# Patient Record
Sex: FEMALE | Race: WHITE | NOT HISPANIC OR LATINO | URBAN - METROPOLITAN AREA
[De-identification: names, ages, dates, MRNs, and addresses within clinical notes are randomized per-mention and may not be internally consistent; named-entity substitution may affect disease eponyms.]

---

## 2017-05-03 ENCOUNTER — OUTPATIENT (OUTPATIENT)
Dept: OUTPATIENT SERVICES | Facility: HOSPITAL | Age: 67
LOS: 1 days | Discharge: HOME | End: 2017-05-03

## 2017-05-03 DIAGNOSIS — Z90.722 ACQUIRED ABSENCE OF OVARIES, BILATERAL: Chronic | ICD-10-CM

## 2017-05-03 DIAGNOSIS — Z98.89 OTHER SPECIFIED POSTPROCEDURAL STATES: Chronic | ICD-10-CM

## 2017-05-03 DIAGNOSIS — C54.1 MALIGNANT NEOPLASM OF ENDOMETRIUM: Chronic | ICD-10-CM

## 2017-05-03 DIAGNOSIS — C54.1 MALIGNANT NEOPLASM OF ENDOMETRIUM: ICD-10-CM

## 2017-05-03 DIAGNOSIS — Z90.710 ACQUIRED ABSENCE OF BOTH CERVIX AND UTERUS: Chronic | ICD-10-CM

## 2017-05-05 ENCOUNTER — RESULT REVIEW (OUTPATIENT)
Age: 67
End: 2017-05-05

## 2017-05-05 ENCOUNTER — APPOINTMENT (OUTPATIENT)
Dept: HEMATOLOGY ONCOLOGY | Facility: CLINIC | Age: 67
End: 2017-05-05

## 2017-05-05 VITALS
DIASTOLIC BLOOD PRESSURE: 78 MMHG | SYSTOLIC BLOOD PRESSURE: 120 MMHG | BODY MASS INDEX: 50.99 KG/M2 | WEIGHT: 293 LBS | RESPIRATION RATE: 17 BRPM | HEART RATE: 88 BPM | TEMPERATURE: 100.2 F

## 2017-05-05 LAB
HCT VFR BLD CALC: 33 % — LOW (ref 34.5–45)
HGB BLD-MCNC: 10.7 G/DL — LOW (ref 11.5–15.5)
MCHC RBC-ENTMCNC: 32.3 G/DL — SIGNIFICANT CHANGE UP (ref 32–36)
MCHC RBC-ENTMCNC: 32.8 PG — SIGNIFICANT CHANGE UP (ref 27–34)
MCV RBC AUTO: 101 FL — HIGH (ref 80–100)
PLATELET # BLD AUTO: 328 K/UL — SIGNIFICANT CHANGE UP (ref 150–400)
RBC # BLD: 3.26 M/UL — LOW (ref 3.8–5.2)
RBC # FLD: 13.4 % — SIGNIFICANT CHANGE UP (ref 10.3–14.5)
WBC # BLD: 9.2 K/UL — SIGNIFICANT CHANGE UP (ref 3.8–10.5)
WBC # FLD AUTO: 9.2 K/UL — SIGNIFICANT CHANGE UP (ref 3.8–10.5)

## 2017-05-09 LAB
ALBUMIN SERPL ELPH-MCNC: 4.2 G/DL
ALP BLD-CCNC: 58 U/L
ALT SERPL-CCNC: 10 U/L
ANION GAP SERPL CALC-SCNC: 23 MMOL/L
AST SERPL-CCNC: 14 U/L
BILIRUB SERPL-MCNC: 0.3 MG/DL
BUN SERPL-MCNC: 22 MG/DL
CALCIUM SERPL-MCNC: 9 MG/DL
CANCER AG125 SERPL-ACNC: 9 U/ML
CHLORIDE SERPL-SCNC: 98 MMOL/L
CO2 SERPL-SCNC: 22 MMOL/L
CREAT SERPL-MCNC: 1.02 MG/DL
GLUCOSE SERPL-MCNC: 128 MG/DL
POTASSIUM SERPL-SCNC: 5.1 MMOL/L
PROT SERPL-MCNC: 6.8 G/DL
SODIUM SERPL-SCNC: 143 MMOL/L

## 2017-05-11 ENCOUNTER — FORM ENCOUNTER (OUTPATIENT)
Age: 67
End: 2017-05-11

## 2017-05-12 ENCOUNTER — APPOINTMENT (OUTPATIENT)
Dept: CT IMAGING | Facility: IMAGING CENTER | Age: 67
End: 2017-05-12

## 2017-05-12 ENCOUNTER — OUTPATIENT (OUTPATIENT)
Dept: OUTPATIENT SERVICES | Facility: HOSPITAL | Age: 67
LOS: 1 days | End: 2017-05-12
Payer: COMMERCIAL

## 2017-05-12 DIAGNOSIS — E78.5 HYPERLIPIDEMIA, UNSPECIFIED: ICD-10-CM

## 2017-05-12 DIAGNOSIS — Z90.722 ACQUIRED ABSENCE OF OVARIES, BILATERAL: Chronic | ICD-10-CM

## 2017-05-12 DIAGNOSIS — Z90.710 ACQUIRED ABSENCE OF BOTH CERVIX AND UTERUS: Chronic | ICD-10-CM

## 2017-05-12 DIAGNOSIS — C54.1 MALIGNANT NEOPLASM OF ENDOMETRIUM: Chronic | ICD-10-CM

## 2017-05-12 DIAGNOSIS — Z98.89 OTHER SPECIFIED POSTPROCEDURAL STATES: Chronic | ICD-10-CM

## 2017-05-12 PROCEDURE — 71260 CT THORAX DX C+: CPT

## 2017-05-12 PROCEDURE — 82565 ASSAY OF CREATININE: CPT

## 2017-05-12 PROCEDURE — 74177 CT ABD & PELVIS W/CONTRAST: CPT

## 2017-05-30 ENCOUNTER — APPOINTMENT (OUTPATIENT)
Dept: GYNECOLOGIC ONCOLOGY | Facility: CLINIC | Age: 67
End: 2017-05-30

## 2017-05-30 VITALS
HEIGHT: 65 IN | WEIGHT: 293 LBS | SYSTOLIC BLOOD PRESSURE: 140 MMHG | DIASTOLIC BLOOD PRESSURE: 80 MMHG | BODY MASS INDEX: 48.82 KG/M2

## 2017-05-30 DIAGNOSIS — R92.8 OTHER ABNORMAL AND INCONCLUSIVE FINDINGS ON DIAGNOSTIC IMAGING OF BREAST: ICD-10-CM

## 2017-07-11 ENCOUNTER — INPATIENT (INPATIENT)
Facility: HOSPITAL | Age: 67
LOS: 8 days | Discharge: HOME | DRG: 603 | End: 2017-07-20
Attending: INTERNAL MEDICINE | Admitting: INTERNAL MEDICINE
Payer: COMMERCIAL

## 2017-07-11 VITALS
SYSTOLIC BLOOD PRESSURE: 117 MMHG | HEART RATE: 97 BPM | TEMPERATURE: 101 F | OXYGEN SATURATION: 97 % | RESPIRATION RATE: 20 BRPM | DIASTOLIC BLOOD PRESSURE: 73 MMHG

## 2017-07-11 DIAGNOSIS — Z90.722 ACQUIRED ABSENCE OF OVARIES, BILATERAL: Chronic | ICD-10-CM

## 2017-07-11 DIAGNOSIS — C54.1 MALIGNANT NEOPLASM OF ENDOMETRIUM: Chronic | ICD-10-CM

## 2017-07-11 DIAGNOSIS — Z98.89 OTHER SPECIFIED POSTPROCEDURAL STATES: Chronic | ICD-10-CM

## 2017-07-11 DIAGNOSIS — L03.90 CELLULITIS, UNSPECIFIED: ICD-10-CM

## 2017-07-11 DIAGNOSIS — Z90.710 ACQUIRED ABSENCE OF BOTH CERVIX AND UTERUS: Chronic | ICD-10-CM

## 2017-07-11 LAB
ALBUMIN SERPL ELPH-MCNC: 3.8 G/DL — SIGNIFICANT CHANGE UP (ref 3.3–5)
ALP SERPL-CCNC: 59 U/L — SIGNIFICANT CHANGE UP (ref 40–120)
ALT FLD-CCNC: 33 U/L RC — SIGNIFICANT CHANGE UP (ref 10–45)
ANION GAP SERPL CALC-SCNC: 17 MMOL/L — SIGNIFICANT CHANGE UP (ref 5–17)
AST SERPL-CCNC: 31 U/L — SIGNIFICANT CHANGE UP (ref 10–40)
BASOPHILS # BLD AUTO: 0 K/UL — SIGNIFICANT CHANGE UP (ref 0–0.2)
BASOPHILS NFR BLD AUTO: 0 % — SIGNIFICANT CHANGE UP (ref 0–2)
BILIRUB SERPL-MCNC: 0.4 MG/DL — SIGNIFICANT CHANGE UP (ref 0.2–1.2)
BUN SERPL-MCNC: 16 MG/DL — SIGNIFICANT CHANGE UP (ref 7–23)
CALCIUM SERPL-MCNC: 8.8 MG/DL — SIGNIFICANT CHANGE UP (ref 8.4–10.5)
CHLORIDE SERPL-SCNC: 98 MMOL/L — SIGNIFICANT CHANGE UP (ref 96–108)
CO2 SERPL-SCNC: 24 MMOL/L — SIGNIFICANT CHANGE UP (ref 22–31)
CREAT SERPL-MCNC: 1.09 MG/DL — SIGNIFICANT CHANGE UP (ref 0.5–1.3)
EOSINOPHIL # BLD AUTO: 0 K/UL — SIGNIFICANT CHANGE UP (ref 0–0.5)
EOSINOPHIL NFR BLD AUTO: 0.2 % — SIGNIFICANT CHANGE UP (ref 0–6)
GAS PNL BLDV: SIGNIFICANT CHANGE UP
GLUCOSE SERPL-MCNC: 168 MG/DL — HIGH (ref 70–99)
HCT VFR BLD CALC: 32.5 % — LOW (ref 34.5–45)
HGB BLD-MCNC: 10.8 G/DL — LOW (ref 11.5–15.5)
LYMPHOCYTES # BLD AUTO: 0.6 K/UL — LOW (ref 1–3.3)
LYMPHOCYTES # BLD AUTO: 8.8 % — LOW (ref 13–44)
MCHC RBC-ENTMCNC: 32.8 PG — SIGNIFICANT CHANGE UP (ref 27–34)
MCHC RBC-ENTMCNC: 33.4 GM/DL — SIGNIFICANT CHANGE UP (ref 32–36)
MCV RBC AUTO: 98.3 FL — SIGNIFICANT CHANGE UP (ref 80–100)
MONOCYTES # BLD AUTO: 0.5 K/UL — SIGNIFICANT CHANGE UP (ref 0–0.9)
MONOCYTES NFR BLD AUTO: 7.3 % — SIGNIFICANT CHANGE UP (ref 2–14)
NEUTROPHILS # BLD AUTO: 6.2 K/UL — SIGNIFICANT CHANGE UP (ref 1.8–7.4)
NEUTROPHILS NFR BLD AUTO: 83.7 % — HIGH (ref 43–77)
PLATELET # BLD AUTO: 244 K/UL — SIGNIFICANT CHANGE UP (ref 150–400)
POTASSIUM SERPL-MCNC: 4.3 MMOL/L — SIGNIFICANT CHANGE UP (ref 3.5–5.3)
POTASSIUM SERPL-SCNC: 4.3 MMOL/L — SIGNIFICANT CHANGE UP (ref 3.5–5.3)
PROT SERPL-MCNC: 6.9 G/DL — SIGNIFICANT CHANGE UP (ref 6–8.3)
RBC # BLD: 3.3 M/UL — LOW (ref 3.8–5.2)
RBC # FLD: 13.5 % — SIGNIFICANT CHANGE UP (ref 10.3–14.5)
SODIUM SERPL-SCNC: 139 MMOL/L — SIGNIFICANT CHANGE UP (ref 135–145)
WBC # BLD: 7.4 K/UL — SIGNIFICANT CHANGE UP (ref 3.8–10.5)
WBC # FLD AUTO: 7.4 K/UL — SIGNIFICANT CHANGE UP (ref 3.8–10.5)

## 2017-07-11 PROCEDURE — 73590 X-RAY EXAM OF LOWER LEG: CPT | Mod: 26,RT

## 2017-07-11 PROCEDURE — 99223 1ST HOSP IP/OBS HIGH 75: CPT

## 2017-07-11 PROCEDURE — 99285 EMERGENCY DEPT VISIT HI MDM: CPT

## 2017-07-11 RX ORDER — VANCOMYCIN HCL 1 G
2000 VIAL (EA) INTRAVENOUS ONCE
Qty: 0 | Refills: 0 | Status: COMPLETED | OUTPATIENT
Start: 2017-07-11 | End: 2017-07-11

## 2017-07-11 RX ORDER — DEXTROSE 50 % IN WATER 50 %
1 SYRINGE (ML) INTRAVENOUS ONCE
Qty: 0 | Refills: 0 | Status: DISCONTINUED | OUTPATIENT
Start: 2017-07-11 | End: 2017-07-20

## 2017-07-11 RX ORDER — SODIUM CHLORIDE 9 MG/ML
1000 INJECTION, SOLUTION INTRAVENOUS
Qty: 0 | Refills: 0 | Status: DISCONTINUED | OUTPATIENT
Start: 2017-07-11 | End: 2017-07-20

## 2017-07-11 RX ORDER — VANCOMYCIN HCL 1 G
VIAL (EA) INTRAVENOUS
Qty: 0 | Refills: 0 | Status: DISCONTINUED | OUTPATIENT
Start: 2017-07-11 | End: 2017-07-11

## 2017-07-11 RX ORDER — GLUCAGON INJECTION, SOLUTION 0.5 MG/.1ML
1 INJECTION, SOLUTION SUBCUTANEOUS ONCE
Qty: 0 | Refills: 0 | Status: DISCONTINUED | OUTPATIENT
Start: 2017-07-11 | End: 2017-07-20

## 2017-07-11 RX ORDER — INSULIN LISPRO 100/ML
VIAL (ML) SUBCUTANEOUS AT BEDTIME
Qty: 0 | Refills: 0 | Status: DISCONTINUED | OUTPATIENT
Start: 2017-07-11 | End: 2017-07-20

## 2017-07-11 RX ORDER — INSULIN LISPRO 100/ML
VIAL (ML) SUBCUTANEOUS
Qty: 0 | Refills: 0 | Status: DISCONTINUED | OUTPATIENT
Start: 2017-07-11 | End: 2017-07-20

## 2017-07-11 RX ORDER — DEXTROSE 50 % IN WATER 50 %
25 SYRINGE (ML) INTRAVENOUS ONCE
Qty: 0 | Refills: 0 | Status: DISCONTINUED | OUTPATIENT
Start: 2017-07-11 | End: 2017-07-20

## 2017-07-11 RX ORDER — SODIUM CHLORIDE 9 MG/ML
1000 INJECTION, SOLUTION INTRAVENOUS ONCE
Qty: 0 | Refills: 0 | Status: DISCONTINUED | OUTPATIENT
Start: 2017-07-11 | End: 2017-07-11

## 2017-07-11 RX ORDER — ACETAMINOPHEN 500 MG
975 TABLET ORAL ONCE
Qty: 0 | Refills: 0 | Status: COMPLETED | OUTPATIENT
Start: 2017-07-11 | End: 2017-07-11

## 2017-07-11 RX ORDER — SODIUM CHLORIDE 9 MG/ML
1000 INJECTION INTRAMUSCULAR; INTRAVENOUS; SUBCUTANEOUS ONCE
Qty: 0 | Refills: 0 | Status: COMPLETED | OUTPATIENT
Start: 2017-07-11 | End: 2017-07-11

## 2017-07-11 RX ORDER — DEXTROSE 50 % IN WATER 50 %
12.5 SYRINGE (ML) INTRAVENOUS ONCE
Qty: 0 | Refills: 0 | Status: DISCONTINUED | OUTPATIENT
Start: 2017-07-11 | End: 2017-07-20

## 2017-07-11 RX ADMIN — Medication 975 MILLIGRAM(S): at 22:17

## 2017-07-11 RX ADMIN — SODIUM CHLORIDE 2000 MILLILITER(S): 9 INJECTION INTRAMUSCULAR; INTRAVENOUS; SUBCUTANEOUS at 21:10

## 2017-07-11 RX ADMIN — Medication 250 MILLIGRAM(S): at 21:15

## 2017-07-11 RX ADMIN — Medication 975 MILLIGRAM(S): at 22:50

## 2017-07-11 NOTE — H&P ADULT - RS GEN PE MLT RESP DETAILS PC
no rales/clear to auscultation bilaterally/no wheezes/respirations non-labored/airway patent/breath sounds equal/good air movement

## 2017-07-11 NOTE — PATIENT PROFILE ADULT. - VISION (WITH CORRECTIVE LENSES IF THE PATIENT USUALLY WEARS THEM):
Normal vision: sees adequately in most situations; can see medication labels, newsprint Normal vision: sees adequately in most situations; can see medication labels, newsprint/uses eyeglasses

## 2017-07-11 NOTE — ED ADULT NURSE NOTE - OBJECTIVE STATEMENT
66F comes to ED c/o right leg swelling, redness and bullae. She states she noticed the redness and bullae today. She has large bullae to medial side of right calf, smaller bullae to right lateral and several smaller areas around right calf. Redness extends to bottom of right heel. She has had N/V/fever/chills x2 days which has resolved. PMH, bullous pemphigoid HTN, hypothyroid, osteoarthritis, DM, hyperlipidemia.  Patient is c/o 5/10 pain to right leg worse with walking. She denies SOB/chest pain/N/V/D/dizziness/numbness/weakness/tingling/abdominal pain/urinary symptoms. Family at bedside. Will continue to monitor. 66F comes to ED c/o right leg swelling, redness and bullae. She states she noticed the redness and bullae today. She has large bullae to medial side of right calf, smaller bullae to right lateral and several smaller areas around right calf. Redness extends to bottom of right heel. She has had N/V/fever/chills x2 days which has resolved. PMH, bullous pemphigoid HTN, hypothyroid, osteoarthritis, DM, hyperlipidemia.  Patient is c/o 5/10 pain to right leg worse with walking. She denies SOB/chest pain/N/V/D/dizziness/numbness/weakness/tingling/abdominal pain/urinary symptoms. She is a&ox4 and does not appear to be in any distress. Family at bedside. Will continue to monitor.

## 2017-07-11 NOTE — H&P ADULT - ASSESSMENT
67 yo woman with PMH of Uterine ca (s/p chemo and completed in April 2016 reported to be in Remission), Bullous Pemphigoid (diagnosed 2014 on Prednisone), HTN, HLD, DMT2, Osteoarthritis presenting with 1 day of worsening right leg redness, swelling and worsening bullae of right leg. 65 yo woman with PMH of Uterine ca (s/p chemo and completed in April 2016 reported to be in Remission), Bullous Pemphigoid (diagnosed 2014 on Prednisone), HTN, HLD, DMT2, Osteoarthritis presenting with 1 day of worsening right leg redness, swelling and worsening bullae of right leg concerning for cellulitis.

## 2017-07-11 NOTE — H&P ADULT - PROBLEM SELECTOR PLAN 5
Continue with pain management with Tramadol prn.  Patient endorse rare Vicodin use.  ISTOP # #: [id number] Continue with pain management with Tramadol prn. (Will have to use immediate release dose as only option inpatient formulary)  Patient endorse rare Vicodin use.  ISTOP # #: [id number]

## 2017-07-11 NOTE — ED PROVIDER NOTE - SKIN, MLM
RLE: extensive erythema, warmth, and mild tenderness over right anterior calf extending to lateral and posterior. No crepitus. Large flaccid bullous over right medial calf, small bullae over right lateral calf.

## 2017-07-11 NOTE — H&P ADULT - PMH
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  On Immunosupressants  Cellcept  Endometrial cancer  dx: 8/2015:  High grade Endometroid Adenocarcinoma  History of osteoarthritis    Hyperlipidemia    Hypertension    Hypothyroidism    Morbid obesity  BMI:  53.6  Type 2 diabetes mellitus    Vitamin D deficiency

## 2017-07-11 NOTE — PATIENT PROFILE ADULT. - NS TRANSFER PATIENT BELONGINGS
Clothing/Jewelry/Money (specify)/Cell Phone/PDA (specify)/tablet, earrings Clothing/Jewelry/Money (specify)/Cell Phone/PDA (specify)/tablet, earrings, pocket book (black), sneakers

## 2017-07-11 NOTE — H&P ADULT - ATTENDING COMMENTS
Dr. Remi Olvera accepted patient's case from the ED and requested hospitalist team to complete admission. Patient previously unknown to me and I was assigned case. Dr. Remi Olvera/[facility] team to continue care. Dr. Remi Olvera accepted patient's case from the ED and requested hospitalist team to complete admission. Patient previously unknown to me and I was assigned case. Dr. Remi Olvera/[facility] team to continue care. Sign out given Night NP. Dr. Remi Olvera accepted patient's case from the ED and requested hospitalist team to complete admission. Patient previously unknown to me and I was assigned case. Dr. Remi Olvera/[facility] team to continue care. Sign out given Night NPJigar.

## 2017-07-11 NOTE — H&P ADULT - NSHPLABSRESULTS_GEN_ALL_CORE
Personally reviewed labs an noted in detail below    Personally reviewed EKG SR 77 bpm  QTc 457 Comparable to Dec 2015 EKG    Prelim Read of : XRay of Right Tibia and Fibula "No tracking soft tissue gas collection. Dystrophic calcifications within the subcutaneous soft tissues of the right lower leg. Diffuse soft tissue swelling. No definite cortical erosion to suggest osteomyelitis. Right knee arthrosis most severe in the medial compartment. "

## 2017-07-11 NOTE — H&P ADULT - HISTORY OF PRESENT ILLNESS
Pleasant, 65 yo woman with PMH of Uterine ca (s/p chemo and completed in April 2016 reported to be in Remission), Bullous Pemphigoid (diagnosed 2014 on Prednisone), HTN, HLD, DMT2, Osteoarthritis presenting with 1 day of worsening right leg redness, swelling and worsening bullae of right leg. Endorses right leg discomfort that is difficult for her to describe but denies burning or pain when bearing weight on the right leg. She states that bullous pemphigoid typically affects the right leg recently and states that they were not as enlarged and fluid filled in prior days. She has been on Prednisone 10 mg day (the past 4-5 weeks). This dose has been increased from 5 mg in setting of increased bullae. Patient also takes Daptomycin and Niacin daily for pemphigoid management. Patient denies subjective fevers, chills, sweats. Endorsed symptoms of fever, vomiting without diarrhea a few days ago that is currently resolved. Denies URI symptoms, CP, SOB, palpitations, development of new rashes. Chronic pain 2/2 to osteoarthritis. Denies recent trauma, recent travel.

## 2017-07-11 NOTE — ED PROVIDER NOTE - CONSTITUTIONAL, MLM
normal... Morbidly obese, awake, alert, oriented to person, place, time/situation and in no apparent distress.

## 2017-07-11 NOTE — ED PROVIDER NOTE - PMH
Adrenal mass  2014 incidental findings 2014  Ct SCAN 9/2015 unchannged  24 hour urine for VMA done by Dr DR Canas [id number] Neg asper patient  Anemia    Bullous pemphigoid  dx: 8/2014.  On Immunosupressants  Cellcept  Endometrial cancer  dx: 8/2015:  High grade Endometroid Adenocarcinoma  History of osteoarthritis    Hyperlipidemia    Hypertension    Hypothyroidism    Morbid obesity  BMI:  53.6  Type 2 diabetes mellitus    Vitamin D deficiency

## 2017-07-11 NOTE — H&P ADULT - SKIN COMMENTS
RLE: bright red erythema with warmth encompassing  top of shin to ankle and medial aspect of foot. Large ~10 cm flacid full bullous over right medial calf  Smaller ~5 cm flaccid bullosu over left lateral calf Mild TTP to shin.

## 2017-07-11 NOTE — ED PROVIDER NOTE - MEDICAL DECISION MAKING DETAILS
Attending MD Garcia: 66F with bullous pemphigoid on chronic po prednisone, DM presenting with right leg redness, swelling and pain, exam consistent with cellulitis, has associated bullae that are likely superinfected, do not suspect necrotizing fasciitis. Given extent of erythema and chronic relative immunosuppression, will admit for IV abx

## 2017-07-11 NOTE — ED PROVIDER NOTE - OBJECTIVE STATEMENT
65 y/o female with pmhx of bullous pemphigoid, DM, HLD, HTN, and OA presents with c/o a painful fluid filled blister on her right leg with erythema that developed today. States her weight is 295 lb. Denies fever, chills, or any other complaints.     Medications:  niacin  BID  prednisone BID    PMD:  Dr. Ross  [facility]    Dermatologist:  Dr. Dea Silva

## 2017-07-11 NOTE — H&P ADULT - PROBLEM SELECTOR PLAN 4
Relative hypotension. Patient took Lisinopril   will hold HTN meds pending improve hemodynamic stablity

## 2017-07-11 NOTE — ED ADULT NURSE NOTE - CHPI ED SYMPTOMS NEG
no dizziness/no numbness/no fever/no tingling/no decreased eating/drinking/no chills/no weakness/no vomiting/no nausea

## 2017-07-11 NOTE — ED ADULT NURSE NOTE - PSH
Endometrial cancer  2015:  Endometrial Biopsy: dx: High Grade Endometroid Adenocarcinoma  History of  section  -95:  4 X  S/P BSO (bilateral salpingo-oophorectomy)    S/P hysterectomy    Status post total thyroidectomy  2001

## 2017-07-11 NOTE — H&P ADULT - PROBLEM SELECTOR PLAN 1
Patient chronically on Doxycycline and Prednisone as outpatient.   Physical exam concerning for cellulitis and possibility of superinfected bullae.  Patient with fever in ED. Labs without leukocytosis, but lactate 2.4  IV vancomycin initiated in ED. Continue with IV vancomycin. Monitor Vancomycin trough.  As patient diabetic, concern for pseudomonal involvement. Will cover with Aztreonam for now. Pharmacy reviewed and approved use of abx  Primary day team to consider ID consult and Derm consult in AM Patient chronically on Doxycycline and Prednisone as outpatient.   Physical exam concerning for cellulitis and possibility of superinfected bullae.  Patient with fever in ED. Labs without leukocytosis, but lactate 2.4  IV vancomycin initiated in ED. Continue with IV vancomycin. Monitor Vancomycin trough.  As patient diabetic, concern for pseudomonal involvement. Will cover with Aztreonam for now. Pharmacy reviewed and approved use of abx  Continue IV fluids  Trend Lactate  F/U Cultures  Primary day team to consider ID consult and Derm consult in AM

## 2017-07-11 NOTE — ED ADULT NURSE NOTE - PMH
Adrenal mass  2014 incidental findings 2014  Ct SCAN 9/2015 unchannged  24 hour urine for VMA done by Dr DR Canas [id number] Neg asper patient  Anemia    Bullous pemphigoid  dx: 8/2014.  On Immunosupressants  Cellcept  Endometrial cancer  dx: 8/2015:  High grade Endometroid Adenocarcinoma  History of osteoarthritis    Hyperlipidemia    Hypertension    Hypothyroidism    Morbid obesity  BMI:  53.6  Type 2 diabetes mellitus    Vitamin D deficiency

## 2017-07-12 DIAGNOSIS — Z87.39 PERSONAL HISTORY OF OTHER DISEASES OF THE MUSCULOSKELETAL SYSTEM AND CONNECTIVE TISSUE: ICD-10-CM

## 2017-07-12 DIAGNOSIS — L12.0 BULLOUS PEMPHIGOID: ICD-10-CM

## 2017-07-12 DIAGNOSIS — E11.9 TYPE 2 DIABETES MELLITUS WITHOUT COMPLICATIONS: ICD-10-CM

## 2017-07-12 DIAGNOSIS — Z29.9 ENCOUNTER FOR PROPHYLACTIC MEASURES, UNSPECIFIED: ICD-10-CM

## 2017-07-12 DIAGNOSIS — L03.115 CELLULITIS OF RIGHT LOWER LIMB: ICD-10-CM

## 2017-07-12 DIAGNOSIS — I10 ESSENTIAL (PRIMARY) HYPERTENSION: ICD-10-CM

## 2017-07-12 DIAGNOSIS — L03.90 CELLULITIS, UNSPECIFIED: ICD-10-CM

## 2017-07-12 LAB
ANION GAP SERPL CALC-SCNC: 15 MMOL/L — SIGNIFICANT CHANGE UP (ref 5–17)
BASOPHILS # BLD AUTO: 0.02 K/UL — SIGNIFICANT CHANGE UP (ref 0–0.2)
BASOPHILS NFR BLD AUTO: 0.3 % — SIGNIFICANT CHANGE UP (ref 0–2)
BUN SERPL-MCNC: 17 MG/DL — SIGNIFICANT CHANGE UP (ref 7–23)
CALCIUM SERPL-MCNC: 7.4 MG/DL — LOW (ref 8.4–10.5)
CHLORIDE SERPL-SCNC: 103 MMOL/L — SIGNIFICANT CHANGE UP (ref 96–108)
CO2 SERPL-SCNC: 21 MMOL/L — LOW (ref 22–31)
CREAT SERPL-MCNC: 0.89 MG/DL — SIGNIFICANT CHANGE UP (ref 0.5–1.3)
EOSINOPHIL # BLD AUTO: 0.06 K/UL — SIGNIFICANT CHANGE UP (ref 0–0.5)
EOSINOPHIL NFR BLD AUTO: 1 % — SIGNIFICANT CHANGE UP (ref 0–6)
GLUCOSE SERPL-MCNC: 102 MG/DL — HIGH (ref 70–99)
HBA1C BLD-MCNC: 6.2 % — HIGH (ref 4–5.6)
HCT VFR BLD CALC: 29.6 % — LOW (ref 34.5–45)
HGB BLD-MCNC: 9.3 G/DL — LOW (ref 11.5–15.5)
IMM GRANULOCYTES NFR BLD AUTO: 0.3 % — SIGNIFICANT CHANGE UP (ref 0–1.5)
LACTATE SERPL-SCNC: 2.1 MMOL/L — HIGH (ref 0.7–2)
LYMPHOCYTES # BLD AUTO: 1.04 K/UL — SIGNIFICANT CHANGE UP (ref 1–3.3)
LYMPHOCYTES # BLD AUTO: 17.2 % — SIGNIFICANT CHANGE UP (ref 13–44)
MAGNESIUM SERPL-MCNC: 1.6 MG/DL — SIGNIFICANT CHANGE UP (ref 1.6–2.6)
MCHC RBC-ENTMCNC: 30.3 PG — SIGNIFICANT CHANGE UP (ref 27–34)
MCHC RBC-ENTMCNC: 31.4 GM/DL — LOW (ref 32–36)
MCV RBC AUTO: 96.4 FL — SIGNIFICANT CHANGE UP (ref 80–100)
MONOCYTES # BLD AUTO: 0.62 K/UL — SIGNIFICANT CHANGE UP (ref 0–0.9)
MONOCYTES NFR BLD AUTO: 10.2 % — SIGNIFICANT CHANGE UP (ref 2–14)
NEUTROPHILS # BLD AUTO: 4.3 K/UL — SIGNIFICANT CHANGE UP (ref 1.8–7.4)
NEUTROPHILS NFR BLD AUTO: 71 % — SIGNIFICANT CHANGE UP (ref 43–77)
PHOSPHATE SERPL-MCNC: 1.8 MG/DL — LOW (ref 2.5–4.5)
PLATELET # BLD AUTO: 234 K/UL — SIGNIFICANT CHANGE UP (ref 150–400)
POTASSIUM SERPL-MCNC: 4.1 MMOL/L — SIGNIFICANT CHANGE UP (ref 3.5–5.3)
POTASSIUM SERPL-SCNC: 4.1 MMOL/L — SIGNIFICANT CHANGE UP (ref 3.5–5.3)
RBC # BLD: 3.07 M/UL — LOW (ref 3.8–5.2)
RBC # FLD: 15.1 % — HIGH (ref 10.3–14.5)
SODIUM SERPL-SCNC: 139 MMOL/L — SIGNIFICANT CHANGE UP (ref 135–145)
WBC # BLD: 6.06 K/UL — SIGNIFICANT CHANGE UP (ref 3.8–10.5)
WBC # FLD AUTO: 6.06 K/UL — SIGNIFICANT CHANGE UP (ref 3.8–10.5)

## 2017-07-12 PROCEDURE — 99254 IP/OBS CNSLTJ NEW/EST MOD 60: CPT | Mod: GC

## 2017-07-12 PROCEDURE — 99223 1ST HOSP IP/OBS HIGH 75: CPT | Mod: GC

## 2017-07-12 RX ORDER — AZTREONAM 2 G
VIAL (EA) INJECTION
Qty: 0 | Refills: 0 | Status: DISCONTINUED | OUTPATIENT
Start: 2017-07-12 | End: 2017-07-12

## 2017-07-12 RX ORDER — ACETAMINOPHEN 500 MG
2 TABLET ORAL
Qty: 0 | Refills: 0 | COMMUNITY

## 2017-07-12 RX ORDER — SODIUM CHLORIDE 9 MG/ML
1000 INJECTION INTRAMUSCULAR; INTRAVENOUS; SUBCUTANEOUS
Qty: 0 | Refills: 0 | Status: COMPLETED | OUTPATIENT
Start: 2017-07-12 | End: 2017-07-12

## 2017-07-12 RX ORDER — LEVOTHYROXINE SODIUM 125 MCG
150 TABLET ORAL DAILY
Qty: 0 | Refills: 0 | Status: DISCONTINUED | OUTPATIENT
Start: 2017-07-12 | End: 2017-07-20

## 2017-07-12 RX ORDER — AZTREONAM 2 G
2000 VIAL (EA) INJECTION EVERY 8 HOURS
Qty: 0 | Refills: 0 | Status: DISCONTINUED | OUTPATIENT
Start: 2017-07-12 | End: 2017-07-12

## 2017-07-12 RX ORDER — ATORVASTATIN CALCIUM 80 MG/1
10 TABLET, FILM COATED ORAL AT BEDTIME
Qty: 0 | Refills: 0 | Status: DISCONTINUED | OUTPATIENT
Start: 2017-07-12 | End: 2017-07-20

## 2017-07-12 RX ORDER — NIACIN 50 MG
500 TABLET ORAL
Qty: 0 | Refills: 0 | Status: DISCONTINUED | OUTPATIENT
Start: 2017-07-12 | End: 2017-07-20

## 2017-07-12 RX ORDER — CEFAZOLIN SODIUM 1 G
VIAL (EA) INJECTION
Qty: 0 | Refills: 0 | Status: DISCONTINUED | OUTPATIENT
Start: 2017-07-12 | End: 2017-07-13

## 2017-07-12 RX ORDER — CEFAZOLIN SODIUM 1 G
2000 VIAL (EA) INJECTION EVERY 8 HOURS
Qty: 0 | Refills: 0 | Status: DISCONTINUED | OUTPATIENT
Start: 2017-07-12 | End: 2017-07-13

## 2017-07-12 RX ORDER — AZTREONAM 2 G
2000 VIAL (EA) INJECTION ONCE
Qty: 0 | Refills: 0 | Status: COMPLETED | OUTPATIENT
Start: 2017-07-12 | End: 2017-07-12

## 2017-07-12 RX ORDER — SODIUM CHLORIDE 9 MG/ML
1000 INJECTION INTRAMUSCULAR; INTRAVENOUS; SUBCUTANEOUS
Qty: 0 | Refills: 0 | Status: DISCONTINUED | OUTPATIENT
Start: 2017-07-12 | End: 2017-07-20

## 2017-07-12 RX ORDER — METFORMIN HYDROCHLORIDE 850 MG/1
1 TABLET ORAL
Qty: 0 | Refills: 0 | COMMUNITY

## 2017-07-12 RX ORDER — TRAMADOL HYDROCHLORIDE 50 MG/1
100 TABLET ORAL EVERY 8 HOURS
Qty: 0 | Refills: 0 | Status: DISCONTINUED | OUTPATIENT
Start: 2017-07-12 | End: 2017-07-15

## 2017-07-12 RX ORDER — FOLIC ACID 0.8 MG
0 TABLET ORAL
Qty: 0 | Refills: 0 | COMMUNITY

## 2017-07-12 RX ORDER — VANCOMYCIN HCL 1 G
1250 VIAL (EA) INTRAVENOUS EVERY 12 HOURS
Qty: 0 | Refills: 0 | Status: DISCONTINUED | OUTPATIENT
Start: 2017-07-12 | End: 2017-07-12

## 2017-07-12 RX ORDER — VANCOMYCIN HCL 1 G
1750 VIAL (EA) INTRAVENOUS EVERY 12 HOURS
Qty: 0 | Refills: 0 | Status: DISCONTINUED | OUTPATIENT
Start: 2017-07-12 | End: 2017-07-12

## 2017-07-12 RX ORDER — ATORVASTATIN CALCIUM 80 MG/1
1 TABLET, FILM COATED ORAL
Qty: 0 | Refills: 0 | COMMUNITY

## 2017-07-12 RX ORDER — CEFAZOLIN SODIUM 1 G
2000 VIAL (EA) INJECTION ONCE
Qty: 0 | Refills: 0 | Status: COMPLETED | OUTPATIENT
Start: 2017-07-12 | End: 2017-07-12

## 2017-07-12 RX ORDER — NIACIN 50 MG
0 TABLET ORAL
Qty: 0 | Refills: 0 | COMMUNITY

## 2017-07-12 RX ORDER — LISINOPRIL 2.5 MG/1
0 TABLET ORAL
Qty: 0 | Refills: 0 | COMMUNITY

## 2017-07-12 RX ADMIN — Medication 166.67 MILLIGRAM(S): at 10:04

## 2017-07-12 RX ADMIN — TRAMADOL HYDROCHLORIDE 100 MILLIGRAM(S): 50 TABLET ORAL at 05:31

## 2017-07-12 RX ADMIN — Medication 500 MILLIGRAM(S): at 18:08

## 2017-07-12 RX ADMIN — SODIUM CHLORIDE 60 MILLILITER(S): 9 INJECTION INTRAMUSCULAR; INTRAVENOUS; SUBCUTANEOUS at 11:00

## 2017-07-12 RX ADMIN — Medication 100 MILLIGRAM(S): at 12:01

## 2017-07-12 RX ADMIN — Medication 100 MILLIGRAM(S): at 02:03

## 2017-07-12 RX ADMIN — Medication 1: at 12:54

## 2017-07-12 RX ADMIN — Medication 150 MICROGRAM(S): at 05:31

## 2017-07-12 RX ADMIN — Medication 2: at 18:00

## 2017-07-12 RX ADMIN — ATORVASTATIN CALCIUM 10 MILLIGRAM(S): 80 TABLET, FILM COATED ORAL at 22:54

## 2017-07-12 RX ADMIN — TRAMADOL HYDROCHLORIDE 100 MILLIGRAM(S): 50 TABLET ORAL at 14:11

## 2017-07-12 RX ADMIN — SODIUM CHLORIDE 125 MILLILITER(S): 9 INJECTION INTRAMUSCULAR; INTRAVENOUS; SUBCUTANEOUS at 00:49

## 2017-07-12 RX ADMIN — TRAMADOL HYDROCHLORIDE 100 MILLIGRAM(S): 50 TABLET ORAL at 23:29

## 2017-07-12 RX ADMIN — Medication 500 MILLIGRAM(S): at 06:01

## 2017-07-12 RX ADMIN — Medication 10 MILLIGRAM(S): at 05:31

## 2017-07-12 RX ADMIN — Medication 100 MILLIGRAM(S): at 18:08

## 2017-07-12 NOTE — CONSULT NOTE ADULT - ASSESSMENT
Pleasant, 67 yo woman with PMH of Uterine ca (s/p chemo and completed in April 2016 reported to be in Remission), Bullous Pemphigoid (diagnosed 2014 on Prednisone), HTN, HLD, DMT2, Osteoarthritis presenting with 1 day of worsening right leg redness. Pleasant, 65 yo woman with PMH of Uterine ca (s/p chemo and completed in April 2016 reported to be in Remission), Bullous Pemphigoid (diagnosed 2014 on Prednisone), HTN, HLD, DMT2, Osteoarthritis presenting with 1 day of worsening right leg redness. Pt with temp of 100.5. Examination notable for multiple RLE bullae with surrounding area of erythema and warmth. Pleasant, 65 yo woman with PMH of Uterine ca (s/p chemo and completed in April 2016 reported to be in Remission), Bullous Pemphigoid (diagnosed 2014 on Prednisone), HTN, HLD, DMT2, Osteoarthritis presenting with 1 day of worsening right leg redness and bullae. Pt with temp of 100.5, however w/o leukocytosis. Examination notable for multiple RLE bullae with surrounding area of erythema and warmth. Pleasant, 65 yo woman with PMH of Uterine ca (s/p chemo and completed in April 2016 reported to be in Remission), Bullous Pemphigoid (diagnosed 2014 on Prednisone), HTN, HLD, DMT2, Osteoarthritis presenting with 1 day of worsening right leg redness and bullae. Pt with temp of 100.5, however w/o leukocytosis. Examination notable for multiple RLE bullae with surrounding area of erythema and warmth, concerning for superimposed cellulitis.

## 2017-07-12 NOTE — CONSULT NOTE ADULT - PROBLEM SELECTOR RECOMMENDATION 9
Appears well controlled over all  -continue with prednisone 10mg/d  -continue with doxy at prior dosing once discharged or antibiotic course completed per ID  -continue niacin  -f/u with outside dermatologist after discharge

## 2017-07-12 NOTE — CONSULT NOTE ADULT - SUBJECTIVE AND OBJECTIVE BOX
HPI:  66F h/o Uterine ca (s/p chemo and completed in 2016 reported to be in Remission), Bullous Pemphigoid ( followed by Derm Dr. Silva, diagnosed  on Prednisone 40 initially, more recently between 5-10mg, also on doxy/niacin as outpatient) presenting with 1 day of worsening right leg redness, swelling and worsening bullae of right leg. Patient reports that initially her BP was all over her body, very itchy. Biopsies established the diagnosis. Recently she has not had any bullae form anywhere but her R leg. In the past outbreaks were not associated with fever, but this time she had fever and nausea/v. It is also itchy.      PAST MEDICAL & SURGICAL HISTORY:  Adrenal mass:  incidental findings   Ct SCAN 2015 unchannged  24 hour urine for VMA done by Dr DR Canas [phone] Neg asper patient  Hyperlipidemia  Endometrial cancer: dx: 2015:  High grade Endometroid Adenocarcinoma  Morbid obesity: BMI:  53.6  Vitamin D deficiency  Hypothyroidism  Anemia  History of osteoarthritis  Type 2 diabetes mellitus  Hypertension  Bullous pemphigoid: dx: 2014.  On Immunosupressants  Cellcept  S/P BSO (bilateral salpingo-oophorectomy)  S/P hysterectomy  Endometrial cancer: 2015:  Endometrial Biopsy: dx: High Grade Endometroid Adenocarcinoma  Status post total thyroidectomy: &#x27; 2001  History of  section: &#x27; 82-&#x27;95:  4 X      REVIEW OF SYSTEMS      General: no lethary	    Skin/Breast: see HPI  	  Ophthalmologic: no eye pain or change in vision  	  ENMT: no dysphagia or change in hearing    Respiratory and Thorax: no SOB or cough  	  Cardiovascular: no palpitations or chest pain    Gastrointestinal: no abdomenal pain or blood in stool     Genitourinary: no dysuria or frequency    Musculoskeletal: no joint pains or weakness	    Neurological:no weakness, numbness , or tingling    MEDICATIONS  (STANDING):  insulin lispro (HumaLOG) corrective regimen sliding scale   SubCutaneous three times a day before meals  insulin lispro (HumaLOG) corrective regimen sliding scale   SubCutaneous at bedtime  dextrose 5%. 1000 milliLiter(s) (50 mL/Hr) IV Continuous <Continuous>  dextrose 50% Injectable 12.5 Gram(s) IV Push once  dextrose 50% Injectable 25 Gram(s) IV Push once  dextrose 50% Injectable 25 Gram(s) IV Push once  atorvastatin 10 milliGRAM(s) Oral at bedtime  niacin  milliGRAM(s) Oral two times a day  levothyroxine 150 MICROGram(s) Oral daily  predniSONE   Tablet 10 milliGRAM(s) Oral daily  sodium chloride 0.9%. 1000 milliLiter(s) (60 mL/Hr) IV Continuous <Continuous>  ceFAZolin   IVPB   IV Intermittent   ceFAZolin   IVPB 2000 milliGRAM(s) IV Intermittent once  ceFAZolin   IVPB 2000 milliGRAM(s) IV Intermittent every 8 hours    MEDICATIONS  (PRN):  dextrose Gel 1 Dose(s) Oral once PRN Blood Glucose LESS THAN 70 milliGRAM(s)/deciliter  glucagon  Injectable 1 milliGRAM(s) IntraMuscular once PRN Glucose LESS THAN 70 milligrams/deciliter  traMADol 100 milliGRAM(s) Oral every 8 hours PRN Moderate-Severe Pain      Allergies    daptomycin (Vomiting)  penicillin (Pruritus; Rash; Hives)    FAMILY HISTORY:  Family history of lymphoma (Father): father      Vital Signs Last 24 Hrs  T(C): 36.4 (2017 07:30), Max: 38.1 (2017 20:31)  T(F): 97.6 (2017 07:30), Max: 100.5 (2017 20:31)  HR: 71 (2017 07:30) (71 - 97)  BP: 96/66 (2017 07:30) (96/59 - 117/73)  BP(mean): --  RR: 19 (2017 07:30) (18 - 20)  SpO2: 96% (2017 07:30) (96% - 97%)    PHYSICAL EXAM:     The patient was alert and oriented X 3, well nourished, and in no  apparent distress.  OP showed no ulcerations  There was no visible lymphadenopathy.  Conjunctiva were non injected  There was no clubbing or edema of extremities.  The scalp, hair, face, eyebrows, lips, OP, neck, chest, back,   extremities X 4, nails were examined.  There was no hyperhidrosis or bromhidrosis.    Of note on skin exam:     R leg edematous    R shin with erythema and scale from mid anterior shin to superior ankle  Large flaccid bullae with serous fluid on medial and lateral shin      LABS:                        9.3    6.06  )-----------( 234      ( 2017 07:30 )             29.6     07-12    139  |  103  |  17  ----------------------------<  102<H>  4.1   |  21<L>  |  0.89    Ca    7.4<L>      2017 07:28  Phos  1.8     07-12  Mg     1.6     07-12    TPro  6.9  /  Alb  3.8  /  TBili  0.4  /  DBili  x   /  AST  31  /  ALT  33  /  AlkPhos  59  07-11

## 2017-07-12 NOTE — PROGRESS NOTE ADULT - PROBLEM SELECTOR PLAN 1
Patient chronically on Doxycycline and Prednisone as outpatient.   Physical exam concerning for cellulitis and possibility of superinfected bullae.  Vanco/aztreonam  Continue IV fluids  Trend Lactate  F/U Cultures  Appreciate ID

## 2017-07-12 NOTE — PROGRESS NOTE ADULT - SUBJECTIVE AND OBJECTIVE BOX
Patient is a 66y old  Female who presents with a chief complaint of RLE redness, swelling (11 Jul 2017 23:54)      SUBJECTIVE / OVERNIGHT EVENTS:    Not in discomfort.  Mild pain in rt foot and distal rt foreleg    Vital Signs Last 24 Hrs  T(C): 36.4 (12 Jul 2017 07:30), Max: 38.1 (11 Jul 2017 20:31)  T(F): 97.6 (12 Jul 2017 07:30), Max: 100.5 (11 Jul 2017 20:31)  HR: 71 (12 Jul 2017 07:30) (71 - 97)  BP: 96/66 (12 Jul 2017 07:30) (96/59 - 117/73)  BP(mean): --  RR: 19 (12 Jul 2017 07:30) (18 - 20)  SpO2: 96% (12 Jul 2017 07:30) (96% - 97%)  I&O's Summary      GENERAL: NAD, AAOx3  HEAD:  Atraumatic, Normocephalic  EYES: EOMI  NECK: Supple, No JVD  CHEST/LUNG: Clear to auscultation bilaterally; No wheeze  HEART: Regular rate and rhythm; No murmurs, rubs, or gallops  ABDOMEN: Soft, Nontender, Nondistended; Bowel sounds present  EXTREMITIES:  2+ Peripheral Pulses, No clubbing, cyanosis, or edema  SKIN: a large blister on medial and lateral rt foreleg, 2 more smaller ones on posterior calf; erythema around all from middle of foreleg to ankle with attendant 2+ edema  NEURO: nonfocal CN/motor/sensory/reflexes    LABS:                        9.3    6.06  )-----------( 234      ( 12 Jul 2017 07:30 )             29.6     07-12    139  |  103  |  17  ----------------------------<  102<H>  4.1   |  21<L>  |  0.89    Ca    7.4<L>      12 Jul 2017 07:28  Phos  1.8     07-12  Mg     1.6     07-12    TPro  6.9  /  Alb  3.8  /  TBili  0.4  /  DBili  x   /  AST  31  /  ALT  33  /  AlkPhos  59  07-11      CAPILLARY BLOOD GLUCOSE  163 (12 Jul 2017 12:29)  127 (12 Jul 2017 09:14)                RADIOLOGY & ADDITIONAL TESTS:    Imaging Personally Reviewed:  [x] YES  [ ] NO    Consultant(s) Notes Reviewed:  [x] YES  [ ] NO      MEDICATIONS  (STANDING):  insulin lispro (HumaLOG) corrective regimen sliding scale   SubCutaneous three times a day before meals  insulin lispro (HumaLOG) corrective regimen sliding scale   SubCutaneous at bedtime  dextrose 5%. 1000 milliLiter(s) (50 mL/Hr) IV Continuous <Continuous>  dextrose 50% Injectable 12.5 Gram(s) IV Push once  dextrose 50% Injectable 25 Gram(s) IV Push once  dextrose 50% Injectable 25 Gram(s) IV Push once  atorvastatin 10 milliGRAM(s) Oral at bedtime  niacin  milliGRAM(s) Oral two times a day  levothyroxine 150 MICROGram(s) Oral daily  predniSONE   Tablet 10 milliGRAM(s) Oral daily  aztreonam  IVPB   IV Intermittent   aztreonam  IVPB 2000 milliGRAM(s) IV Intermittent every 8 hours  vancomycin  IVPB 1250 milliGRAM(s) IV Intermittent every 12 hours  sodium chloride 0.9%. 1000 milliLiter(s) (60 mL/Hr) IV Continuous <Continuous>    MEDICATIONS  (PRN):  dextrose Gel 1 Dose(s) Oral once PRN Blood Glucose LESS THAN 70 milliGRAM(s)/deciliter  glucagon  Injectable 1 milliGRAM(s) IntraMuscular once PRN Glucose LESS THAN 70 milligrams/deciliter  traMADol 100 milliGRAM(s) Oral every 8 hours PRN Moderate-Severe Pain      Care Discussed with Consultants/Other Providers [x] YES  [ ] NO    HEALTH ISSUES - PROBLEM Dx:  Prophylactic measure: Prophylactic measure  History of osteoarthritis: History of osteoarthritis  Hypertension: Hypertension  Type 2 diabetes mellitus without complication, unspecified long term insulin use status: Type 2 diabetes mellitus without complication, unspecified long term insulin use status  Bullous pemphigoid: Bullous pemphigoid  Cellulitis of right lower extremity: Cellulitis of right lower extremity Patient is a 66y old  Female who presents with a chief complaint of RLE redness, swelling (11 Jul 2017 23:54)      SUBJECTIVE / OVERNIGHT EVENTS:    Not in discomfort.  Mild pain in rt foot and distal rt foreleg    Vital Signs Last 24 Hrs  T(C): 36.4 (12 Jul 2017 07:30), Max: 38.1 (11 Jul 2017 20:31)  T(F): 97.6 (12 Jul 2017 07:30), Max: 100.5 (11 Jul 2017 20:31)  HR: 71 (12 Jul 2017 07:30) (71 - 97)  BP: 96/66 (12 Jul 2017 07:30) (96/59 - 117/73)  BP(mean): --  RR: 19 (12 Jul 2017 07:30) (18 - 20)  SpO2: 96% (12 Jul 2017 07:30) (96% - 97%)  I&O's Summary      GENERAL: NAD, AAOx3  HEAD:  Atraumatic, Normocephalic  EYES: EOMI  NECK: Supple, No JVD  CHEST/LUNG: Clear to auscultation bilaterally; No wheeze  HEART: Regular rate and rhythm; No murmurs, rubs, or gallops  ABDOMEN: Soft, Nontender, Nondistended; Bowel sounds present  EXTREMITIES:  2+ LE  edema b/l  SKIN: a large blister on medial and lateral rt foreleg, 2 more smaller ones on posterior calf; erythema around all from middle of foreleg to ankle with attendant 2+ edema  NEURO: nonfocal CN/motor/sensory/reflexes    LABS:                        9.3    6.06  )-----------( 234      ( 12 Jul 2017 07:30 )             29.6     07-12    139  |  103  |  17  ----------------------------<  102<H>  4.1   |  21<L>  |  0.89    Ca    7.4<L>      12 Jul 2017 07:28  Phos  1.8     07-12  Mg     1.6     07-12    TPro  6.9  /  Alb  3.8  /  TBili  0.4  /  DBili  x   /  AST  31  /  ALT  33  /  AlkPhos  59  07-11      CAPILLARY BLOOD GLUCOSE  163 (12 Jul 2017 12:29)  127 (12 Jul 2017 09:14)                RADIOLOGY & ADDITIONAL TESTS:    Imaging Personally Reviewed:  [x] YES  [ ] NO    Consultant(s) Notes Reviewed:  [x] YES  [ ] NO      MEDICATIONS  (STANDING):  insulin lispro (HumaLOG) corrective regimen sliding scale   SubCutaneous three times a day before meals  insulin lispro (HumaLOG) corrective regimen sliding scale   SubCutaneous at bedtime  dextrose 5%. 1000 milliLiter(s) (50 mL/Hr) IV Continuous <Continuous>  dextrose 50% Injectable 12.5 Gram(s) IV Push once  dextrose 50% Injectable 25 Gram(s) IV Push once  dextrose 50% Injectable 25 Gram(s) IV Push once  atorvastatin 10 milliGRAM(s) Oral at bedtime  niacin  milliGRAM(s) Oral two times a day  levothyroxine 150 MICROGram(s) Oral daily  predniSONE   Tablet 10 milliGRAM(s) Oral daily  aztreonam  IVPB   IV Intermittent   aztreonam  IVPB 2000 milliGRAM(s) IV Intermittent every 8 hours  vancomycin  IVPB 1250 milliGRAM(s) IV Intermittent every 12 hours  sodium chloride 0.9%. 1000 milliLiter(s) (60 mL/Hr) IV Continuous <Continuous>    MEDICATIONS  (PRN):  dextrose Gel 1 Dose(s) Oral once PRN Blood Glucose LESS THAN 70 milliGRAM(s)/deciliter  glucagon  Injectable 1 milliGRAM(s) IntraMuscular once PRN Glucose LESS THAN 70 milligrams/deciliter  traMADol 100 milliGRAM(s) Oral every 8 hours PRN Moderate-Severe Pain      Care Discussed with Consultants/Other Providers [x] YES  [ ] NO    HEALTH ISSUES - PROBLEM Dx:  Prophylactic measure: Prophylactic measure  History of osteoarthritis: History of osteoarthritis  Hypertension: Hypertension  Type 2 diabetes mellitus without complication, unspecified long term insulin use status: Type 2 diabetes mellitus without complication, unspecified long term insulin use status  Bullous pemphigoid: Bullous pemphigoid  Cellulitis of right lower extremity: Cellulitis of right lower extremity

## 2017-07-12 NOTE — CONSULT NOTE ADULT - ASSESSMENT
66F h/o BP on pred 10, doxy/niacin, p/w fever, pain and swelling of R leg with large flaccid bullae on exam 66F h/o BP on pred 10, doxy/niacin, p/w fever, pain and swelling of R leg with large flaccid bullae on exam suggestive of cellulitis w/ superimposed bullae

## 2017-07-12 NOTE — CONSULT NOTE ADULT - PROBLEM SELECTOR RECOMMENDATION 2
R leg with appearance suggestive of true cellulitis and superimposed bullae  -agree w/ abx per ID  -drained bullae today  -topical bactroban daily to open areas  -keep area clean dry

## 2017-07-12 NOTE — CONSULT NOTE ADULT - ATTENDING COMMENTS
Patient seen and examined.  above verified  h/o bullous pemphigoid-on chronic steroids,doxycycline  Now with LLE cellulitis  Also with bulla ?pemphigoid vs secondary to cellulitis  Pt with PCN allergy but has previously tolerated cefazolin-unclear if 11/18/16 had drug fever(old records reviewed)  No other s/s allergy with it  Appearance is mosty consistent with strep-no prior Cx with MRSA  hemodynamically stable  Will recommend cefazolin with close monitoring for any s/s allergic reaction  Follow Cx  Derm eval  Tailor plan per results and clinical course.  Will Follow.  Beeper [id number]-gizp/afterhours/No response-[id number]

## 2017-07-12 NOTE — CONSULT NOTE ADULT - SUBJECTIVE AND OBJECTIVE BOX
Patient is a 66y old  Female who presents with a chief complaint of RLE redness, swelling (2017 23:54)    HPI:  Pleasant, 67 yo woman with PMH of Uterine ca (s/p chemo and completed in 2016 reported to be in Remission), Bullous Pemphigoid (diagnosed  on Prednisone), HTN, HLD, DMT2, Osteoarthritis presenting with 1 day of worsening right leg redness, swelling and worsening bullae of right leg. Endorses right leg discomfort that is difficult for her to describe but denies burning or pain when bearing weight on the right leg. She states that bullous pemphigoid typically affects the right leg recently and states that they were not as enlarged and fluid filled in prior days. She has been on Prednisone 10 mg day (the past 4-5 weeks). This dose has been increased from 5 mg in setting of increased bullae. Patient also takes Doxycycline PO and Niacin daily for pemphigoid management. Patient denies subjective fevers, chills, sweats. Endorsed symptoms of fever, vomiting without diarrhea a few days ago that is currently resolved. Denies URI symptoms, CP, SOB, palpitations, development of new rashes. Chronic pain 2/2 to osteoarthritis. Denies recent trauma, recent travel. (2017 23:54)      PAST MEDICAL & SURGICAL HISTORY:  Adrenal mass:  incidental findings 2014  Ct SCAN 2015 unchannged  24 hour urine for VMA done by Dr DR Canas [phone] Neg asper patient  Hyperlipidemia  Endometrial cancer: dx: 2015:  High grade Endometroid Adenocarcinoma  Morbid obesity: BMI:  53.6  Vitamin D deficiency  Hypothyroidism  Anemia  History of osteoarthritis  Type 2 diabetes mellitus  Hypertension  Bullous pemphigoid: dx: 2014.  On Immunosupressants  Cellcept  S/P BSO (bilateral salpingo-oophorectomy)  S/P hysterectomy  Endometrial cancer: 2015:  Endometrial Biopsy: dx: High Grade Endometroid Adenocarcinoma  Status post total thyroidectomy: &#x27;   History of  section: &#x27; 82-&#x27;95:  4 X      Social history:  Past smoker  Lives with family  No drug abuse    FAMILY HISTORY:  Family history of lymphoma (Father): father  No hx of bullous pemphigoid    Allergies    daptomycin (Vomiting)  penicillin (Pruritus; Rash; Hives)    Intolerances        Antimicrobials:    aztreonam  IVPB 2000 milliGRAM(s) IV Intermittent every 8 hours  vancomycin  IVPB 1250 milliGRAM(s) IV Intermittent every 12 hours        Vital Signs Last 24 Hrs  T(C): 36.4 (2017 07:30), Max: 38.1 (2017 20:31)  T(F): 97.6 (2017 07:30), Max: 100.5 (2017 20:31)  HR: 71 (2017 07:30) (71 - 97)  BP: 96/66 (2017 07:30) (96/59 - 117/73)  BP(mean): --  RR: 19 (2017 07:30) (18 - 20)  SpO2: 96% (2017 07:30) (96% - 97%)                              9.3    6.06  )-----------( 234      ( 2017 07:30 )             29.6         07-12    139  |  103  |  17  ----------------------------<  102<H>  4.1   |  21<L>  |  0.89    Ca    7.4<L>      2017 07:28  Phos  1.8     07-12  Mg     1.6     07-12    TPro  6.9  /  Alb  3.8  /  TBili  0.4  /  DBili  x   /  AST  31  /  ALT  33  /  AlkPhos  59  07-11      RECENT CULTURES:    Blood cultures : In lab Patient is a 66y old  Female who presents with a chief complaint of RLE redness, swelling (2017 23:54)    HPI:  Pleasant, 67 yo woman with PMH of Uterine ca (s/p chemo and completed in 2016 reported to be in Remission), Bullous Pemphigoid (diagnosed  on Prednisone), HTN, HLD, DMT2, Osteoarthritis presenting with 1 day of worsening right leg redness, swelling and worsening bullae of right leg. Endorses right leg discomfort that is difficult for her to describe but denies burning or pain when bearing weight on the right leg. She states that bullous pemphigoid typically affects the right leg recently and states that they were not as enlarged and fluid filled in prior days. She has been on Prednisone 10 mg day (the past 4-5 weeks). This dose has been increased from 5 mg in setting of increased bullae. Patient also takes Doxycycline PO and Niacin daily for pemphigoid management. Patient denies subjective fevers, chills, sweats. Endorsed symptoms of fever, vomiting without diarrhea a few days ago that is currently resolved. Denies URI symptoms, CP, SOB, palpitations, development of new rashes. Chronic pain 2/2 to osteoarthritis. Denies recent trauma, recent travel. (2017 23:54)    ID consulted for RLE cellulitis    PAST MEDICAL & SURGICAL HISTORY:  Adrenal mass:  incidental findings   Ct SCAN 2015 unchanged  24 hour urine for VMA done by Dr DR Canas [phone] Neg asper patient  Hyperlipidemia  Endometrial cancer: dx: 2015:  High grade Endometrioid Adenocarcinoma  Morbid obesity: BMI:  53.6  Vitamin D deficiency  Hypothyroidism  Anemia  History of osteoarthritis  Type 2 diabetes mellitus  Hypertension  Bullous pemphigoid: dx: 2014.  Off Cellcept  S/P BSO (bilateral salpingo-oophorectomy)  S/P hysterectomy  Endometrial cancer: 2015:  Endometrial Biopsy: dx: High Grade Endometroid Adenocarcinoma  Status post total thyroidectomy: &#x27;   History of  section: &#x27; 82-&#x27;95:  4 X      Social history:  Past smoker  Lives with family  No drug abuse    FAMILY HISTORY:  Family history of lymphoma (Father): father  No hx of bullous pemphigoid    Allergies    daptomycin (Vomiting)  penicillin (Pruritus; Rash; Hives)    Intolerances        Antimicrobials:    aztreonam  IVPB 2000 milliGRAM(s) IV Intermittent every 8 hours  vancomycin  IVPB 1250 milliGRAM(s) IV Intermittent every 12 hours        Vital Signs Last 24 Hrs  T(C): 36.4 (2017 07:30), Max: 38.1 (2017 20:31)  T(F): 97.6 (2017 07:30), Max: 100.5 (2017 20:31)  HR: 71 (2017 07:30) (71 - 97)  BP: 96/66 (2017 07:30) (96/59 - 117/73)  BP(mean): --  RR: 19 (2017 07:30) (18 - 20)  SpO2: 96% (2017 07:30) (96% - 97%)                              9.3    6.06  )-----------( 234      ( 2017 07:30 )             29.6         07-12    139  |  103  |  17  ----------------------------<  102<H>  4.1   |  21<L>  |  0.89    Ca    7.4<L>      2017 07:28  Phos  1.8     07-12  Mg     1.6     07-12    TPro  6.9  /  Alb  3.8  /  TBili  0.4  /  DBili  x   /  AST  31  /  ALT  33  /  AlkPhos  59  07-11      RECENT CULTURES:    Blood cultures : In lab Patient is a 66y old  Female who presents with a chief complaint of RLE redness, swelling (2017 23:54)    HPI:  Pleasant, 65 yo woman with PMH of Uterine ca (s/p chemo and completed in 2016 reported to be in Remission), Bullous Pemphigoid (diagnosed  on Prednisone), HTN, HLD, DMT2, Osteoarthritis presenting with 1 day of worsening right leg redness, swelling and worsening bullae of right leg. Endorses right leg discomfort that is difficult for her to describe but denies burning or pain when bearing weight on the right leg. She states that bullous pemphigoid typically affects the right leg recently and states that they were not as enlarged and fluid filled in prior days. She has been on Prednisone 10 mg day (the past 4-5 weeks). This dose has been increased from 5 mg in setting of increased bullae. Patient also takes Doxycycline PO and Niacin daily for pemphigoid management. Patient denies subjective fevers, chills, sweats. Endorsed symptoms of fever, vomiting without diarrhea a few days ago that is currently resolved. Denies URI symptoms, CP, SOB, palpitations, development of new rashes. Chronic pain 2/2 to osteoarthritis. Denies recent trauma, recent travel. (2017 23:54)    ID consulted for RLE cellulitis    PAST MEDICAL & SURGICAL HISTORY:  Adrenal mass:  incidental findings   Ct SCAN 2015 unchanged  24 hour urine for VMA done by Dr DR Canas [phone] Neg asper patient  Hyperlipidemia  Endometrial cancer: dx: 2015:  High grade Endometrioid Adenocarcinoma  Morbid obesity: BMI:  53.6  Vitamin D deficiency  Hypothyroidism  Anemia  History of osteoarthritis  Type 2 diabetes mellitus  Hypertension  Bullous pemphigoid: dx: 2014.  Off Cellcept  S/P BSO (bilateral salpingo-oophorectomy)  S/P hysterectomy  Endometrial cancer: 2015:  Endometrial Biopsy: dx: High Grade Endometroid Adenocarcinoma  Status post total thyroidectomy: &#x27;   History of  section: &#x27; 82-&#x27;95:  4 X      Social history:  Past smoker  Lives with family  No drug abuse    FAMILY HISTORY:  Family history of lymphoma (Father): father  No hx of bullous pemphigoid    Allergies    daptomycin (Vomiting)  penicillin (Pruritus; Rash; Hives)    Intolerances  has tolerated ancef in past-but had fever 16 not certain if drug fever and was changed to vanco        Antimicrobials:    aztreonam  IVPB 2000 milliGRAM(s) IV Intermittent every 8 hours  vancomycin  IVPB 1250 milliGRAM(s) IV Intermittent every 12 hours        Vital Signs Last 24 Hrs  T(C): 36.4 (2017 07:30), Max: 38.1 (2017 20:31)  T(F): 97.6 (2017 07:30), Max: 100.5 (2017 20:31)  HR: 71 (2017 07:30) (71 - 97)  BP: 96/66 (2017 07:30) (96/59 - 117/73)  BP(mean): --  RR: 19 (2017 07:30) (18 - 20)  SpO2: 96% (2017 07:30) (96% - 97%)                              9.3    6.06  )-----------( 234      ( 2017 07:30 )             29.6         07-12    139  |  103  |  17  ----------------------------<  102<H>  4.1   |  21<L>  |  0.89    Ca    7.4<L>      2017 07:28  Phos  1.8     07-12  Mg     1.6     -12    TPro  6.9  /  Alb  3.8  /  TBili  0.4  /  DBili  x   /  AST  31  /  ALT  33  /  AlkPhos  59  07-11      RECENT CULTURES:    Blood cultures : In lab

## 2017-07-12 NOTE — CONSULT NOTE ADULT - SKIN COMMENTS
RLE with multiple bullae, surrounding erythema and warmth RLE with multiple bullae, surrounding erythema and warmth, tenderness

## 2017-07-12 NOTE — PROGRESS NOTE ADULT - ASSESSMENT
65 yo woman with PMH of Uterine ca (s/p chemo and completed in April 2016 reported to be in Remission), Bullous Pemphigoid (diagnosed 2014 on Prednisone), HTN, HLD, DMT2, Osteoarthritis presenting with 1 day of worsening right leg redness, swelling and worsening bullae of right leg concerning for cellulitis.

## 2017-07-12 NOTE — CONSULT NOTE ADULT - PROBLEM SELECTOR RECOMMENDATION 9
-Fu blood cultures  -Continue Vancomycin 1 g q12 -Fu blood cultures  -Dc Vancomycin   -Dc Aztreonam  -Start Ancef 2g q8-has tolerated ancef in past with unclear hx of drug fever, will monitor closely while on ancef  -Will look for possible oral options for discharge, however will be based on further clinical course and culture results -Fu blood cultures  -Dc Vancomycin   -Dc Aztreonam  -Start Ancef 2g q8-has tolerated ancef in past with unclear hx of drug fever, will monitor closely while on ancef  -Will look for possible oral options on discharge, however will be based on further clinical course and culture results -Fu blood cultures  -Dc Vancomycin   -Dc Aztreonam  -Start Ancef 2g q8-has tolerated ancef in past with unclear hx of drug fever, will monitor closely while on ancef  -Will look for possible oral options on discharge, however will be based on further clinical course and culture results  -Derm to address bullous pemphigoid and dose of prednisone -Fu blood cultures  -Dc Vancomycin   -Dc Aztreonam  -Start Ancef 2g q8-has tolerated ancef in past with unclear hx of drug fever, will monitor closely while on ancef  -Derm to address bullous pemphigoid and dose of prednisone

## 2017-07-13 ENCOUNTER — TRANSCRIPTION ENCOUNTER (OUTPATIENT)
Age: 67
End: 2017-07-13

## 2017-07-13 LAB
ANION GAP SERPL CALC-SCNC: 15 MMOL/L — SIGNIFICANT CHANGE UP (ref 5–17)
BASOPHILS # BLD AUTO: 0.02 K/UL — SIGNIFICANT CHANGE UP (ref 0–0.2)
BASOPHILS NFR BLD AUTO: 0.3 % — SIGNIFICANT CHANGE UP (ref 0–2)
BUN SERPL-MCNC: 20 MG/DL — SIGNIFICANT CHANGE UP (ref 7–23)
CALCIUM SERPL-MCNC: 7.8 MG/DL — LOW (ref 8.4–10.5)
CHLORIDE SERPL-SCNC: 100 MMOL/L — SIGNIFICANT CHANGE UP (ref 96–108)
CO2 SERPL-SCNC: 22 MMOL/L — SIGNIFICANT CHANGE UP (ref 22–31)
CREAT SERPL-MCNC: 0.92 MG/DL — SIGNIFICANT CHANGE UP (ref 0.5–1.3)
EOSINOPHIL # BLD AUTO: 0.16 K/UL — SIGNIFICANT CHANGE UP (ref 0–0.5)
EOSINOPHIL NFR BLD AUTO: 2.6 % — SIGNIFICANT CHANGE UP (ref 0–6)
GLUCOSE SERPL-MCNC: 105 MG/DL — HIGH (ref 70–99)
HCT VFR BLD CALC: 28.9 % — LOW (ref 34.5–45)
HGB BLD-MCNC: 9.2 G/DL — LOW (ref 11.5–15.5)
IMM GRANULOCYTES NFR BLD AUTO: 0.8 % — SIGNIFICANT CHANGE UP (ref 0–1.5)
LYMPHOCYTES # BLD AUTO: 1.62 K/UL — SIGNIFICANT CHANGE UP (ref 1–3.3)
LYMPHOCYTES # BLD AUTO: 26.4 % — SIGNIFICANT CHANGE UP (ref 13–44)
MCHC RBC-ENTMCNC: 30.5 PG — SIGNIFICANT CHANGE UP (ref 27–34)
MCHC RBC-ENTMCNC: 31.8 GM/DL — LOW (ref 32–36)
MCV RBC AUTO: 95.7 FL — SIGNIFICANT CHANGE UP (ref 80–100)
MONOCYTES # BLD AUTO: 0.66 K/UL — SIGNIFICANT CHANGE UP (ref 0–0.9)
MONOCYTES NFR BLD AUTO: 10.8 % — SIGNIFICANT CHANGE UP (ref 2–14)
NEUTROPHILS # BLD AUTO: 3.62 K/UL — SIGNIFICANT CHANGE UP (ref 1.8–7.4)
NEUTROPHILS NFR BLD AUTO: 59.1 % — SIGNIFICANT CHANGE UP (ref 43–77)
PLATELET # BLD AUTO: 264 K/UL — SIGNIFICANT CHANGE UP (ref 150–400)
POTASSIUM SERPL-MCNC: 3.9 MMOL/L — SIGNIFICANT CHANGE UP (ref 3.5–5.3)
POTASSIUM SERPL-SCNC: 3.9 MMOL/L — SIGNIFICANT CHANGE UP (ref 3.5–5.3)
RBC # BLD: 3.02 M/UL — LOW (ref 3.8–5.2)
RBC # FLD: 14.8 % — HIGH (ref 10.3–14.5)
SODIUM SERPL-SCNC: 137 MMOL/L — SIGNIFICANT CHANGE UP (ref 135–145)
WBC # BLD: 6.13 K/UL — SIGNIFICANT CHANGE UP (ref 3.8–10.5)
WBC # FLD AUTO: 6.13 K/UL — SIGNIFICANT CHANGE UP (ref 3.8–10.5)

## 2017-07-13 PROCEDURE — 93970 EXTREMITY STUDY: CPT | Mod: 26

## 2017-07-13 PROCEDURE — 99233 SBSQ HOSP IP/OBS HIGH 50: CPT | Mod: GC

## 2017-07-13 RX ORDER — VANCOMYCIN HCL 1 G
1000 VIAL (EA) INTRAVENOUS EVERY 12 HOURS
Qty: 0 | Refills: 0 | Status: DISCONTINUED | OUTPATIENT
Start: 2017-07-13 | End: 2017-07-20

## 2017-07-13 RX ORDER — DIPHENHYDRAMINE HCL 50 MG
25 CAPSULE ORAL ONCE
Qty: 0 | Refills: 0 | Status: COMPLETED | OUTPATIENT
Start: 2017-07-13 | End: 2017-07-13

## 2017-07-13 RX ORDER — MUPIROCIN 20 MG/G
1 OINTMENT TOPICAL
Qty: 0 | Refills: 0 | Status: DISCONTINUED | OUTPATIENT
Start: 2017-07-13 | End: 2017-07-19

## 2017-07-13 RX ORDER — HEPARIN SODIUM 5000 [USP'U]/ML
5000 INJECTION INTRAVENOUS; SUBCUTANEOUS EVERY 8 HOURS
Qty: 0 | Refills: 0 | Status: DISCONTINUED | OUTPATIENT
Start: 2017-07-13 | End: 2017-07-20

## 2017-07-13 RX ORDER — VANCOMYCIN HCL 1 G
VIAL (EA) INTRAVENOUS
Qty: 0 | Refills: 0 | Status: DISCONTINUED | OUTPATIENT
Start: 2017-07-13 | End: 2017-07-20

## 2017-07-13 RX ORDER — VANCOMYCIN HCL 1 G
1000 VIAL (EA) INTRAVENOUS ONCE
Qty: 0 | Refills: 0 | Status: COMPLETED | OUTPATIENT
Start: 2017-07-13 | End: 2017-07-13

## 2017-07-13 RX ADMIN — Medication 1: at 12:51

## 2017-07-13 RX ADMIN — TRAMADOL HYDROCHLORIDE 100 MILLIGRAM(S): 50 TABLET ORAL at 14:54

## 2017-07-13 RX ADMIN — TRAMADOL HYDROCHLORIDE 100 MILLIGRAM(S): 50 TABLET ORAL at 22:56

## 2017-07-13 RX ADMIN — Medication 250 MILLIGRAM(S): at 09:44

## 2017-07-13 RX ADMIN — Medication 25 MILLIGRAM(S): at 08:04

## 2017-07-13 RX ADMIN — Medication 250 MILLIGRAM(S): at 21:36

## 2017-07-13 RX ADMIN — Medication 100 MILLIGRAM(S): at 03:00

## 2017-07-13 RX ADMIN — MUPIROCIN 1 APPLICATION(S): 20 OINTMENT TOPICAL at 17:16

## 2017-07-13 RX ADMIN — Medication 500 MILLIGRAM(S): at 17:16

## 2017-07-13 RX ADMIN — TRAMADOL HYDROCHLORIDE 100 MILLIGRAM(S): 50 TABLET ORAL at 00:35

## 2017-07-13 RX ADMIN — HEPARIN SODIUM 5000 UNIT(S): 5000 INJECTION INTRAVENOUS; SUBCUTANEOUS at 14:50

## 2017-07-13 RX ADMIN — TRAMADOL HYDROCHLORIDE 100 MILLIGRAM(S): 50 TABLET ORAL at 15:30

## 2017-07-13 RX ADMIN — Medication 500 MILLIGRAM(S): at 05:24

## 2017-07-13 RX ADMIN — HEPARIN SODIUM 5000 UNIT(S): 5000 INJECTION INTRAVENOUS; SUBCUTANEOUS at 21:36

## 2017-07-13 RX ADMIN — TRAMADOL HYDROCHLORIDE 100 MILLIGRAM(S): 50 TABLET ORAL at 22:04

## 2017-07-13 RX ADMIN — Medication 10 MILLIGRAM(S): at 05:24

## 2017-07-13 RX ADMIN — Medication 150 MICROGRAM(S): at 05:24

## 2017-07-13 RX ADMIN — ATORVASTATIN CALCIUM 10 MILLIGRAM(S): 80 TABLET, FILM COATED ORAL at 21:36

## 2017-07-13 NOTE — DISCHARGE NOTE ADULT - PLAN OF CARE
Completed IV antibiotics. Your Hemoglobin A1C (HgA1C)  was 6.2 this admission.  Make sure you get your HgA1c  checked every three months.  It's important not to skip any meals.  Keep a log of your blood glucose results and always take it with you to your doctor appointments.  Keep a list of your current medications including  and over the counter medications and bring this medication list with you to all your doctor appointments.  If you have not seen your ophthalmologist this year call for appointment.  Check your feet daily for redness, sores, or openings. Do not self treat. If no improvement in two days call your primary care physician for an appointment.  Low blood sugar (hypoglycemia) is a blood sugar below 70mg/dl. Check your blood sugar if you feel signs/symptoms of hypoglycemia. If your blood sugar is below 70 take 15 grams of carbohydrates (ex 4 oz of apple juice, 3-4 glucose tablets, or 4-6 oz of regular soda) wait 15 minutes and repeat blood sugar to make sure it comes up above 70.  If your blood sugar is above 70 and you are due for a meal, have a meal.  If you are not due for a meal have a snack.  This snack helps keeps your blood sugar at a safe range. Your Blood Pressure medication is now on hold because your Blood Pressure is not high.  Take your medication as prescribed when you are restarted on your Blood Pressure medications.   Follow up with your medical doctor for routine blood pressure monitoring, and to establish long term blood pressure treatment goals.  Low salt diet  Activity as tolerated.  Take all medication as prescribed.  Notify your doctor if you have any of the following symptoms:   Dizziness, Lightheadedness, Blurry vision, Headache, Chest pain, Shortness of breath Take your medication as prescribed.   Follow up with your medical doctor for routine blood  work monitoring, and to establish long term treatment goals. Take your medication as prescribed.   Follow up with your Dermatologist for  monitoring, and to establish long term treatment goals. Monitor your leg for any pain,  swelling, warmth or redness and seek medical care.

## 2017-07-13 NOTE — DISCHARGE NOTE ADULT - VISION (WITH CORRECTIVE LENSES IF THE PATIENT USUALLY WEARS THEM):
Normal vision: sees adequately in most situations; can see medication labels, newsprint/uses eyeglasses

## 2017-07-13 NOTE — CONSULT NOTE ADULT - SUBJECTIVE AND OBJECTIVE BOX
HPI:  Pleasant, 67 yo woman with PMH of Uterine ca (s/p chemo and completed in 2016 reported to be in Remission), Bullous Pemphigoid (diagnosed  on Prednisone), HTN, HLD, DMT2, Osteoarthritis presenting with 1 day of worsening right leg redness, swelling and worsening bullae of right leg. Endorses right leg discomfort that is difficult for her to describe but denies burning or pain when bearing weight on the right leg. She states that bullous pemphigoid typically affects the right leg recently and states that they were not as enlarged and fluid filled in prior days. She has been on Prednisone 10 mg day (the past 4-5 weeks). This dose has been increased from 5 mg in setting of increased bullae. Patient also takes Daptomycin and Niacin daily for pemphigoid management. Patient denies subjective fevers, chills, sweats. Endorsed symptoms of fever, vomiting without diarrhea a few days ago that is currently resolved. Denies URI symptoms, CP, SOB, palpitations, development of new rashes. Chronic pain 2/2 to osteoarthritis. Denies recent trauma, recent travel. (2017 23:54)    Podiatry consulted to evaluate painful, elongated nails.     Allergies    daptomycin (Vomiting)  penicillin (Pruritus; Rash; Hives)    Intolerances    PAST MEDICAL & SURGICAL HISTORY:  Adrenal mass:  incidental findings   Ct SCAN 2015 unchannged  24 hour urine for VMA done by Dr DR Canas [phone] Neg asper patient  Hyperlipidemia  Endometrial cancer: dx: 2015:  High grade Endometroid Adenocarcinoma  Morbid obesity: BMI:  53.6  Vitamin D deficiency  Hypothyroidism  Anemia  History of osteoarthritis  Type 2 diabetes mellitus  Hypertension  Bullous pemphigoid: dx: 2014.  On Immunosupressants  Cellcept  S/P BSO (bilateral salpingo-oophorectomy)  S/P hysterectomy  Endometrial cancer: 2015:  Endometrial Biopsy: dx: High Grade Endometroid Adenocarcinoma  Status post total thyroidectomy: &#x27;   History of  section: &#x27; 82-&#x27;95:  4 X        ELVIA:  Elongated painful toe nails x 10.  No local signs of infection.  No open lesions.    Epicritic sensation intact  Pedal pulses palp.

## 2017-07-13 NOTE — PROGRESS NOTE ADULT - SUBJECTIVE AND OBJECTIVE BOX
66yPatient is a 66y old  Female who presents with a chief complaint of RLE redness, swelling (11 Jul 2017 23:54)      Interval history: Seen by derm, s/p drainage of the bullae. Remains afebrile.         Antimicrobials:    vancomycin  IVPB 1000 milliGRAM(s) IV Intermittent once  vancomycin  IVPB 1000 milliGRAM(s) IV Intermittent every 12 hours  vancomycin  IVPB   IV Intermittent       Vital Signs Last 24 Hrs  T(C): 36.6 (07-13-17 @ 07:55), Max: 36.9 (07-12-17 @ 16:00)  T(F): 97.8 (07-13-17 @ 07:55), Max: 98.5 (07-13-17 @ 06:36)  HR: 83 (07-13-17 @ 07:55) (77 - 88)  BP: 128/75 (07-13-17 @ 07:55) (105/57 - 128/75)  BP(mean): --  RR: 16 (07-13-17 @ 07:55) (16 - 18)  SpO2: 96% (07-13-17 @ 07:55) (96% - 100%)                            9.2    6.13  )-----------( 264      ( 13 Jul 2017 08:14 )             28.9   07-12    139  |  103  |  17  ----------------------------<  102<H>  4.1   |  21<L>  |  0.89    Ca    7.4<L>      12 Jul 2017 07:28  Phos  1.8     07-12  Mg     1.6     07-12    TPro  6.9  /  Alb  3.8  /  TBili  0.4  /  DBili  x   /  AST  31  /  ALT  33  /  AlkPhos  59  07-11      LIVER FUNCTIONS - ( 11 Jul 2017 21:21 )  Alb: 3.8 g/dL / Pro: 6.9 g/dL / ALK PHOS: 59 U/L / ALT: 33 U/L RC / AST: 31 U/L / GGT: x             RECENT CULTURES:      Radiology: 66yPatient is a 66y old  Female who presents with a chief complaint of RLE redness, swelling (11 Jul 2017 23:54)      Interval history: Seen by derm, s/p drainage of the bullae. Remains afebrile. Had body itching and hives while on Ancef Ancef changed to IV Vancomycin         Antimicrobials:    vancomycin  IVPB 1000 milliGRAM(s) IV Intermittent once  vancomycin  IVPB 1000 milliGRAM(s) IV Intermittent every 12 hours  vancomycin  IVPB   IV Intermittent       Vital Signs Last 24 Hrs  T(C): 36.6 (07-13-17 @ 07:55), Max: 36.9 (07-12-17 @ 16:00)  T(F): 97.8 (07-13-17 @ 07:55), Max: 98.5 (07-13-17 @ 06:36)  HR: 83 (07-13-17 @ 07:55) (77 - 88)  BP: 128/75 (07-13-17 @ 07:55) (105/57 - 128/75)  BP(mean): --  RR: 16 (07-13-17 @ 07:55) (16 - 18)  SpO2: 96% (07-13-17 @ 07:55) (96% - 100%)                            9.2    6.13  )-----------( 264      ( 13 Jul 2017 08:14 )             28.9   07-12    139  |  103  |  17  ----------------------------<  102<H>  4.1   |  21<L>  |  0.89    Ca    7.4<L>      12 Jul 2017 07:28  Phos  1.8     07-12  Mg     1.6     07-12    TPro  6.9  /  Alb  3.8  /  TBili  0.4  /  DBili  x   /  AST  31  /  ALT  33  /  AlkPhos  59  07-11      LIVER FUNCTIONS - ( 11 Jul 2017 21:21 )  Alb: 3.8 g/dL / Pro: 6.9 g/dL / ALK PHOS: 59 U/L / ALT: 33 U/L RC / AST: 31 U/L / GGT: x             RECENT CULTURES:      Radiology:

## 2017-07-13 NOTE — DISCHARGE NOTE ADULT - ADDITIONAL INSTRUCTIONS
Follow up with Dr. Ross - your PMD for management of your Diabetes and Blood Pressure, and Dr. Dr. Dea Silva - your Dermatologist within 1 week after discharge from hospital.  Call to schedule appointments.

## 2017-07-13 NOTE — DISCHARGE NOTE ADULT - PATIENT PORTAL LINK FT
“You can access the Follow[facility] Patient Portal, offered by [facility], by registering with the following website: [email or web address]”

## 2017-07-13 NOTE — PROGRESS NOTE ADULT - SUBJECTIVE AND OBJECTIVE BOX
Developed skin rash 2' keflex, was switched back to vancomycin    Vital Signs Last 24 Hrs  T(C): 36.6 (13 Jul 2017 07:55), Max: 36.9 (12 Jul 2017 16:00)  T(F): 97.8 (13 Jul 2017 07:55), Max: 98.5 (13 Jul 2017 06:36)  HR: 83 (13 Jul 2017 07:55) (77 - 88)  BP: 128/75 (13 Jul 2017 07:55) (105/57 - 128/75)  BP(mean): --  RR: 16 (13 Jul 2017 07:55) (16 - 18)  SpO2: 96% (13 Jul 2017 07:55) (96% - 100%)    GENERAL: NAD, AAOx3  HEAD:  Atraumatic, Normocephalic  EYES: EOMI  NECK: Supple, No JVD  CHEST/LUNG: Clear to auscultation bilaterally; No wheeze  HEART: Regular rate and rhythm; No murmurs, rubs, or gallops  ABDOMEN: Soft, Nontender, Nondistended; Bowel sounds present  EXTREMITIES: 2 + LE edema b/l  SKIN: medial blister redeveloped in rt lower leg; chronic drainage from lateral drained blister site  NEURO: nonfocal CN/motor/sensory/reflexes    LABS:                        9.2    6.13  )-----------( 264      ( 13 Jul 2017 08:14 )             28.9     07-13    137  |  100  |  20  ----------------------------<  105<H>  3.9   |  22  |  0.92    Ca    7.8<L>      13 Jul 2017 08:01  Phos  1.8     07-12  Mg     1.6     07-12    TPro  6.9  /  Alb  3.8  /  TBili  0.4  /  DBili  x   /  AST  31  /  ALT  33  /  AlkPhos  59  07-11      CAPILLARY BLOOD GLUCOSE  117 (13 Jul 2017 07:49)  173 (12 Jul 2017 21:37)  227 (12 Jul 2017 16:27)  163 (12 Jul 2017 12:29)

## 2017-07-13 NOTE — DISCHARGE NOTE ADULT - PROVIDER TOKENS
TOKEN:'[id number]',FREE:[LAST:[Ricardo],FIRST:[Dea],PHONE:[phone]],FAX:[(   [phone]    -],ADDRESS:[Dermatologist  [address] [place] [address].]]

## 2017-07-13 NOTE — DISCHARGE NOTE ADULT - CARE PLAN
Principal Discharge DX:	Cellulitis of right lower extremity  Goal:	Completed IV antibiotics.  Instructions for follow-up, activity and diet:	Monitor your leg for any pain,  swelling, warmth or redness and seek medical care.  Secondary Diagnosis:	Type 2 diabetes mellitus  Instructions for follow-up, activity and diet:	Your Hemoglobin A1C (HgA1C)  was 6.2 this admission.  Make sure you get your HgA1c  checked every three months.  It's important not to skip any meals.  Keep a log of your blood glucose results and always take it with you to your doctor appointments.  Keep a list of your current medications including  and over the counter medications and bring this medication list with you to all your doctor appointments.  If you have not seen your ophthalmologist this year call for appointment.  Check your feet daily for redness, sores, or openings. Do not self treat. If no improvement in two days call your primary care physician for an appointment.  Low blood sugar (hypoglycemia) is a blood sugar below 70mg/dl. Check your blood sugar if you feel signs/symptoms of hypoglycemia. If your blood sugar is below 70 take 15 grams of carbohydrates (ex 4 oz of apple juice, 3-4 glucose tablets, or 4-6 oz of regular soda) wait 15 minutes and repeat blood sugar to make sure it comes up above 70.  If your blood sugar is above 70 and you are due for a meal, have a meal.  If you are not due for a meal have a snack.  This snack helps keeps your blood sugar at a safe range.  Secondary Diagnosis:	Hypertension  Instructions for follow-up, activity and diet:	Your Blood Pressure medication is now on hold because your Blood Pressure is not high.  Take your medication as prescribed when you are restarted on your Blood Pressure medications.   Follow up with your medical doctor for routine blood pressure monitoring, and to establish long term blood pressure treatment goals.  Low salt diet  Activity as tolerated.  Take all medication as prescribed.  Notify your doctor if you have any of the following symptoms:   Dizziness, Lightheadedness, Blurry vision, Headache, Chest pain, Shortness of breath  Secondary Diagnosis:	Hyperlipidemia  Instructions for follow-up, activity and diet:	Take your medication as prescribed.   Follow up with your medical doctor for routine blood  work monitoring, and to establish long term treatment goals.  Secondary Diagnosis:	Bullous pemphigoid  Instructions for follow-up, activity and diet:	Take your medication as prescribed.   Follow up with your Dermatologist for  monitoring, and to establish long term treatment goals.  Secondary Diagnosis:	Hypothyroidism  Instructions for follow-up, activity and diet:	Take your medication as prescribed.   Follow up with your medical doctor for routine blood  work monitoring, and to establish long term treatment goals.

## 2017-07-13 NOTE — PROGRESS NOTE ADULT - EXTREMITIES COMMENTS
RLE with erythema, redness, still with one big bullae over medial aspect of leg, tenderness and dressing gauze with soakage

## 2017-07-13 NOTE — DISCHARGE NOTE ADULT - HOSPITAL COURSE
Attending  MD to complete. 67 yo woman with PMH of Uterine ca (s/p chemo and completed in April 2016 reported to be in Remission), Bullous Pemphigoid (diagnosed 2014 on Prednisone), HTN, HLD, DMT2, Osteoarthritis presenting with 1 day of worsening right leg redness, swelling and worsening bullae of right leg.  Dx: Cellulitis     7/11: Vanco X 1 dose in ED. On aztreonam and vanco now   7/12: Seen by ID, believes cellulitis 2/2 to Gram positive bacteria, recs d/c Aztreonam & Vanc           Started on Ancef 2gms IV Q8 (received in the past as per ID with no reaction)           Podiatry consulted to clip toenails as rec by ID, f/u recs  7/13: Developed rash - Ancef d/c'd - started on Vancomycin.  LE Doppler neg dvt  7/14	Check vanco levels and adjust dose to target trough 10-20  7/17: Morphine prn added           Derm f/u - blisters are r/t cellulitis, not bullous pemphigoid, so don't increase steroids at 	this time  7/18 Wound care recs - adaptiq no touch and aquacel on top daily          D/c in am; as per ID, restart her Doxycycline 100mg po BID on discharge

## 2017-07-13 NOTE — PROGRESS NOTE ADULT - PROBLEM SELECTOR PLAN 1
-Fu blood culture  -Continue Ancef 2g q8--monitor for any allergic symptoms( ? hx of drug fever with Ancef on prior admission) -Fu blood culture  -Ancef discontinued 2/2 allergic reaction( itchiness and hives)  -Continue Vancomycin 1 g q12--monitor vanco trough  -Bullous pemphigoid--care per derm, s/p drainage of bullae

## 2017-07-13 NOTE — PROGRESS NOTE ADULT - ASSESSMENT
65 yo woman with PMH of Uterine ca (s/p chemo and completed in April 2016 reported to be in Remission), Bullous Pemphigoid (diagnosed 2014 on Prednisone and suppressive PO doxy theapy), HTN, HLD, DMT2, Osteoarthritis presenting with worsening right leg redness and bullae. Examination notable for multiple RLE bullae with surrounding area of erythema and warmth, concerning for superimposed cellulitis, s/p drainage of bullae by derm. 65 yo woman with PMH of Uterine ca (s/p chemo and completed in April 2016 reported to be in Remission), Bullous Pemphigoid (diagnosed 2014 on Prednisone and suppressive PO doxy theapy), HTN, HLD, DMT2, Osteoarthritis presenting with worsening right leg redness and bullae. Examination notable for multiple RLE bullae with surrounding area of erythema and warmth, concerning for superimposed cellulitis, s/p drainage of bullae by derm. Had allergic hives and itchiness while on Ancef. I

## 2017-07-13 NOTE — CONSULT NOTE ADULT - ASSESSMENT
66 year old DM female with onychomycosis bilateral feet  -DM pedal education   -Routine examination  -Debridement of elongated nails x 10  -Stressed the importance of following up as an out patient to prevent them from getting so long ago - may cause ulcerations.  Pt agreeable.   -F/u within 2 weeks of DC.  Call [phone] to make an apt with Dr. Ramos.

## 2017-07-13 NOTE — PROGRESS NOTE ADULT - ATTENDING COMMENTS
Patient seen and examined.  Above verified-Hives/?rash with ancef  Changed to Vanco  leg better  Continue vanco for now  Monitor trough  Other plan as above  Will Follow.  Beeper [id number]-tflt/afterhours/No response-[id number]

## 2017-07-13 NOTE — PROGRESS NOTE ADULT - PROBLEM SELECTOR PLAN 1
Patient chronically on Doxycycline and Prednisone as outpatient.   Vanco as per ID  wet-to-dry bid dressing changes with mupirocin  Trend Lactate  F/U Cultures  Appreciate ID

## 2017-07-13 NOTE — DISCHARGE NOTE ADULT - CARE PROVIDER_API CALL
Olivia Ross), Internal Medicine  [address] [facility] [address] [place] [address]  Phone: [phone]  Fax: [phone]    Dea Silva  Dermatologist  [address] [place] [address].  Phone: [phone]  Fax: (   )    -

## 2017-07-13 NOTE — DISCHARGE NOTE ADULT - MEDICATION SUMMARY - MEDICATIONS TO TAKE
I will START or STAY ON the medications listed below when I get home from the hospital:    iron  -- 1 tab(s) by mouth once a day  -- Indication: For Supplement    clobetasol topical cream  -- Apply on skin to affected area once a day, As Needed  -- Indication: For Bullous pemphigoid    predniSONE 10 mg oral tablet  -- 1 tab(s) by mouth once a day  -- Indication: For Bullous pemphigoid    traMADol 300 mg/24 hours oral tablet, extended release  -- 1 tab(s) by mouth once a day  -- Indication: For Pain    Tylenol Caplet Extra Strength 500 mg oral tablet  -- 1 tab(s) by mouth 2 times a day, As Needed -   For mild pain.   -- Indication: For Pain    HYDROcodone-acetaminophen 10 mg-325 mg oral tablet  -- 1 tab(s) by mouth once a day, As Needed - for cough.   -- Indication: For Cough    metFORMIN 500 mg oral tablet, extended release  -- 2 tab(s) by mouth 2 times a day  -- Indication: For Type 2 diabetes mellitus without complication, unspecified long term insulin use status    pioglitazone 15 mg oral tablet  -- 1 tab(s) by mouth once a day  -- Indication: For Type 2 diabetes mellitus without complication, unspecified long term insulin use status    Lipitor 20 mg oral tablet  -- 1 tab(s) by mouth once a day  -- Indication: For Cholesterol    niacin 500 mg oral capsule  -- 1 cap(s) by mouth 2 times a day  -- Indication: For Cholesterol    Fosamax 35 mg oral tablet  -- 1 tab(s) by mouth once a week on Sunday  -- Indication: For Bone    Alpha Lipoic Acid 100 mg oral tablet  -- 1 tab(s) by mouth once a day  -- Indication: For Supplement    Synthroid 150 mcg (0.15 mg) oral tablet  -- 2 tab(s) by mouth once a day on Thursdays  -- Indication: For Thyroid    Synthroid 150 mcg (0.15 mg) oral tablet  -- 1 tab(s) by mouth once a day on Mon, Tues, Wed, Fri, Sat, Sun  -- Indication: For Thyroid    Vitamin B-12  -- 1 tab(s) by mouth once a day  -- Indication: For Supplement    folic acid  -- 1 tab(s) by mouth once a day  -- Indication: For Supplement I will START or STAY ON the medications listed below when I get home from the hospital:    iron  -- 1 tab(s) by mouth once a day  -- Indication: For Supplement    clobetasol topical cream  -- Apply on skin to affected area once a day, As Needed  -- Indication: For Bullous pemphigoid    predniSONE 10 mg oral tablet  -- 1 tab(s) by mouth once a day  -- Indication: For Bullous pemphigoid    traMADol 300 mg/24 hours oral tablet, extended release  -- 1 tab(s) by mouth once a day  -- Indication: For Pain    Tylenol Caplet Extra Strength 500 mg oral tablet  -- 1 tab(s) by mouth 2 times a day, As Needed -   For mild pain.   -- Indication: For Pain    HYDROcodone-acetaminophen 10 mg-325 mg oral tablet  -- 1 tab(s) by mouth once a day, As Needed - for cough.   -- Indication: For Cough    metFORMIN 500 mg oral tablet, extended release  -- 2 tab(s) by mouth 2 times a day  -- Indication: For Type 2 diabetes mellitus without complication, unspecified long term insulin use status    pioglitazone 15 mg oral tablet  -- 1 tab(s) by mouth once a day  -- Indication: For Type 2 diabetes mellitus without complication, unspecified long term insulin use status    Lipitor 20 mg oral tablet  -- 1 tab(s) by mouth once a day  -- Indication: For Cholesterol    niacin 500 mg oral capsule  -- 1 cap(s) by mouth 2 times a day  -- Indication: For Cholesterol    doxycycline hyclate 100 mg oral tablet  -- 1 tab(s) by mouth 2 times a day  -- Indication: For Bullous pemphigoid    Fosamax 35 mg oral tablet  -- 1 tab(s) by mouth once a week on Sunday  -- Indication: For Bone    Alpha Lipoic Acid 100 mg oral tablet  -- 1 tab(s) by mouth once a day  -- Indication: For Supplement    Synthroid 150 mcg (0.15 mg) oral tablet  -- 2 tab(s) by mouth once a day on Thursdays  -- Indication: For Thyroid    Synthroid 150 mcg (0.15 mg) oral tablet  -- 1 tab(s) by mouth once a day on Mon, Tues, Wed, Fri, Sat, Sun  -- Indication: For Thyroid    Vitamin B-12  -- 1 tab(s) by mouth once a day  -- Indication: For Supplement    folic acid  -- 1 tab(s) by mouth once a day  -- Indication: For Supplement

## 2017-07-14 LAB
ANION GAP SERPL CALC-SCNC: 16 MMOL/L — SIGNIFICANT CHANGE UP (ref 5–17)
BUN SERPL-MCNC: 18 MG/DL — SIGNIFICANT CHANGE UP (ref 7–23)
CALCIUM SERPL-MCNC: 8.3 MG/DL — LOW (ref 8.4–10.5)
CHLORIDE SERPL-SCNC: 100 MMOL/L — SIGNIFICANT CHANGE UP (ref 96–108)
CO2 SERPL-SCNC: 22 MMOL/L — SIGNIFICANT CHANGE UP (ref 22–31)
CREAT SERPL-MCNC: 0.88 MG/DL — SIGNIFICANT CHANGE UP (ref 0.5–1.3)
GLUCOSE SERPL-MCNC: 99 MG/DL — SIGNIFICANT CHANGE UP (ref 70–99)
HCT VFR BLD CALC: 29.1 % — LOW (ref 34.5–45)
HGB BLD-MCNC: 9.3 G/DL — LOW (ref 11.5–15.5)
MCHC RBC-ENTMCNC: 30.6 PG — SIGNIFICANT CHANGE UP (ref 27–34)
MCHC RBC-ENTMCNC: 32 GM/DL — SIGNIFICANT CHANGE UP (ref 32–36)
MCV RBC AUTO: 95.7 FL — SIGNIFICANT CHANGE UP (ref 80–100)
PLATELET # BLD AUTO: 291 K/UL — SIGNIFICANT CHANGE UP (ref 150–400)
POTASSIUM SERPL-MCNC: 4 MMOL/L — SIGNIFICANT CHANGE UP (ref 3.5–5.3)
POTASSIUM SERPL-SCNC: 4 MMOL/L — SIGNIFICANT CHANGE UP (ref 3.5–5.3)
RBC # BLD: 3.04 M/UL — LOW (ref 3.8–5.2)
RBC # FLD: 14.7 % — HIGH (ref 10.3–14.5)
SODIUM SERPL-SCNC: 138 MMOL/L — SIGNIFICANT CHANGE UP (ref 135–145)
VANCOMYCIN TROUGH SERPL-MCNC: 12.8 UG/ML — SIGNIFICANT CHANGE UP (ref 10–20)
WBC # BLD: 5.99 K/UL — SIGNIFICANT CHANGE UP (ref 3.8–10.5)
WBC # FLD AUTO: 5.99 K/UL — SIGNIFICANT CHANGE UP (ref 3.8–10.5)

## 2017-07-14 PROCEDURE — 99232 SBSQ HOSP IP/OBS MODERATE 35: CPT

## 2017-07-14 RX ADMIN — Medication 2: at 12:25

## 2017-07-14 RX ADMIN — HEPARIN SODIUM 5000 UNIT(S): 5000 INJECTION INTRAVENOUS; SUBCUTANEOUS at 21:56

## 2017-07-14 RX ADMIN — Medication 500 MILLIGRAM(S): at 05:16

## 2017-07-14 RX ADMIN — TRAMADOL HYDROCHLORIDE 100 MILLIGRAM(S): 50 TABLET ORAL at 05:16

## 2017-07-14 RX ADMIN — MUPIROCIN 1 APPLICATION(S): 20 OINTMENT TOPICAL at 05:22

## 2017-07-14 RX ADMIN — TRAMADOL HYDROCHLORIDE 100 MILLIGRAM(S): 50 TABLET ORAL at 06:06

## 2017-07-14 RX ADMIN — Medication 500 MILLIGRAM(S): at 18:06

## 2017-07-14 RX ADMIN — TRAMADOL HYDROCHLORIDE 100 MILLIGRAM(S): 50 TABLET ORAL at 23:14

## 2017-07-14 RX ADMIN — Medication 250 MILLIGRAM(S): at 21:56

## 2017-07-14 RX ADMIN — HEPARIN SODIUM 5000 UNIT(S): 5000 INJECTION INTRAVENOUS; SUBCUTANEOUS at 14:15

## 2017-07-14 RX ADMIN — Medication 150 MICROGRAM(S): at 05:16

## 2017-07-14 RX ADMIN — ATORVASTATIN CALCIUM 10 MILLIGRAM(S): 80 TABLET, FILM COATED ORAL at 21:56

## 2017-07-14 RX ADMIN — Medication 2: at 18:05

## 2017-07-14 RX ADMIN — HEPARIN SODIUM 5000 UNIT(S): 5000 INJECTION INTRAVENOUS; SUBCUTANEOUS at 05:16

## 2017-07-14 RX ADMIN — MUPIROCIN 1 APPLICATION(S): 20 OINTMENT TOPICAL at 19:19

## 2017-07-14 RX ADMIN — TRAMADOL HYDROCHLORIDE 100 MILLIGRAM(S): 50 TABLET ORAL at 15:37

## 2017-07-14 RX ADMIN — Medication 250 MILLIGRAM(S): at 11:18

## 2017-07-14 RX ADMIN — TRAMADOL HYDROCHLORIDE 100 MILLIGRAM(S): 50 TABLET ORAL at 00:00

## 2017-07-14 RX ADMIN — Medication 10 MILLIGRAM(S): at 05:16

## 2017-07-14 NOTE — PROGRESS NOTE ADULT - SUBJECTIVE AND OBJECTIVE BOX
Patient is a 66y old  Female who presents with a chief complaint of RLE redness, swelling (13 Jul 2017 12:51)    Being followed by ID for cellulitis,cefazolin allergy    Interval history:  feels better  Leg pain improved  No acute events      ROS:  No cough,SOB,CP  No N/V/D./abd pain  No other complaints      Antimicrobials:    vancomycin  IVPB 1000 milliGRAM(s) IV Intermittent every 12 hours  vancomycin  IVPB   IV Intermittent       Vital Signs Last 24 Hrs  T(C): 37.3 (07-14-17 @ 12:31), Max: 37.3 (07-14-17 @ 12:31)  T(F): 99.1 (07-14-17 @ 12:31), Max: 99.1 (07-14-17 @ 12:31)  HR: 80 (07-14-17 @ 12:31) (74 - 90)  BP: 137/82 (07-14-17 @ 12:31) (117/71 - 137/82)  BP(mean): --  RR: 18 (07-14-17 @ 12:31) (18 - 18)  SpO2: 98% (07-14-17 @ 12:31) (95% - 98%)    Physical Exam:        HEENT PERRLA EOMI,No pallor or icterus    No oral exudate or erythema    Neck supple no JVD or LN    Chest Good AE,CTA    CVS RRR S1 S2 WNl No murmur or rub or gallop    Abd soft BS normal No tenderness no masses    Ext Bilateral LE stasis,left leg erythema decreased,no tenderness-blister no discharge    IV site no erythema tenderness or discharge    Joints no swelling or LOM    CNS AAO X 3 no focal    Lab Data:                          9.3    5.99  )-----------( 291      ( 14 Jul 2017 06:52 )             29.1       07-14    138  |  100  |  18  ----------------------------<  99  4.0   |  22  |  0.88    Ca    8.3<L>      14 Jul 2017 08:33    Culture - Blood (07.12.17 @ 00:34)    Specimen Source: .Blood Blood    Culture Results:   No growth to date.    Culture - Blood (07.12.17 @ 00:34)    Specimen Source: .Blood Blood    Culture Results:   No growth to date.                Culture - Blood (07.12.17 @ 00:34)    Specimen Source: .Blood Blood    Culture Results:   No growth to date.

## 2017-07-14 NOTE — PROGRESS NOTE ADULT - PROBLEM SELECTOR PLAN 1
Patient chronically on Doxycycline and Prednisone as outpatient.   Vanco to be continued (developed rash 2' keflex); trough noted  wet-to-dry bid dressing changes with mupirocin  Appreciate ID

## 2017-07-14 NOTE — PROGRESS NOTE ADULT - SUBJECTIVE AND OBJECTIVE BOX
Pt seen and examined on July 14th, 2017    Still has 7/10 pain in her distal right leg    No fevers or chills.    Afebrile VSS      GENERAL: NAD, AAOx3  HEAD:  Atraumatic, Normocephalic  EYES: EOMI  NECK: Supple, No JVD  CHEST/LUNG: Clear to auscultation bilaterally; No wheeze  HEART: Regular rate and rhythm; No murmurs, rubs, or gallops  ABDOMEN: Soft, Nontender, Nondistended; Bowel sounds present  EXTREMITIES: 2 + LE edema b/l  SKIN: no more blisters intact; still erythematous and warm to touch distal rt LE  NEURO: nonfocal CN/motor/sensory/reflexes    labs reviewed

## 2017-07-14 NOTE — PROGRESS NOTE ADULT - ASSESSMENT
65 yo woman with PMH of Uterine ca (s/p chemo and completed in April 2016 reported to be in Remission), Bullous Pemphigoid (diagnosed 2014 on Prednisone and suppressive PO doxy theapy), HTN, HLD, DMT2, Osteoarthritis presenting with worsening right leg redness and bullae. Examination notable for multiple RLE bullae with surrounding area of erythema and warmth, concerning for superimposed cellulitis, s/p drainage of bullae by derm. Had allergic hives and itchiness while on Ancef.   Improved on vanco  Continue for now  Check vanco levels and adjust dose to target trough 10-20  Other plan per primary  Infectious Diseases Service will cover over weekend.  Please call [phone] if issues

## 2017-07-15 LAB
ANION GAP SERPL CALC-SCNC: 16 MMOL/L — SIGNIFICANT CHANGE UP (ref 5–17)
BUN SERPL-MCNC: 19 MG/DL — SIGNIFICANT CHANGE UP (ref 7–23)
CALCIUM SERPL-MCNC: 8.8 MG/DL — SIGNIFICANT CHANGE UP (ref 8.4–10.5)
CHLORIDE SERPL-SCNC: 102 MMOL/L — SIGNIFICANT CHANGE UP (ref 96–108)
CO2 SERPL-SCNC: 23 MMOL/L — SIGNIFICANT CHANGE UP (ref 22–31)
CREAT SERPL-MCNC: 0.83 MG/DL — SIGNIFICANT CHANGE UP (ref 0.5–1.3)
GLUCOSE SERPL-MCNC: 112 MG/DL — HIGH (ref 70–99)
POTASSIUM SERPL-MCNC: 3.9 MMOL/L — SIGNIFICANT CHANGE UP (ref 3.5–5.3)
POTASSIUM SERPL-SCNC: 3.9 MMOL/L — SIGNIFICANT CHANGE UP (ref 3.5–5.3)
SODIUM SERPL-SCNC: 141 MMOL/L — SIGNIFICANT CHANGE UP (ref 135–145)

## 2017-07-15 RX ORDER — TRAMADOL HYDROCHLORIDE 50 MG/1
100 TABLET ORAL EVERY 6 HOURS
Qty: 0 | Refills: 0 | Status: DISCONTINUED | OUTPATIENT
Start: 2017-07-15 | End: 2017-07-20

## 2017-07-15 RX ADMIN — HEPARIN SODIUM 5000 UNIT(S): 5000 INJECTION INTRAVENOUS; SUBCUTANEOUS at 05:13

## 2017-07-15 RX ADMIN — TRAMADOL HYDROCHLORIDE 100 MILLIGRAM(S): 50 TABLET ORAL at 06:40

## 2017-07-15 RX ADMIN — HEPARIN SODIUM 5000 UNIT(S): 5000 INJECTION INTRAVENOUS; SUBCUTANEOUS at 12:30

## 2017-07-15 RX ADMIN — TRAMADOL HYDROCHLORIDE 100 MILLIGRAM(S): 50 TABLET ORAL at 13:22

## 2017-07-15 RX ADMIN — Medication 500 MILLIGRAM(S): at 17:57

## 2017-07-15 RX ADMIN — TRAMADOL HYDROCHLORIDE 100 MILLIGRAM(S): 50 TABLET ORAL at 00:21

## 2017-07-15 RX ADMIN — TRAMADOL HYDROCHLORIDE 100 MILLIGRAM(S): 50 TABLET ORAL at 20:40

## 2017-07-15 RX ADMIN — Medication 500 MILLIGRAM(S): at 05:13

## 2017-07-15 RX ADMIN — MUPIROCIN 1 APPLICATION(S): 20 OINTMENT TOPICAL at 17:57

## 2017-07-15 RX ADMIN — HEPARIN SODIUM 5000 UNIT(S): 5000 INJECTION INTRAVENOUS; SUBCUTANEOUS at 21:49

## 2017-07-15 RX ADMIN — Medication 1: at 12:29

## 2017-07-15 RX ADMIN — TRAMADOL HYDROCHLORIDE 100 MILLIGRAM(S): 50 TABLET ORAL at 19:50

## 2017-07-15 RX ADMIN — Medication 250 MILLIGRAM(S): at 21:48

## 2017-07-15 RX ADMIN — TRAMADOL HYDROCHLORIDE 100 MILLIGRAM(S): 50 TABLET ORAL at 13:52

## 2017-07-15 RX ADMIN — Medication 150 MICROGRAM(S): at 05:13

## 2017-07-15 RX ADMIN — Medication 250 MILLIGRAM(S): at 08:30

## 2017-07-15 RX ADMIN — Medication 1: at 08:28

## 2017-07-15 RX ADMIN — ATORVASTATIN CALCIUM 10 MILLIGRAM(S): 80 TABLET, FILM COATED ORAL at 21:49

## 2017-07-15 RX ADMIN — MUPIROCIN 1 APPLICATION(S): 20 OINTMENT TOPICAL at 05:13

## 2017-07-15 RX ADMIN — Medication 10 MILLIGRAM(S): at 05:13

## 2017-07-15 NOTE — PROGRESS NOTE ADULT - PROBLEM SELECTOR PLAN 1
Patient chronically on Doxycycline and Prednisone as outpatient.   Vanco to be continued (developed rash 2' keflex); next trough Monday morning  wet-to-dry bid dressing changes with mupirocin  Appreciate ID

## 2017-07-15 NOTE — PROGRESS NOTE ADULT - SUBJECTIVE AND OBJECTIVE BOX
Pain medicine not holding through    Vital Signs Last 24 Hrs  T(C): 36.8 (15 Jul 2017 06:46), Max: 37.3 (14 Jul 2017 12:31)  T(F): 98.3 (15 Jul 2017 06:46), Max: 99.1 (14 Jul 2017 12:31)  HR: 94 (15 Jul 2017 06:46) (80 - 106)  BP: 109/69 (15 Jul 2017 06:46) (109/69 - 137/82)  BP(mean): --  RR: 18 (15 Jul 2017 06:46) (18 - 18)  SpO2: 100% (15 Jul 2017 06:46) (97% - 100%)    GENERAL: NAD, AAOx3  HEAD:  Atraumatic, Normocephalic  EYES: EOMI  NECK: Supple, No JVD  CHEST/LUNG: Clear to auscultation bilaterally; No wheeze  HEART: Regular rate and rhythm; No murmurs, rubs, or gallops  ABDOMEN: Soft, Nontender, Nondistended; Bowel sounds present  EXTREMITIES: 2 + LE edema b/l  SKIN: no more blisters intact; still erythematous and warm to touch distal rt LE  NEURO: nonfocal CN/motor/sensory/reflexes    LABS:                        9.3    5.99  )-----------( 291      ( 14 Jul 2017 06:52 )             29.1     07-14    138  |  100  |  18  ----------------------------<  99  4.0   |  22  |  0.88    Ca    8.3<L>      14 Jul 2017 08:33        CAPILLARY BLOOD GLUCOSE  157 (15 Jul 2017 07:14)  133 (14 Jul 2017 21:15)  227 (14 Jul 2017 16:24)  225 (14 Jul 2017 11:32)

## 2017-07-15 NOTE — PROGRESS NOTE ADULT - ASSESSMENT
67 yo woman with PMH of Uterine ca (s/p chemo and completed in April 2016 reported to be in Remission), Bullous Pemphigoid (diagnosed 2014 on Prednisone), HTN, HLD, DMT2, Osteoarthritis presenting with 1 day of worsening right leg redness, swelling and worsening bullae of right leg concerning for cellulitis.

## 2017-07-16 LAB
ANION GAP SERPL CALC-SCNC: 14 MMOL/L — SIGNIFICANT CHANGE UP (ref 5–17)
BUN SERPL-MCNC: 19 MG/DL — SIGNIFICANT CHANGE UP (ref 7–23)
CALCIUM SERPL-MCNC: 9 MG/DL — SIGNIFICANT CHANGE UP (ref 8.4–10.5)
CHLORIDE SERPL-SCNC: 101 MMOL/L — SIGNIFICANT CHANGE UP (ref 96–108)
CO2 SERPL-SCNC: 25 MMOL/L — SIGNIFICANT CHANGE UP (ref 22–31)
CREAT SERPL-MCNC: 0.92 MG/DL — SIGNIFICANT CHANGE UP (ref 0.5–1.3)
GLUCOSE SERPL-MCNC: 108 MG/DL — HIGH (ref 70–99)
POTASSIUM SERPL-MCNC: 4.3 MMOL/L — SIGNIFICANT CHANGE UP (ref 3.5–5.3)
POTASSIUM SERPL-SCNC: 4.3 MMOL/L — SIGNIFICANT CHANGE UP (ref 3.5–5.3)
SODIUM SERPL-SCNC: 140 MMOL/L — SIGNIFICANT CHANGE UP (ref 135–145)
VANCOMYCIN TROUGH SERPL-MCNC: 16.1 UG/ML — SIGNIFICANT CHANGE UP (ref 10–20)

## 2017-07-16 RX ORDER — ACETAMINOPHEN 500 MG
650 TABLET ORAL ONCE
Qty: 0 | Refills: 0 | Status: COMPLETED | OUTPATIENT
Start: 2017-07-16 | End: 2017-07-16

## 2017-07-16 RX ADMIN — TRAMADOL HYDROCHLORIDE 100 MILLIGRAM(S): 50 TABLET ORAL at 16:52

## 2017-07-16 RX ADMIN — TRAMADOL HYDROCHLORIDE 100 MILLIGRAM(S): 50 TABLET ORAL at 09:11

## 2017-07-16 RX ADMIN — HEPARIN SODIUM 5000 UNIT(S): 5000 INJECTION INTRAVENOUS; SUBCUTANEOUS at 23:12

## 2017-07-16 RX ADMIN — TRAMADOL HYDROCHLORIDE 100 MILLIGRAM(S): 50 TABLET ORAL at 08:41

## 2017-07-16 RX ADMIN — HEPARIN SODIUM 5000 UNIT(S): 5000 INJECTION INTRAVENOUS; SUBCUTANEOUS at 05:08

## 2017-07-16 RX ADMIN — MUPIROCIN 1 APPLICATION(S): 20 OINTMENT TOPICAL at 16:42

## 2017-07-16 RX ADMIN — Medication 250 MILLIGRAM(S): at 23:09

## 2017-07-16 RX ADMIN — Medication 1: at 08:32

## 2017-07-16 RX ADMIN — Medication 250 MILLIGRAM(S): at 08:33

## 2017-07-16 RX ADMIN — Medication 150 MICROGRAM(S): at 05:08

## 2017-07-16 RX ADMIN — Medication 650 MILLIGRAM(S): at 04:44

## 2017-07-16 RX ADMIN — TRAMADOL HYDROCHLORIDE 100 MILLIGRAM(S): 50 TABLET ORAL at 23:45

## 2017-07-16 RX ADMIN — TRAMADOL HYDROCHLORIDE 100 MILLIGRAM(S): 50 TABLET ORAL at 02:27

## 2017-07-16 RX ADMIN — ATORVASTATIN CALCIUM 10 MILLIGRAM(S): 80 TABLET, FILM COATED ORAL at 23:12

## 2017-07-16 RX ADMIN — Medication 500 MILLIGRAM(S): at 05:07

## 2017-07-16 RX ADMIN — Medication 500 MILLIGRAM(S): at 16:51

## 2017-07-16 RX ADMIN — TRAMADOL HYDROCHLORIDE 100 MILLIGRAM(S): 50 TABLET ORAL at 16:53

## 2017-07-16 RX ADMIN — Medication 1: at 12:19

## 2017-07-16 RX ADMIN — HEPARIN SODIUM 5000 UNIT(S): 5000 INJECTION INTRAVENOUS; SUBCUTANEOUS at 12:20

## 2017-07-16 RX ADMIN — Medication 10 MILLIGRAM(S): at 05:08

## 2017-07-16 RX ADMIN — TRAMADOL HYDROCHLORIDE 100 MILLIGRAM(S): 50 TABLET ORAL at 01:28

## 2017-07-16 RX ADMIN — TRAMADOL HYDROCHLORIDE 100 MILLIGRAM(S): 50 TABLET ORAL at 23:11

## 2017-07-16 RX ADMIN — Medication 650 MILLIGRAM(S): at 05:30

## 2017-07-16 RX ADMIN — MUPIROCIN 1 APPLICATION(S): 20 OINTMENT TOPICAL at 05:08

## 2017-07-16 NOTE — PROGRESS NOTE ADULT - SUBJECTIVE AND OBJECTIVE BOX
Still quite sensitive in popped blister skin areas  Still serous drainage from popped blisters    Vital Signs Last 24 Hrs  T(C): 37.2 (16 Jul 2017 04:50), Max: 37.2 (16 Jul 2017 04:50)  T(F): 99 (16 Jul 2017 04:50), Max: 99 (16 Jul 2017 04:50)  HR: 80 (16 Jul 2017 04:50) (78 - 86)  BP: 115/61 (16 Jul 2017 04:50) (115/61 - 132/59)  BP(mean): --  RR: 18 (16 Jul 2017 04:50) (18 - 18)  SpO2: 96% (16 Jul 2017 04:50) (96% - 99%)    GENERAL: NAD, AAOx3  HEAD:  Atraumatic, Normocephalic  EYES: EOMI  NECK: Supple, No JVD  CHEST/LUNG: Clear to auscultation bilaterally; No wheeze  HEART: Regular rate and rhythm; No murmurs, rubs, or gallops  ABDOMEN: Soft, Nontender, Nondistended; Bowel sounds present  EXTREMITIES: 2 + LE edema b/l  SKIN: nonblister areas less red and tender, popped blister areas still with serous drainage and raw pink skin underneath  NEURO: nonfocal CN/motor/sensory/reflexes    LABS:    07-16    140  |  101  |  19  ----------------------------<  108<H>  4.3   |  25  |  0.92    Ca    9.0      16 Jul 2017 08:46        CAPILLARY BLOOD GLUCOSE  158 (16 Jul 2017 08:23)  185 (15 Jul 2017 21:59)  131 (15 Jul 2017 16:38)  174 (15 Jul 2017 12:26)

## 2017-07-17 LAB
ANION GAP SERPL CALC-SCNC: 15 MMOL/L — SIGNIFICANT CHANGE UP (ref 5–17)
BUN SERPL-MCNC: 19 MG/DL — SIGNIFICANT CHANGE UP (ref 7–23)
CALCIUM SERPL-MCNC: 9.2 MG/DL — SIGNIFICANT CHANGE UP (ref 8.4–10.5)
CHLORIDE SERPL-SCNC: 97 MMOL/L — SIGNIFICANT CHANGE UP (ref 96–108)
CO2 SERPL-SCNC: 27 MMOL/L — SIGNIFICANT CHANGE UP (ref 22–31)
CREAT SERPL-MCNC: 0.86 MG/DL — SIGNIFICANT CHANGE UP (ref 0.5–1.3)
CULTURE RESULTS: SIGNIFICANT CHANGE UP
CULTURE RESULTS: SIGNIFICANT CHANGE UP
GLUCOSE SERPL-MCNC: 110 MG/DL — HIGH (ref 70–99)
POTASSIUM SERPL-MCNC: 4.1 MMOL/L — SIGNIFICANT CHANGE UP (ref 3.5–5.3)
POTASSIUM SERPL-SCNC: 4.1 MMOL/L — SIGNIFICANT CHANGE UP (ref 3.5–5.3)
SODIUM SERPL-SCNC: 139 MMOL/L — SIGNIFICANT CHANGE UP (ref 135–145)
SPECIMEN SOURCE: SIGNIFICANT CHANGE UP
SPECIMEN SOURCE: SIGNIFICANT CHANGE UP

## 2017-07-17 PROCEDURE — 99232 SBSQ HOSP IP/OBS MODERATE 35: CPT

## 2017-07-17 RX ORDER — MORPHINE SULFATE 50 MG/1
2 CAPSULE, EXTENDED RELEASE ORAL EVERY 6 HOURS
Qty: 0 | Refills: 0 | Status: DISCONTINUED | OUTPATIENT
Start: 2017-07-17 | End: 2017-07-20

## 2017-07-17 RX ADMIN — Medication 500 MILLIGRAM(S): at 05:19

## 2017-07-17 RX ADMIN — MORPHINE SULFATE 2 MILLIGRAM(S): 50 CAPSULE, EXTENDED RELEASE ORAL at 15:01

## 2017-07-17 RX ADMIN — Medication 2: at 16:45

## 2017-07-17 RX ADMIN — HEPARIN SODIUM 5000 UNIT(S): 5000 INJECTION INTRAVENOUS; SUBCUTANEOUS at 12:02

## 2017-07-17 RX ADMIN — Medication 250 MILLIGRAM(S): at 08:43

## 2017-07-17 RX ADMIN — Medication 1: at 08:43

## 2017-07-17 RX ADMIN — Medication 500 MILLIGRAM(S): at 16:46

## 2017-07-17 RX ADMIN — ATORVASTATIN CALCIUM 10 MILLIGRAM(S): 80 TABLET, FILM COATED ORAL at 21:25

## 2017-07-17 RX ADMIN — HEPARIN SODIUM 5000 UNIT(S): 5000 INJECTION INTRAVENOUS; SUBCUTANEOUS at 05:21

## 2017-07-17 RX ADMIN — TRAMADOL HYDROCHLORIDE 100 MILLIGRAM(S): 50 TABLET ORAL at 22:28

## 2017-07-17 RX ADMIN — Medication 1: at 12:01

## 2017-07-17 RX ADMIN — Medication 250 MILLIGRAM(S): at 21:30

## 2017-07-17 RX ADMIN — Medication 150 MICROGRAM(S): at 05:19

## 2017-07-17 RX ADMIN — MUPIROCIN 1 APPLICATION(S): 20 OINTMENT TOPICAL at 16:45

## 2017-07-17 RX ADMIN — TRAMADOL HYDROCHLORIDE 100 MILLIGRAM(S): 50 TABLET ORAL at 21:30

## 2017-07-17 RX ADMIN — HEPARIN SODIUM 5000 UNIT(S): 5000 INJECTION INTRAVENOUS; SUBCUTANEOUS at 21:25

## 2017-07-17 RX ADMIN — MUPIROCIN 1 APPLICATION(S): 20 OINTMENT TOPICAL at 05:19

## 2017-07-17 RX ADMIN — TRAMADOL HYDROCHLORIDE 100 MILLIGRAM(S): 50 TABLET ORAL at 05:17

## 2017-07-17 RX ADMIN — TRAMADOL HYDROCHLORIDE 100 MILLIGRAM(S): 50 TABLET ORAL at 05:50

## 2017-07-17 RX ADMIN — MORPHINE SULFATE 2 MILLIGRAM(S): 50 CAPSULE, EXTENDED RELEASE ORAL at 14:31

## 2017-07-17 RX ADMIN — Medication 10 MILLIGRAM(S): at 05:19

## 2017-07-17 NOTE — PROGRESS NOTE ADULT - SUBJECTIVE AND OBJECTIVE BOX
Still has significant pain at her blister sites on rt foreleg    Vital Signs Last 24 Hrs  T(C): 36.8 (17 Jul 2017 04:50), Max: 37.3 (16 Jul 2017 21:41)  T(F): 98.2 (17 Jul 2017 04:50), Max: 99.1 (16 Jul 2017 21:41)  HR: 81 (17 Jul 2017 04:50) (73 - 81)  BP: 104/61 (17 Jul 2017 04:50) (104/61 - 155/78)  BP(mean): --  RR: 18 (17 Jul 2017 04:50) (18 - 18)  SpO2: 95% (17 Jul 2017 04:50) (95% - 98%)    GENERAL: NAD, AAOx3  HEAD:  Atraumatic, Normocephalic  EYES: EOMI  NECK: Supple, No JVD  CHEST/LUNG: Clear to auscultation bilaterally; No wheeze  HEART: Regular rate and rhythm; No murmurs, rubs, or gallops  ABDOMEN: Soft, Nontender, Nondistended; Bowel sounds present  EXTREMITIES: 2 + LE edema b/l  SKIN: nonblister areas less red and tender, popped blister areas still with serous drainage and raw pink skin underneath    LABS:    07-17    139  |  97  |  19  ----------------------------<  110<H>  4.1   |  27  |  0.86    Ca    9.2      17 Jul 2017 07:24        CAPILLARY BLOOD GLUCOSE  156 (17 Jul 2017 07:24)  114 (16 Jul 2017 21:41)  130 (16 Jul 2017 16:44)  183 (16 Jul 2017 12:17)

## 2017-07-17 NOTE — PROGRESS NOTE ADULT - ASSESSMENT
65 yo woman with PMH of Uterine ca (s/p chemo and completed in April 2016 reported to be in Remission), Bullous Pemphigoid (diagnosed 2014 on Prednisone and suppressive PO doxy theapy), HTN, HLD, DMT2, Osteoarthritis presenting with worsening right leg redness and bullae.   Had allergic hives and itchiness while on Ancef.   Improved -day 6 of abx  If stable could stop tomorrow  Other plan including pain per primary team  Case d/w Med NP  ID will follow as needed,please call [phone] if any questions or issues.

## 2017-07-17 NOTE — PROGRESS NOTE ADULT - SUBJECTIVE AND OBJECTIVE BOX
Patient is a 66y old  Female who presents with a chief complaint of RLE redness, swelling (13 Jul 2017 12:51)    Being followed by ID for cellulitis    Interval history:Some leg pain  No acute events      ROS:  No cough,SOB,CP  No N/V/D./abd pain  No other complaints      Antimicrobials:    vancomycin  IVPB 1000 milliGRAM(s) IV Intermittent every 12 hours  vancomycin  IVPB   IV Intermittent       Vital Signs Last 24 Hrs  T(C): 36.8 (07-17-17 @ 04:50), Max: 37.3 (07-16-17 @ 21:41)  T(F): 98.2 (07-17-17 @ 04:50), Max: 99.1 (07-16-17 @ 21:41)  HR: 81 (07-17-17 @ 04:50) (74 - 81)  BP: 104/61 (07-17-17 @ 04:50) (104/61 - 155/78)  BP(mean): --  RR: 18 (07-17-17 @ 04:50) (18 - 18)  SpO2: 95% (07-17-17 @ 04:50) (95% - 98%)    Physical Exam:    Constitutional well preserved,comfortable,pleasant    HEENT PERRLA EOMI,No pallor or icterus    No oral exudate or erythema    Neck supple no JVD or LN    Chest Good AE,CTA    CVS RRR S1 S2 WNl No murmur or rub or gallop    Abd soft BS normal No tenderness no masses    Ext bilateral stasis-some erythema on right leg but decreased-mild tenderness    IV site no erythema tenderness or discharge    Joints no swelling or LOM    CNS AAO X 3 no focal    Lab Data:        07-17    139  |  97  |  19  ----------------------------<  110<H>  4.1   |  27  |  0.86    Ca    9.2      17 Jul 2017 07:24                  Vancomycin Level, Trough: 16.1 ug/mL (07-16-17 @ 21:36)    Culture - Blood (07.12.17 @ 00:34)    Specimen Source: .Blood Blood    Culture Results:   No growth at 5 days.    Culture - Blood (07.12.17 @ 00:34)    Specimen Source: .Blood Blood    Culture Results:   No growth at 5 days.

## 2017-07-17 NOTE — PROGRESS NOTE ADULT - PROBLEM SELECTOR PLAN 1
Patient chronically on Doxycycline and Prednisone as outpatient.   Vanco to be continued (developed rash 2' keflex); next trough Thursday morning  wet-to-dry bid dressing changes with mupirocin  Appreciate ID, derm; wound care consult  MSO4 2 mg IVP q6h prn severe pain

## 2017-07-18 LAB
ANION GAP SERPL CALC-SCNC: 14 MMOL/L — SIGNIFICANT CHANGE UP (ref 5–17)
BASOPHILS # BLD AUTO: 0.03 K/UL — SIGNIFICANT CHANGE UP (ref 0–0.2)
BASOPHILS NFR BLD AUTO: 0.5 % — SIGNIFICANT CHANGE UP (ref 0–2)
BUN SERPL-MCNC: 22 MG/DL — SIGNIFICANT CHANGE UP (ref 7–23)
CALCIUM SERPL-MCNC: 9.4 MG/DL — SIGNIFICANT CHANGE UP (ref 8.4–10.5)
CHLORIDE SERPL-SCNC: 98 MMOL/L — SIGNIFICANT CHANGE UP (ref 96–108)
CO2 SERPL-SCNC: 26 MMOL/L — SIGNIFICANT CHANGE UP (ref 22–31)
CREAT SERPL-MCNC: 0.98 MG/DL — SIGNIFICANT CHANGE UP (ref 0.5–1.3)
EOSINOPHIL # BLD AUTO: 0.16 K/UL — SIGNIFICANT CHANGE UP (ref 0–0.5)
EOSINOPHIL NFR BLD AUTO: 2.9 % — SIGNIFICANT CHANGE UP (ref 0–6)
GLUCOSE SERPL-MCNC: 127 MG/DL — HIGH (ref 70–99)
HCT VFR BLD CALC: 30.2 % — LOW (ref 34.5–45)
HGB BLD-MCNC: 9.6 G/DL — LOW (ref 11.5–15.5)
IMM GRANULOCYTES NFR BLD AUTO: 3.1 % — HIGH (ref 0–1.5)
LYMPHOCYTES # BLD AUTO: 1.52 K/UL — SIGNIFICANT CHANGE UP (ref 1–3.3)
LYMPHOCYTES # BLD AUTO: 27.4 % — SIGNIFICANT CHANGE UP (ref 13–44)
MCHC RBC-ENTMCNC: 31 PG — SIGNIFICANT CHANGE UP (ref 27–34)
MCHC RBC-ENTMCNC: 31.8 GM/DL — LOW (ref 32–36)
MCV RBC AUTO: 97.4 FL — SIGNIFICANT CHANGE UP (ref 80–100)
MONOCYTES # BLD AUTO: 0.48 K/UL — SIGNIFICANT CHANGE UP (ref 0–0.9)
MONOCYTES NFR BLD AUTO: 8.6 % — SIGNIFICANT CHANGE UP (ref 2–14)
NEUTROPHILS # BLD AUTO: 3.19 K/UL — SIGNIFICANT CHANGE UP (ref 1.8–7.4)
NEUTROPHILS NFR BLD AUTO: 57.5 % — SIGNIFICANT CHANGE UP (ref 43–77)
PLATELET # BLD AUTO: 351 K/UL — SIGNIFICANT CHANGE UP (ref 150–400)
POTASSIUM SERPL-MCNC: 4.4 MMOL/L — SIGNIFICANT CHANGE UP (ref 3.5–5.3)
POTASSIUM SERPL-SCNC: 4.4 MMOL/L — SIGNIFICANT CHANGE UP (ref 3.5–5.3)
RBC # BLD: 3.1 M/UL — LOW (ref 3.8–5.2)
RBC # FLD: 15.4 % — HIGH (ref 10.3–14.5)
SODIUM SERPL-SCNC: 138 MMOL/L — SIGNIFICANT CHANGE UP (ref 135–145)
WBC # BLD: 5.55 K/UL — SIGNIFICANT CHANGE UP (ref 3.8–10.5)
WBC # FLD AUTO: 5.55 K/UL — SIGNIFICANT CHANGE UP (ref 3.8–10.5)

## 2017-07-18 PROCEDURE — 99232 SBSQ HOSP IP/OBS MODERATE 35: CPT

## 2017-07-18 RX ADMIN — MORPHINE SULFATE 2 MILLIGRAM(S): 50 CAPSULE, EXTENDED RELEASE ORAL at 17:31

## 2017-07-18 RX ADMIN — MORPHINE SULFATE 2 MILLIGRAM(S): 50 CAPSULE, EXTENDED RELEASE ORAL at 07:39

## 2017-07-18 RX ADMIN — ATORVASTATIN CALCIUM 10 MILLIGRAM(S): 80 TABLET, FILM COATED ORAL at 22:40

## 2017-07-18 RX ADMIN — MUPIROCIN 1 APPLICATION(S): 20 OINTMENT TOPICAL at 17:27

## 2017-07-18 RX ADMIN — MORPHINE SULFATE 2 MILLIGRAM(S): 50 CAPSULE, EXTENDED RELEASE ORAL at 18:01

## 2017-07-18 RX ADMIN — HEPARIN SODIUM 5000 UNIT(S): 5000 INJECTION INTRAVENOUS; SUBCUTANEOUS at 22:40

## 2017-07-18 RX ADMIN — Medication 500 MILLIGRAM(S): at 17:28

## 2017-07-18 RX ADMIN — TRAMADOL HYDROCHLORIDE 100 MILLIGRAM(S): 50 TABLET ORAL at 11:24

## 2017-07-18 RX ADMIN — TRAMADOL HYDROCHLORIDE 100 MILLIGRAM(S): 50 TABLET ORAL at 11:54

## 2017-07-18 RX ADMIN — Medication 10 MILLIGRAM(S): at 06:33

## 2017-07-18 RX ADMIN — Medication 2: at 12:40

## 2017-07-18 RX ADMIN — TRAMADOL HYDROCHLORIDE 100 MILLIGRAM(S): 50 TABLET ORAL at 04:16

## 2017-07-18 RX ADMIN — HEPARIN SODIUM 5000 UNIT(S): 5000 INJECTION INTRAVENOUS; SUBCUTANEOUS at 12:41

## 2017-07-18 RX ADMIN — TRAMADOL HYDROCHLORIDE 100 MILLIGRAM(S): 50 TABLET ORAL at 03:28

## 2017-07-18 RX ADMIN — Medication 150 MICROGRAM(S): at 06:32

## 2017-07-18 RX ADMIN — Medication 500 MILLIGRAM(S): at 06:32

## 2017-07-18 RX ADMIN — Medication 250 MILLIGRAM(S): at 08:20

## 2017-07-18 RX ADMIN — Medication 2: at 17:27

## 2017-07-18 RX ADMIN — Medication 250 MILLIGRAM(S): at 21:25

## 2017-07-18 RX ADMIN — HEPARIN SODIUM 5000 UNIT(S): 5000 INJECTION INTRAVENOUS; SUBCUTANEOUS at 06:32

## 2017-07-18 RX ADMIN — MUPIROCIN 1 APPLICATION(S): 20 OINTMENT TOPICAL at 06:33

## 2017-07-18 RX ADMIN — MORPHINE SULFATE 2 MILLIGRAM(S): 50 CAPSULE, EXTENDED RELEASE ORAL at 07:00

## 2017-07-18 NOTE — ADVANCED PRACTICE NURSE CONSULT - RECOMMEDATIONS
1.  Adaptic no touch silicone sheets to areas of ruptured bullae.  This dressing may remain in place for several days and changed as needed.  Place Aquacel dressings over the Adaptic as needed to wick and absorb drainage.  As drainage deceases may replace Aquacel with gauze over Adaptic.  Cover with ABDs and sonya or stretch netting.  Change the gauze layers daily or as needed.  Box of stretch netting ordered to replace the sonya.  Remain available as requested by staff

## 2017-07-18 NOTE — PROGRESS NOTE ADULT - SUBJECTIVE AND OBJECTIVE BOX
Patient is a 66y old  Female who presents with a chief complaint of RLE redness, swelling (13 Jul 2017 12:51)    Being followed by ID for cellulitis,pemhigoid    Interval history:  Still some leg pain  No acute events      ROS:  No cough,SOB,CP  No N/V/D./abd pain  No other complaints      Antimicrobials:    vancomycin  IVPB 1000 milliGRAM(s) IV Intermittent every 12 hours  vancomycin  IVPB   IV Intermittent       Vital Signs Last 24 Hrs  T(C): 37.2 (07-18-17 @ 05:15), Max: 37.2 (07-18-17 @ 05:15)  T(F): 99 (07-18-17 @ 05:15), Max: 99 (07-18-17 @ 05:15)  HR: 76 (07-18-17 @ 05:15) (76 - 93)  BP: 125/73 (07-18-17 @ 05:15) (125/73 - 135/72)  BP(mean): --  RR: 18 (07-18-17 @ 05:15) (18 - 18)  SpO2: 96% (07-18-17 @ 05:15) (93% - 98%)    Physical Exam:    Constitutional well preserved,comfortable,pleasant    HEENT PERRLA EOMI,No pallor or icterus    No oral exudate or erythema    Neck supple no JVD or LN    Chest Good AE,CTA    CVS RRR S1 S2 WNl No murmur or rub or gallop    Abd soft BS normal No tenderness no masses    Ext Right leg decreased erythema minimal tenderenss and ruptured bullae site    IV site no erythema tenderness or discharge    Joints no swelling or LOM    CNS AAO X 3 no focal    Lab Data:                          9.6    5.55  )-----------( 351      ( 18 Jul 2017 07:50 )             30.2       07-18    138  |  98  |  22  ----------------------------<  127<H>  4.4   |  26  |  0.98    Ca    9.4      18 Jul 2017 07:41                  Vancomycin Level, Trough: 16.1 ug/mL (07-16-17 @ 21:36)    Culture - Blood (07.12.17 @ 00:34)    Specimen Source: .Blood Blood    Culture Results:   No growth at 5 days.      Culture - Blood (07.12.17 @ 00:34)    Specimen Source: .Blood Blood    Culture Results:   No growth at 5 days.

## 2017-07-18 NOTE — PROGRESS NOTE ADULT - SUBJECTIVE AND OBJECTIVE BOX
Patient is a 66y old  Female who presents with a chief complaint of RLE redness, swelling (13 Jul 2017 12:51)      SUBJECTIVE / OVERNIGHT EVENTS:   Feels better.  Denies CP/SOB/Palpitation/HA.    MEDICATIONS  (STANDING):  insulin lispro (HumaLOG) corrective regimen sliding scale   SubCutaneous three times a day before meals  insulin lispro (HumaLOG) corrective regimen sliding scale   SubCutaneous at bedtime  dextrose 5%. 1000 milliLiter(s) (50 mL/Hr) IV Continuous <Continuous>  dextrose 50% Injectable 12.5 Gram(s) IV Push once  dextrose 50% Injectable 25 Gram(s) IV Push once  dextrose 50% Injectable 25 Gram(s) IV Push once  atorvastatin 10 milliGRAM(s) Oral at bedtime  niacin  milliGRAM(s) Oral two times a day  levothyroxine 150 MICROGram(s) Oral daily  predniSONE   Tablet 10 milliGRAM(s) Oral daily  sodium chloride 0.9%. 1000 milliLiter(s) (60 mL/Hr) IV Continuous <Continuous>  vancomycin  IVPB 1000 milliGRAM(s) IV Intermittent every 12 hours  vancomycin  IVPB   IV Intermittent   heparin  Injectable 5000 Unit(s) SubCutaneous every 8 hours  mupirocin 2% Ointment 1 Application(s) Topical two times a day    MEDICATIONS  (PRN):  dextrose Gel 1 Dose(s) Oral once PRN Blood Glucose LESS THAN 70 milliGRAM(s)/deciliter  glucagon  Injectable 1 milliGRAM(s) IntraMuscular once PRN Glucose LESS THAN 70 milligrams/deciliter  traMADol 100 milliGRAM(s) Oral every 6 hours PRN Moderate Pain (4 - 6)  morphine  - Injectable 2 milliGRAM(s) IV Push every 6 hours PRN Severe Pain (7 - 10)      Vital Signs Last 24 Hrs  T(C): 36.9 (07-18-17 @ 22:23), Max: 37.2 (07-18-17 @ 05:15)  HR: 86 (07-19-17 @ 00:56) (76 - 87)  BP: 128/78 (07-19-17 @ 00:56) (125/73 - 142/74)  RR: 18 (07-19-17 @ 00:56) (16 - 18)  SpO2: 97% (07-18-17 @ 22:23) (96% - 97%)  CAPILLARY BLOOD GLUCOSE  197 (18 Jul 2017 22:19)  212 (18 Jul 2017 17:11)  205 (18 Jul 2017 11:56)  131 (18 Jul 2017 07:53)        I&O's Summary    17 Jul 2017 07:01  -  18 Jul 2017 07:00  --------------------------------------------------------  IN: 1160 mL / OUT: 0 mL / NET: 1160 mL    18 Jul 2017 07:01  -  19 Jul 2017 01:41  --------------------------------------------------------  IN: 1660 mL / OUT: 0 mL / NET: 1660 mL        PHYSICAL EXAM:  GENERAL: NAD, well-developed  HEAD:  Atraumatic, Normocephalic  EYES: EOMI, PERRLA, conjunctiva and sclera clear  NECK: Supple, No JVD  CHEST/LUNG: Clear to auscultation bilaterally; No wheeze  HEART: Regular rate and rhythm; No murmurs, rubs, or gallops  ABDOMEN: Soft, Nontender, Nondistended; Bowel sounds present  EXTREMITIES:  2+ Peripheral Pulses, No clubbing, cyanosis, or edema  PSYCH: AAOx3  NEUROLOGY: non-focal  SKIN: No rashes or lesions    LABS:                        9.6    5.55  )-----------( 351      ( 18 Jul 2017 07:50 )             30.2     07-18    138  |  98  |  22  ----------------------------<  127<H>  4.4   |  26  |  0.98    Ca    9.4      18 Jul 2017 07:41              CAPILLARY BLOOD GLUCOSE  197 (18 Jul 2017 22:19)  212 (18 Jul 2017 17:11)  205 (18 Jul 2017 11:56)  131 (18 Jul 2017 07:53)                    RADIOLOGY & ADDITIONAL TESTS:    Imaging Personally Reviewed:    Consultant(s) Notes Reviewed:      Care Discussed with Consultants/Other Providers:

## 2017-07-18 NOTE — ADVANCED PRACTICE NURSE CONSULT - REASON FOR CONSULT
Requested by nursing staff to assess right lower extremity.  Patient is a pleasant, 65 yo woman with PMH of Uterine ca (s/p chemo and completed in April 2016 reported to be in Remission), Bullous Pemphigoid (diagnosed 2014 on Prednisone), HTN, HLD, DMT2, Osteoarthritis presenting with 1 day of worsening right leg redness, swelling and worsening bullae of right leg. Endorses right leg discomfort that is difficult for her to describe but denies burning or pain when bearing weight on the right leg. She states that bullous pemphigoid typically affects the right leg recently and states that they were not as enlarged and fluid filled in prior days. She has been on Prednisone 10 mg day (the past 4-5 weeks). This dose has been increased from 5 mg in setting of increased bullae. Patient also takes Daptomycin and Niacin daily for pemphigoid management. Patient denies subjective fevers, chills, sweats. Endorsed symptoms of fever, vomiting without diarrhea a few days ago that is currently resolved. Denies URI symptoms, CP, SOB, palpitations, development of new rashes. Chronic pain 2/2 to osteoarthritis. Denies recent trauma, recent travel.  Patient has been seen by dermatology and at this time this right lower leg may be presenting with bullae and also cellulitis.  Requested for dressing recommendations.

## 2017-07-18 NOTE — ADVANCED PRACTICE NURSE CONSULT - ASSESSMENT
Patient is on a APERA BAGS P 500 low airloss surface and is being assisted with turning and positioning as needed. She is currently sitting out of bed in the chair with her legs elevated.  She states her right lower extremity is extremely painful even with the lightest touch.  She is agreeable to have me assess her leg as the treatments have been painful and she is hopeful I can recommend an alternate treatment option. .  On assessment patient presents with several ruptured bullae over the anterior and posterior aspects of the leg.  Drainage appears to be serous and no odor is noted.   The leg was gently cleansed and assessed.  To ensure more comfort with treatments,  will recommend applying Adaptic No Touch silicone sheets over ruptured bullae sites and then cover with Aquacel to wick and absorb drainage.  Goal of dressing is to ensure less painful and atraumatic dressing changes.  The Adaptic dressing may remain in place for 2-3 days and the Aquacel may be changed daily and as needed.  Cover with ABD pads and stretch netting was ordered to replace using sonya.  After dressing change patient was comfortable and happy with this treatment that may be repeated at home.

## 2017-07-18 NOTE — PROGRESS NOTE ADULT - ASSESSMENT
67 yo woman with PMH of Uterine ca (s/p chemo and completed in April 2016 reported to be in Remission), Bullous Pemphigoid (diagnosed 2014 on Prednisone and suppressive PO doxy theapy), HTN, HLD, DMT2, Osteoarthritis presenting with worsening right leg redness and bullae.   Had allergic hives and itchiness while on Ancef.   Improved -day 7 of abx  can stop after today,restart suppressive po doxy.(per derm)  Other plan including pain per primary team  Case d/w Med NP  Will Follow.  Beeper [id number]-deno/afterhours/No response-[id number]

## 2017-07-19 LAB
ANION GAP SERPL CALC-SCNC: 16 MMOL/L — SIGNIFICANT CHANGE UP (ref 5–17)
BUN SERPL-MCNC: 20 MG/DL — SIGNIFICANT CHANGE UP (ref 7–23)
CALCIUM SERPL-MCNC: 9.5 MG/DL — SIGNIFICANT CHANGE UP (ref 8.4–10.5)
CHLORIDE SERPL-SCNC: 98 MMOL/L — SIGNIFICANT CHANGE UP (ref 96–108)
CO2 SERPL-SCNC: 24 MMOL/L — SIGNIFICANT CHANGE UP (ref 22–31)
CREAT SERPL-MCNC: 0.95 MG/DL — SIGNIFICANT CHANGE UP (ref 0.5–1.3)
GLUCOSE SERPL-MCNC: 125 MG/DL — HIGH (ref 70–99)
HCT VFR BLD CALC: 30.3 % — LOW (ref 34.5–45)
HGB BLD-MCNC: 9.6 G/DL — LOW (ref 11.5–15.5)
MCHC RBC-ENTMCNC: 31 PG — SIGNIFICANT CHANGE UP (ref 27–34)
MCHC RBC-ENTMCNC: 31.7 GM/DL — LOW (ref 32–36)
MCV RBC AUTO: 97.7 FL — SIGNIFICANT CHANGE UP (ref 80–100)
PLATELET # BLD AUTO: 359 K/UL — SIGNIFICANT CHANGE UP (ref 150–400)
POTASSIUM SERPL-MCNC: 4.3 MMOL/L — SIGNIFICANT CHANGE UP (ref 3.5–5.3)
POTASSIUM SERPL-SCNC: 4.3 MMOL/L — SIGNIFICANT CHANGE UP (ref 3.5–5.3)
RBC # BLD: 3.1 M/UL — LOW (ref 3.8–5.2)
RBC # FLD: 15.5 % — HIGH (ref 10.3–14.5)
SODIUM SERPL-SCNC: 138 MMOL/L — SIGNIFICANT CHANGE UP (ref 135–145)
WBC # BLD: 5.17 K/UL — SIGNIFICANT CHANGE UP (ref 3.8–10.5)
WBC # FLD AUTO: 5.17 K/UL — SIGNIFICANT CHANGE UP (ref 3.8–10.5)

## 2017-07-19 PROCEDURE — 99232 SBSQ HOSP IP/OBS MODERATE 35: CPT

## 2017-07-19 RX ADMIN — MORPHINE SULFATE 2 MILLIGRAM(S): 50 CAPSULE, EXTENDED RELEASE ORAL at 21:55

## 2017-07-19 RX ADMIN — MORPHINE SULFATE 2 MILLIGRAM(S): 50 CAPSULE, EXTENDED RELEASE ORAL at 01:02

## 2017-07-19 RX ADMIN — Medication 500 MILLIGRAM(S): at 17:22

## 2017-07-19 RX ADMIN — Medication 150 MICROGRAM(S): at 05:38

## 2017-07-19 RX ADMIN — ATORVASTATIN CALCIUM 10 MILLIGRAM(S): 80 TABLET, FILM COATED ORAL at 21:39

## 2017-07-19 RX ADMIN — Medication 250 MILLIGRAM(S): at 08:09

## 2017-07-19 RX ADMIN — HEPARIN SODIUM 5000 UNIT(S): 5000 INJECTION INTRAVENOUS; SUBCUTANEOUS at 13:33

## 2017-07-19 RX ADMIN — Medication 2: at 12:29

## 2017-07-19 RX ADMIN — TRAMADOL HYDROCHLORIDE 100 MILLIGRAM(S): 50 TABLET ORAL at 05:38

## 2017-07-19 RX ADMIN — TRAMADOL HYDROCHLORIDE 100 MILLIGRAM(S): 50 TABLET ORAL at 16:30

## 2017-07-19 RX ADMIN — Medication 500 MILLIGRAM(S): at 05:38

## 2017-07-19 RX ADMIN — TRAMADOL HYDROCHLORIDE 100 MILLIGRAM(S): 50 TABLET ORAL at 15:55

## 2017-07-19 RX ADMIN — HEPARIN SODIUM 5000 UNIT(S): 5000 INJECTION INTRAVENOUS; SUBCUTANEOUS at 05:38

## 2017-07-19 RX ADMIN — HEPARIN SODIUM 5000 UNIT(S): 5000 INJECTION INTRAVENOUS; SUBCUTANEOUS at 21:40

## 2017-07-19 RX ADMIN — Medication 250 MILLIGRAM(S): at 21:13

## 2017-07-19 RX ADMIN — Medication 1: at 08:07

## 2017-07-19 RX ADMIN — Medication 10 MILLIGRAM(S): at 05:38

## 2017-07-19 RX ADMIN — MORPHINE SULFATE 2 MILLIGRAM(S): 50 CAPSULE, EXTENDED RELEASE ORAL at 01:15

## 2017-07-19 RX ADMIN — MORPHINE SULFATE 2 MILLIGRAM(S): 50 CAPSULE, EXTENDED RELEASE ORAL at 21:37

## 2017-07-19 RX ADMIN — TRAMADOL HYDROCHLORIDE 100 MILLIGRAM(S): 50 TABLET ORAL at 06:02

## 2017-07-19 NOTE — PROGRESS NOTE ADULT - SUBJECTIVE AND OBJECTIVE BOX
Patient is a 66y old  Female who presents with a chief complaint of RLE redness, swelling (13 Jul 2017 12:51)      SUBJECTIVE / OVERNIGHT EVENTS:   Feels better.  Denies CP/SOB/Palpitation/HA.    MEDICATIONS  (STANDING):  insulin lispro (HumaLOG) corrective regimen sliding scale   SubCutaneous three times a day before meals  insulin lispro (HumaLOG) corrective regimen sliding scale   SubCutaneous at bedtime  dextrose 5%. 1000 milliLiter(s) (50 mL/Hr) IV Continuous <Continuous>  dextrose 50% Injectable 12.5 Gram(s) IV Push once  dextrose 50% Injectable 25 Gram(s) IV Push once  dextrose 50% Injectable 25 Gram(s) IV Push once  atorvastatin 10 milliGRAM(s) Oral at bedtime  niacin  milliGRAM(s) Oral two times a day  levothyroxine 150 MICROGram(s) Oral daily  predniSONE   Tablet 10 milliGRAM(s) Oral daily  sodium chloride 0.9%. 1000 milliLiter(s) (60 mL/Hr) IV Continuous <Continuous>  vancomycin  IVPB 1000 milliGRAM(s) IV Intermittent every 12 hours  vancomycin  IVPB   IV Intermittent   heparin  Injectable 5000 Unit(s) SubCutaneous every 8 hours    MEDICATIONS  (PRN):  dextrose Gel 1 Dose(s) Oral once PRN Blood Glucose LESS THAN 70 milliGRAM(s)/deciliter  glucagon  Injectable 1 milliGRAM(s) IntraMuscular once PRN Glucose LESS THAN 70 milligrams/deciliter  traMADol 100 milliGRAM(s) Oral every 6 hours PRN Moderate Pain (4 - 6)  morphine  - Injectable 2 milliGRAM(s) IV Push every 6 hours PRN Severe Pain (7 - 10)      Vital Signs Last 24 Hrs  T(C): 37.3 (07-19-17 @ 21:28), Max: 37.4 (07-19-17 @ 13:53)  HR: 84 (07-19-17 @ 21:28) (83 - 90)  BP: 120/78 (07-19-17 @ 21:28) (120/78 - 122/76)  RR: 18 (07-19-17 @ 21:28) (18 - 18)  SpO2: 97% (07-19-17 @ 21:28) (96% - 97%)  CAPILLARY BLOOD GLUCOSE  120 (19 Jul 2017 21:41)  145 (19 Jul 2017 17:20)  222 (19 Jul 2017 11:57)  154 (19 Jul 2017 07:31)        I&O's Summary    18 Jul 2017 07:01  -  19 Jul 2017 07:00  --------------------------------------------------------  IN: 2060 mL / OUT: 0 mL / NET: 2060 mL    19 Jul 2017 07:01  -  20 Jul 2017 02:05  --------------------------------------------------------  IN: 1360 mL / OUT: 0 mL / NET: 1360 mL        PHYSICAL EXAM:  GENERAL: NAD, well-developed  HEAD:  Atraumatic, Normocephalic  EYES: EOMI, PERRLA, conjunctiva and sclera clear  NECK: Supple, No JVD  CHEST/LUNG: Clear to auscultation bilaterally; No wheeze  HEART: Regular rate and rhythm; No murmurs, rubs, or gallops  ABDOMEN: Soft, Nontender, Nondistended; Bowel sounds present  EXTREMITIES:  2+ Peripheral Pulses, No clubbing, cyanosis, or edema  PSYCH: AAOx3  NEUROLOGY: non-focal  SKIN: No rashes or lesions    LABS:                        9.6    5.17  )-----------( 359      ( 19 Jul 2017 07:25 )             30.3     07-19    138  |  98  |  20  ----------------------------<  125<H>  4.3   |  24  |  0.95    Ca    9.5      19 Jul 2017 07:30              CAPILLARY BLOOD GLUCOSE  120 (19 Jul 2017 21:41)  145 (19 Jul 2017 17:20)  222 (19 Jul 2017 11:57)  154 (19 Jul 2017 07:31)                    RADIOLOGY & ADDITIONAL TESTS:    Imaging Personally Reviewed:    Consultant(s) Notes Reviewed:      Care Discussed with Consultants/Other Providers:

## 2017-07-19 NOTE — PROGRESS NOTE ADULT - SUBJECTIVE AND OBJECTIVE BOX
Patient is a 66y old  Female who presents with a chief complaint of RLE redness, swelling (13 Jul 2017 12:51)    Being followed by ID for leg cellyulitis    Interval history:  leg pain better  No acute events      ROS:  No cough,SOB,CP  No N/V/D./abd pain  No other complaints      Antimicrobials:    vancomycin  IVPB 1000 milliGRAM(s) IV Intermittent every 12 hours  vancomycin  IVPB   IV Intermittent       Vital Signs Last 24 Hrs  T(C): 37.1 (07-19-17 @ 03:38), Max: 37.1 (07-19-17 @ 03:38)  T(F): 98.7 (07-19-17 @ 03:38), Max: 98.7 (07-19-17 @ 03:38)  HR: 90 (07-19-17 @ 03:38) (79 - 90)  BP: 120/78 (07-19-17 @ 03:38) (120/78 - 142/74)  BP(mean): --  RR: 18 (07-19-17 @ 03:38) (16 - 18)  SpO2: 97% (07-19-17 @ 03:38) (97% - 97%)    Physical Exam:    Constitutional well preserved,comfortable,pleasant    HEENT PERRLA EOMI,No pallor or icterus    No oral exudate or erythema    Neck supple no JVD or LN    Chest Good AE,CTA    CVS RRR S1 S2 WNl No murmur or rub or gallop    Abd soft BS normal No tenderness no masses    Right leg erythema improved ,bullae collapsed -no tenderness     IV site no erythema tenderness or discharge    Joints no swelling or LOM    CNS AAO X 3 no focal    Lab Data:                          9.6    5.17  )-----------( 359      ( 19 Jul 2017 07:25 )             30.3       07-19    138  |  98  |  20  ----------------------------<  125<H>  4.3   |  24  |  0.95    Ca    9.5      19 Jul 2017 07:30    Culture - Blood (07.12.17 @ 00:34)    Specimen Source: .Blood Blood    Culture Results:   No growth at 5 days.

## 2017-07-19 NOTE — PROGRESS NOTE ADULT - ASSESSMENT
65 yo woman with PMH of Uterine ca (s/p chemo and completed in April 2016 reported to be in Remission), Bullous Pemphigoid (diagnosed 2014 on Prednisone and suppressive PO doxy theapy), HTN, HLD, DMT2, Osteoarthritis presenting with worsening right leg redness and bullae.   Had allergic hives and itchiness while on Ancef.   Improved -s/p 7 days of vanco  can stop ,restart suppressive po doxy.(per derm)  Other plan including pain per primary team  Case d/w Med NP  ID will follow as needed,please call [phone] if any questions or issues.

## 2017-07-19 NOTE — PROGRESS NOTE ADULT - PROBLEM SELECTOR PROBLEM 5
History of osteoarthritis

## 2017-07-19 NOTE — PROGRESS NOTE ADULT - PROBLEM SELECTOR PLAN 3
Hold oral medications  Monitor FS  Corrective SSI  Primary day team to readjust coverage pending FS
Hold oral medications in hospital  Monitor FS  Corrective SSI  Primary day team to readjust coverage pending FS
Hold oral medications in hospital  Monitor FS  Corrective SSI  Primary day team to readjust coverage pending FS
Hold oral medications  Monitor FS  Corrective SSI  Primary day team to readjust coverage pending FS
Hold oral medications in hospital  Monitor FS  Corrective SSI  Primary day team to readjust coverage pending FS
Monitor FS  Corrective SSI  Primary day team to readjust coverage pending FS

## 2017-07-19 NOTE — PROGRESS NOTE ADULT - PROBLEM SELECTOR PLAN 5
Cont tramadol to 100 mg q6h prn.   ISTOP # #: [id number]
Cont tramadol to 100 mg q6h prn.   ISTOP # #: [id number]
Continue with pain management with Tramadol prn. (Will have to use immediate release dose as only option inpatient formulary)  ISTOP # #: [id number]
Continue with pain management with Tramadol prn. (Will have to use immediate release dose as only option inpatient formulary)  ISTOP # #: [id number]
Increased tramadol to 100 mg q6h prn.   ISTOP # #: [id number]
Cont tramadol to 100 mg q6h prn.
Cont tramadol to 100 mg q6h prn.   ISTOP # #: [id number]
Continue with pain management with Tramadol prn. (Will have to use immediate release dose as only option inpatient formulary)  ISTOP # #: [id number]

## 2017-07-19 NOTE — PROGRESS NOTE ADULT - PROBLEM SELECTOR PLAN 4
Cont to hold BP meds
Relative hypotension. Patient took Lisinopril   will hold HTN meds pending improve hemodynamic stablity
Cont to hold BP meds

## 2017-07-19 NOTE — PROGRESS NOTE ADULT - PROBLEM SELECTOR PROBLEM 3
Type 2 diabetes mellitus without complication, unspecified long term insulin use status

## 2017-07-19 NOTE — PROGRESS NOTE ADULT - PROBLEM SELECTOR PLAN 1
Vanco to be continued (developed rash 2' keflex); next trough Thursday morning  wet-to-dry bid dressing changes with mupirocin.  MSO4 2 mg IVP q6h prn severe pain

## 2017-07-19 NOTE — PROGRESS NOTE ADULT - PROBLEM SELECTOR PROBLEM 1
Cellulitis of right lower extremity

## 2017-07-19 NOTE — PROGRESS NOTE ADULT - PROBLEM SELECTOR PLAN 2
Will continue with outpatient regimen  Appreciate derm  Pt on prednisone 10 mg daily, weaned down from 15 mg short time ago by her outside dermatologist; was on cellcept in past as well
Will continue with outpatient regimen  Appreciate derm  Pt on prednisone 10 mg daily, weaned down from 15 mg short time ago by her outside dermatologist; was on cellcept in past as well
Will continue with outpatient regimen  Appreciate derm  Pt on prednisone 10 mg daily, weaned down from 15 mg short time ago by her outside dermatologist; was on cellcept in past as well  If drainage does not resolved, will ask derm if going to 15 mg is worth a trial
Will continue with outpatient regimen  Appreciate derm; ?increase prednisone dose given persistent pain?
Will continue with outpatient regimen  Called derm consult  Pt on prednisone 10 mg daily, weaned down from 15 mg short time ago by her outside dermatologist; was on cellcept in past as well
Prednisone.
Will continue with outpatient regimen  Appreciate derm  Pt on prednisone 10 mg daily, weaned down from 15 mg short time ago by her outside dermatologist; was on cellcept in past as well
Will continue with outpatient regimen  Appreciate derm; ?increase prednisone dose given persistent pain?

## 2017-07-19 NOTE — PROGRESS NOTE ADULT - PROBLEM SELECTOR PROBLEM 2
Bullous pemphigoid

## 2017-07-19 NOTE — PROGRESS NOTE ADULT - PROBLEM SELECTOR PROBLEM 4
Hypertension

## 2017-07-20 VITALS — HEART RATE: 78 BPM | RESPIRATION RATE: 18 BRPM | SYSTOLIC BLOOD PRESSURE: 118 MMHG | DIASTOLIC BLOOD PRESSURE: 70 MMHG

## 2017-07-20 PROCEDURE — 80053 COMPREHEN METABOLIC PANEL: CPT

## 2017-07-20 PROCEDURE — 85027 COMPLETE CBC AUTOMATED: CPT

## 2017-07-20 PROCEDURE — 93970 EXTREMITY STUDY: CPT

## 2017-07-20 PROCEDURE — 82803 BLOOD GASES ANY COMBINATION: CPT

## 2017-07-20 PROCEDURE — 80202 ASSAY OF VANCOMYCIN: CPT

## 2017-07-20 PROCEDURE — 83605 ASSAY OF LACTIC ACID: CPT

## 2017-07-20 PROCEDURE — 73590 X-RAY EXAM OF LOWER LEG: CPT

## 2017-07-20 PROCEDURE — 82947 ASSAY GLUCOSE BLOOD QUANT: CPT

## 2017-07-20 PROCEDURE — 84132 ASSAY OF SERUM POTASSIUM: CPT

## 2017-07-20 PROCEDURE — 82435 ASSAY OF BLOOD CHLORIDE: CPT

## 2017-07-20 PROCEDURE — 99232 SBSQ HOSP IP/OBS MODERATE 35: CPT

## 2017-07-20 PROCEDURE — 99285 EMERGENCY DEPT VISIT HI MDM: CPT | Mod: 25

## 2017-07-20 PROCEDURE — 84295 ASSAY OF SERUM SODIUM: CPT

## 2017-07-20 PROCEDURE — 83735 ASSAY OF MAGNESIUM: CPT

## 2017-07-20 PROCEDURE — 84100 ASSAY OF PHOSPHORUS: CPT

## 2017-07-20 PROCEDURE — 80048 BASIC METABOLIC PNL TOTAL CA: CPT

## 2017-07-20 PROCEDURE — 87040 BLOOD CULTURE FOR BACTERIA: CPT

## 2017-07-20 PROCEDURE — 82330 ASSAY OF CALCIUM: CPT

## 2017-07-20 PROCEDURE — 96374 THER/PROPH/DIAG INJ IV PUSH: CPT

## 2017-07-20 PROCEDURE — 85014 HEMATOCRIT: CPT

## 2017-07-20 PROCEDURE — 83036 HEMOGLOBIN GLYCOSYLATED A1C: CPT

## 2017-07-20 RX ORDER — ACETAMINOPHEN 500 MG
1 TABLET ORAL
Qty: 0 | Refills: 0 | COMMUNITY

## 2017-07-20 RX ORDER — LISINOPRIL 2.5 MG/1
1 TABLET ORAL
Qty: 0 | Refills: 0 | COMMUNITY

## 2017-07-20 RX ADMIN — Medication 500 MILLIGRAM(S): at 05:13

## 2017-07-20 RX ADMIN — MORPHINE SULFATE 2 MILLIGRAM(S): 50 CAPSULE, EXTENDED RELEASE ORAL at 05:18

## 2017-07-20 RX ADMIN — Medication 150 MICROGRAM(S): at 05:13

## 2017-07-20 RX ADMIN — HEPARIN SODIUM 5000 UNIT(S): 5000 INJECTION INTRAVENOUS; SUBCUTANEOUS at 05:13

## 2017-07-20 RX ADMIN — TRAMADOL HYDROCHLORIDE 100 MILLIGRAM(S): 50 TABLET ORAL at 08:50

## 2017-07-20 RX ADMIN — Medication 2: at 08:15

## 2017-07-20 RX ADMIN — Medication 250 MILLIGRAM(S): at 08:16

## 2017-07-20 RX ADMIN — TRAMADOL HYDROCHLORIDE 100 MILLIGRAM(S): 50 TABLET ORAL at 08:13

## 2017-07-20 RX ADMIN — Medication 10 MILLIGRAM(S): at 05:13

## 2017-07-20 RX ADMIN — MORPHINE SULFATE 2 MILLIGRAM(S): 50 CAPSULE, EXTENDED RELEASE ORAL at 05:35

## 2017-07-20 NOTE — PROGRESS NOTE ADULT - ASSESSMENT
67 yo woman with PMH of Uterine ca (s/p chemo and completed in April 2016 reported to be in Remission), Bullous Pemphigoid (diagnosed 2014 on Prednisone and suppressive PO doxy theapy), HTN, HLD, DMT2, Osteoarthritis presenting with worsening right leg redness and bullae.   Had allergic hives and itchiness while on Ancef.   Improved -s/p 8 days of vanco  can stop ,restart suppressive po doxy.(per derm)  Other plan including pain per primary team  Case d/w Med NP  ID will follow as needed,please call [phone] if any questions or issues.

## 2017-07-20 NOTE — PROGRESS NOTE ADULT - SUBJECTIVE AND OBJECTIVE BOX
Patient is a 66y old  Female who presents with a chief complaint of RLE redness, swelling (13 Jul 2017 12:51)    Being followed by ID for leg cellulitis    Interval history:feels better  No acute events      ROS:  No cough,SOB,CP  No N/V/D./abd pain  No other complaints      Antimicrobials:vanco dced        Vital Signs Last 24 Hrs  T(C): 36.7 (07-20-17 @ 03:55), Max: 37.4 (07-19-17 @ 13:53)  T(F): 98.1 (07-20-17 @ 03:55), Max: 99.3 (07-19-17 @ 13:53)  HR: 78 (07-20-17 @ 05:24) (78 - 89)  BP: 118/70 (07-20-17 @ 05:24) (108/69 - 122/76)  BP(mean): --  RR: 18 (07-20-17 @ 05:24) (18 - 18)  SpO2: 98% (07-20-17 @ 03:55) (96% - 98%)    Physical Exam:    HEENT PERRLA EOMI,No pallor or icterus    No oral exudate or erythema    Neck supple no JVD or LN    Chest Good AE,CTA    CVS RRR S1 S2 WNl No murmur or rub or gallop    Abd soft BS normal No tenderness no masses    Ext Right leg almost resolved erythema ,minimal tenderness    IV site no erythema tenderness or discharge    Joints no swelling or LOM    CNS AAO X 3 no focal    Lab Data:                          9.6    5.17  )-----------( 359      ( 19 Jul 2017 07:25 )             30.3       07-19    138  |  98  |  20  ----------------------------<  125<H>  4.3   |  24  |  0.95    Ca    9.5      19 Jul 2017 07:30

## 2017-09-07 ENCOUNTER — INPATIENT (INPATIENT)
Facility: HOSPITAL | Age: 67
LOS: 4 days | Discharge: HOME | DRG: 603 | End: 2017-09-12
Attending: INTERNAL MEDICINE | Admitting: INTERNAL MEDICINE
Payer: COMMERCIAL

## 2017-09-07 VITALS
TEMPERATURE: 99 F | DIASTOLIC BLOOD PRESSURE: 76 MMHG | RESPIRATION RATE: 18 BRPM | OXYGEN SATURATION: 98 % | SYSTOLIC BLOOD PRESSURE: 120 MMHG | HEART RATE: 98 BPM

## 2017-09-07 DIAGNOSIS — Z29.9 ENCOUNTER FOR PROPHYLACTIC MEASURES, UNSPECIFIED: ICD-10-CM

## 2017-09-07 DIAGNOSIS — I10 ESSENTIAL (PRIMARY) HYPERTENSION: ICD-10-CM

## 2017-09-07 DIAGNOSIS — Z98.89 OTHER SPECIFIED POSTPROCEDURAL STATES: Chronic | ICD-10-CM

## 2017-09-07 DIAGNOSIS — Z90.722 ACQUIRED ABSENCE OF OVARIES, BILATERAL: Chronic | ICD-10-CM

## 2017-09-07 DIAGNOSIS — E03.9 HYPOTHYROIDISM, UNSPECIFIED: ICD-10-CM

## 2017-09-07 DIAGNOSIS — Z90.710 ACQUIRED ABSENCE OF BOTH CERVIX AND UTERUS: Chronic | ICD-10-CM

## 2017-09-07 DIAGNOSIS — L03.115 CELLULITIS OF RIGHT LOWER LIMB: ICD-10-CM

## 2017-09-07 DIAGNOSIS — C54.1 MALIGNANT NEOPLASM OF ENDOMETRIUM: Chronic | ICD-10-CM

## 2017-09-07 DIAGNOSIS — E11.9 TYPE 2 DIABETES MELLITUS WITHOUT COMPLICATIONS: ICD-10-CM

## 2017-09-07 DIAGNOSIS — L12.0 BULLOUS PEMPHIGOID: ICD-10-CM

## 2017-09-07 LAB
ALBUMIN SERPL ELPH-MCNC: 4.2 G/DL — SIGNIFICANT CHANGE UP (ref 3.3–5)
ALP SERPL-CCNC: 63 U/L — SIGNIFICANT CHANGE UP (ref 40–120)
ALT FLD-CCNC: 12 U/L RC — SIGNIFICANT CHANGE UP (ref 10–45)
ANION GAP SERPL CALC-SCNC: 16 MMOL/L — SIGNIFICANT CHANGE UP (ref 5–17)
AST SERPL-CCNC: 14 U/L — SIGNIFICANT CHANGE UP (ref 10–40)
BASOPHILS # BLD AUTO: 0 K/UL — SIGNIFICANT CHANGE UP (ref 0–0.2)
BASOPHILS NFR BLD AUTO: 0.1 % — SIGNIFICANT CHANGE UP (ref 0–2)
BILIRUB SERPL-MCNC: 0.2 MG/DL — SIGNIFICANT CHANGE UP (ref 0.2–1.2)
BUN SERPL-MCNC: 27 MG/DL — HIGH (ref 7–23)
CALCIUM SERPL-MCNC: 9.5 MG/DL — SIGNIFICANT CHANGE UP (ref 8.4–10.5)
CHLORIDE SERPL-SCNC: 100 MMOL/L — SIGNIFICANT CHANGE UP (ref 96–108)
CO2 SERPL-SCNC: 26 MMOL/L — SIGNIFICANT CHANGE UP (ref 22–31)
CREAT SERPL-MCNC: 1.04 MG/DL — SIGNIFICANT CHANGE UP (ref 0.5–1.3)
EOSINOPHIL # BLD AUTO: 0 K/UL — SIGNIFICANT CHANGE UP (ref 0–0.5)
EOSINOPHIL NFR BLD AUTO: 0.4 % — SIGNIFICANT CHANGE UP (ref 0–6)
GLUCOSE SERPL-MCNC: 169 MG/DL — HIGH (ref 70–99)
HCT VFR BLD CALC: 32.2 % — LOW (ref 34.5–45)
HGB BLD-MCNC: 10.9 G/DL — LOW (ref 11.5–15.5)
LYMPHOCYTES # BLD AUTO: 0.8 K/UL — LOW (ref 1–3.3)
LYMPHOCYTES # BLD AUTO: 13.4 % — SIGNIFICANT CHANGE UP (ref 13–44)
MCHC RBC-ENTMCNC: 33.7 GM/DL — SIGNIFICANT CHANGE UP (ref 32–36)
MCHC RBC-ENTMCNC: 33.9 PG — SIGNIFICANT CHANGE UP (ref 27–34)
MCV RBC AUTO: 100 FL — SIGNIFICANT CHANGE UP (ref 80–100)
MONOCYTES # BLD AUTO: 0.4 K/UL — SIGNIFICANT CHANGE UP (ref 0–0.9)
MONOCYTES NFR BLD AUTO: 6.5 % — SIGNIFICANT CHANGE UP (ref 2–14)
NEUTROPHILS # BLD AUTO: 4.7 K/UL — SIGNIFICANT CHANGE UP (ref 1.8–7.4)
NEUTROPHILS NFR BLD AUTO: 79.6 % — HIGH (ref 43–77)
PLATELET # BLD AUTO: 299 K/UL — SIGNIFICANT CHANGE UP (ref 150–400)
POTASSIUM SERPL-MCNC: 4.5 MMOL/L — SIGNIFICANT CHANGE UP (ref 3.5–5.3)
POTASSIUM SERPL-SCNC: 4.5 MMOL/L — SIGNIFICANT CHANGE UP (ref 3.5–5.3)
PROT SERPL-MCNC: 7.3 G/DL — SIGNIFICANT CHANGE UP (ref 6–8.3)
RBC # BLD: 3.21 M/UL — LOW (ref 3.8–5.2)
RBC # FLD: 13.7 % — SIGNIFICANT CHANGE UP (ref 10.3–14.5)
SODIUM SERPL-SCNC: 142 MMOL/L — SIGNIFICANT CHANGE UP (ref 135–145)
WBC # BLD: 6 K/UL — SIGNIFICANT CHANGE UP (ref 3.8–10.5)
WBC # FLD AUTO: 6 K/UL — SIGNIFICANT CHANGE UP (ref 3.8–10.5)

## 2017-09-07 PROCEDURE — 99223 1ST HOSP IP/OBS HIGH 75: CPT

## 2017-09-07 PROCEDURE — 73590 X-RAY EXAM OF LOWER LEG: CPT | Mod: 26,RT

## 2017-09-07 PROCEDURE — 99285 EMERGENCY DEPT VISIT HI MDM: CPT

## 2017-09-07 RX ORDER — DEXTROSE 50 % IN WATER 50 %
25 SYRINGE (ML) INTRAVENOUS ONCE
Qty: 0 | Refills: 0 | Status: DISCONTINUED | OUTPATIENT
Start: 2017-09-07 | End: 2017-09-12

## 2017-09-07 RX ORDER — CEFAZOLIN SODIUM 1 G
VIAL (EA) INJECTION
Qty: 0 | Refills: 0 | Status: DISCONTINUED | OUTPATIENT
Start: 2017-09-07 | End: 2017-09-08

## 2017-09-07 RX ORDER — TRAMADOL HYDROCHLORIDE 50 MG/1
50 TABLET ORAL EVERY 6 HOURS
Qty: 0 | Refills: 0 | Status: DISCONTINUED | OUTPATIENT
Start: 2017-09-07 | End: 2017-09-12

## 2017-09-07 RX ORDER — NIACIN 50 MG
500 TABLET ORAL
Qty: 0 | Refills: 0 | Status: DISCONTINUED | OUTPATIENT
Start: 2017-09-07 | End: 2017-09-12

## 2017-09-07 RX ORDER — FOLIC ACID 0.8 MG
0.4 TABLET ORAL DAILY
Qty: 0 | Refills: 0 | Status: DISCONTINUED | OUTPATIENT
Start: 2017-09-07 | End: 2017-09-12

## 2017-09-07 RX ORDER — ACETAMINOPHEN 500 MG
1 TABLET ORAL
Qty: 0 | Refills: 0 | COMMUNITY

## 2017-09-07 RX ORDER — FOLIC ACID 0.8 MG
1 TABLET ORAL
Qty: 0 | Refills: 0 | COMMUNITY

## 2017-09-07 RX ORDER — MUPIROCIN 20 MG/G
1 OINTMENT TOPICAL
Qty: 0 | Refills: 0 | Status: DISCONTINUED | OUTPATIENT
Start: 2017-09-07 | End: 2017-09-12

## 2017-09-07 RX ORDER — ATORVASTATIN CALCIUM 80 MG/1
1 TABLET, FILM COATED ORAL
Qty: 0 | Refills: 0 | COMMUNITY

## 2017-09-07 RX ORDER — PREGABALIN 225 MG/1
1 CAPSULE ORAL
Qty: 0 | Refills: 0 | COMMUNITY

## 2017-09-07 RX ORDER — NIACIN 50 MG
1 TABLET ORAL
Qty: 0 | Refills: 0 | COMMUNITY

## 2017-09-07 RX ORDER — ATORVASTATIN CALCIUM 80 MG/1
20 TABLET, FILM COATED ORAL AT BEDTIME
Qty: 0 | Refills: 0 | Status: DISCONTINUED | OUTPATIENT
Start: 2017-09-07 | End: 2017-09-12

## 2017-09-07 RX ORDER — FERROUS SULFATE 325(65) MG
325 TABLET ORAL DAILY
Qty: 0 | Refills: 0 | Status: DISCONTINUED | OUTPATIENT
Start: 2017-09-07 | End: 2017-09-12

## 2017-09-07 RX ORDER — SODIUM CHLORIDE 9 MG/ML
1000 INJECTION, SOLUTION INTRAVENOUS
Qty: 0 | Refills: 0 | Status: DISCONTINUED | OUTPATIENT
Start: 2017-09-07 | End: 2017-09-12

## 2017-09-07 RX ORDER — ACETAMINOPHEN 500 MG
650 TABLET ORAL EVERY 6 HOURS
Qty: 0 | Refills: 0 | Status: DISCONTINUED | OUTPATIENT
Start: 2017-09-07 | End: 2017-09-12

## 2017-09-07 RX ORDER — INFLUENZA VIRUS VACCINE 15; 15; 15; 15 UG/.5ML; UG/.5ML; UG/.5ML; UG/.5ML
0.5 SUSPENSION INTRAMUSCULAR ONCE
Qty: 0 | Refills: 0 | Status: COMPLETED | OUTPATIENT
Start: 2017-09-07 | End: 2017-09-12

## 2017-09-07 RX ORDER — ASPIRIN/CALCIUM CARB/MAGNESIUM 324 MG
81 TABLET ORAL DAILY
Qty: 0 | Refills: 0 | Status: DISCONTINUED | OUTPATIENT
Start: 2017-09-07 | End: 2017-09-12

## 2017-09-07 RX ORDER — CEFAZOLIN SODIUM 1 G
1000 VIAL (EA) INJECTION ONCE
Qty: 0 | Refills: 0 | Status: COMPLETED | OUTPATIENT
Start: 2017-09-07 | End: 2017-09-07

## 2017-09-07 RX ORDER — DEXTROSE 50 % IN WATER 50 %
1 SYRINGE (ML) INTRAVENOUS ONCE
Qty: 0 | Refills: 0 | Status: DISCONTINUED | OUTPATIENT
Start: 2017-09-07 | End: 2017-09-12

## 2017-09-07 RX ORDER — HEPARIN SODIUM 5000 [USP'U]/ML
5000 INJECTION INTRAVENOUS; SUBCUTANEOUS EVERY 8 HOURS
Qty: 0 | Refills: 0 | Status: DISCONTINUED | OUTPATIENT
Start: 2017-09-07 | End: 2017-09-12

## 2017-09-07 RX ORDER — CEFAZOLIN SODIUM 1 G
1000 VIAL (EA) INJECTION EVERY 8 HOURS
Qty: 0 | Refills: 0 | Status: DISCONTINUED | OUTPATIENT
Start: 2017-09-08 | End: 2017-09-08

## 2017-09-07 RX ORDER — INSULIN LISPRO 100/ML
VIAL (ML) SUBCUTANEOUS
Qty: 0 | Refills: 0 | Status: DISCONTINUED | OUTPATIENT
Start: 2017-09-07 | End: 2017-09-12

## 2017-09-07 RX ORDER — GLUCAGON INJECTION, SOLUTION 0.5 MG/.1ML
1 INJECTION, SOLUTION SUBCUTANEOUS ONCE
Qty: 0 | Refills: 0 | Status: DISCONTINUED | OUTPATIENT
Start: 2017-09-07 | End: 2017-09-12

## 2017-09-07 RX ORDER — LISINOPRIL 2.5 MG/1
5 TABLET ORAL DAILY
Qty: 0 | Refills: 0 | Status: DISCONTINUED | OUTPATIENT
Start: 2017-09-07 | End: 2017-09-12

## 2017-09-07 RX ORDER — CHOLECALCIFEROL (VITAMIN D3) 125 MCG
1000 CAPSULE ORAL DAILY
Qty: 0 | Refills: 0 | Status: DISCONTINUED | OUTPATIENT
Start: 2017-09-07 | End: 2017-09-12

## 2017-09-07 RX ORDER — LEVOTHYROXINE SODIUM 125 MCG
300 TABLET ORAL DAILY
Qty: 0 | Refills: 0 | Status: DISCONTINUED | OUTPATIENT
Start: 2017-09-07 | End: 2017-09-08

## 2017-09-07 RX ORDER — DEXTROSE 50 % IN WATER 50 %
12.5 SYRINGE (ML) INTRAVENOUS ONCE
Qty: 0 | Refills: 0 | Status: DISCONTINUED | OUTPATIENT
Start: 2017-09-07 | End: 2017-09-12

## 2017-09-07 RX ORDER — VANCOMYCIN HCL 1 G
1000 VIAL (EA) INTRAVENOUS ONCE
Qty: 0 | Refills: 0 | Status: COMPLETED | OUTPATIENT
Start: 2017-09-07 | End: 2017-09-07

## 2017-09-07 RX ADMIN — Medication 100 MILLIGRAM(S): at 22:27

## 2017-09-07 RX ADMIN — Medication 250 MILLIGRAM(S): at 18:11

## 2017-09-07 RX ADMIN — Medication 81 MILLIGRAM(S): at 21:45

## 2017-09-07 RX ADMIN — ATORVASTATIN CALCIUM 20 MILLIGRAM(S): 80 TABLET, FILM COATED ORAL at 21:45

## 2017-09-07 RX ADMIN — HEPARIN SODIUM 5000 UNIT(S): 5000 INJECTION INTRAVENOUS; SUBCUTANEOUS at 21:45

## 2017-09-07 RX ADMIN — TRAMADOL HYDROCHLORIDE 50 MILLIGRAM(S): 50 TABLET ORAL at 21:43

## 2017-09-07 RX ADMIN — Medication 500 MILLIGRAM(S): at 23:17

## 2017-09-07 RX ADMIN — Medication 0.4 MILLIGRAM(S): at 23:17

## 2017-09-07 RX ADMIN — MUPIROCIN 1 APPLICATION(S): 20 OINTMENT TOPICAL at 23:17

## 2017-09-07 NOTE — H&P ADULT - PMH
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  On Immunosupressants  Cellcept  Endometrial cancer  dx: 8/2015:  High grade Endometroid Adenocarcinoma  History of osteoarthritis    Hyperlipidemia    Hypertension    Hypothyroidism    Morbid obesity  BMI:  53.6  Type 2 diabetes mellitus    Vitamin D deficiency

## 2017-09-07 NOTE — H&P ADULT - SKIN COMMENTS
RLE: bright red erythema  extending from below the knee to the ankle, 2 small wounds noted , 1 on the lateral aspect of the shin and 1 on the calf , both with serosanginous discharge, no malodor noted; tender+, warmth+

## 2017-09-07 NOTE — H&P ADULT - HISTORY OF PRESENT ILLNESS
66F with h/o Uterine ca (s/p chemo and completed in April 2016 ;  in remission), Bullous Pemphigoid (diagnosed 2014 on Prednisone), HTN, HLD, DMT2, OA who presents with persistent right leg redness, swelling and  bullae x 1 day.  Reports RLE pain and warmth. She notes 2 bullae on the right leg which ruptured ; denies purulent discharge. Pt denies fever/chills, nausea/vomiting.  Of note pt was recently admitted to [facility] for RLE cellulitis ( 7/11 - 7/20).  She reports some improvement in redness , swelling and pain at the time of discharge. However noted recurrence of symptoms yesterday. Pt was discharged on PO Doxycycline ( which she is on chronically for Bullous pemphigoid). During her last admission she received vancomycin,aztreonam and cefazolin. Blood cultures were negative.    ED course  VS : 120/76    98  18  O2 98% on room air  T 99.3F  Labs : wbc 6  h/h 10/32  plt 299 bun/Cr 27/1.04  Treatment : Vancomycin 1 g IVPB x 1 66F with h/o Uterine ca (s/p chemo and completed in April 2016 ;  in remission), Bullous Pemphigoid (diagnosed 2014 on Prednisone), HTN, HLD, DMT2, OA who presents with persistent right leg redness, swelling and  bullae x 1 day.  Reports RLE pain and warmth. She notes 2 bullae on the right leg which ruptured ; denies purulent discharge. Pt denies fever/chills, nausea/vomiting.  Of note pt was recently admitted to [facility] for RLE cellulitis ( 7/11 - 7/20).  She reports some improvement in redness , swelling and pain at the time of discharge. However noted recurrence of symptoms yesterday. Pt was discharged on PO Doxycycline ( which she is on chronically for Bullous pemphigoid). During her last admission she received vancomycin,aztreonam and cefazolin. Blood cultures were negative.    ED course  VS : 120/76    98  18  O2 98% on room air  T 99.3F  Labs : wbc 6  h/h 10/32  plt 299 bun/Cr 27/1.04  Treatment : Vancomycin 1 g IVPB x 1

## 2017-09-07 NOTE — ED PROVIDER NOTE - PMH
Adrenal mass  2014 incidental findings 2014  Ct SCAN 9/2015 unchannged  24 hour urine for VMA done by Dr DR Canas [phone] Neg asper patient  Anemia    Bullous pemphigoid  dx: 8/2014.  On Immunosupressants  Cellcept  Endometrial cancer  dx: 8/2015:  High grade Endometroid Adenocarcinoma  History of osteoarthritis    Hyperlipidemia    Hypertension    Hypothyroidism    Morbid obesity  BMI:  53.6  Type 2 diabetes mellitus    Vitamin D deficiency

## 2017-09-07 NOTE — ED ADULT NURSE NOTE - OBJECTIVE STATEMENT
Pt is sent by her PMD for the management of RLE  cellulitis . RLE has swelling & redness. temp 99.2 denies CP SOB N/V/D fever chills. Pt got evalauted by Ed MD Dr Melendez & team

## 2017-09-07 NOTE — ED PROVIDER NOTE - OBJECTIVE STATEMENT
65 yo female, history of bullous pemphigoid, chronic prednisone, prior RLE cellulitis requiring IV antibiotics in July, here with recurrent moderate RLE erythema x several days, seen by PCP today, sent to ED for further management.  No fevers, no LOC, no vomiting.

## 2017-09-07 NOTE — H&P ADULT - ASSESSMENT
66F with h/o Uterine ca (s/p chemo and completed in April 2016 ;  in remission), Bullous Pemphigoid (diagnosed 2014 on Prednisone), HTN, HLD, DMT2, OA who presents with persistent right leg redness, swelling ,pain, warmth and  bullae concerning for recurrent cellulitis vs incomplete treatment.

## 2017-09-07 NOTE — ED PROVIDER NOTE - NS ED ROS FT
CONSTITUTIONAL: no fevers  HEENT: no dysphagia  CV: no chest pain  RESP: no SOB  GI: no nausea/vomiting  : no dysuria  DERM: +RLE erythema  MSK: no back pain  NEURO: no LOC  ENDO: no diabetes

## 2017-09-07 NOTE — PATIENT PROFILE ADULT. - NS TRANSFER PATIENT BELONGINGS
Cell Phone/PDA (specify)/Jewelry/Money (specify)/Clothing/tablet, earrings, pocket book (black), sneakers

## 2017-09-07 NOTE — ED PROVIDER NOTE - PHYSICAL EXAMINATION
GEN: calm, cooperative, ENT: mucous membranes moist, HEAD: NCAT, CV: S1 S2 no murmurs, RESP: no respiratory distress, ABD: no abdominal TTP, MSK: moves all extremities, 2+ LE edema, circumferential erythema in the RLE, foot not involved, NEURO: awake, alert, oriented, PSYCH: affect normal

## 2017-09-08 LAB
ANION GAP SERPL CALC-SCNC: 15 MMOL/L — SIGNIFICANT CHANGE UP (ref 5–17)
BUN SERPL-MCNC: 25 MG/DL — HIGH (ref 7–23)
CALCIUM SERPL-MCNC: 9.8 MG/DL — SIGNIFICANT CHANGE UP (ref 8.4–10.5)
CHLORIDE SERPL-SCNC: 99 MMOL/L — SIGNIFICANT CHANGE UP (ref 96–108)
CO2 SERPL-SCNC: 25 MMOL/L — SIGNIFICANT CHANGE UP (ref 22–31)
CREAT SERPL-MCNC: 0.97 MG/DL — SIGNIFICANT CHANGE UP (ref 0.5–1.3)
GLUCOSE SERPL-MCNC: 100 MG/DL — HIGH (ref 70–99)
HCT VFR BLD CALC: 34.9 % — SIGNIFICANT CHANGE UP (ref 34.5–45)
HGB BLD-MCNC: 11.7 G/DL — SIGNIFICANT CHANGE UP (ref 11.5–15.5)
MCHC RBC-ENTMCNC: 33.6 GM/DL — SIGNIFICANT CHANGE UP (ref 32–36)
MCHC RBC-ENTMCNC: 33.7 PG — SIGNIFICANT CHANGE UP (ref 27–34)
MCV RBC AUTO: 100 FL — SIGNIFICANT CHANGE UP (ref 80–100)
PLATELET # BLD AUTO: 304 K/UL — SIGNIFICANT CHANGE UP (ref 150–400)
POTASSIUM SERPL-MCNC: 4.3 MMOL/L — SIGNIFICANT CHANGE UP (ref 3.5–5.3)
POTASSIUM SERPL-SCNC: 4.3 MMOL/L — SIGNIFICANT CHANGE UP (ref 3.5–5.3)
RBC # BLD: 3.48 M/UL — LOW (ref 3.8–5.2)
RBC # FLD: 13.9 % — SIGNIFICANT CHANGE UP (ref 10.3–14.5)
SODIUM SERPL-SCNC: 139 MMOL/L — SIGNIFICANT CHANGE UP (ref 135–145)
WBC # BLD: 7.2 K/UL — SIGNIFICANT CHANGE UP (ref 3.8–10.5)
WBC # FLD AUTO: 7.2 K/UL — SIGNIFICANT CHANGE UP (ref 3.8–10.5)

## 2017-09-08 PROCEDURE — 99254 IP/OBS CNSLTJ NEW/EST MOD 60: CPT

## 2017-09-08 PROCEDURE — 99223 1ST HOSP IP/OBS HIGH 75: CPT

## 2017-09-08 RX ORDER — LEVOTHYROXINE SODIUM 125 MCG
300 TABLET ORAL
Qty: 0 | Refills: 0 | Status: DISCONTINUED | OUTPATIENT
Start: 2017-09-08 | End: 2017-09-12

## 2017-09-08 RX ORDER — LEVOTHYROXINE SODIUM 125 MCG
150 TABLET ORAL
Qty: 0 | Refills: 0 | Status: DISCONTINUED | OUTPATIENT
Start: 2017-09-08 | End: 2017-09-12

## 2017-09-08 RX ORDER — VANCOMYCIN HCL 1 G
1250 VIAL (EA) INTRAVENOUS EVERY 12 HOURS
Qty: 0 | Refills: 0 | Status: DISCONTINUED | OUTPATIENT
Start: 2017-09-08 | End: 2017-09-11

## 2017-09-08 RX ORDER — LEVOTHYROXINE SODIUM 125 MCG
150 TABLET ORAL DAILY
Qty: 0 | Refills: 0 | Status: DISCONTINUED | OUTPATIENT
Start: 2017-09-08 | End: 2017-09-08

## 2017-09-08 RX ORDER — VANCOMYCIN HCL 1 G
VIAL (EA) INTRAVENOUS
Qty: 0 | Refills: 0 | Status: DISCONTINUED | OUTPATIENT
Start: 2017-09-08 | End: 2017-09-11

## 2017-09-08 RX ORDER — VANCOMYCIN HCL 1 G
1250 VIAL (EA) INTRAVENOUS ONCE
Qty: 0 | Refills: 0 | Status: COMPLETED | OUTPATIENT
Start: 2017-09-08 | End: 2017-09-08

## 2017-09-08 RX ORDER — OXYCODONE HYDROCHLORIDE 5 MG/1
5 TABLET ORAL ONCE
Qty: 0 | Refills: 0 | Status: DISCONTINUED | OUTPATIENT
Start: 2017-09-08 | End: 2017-09-08

## 2017-09-08 RX ADMIN — TRAMADOL HYDROCHLORIDE 50 MILLIGRAM(S): 50 TABLET ORAL at 04:03

## 2017-09-08 RX ADMIN — Medication 166.67 MILLIGRAM(S): at 12:00

## 2017-09-08 RX ADMIN — TRAMADOL HYDROCHLORIDE 50 MILLIGRAM(S): 50 TABLET ORAL at 04:31

## 2017-09-08 RX ADMIN — MUPIROCIN 1 APPLICATION(S): 20 OINTMENT TOPICAL at 05:58

## 2017-09-08 RX ADMIN — Medication 5 MILLIGRAM(S): at 19:09

## 2017-09-08 RX ADMIN — Medication 166.67 MILLIGRAM(S): at 23:30

## 2017-09-08 RX ADMIN — LISINOPRIL 5 MILLIGRAM(S): 2.5 TABLET ORAL at 05:55

## 2017-09-08 RX ADMIN — Medication 1000 UNIT(S): at 11:46

## 2017-09-08 RX ADMIN — HEPARIN SODIUM 5000 UNIT(S): 5000 INJECTION INTRAVENOUS; SUBCUTANEOUS at 05:55

## 2017-09-08 RX ADMIN — TRAMADOL HYDROCHLORIDE 50 MILLIGRAM(S): 50 TABLET ORAL at 06:45

## 2017-09-08 RX ADMIN — TRAMADOL HYDROCHLORIDE 50 MILLIGRAM(S): 50 TABLET ORAL at 20:55

## 2017-09-08 RX ADMIN — Medication 150 MICROGRAM(S): at 05:57

## 2017-09-08 RX ADMIN — Medication 325 MILLIGRAM(S): at 11:46

## 2017-09-08 RX ADMIN — MUPIROCIN 1 APPLICATION(S): 20 OINTMENT TOPICAL at 18:20

## 2017-09-08 RX ADMIN — Medication 100 MILLIGRAM(S): at 06:35

## 2017-09-08 RX ADMIN — TRAMADOL HYDROCHLORIDE 50 MILLIGRAM(S): 50 TABLET ORAL at 19:54

## 2017-09-08 RX ADMIN — Medication 500 MILLIGRAM(S): at 05:55

## 2017-09-08 RX ADMIN — Medication 0.4 MILLIGRAM(S): at 11:46

## 2017-09-08 RX ADMIN — Medication 500 MILLIGRAM(S): at 19:55

## 2017-09-08 RX ADMIN — Medication 650 MILLIGRAM(S): at 07:48

## 2017-09-08 RX ADMIN — Medication 650 MILLIGRAM(S): at 08:43

## 2017-09-08 RX ADMIN — Medication 1: at 12:51

## 2017-09-08 RX ADMIN — ATORVASTATIN CALCIUM 20 MILLIGRAM(S): 80 TABLET, FILM COATED ORAL at 21:34

## 2017-09-08 RX ADMIN — HEPARIN SODIUM 5000 UNIT(S): 5000 INJECTION INTRAVENOUS; SUBCUTANEOUS at 21:34

## 2017-09-08 RX ADMIN — OXYCODONE HYDROCHLORIDE 5 MILLIGRAM(S): 5 TABLET ORAL at 11:46

## 2017-09-08 RX ADMIN — HEPARIN SODIUM 5000 UNIT(S): 5000 INJECTION INTRAVENOUS; SUBCUTANEOUS at 15:36

## 2017-09-08 RX ADMIN — Medication 81 MILLIGRAM(S): at 11:46

## 2017-09-08 RX ADMIN — Medication 100 MILLIGRAM(S): at 18:20

## 2017-09-08 NOTE — CONSULT NOTE ADULT - ASSESSMENT
Lipodermatosclerosis, Acute Flare  we have disc nature and course with pt  we do not suspect soft tissue infection  suggest Pentoxifylline 400mg TID, advanced to 800mg TID after 1-2 week if well tolerated  disc GI upset with higher doses  course will be chronic  pt given info for f/u as needed
66F with h/o Uterine ca (s/p chemo and completed in April 2016 ;  in remission), Bullous Pemphigoid (diagnosed 2014 on Prednisone), HTN, HLD, DMT2, OA who presents with persistent right leg redness, swelling and  bullae x 1 day.  patient on chronic suppressive doxy.  Prior cellulitis-had allergic reaction to cefazolin  Also rash to dapto  Was treated with IV vanco till 7/20  Now again with erythema-mildly increased temperature-no tenderness  No systemic signs  ?Early cellulitis V stasis v pemphigoid  Rec:  1) Check Cx  2) Derm input  3) Empiric IV vanco,monitor trough  4) leg elevation  5) Tailor plan per course,results.  Infectious Diseases Service will cover over weekend.  Please call [phone] if issues  Case d/w Med NP

## 2017-09-08 NOTE — PROGRESS NOTE ADULT - PROBLEM SELECTOR PLAN 2
will hold doxycycline and prednisone for now  Dermatology evaluation  Wound care evaluation cont doxycycline and prednisone  Dermatology evaluation  Wound care evaluation

## 2017-09-08 NOTE — CONSULT NOTE ADULT - SUBJECTIVE AND OBJECTIVE BOX
Patient is a 66y old  Female who presents with a chief complaint of right leg cellulitis (07 Sep 2017 22:31)    HPI:  66F with h/o Uterine ca (s/p chemo and completed in 2016 ;  in remission), Bullous Pemphigoid (diagnosed  on Prednisone), HTN, HLD, DMT2, OA who presents with persistent right leg redness, swelling and  bullae x 1 day.  Reports RLE pain and warmth. She notes 2 bullae on the right leg which ruptured ; denies purulent discharge. Pt denies fever/chills, nausea/vomiting.  Of note pt was recently admitted to [facility] for RLE cellulitis (  - ).  She reports some improvement in redness , swelling and pain at the time of discharge. However noted recurrence of symptoms yesterday. Pt was discharged on PO Doxycycline ( which she is on chronically for Bullous pemphigoid). During her last admission she received vancomycin,aztreonam and cefazolin. Blood cultures were negative.  She had developed itching and hives with cefazolin on last admission  Id consulted    ED course  VS : 120/76    98  18  O2 98% on room air  T 99.3F  Labs : wbc 6  h/h 10/32  plt 299 bun/Cr 27/1.04  Treatment : Vancomycin 1 g IVPB x 1 (07 Sep 2017 20:51)      PAST MEDICAL & SURGICAL HISTORY:  Adrenal mass:  incidental findings   Ct SCAN 2015 unchannged  24 hour urine for VMA done by Dr DR Canas [phone] Neg asper patient  Hyperlipidemia  Endometrial cancer: dx: 2015:  High grade Endometroid Adenocarcinoma  Morbid obesity: BMI:  53.6  Vitamin D deficiency  Hypothyroidism  Anemia  History of osteoarthritis  Type 2 diabetes mellitus  Hypertension  Bullous pemphigoid: dx: 2014.  On Immunosupressants  Cellcept  S/P BSO (bilateral salpingo-oophorectomy)  S/P hysterectomy  Endometrial cancer: 2015:  Endometrial Biopsy: dx: High Grade Endometroid Adenocarcinoma  Status post total thyroidectomy: &#x27;   History of  section: &#x27; 82-&#x27;95:  4 X      Social history:  No    Smoking,    ETOH,      IVDU       FAMILY HISTORY:  Family history of lymphoma (Father): father  - Reviewed,Non contributory     REVIEW OF SYSTEMS  General:	Denies any malaise fatigue or chills. Fevers absent    Skin:as above   	  Ophthalmologic:Denies any visual complaints,discharge redness or photophobia  	  ENMT:No nasal discharge,headache,sinus congestion or throat pain.No dental complaints    Respiratory and Thorax:No cough,sputum or chest pain.Denies shortness of breath  	  Cardiovascular:	No chest pain,palpitaions or dizziness    Gastrointestinal:	NO nausea,abdominal pain or diarrhea.    Genitourinary:	No dysuria,frequency. No flank pain    Musculoskeletal:	No joint swelling or pain.No weakness    Neurological:No confusion,diziness.No extremity weakness.No bladder or bowel incontinence	    Psychiatric:No delusions or hallucinations	    Hematology/Lymphatics:	No LN swelling.No gum bleeding     Endocrine:	No recent weight gain or loss.No abnormal heat/cold intolerance    Allergic/Immunologic:	No hives or rash   Allergies    Ancef (Rash)  daptomycin (Vomiting)  Keflex (Unknown)  penicillin (Pruritus; Rash; Hives)    Intolerances        Antimicrobials:    vancomycin  IVPB 1250 milliGRAM(s) IV Intermittent once  vancomycin  IVPB 1250 milliGRAM(s) IV Intermittent every 12 hours  vancomycin  IVPB   IV Intermittent   doxycycline hyclate Capsule 100 milliGRAM(s) Oral every 12 hours        Vital Signs Last 24 Hrs  T(C): 36.7 (08 Sep 2017 07:00), Max: 37.7 (08 Sep 2017 05:51)  T(F): 98.1 (08 Sep 2017 07:00), Max: 99.8 (08 Sep 2017 05:51)  HR: 82 (08 Sep 2017 07:00) (75 - 98)  BP: 114/68 (08 Sep 2017 07:00) (112/62 - 125/76)  BP(mean): 92 (07 Sep 2017 20:51) (92 - 92)  RR: 18 (08 Sep 2017 07:00) (17 - 18)  SpO2: 98% (08 Sep 2017 07:00) (97% - 100%)    PHYSICAL EXAM:Pleasant patient in no acute distress.      Constitutional:Comfortable.Awake and alert  No cachexia     Eyes:PERRL EOMI.NO discharge or conjunctival injection    ENMT:No sinus tenderness.No thrush.No pharyngeal exudate or erythema.Fair dental hygiene    Neck:Supple,No LN,no JVD      Respiratory:Good air entry bilaterally,CTA    Cardiovascular:S1 S2 wnl, No murmurs,rub or gallops    Gastrointestinal:Obese Soft BS(+) no tenderness no masses ,No rebound or guarding    Genitourinary:No CVA tendereness     Rectal:    Extremities:Right leg erythema -no skin breaks,mild increased temperature-no discharge,no tenderness    Vascular:peripheral pulses felt    Neurological:AAO X 3,No grossly focal deficits    Lymph Nodes:No palpable LNs    Musculoskeletal:No joint swelling or LOM    Psychiatric:Affect normal.                Labs:                            11.7   7.2   )-----------( 304      ( 08 Sep 2017 05:37 )             34.9             139  |  99  |  25<H>  ----------------------------<  100<H>  4.3   |  25  |  0.97    Ca    9.8      08 Sep 2017 05:37    TPro  7.3  /  Alb  4.2  /  TBili  0.2  /  DBili  x   /  AST  14  /  ALT  12  /  AlkPhos  63  -07        MICROBIOLOGY:      RECENT CULTURES: Patient is a 66y old  Female who presents with a chief complaint of right leg cellulitis (07 Sep 2017 22:31)    HPI:  66F with h/o Uterine ca (s/p chemo and completed in 2016 ;  in remission), Bullous Pemphigoid (diagnosed  on Prednisone), HTN, HLD, DMT2, OA who presents with persistent right leg redness, swelling and  bullae x 1 day.  Reports RLE pain and warmth. She notes 2 bullae on the right leg which ruptured ; denies purulent discharge. Pt denies fever/chills, nausea/vomiting.  Of note pt was recently admitted to [facility] for RLE cellulitis (  - ).  She reports some improvement in redness , swelling and pain at the time of discharge. However noted recurrence of symptoms yesterday. Pt was discharged on PO Doxycycline ( which she is on chronically for Bullous pemphigoid). During her last admission she received vancomycin,aztreonam and cefazolin. Blood cultures were negative.  She had developed itching and hives with cefazolin on last admission  Id consulted    ED course  VS : 120/76    98  18  O2 98% on room air  T 99.3F  Labs : wbc 6  h/h 10/32  plt 299 bun/Cr 27/1.04  Treatment : Vancomycin 1 g IVPB x 1 (07 Sep 2017 20:51)      PAST MEDICAL & SURGICAL HISTORY:  Adrenal mass:  incidental findings   Ct SCAN 2015 unchannged  24 hour urine for VMA done by Dr DR Canas [phone] Neg asper patient  Hyperlipidemia  Endometrial cancer: dx: 2015:  High grade Endometroid Adenocarcinoma  Morbid obesity: BMI:  53.6  Vitamin D deficiency  Hypothyroidism  Anemia  History of osteoarthritis  Type 2 diabetes mellitus  Hypertension  Bullous pemphigoid: dx: 2014.  On Immunosupressants  Cellcept  S/P BSO (bilateral salpingo-oophorectomy)  S/P hysterectomy  Endometrial cancer: 2015:  Endometrial Biopsy: dx: High Grade Endometroid Adenocarcinoma  Status post total thyroidectomy: &#x27;   History of  section: &#x27; 82-&#x27;95:  4 X      Social history:  No    Smoking,    ETOH,      IVDU       FAMILY HISTORY:  Family history of lymphoma (Father): father  - Reviewed,Non contributory     REVIEW OF SYSTEMS  General:	Denies any malaise fatigue or chills. Fevers absent    Skin:as above   	  Ophthalmologic:Denies any visual complaints,discharge redness or photophobia  	  ENMT:No nasal discharge,headache,sinus congestion or throat pain.No dental complaints    Respiratory and Thorax:No cough,sputum or chest pain.Denies shortness of breath  	  Cardiovascular:	No chest pain,palpitaions or dizziness    Gastrointestinal:	NO nausea,abdominal pain or diarrhea.    Genitourinary:	No dysuria,frequency. No flank pain    Musculoskeletal:	No joint swelling or pain.No weakness    Neurological:No confusion,diziness.No extremity weakness.No bladder or bowel incontinence	    Psychiatric:No delusions or hallucinations	    Hematology/Lymphatics:	No LN swelling.No gum bleeding     Endocrine:	No recent weight gain or loss.No abnormal heat/cold intolerance    Allergic/Immunologic:	No hives or rash   Allergies    Ancef (Rash)  daptomycin (Vomiting)  Keflex (Unknown)  penicillin (Pruritus; Rash; Hives)    Intolerances        Antimicrobials:    vancomycin  IVPB 1250 milliGRAM(s) IV Intermittent once  vancomycin  IVPB 1250 milliGRAM(s) IV Intermittent every 12 hours  vancomycin  IVPB   IV Intermittent   doxycycline hyclate Capsule 100 milliGRAM(s) Oral every 12 hours        Vital Signs Last 24 Hrs  T(C): 36.7 (08 Sep 2017 07:00), Max: 37.7 (08 Sep 2017 05:51)  T(F): 98.1 (08 Sep 2017 07:00), Max: 99.8 (08 Sep 2017 05:51)  HR: 82 (08 Sep 2017 07:00) (75 - 98)  BP: 114/68 (08 Sep 2017 07:00) (112/62 - 125/76)  BP(mean): 92 (07 Sep 2017 20:51) (92 - 92)  RR: 18 (08 Sep 2017 07:00) (17 - 18)  SpO2: 98% (08 Sep 2017 07:00) (97% - 100%)    PHYSICAL EXAM:Pleasant patient in no acute distress.      Constitutional:Comfortable.Awake and alert  No cachexia     Eyes:PERRL EOMI.NO discharge or conjunctival injection    ENMT:No sinus tenderness.No thrush.No pharyngeal exudate or erythema.Fair dental hygiene    Neck:Supple,No LN,no JVD      Respiratory:Good air entry bilaterally,CTA    Cardiovascular:S1 S2 wnl, No murmurs,rub or gallops    Gastrointestinal:Obese Soft BS(+) no tenderness no masses ,No rebound or guarding    Genitourinary:No CVA tendereness     Rectal:    Extremities:Right leg erythema -no skin breaks,mild increased temperature-no discharge,no tenderness    Vascular:peripheral pulses felt    Neurological:AAO X 3,No grossly focal deficits    Lymph Nodes:No palpable LNs    Musculoskeletal:No joint swelling or LOM    Psychiatric:Affect normal.                Labs:                            11.7   7.2   )-----------( 304      ( 08 Sep 2017 05:37 )             34.9             139  |  99  |  25<H>  ----------------------------<  100<H>  4.3   |  25  |  0.97    Ca    9.8      08 Sep 2017 05:37    TPro  7.3  /  Alb  4.2  /  TBili  0.2  /  DBili  x   /  AST  14  /  ALT  12  /  AlkPhos  63  -07 Patient is a 66y old  Female who presents with a chief complaint of right leg cellulitis (07 Sep 2017 22:31)    HPI:  66F with h/o Uterine ca (s/p chemo and completed in 2016 ;  in remission), Bullous Pemphigoid (diagnosed  on Prednisone), HTN, HLD, DMT2, OA who presents with persistent right leg redness, swelling and  bullae x 1 day.  Reports RLE pain and warmth. She notes 2 bullae on the right leg which ruptured ; denies purulent discharge. Pt denies fever/chills, nausea/vomiting.  Of note pt was recently admitted to [facility] for RLE cellulitis (  - ).  She reports some improvement in redness , swelling and pain at the time of discharge. However noted recurrence of symptoms yesterday. Pt was discharged on PO Doxycycline ( which she is on chronically for Bullous pemphigoid). During her last admission she received vancomycin,aztreonam and cefazolin. Blood cultures were negative.  She had developed itching and hives with cefazolin on last admission  Id consulted    ED course  VS : 120/76    98  18  O2 98% on room air  T 99.3F  Labs : wbc 6  h/h 10/32  plt 299 bun/Cr 27/1.04  Treatment : Vancomycin 1 g IVPB x 1 (07 Sep 2017 20:51)      PAST MEDICAL & SURGICAL HISTORY:  Adrenal mass:  incidental findings   Ct SCAN 2015 unchannged  24 hour urine for VMA done by Dr DR Canas [phone] Neg asper patient  Hyperlipidemia  Endometrial cancer: dx: 2015:  High grade Endometroid Adenocarcinoma  Morbid obesity: BMI:  53.6  Vitamin D deficiency  Hypothyroidism  Anemia  History of osteoarthritis  Type 2 diabetes mellitus  Hypertension  Bullous pemphigoid: dx: 2014.  On Immunosupressants  Cellcept  S/P BSO (bilateral salpingo-oophorectomy)  S/P hysterectomy  Endometrial cancer: 2015:  Endometrial Biopsy: dx: High Grade Endometroid Adenocarcinoma  Status post total thyroidectomy: &#x27;   History of  section: &#x27; 82-&#x27;95:  4 X      Social history:  No    Smoking,    ETOH,      IVDU       FAMILY HISTORY:  Family history of lymphoma (Father): father  - Reviewed,Non contributory     REVIEW OF SYSTEMS  General:	Denies any malaise fatigue or chills. Fevers absent    Skin:as above   	  Ophthalmologic:Denies any visual complaints,discharge redness or photophobia  	  ENMT:No nasal discharge,headache,sinus congestion or throat pain.No dental complaints    Respiratory and Thorax:No cough,sputum or chest pain.Denies shortness of breath  	  Cardiovascular:	No chest pain,palpitaions or dizziness    Gastrointestinal:	NO nausea,abdominal pain or diarrhea.    Genitourinary:	No dysuria,frequency. No flank pain    Musculoskeletal:	No joint swelling or pain.No weakness    Neurological:No confusion,diziness.No extremity weakness.No bladder or bowel incontinence	    Psychiatric:No delusions or hallucinations	    Hematology/Lymphatics:	No LN swelling.No gum bleeding     Endocrine:	No recent weight gain or loss.No abnormal heat/cold intolerance    Allergic/Immunologic:	No hives or rash   Allergies    Ancef (Rash)  daptomycin (Vomiting)  Keflex (Unknown)  penicillin (Pruritus; Rash; Hives)    Intolerances        Antimicrobials:    vancomycin  IVPB 1250 milliGRAM(s) IV Intermittent once  vancomycin  IVPB 1250 milliGRAM(s) IV Intermittent every 12 hours  vancomycin  IVPB   IV Intermittent   doxycycline hyclate Capsule 100 milliGRAM(s) Oral every 12 hours        Vital Signs Last 24 Hrs  T(C): 36.7 (08 Sep 2017 07:00), Max: 37.7 (08 Sep 2017 05:51)  T(F): 98.1 (08 Sep 2017 07:00), Max: 99.8 (08 Sep 2017 05:51)  HR: 82 (08 Sep 2017 07:00) (75 - 98)  BP: 114/68 (08 Sep 2017 07:00) (112/62 - 125/76)  BP(mean): 92 (07 Sep 2017 20:51) (92 - 92)  RR: 18 (08 Sep 2017 07:00) (17 - 18)  SpO2: 98% (08 Sep 2017 07:00) (97% - 100%)    PHYSICAL EXAM:Pleasant patient in no acute distress.      Constitutional:Comfortable.Awake and alert  No cachexia     Eyes:PERRL EOMI.NO discharge or conjunctival injection    ENMT:No sinus tenderness.No thrush.No pharyngeal exudate or erythema.Fair dental hygiene    Neck:Supple,No LN,no JVD      Respiratory:Good air entry bilaterally,CTA    Cardiovascular:S1 S2 wnl, No murmurs,rub or gallops    Gastrointestinal:Obese Soft BS(+) no tenderness no masses ,No rebound or guarding    Genitourinary:No CVA tendereness     Rectal:    Extremities:Right leg erythema -no skin breaks,mild increased temperature-no discharge,no tenderness    Vascular:peripheral pulses felt    Neurological:AAO X 3,No grossly focal deficits    Lymph Nodes:No palpable LNs    Musculoskeletal:No joint swelling or LOM    Psychiatric:Affect normal.    Leg as of 17 1100 hrs(with permission of patient)                  Labs:                            11.7   7.2   )-----------( 304      ( 08 Sep 2017 05:37 )             34.9         -    139  |  99  |  25<H>  ----------------------------<  100<H>  4.3   |  25  |  0.97    Ca    9.8      08 Sep 2017 05:37    TPro  7.3  /  Alb  4.2  /  TBili  0.2  /  DBili  x   /  AST  14  /  ALT  12  /  AlkPhos  63

## 2017-09-08 NOTE — CONSULT NOTE ADULT - SUBJECTIVE AND OBJECTIVE BOX
HPI:  66F with h/o Uterine ca (s/p chemo and completed in 2016 ;  in remission), Bullous Pemphigoid (diagnosed  on Prednisone), HTN, HLD, DMT2, OA who presents with persistent right leg redness, swelling and  bullae x 1 day.  Reports RLE pain and warmth.  Noted 2 bullae on the right leg which ruptured ; denies purulent discharge. Pt denies fever/chills, nausea/vomiting.  Of note pt was recently admitted to [facility] for RLE cellulitis (  - ).  She reports some improvement in redness , swelling and pain at the time of discharge. However noted recurrence of symptoms yesterday. Pt was discharged on PO Doxycycline (which she is on chronically for Bullous pemphigoid, along with prednisone 10 mg). During her last admission she received vancomycin,aztreonam and cefazolin. Blood cultures were negative.  No fevers, WBC.    ED course  VS : 120/76    98  18  O2 98% on room air  T 99.3F  Labs : wbc 6  h/h 10/32  plt 299 bun/Cr 27/1.04  Treatment : Vancomycin 1 g IVPB x 1 (07 Sep 2017 20:51)      PAST MEDICAL & SURGICAL HISTORY:  Adrenal mass:  incidental findings   Ct SCAN 2015 unchannged  24 hour urine for VMA done by Dr DR Canas [id number] Neg asper patient  Hyperlipidemia  Endometrial cancer: dx: 2015:  High grade Endometroid Adenocarcinoma  Morbid obesity: BMI:  53.6  Vitamin D deficiency  Hypothyroidism  Anemia  History of osteoarthritis  Type 2 diabetes mellitus  Hypertension  Bullous pemphigoid: dx: 2014.  On Immunosupressants  Cellcept  S/P BSO (bilateral salpingo-oophorectomy)  S/P hysterectomy  Endometrial cancer: 2015:  Endometrial Biopsy: dx: High Grade Endometroid Adenocarcinoma  Status post total thyroidectomy: &#x27;   History of  section: &#x27; 82-&#x27;95:  4 X      REVIEW OF SYSTEMS      General: no fevers/chills, no lethary	    Skin/Breast: see HPI  	  Ophthalmologic: no eye pain or change in vision  	  ENMT: no dysphagia or change in hearing    Respiratory and Thorax: no SOB or cough  	  Cardiovascular: no palpitations or chest pain    Gastrointestinal: no abdomenal pain or blood in stool     Genitourinary: no dysuria or frequency    Musculoskeletal: no joint pains or weakness	    Neurological:no weakness, numbness , or tingling    MEDICATIONS  (STANDING):  aspirin enteric coated 81 milliGRAM(s) Oral daily  lisinopril 5 milliGRAM(s) Oral daily  atorvastatin 20 milliGRAM(s) Oral at bedtime  niacin  milliGRAM(s) Oral two times a day  mupirocin 2% Ointment 1 Application(s) Topical two times a day  ferrous    sulfate 325 milliGRAM(s) Oral daily  folic acid 0.4 milliGRAM(s) Oral daily  cholecalciferol 1000 Unit(s) Oral daily  heparin  Injectable 5000 Unit(s) SubCutaneous every 8 hours  insulin lispro (HumaLOG) corrective regimen sliding scale   SubCutaneous three times a day before meals  dextrose 5%. 1000 milliLiter(s) (50 mL/Hr) IV Continuous <Continuous>  dextrose 50% Injectable 12.5 Gram(s) IV Push once  dextrose 50% Injectable 25 Gram(s) IV Push once  dextrose 50% Injectable 25 Gram(s) IV Push once  influenza   Vaccine 0.5 milliLiter(s) IntraMuscular once  levothyroxine 300 MICROGram(s) Oral <User Schedule>  levothyroxine 150 MICROGram(s) Oral <User Schedule>  vancomycin  IVPB 1250 milliGRAM(s) IV Intermittent every 12 hours  vancomycin  IVPB   IV Intermittent   predniSONE   Tablet 5 milliGRAM(s) Oral two times a day  doxycycline hyclate Capsule 100 milliGRAM(s) Oral every 12 hours    MEDICATIONS  (PRN):  dextrose Gel 1 Dose(s) Oral once PRN Blood Glucose LESS THAN 70 milliGRAM(s)/deciliter  glucagon  Injectable 1 milliGRAM(s) IntraMuscular once PRN Glucose LESS THAN 70 milligrams/deciliter  traMADol 50 milliGRAM(s) Oral every 6 hours PRN Moderate Pain (4 - 6)  acetaminophen   Tablet. 650 milliGRAM(s) Oral every 6 hours PRN Mild Pain (1 - 3)      Allergies    Ancef (Rash)  daptomycin (Vomiting)  Keflex (Unknown)  penicillin (Pruritus; Rash; Hives)    Intolerances        SOCIAL HISTORY:    FAMILY HISTORY:  Family history of lymphoma (Father): father      Vital Signs Last 24 Hrs  T(C): 36.6 (08 Sep 2017 13:17), Max: 37.7 (08 Sep 2017 05:51)  T(F): 97.8 (08 Sep 2017 13:17), Max: 99.8 (08 Sep 2017 05:51)  HR: 75 (08 Sep 2017 13:17) (75 - 98)  BP: 113/69 (08 Sep 2017 13:17) (112/62 - 125/76)  BP(mean): 92 (07 Sep 2017 20:51) (92 - 92)  RR: 16 (08 Sep 2017 13:17) (16 - 18)  SpO2: 98% (08 Sep 2017 13:17) (97% - 100%)    PHYSICAL EXAM:     The patient was alert and oriented X 3, well nourished, and in no  apparent distress.  OP showed no ulcerations  There was no visible lymphadenopathy.  Conjunctiva were non injected  There was no clubbing or edema of extremities.  The scalp, hair, face, eyebrows, lips, OP, neck, chest, back,   extremities X 4, nails were examined.  There was no hyperhidrosis or bromhidrosis.    Of note on skin exam:     R lower leg with pink patch with posterior small tense bulla, +edema    LABS:                        11.7   7.2   )-----------( 304      ( 08 Sep 2017 05:37 )             34.9     09-08    139  |  99  |  25<H>  ----------------------------<  100<H>  4.3   |  25  |  0.97    Ca    9.8      08 Sep 2017 05:37    TPro  7.3  /  Alb  4.2  /  TBili  0.2  /  DBili  x   /  AST  14  /  ALT  12  /  AlkPhos  63  -07          RADIOLOGY & ADDITIONAL STUDIES: HPI:  66F with h/o Uterine ca (s/p chemo and completed in 2016 ;  in remission), Bullous Pemphigoid (diagnosed  on Prednisone), HTN, HLD, DMT2, OA who presents with persistent right leg redness, swelling and  bullae x 1 day.  Reports RLE pain and warmth.  Noted 2 bullae on the right leg which ruptured ; denies purulent discharge. Pt denies fever/chills, nausea/vomiting.  Of note pt was recently admitted to [facility] for RLE cellulitis (  - ).  She reports some improvement in redness , swelling and pain at the time of discharge. However noted recurrence of symptoms yesterday. Pt was discharged on PO Doxycycline (which she is on chronically for Bullous pemphigoid, along with prednisone 10 mg). During her last admission she received vancomycin,aztreonam and cefazolin. Blood cultures were negative.  No fevers, WBC.    ED course  VS : 120/76    98  18  O2 98% on room air  T 99.3F  Labs : wbc 6  h/h 10/32  plt 299 bun/Cr 27/1.04  Treatment : Vancomycin 1 g IVPB x 1 (07 Sep 2017 20:51)      PAST MEDICAL & SURGICAL HISTORY:  Adrenal mass:  incidental findings   Ct SCAN 2015 unchannged  24 hour urine for VMA done by Dr DR Canas [phone] Neg asper patient  Hyperlipidemia  Endometrial cancer: dx: 2015:  High grade Endometroid Adenocarcinoma  Morbid obesity: BMI:  53.6  Vitamin D deficiency  Hypothyroidism  Anemia  History of osteoarthritis  Type 2 diabetes mellitus  Hypertension  Bullous pemphigoid: dx: 2014.  On Immunosupressants  Cellcept  S/P BSO (bilateral salpingo-oophorectomy)  S/P hysterectomy  Endometrial cancer: 2015:  Endometrial Biopsy: dx: High Grade Endometroid Adenocarcinoma  Status post total thyroidectomy: &#x27;   History of  section: &#x27; 82-&#x27;95:  4 X      REVIEW OF SYSTEMS      General: no fevers/chills, no lethary	    Skin/Breast: see HPI  	  Ophthalmologic: no eye pain or change in vision  	  ENMT: no dysphagia or change in hearing    Respiratory and Thorax: no SOB or cough  	  Cardiovascular: no palpitations or chest pain    Gastrointestinal: no abdomenal pain or blood in stool     Genitourinary: no dysuria or frequency    Musculoskeletal: no joint pains or weakness	    Neurological:no weakness, numbness , or tingling    MEDICATIONS  (STANDING):  aspirin enteric coated 81 milliGRAM(s) Oral daily  lisinopril 5 milliGRAM(s) Oral daily  atorvastatin 20 milliGRAM(s) Oral at bedtime  niacin  milliGRAM(s) Oral two times a day  mupirocin 2% Ointment 1 Application(s) Topical two times a day  ferrous    sulfate 325 milliGRAM(s) Oral daily  folic acid 0.4 milliGRAM(s) Oral daily  cholecalciferol 1000 Unit(s) Oral daily  heparin  Injectable 5000 Unit(s) SubCutaneous every 8 hours  insulin lispro (HumaLOG) corrective regimen sliding scale   SubCutaneous three times a day before meals  dextrose 5%. 1000 milliLiter(s) (50 mL/Hr) IV Continuous <Continuous>  dextrose 50% Injectable 12.5 Gram(s) IV Push once  dextrose 50% Injectable 25 Gram(s) IV Push once  dextrose 50% Injectable 25 Gram(s) IV Push once  influenza   Vaccine 0.5 milliLiter(s) IntraMuscular once  levothyroxine 300 MICROGram(s) Oral <User Schedule>  levothyroxine 150 MICROGram(s) Oral <User Schedule>  vancomycin  IVPB 1250 milliGRAM(s) IV Intermittent every 12 hours  vancomycin  IVPB   IV Intermittent   predniSONE   Tablet 5 milliGRAM(s) Oral two times a day  doxycycline hyclate Capsule 100 milliGRAM(s) Oral every 12 hours    MEDICATIONS  (PRN):  dextrose Gel 1 Dose(s) Oral once PRN Blood Glucose LESS THAN 70 milliGRAM(s)/deciliter  glucagon  Injectable 1 milliGRAM(s) IntraMuscular once PRN Glucose LESS THAN 70 milligrams/deciliter  traMADol 50 milliGRAM(s) Oral every 6 hours PRN Moderate Pain (4 - 6)  acetaminophen   Tablet. 650 milliGRAM(s) Oral every 6 hours PRN Mild Pain (1 - 3)      Allergies    Ancef (Rash)  daptomycin (Vomiting)  Keflex (Unknown)  penicillin (Pruritus; Rash; Hives)    Intolerances        SOCIAL HISTORY:    FAMILY HISTORY:  Family history of lymphoma (Father): father      Vital Signs Last 24 Hrs  T(C): 36.6 (08 Sep 2017 13:17), Max: 37.7 (08 Sep 2017 05:51)  T(F): 97.8 (08 Sep 2017 13:17), Max: 99.8 (08 Sep 2017 05:51)  HR: 75 (08 Sep 2017 13:17) (75 - 98)  BP: 113/69 (08 Sep 2017 13:17) (112/62 - 125/76)  BP(mean): 92 (07 Sep 2017 20:51) (92 - 92)  RR: 16 (08 Sep 2017 13:17) (16 - 18)  SpO2: 98% (08 Sep 2017 13:17) (97% - 100%)    PHYSICAL EXAM:     The patient was alert and oriented X 3, well nourished, and in no  apparent distress.  OP showed no ulcerations  There was no visible lymphadenopathy.  Conjunctiva were non injected  There was no clubbing or edema of extremities.  The scalp, hair, face, eyebrows, lips, OP, neck, chest, back,   extremities X 4, nails were examined.  There was no hyperhidrosis or bromhidrosis.    Of note on skin exam:   obese  R lower leg shows bottle necking and verrucous scale, has well demarcated erythema beginning at calf and patchy erythema down to ankle. No significant swelling or warmth. small bulla    LABS:                        11.7   7.2   )-----------( 304      ( 08 Sep 2017 05:37 )             34.9     09-08    139  |  99  |  25<H>  ----------------------------<  100<H>  4.3   |  25  |  0.97    Ca    9.8      08 Sep 2017 05:37    TPro  7.3  /  Alb  4.2  /  TBili  0.2  /  DBili  x   /  AST  14  /  ALT  12  /  AlkPhos  63  -07          RADIOLOGY & ADDITIONAL STUDIES:

## 2017-09-09 RX ADMIN — TRAMADOL HYDROCHLORIDE 50 MILLIGRAM(S): 50 TABLET ORAL at 15:15

## 2017-09-09 RX ADMIN — ATORVASTATIN CALCIUM 20 MILLIGRAM(S): 80 TABLET, FILM COATED ORAL at 22:54

## 2017-09-09 RX ADMIN — HEPARIN SODIUM 5000 UNIT(S): 5000 INJECTION INTRAVENOUS; SUBCUTANEOUS at 13:02

## 2017-09-09 RX ADMIN — Medication 500 MILLIGRAM(S): at 06:15

## 2017-09-09 RX ADMIN — TRAMADOL HYDROCHLORIDE 50 MILLIGRAM(S): 50 TABLET ORAL at 23:50

## 2017-09-09 RX ADMIN — Medication 5 MILLIGRAM(S): at 17:02

## 2017-09-09 RX ADMIN — MUPIROCIN 1 APPLICATION(S): 20 OINTMENT TOPICAL at 06:15

## 2017-09-09 RX ADMIN — LISINOPRIL 5 MILLIGRAM(S): 2.5 TABLET ORAL at 06:15

## 2017-09-09 RX ADMIN — Medication 150 MICROGRAM(S): at 06:15

## 2017-09-09 RX ADMIN — MUPIROCIN 1 APPLICATION(S): 20 OINTMENT TOPICAL at 17:02

## 2017-09-09 RX ADMIN — HEPARIN SODIUM 5000 UNIT(S): 5000 INJECTION INTRAVENOUS; SUBCUTANEOUS at 22:54

## 2017-09-09 RX ADMIN — Medication 1000 UNIT(S): at 11:32

## 2017-09-09 RX ADMIN — Medication 100 MILLIGRAM(S): at 06:30

## 2017-09-09 RX ADMIN — Medication 5 MILLIGRAM(S): at 06:15

## 2017-09-09 RX ADMIN — TRAMADOL HYDROCHLORIDE 50 MILLIGRAM(S): 50 TABLET ORAL at 03:32

## 2017-09-09 RX ADMIN — Medication 166.67 MILLIGRAM(S): at 12:25

## 2017-09-09 RX ADMIN — TRAMADOL HYDROCHLORIDE 50 MILLIGRAM(S): 50 TABLET ORAL at 16:10

## 2017-09-09 RX ADMIN — Medication 0.4 MILLIGRAM(S): at 11:32

## 2017-09-09 RX ADMIN — Medication 325 MILLIGRAM(S): at 11:33

## 2017-09-09 RX ADMIN — TRAMADOL HYDROCHLORIDE 50 MILLIGRAM(S): 50 TABLET ORAL at 03:02

## 2017-09-09 RX ADMIN — Medication 81 MILLIGRAM(S): at 11:32

## 2017-09-09 RX ADMIN — Medication 500 MILLIGRAM(S): at 17:45

## 2017-09-09 RX ADMIN — Medication 100 MILLIGRAM(S): at 17:02

## 2017-09-09 RX ADMIN — HEPARIN SODIUM 5000 UNIT(S): 5000 INJECTION INTRAVENOUS; SUBCUTANEOUS at 06:15

## 2017-09-09 NOTE — DOWNTIME INTERRUPTION NOTE - WHICH MANUAL FORMS INITIATED?
Downtime protocol was followed for this chart. Orders were checked and backloaded by the providers, if necessary, and reviewed by downtime backloading captains. See paper records for clinical information entered during the [place] downtime.  Downtime protocol was followed for this chart. Orders were checked and backloaded by the providers, if necessary, and reviewed by downtime backloading captains.

## 2017-09-10 LAB — VANCOMYCIN TROUGH SERPL-MCNC: 17.6 UG/ML — SIGNIFICANT CHANGE UP (ref 10–20)

## 2017-09-10 RX ADMIN — Medication 0.4 MILLIGRAM(S): at 12:44

## 2017-09-10 RX ADMIN — Medication 1: at 17:14

## 2017-09-10 RX ADMIN — Medication 1000 UNIT(S): at 12:44

## 2017-09-10 RX ADMIN — HEPARIN SODIUM 5000 UNIT(S): 5000 INJECTION INTRAVENOUS; SUBCUTANEOUS at 06:04

## 2017-09-10 RX ADMIN — TRAMADOL HYDROCHLORIDE 50 MILLIGRAM(S): 50 TABLET ORAL at 19:57

## 2017-09-10 RX ADMIN — HEPARIN SODIUM 5000 UNIT(S): 5000 INJECTION INTRAVENOUS; SUBCUTANEOUS at 23:07

## 2017-09-10 RX ADMIN — Medication 500 MILLIGRAM(S): at 06:04

## 2017-09-10 RX ADMIN — ATORVASTATIN CALCIUM 20 MILLIGRAM(S): 80 TABLET, FILM COATED ORAL at 23:06

## 2017-09-10 RX ADMIN — Medication 5 MILLIGRAM(S): at 17:53

## 2017-09-10 RX ADMIN — TRAMADOL HYDROCHLORIDE 50 MILLIGRAM(S): 50 TABLET ORAL at 12:44

## 2017-09-10 RX ADMIN — Medication 150 MICROGRAM(S): at 06:04

## 2017-09-10 RX ADMIN — Medication 166.67 MILLIGRAM(S): at 01:27

## 2017-09-10 RX ADMIN — TRAMADOL HYDROCHLORIDE 50 MILLIGRAM(S): 50 TABLET ORAL at 01:00

## 2017-09-10 RX ADMIN — Medication 100 MILLIGRAM(S): at 06:04

## 2017-09-10 RX ADMIN — Medication 325 MILLIGRAM(S): at 12:44

## 2017-09-10 RX ADMIN — TRAMADOL HYDROCHLORIDE 50 MILLIGRAM(S): 50 TABLET ORAL at 14:00

## 2017-09-10 RX ADMIN — TRAMADOL HYDROCHLORIDE 50 MILLIGRAM(S): 50 TABLET ORAL at 19:27

## 2017-09-10 RX ADMIN — Medication 500 MILLIGRAM(S): at 17:53

## 2017-09-10 RX ADMIN — MUPIROCIN 1 APPLICATION(S): 20 OINTMENT TOPICAL at 06:04

## 2017-09-10 RX ADMIN — MUPIROCIN 1 APPLICATION(S): 20 OINTMENT TOPICAL at 17:53

## 2017-09-10 RX ADMIN — Medication 100 MILLIGRAM(S): at 17:53

## 2017-09-10 RX ADMIN — TRAMADOL HYDROCHLORIDE 50 MILLIGRAM(S): 50 TABLET ORAL at 07:09

## 2017-09-10 RX ADMIN — Medication 5 MILLIGRAM(S): at 06:04

## 2017-09-10 RX ADMIN — HEPARIN SODIUM 5000 UNIT(S): 5000 INJECTION INTRAVENOUS; SUBCUTANEOUS at 13:55

## 2017-09-10 RX ADMIN — Medication 166.67 MILLIGRAM(S): at 12:51

## 2017-09-10 RX ADMIN — Medication 81 MILLIGRAM(S): at 12:44

## 2017-09-10 RX ADMIN — LISINOPRIL 5 MILLIGRAM(S): 2.5 TABLET ORAL at 06:04

## 2017-09-10 RX ADMIN — TRAMADOL HYDROCHLORIDE 50 MILLIGRAM(S): 50 TABLET ORAL at 06:04

## 2017-09-11 ENCOUNTER — TRANSCRIPTION ENCOUNTER (OUTPATIENT)
Age: 67
End: 2017-09-11

## 2017-09-11 LAB
ANION GAP SERPL CALC-SCNC: 14 MMOL/L — SIGNIFICANT CHANGE UP (ref 5–17)
BUN SERPL-MCNC: 26 MG/DL — HIGH (ref 7–23)
CALCIUM SERPL-MCNC: 9.7 MG/DL — SIGNIFICANT CHANGE UP (ref 8.4–10.5)
CHLORIDE SERPL-SCNC: 101 MMOL/L — SIGNIFICANT CHANGE UP (ref 96–108)
CO2 SERPL-SCNC: 24 MMOL/L — SIGNIFICANT CHANGE UP (ref 22–31)
CREAT SERPL-MCNC: 0.94 MG/DL — SIGNIFICANT CHANGE UP (ref 0.5–1.3)
GLUCOSE SERPL-MCNC: 119 MG/DL — HIGH (ref 70–99)
HCT VFR BLD CALC: 29.9 % — LOW (ref 34.5–45)
HGB BLD-MCNC: 9.5 G/DL — LOW (ref 11.5–15.5)
MCHC RBC-ENTMCNC: 31.4 PG — SIGNIFICANT CHANGE UP (ref 27–34)
MCHC RBC-ENTMCNC: 31.8 GM/DL — LOW (ref 32–36)
MCV RBC AUTO: 98.7 FL — SIGNIFICANT CHANGE UP (ref 80–100)
PLATELET # BLD AUTO: 313 K/UL — SIGNIFICANT CHANGE UP (ref 150–400)
POTASSIUM SERPL-MCNC: 4.5 MMOL/L — SIGNIFICANT CHANGE UP (ref 3.5–5.3)
POTASSIUM SERPL-SCNC: 4.5 MMOL/L — SIGNIFICANT CHANGE UP (ref 3.5–5.3)
RBC # BLD: 3.03 M/UL — LOW (ref 3.8–5.2)
RBC # FLD: 15.4 % — HIGH (ref 10.3–14.5)
SODIUM SERPL-SCNC: 139 MMOL/L — SIGNIFICANT CHANGE UP (ref 135–145)
WBC # BLD: 6.08 K/UL — SIGNIFICANT CHANGE UP (ref 3.8–10.5)
WBC # FLD AUTO: 6.08 K/UL — SIGNIFICANT CHANGE UP (ref 3.8–10.5)

## 2017-09-11 PROCEDURE — 99232 SBSQ HOSP IP/OBS MODERATE 35: CPT

## 2017-09-11 RX ADMIN — HEPARIN SODIUM 5000 UNIT(S): 5000 INJECTION INTRAVENOUS; SUBCUTANEOUS at 23:38

## 2017-09-11 RX ADMIN — Medication 650 MILLIGRAM(S): at 16:40

## 2017-09-11 RX ADMIN — Medication 650 MILLIGRAM(S): at 15:31

## 2017-09-11 RX ADMIN — Medication 0.4 MILLIGRAM(S): at 11:56

## 2017-09-11 RX ADMIN — MUPIROCIN 1 APPLICATION(S): 20 OINTMENT TOPICAL at 17:21

## 2017-09-11 RX ADMIN — Medication 5 MILLIGRAM(S): at 17:21

## 2017-09-11 RX ADMIN — TRAMADOL HYDROCHLORIDE 50 MILLIGRAM(S): 50 TABLET ORAL at 19:01

## 2017-09-11 RX ADMIN — Medication 81 MILLIGRAM(S): at 11:56

## 2017-09-11 RX ADMIN — TRAMADOL HYDROCHLORIDE 50 MILLIGRAM(S): 50 TABLET ORAL at 19:51

## 2017-09-11 RX ADMIN — Medication 5 MILLIGRAM(S): at 05:36

## 2017-09-11 RX ADMIN — MUPIROCIN 1 APPLICATION(S): 20 OINTMENT TOPICAL at 05:31

## 2017-09-11 RX ADMIN — HEPARIN SODIUM 5000 UNIT(S): 5000 INJECTION INTRAVENOUS; SUBCUTANEOUS at 05:31

## 2017-09-11 RX ADMIN — HEPARIN SODIUM 5000 UNIT(S): 5000 INJECTION INTRAVENOUS; SUBCUTANEOUS at 13:58

## 2017-09-11 RX ADMIN — Medication 166.67 MILLIGRAM(S): at 15:26

## 2017-09-11 RX ADMIN — Medication 166.67 MILLIGRAM(S): at 03:06

## 2017-09-11 RX ADMIN — Medication 150 MICROGRAM(S): at 05:31

## 2017-09-11 RX ADMIN — TRAMADOL HYDROCHLORIDE 50 MILLIGRAM(S): 50 TABLET ORAL at 11:56

## 2017-09-11 RX ADMIN — TRAMADOL HYDROCHLORIDE 50 MILLIGRAM(S): 50 TABLET ORAL at 03:06

## 2017-09-11 RX ADMIN — Medication 1: at 11:55

## 2017-09-11 RX ADMIN — Medication 100 MILLIGRAM(S): at 05:30

## 2017-09-11 RX ADMIN — Medication 325 MILLIGRAM(S): at 11:56

## 2017-09-11 RX ADMIN — TRAMADOL HYDROCHLORIDE 50 MILLIGRAM(S): 50 TABLET ORAL at 03:36

## 2017-09-11 RX ADMIN — TRAMADOL HYDROCHLORIDE 50 MILLIGRAM(S): 50 TABLET ORAL at 12:40

## 2017-09-11 RX ADMIN — Medication 650 MILLIGRAM(S): at 23:38

## 2017-09-11 RX ADMIN — Medication 500 MILLIGRAM(S): at 05:29

## 2017-09-11 RX ADMIN — Medication 100 MILLIGRAM(S): at 17:21

## 2017-09-11 RX ADMIN — Medication 1000 UNIT(S): at 11:56

## 2017-09-11 RX ADMIN — ATORVASTATIN CALCIUM 20 MILLIGRAM(S): 80 TABLET, FILM COATED ORAL at 23:38

## 2017-09-11 RX ADMIN — Medication 500 MILLIGRAM(S): at 17:20

## 2017-09-11 RX ADMIN — LISINOPRIL 5 MILLIGRAM(S): 2.5 TABLET ORAL at 05:30

## 2017-09-11 RX ADMIN — Medication 1: at 17:20

## 2017-09-11 NOTE — DISCHARGE NOTE ADULT - PLAN OF CARE
free of infection improved, complete bactrim prescribed, pain med as needed, elevate leg as needed, use dressing daily, use ointment as needed. control infection cont prednisone and doxycycline as before, follow up with Ricardo Frankel in 1-2 weeks to keep Hb A1-c <6.5 cont current home regimens stable cont current home med controlled

## 2017-09-11 NOTE — DISCHARGE NOTE ADULT - SECONDARY DIAGNOSIS.
Bullous pemphigoid Type 2 diabetes mellitus without complication, without long-term current use of insulin Hypothyroidism Hypertension Endometrial cancer

## 2017-09-11 NOTE — DISCHARGE NOTE ADULT - VISION (WITH CORRECTIVE LENSES IF THE PATIENT USUALLY WEARS THEM):
uses eyeglasses/Normal vision: sees adequately in most situations; can see medication labels, newsprint

## 2017-09-11 NOTE — DISCHARGE NOTE ADULT - HOSPITAL COURSE
Pt was admitted with RIGHT LOWER EXTREMITY cellulitis and treated with iv vanco for 5 days, iv ancef for 2 days, seen by house ID  and recommend po bactrim ds 1 tab bid for 5 days. She also continued her home doxycycline and prednisone for bullous pemphigoid, she was evaluated by PT,  she is stable to be discharged home with home PT. Pt was admitted with RIGHT LOWER EXTREMITY cellulitis and treated with iv vanco for 5 days, iv ancef for 2 days, seen by house ID  and recommend po bactrim ds 1 tab bid for 5 days. She also continued her home doxycycline and prednisone for bullous pemphigoid, she was evaluated by PT,  she is stable to be discharged home with home PT.      Attending note:  66F with h/o Uterine ca (s/p chemo and completed in April 2016 ;  in remission), Bullous Pemphigoid (diagnosed 2014 on chronic Prednisone), HTN, HLD, DMT2, OA who presents with persistent right leg redness, swelling , pain, warmth and  bullae. Patient admitted for cellulitis. Patient started on IV vanco. Patient remained afebrile, no leukocytosis. Seen by derm and thought possible lipodermatosclerosis.  On discharge, abx changed to bactrim 5 day course. patient declined to start Pentoxifylline 400mg TID, can decide outpatient.  To cont doxy and prednisone.

## 2017-09-11 NOTE — DISCHARGE NOTE ADULT - CARE PROVIDER_API CALL
Olivia Ross), Internal Medicine  [address] [place] [address] [address]  [place] [address]  Phone: [phone]  Fax: [phone]    Ricardo Malin  Phone: [phone]  Fax: (   )    -

## 2017-09-11 NOTE — DISCHARGE NOTE ADULT - MEDICATION SUMMARY - MEDICATIONS TO TAKE
I will START or STAY ON the medications listed below when I get home from the hospital:    clobetasol topical cream  -- Apply on skin to affected area once a day, As Needed  -- Indication: For Bullous pemphigoid    predniSONE 5 mg oral tablet  -- 1 tab(s) by mouth 2 times a day  -- Indication: For Bullous pemphigoid    traMADol 300 mg/24 hours oral tablet, extended release  -- 1 tab(s) by mouth once a day  -- Indication: For Cellulitis of right leg    HYDROcodone-acetaminophen 10 mg-325 mg oral tablet  -- 1 tab(s) by mouth every 6 hours, As Needed -   -- Indication: For Cellulitis of right leg    Aspirin Enteric Coated 81 mg oral delayed release tablet  -- 1 tab(s) by mouth once a day  -- Indication: For CAD prophylaxis    lisinopril 5 mg oral tablet  -- 1 tab(s) by mouth once a day  -- Indication: For Essential hypertension    pioglitazone 15 mg oral tablet  -- 1 tab(s) by mouth once a day  -- Indication: For Type 2 diabetes mellitus without complication, without long-term current use of insulin    metFORMIN 500 mg oral tablet, extended release  -- 2 tab(s) by mouth 2 times a day  -- Indication: For Type 2 diabetes mellitus without complication, without long-term current use of insulin    Lipitor 20 mg oral tablet  -- 1 tab(s) by mouth once a day (at bedtime)  -- Indication: For HLD    niacin 500 mg oral tablet  -- 1 tab(s) by mouth 2 times a day  -- Indication: For Prophylactic measure    doxycycline hyclate 100 mg oral tablet  -- 1 tab(s) by mouth 2 times a day  -- Indication: For Bullous pemphigoid    Fosamax 35 mg oral tablet  -- 1 tab(s) by mouth once a week on Sunday  -- Indication: For Prophylactic measure    mupirocin 2% topical ointment  -- Apply on skin to affected area 2 times a day  -- Indication: For Bullous pemphigoid    ferrous sulfate 325 mg (65 mg elemental iron) oral tablet  -- 1 tab(s) by mouth once a day  -- Indication: For anemia    sulfamethoxazole-trimethoprim 800 mg-160 mg oral tablet  -- 1 tab(s) by mouth 2 times a day  -- Indication: For Cellulitis of right leg    Alpha Lipoic Acid 200 mg oral capsule  -- 1 cap(s) by mouth once a day  -- Indication: For Prophylactic measure    Synthroid 150 mcg (0.15 mg) oral tablet  -- 2 tab(s) by mouth once a day on Thursdays  -- Indication: For Hypothyroidism, unspecified type    levothyroxine 150 mcg (0.15 mg) oral tablet  -- 1 tab(s) by mouth   -- Indication: For Hypothyroidism, unspecified type    Synthroid 150 mcg (0.15 mg) oral tablet  -- 1 tab(s) by mouth once a day on Mon, Tues, Wed, Fri, Sat, Sun  -- Indication: For Hypothyroidism, unspecified type    Vitamin D3 1000 intl units oral tablet  -- 1 tab(s) by mouth once a day  -- Indication: For Prophylactic measure    folic acid 0.4 mg oral tablet  -- 1 tab(s) by mouth once a day  -- Indication: For Prophylactic measure I will START or STAY ON the medications listed below when I get home from the hospital:    clobetasol topical cream  -- Apply on skin to affected area once a day, As Needed  -- Indication: For Bullous pemphigoid    predniSONE 5 mg oral tablet  -- 1 tab(s) by mouth 2 times a day  -- Indication: For Bullous pemphigoid    Aspirin Enteric Coated 81 mg oral delayed release tablet  -- 1 tab(s) by mouth once a day  -- Indication: For CAD prophylaxis    HYDROcodone-acetaminophen 10 mg-325 mg oral tablet  -- 1 tab(s) by mouth every 6 hours, As Needed -   -- Indication: For Cellulitis of right leg    traMADol 300 mg/24 hours oral tablet, extended release  -- 1 tab(s) by mouth once a day  -- Indication: For Cellulitis of right leg    lisinopril 5 mg oral tablet  -- 1 tab(s) by mouth once a day  -- Indication: For Essential hypertension    pioglitazone 15 mg oral tablet  -- 1 tab(s) by mouth once a day  -- Indication: For Type 2 diabetes mellitus without complication, without long-term current use of insulin    metFORMIN 500 mg oral tablet, extended release  -- 2 tab(s) by mouth 2 times a day  -- Indication: For Type 2 diabetes mellitus without complication, without long-term current use of insulin    Lipitor 20 mg oral tablet  -- 1 tab(s) by mouth once a day (at bedtime)  -- Indication: For HLD    niacin 500 mg oral tablet  -- 1 tab(s) by mouth 2 times a day  -- Indication: For Prophylactic measure    doxycycline hyclate 100 mg oral tablet  -- 1 tab(s) by mouth 2 times a day  -- Indication: For Bullous pemphigoid    Fosamax 35 mg oral tablet  -- 1 tab(s) by mouth once a week on Sunday  -- Indication: For Prophylactic measure    mupirocin 2% topical ointment  -- Apply on skin to affected area 2 times a day  -- Indication: For Bullous pemphigoid    ferrous sulfate 325 mg (65 mg elemental iron) oral tablet  -- 1 tab(s) by mouth once a day  -- Indication: For anemia    sulfamethoxazole-trimethoprim 800 mg-160 mg oral tablet  -- 1 tab(s) by mouth 2 times a day x 4 days  -- Indication: For Cellulitis of right leg    Alpha Lipoic Acid 200 mg oral capsule  -- 1 cap(s) by mouth once a day  -- Indication: For Prophylactic measure    Synthroid 150 mcg (0.15 mg) oral tablet  -- 2 tab(s) by mouth once a day on Thursdays  -- Indication: For Hypothyroidism, unspecified type    Synthroid 150 mcg (0.15 mg) oral tablet  -- 1 tab(s) by mouth once a day on Mon, Tues, Wed, Fri, Sat, Sun  -- Indication: For Hypothyroidism, unspecified type    folic acid 0.4 mg oral tablet  -- 1 tab(s) by mouth once a day  -- Indication: For Prophylactic measure    Vitamin D3 1000 intl units oral tablet  -- 1 tab(s) by mouth once a day  -- Indication: For Prophylactic measure

## 2017-09-11 NOTE — DISCHARGE NOTE ADULT - PATIENT PORTAL LINK FT
“You can access the [facility] Patient Portal, offered by [facility], by registering with the following website: [email or web address]”

## 2017-09-11 NOTE — DISCHARGE NOTE ADULT - NS AS DC FOLLOWUP STROKE INST
Influenza vaccination (VIS Pub Date: August 19, 2014)/Smoking Cessation Influenza vaccination (VIS Pub Date: August 19, 2014)

## 2017-09-11 NOTE — DISCHARGE NOTE ADULT - CARE PLAN
Principal Discharge DX:	Cellulitis of right leg  Goal:	free of infection  Instructions for follow-up, activity and diet:	improved, complete bactrim prescribed, pain med as needed, elevate leg as needed, use dressing daily, use ointment as needed.  Secondary Diagnosis:	Bullous pemphigoid  Goal:	control infection  Instructions for follow-up, activity and diet:	cont prednisone and doxycycline as before, follow up with Ricardo Frankel in 1-2 weeks  Secondary Diagnosis:	Type 2 diabetes mellitus without complication, without long-term current use of insulin  Goal:	to keep Hb A1-c <6.5  Instructions for follow-up, activity and diet:	cont current home regimens  Secondary Diagnosis:	Hypothyroidism  Goal:	stable  Instructions for follow-up, activity and diet:	cont current home med  Secondary Diagnosis:	Hypertension  Goal:	controlled  Instructions for follow-up, activity and diet:	cont current home med  Secondary Diagnosis:	Endometrial cancer

## 2017-09-11 NOTE — PROGRESS NOTE ADULT - PROBLEM SELECTOR PLAN 3
hold oral medications  TAN  DM diet  Monitor FS q ac qhs

## 2017-09-11 NOTE — PROGRESS NOTE ADULT - PROBLEM SELECTOR PLAN 6
Heparin SQ for DVT ppx
Heparin SQ for DVT ppx    anticipate DC tomorrow

## 2017-09-11 NOTE — PROGRESS NOTE ADULT - PROBLEM SELECTOR PLAN 1
appreciate ID recs, cont IV vanco  derm eval
appreciate ID and derm recs, concern for acute flare lipodermatosclerosis  will cont IV vanco per ID  patient declines to start Pentoxifylline 400mg TID, can decide outpatient
appreciate ID and derm recs, concern for acute flare lipodermatosclerosis  will defer to ID regarding continuing antibiotics  patient hesitant to start Pentoxifylline 400mg TID, will discuss again tomorrow
appreciate ID and derm recs, unclear dx including lipodermatosclerosis / cellulitis  change PO abx to bactrim 5 day course  patient declines to start Pentoxifylline 400mg TID, can decide outpatient

## 2017-09-12 VITALS
TEMPERATURE: 37 F | OXYGEN SATURATION: 97 % | DIASTOLIC BLOOD PRESSURE: 51 MMHG | HEART RATE: 84 BPM | RESPIRATION RATE: 18 BRPM | SYSTOLIC BLOOD PRESSURE: 95 MMHG

## 2017-09-12 LAB
ANION GAP SERPL CALC-SCNC: 18 MMOL/L — HIGH (ref 5–17)
BASOPHILS # BLD AUTO: 0.03 K/UL — SIGNIFICANT CHANGE UP (ref 0–0.2)
BASOPHILS NFR BLD AUTO: 0.5 % — SIGNIFICANT CHANGE UP (ref 0–2)
BUN SERPL-MCNC: 25 MG/DL — HIGH (ref 7–23)
CALCIUM SERPL-MCNC: 8.8 MG/DL — SIGNIFICANT CHANGE UP (ref 8.4–10.5)
CHLORIDE SERPL-SCNC: 101 MMOL/L — SIGNIFICANT CHANGE UP (ref 96–108)
CO2 SERPL-SCNC: 21 MMOL/L — LOW (ref 22–31)
CREAT SERPL-MCNC: 0.81 MG/DL — SIGNIFICANT CHANGE UP (ref 0.5–1.3)
EOSINOPHIL # BLD AUTO: 0.21 K/UL — SIGNIFICANT CHANGE UP (ref 0–0.5)
EOSINOPHIL NFR BLD AUTO: 3.2 % — SIGNIFICANT CHANGE UP (ref 0–6)
GLUCOSE SERPL-MCNC: 111 MG/DL — HIGH (ref 70–99)
HCT VFR BLD CALC: 31.1 % — LOW (ref 34.5–45)
HGB BLD-MCNC: 9.9 G/DL — LOW (ref 11.5–15.5)
IMM GRANULOCYTES NFR BLD AUTO: 1.2 % — SIGNIFICANT CHANGE UP (ref 0–1.5)
LYMPHOCYTES # BLD AUTO: 1.83 K/UL — SIGNIFICANT CHANGE UP (ref 1–3.3)
LYMPHOCYTES # BLD AUTO: 28.1 % — SIGNIFICANT CHANGE UP (ref 13–44)
MCHC RBC-ENTMCNC: 31.5 PG — SIGNIFICANT CHANGE UP (ref 27–34)
MCHC RBC-ENTMCNC: 31.8 GM/DL — LOW (ref 32–36)
MCV RBC AUTO: 99 FL — SIGNIFICANT CHANGE UP (ref 80–100)
MONOCYTES # BLD AUTO: 0.62 K/UL — SIGNIFICANT CHANGE UP (ref 0–0.9)
MONOCYTES NFR BLD AUTO: 9.5 % — SIGNIFICANT CHANGE UP (ref 2–14)
NEUTROPHILS # BLD AUTO: 3.74 K/UL — SIGNIFICANT CHANGE UP (ref 1.8–7.4)
NEUTROPHILS NFR BLD AUTO: 57.5 % — SIGNIFICANT CHANGE UP (ref 43–77)
PLATELET # BLD AUTO: 352 K/UL — SIGNIFICANT CHANGE UP (ref 150–400)
POTASSIUM SERPL-MCNC: 4.5 MMOL/L — SIGNIFICANT CHANGE UP (ref 3.5–5.3)
POTASSIUM SERPL-SCNC: 4.5 MMOL/L — SIGNIFICANT CHANGE UP (ref 3.5–5.3)
RBC # BLD: 3.14 M/UL — LOW (ref 3.8–5.2)
RBC # FLD: 15.3 % — HIGH (ref 10.3–14.5)
SODIUM SERPL-SCNC: 140 MMOL/L — SIGNIFICANT CHANGE UP (ref 135–145)
WBC # BLD: 6.51 K/UL — SIGNIFICANT CHANGE UP (ref 3.8–10.5)
WBC # FLD AUTO: 6.51 K/UL — SIGNIFICANT CHANGE UP (ref 3.8–10.5)

## 2017-09-12 PROCEDURE — 99232 SBSQ HOSP IP/OBS MODERATE 35: CPT

## 2017-09-12 PROCEDURE — 87040 BLOOD CULTURE FOR BACTERIA: CPT

## 2017-09-12 PROCEDURE — 80202 ASSAY OF VANCOMYCIN: CPT

## 2017-09-12 PROCEDURE — 99285 EMERGENCY DEPT VISIT HI MDM: CPT | Mod: 25

## 2017-09-12 PROCEDURE — 73590 X-RAY EXAM OF LOWER LEG: CPT

## 2017-09-12 PROCEDURE — 80053 COMPREHEN METABOLIC PANEL: CPT

## 2017-09-12 PROCEDURE — 85027 COMPLETE CBC AUTOMATED: CPT

## 2017-09-12 PROCEDURE — 80048 BASIC METABOLIC PNL TOTAL CA: CPT

## 2017-09-12 PROCEDURE — 90686 IIV4 VACC NO PRSV 0.5 ML IM: CPT

## 2017-09-12 RX ORDER — LEVOTHYROXINE SODIUM 125 MCG
1 TABLET ORAL
Qty: 0 | Refills: 0 | COMMUNITY
Start: 2017-09-12

## 2017-09-12 RX ADMIN — Medication 5 MILLIGRAM(S): at 16:55

## 2017-09-12 RX ADMIN — Medication 1000 UNIT(S): at 11:57

## 2017-09-12 RX ADMIN — Medication 650 MILLIGRAM(S): at 00:15

## 2017-09-12 RX ADMIN — Medication 81 MILLIGRAM(S): at 11:57

## 2017-09-12 RX ADMIN — Medication 1: at 11:56

## 2017-09-12 RX ADMIN — Medication 1 TABLET(S): at 16:55

## 2017-09-12 RX ADMIN — MUPIROCIN 1 APPLICATION(S): 20 OINTMENT TOPICAL at 06:24

## 2017-09-12 RX ADMIN — TRAMADOL HYDROCHLORIDE 50 MILLIGRAM(S): 50 TABLET ORAL at 09:05

## 2017-09-12 RX ADMIN — Medication 325 MILLIGRAM(S): at 11:57

## 2017-09-12 RX ADMIN — Medication 1 TABLET(S): at 06:24

## 2017-09-12 RX ADMIN — INFLUENZA VIRUS VACCINE 0.5 MILLILITER(S): 15; 15; 15; 15 SUSPENSION INTRAMUSCULAR at 12:01

## 2017-09-12 RX ADMIN — Medication 0.4 MILLIGRAM(S): at 11:57

## 2017-09-12 RX ADMIN — TRAMADOL HYDROCHLORIDE 50 MILLIGRAM(S): 50 TABLET ORAL at 02:19

## 2017-09-12 RX ADMIN — TRAMADOL HYDROCHLORIDE 50 MILLIGRAM(S): 50 TABLET ORAL at 01:25

## 2017-09-12 RX ADMIN — TRAMADOL HYDROCHLORIDE 50 MILLIGRAM(S): 50 TABLET ORAL at 14:36

## 2017-09-12 RX ADMIN — Medication 150 MICROGRAM(S): at 06:24

## 2017-09-12 RX ADMIN — Medication 500 MILLIGRAM(S): at 16:55

## 2017-09-12 RX ADMIN — Medication 500 MILLIGRAM(S): at 06:24

## 2017-09-12 RX ADMIN — LISINOPRIL 5 MILLIGRAM(S): 2.5 TABLET ORAL at 06:24

## 2017-09-12 RX ADMIN — HEPARIN SODIUM 5000 UNIT(S): 5000 INJECTION INTRAVENOUS; SUBCUTANEOUS at 06:24

## 2017-09-12 RX ADMIN — Medication 100 MILLIGRAM(S): at 16:55

## 2017-09-12 RX ADMIN — Medication 5 MILLIGRAM(S): at 06:24

## 2017-09-12 RX ADMIN — TRAMADOL HYDROCHLORIDE 50 MILLIGRAM(S): 50 TABLET ORAL at 08:43

## 2017-09-12 RX ADMIN — Medication 650 MILLIGRAM(S): at 06:24

## 2017-09-12 RX ADMIN — Medication 100 MILLIGRAM(S): at 06:24

## 2017-09-12 RX ADMIN — Medication 650 MILLIGRAM(S): at 07:15

## 2017-09-12 NOTE — PROGRESS NOTE ADULT - PROVIDER SPECIALTY LIST ADULT
Infectious Disease
Infectious Disease
Internal Medicine

## 2017-09-12 NOTE — PROGRESS NOTE ADULT - SUBJECTIVE AND OBJECTIVE BOX
Patient is a 66y old  Female who presents with a chief complaint of right leg cellulitis (07 Sep 2017 22:31)      SUBJECTIVE / OVERNIGHT EVENTS:    Patient seen and examined. able to ambulate. does not know if leg is improved. denies pain.      Vital Signs Last 24 Hrs  T(C): 37.1 (10 Sep 2017 03:38), Max: 37.1 (09 Sep 2017 14:47)  T(F): 98.8 (10 Sep 2017 03:38), Max: 98.8 (09 Sep 2017 14:47)  HR: 73 (10 Sep 2017 03:38) (73 - 84)  BP: 99/55 (10 Sep 2017 03:38) (99/55 - 127/74)  BP(mean): --  RR: 17 (10 Sep 2017 03:38) (17 - 18)  SpO2: 95% (10 Sep 2017 03:38) (95% - 98%)  I&O's Summary    09 Sep 2017 07:01  -  10 Sep 2017 07:00  --------------------------------------------------------  IN: 1780 mL / OUT: 0 mL / NET: 1780 mL        PE:  GENERAL: NAD, AAOx3, obese  HEAD:  Atraumatic, Normocephalic  EYES: EOMI, PERRLA, conjunctiva and sclera clear  NECK: Supple, No JVD  CHEST/LUNG: CTABL, No wheeze  HEART: Regular rate and rhythm; no murmur  ABDOMEN: Soft, Nontender, Nondistended; Bowel sounds present  EXTREMITIES:  2+ Peripheral Pulses, No clubbing, cyanosis, or edema  SKIN: No rashes or lesions, RLE erythematous, bullae  NEURO: No focal deficits    LABS:            CAPILLARY BLOOD GLUCOSE  117 (10 Sep 2017 08:09)  184 (09 Sep 2017 22:18)  116 (09 Sep 2017 16:37)  124 (09 Sep 2017 12:14)                RADIOLOGY & ADDITIONAL TESTS:    Imaging Personally Reviewed:  [x] YES  [ ] NO    Consultant(s) Notes Reviewed:  [x] YES  [ ] NO    MEDICATIONS  (STANDING):  aspirin enteric coated 81 milliGRAM(s) Oral daily  lisinopril 5 milliGRAM(s) Oral daily  atorvastatin 20 milliGRAM(s) Oral at bedtime  niacin  milliGRAM(s) Oral two times a day  mupirocin 2% Ointment 1 Application(s) Topical two times a day  ferrous    sulfate 325 milliGRAM(s) Oral daily  folic acid 0.4 milliGRAM(s) Oral daily  cholecalciferol 1000 Unit(s) Oral daily  heparin  Injectable 5000 Unit(s) SubCutaneous every 8 hours  insulin lispro (HumaLOG) corrective regimen sliding scale   SubCutaneous three times a day before meals  dextrose 5%. 1000 milliLiter(s) (50 mL/Hr) IV Continuous <Continuous>  dextrose 50% Injectable 12.5 Gram(s) IV Push once  dextrose 50% Injectable 25 Gram(s) IV Push once  dextrose 50% Injectable 25 Gram(s) IV Push once  influenza   Vaccine 0.5 milliLiter(s) IntraMuscular once  levothyroxine 300 MICROGram(s) Oral <User Schedule>  levothyroxine 150 MICROGram(s) Oral <User Schedule>  vancomycin  IVPB   IV Intermittent   vancomycin  IVPB 1250 milliGRAM(s) IV Intermittent every 12 hours  predniSONE   Tablet 5 milliGRAM(s) Oral two times a day  doxycycline hyclate Capsule 100 milliGRAM(s) Oral every 12 hours    MEDICATIONS  (PRN):  dextrose Gel 1 Dose(s) Oral once PRN Blood Glucose LESS THAN 70 milliGRAM(s)/deciliter  glucagon  Injectable 1 milliGRAM(s) IntraMuscular once PRN Glucose LESS THAN 70 milligrams/deciliter  traMADol 50 milliGRAM(s) Oral every 6 hours PRN Moderate Pain (4 - 6)  acetaminophen   Tablet. 650 milliGRAM(s) Oral every 6 hours PRN Mild Pain (1 - 3)      Care Discussed with Consultants/Other Providers [x] YES  [ ] NO    HEALTH ISSUES - PROBLEM Dx:  Prophylactic measure: Prophylactic measure  Hypothyroidism, unspecified type: Hypothyroidism, unspecified type  Essential hypertension: Essential hypertension  Type 2 diabetes mellitus without complication, without long-term current use of insulin: Type 2 diabetes mellitus without complication, without long-term current use of insulin  Bullous pemphigoid: Bullous pemphigoid  Cellulitis of right leg: Cellulitis of right leg
Patient is a 66y old  Female who presents with a chief complaint of right leg cellulitis (07 Sep 2017 22:31)      SUBJECTIVE / OVERNIGHT EVENTS:    Patient seen and examined. denies cp sob. co redness right lower leg. afebrile.      Vital Signs Last 24 Hrs  T(C): 37.2 (08 Sep 2017 20:20), Max: 37.2 (08 Sep 2017 20:20)  T(F): 99 (08 Sep 2017 20:20), Max: 99 (08 Sep 2017 20:20)  HR: 91 (08 Sep 2017 20:20) (76 - 91)  BP: 112/74 (08 Sep 2017 20:20) (112/73 - 112/74)  BP(mean): --  RR: 18 (08 Sep 2017 20:20) (18 - 18)  SpO2: 98% (08 Sep 2017 20:20) (98% - 100%)  I&O's Summary    08 Sep 2017 07:01  -  09 Sep 2017 07:00  --------------------------------------------------------  IN: 300 mL / OUT: 0 mL / NET: 300 mL    09 Sep 2017 07:01  -  09 Sep 2017 14:07  --------------------------------------------------------  IN: 250 mL / OUT: 0 mL / NET: 250 mL        PE:  GENERAL: NAD, AAOx3, obese  HEAD:  Atraumatic, Normocephalic  EYES: EOMI, PERRLA, conjunctiva and sclera clear  NECK: Supple, No JVD  CHEST/LUNG: CTABL, No wheeze  HEART: Regular rate and rhythm; no murmur  ABDOMEN: Soft, Nontender, Nondistended; Bowel sounds present  EXTREMITIES:  2+ Peripheral Pulses, No clubbing, cyanosis, or edema  SKIN: No rashes or lesions, RLE warm to touch, erythematous  NEURO: No focal deficits    LABS:                        11.7   7.2   )-----------( 304      ( 08 Sep 2017 05:37 )             34.9     09-08    139  |  99  |  25<H>  ----------------------------<  100<H>  4.3   |  25  |  0.97    Ca    9.8      08 Sep 2017 05:37    TPro  7.3  /  Alb  4.2  /  TBili  0.2  /  DBili  x   /  AST  14  /  ALT  12  /  AlkPhos  63  09-07      CAPILLARY BLOOD GLUCOSE  124 (09 Sep 2017 12:14)  122 (09 Sep 2017 08:25)  166 (08 Sep 2017 21:04)  144 (08 Sep 2017 16:25)                RADIOLOGY & ADDITIONAL TESTS:    Imaging Personally Reviewed:  [x] YES  [ ] NO    Consultant(s) Notes Reviewed:  [x] YES  [ ] NO    MEDICATIONS  (STANDING):  aspirin enteric coated 81 milliGRAM(s) Oral daily  lisinopril 5 milliGRAM(s) Oral daily  atorvastatin 20 milliGRAM(s) Oral at bedtime  niacin  milliGRAM(s) Oral two times a day  mupirocin 2% Ointment 1 Application(s) Topical two times a day  ferrous    sulfate 325 milliGRAM(s) Oral daily  folic acid 0.4 milliGRAM(s) Oral daily  cholecalciferol 1000 Unit(s) Oral daily  heparin  Injectable 5000 Unit(s) SubCutaneous every 8 hours  insulin lispro (HumaLOG) corrective regimen sliding scale   SubCutaneous three times a day before meals  dextrose 5%. 1000 milliLiter(s) (50 mL/Hr) IV Continuous <Continuous>  dextrose 50% Injectable 12.5 Gram(s) IV Push once  dextrose 50% Injectable 25 Gram(s) IV Push once  dextrose 50% Injectable 25 Gram(s) IV Push once  influenza   Vaccine 0.5 milliLiter(s) IntraMuscular once  levothyroxine 300 MICROGram(s) Oral <User Schedule>  levothyroxine 150 MICROGram(s) Oral <User Schedule>  vancomycin  IVPB   IV Intermittent   vancomycin  IVPB 1250 milliGRAM(s) IV Intermittent every 12 hours  predniSONE   Tablet 5 milliGRAM(s) Oral two times a day  doxycycline hyclate Capsule 100 milliGRAM(s) Oral every 12 hours    MEDICATIONS  (PRN):  dextrose Gel 1 Dose(s) Oral once PRN Blood Glucose LESS THAN 70 milliGRAM(s)/deciliter  glucagon  Injectable 1 milliGRAM(s) IntraMuscular once PRN Glucose LESS THAN 70 milligrams/deciliter  traMADol 50 milliGRAM(s) Oral every 6 hours PRN Moderate Pain (4 - 6)  acetaminophen   Tablet. 650 milliGRAM(s) Oral every 6 hours PRN Mild Pain (1 - 3)      Care Discussed with Consultants/Other Providers [x] YES  [ ] NO    HEALTH ISSUES - PROBLEM Dx:  Prophylactic measure: Prophylactic measure  Hypothyroidism, unspecified type: Hypothyroidism, unspecified type  Essential hypertension: Essential hypertension  Type 2 diabetes mellitus without complication, without long-term current use of insulin: Type 2 diabetes mellitus without complication, without long-term current use of insulin  Bullous pemphigoid: Bullous pemphigoid  Cellulitis of right leg: Cellulitis of right leg
Patient is a 66y old  Female who presents with a chief complaint of right leg cellulitis (07 Sep 2017 22:31)    Being followed by ID for ?cellulitis    Interval history:reports some decrease in swelling-no pain  No acute events      ROS:  No cough,SOB,CP  No N/V/D./abd pain  No other complaints      Antimicrobials:    vancomycin  IVPB   IV Intermittent   vancomycin  IVPB 1250 milliGRAM(s) IV Intermittent every 12 hours  doxycycline hyclate Capsule 100 milliGRAM(s) Oral every 12 hours      Vital Signs Last 24 Hrs  T(C): 36.8 (09-11-17 @ 03:54), Max: 37 (09-10-17 @ 20:58)  T(F): 98.3 (09-11-17 @ 03:54), Max: 98.6 (09-10-17 @ 20:58)  HR: 75 (09-11-17 @ 03:54) (75 - 84)  BP: 102/70 (09-11-17 @ 03:54) (92/50 - 116/68)  BP(mean): --  RR: 18 (09-11-17 @ 03:54) (17 - 18)  SpO2: 98% (09-11-17 @ 03:54) (97% - 99%)    Physical Exam:    Constitutional well preserved,comfortable,pleasant    HEENT PERRLA EOMI,No pallor or icterus    No oral exudate or erythema    Neck supple no JVD or LN    Chest Good AE,CTA    CVS RRR S1 S2 WNl No murmur or rub or gallop    Abd soft BS normal No tenderness no masses    Ext right leg some erythema-no tenderness no discharge  Small blister on right leg    IV site no erythema tenderness or discharge    Joints no swelling or LOM    CNS AAO X 3 no focal    Lab Data:                          9.5    6.08  )-----------( 313      ( 11 Sep 2017 07:17 )             29.9       09-11    139  |  101  |  26<H>  ----------------------------<  119<H>  4.5   |  24  |  0.94    Ca    9.7      11 Sep 2017 07:05                  Vancomycin Level, Trough: 17.6 ug/mL (09-10-17 @ 00:15)    Culture - Blood (09.08.17 @ 00:28)    Specimen Source: .Blood Blood-Peripheral    Culture Results:   No growth to date.    Culture - Blood (09.08.17 @ 00:23)    Specimen Source: .Blood Blood-Peripheral    Culture Results:   No growth to date.
Patient is a 66y old  Female who presents with a chief complaint of right leg cellulitis (07 Sep 2017 22:31)    SUBJECTIVE / OVERNIGHT EVENTS:    Patient seen and examined.     Vital Signs Last 24 Hrs  T(C): 36.8 (11 Sep 2017 03:54), Max: 37 (10 Sep 2017 20:58)  T(F): 98.3 (11 Sep 2017 03:54), Max: 98.6 (10 Sep 2017 20:58)  HR: 75 (11 Sep 2017 03:54) (75 - 84)  BP: 102/70 (11 Sep 2017 03:54) (92/50 - 116/68)  BP(mean): --  RR: 18 (11 Sep 2017 03:54) (17 - 18)  SpO2: 98% (11 Sep 2017 03:54) (97% - 99%)  I&O's Summary    10 Sep 2017 07:01  -  11 Sep 2017 07:00  --------------------------------------------------------  IN: 1770 mL / OUT: 0 mL / NET: 1770 mL      PE:  GENERAL: NAD, AAOx3, obese  HEAD:  Atraumatic, Normocephalic  EYES: EOMI, PERRLA, conjunctiva and sclera clear  NECK: Supple, No JVD  CHEST/LUNG: CTABL, No wheeze  HEART: Regular rate and rhythm; no murmur  ABDOMEN: Soft, Nontender, Nondistended; Bowel sounds present  EXTREMITIES:  chronic venous stasis  SKIN: RLE erythematous, warm to touch, bullae left shin new  NEURO: No focal deficits    LABS:                        9.5    6.08  )-----------( 313      ( 11 Sep 2017 07:17 )             29.9     09-11    139  |  101  |  26<H>  ----------------------------<  119<H>  4.5   |  24  |  0.94    Ca    9.7      11 Sep 2017 07:05        CAPILLARY BLOOD GLUCOSE  140 (11 Sep 2017 07:32)  207 (10 Sep 2017 21:45)  173 (10 Sep 2017 16:47)  125 (10 Sep 2017 12:13)                RADIOLOGY & ADDITIONAL TESTS:    Imaging Personally Reviewed:  [x] YES  [ ] NO    Consultant(s) Notes Reviewed:  [x] YES  [ ] NO    MEDICATIONS  (STANDING):  aspirin enteric coated 81 milliGRAM(s) Oral daily  lisinopril 5 milliGRAM(s) Oral daily  atorvastatin 20 milliGRAM(s) Oral at bedtime  niacin  milliGRAM(s) Oral two times a day  mupirocin 2% Ointment 1 Application(s) Topical two times a day  ferrous    sulfate 325 milliGRAM(s) Oral daily  folic acid 0.4 milliGRAM(s) Oral daily  cholecalciferol 1000 Unit(s) Oral daily  heparin  Injectable 5000 Unit(s) SubCutaneous every 8 hours  insulin lispro (HumaLOG) corrective regimen sliding scale   SubCutaneous three times a day before meals  dextrose 5%. 1000 milliLiter(s) (50 mL/Hr) IV Continuous <Continuous>  dextrose 50% Injectable 12.5 Gram(s) IV Push once  dextrose 50% Injectable 25 Gram(s) IV Push once  dextrose 50% Injectable 25 Gram(s) IV Push once  influenza   Vaccine 0.5 milliLiter(s) IntraMuscular once  levothyroxine 300 MICROGram(s) Oral <User Schedule>  levothyroxine 150 MICROGram(s) Oral <User Schedule>  vancomycin  IVPB   IV Intermittent   vancomycin  IVPB 1250 milliGRAM(s) IV Intermittent every 12 hours  predniSONE   Tablet 5 milliGRAM(s) Oral two times a day  doxycycline hyclate Capsule 100 milliGRAM(s) Oral every 12 hours    MEDICATIONS  (PRN):  dextrose Gel 1 Dose(s) Oral once PRN Blood Glucose LESS THAN 70 milliGRAM(s)/deciliter  glucagon  Injectable 1 milliGRAM(s) IntraMuscular once PRN Glucose LESS THAN 70 milligrams/deciliter  traMADol 50 milliGRAM(s) Oral every 6 hours PRN Moderate Pain (4 - 6)  acetaminophen   Tablet. 650 milliGRAM(s) Oral every 6 hours PRN Mild Pain (1 - 3)      Care Discussed with Consultants/Other Providers [x] YES  [ ] NO    HEALTH ISSUES - PROBLEM Dx:  Prophylactic measure: Prophylactic measure  Hypothyroidism, unspecified type: Hypothyroidism, unspecified type  Essential hypertension: Essential hypertension  Type 2 diabetes mellitus without complication, without long-term current use of insulin: Type 2 diabetes mellitus without complication, without long-term current use of insulin  Bullous pemphigoid: Bullous pemphigoid  Cellulitis of right leg: Cellulitis of right leg
Patient is a 66y old  Female who presents with a chief complaint of right leg cellulitis (11 Sep 2017 11:17)    Being followed by ID for RLE erythema    Interval history:Left leg blister burst-no tenderness  No acute events      ROS:  No cough,SOB,CP  No N/V/D./abd pain  No other complaints      Antimicrobials:    doxycycline hyclate Capsule 100 milliGRAM(s) Oral every 12 hours  trimethoprim  160 mG/sulfamethoxazole 800 mG 1 Tablet(s) Oral two times a day      Vital Signs Last 24 Hrs  T(C): 36.5 (09-12-17 @ 04:05), Max: 37.3 (09-11-17 @ 13:58)  T(F): 97.7 (09-12-17 @ 04:05), Max: 99.2 (09-11-17 @ 13:58)  HR: 76 (09-12-17 @ 04:05) (76 - 78)  BP: 106/60 (09-12-17 @ 04:05) (105/56 - 107/63)  BP(mean): --  RR: 18 (09-12-17 @ 04:05) (18 - 18)  SpO2: 99% (09-12-17 @ 04:05) (97% - 99%)    Physical Exam:    Constitutional well preserved,comfortable,pleasant    HEENT PERRLA EOMI,No pallor or icterus    No oral exudate or erythema    Neck supple no JVD or LN    Chest Good AE,CTA    CVS RRR S1 S2 WNl No murmur or rub or gallop    Abd soft BS normal No tenderness no masses    Ext RLE erythema unchanged -no tenderness  LLE blister no discharge    IV site no erythema tenderness or discharge    Joints no swelling or LOM    CNS AAO X 3 no focal    Lab Data:                          9.9    6.51  )-----------( 352      ( 12 Sep 2017 08:36 )             31.1       09-11    139  |  101  |  26<H>  ----------------------------<  119<H>  4.5   |  24  |  0.94    Ca    9.7      11 Sep 2017 07:05
Patient is a 66y old  Female who presents with a chief complaint of right leg cellulitis (07 Sep 2017 22:31)      SUBJECTIVE / OVERNIGHT EVENTS:    Patient seen and examined. co right leg redness and swelling. denies cp sob.      Vital Signs Last 24 Hrs  T(C): 36.7 (08 Sep 2017 07:00), Max: 37.7 (08 Sep 2017 05:51)  T(F): 98.1 (08 Sep 2017 07:00), Max: 99.8 (08 Sep 2017 05:51)  HR: 82 (08 Sep 2017 07:00) (75 - 98)  BP: 114/68 (08 Sep 2017 07:00) (112/62 - 125/76)  BP(mean): 92 (07 Sep 2017 20:51) (92 - 92)  RR: 18 (08 Sep 2017 07:00) (17 - 18)  SpO2: 98% (08 Sep 2017 07:00) (97% - 100%)  I&O's Summary      PE:  GENERAL: NAD, AAOx3, obese  HEAD:  Atraumatic, Normocephalic  EYES: EOMI, PERRLA, conjunctiva and sclera clear  NECK: Supple, No JVD  CHEST/LUNG: CTABL, No wheeze  HEART: Regular rate and rhythm; no murmur  ABDOMEN: Soft, Nontender, Nondistended; Bowel sounds present  EXTREMITIES:  RLE erythema and warm to touch, no pus  NEURO: No focal deficits    LABS:                        11.7   7.2   )-----------( 304      ( 08 Sep 2017 05:37 )             34.9     09-08    139  |  99  |  25<H>  ----------------------------<  100<H>  4.3   |  25  |  0.97    Ca    9.8      08 Sep 2017 05:37    TPro  7.3  /  Alb  4.2  /  TBili  0.2  /  DBili  x   /  AST  14  /  ALT  12  /  AlkPhos  63  09-07      CAPILLARY BLOOD GLUCOSE  113 (08 Sep 2017 08:36)  143 (07 Sep 2017 21:14)                RADIOLOGY & ADDITIONAL TESTS:    Imaging Personally Reviewed:  [x] YES  [ ] NO    Consultant(s) Notes Reviewed:  [x] YES  [ ] NO    MEDICATIONS  (STANDING):  aspirin enteric coated 81 milliGRAM(s) Oral daily  lisinopril 5 milliGRAM(s) Oral daily  atorvastatin 20 milliGRAM(s) Oral at bedtime  niacin  milliGRAM(s) Oral two times a day  mupirocin 2% Ointment 1 Application(s) Topical two times a day  ferrous    sulfate 325 milliGRAM(s) Oral daily  folic acid 0.4 milliGRAM(s) Oral daily  cholecalciferol 1000 Unit(s) Oral daily  heparin  Injectable 5000 Unit(s) SubCutaneous every 8 hours  insulin lispro (HumaLOG) corrective regimen sliding scale   SubCutaneous three times a day before meals  dextrose 5%. 1000 milliLiter(s) (50 mL/Hr) IV Continuous <Continuous>  dextrose 50% Injectable 12.5 Gram(s) IV Push once  dextrose 50% Injectable 25 Gram(s) IV Push once  dextrose 50% Injectable 25 Gram(s) IV Push once  influenza   Vaccine 0.5 milliLiter(s) IntraMuscular once  levothyroxine 300 MICROGram(s) Oral <User Schedule>  levothyroxine 150 MICROGram(s) Oral <User Schedule>  vancomycin  IVPB 1250 milliGRAM(s) IV Intermittent every 12 hours  vancomycin  IVPB   IV Intermittent   predniSONE   Tablet 5 milliGRAM(s) Oral two times a day  doxycycline hyclate Capsule 100 milliGRAM(s) Oral every 12 hours    MEDICATIONS  (PRN):  dextrose Gel 1 Dose(s) Oral once PRN Blood Glucose LESS THAN 70 milliGRAM(s)/deciliter  glucagon  Injectable 1 milliGRAM(s) IntraMuscular once PRN Glucose LESS THAN 70 milligrams/deciliter  traMADol 50 milliGRAM(s) Oral every 6 hours PRN Moderate Pain (4 - 6)  acetaminophen   Tablet. 650 milliGRAM(s) Oral every 6 hours PRN Mild Pain (1 - 3)      Care Discussed with Consultants/Other Providers [x] YES  [ ] NO    HEALTH ISSUES - PROBLEM Dx:  Prophylactic measure: Prophylactic measure  Hypothyroidism, unspecified type: Hypothyroidism, unspecified type  Essential hypertension: Essential hypertension  Type 2 diabetes mellitus without complication, without long-term current use of insulin: Type 2 diabetes mellitus without complication, without long-term current use of insulin  Bullous pemphigoid: Bullous pemphigoid  Cellulitis of right leg: Cellulitis of right leg

## 2017-09-13 LAB
CULTURE RESULTS: SIGNIFICANT CHANGE UP
CULTURE RESULTS: SIGNIFICANT CHANGE UP
SPECIMEN SOURCE: SIGNIFICANT CHANGE UP
SPECIMEN SOURCE: SIGNIFICANT CHANGE UP

## 2017-11-07 NOTE — PROGRESS NOTE ADULT - PROBLEM/PLAN-1
----- Message from Carroll Palma MD sent at 11/5/2017  9:45 PM CST -----  Inform patient that hiv, rpr, hep B and hep C results are negative.  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    Please let us know if she has any questions.  (please mail her info sheet about herpes).    Dr palma  
DISPLAY PLAN FREE TEXT

## 2017-11-15 ENCOUNTER — RECORD ABSTRACTING (OUTPATIENT)
Age: 67
End: 2017-11-15

## 2017-11-15 ENCOUNTER — APPOINTMENT (OUTPATIENT)
Dept: ALLERGY | Facility: CLINIC | Age: 67
End: 2017-11-15
Payer: MEDICARE

## 2017-11-15 VITALS
HEIGHT: 65 IN | HEART RATE: 80 BPM | RESPIRATION RATE: 14 BRPM | DIASTOLIC BLOOD PRESSURE: 80 MMHG | SYSTOLIC BLOOD PRESSURE: 130 MMHG | BODY MASS INDEX: 48.48 KG/M2 | WEIGHT: 291 LBS

## 2017-11-15 PROCEDURE — 99205 OFFICE O/P NEW HI 60 MIN: CPT

## 2017-11-15 RX ORDER — SAW/PYGEUM/BETA/HERB/D3/B6/ZN 30 MG-25MG
CAPSULE ORAL
Refills: 0 | Status: ACTIVE | COMMUNITY

## 2017-11-21 ENCOUNTER — OUTPATIENT (OUTPATIENT)
Dept: OUTPATIENT SERVICES | Facility: HOSPITAL | Age: 67
LOS: 1 days | Discharge: HOME | End: 2017-11-21

## 2017-11-21 DIAGNOSIS — C54.1 MALIGNANT NEOPLASM OF ENDOMETRIUM: ICD-10-CM

## 2017-11-21 DIAGNOSIS — Z98.89 OTHER SPECIFIED POSTPROCEDURAL STATES: Chronic | ICD-10-CM

## 2017-11-21 DIAGNOSIS — Z90.710 ACQUIRED ABSENCE OF BOTH CERVIX AND UTERUS: Chronic | ICD-10-CM

## 2017-11-21 DIAGNOSIS — C54.1 MALIGNANT NEOPLASM OF ENDOMETRIUM: Chronic | ICD-10-CM

## 2017-11-21 DIAGNOSIS — Z90.722 ACQUIRED ABSENCE OF OVARIES, BILATERAL: Chronic | ICD-10-CM

## 2017-11-28 ENCOUNTER — APPOINTMENT (OUTPATIENT)
Dept: GYNECOLOGIC ONCOLOGY | Facility: CLINIC | Age: 67
End: 2017-11-28
Payer: MEDICARE

## 2017-11-28 VITALS
WEIGHT: 293 LBS | HEIGHT: 65 IN | DIASTOLIC BLOOD PRESSURE: 82 MMHG | SYSTOLIC BLOOD PRESSURE: 126 MMHG | BODY MASS INDEX: 48.82 KG/M2

## 2017-11-28 PROCEDURE — 99213 OFFICE O/P EST LOW 20 MIN: CPT

## 2017-11-29 ENCOUNTER — RESULT REVIEW (OUTPATIENT)
Age: 67
End: 2017-11-29

## 2017-11-29 ENCOUNTER — APPOINTMENT (OUTPATIENT)
Dept: HEMATOLOGY ONCOLOGY | Facility: CLINIC | Age: 67
End: 2017-11-29
Payer: MEDICARE

## 2017-11-29 VITALS
OXYGEN SATURATION: 98 % | HEART RATE: 109 BPM | TEMPERATURE: 98.8 F | BODY MASS INDEX: 49.09 KG/M2 | WEIGHT: 293 LBS | RESPIRATION RATE: 16 BRPM | DIASTOLIC BLOOD PRESSURE: 61 MMHG | SYSTOLIC BLOOD PRESSURE: 106 MMHG

## 2017-11-29 LAB
HCT VFR BLD CALC: 33 % — LOW (ref 34.5–45)
HGB BLD-MCNC: 11.1 G/DL — LOW (ref 11.5–15.5)
MCHC RBC-ENTMCNC: 33.1 PG — SIGNIFICANT CHANGE UP (ref 27–34)
MCHC RBC-ENTMCNC: 33.6 G/DL — SIGNIFICANT CHANGE UP (ref 32–36)
MCV RBC AUTO: 98.6 FL — SIGNIFICANT CHANGE UP (ref 80–100)
PLATELET # BLD AUTO: 372 K/UL — SIGNIFICANT CHANGE UP (ref 150–400)
RBC # BLD: 3.35 M/UL — LOW (ref 3.8–5.2)
RBC # FLD: 13.5 % — SIGNIFICANT CHANGE UP (ref 10.3–14.5)
WBC # BLD: 7.3 K/UL — SIGNIFICANT CHANGE UP (ref 3.8–10.5)
WBC # FLD AUTO: 7.3 K/UL — SIGNIFICANT CHANGE UP (ref 3.8–10.5)

## 2017-11-29 PROCEDURE — 99214 OFFICE O/P EST MOD 30 MIN: CPT

## 2017-11-29 RX ORDER — CHROMIUM 200 MCG
TABLET ORAL
Refills: 0 | Status: DISCONTINUED | COMMUNITY

## 2017-11-29 RX ORDER — PIOGLITAZONE AND GLIMEPIRIDE 30; 4 MG/1; MG/1
TABLET ORAL DAILY
Refills: 0 | Status: DISCONTINUED | COMMUNITY
End: 2017-11-29

## 2017-11-30 LAB — CANCER AG125 SERPL-ACNC: 10 U/ML

## 2017-12-06 ENCOUNTER — APPOINTMENT (OUTPATIENT)
Dept: ALLERGY | Facility: CLINIC | Age: 67
End: 2017-12-06
Payer: MEDICARE

## 2017-12-06 VITALS
WEIGHT: 293 LBS | RESPIRATION RATE: 14 BRPM | HEIGHT: 65 IN | HEART RATE: 88 BPM | DIASTOLIC BLOOD PRESSURE: 60 MMHG | BODY MASS INDEX: 48.82 KG/M2 | SYSTOLIC BLOOD PRESSURE: 110 MMHG

## 2017-12-06 LAB
ALBUMIN SERPL ELPH-MCNC: 4.3 G/DL
ALP BLD-CCNC: 59 U/L
ALT SERPL-CCNC: 8 U/L
ANION GAP SERPL CALC-SCNC: 17 MMOL/L
AST SERPL-CCNC: 13 U/L
BILIRUB SERPL-MCNC: 0.2 MG/DL
BUN SERPL-MCNC: 25 MG/DL
CALCIUM SERPL-MCNC: 9.6 MG/DL
CHLORIDE SERPL-SCNC: 101 MMOL/L
CO2 SERPL-SCNC: 24 MMOL/L
CREAT SERPL-MCNC: 1.01 MG/DL
GLUCOSE SERPL-MCNC: 176 MG/DL
POTASSIUM SERPL-SCNC: 5 MMOL/L
PROT SERPL-MCNC: 7 G/DL
SODIUM SERPL-SCNC: 142 MMOL/L

## 2017-12-06 PROCEDURE — 99214 OFFICE O/P EST MOD 30 MIN: CPT

## 2017-12-08 ENCOUNTER — APPOINTMENT (OUTPATIENT)
Dept: DERMATOLOGY | Facility: CLINIC | Age: 67
End: 2017-12-08
Payer: MEDICARE

## 2017-12-08 VITALS — SYSTOLIC BLOOD PRESSURE: 118 MMHG | DIASTOLIC BLOOD PRESSURE: 70 MMHG

## 2017-12-08 PROCEDURE — 99214 OFFICE O/P EST MOD 30 MIN: CPT

## 2017-12-11 ENCOUNTER — MESSAGE (OUTPATIENT)
Age: 67
End: 2017-12-11

## 2017-12-15 ENCOUNTER — RX RENEWAL (OUTPATIENT)
Age: 67
End: 2017-12-15

## 2018-01-09 ENCOUNTER — FORM ENCOUNTER (OUTPATIENT)
Age: 68
End: 2018-01-09

## 2018-01-09 RX ORDER — IMMUNE GLOBULIN (HUMAN) 10 G/100ML
20 INJECTION INTRAVENOUS; SUBCUTANEOUS
Qty: 14 | Refills: 0 | Status: DISCONTINUED | COMMUNITY
Start: 2017-12-15 | End: 2018-01-09

## 2018-01-10 ENCOUNTER — OUTPATIENT (OUTPATIENT)
Dept: OUTPATIENT SERVICES | Facility: HOSPITAL | Age: 68
LOS: 1 days | End: 2018-01-10
Payer: MEDICARE

## 2018-01-10 ENCOUNTER — APPOINTMENT (OUTPATIENT)
Dept: CT IMAGING | Facility: IMAGING CENTER | Age: 68
End: 2018-01-10
Payer: MEDICARE

## 2018-01-10 DIAGNOSIS — C54.1 MALIGNANT NEOPLASM OF ENDOMETRIUM: ICD-10-CM

## 2018-01-10 DIAGNOSIS — C54.1 MALIGNANT NEOPLASM OF ENDOMETRIUM: Chronic | ICD-10-CM

## 2018-01-10 DIAGNOSIS — Z98.89 OTHER SPECIFIED POSTPROCEDURAL STATES: Chronic | ICD-10-CM

## 2018-01-10 DIAGNOSIS — Z90.710 ACQUIRED ABSENCE OF BOTH CERVIX AND UTERUS: Chronic | ICD-10-CM

## 2018-01-10 DIAGNOSIS — Z90.722 ACQUIRED ABSENCE OF OVARIES, BILATERAL: Chronic | ICD-10-CM

## 2018-01-10 PROCEDURE — 71260 CT THORAX DX C+: CPT | Mod: 26

## 2018-01-10 PROCEDURE — 71260 CT THORAX DX C+: CPT

## 2018-01-10 PROCEDURE — 74177 CT ABD & PELVIS W/CONTRAST: CPT | Mod: 26

## 2018-01-10 PROCEDURE — 82565 ASSAY OF CREATININE: CPT

## 2018-01-10 PROCEDURE — 74177 CT ABD & PELVIS W/CONTRAST: CPT

## 2018-01-23 ENCOUNTER — RX RENEWAL (OUTPATIENT)
Age: 68
End: 2018-01-23

## 2018-01-30 ENCOUNTER — APPOINTMENT (OUTPATIENT)
Dept: RHEUMATOLOGY | Facility: CLINIC | Age: 68
End: 2018-01-30
Payer: MEDICARE

## 2018-01-30 PROCEDURE — 96365 THER/PROPH/DIAG IV INF INIT: CPT

## 2018-01-30 PROCEDURE — 96366 THER/PROPH/DIAG IV INF ADDON: CPT

## 2018-02-01 ENCOUNTER — APPOINTMENT (OUTPATIENT)
Dept: RHEUMATOLOGY | Facility: CLINIC | Age: 68
End: 2018-02-01
Payer: MEDICARE

## 2018-02-01 PROCEDURE — 96365 THER/PROPH/DIAG IV INF INIT: CPT

## 2018-02-01 PROCEDURE — 96366 THER/PROPH/DIAG IV INF ADDON: CPT

## 2018-02-05 ENCOUNTER — APPOINTMENT (OUTPATIENT)
Dept: RHEUMATOLOGY | Facility: CLINIC | Age: 68
End: 2018-02-05
Payer: MEDICARE

## 2018-02-05 PROCEDURE — 96365 THER/PROPH/DIAG IV INF INIT: CPT

## 2018-02-05 PROCEDURE — 96366 THER/PROPH/DIAG IV INF ADDON: CPT

## 2018-02-08 ENCOUNTER — EMERGENCY (EMERGENCY)
Facility: HOSPITAL | Age: 68
LOS: 1 days | Discharge: HOME | End: 2018-02-08
Attending: EMERGENCY MEDICINE | Admitting: EMERGENCY MEDICINE
Payer: MEDICARE

## 2018-02-08 VITALS
RESPIRATION RATE: 19 BRPM | SYSTOLIC BLOOD PRESSURE: 133 MMHG | OXYGEN SATURATION: 100 % | DIASTOLIC BLOOD PRESSURE: 76 MMHG | HEART RATE: 102 BPM | TEMPERATURE: 98 F

## 2018-02-08 DIAGNOSIS — C54.1 MALIGNANT NEOPLASM OF ENDOMETRIUM: Chronic | ICD-10-CM

## 2018-02-08 DIAGNOSIS — Z90.710 ACQUIRED ABSENCE OF BOTH CERVIX AND UTERUS: Chronic | ICD-10-CM

## 2018-02-08 DIAGNOSIS — Z90.722 ACQUIRED ABSENCE OF OVARIES, BILATERAL: Chronic | ICD-10-CM

## 2018-02-08 DIAGNOSIS — Z98.89 OTHER SPECIFIED POSTPROCEDURAL STATES: Chronic | ICD-10-CM

## 2018-02-08 LAB
ALBUMIN SERPL ELPH-MCNC: 3.5 G/DL — SIGNIFICANT CHANGE UP (ref 3.3–5)
ALP SERPL-CCNC: 53 U/L — SIGNIFICANT CHANGE UP (ref 40–120)
ALT FLD-CCNC: 22 U/L RC — SIGNIFICANT CHANGE UP (ref 10–45)
ANION GAP SERPL CALC-SCNC: 17 MMOL/L — SIGNIFICANT CHANGE UP (ref 5–17)
APTT BLD: 27.8 SEC — SIGNIFICANT CHANGE UP (ref 27.5–37.4)
AST SERPL-CCNC: 30 U/L — SIGNIFICANT CHANGE UP (ref 10–40)
BASOPHILS # BLD AUTO: 0 K/UL — SIGNIFICANT CHANGE UP (ref 0–0.2)
BASOPHILS NFR BLD AUTO: 0.5 % — SIGNIFICANT CHANGE UP (ref 0–2)
BILIRUB SERPL-MCNC: 0.3 MG/DL — SIGNIFICANT CHANGE UP (ref 0.2–1.2)
BUN SERPL-MCNC: 34 MG/DL — HIGH (ref 7–23)
CALCIUM SERPL-MCNC: 9.5 MG/DL — SIGNIFICANT CHANGE UP (ref 8.4–10.5)
CHLORIDE SERPL-SCNC: 98 MMOL/L — SIGNIFICANT CHANGE UP (ref 96–108)
CO2 SERPL-SCNC: 23 MMOL/L — SIGNIFICANT CHANGE UP (ref 22–31)
CREAT SERPL-MCNC: 0.93 MG/DL — SIGNIFICANT CHANGE UP (ref 0.5–1.3)
EOSINOPHIL # BLD AUTO: 0 K/UL — SIGNIFICANT CHANGE UP (ref 0–0.5)
EOSINOPHIL NFR BLD AUTO: 0.6 % — SIGNIFICANT CHANGE UP (ref 0–6)
GLUCOSE SERPL-MCNC: 151 MG/DL — HIGH (ref 70–99)
HCT VFR BLD CALC: 29.8 % — LOW (ref 34.5–45)
HGB BLD-MCNC: 9.9 G/DL — LOW (ref 11.5–15.5)
INR BLD: 1.04 RATIO — SIGNIFICANT CHANGE UP (ref 0.88–1.16)
LYMPHOCYTES # BLD AUTO: 1.2 K/UL — SIGNIFICANT CHANGE UP (ref 1–3.3)
LYMPHOCYTES # BLD AUTO: 21.6 % — SIGNIFICANT CHANGE UP (ref 13–44)
MCHC RBC-ENTMCNC: 32.6 PG — SIGNIFICANT CHANGE UP (ref 27–34)
MCHC RBC-ENTMCNC: 33.3 GM/DL — SIGNIFICANT CHANGE UP (ref 32–36)
MCV RBC AUTO: 97.9 FL — SIGNIFICANT CHANGE UP (ref 80–100)
MONOCYTES # BLD AUTO: 0.4 K/UL — SIGNIFICANT CHANGE UP (ref 0–0.9)
MONOCYTES NFR BLD AUTO: 7.7 % — SIGNIFICANT CHANGE UP (ref 2–14)
NEUTROPHILS # BLD AUTO: 4 K/UL — SIGNIFICANT CHANGE UP (ref 1.8–7.4)
NEUTROPHILS NFR BLD AUTO: 69.7 % — SIGNIFICANT CHANGE UP (ref 43–77)
PLATELET # BLD AUTO: 338 K/UL — SIGNIFICANT CHANGE UP (ref 150–400)
POTASSIUM SERPL-MCNC: 4.4 MMOL/L — SIGNIFICANT CHANGE UP (ref 3.5–5.3)
POTASSIUM SERPL-SCNC: 4.4 MMOL/L — SIGNIFICANT CHANGE UP (ref 3.5–5.3)
PROT SERPL-MCNC: 9 G/DL — HIGH (ref 6–8.3)
PROTHROM AB SERPL-ACNC: 11.3 SEC — SIGNIFICANT CHANGE UP (ref 9.8–12.7)
RBC # BLD: 3.04 M/UL — LOW (ref 3.8–5.2)
RBC # FLD: 13.7 % — SIGNIFICANT CHANGE UP (ref 10.3–14.5)
SODIUM SERPL-SCNC: 138 MMOL/L — SIGNIFICANT CHANGE UP (ref 135–145)
WBC # BLD: 5.8 K/UL — SIGNIFICANT CHANGE UP (ref 3.8–10.5)
WBC # FLD AUTO: 5.8 K/UL — SIGNIFICANT CHANGE UP (ref 3.8–10.5)

## 2018-02-08 PROCEDURE — 99284 EMERGENCY DEPT VISIT MOD MDM: CPT | Mod: GC

## 2018-02-08 PROCEDURE — 80053 COMPREHEN METABOLIC PANEL: CPT

## 2018-02-08 PROCEDURE — 85027 COMPLETE CBC AUTOMATED: CPT

## 2018-02-08 PROCEDURE — 99283 EMERGENCY DEPT VISIT LOW MDM: CPT

## 2018-02-08 PROCEDURE — 85730 THROMBOPLASTIN TIME PARTIAL: CPT

## 2018-02-08 PROCEDURE — 85610 PROTHROMBIN TIME: CPT

## 2018-02-08 RX ORDER — OXYMETAZOLINE HYDROCHLORIDE 0.5 MG/ML
2 SPRAY NASAL ONCE
Qty: 0 | Refills: 0 | Status: COMPLETED | OUTPATIENT
Start: 2018-02-08 | End: 2018-02-08

## 2018-02-08 RX ADMIN — OXYMETAZOLINE HYDROCHLORIDE 2 SPRAY(S): 0.5 SPRAY NASAL at 23:18

## 2018-02-08 NOTE — ED ADULT TRIAGE NOTE - CHIEF COMPLAINT QUOTE
nose bleed x 2 hours on a aspirin. pt reports she just sneezed and her nose began to bleed. pt denies any headaches or pain. pt recently started IVIG last week ago

## 2018-02-08 NOTE — ED PROVIDER NOTE - NOSE FINDINGS
Plugged with 4x4 gauze with blood notes at the nostrils but not dripping around them. Plugged with 4x4 gauze with blood notes at the nostrils but not dripping around them./CLOTTED NASAL BLOOD

## 2018-02-08 NOTE — ED PROVIDER NOTE - ATTENDING CONTRIBUTION TO CARE
Mojgan Beal MD - Attending Physician: I have personally seen and examined this patient with the resident/fellow.  I have fully participated in the care of this patient. I have reviewed all pertinent clinical information, including history, physical exam, plan and the Resident/Fellow’s note and agree except as noted. See MDM

## 2018-02-08 NOTE — ED ADULT NURSE NOTE - PMH
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  On Immunosupressants  Cellcept  Endometrial cancer  dx: 8/2015:  High grade Endometroid Adenocarcinoma  History of osteoarthritis    Hyperlipidemia    Hypertension    Hypothyroidism    Morbid obesity  BMI:  53.6  Type 2 diabetes mellitus    Vitamin D deficiency

## 2018-02-08 NOTE — ED ADULT NURSE NOTE - OBJECTIVE STATEMENT
67 y.o female w. c/o epistaxis (left more than right) x today 19:30 on ASA, states it started after episodes of sneezing.  Pt started on IVIG, last week, for Bullous pemphigoid. Denies fever, chills, dizziness, HA, and any other complaints. Pt is axox3, lungs CTA, bleeding controlled by applying pressure, safety and comfort maintained.

## 2018-02-08 NOTE — ED PROVIDER NOTE - PMH
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  On Immunosupressants  Cellcept  Endometrial cancer  dx: 8/2015:  High grade Endometroid Adenocarcinoma  History of osteoarthritis    Hyperlipidemia    Hypertension    Hypothyroidism    Morbid obesity  BMI:  53.6  Type 2 diabetes mellitus    Vitamin D deficiency

## 2018-02-08 NOTE — ED PROVIDER NOTE - MEDICAL DECISION MAKING DETAILS
Mojgan Beal MD - Attending Physician: Pt here with prolonged nosebleed. Tried gauze at home but did not hold pressure long. Will attempt oxymetalazone and pressure. Labs given history

## 2018-02-08 NOTE — ED PROVIDER NOTE - PROGRESS NOTE DETAILS
Pt reassess and currently stopped bleeding from nostrils. Sprayed two puff of oxymetazoline to each naris. Will continue observation. No current active bleeding. Given extent of prior bleeding, will continue to obs and reeval prior to dispo. Mild drop in H/H from prior visit Justice Matthew MD, PGY2: Patient received at resident sign out. Pt appears well, no active bleeding in nose or in posterior pharynx. Pt ambulated without difficulty or dizziness. Discussed holding aspirin until she discusses with her Justice Matthew MD, PGY2: Patient received at resident sign out. Pt appears well, no active bleeding in nose or in posterior pharynx for > 1 hour. Pt ambulated without difficulty or dizziness. Discussed holding aspirin until she discusses with her pcp. Feels comfortable going home. Will dc. Return precautions discussed

## 2018-02-08 NOTE — ED PROVIDER NOTE - NS_ ATTENDINGSCRIBEDETAILS _ED_A_ED_FT
Mojgan Beal MD - Attending Physician: The scribe's documentation has been prepared under my direction and personally reviewed by me in its entirety. I confirm that the note above accurately reflects all work, treatment, procedures, and medical decision making performed by me.

## 2018-02-08 NOTE — ED PROVIDER NOTE - OBJECTIVE STATEMENT
66 y/o female, with multiple medical Hx, presents to the ED for epistaxis (left more than right) x today 19:30. Pt on ASA. Reports Sxs starting after sneezing. Tried to stop the bleeding with no relief. Reports similar episode, 1 year ago, while undergoing chemotherapy. Pt started on IVIG, last week, for Bullous pemphigoid. Denies fever, chills, dizziness, HA, and any other complaints. 68 y/o female, with multiple medical Hx, presents to the ED for epistaxis (left more than right) x today 19:30. Pt on ASA. Reports Sxs starting after sneezing. Tried to stop the bleeding with no relief by packing with tissue. Reports similar episode, 1 year ago, while undergoing chemotherapy. Pt started on IVIG, last week, for Bullous pemphigoid. Denies fever, chills, dizziness, HA, and any other complaints.

## 2018-02-09 VITALS
OXYGEN SATURATION: 99 % | HEART RATE: 96 BPM | TEMPERATURE: 98 F | RESPIRATION RATE: 18 BRPM | SYSTOLIC BLOOD PRESSURE: 128 MMHG | DIASTOLIC BLOOD PRESSURE: 76 MMHG

## 2018-02-14 ENCOUNTER — OUTPATIENT (OUTPATIENT)
Dept: OUTPATIENT SERVICES | Facility: HOSPITAL | Age: 68
LOS: 1 days | End: 2018-02-14
Payer: MEDICARE

## 2018-02-14 ENCOUNTER — APPOINTMENT (OUTPATIENT)
Dept: MAMMOGRAPHY | Facility: IMAGING CENTER | Age: 68
End: 2018-02-14
Payer: MEDICARE

## 2018-02-14 ENCOUNTER — RESULT REVIEW (OUTPATIENT)
Age: 68
End: 2018-02-14

## 2018-02-14 DIAGNOSIS — R92.1 MAMMOGRAPHIC CALCIFICATION FOUND ON DIAGNOSTIC IMAGING OF BREAST: ICD-10-CM

## 2018-02-14 DIAGNOSIS — Z98.89 OTHER SPECIFIED POSTPROCEDURAL STATES: Chronic | ICD-10-CM

## 2018-02-14 DIAGNOSIS — C54.1 MALIGNANT NEOPLASM OF ENDOMETRIUM: Chronic | ICD-10-CM

## 2018-02-14 DIAGNOSIS — Z90.722 ACQUIRED ABSENCE OF OVARIES, BILATERAL: Chronic | ICD-10-CM

## 2018-02-14 DIAGNOSIS — Z90.710 ACQUIRED ABSENCE OF BOTH CERVIX AND UTERUS: Chronic | ICD-10-CM

## 2018-02-14 PROCEDURE — 19081 BX BREAST 1ST LESION STRTCTC: CPT | Mod: RT

## 2018-02-14 PROCEDURE — 88305 TISSUE EXAM BY PATHOLOGIST: CPT | Mod: 26

## 2018-02-14 PROCEDURE — 77065 DX MAMMO INCL CAD UNI: CPT | Mod: 26,RT

## 2018-02-14 PROCEDURE — A4648: CPT

## 2018-02-14 PROCEDURE — 77065 DX MAMMO INCL CAD UNI: CPT

## 2018-02-14 PROCEDURE — 19081 BX BREAST 1ST LESION STRTCTC: CPT

## 2018-02-14 PROCEDURE — 88305 TISSUE EXAM BY PATHOLOGIST: CPT

## 2018-02-22 ENCOUNTER — MOBILE ON CALL (OUTPATIENT)
Age: 68
End: 2018-02-22

## 2018-03-07 ENCOUNTER — APPOINTMENT (OUTPATIENT)
Dept: RHEUMATOLOGY | Facility: CLINIC | Age: 68
End: 2018-03-07

## 2018-03-15 ENCOUNTER — RESULT REVIEW (OUTPATIENT)
Age: 68
End: 2018-03-15

## 2018-03-26 ENCOUNTER — APPOINTMENT (OUTPATIENT)
Dept: RHEUMATOLOGY | Facility: CLINIC | Age: 68
End: 2018-03-26
Payer: MEDICARE

## 2018-03-26 PROCEDURE — 96366 THER/PROPH/DIAG IV INF ADDON: CPT

## 2018-03-26 PROCEDURE — 96365 THER/PROPH/DIAG IV INF INIT: CPT

## 2018-03-27 ENCOUNTER — APPOINTMENT (OUTPATIENT)
Dept: RHEUMATOLOGY | Facility: CLINIC | Age: 68
End: 2018-03-27

## 2018-03-28 ENCOUNTER — APPOINTMENT (OUTPATIENT)
Dept: RHEUMATOLOGY | Facility: CLINIC | Age: 68
End: 2018-03-28
Payer: MEDICARE

## 2018-03-28 PROCEDURE — 96365 THER/PROPH/DIAG IV INF INIT: CPT

## 2018-03-28 PROCEDURE — 96366 THER/PROPH/DIAG IV INF ADDON: CPT

## 2018-03-30 ENCOUNTER — APPOINTMENT (OUTPATIENT)
Dept: RHEUMATOLOGY | Facility: CLINIC | Age: 68
End: 2018-03-30
Payer: MEDICARE

## 2018-03-30 PROCEDURE — 96366 THER/PROPH/DIAG IV INF ADDON: CPT

## 2018-03-30 PROCEDURE — 96365 THER/PROPH/DIAG IV INF INIT: CPT

## 2018-04-06 ENCOUNTER — APPOINTMENT (OUTPATIENT)
Dept: DERMATOLOGY | Facility: CLINIC | Age: 68
End: 2018-04-06
Payer: MEDICARE

## 2018-04-06 VITALS — DIASTOLIC BLOOD PRESSURE: 74 MMHG | SYSTOLIC BLOOD PRESSURE: 136 MMHG

## 2018-04-06 PROCEDURE — 99214 OFFICE O/P EST MOD 30 MIN: CPT | Mod: GC

## 2018-04-10 ENCOUNTER — RX RENEWAL (OUTPATIENT)
Age: 68
End: 2018-04-10

## 2018-04-17 ENCOUNTER — APPOINTMENT (OUTPATIENT)
Dept: WOUND CARE | Facility: CLINIC | Age: 68
End: 2018-04-17

## 2018-04-23 ENCOUNTER — APPOINTMENT (OUTPATIENT)
Dept: VASCULAR SURGERY | Facility: CLINIC | Age: 68
End: 2018-04-23
Payer: MEDICARE

## 2018-04-23 ENCOUNTER — APPOINTMENT (OUTPATIENT)
Dept: WOUND CARE | Facility: CLINIC | Age: 68
End: 2018-04-23
Payer: MEDICARE

## 2018-04-23 VITALS — HEIGHT: 60 IN

## 2018-04-23 PROCEDURE — 93970 EXTREMITY STUDY: CPT

## 2018-04-23 PROCEDURE — 93923 UPR/LXTR ART STDY 3+ LVLS: CPT

## 2018-04-23 PROCEDURE — 99204 OFFICE O/P NEW MOD 45 MIN: CPT

## 2018-04-24 ENCOUNTER — APPOINTMENT (OUTPATIENT)
Dept: RHEUMATOLOGY | Facility: CLINIC | Age: 68
End: 2018-04-24

## 2018-05-07 ENCOUNTER — APPOINTMENT (OUTPATIENT)
Dept: RHEUMATOLOGY | Facility: CLINIC | Age: 68
End: 2018-05-07
Payer: MEDICARE

## 2018-05-07 PROCEDURE — 96365 THER/PROPH/DIAG IV INF INIT: CPT

## 2018-05-07 PROCEDURE — 96366 THER/PROPH/DIAG IV INF ADDON: CPT

## 2018-05-09 ENCOUNTER — APPOINTMENT (OUTPATIENT)
Dept: RHEUMATOLOGY | Facility: CLINIC | Age: 68
End: 2018-05-09
Payer: MEDICARE

## 2018-05-09 PROCEDURE — 96366 THER/PROPH/DIAG IV INF ADDON: CPT

## 2018-05-09 PROCEDURE — 96365 THER/PROPH/DIAG IV INF INIT: CPT

## 2018-05-11 ENCOUNTER — APPOINTMENT (OUTPATIENT)
Dept: RHEUMATOLOGY | Facility: CLINIC | Age: 68
End: 2018-05-11
Payer: MEDICARE

## 2018-05-11 PROCEDURE — 96365 THER/PROPH/DIAG IV INF INIT: CPT

## 2018-05-11 PROCEDURE — 96366 THER/PROPH/DIAG IV INF ADDON: CPT

## 2018-05-16 ENCOUNTER — APPOINTMENT (OUTPATIENT)
Dept: WOUND CARE | Facility: CLINIC | Age: 68
End: 2018-05-16
Payer: MEDICARE

## 2018-05-16 VITALS
BODY MASS INDEX: 46.15 KG/M2 | HEART RATE: 105 BPM | SYSTOLIC BLOOD PRESSURE: 157 MMHG | TEMPERATURE: 98.8 F | HEIGHT: 65 IN | DIASTOLIC BLOOD PRESSURE: 80 MMHG | WEIGHT: 277 LBS

## 2018-05-16 PROCEDURE — 11042 DBRDMT SUBQ TIS 1ST 20SQCM/<: CPT

## 2018-05-23 ENCOUNTER — APPOINTMENT (OUTPATIENT)
Dept: WOUND CARE | Facility: CLINIC | Age: 68
End: 2018-05-23

## 2018-05-30 ENCOUNTER — APPOINTMENT (OUTPATIENT)
Dept: GYNECOLOGIC ONCOLOGY | Facility: CLINIC | Age: 68
End: 2018-05-30
Payer: COMMERCIAL

## 2018-05-30 VITALS — BODY MASS INDEX: 48.82 KG/M2 | HEIGHT: 65 IN | WEIGHT: 293 LBS

## 2018-05-30 PROCEDURE — 99213 OFFICE O/P EST LOW 20 MIN: CPT

## 2018-05-30 RX ORDER — PREDNISONE 10 MG/1
10 TABLET ORAL
Refills: 0 | Status: DISCONTINUED | COMMUNITY
End: 2018-05-30

## 2018-06-01 ENCOUNTER — APPOINTMENT (OUTPATIENT)
Dept: WOUND CARE | Facility: CLINIC | Age: 68
End: 2018-06-01
Payer: MEDICARE

## 2018-06-01 DIAGNOSIS — R26.2 DIFFICULTY IN WALKING, NOT ELSEWHERE CLASSIFIED: ICD-10-CM

## 2018-06-01 PROCEDURE — 99213 OFFICE O/P EST LOW 20 MIN: CPT

## 2018-06-18 ENCOUNTER — APPOINTMENT (OUTPATIENT)
Dept: RHEUMATOLOGY | Facility: CLINIC | Age: 68
End: 2018-06-18
Payer: MEDICARE

## 2018-06-18 PROCEDURE — 96366 THER/PROPH/DIAG IV INF ADDON: CPT

## 2018-06-18 PROCEDURE — 96365 THER/PROPH/DIAG IV INF INIT: CPT

## 2018-06-20 ENCOUNTER — APPOINTMENT (OUTPATIENT)
Dept: RHEUMATOLOGY | Facility: CLINIC | Age: 68
End: 2018-06-20
Payer: MEDICARE

## 2018-06-20 PROCEDURE — 96365 THER/PROPH/DIAG IV INF INIT: CPT

## 2018-06-20 PROCEDURE — 96366 THER/PROPH/DIAG IV INF ADDON: CPT

## 2018-06-22 ENCOUNTER — APPOINTMENT (OUTPATIENT)
Dept: RHEUMATOLOGY | Facility: CLINIC | Age: 68
End: 2018-06-22
Payer: MEDICARE

## 2018-06-22 PROCEDURE — 96366 THER/PROPH/DIAG IV INF ADDON: CPT

## 2018-06-22 PROCEDURE — 96365 THER/PROPH/DIAG IV INF INIT: CPT

## 2018-07-06 ENCOUNTER — APPOINTMENT (OUTPATIENT)
Dept: DERMATOLOGY | Facility: CLINIC | Age: 68
End: 2018-07-06
Payer: MEDICARE

## 2018-07-06 VITALS — SYSTOLIC BLOOD PRESSURE: 124 MMHG | DIASTOLIC BLOOD PRESSURE: 66 MMHG

## 2018-07-06 PROCEDURE — 99214 OFFICE O/P EST MOD 30 MIN: CPT

## 2018-07-09 ENCOUNTER — APPOINTMENT (OUTPATIENT)
Dept: WOUND CARE | Facility: CLINIC | Age: 68
End: 2018-07-09
Payer: MEDICARE

## 2018-07-09 VITALS
TEMPERATURE: 97.8 F | DIASTOLIC BLOOD PRESSURE: 70 MMHG | HEIGHT: 65 IN | SYSTOLIC BLOOD PRESSURE: 131 MMHG | HEART RATE: 108 BPM | WEIGHT: 293 LBS | BODY MASS INDEX: 48.82 KG/M2 | RESPIRATION RATE: 16 BRPM

## 2018-07-09 PROCEDURE — 29581 APPL MULTLAYER CMPRN SYS LEG: CPT | Mod: RT

## 2018-07-13 ENCOUNTER — OUTPATIENT (OUTPATIENT)
Dept: OUTPATIENT SERVICES | Facility: HOSPITAL | Age: 68
LOS: 1 days | Discharge: HOME | End: 2018-07-13

## 2018-07-13 DIAGNOSIS — Z90.710 ACQUIRED ABSENCE OF BOTH CERVIX AND UTERUS: Chronic | ICD-10-CM

## 2018-07-13 DIAGNOSIS — Z98.89 OTHER SPECIFIED POSTPROCEDURAL STATES: Chronic | ICD-10-CM

## 2018-07-13 DIAGNOSIS — C54.1 MALIGNANT NEOPLASM OF ENDOMETRIUM: Chronic | ICD-10-CM

## 2018-07-13 DIAGNOSIS — C54.1 MALIGNANT NEOPLASM OF ENDOMETRIUM: ICD-10-CM

## 2018-07-13 DIAGNOSIS — Z90.722 ACQUIRED ABSENCE OF OVARIES, BILATERAL: Chronic | ICD-10-CM

## 2018-07-18 ENCOUNTER — APPOINTMENT (OUTPATIENT)
Dept: HEMATOLOGY ONCOLOGY | Facility: CLINIC | Age: 68
End: 2018-07-18
Payer: MEDICARE

## 2018-07-18 ENCOUNTER — RESULT REVIEW (OUTPATIENT)
Age: 68
End: 2018-07-18

## 2018-07-18 VITALS
WEIGHT: 279.99 LBS | BODY MASS INDEX: 46.59 KG/M2 | RESPIRATION RATE: 16 BRPM | SYSTOLIC BLOOD PRESSURE: 108 MMHG | TEMPERATURE: 97.8 F | OXYGEN SATURATION: 97 % | DIASTOLIC BLOOD PRESSURE: 64 MMHG | HEART RATE: 104 BPM

## 2018-07-18 DIAGNOSIS — M54.5 LOW BACK PAIN: ICD-10-CM

## 2018-07-18 LAB
HCT VFR BLD CALC: 31.7 % — LOW (ref 34.5–45)
HGB BLD-MCNC: 10.4 G/DL — LOW (ref 11.5–15.5)
MCHC RBC-ENTMCNC: 31.5 PG — SIGNIFICANT CHANGE UP (ref 27–34)
MCHC RBC-ENTMCNC: 32.7 G/DL — SIGNIFICANT CHANGE UP (ref 32–36)
MCV RBC AUTO: 96.2 FL — SIGNIFICANT CHANGE UP (ref 80–100)
PLATELET # BLD AUTO: 274 K/UL — SIGNIFICANT CHANGE UP (ref 150–400)
RBC # BLD: 3.3 M/UL — LOW (ref 3.8–5.2)
RBC # FLD: 14.7 % — HIGH (ref 10.3–14.5)
WBC # BLD: 5.3 K/UL — SIGNIFICANT CHANGE UP (ref 3.8–10.5)
WBC # FLD AUTO: 5.3 K/UL — SIGNIFICANT CHANGE UP (ref 3.8–10.5)

## 2018-07-18 PROCEDURE — 99214 OFFICE O/P EST MOD 30 MIN: CPT

## 2018-07-19 LAB
ALBUMIN SERPL ELPH-MCNC: 4.2 G/DL
ALP BLD-CCNC: 50 U/L
ALT SERPL-CCNC: 14 U/L
ANION GAP SERPL CALC-SCNC: 17 MMOL/L
AST SERPL-CCNC: 24 U/L
BILIRUB SERPL-MCNC: 0.2 MG/DL
BUN SERPL-MCNC: 29 MG/DL
CALCIUM SERPL-MCNC: 9.3 MG/DL
CANCER AG125 SERPL-ACNC: 9 U/ML
CHLORIDE SERPL-SCNC: 100 MMOL/L
CO2 SERPL-SCNC: 23 MMOL/L
CREAT SERPL-MCNC: 0.99 MG/DL
GLUCOSE SERPL-MCNC: 115 MG/DL
IRON SERPL-MCNC: 64 UG/DL
POTASSIUM SERPL-SCNC: 5.1 MMOL/L
PROT SERPL-MCNC: 7.7 G/DL
SODIUM SERPL-SCNC: 140 MMOL/L

## 2018-07-20 ENCOUNTER — APPOINTMENT (OUTPATIENT)
Dept: RHEUMATOLOGY | Facility: CLINIC | Age: 68
End: 2018-07-20

## 2018-07-30 ENCOUNTER — APPOINTMENT (OUTPATIENT)
Dept: RHEUMATOLOGY | Facility: CLINIC | Age: 68
End: 2018-07-30
Payer: MEDICARE

## 2018-07-30 PROCEDURE — 96366 THER/PROPH/DIAG IV INF ADDON: CPT

## 2018-07-30 PROCEDURE — 96365 THER/PROPH/DIAG IV INF INIT: CPT

## 2018-08-01 ENCOUNTER — APPOINTMENT (OUTPATIENT)
Dept: RHEUMATOLOGY | Facility: CLINIC | Age: 68
End: 2018-08-01
Payer: MEDICARE

## 2018-08-01 PROCEDURE — 96365 THER/PROPH/DIAG IV INF INIT: CPT

## 2018-08-01 PROCEDURE — 96366 THER/PROPH/DIAG IV INF ADDON: CPT

## 2018-08-03 ENCOUNTER — APPOINTMENT (OUTPATIENT)
Dept: RHEUMATOLOGY | Facility: CLINIC | Age: 68
End: 2018-08-03
Payer: MEDICARE

## 2018-08-03 PROCEDURE — 96366 THER/PROPH/DIAG IV INF ADDON: CPT

## 2018-08-03 PROCEDURE — 96365 THER/PROPH/DIAG IV INF INIT: CPT

## 2018-08-06 ENCOUNTER — APPOINTMENT (OUTPATIENT)
Dept: WOUND CARE | Facility: CLINIC | Age: 68
End: 2018-08-06
Payer: MEDICARE

## 2018-08-06 PROCEDURE — 29581 APPL MULTLAYER CMPRN SYS LEG: CPT | Mod: RT

## 2018-08-22 ENCOUNTER — APPOINTMENT (OUTPATIENT)
Dept: WOUND CARE | Facility: CLINIC | Age: 68
End: 2018-08-22
Payer: MEDICARE

## 2018-08-22 VITALS
RESPIRATION RATE: 18 BRPM | TEMPERATURE: 98.6 F | HEART RATE: 114 BPM | SYSTOLIC BLOOD PRESSURE: 149 MMHG | DIASTOLIC BLOOD PRESSURE: 78 MMHG

## 2018-08-22 PROCEDURE — 99213 OFFICE O/P EST LOW 20 MIN: CPT

## 2018-08-27 ENCOUNTER — APPOINTMENT (OUTPATIENT)
Dept: RHEUMATOLOGY | Facility: CLINIC | Age: 68
End: 2018-08-27
Payer: MEDICARE

## 2018-08-27 PROCEDURE — 96366 THER/PROPH/DIAG IV INF ADDON: CPT

## 2018-08-27 PROCEDURE — 96365 THER/PROPH/DIAG IV INF INIT: CPT

## 2018-08-28 ENCOUNTER — RX RENEWAL (OUTPATIENT)
Age: 68
End: 2018-08-28

## 2018-08-29 ENCOUNTER — APPOINTMENT (OUTPATIENT)
Dept: RHEUMATOLOGY | Facility: CLINIC | Age: 68
End: 2018-08-29
Payer: MEDICARE

## 2018-08-29 PROCEDURE — 96366 THER/PROPH/DIAG IV INF ADDON: CPT

## 2018-08-29 PROCEDURE — 96365 THER/PROPH/DIAG IV INF INIT: CPT

## 2018-08-31 ENCOUNTER — APPOINTMENT (OUTPATIENT)
Dept: RHEUMATOLOGY | Facility: CLINIC | Age: 68
End: 2018-08-31
Payer: MEDICARE

## 2018-08-31 PROCEDURE — 96366 THER/PROPH/DIAG IV INF ADDON: CPT

## 2018-08-31 PROCEDURE — 96365 THER/PROPH/DIAG IV INF INIT: CPT

## 2018-09-04 ENCOUNTER — APPOINTMENT (OUTPATIENT)
Dept: MAMMOGRAPHY | Facility: IMAGING CENTER | Age: 68
End: 2018-09-04
Payer: MEDICARE

## 2018-09-04 ENCOUNTER — OUTPATIENT (OUTPATIENT)
Dept: OUTPATIENT SERVICES | Facility: HOSPITAL | Age: 68
LOS: 1 days | End: 2018-09-04
Payer: MEDICARE

## 2018-09-04 DIAGNOSIS — Z90.722 ACQUIRED ABSENCE OF OVARIES, BILATERAL: Chronic | ICD-10-CM

## 2018-09-04 DIAGNOSIS — Z00.00 ENCOUNTER FOR GENERAL ADULT MEDICAL EXAMINATION WITHOUT ABNORMAL FINDINGS: ICD-10-CM

## 2018-09-04 DIAGNOSIS — Z98.89 OTHER SPECIFIED POSTPROCEDURAL STATES: Chronic | ICD-10-CM

## 2018-09-04 DIAGNOSIS — Z90.710 ACQUIRED ABSENCE OF BOTH CERVIX AND UTERUS: Chronic | ICD-10-CM

## 2018-09-04 DIAGNOSIS — C54.1 MALIGNANT NEOPLASM OF ENDOMETRIUM: Chronic | ICD-10-CM

## 2018-09-04 PROCEDURE — G0279: CPT | Mod: 26

## 2018-09-04 PROCEDURE — G0279: CPT

## 2018-09-04 PROCEDURE — 77065 DX MAMMO INCL CAD UNI: CPT

## 2018-09-04 PROCEDURE — 77065 DX MAMMO INCL CAD UNI: CPT | Mod: 26,RT

## 2018-09-05 ENCOUNTER — APPOINTMENT (OUTPATIENT)
Dept: WOUND CARE | Facility: CLINIC | Age: 68
End: 2018-09-05
Payer: MEDICARE

## 2018-09-05 PROCEDURE — 99213 OFFICE O/P EST LOW 20 MIN: CPT

## 2018-09-07 ENCOUNTER — FORM ENCOUNTER (OUTPATIENT)
Age: 68
End: 2018-09-07

## 2018-09-08 ENCOUNTER — OUTPATIENT (OUTPATIENT)
Dept: OUTPATIENT SERVICES | Facility: HOSPITAL | Age: 68
LOS: 1 days | End: 2018-09-08
Payer: MEDICARE

## 2018-09-08 ENCOUNTER — APPOINTMENT (OUTPATIENT)
Dept: CT IMAGING | Facility: IMAGING CENTER | Age: 68
End: 2018-09-08
Payer: MEDICARE

## 2018-09-08 DIAGNOSIS — Z90.710 ACQUIRED ABSENCE OF BOTH CERVIX AND UTERUS: Chronic | ICD-10-CM

## 2018-09-08 DIAGNOSIS — C54.1 MALIGNANT NEOPLASM OF ENDOMETRIUM: Chronic | ICD-10-CM

## 2018-09-08 DIAGNOSIS — Z98.89 OTHER SPECIFIED POSTPROCEDURAL STATES: Chronic | ICD-10-CM

## 2018-09-08 DIAGNOSIS — Z90.722 ACQUIRED ABSENCE OF OVARIES, BILATERAL: Chronic | ICD-10-CM

## 2018-09-08 DIAGNOSIS — C54.1 MALIGNANT NEOPLASM OF ENDOMETRIUM: ICD-10-CM

## 2018-09-08 PROCEDURE — 82565 ASSAY OF CREATININE: CPT

## 2018-09-08 PROCEDURE — 71260 CT THORAX DX C+: CPT | Mod: 26

## 2018-09-08 PROCEDURE — 74177 CT ABD & PELVIS W/CONTRAST: CPT | Mod: 26

## 2018-09-08 PROCEDURE — 74177 CT ABD & PELVIS W/CONTRAST: CPT

## 2018-09-08 PROCEDURE — 71260 CT THORAX DX C+: CPT

## 2018-09-19 ENCOUNTER — APPOINTMENT (OUTPATIENT)
Dept: WOUND CARE | Facility: CLINIC | Age: 68
End: 2018-09-19
Payer: MEDICARE

## 2018-09-19 VITALS
DIASTOLIC BLOOD PRESSURE: 81 MMHG | HEART RATE: 105 BPM | RESPIRATION RATE: 18 BRPM | HEIGHT: 65 IN | SYSTOLIC BLOOD PRESSURE: 137 MMHG | BODY MASS INDEX: 46.48 KG/M2 | WEIGHT: 279 LBS | TEMPERATURE: 97.8 F

## 2018-09-19 DIAGNOSIS — T14.90XA INJURY, UNSPECIFIED, INITIAL ENCOUNTER: ICD-10-CM

## 2018-09-19 PROCEDURE — 99213 OFFICE O/P EST LOW 20 MIN: CPT

## 2018-09-24 ENCOUNTER — APPOINTMENT (OUTPATIENT)
Dept: RHEUMATOLOGY | Facility: CLINIC | Age: 68
End: 2018-09-24
Payer: MEDICARE

## 2018-09-24 PROCEDURE — 96365 THER/PROPH/DIAG IV INF INIT: CPT

## 2018-09-24 PROCEDURE — 96366 THER/PROPH/DIAG IV INF ADDON: CPT

## 2018-09-26 ENCOUNTER — APPOINTMENT (OUTPATIENT)
Dept: RHEUMATOLOGY | Facility: CLINIC | Age: 68
End: 2018-09-26
Payer: MEDICARE

## 2018-09-26 PROCEDURE — 90662 IIV NO PRSV INCREASED AG IM: CPT

## 2018-09-26 PROCEDURE — 96366 THER/PROPH/DIAG IV INF ADDON: CPT

## 2018-09-26 PROCEDURE — G0008: CPT

## 2018-09-26 PROCEDURE — 96365 THER/PROPH/DIAG IV INF INIT: CPT

## 2018-09-28 ENCOUNTER — APPOINTMENT (OUTPATIENT)
Dept: RHEUMATOLOGY | Facility: CLINIC | Age: 68
End: 2018-09-28
Payer: MEDICARE

## 2018-09-28 PROCEDURE — 96365 THER/PROPH/DIAG IV INF INIT: CPT

## 2018-09-28 PROCEDURE — 96366 THER/PROPH/DIAG IV INF ADDON: CPT

## 2018-10-02 ENCOUNTER — RX RENEWAL (OUTPATIENT)
Age: 68
End: 2018-10-02

## 2018-10-05 ENCOUNTER — INPATIENT (INPATIENT)
Facility: HOSPITAL | Age: 68
LOS: 4 days | Discharge: HOME | DRG: 603 | End: 2018-10-10
Attending: INTERNAL MEDICINE | Admitting: INTERNAL MEDICINE
Payer: MEDICARE

## 2018-10-05 VITALS
RESPIRATION RATE: 18 BRPM | HEART RATE: 93 BPM | TEMPERATURE: 98 F | OXYGEN SATURATION: 99 % | SYSTOLIC BLOOD PRESSURE: 140 MMHG | DIASTOLIC BLOOD PRESSURE: 77 MMHG | WEIGHT: 276.9 LBS | HEIGHT: 65 IN

## 2018-10-05 DIAGNOSIS — Z90.710 ACQUIRED ABSENCE OF BOTH CERVIX AND UTERUS: Chronic | ICD-10-CM

## 2018-10-05 DIAGNOSIS — E11.65 TYPE 2 DIABETES MELLITUS WITH HYPERGLYCEMIA: ICD-10-CM

## 2018-10-05 DIAGNOSIS — L03.115 CELLULITIS OF RIGHT LOWER LIMB: ICD-10-CM

## 2018-10-05 DIAGNOSIS — Z29.9 ENCOUNTER FOR PROPHYLACTIC MEASURES, UNSPECIFIED: ICD-10-CM

## 2018-10-05 DIAGNOSIS — Z98.89 OTHER SPECIFIED POSTPROCEDURAL STATES: Chronic | ICD-10-CM

## 2018-10-05 DIAGNOSIS — E78.5 HYPERLIPIDEMIA, UNSPECIFIED: ICD-10-CM

## 2018-10-05 DIAGNOSIS — C54.1 MALIGNANT NEOPLASM OF ENDOMETRIUM: Chronic | ICD-10-CM

## 2018-10-05 DIAGNOSIS — L12.0 BULLOUS PEMPHIGOID: ICD-10-CM

## 2018-10-05 DIAGNOSIS — Z90.722 ACQUIRED ABSENCE OF OVARIES, BILATERAL: Chronic | ICD-10-CM

## 2018-10-05 LAB
ALBUMIN SERPL ELPH-MCNC: 3.9 G/DL — SIGNIFICANT CHANGE UP (ref 3.3–5)
ALP SERPL-CCNC: 59 U/L — SIGNIFICANT CHANGE UP (ref 40–120)
ALT FLD-CCNC: 9 U/L — LOW (ref 10–45)
ANION GAP SERPL CALC-SCNC: 12 MMOL/L — SIGNIFICANT CHANGE UP (ref 5–17)
AST SERPL-CCNC: 19 U/L — SIGNIFICANT CHANGE UP (ref 10–40)
BASE EXCESS BLDV CALC-SCNC: 1.1 MMOL/L — SIGNIFICANT CHANGE UP (ref -2–2)
BASOPHILS # BLD AUTO: 0 K/UL — SIGNIFICANT CHANGE UP (ref 0–0.2)
BASOPHILS NFR BLD AUTO: 1 % — SIGNIFICANT CHANGE UP (ref 0–2)
BILIRUB SERPL-MCNC: 0.2 MG/DL — SIGNIFICANT CHANGE UP (ref 0.2–1.2)
BUN SERPL-MCNC: 24 MG/DL — HIGH (ref 7–23)
CA-I SERPL-SCNC: 1.2 MMOL/L — SIGNIFICANT CHANGE UP (ref 1.12–1.3)
CALCIUM SERPL-MCNC: 9.8 MG/DL — SIGNIFICANT CHANGE UP (ref 8.4–10.5)
CHLORIDE BLDV-SCNC: 105 MMOL/L — SIGNIFICANT CHANGE UP (ref 96–108)
CHLORIDE SERPL-SCNC: 100 MMOL/L — SIGNIFICANT CHANGE UP (ref 96–108)
CO2 BLDV-SCNC: 28 MMOL/L — SIGNIFICANT CHANGE UP (ref 22–30)
CO2 SERPL-SCNC: 22 MMOL/L — SIGNIFICANT CHANGE UP (ref 22–31)
CREAT SERPL-MCNC: 1.03 MG/DL — SIGNIFICANT CHANGE UP (ref 0.5–1.3)
EOSINOPHIL # BLD AUTO: 0 K/UL — SIGNIFICANT CHANGE UP (ref 0–0.5)
EOSINOPHIL NFR BLD AUTO: 0.5 % — SIGNIFICANT CHANGE UP (ref 0–6)
GAS PNL BLDV: 136 MMOL/L — SIGNIFICANT CHANGE UP (ref 136–145)
GAS PNL BLDV: SIGNIFICANT CHANGE UP
GLUCOSE BLDV-MCNC: 106 MG/DL — HIGH (ref 70–99)
GLUCOSE SERPL-MCNC: 104 MG/DL — HIGH (ref 70–99)
HCO3 BLDV-SCNC: 27 MMOL/L — SIGNIFICANT CHANGE UP (ref 21–29)
HCT VFR BLD CALC: 34.3 % — LOW (ref 34.5–45)
HCT VFR BLDA CALC: 34 % — LOW (ref 39–50)
HGB BLD CALC-MCNC: 11.2 G/DL — LOW (ref 11.5–15.5)
HGB BLD-MCNC: 11 G/DL — LOW (ref 11.5–15.5)
LACTATE BLDV-MCNC: 2.2 MMOL/L — HIGH (ref 0.7–2)
LYMPHOCYTES # BLD AUTO: 0.8 K/UL — LOW (ref 1–3.3)
LYMPHOCYTES # BLD AUTO: 18.9 % — SIGNIFICANT CHANGE UP (ref 13–44)
MCHC RBC-ENTMCNC: 31.8 PG — SIGNIFICANT CHANGE UP (ref 27–34)
MCHC RBC-ENTMCNC: 32.1 GM/DL — SIGNIFICANT CHANGE UP (ref 32–36)
MCV RBC AUTO: 99 FL — SIGNIFICANT CHANGE UP (ref 80–100)
MONOCYTES # BLD AUTO: 0.2 K/UL — SIGNIFICANT CHANGE UP (ref 0–0.9)
MONOCYTES NFR BLD AUTO: 6.1 % — SIGNIFICANT CHANGE UP (ref 2–14)
NEUTROPHILS # BLD AUTO: 3 K/UL — SIGNIFICANT CHANGE UP (ref 1.8–7.4)
NEUTROPHILS NFR BLD AUTO: 73.5 % — SIGNIFICANT CHANGE UP (ref 43–77)
PCO2 BLDV: 49 MMHG — SIGNIFICANT CHANGE UP (ref 35–50)
PH BLDV: 7.35 — SIGNIFICANT CHANGE UP (ref 7.35–7.45)
PLATELET # BLD AUTO: 340 K/UL — SIGNIFICANT CHANGE UP (ref 150–400)
PO2 BLDV: 26 MMHG — SIGNIFICANT CHANGE UP (ref 25–45)
POTASSIUM BLDV-SCNC: 4.6 MMOL/L — SIGNIFICANT CHANGE UP (ref 3.5–5.3)
POTASSIUM SERPL-MCNC: 4.8 MMOL/L — SIGNIFICANT CHANGE UP (ref 3.5–5.3)
POTASSIUM SERPL-SCNC: 4.8 MMOL/L — SIGNIFICANT CHANGE UP (ref 3.5–5.3)
PROT SERPL-MCNC: 10 G/DL — HIGH (ref 6–8.3)
RBC # BLD: 3.46 M/UL — LOW (ref 3.8–5.2)
RBC # FLD: 14.4 % — SIGNIFICANT CHANGE UP (ref 10.3–14.5)
SAO2 % BLDV: 33 % — LOW (ref 67–88)
SODIUM SERPL-SCNC: 134 MMOL/L — LOW (ref 135–145)
WBC # BLD: 4.1 K/UL — SIGNIFICANT CHANGE UP (ref 3.8–10.5)
WBC # FLD AUTO: 4.1 K/UL — SIGNIFICANT CHANGE UP (ref 3.8–10.5)

## 2018-10-05 PROCEDURE — 99285 EMERGENCY DEPT VISIT HI MDM: CPT

## 2018-10-05 PROCEDURE — 99222 1ST HOSP IP/OBS MODERATE 55: CPT

## 2018-10-05 PROCEDURE — 99223 1ST HOSP IP/OBS HIGH 75: CPT

## 2018-10-05 RX ORDER — ATORVASTATIN CALCIUM 80 MG/1
1 TABLET, FILM COATED ORAL
Qty: 0 | Refills: 0 | COMMUNITY

## 2018-10-05 RX ORDER — OXYCODONE HYDROCHLORIDE 5 MG/1
5 TABLET ORAL ONCE
Qty: 0 | Refills: 0 | Status: DISCONTINUED | OUTPATIENT
Start: 2018-10-05 | End: 2018-10-05

## 2018-10-05 RX ORDER — CHOLECALCIFEROL (VITAMIN D3) 125 MCG
1 CAPSULE ORAL
Qty: 0 | Refills: 0 | COMMUNITY

## 2018-10-05 RX ORDER — VANCOMYCIN HCL 1 G
1000 VIAL (EA) INTRAVENOUS ONCE
Qty: 0 | Refills: 0 | Status: COMPLETED | OUTPATIENT
Start: 2018-10-05 | End: 2018-10-05

## 2018-10-05 RX ORDER — SODIUM CHLORIDE 9 MG/ML
500 INJECTION INTRAMUSCULAR; INTRAVENOUS; SUBCUTANEOUS ONCE
Qty: 0 | Refills: 0 | Status: COMPLETED | OUTPATIENT
Start: 2018-10-05 | End: 2018-10-05

## 2018-10-05 RX ORDER — LISINOPRIL 2.5 MG/1
1 TABLET ORAL
Qty: 0 | Refills: 0 | COMMUNITY

## 2018-10-05 RX ORDER — ATORVASTATIN CALCIUM 80 MG/1
20 TABLET, FILM COATED ORAL AT BEDTIME
Qty: 0 | Refills: 0 | Status: DISCONTINUED | OUTPATIENT
Start: 2018-10-05 | End: 2018-10-10

## 2018-10-05 RX ORDER — NIACIN 50 MG
1 TABLET ORAL
Qty: 0 | Refills: 0 | COMMUNITY

## 2018-10-05 RX ORDER — METFORMIN HYDROCHLORIDE 850 MG/1
2 TABLET ORAL
Qty: 0 | Refills: 0 | COMMUNITY

## 2018-10-05 RX ADMIN — SODIUM CHLORIDE 1000 MILLILITER(S): 9 INJECTION INTRAMUSCULAR; INTRAVENOUS; SUBCUTANEOUS at 18:27

## 2018-10-05 RX ADMIN — Medication 250 MILLIGRAM(S): at 18:41

## 2018-10-05 RX ADMIN — OXYCODONE HYDROCHLORIDE 5 MILLIGRAM(S): 5 TABLET ORAL at 22:51

## 2018-10-05 RX ADMIN — ATORVASTATIN CALCIUM 20 MILLIGRAM(S): 80 TABLET, FILM COATED ORAL at 22:51

## 2018-10-05 RX ADMIN — OXYCODONE HYDROCHLORIDE 5 MILLIGRAM(S): 5 TABLET ORAL at 23:20

## 2018-10-05 NOTE — H&P ADULT - ATTENDING COMMENTS
Care to be assumed by [facility] hospitalist at 8 am.  This patient was assigned to me by the hospitalist in charge; my involvement in this case has consisted of the initial history, physical, chart review, and management plan.  This patient was previously unknown to me.  Case endorsed to NP ______ at ____. Care to be assumed by [facility] hospitalist at 8 am.  This patient was assigned to me by the hospitalist in charge; my involvement in this case has consisted of the initial history, physical, chart review, and management plan.  This patient was previously unknown to me.  Case endorsed to NICK max at 140 am.

## 2018-10-05 NOTE — H&P ADULT - NSHPREVIEWOFSYSTEMS_GEN_ALL_CORE
REVIEW OF SYSTEMS:  CONSTITUTIONAL: No weakness. No fevers. No chills. No weight loss. Good appetite.  EYES: No visual changes. No eye pain.  ENT: No hearing difficulty. No vertigo. No dysphagia. No Sinusitis/rhinorrhea.  NECK: No pain. No stiffness/rigidity.  CARDIAC: No chest pain. No palpitations.  RESPIRATORY: No cough. No SOB. No hemoptysis.  GASTROINTESTINAL: No abdominal pain. No nausea. No vomiting. No hematemesis. No diarrhea. No constipation. No melena. No hematochezia.  GENITOURINARY: No dysuria. No frequency. No hesitancy. No hematuria.  NEUROLOGICAL: No numbness. No focal weakness. No incontinence. No headache.  BACK: No back pain.  EXTREMITIES: chronic lower extremity edema. Full ROM.  SKIN: No rashes. No itching. +erythematous/tender/warm cellulitic lesion as noted.   PSYCHIATRIC: No depression. No anxiety. No SI/HI.  ALLERGIC: No lip swelling. No hives.  All other review of systems is negative unless indicated above.

## 2018-10-05 NOTE — H&P ADULT - PROBLEM SELECTOR PLAN 1
-warmth/erythema/tenderness/swelling in setting of diabetic pt with bullous pemphigoid and popped blister. Afebrile, no leukocytosis, hemodynamically stable  -admit for IV abx 2/2 failure of PO therapy as outpatient  -s/p vanco in ER, no red man syndrome, tolerated well. c/w vanco  -f/u bcx  -consider ID eval

## 2018-10-05 NOTE — ED ADULT NURSE NOTE - NSIMPLEMENTINTERV_GEN_ALL_ED
Implemented All Universal Safety Interventions:  [place] to call system. Call bell, personal items and telephone within reach. Instruct patient to call for assistance. Room bathroom lighting operational. Non-slip footwear when patient is off stretcher. Physically safe environment: no spills, clutter or unnecessary equipment. Stretcher in lowest position, wheels locked, appropriate side rails in place.

## 2018-10-05 NOTE — H&P ADULT - HISTORY OF PRESENT ILLNESS
67 F PMH bullous pemphigoid, T2DM, HLD, hypothyroid, uterine ca in remission, p/w RLE cellulitis.     VS: Tm 99.2, 70, 145/75, 18, 99% RA. Labs: no leukocytosis, chronic stable anemia, rest of cbc unrevealing, mild hypona 134 rest of cmp unrevealing, lactate 2.2. In ER pt received vancomycin, IVF, prior to medicine team involvement. 67 F PMH bullous pemphigoid, T2DM, HLD, hypothyroid, uterine ca in remission, Red man syndrome 2/2 vancomycin rapid infusion, p/w RLE cellulitis. Pt says that starting 3 days PTA, she noticed a blister that had developed on her RLE had popped and started getting red. The redness started spreading downward from shin to ankle and she alerted her dermatologist.  Pt had been taking prophylactic doxycycline bid; her dermatologist told her to d/c doxy and to start bactrim for treatment of cellulitis.  Pt notes that there was some initial improvement of erythema, but then eventually progressive sx. +erythema. +warmth +tenderness from mid shin to ankle. No foot or toe involvement. ROM intact. Able to ambulate but with pain. Denies trauma or puncture wounds. Denies purulence. Denies alternate sites of cellulitis. Denies f/c. Denies cp/sob/n/v/d/dysuria/cough/uri sx. Pt notes she is on IVIG, and has been taking it every 6 wks, recently reduced to q4 wks; last infusion was last week of September. Pt has been taking mupirocin cream and clobetasol cream to her LE pemphigus lesions.     VS: Tm 99.2, 70, 145/75, 18, 99% RA. Labs: no leukocytosis, chronic stable anemia, rest of cbc unrevealing, mild hypona 134 rest of cmp unrevealing, lactate 2.2. In ER pt received vancomycin, IVF, prior to medicine team involvement.

## 2018-10-05 NOTE — ED PROVIDER NOTE - PMH
Adrenal mass  2014 incidental findings 2014  Ct SCAN 9/2015 unchannged  24 hour urine for VMA done by Dr DR Canas [phone] Neg asper patient  Anemia    Bullous pemphigoid  dx: 8/2014.  On Immunosupressants  Cellcept  Endometrial cancer  dx: 8/2015:  High grade Endometroid Adenocarcinoma  History of osteoarthritis    Hyperlipidemia    Hypertension    Hypothyroidism    Morbid obesity  BMI:  53.6  Type 2 diabetes mellitus    Vitamin D deficiency

## 2018-10-05 NOTE — ED ADULT NURSE NOTE - OBJECTIVE STATEMENT
66 y/o female hx DM2, HTN, HLD, bullous pemphigoid presents to the ED via EMS from pulmonologist office c/o RLE erythema x 3 days. Pt states was evaluated by dermatologist on Tuesday, started on Bactrim and advised with no improvement to come to ED for IV antibiotics . Denies fever, chills, n/v, weakness, abd pain, diarrhea/constipation, numbness/tingling, urinary s/s. Pt A&Ox3, in no respiratory distress, no CP, erythema noted to right lower extremity surrounding previous bullous (being treated by wound care RN at home.) Pulses present, cap refill <2, skin warm to touch. PT safety maintained with family at bedside, call bell within reach and bed in the lowest position.

## 2018-10-05 NOTE — H&P ADULT - PMH
Adrenal mass  2014 incidental findings 2014  Ct SCAN 9/2015 unchannged  24 hour urine for VMA done by Dr DR Canas [phone] Neg asper patient  Anemia    Bullous pemphigoid  dx: 8/2014.  On Immunosupressants  Cellcept  Endometrial cancer  dx: 8/2015:  High grade Endometroid Adenocarcinoma  History of osteoarthritis    Hyperlipidemia    Hypertension    Hypothyroidism    Morbid obesity  BMI:  53.6  Type 2 diabetes mellitus    Vitamin D deficiency

## 2018-10-05 NOTE — H&P ADULT - NSHPPHYSICALEXAM_GEN_ALL_CORE
PHYSICAL EXAM:  GENERAL: NAD, well-groomed, morbidly obese  HEAD:  Atraumatic, Normocephalic  EYES: EOMI, PERRLA, conjunctiva and sclera clear  ENMT: No tonsillar erythema, exudates, or enlargement; Moist mucous membranes, Good dentition, No lesions  NECK: Supple, No JVD, Normal thyroid  CHEST/LUNG: Clear to percussion bilaterally; No rales, rhonchi, wheezing, or rubs no resp distress or accessory musc usage, talking in full sentences  HEART: Regular rate and rhythm; No murmurs, rubs, or gallops, 1+ edema b/l LE  ABDOMEN: Soft, Nontender, Nondistended; Bowel sounds present no rebound/guarding  EXTREMITIES:  2+ Peripheral Pulses, No clubbing, cyanosis rom intact  LYMPH: No lymphadenopathy noted  SKIN: +R LE erythema from mid shin to ankle. +medial aspect popped blister with slight eschar, surrounding erytheema, no purulence or crepitus/fluctuance. +warmth. similar sized blisters on R leg without infectious sx.   NERVOUS SYSTEM:  Alert & Oriented X3, Good concentration; Motor Strength 5/5 B/L upper and lower extremities

## 2018-10-05 NOTE — H&P ADULT - NSHPLABSRESULTS_GEN_ALL_CORE
Labs personally reviewed  no leukocytosis, chronic stable anemia, rest of cbc unrevealing, mild hypona 134 rest of cmp unrevealing, lactate 2.2.   NO imaging or ekg in chart to personally review.

## 2018-10-05 NOTE — ED PROVIDER NOTE - SKIN COLOR
Pt's RLE with diffuse erythema/edema with overlying skin changes over anterior shin c/w skin break from previous bulla.  Remains nvsc intact distal to site.

## 2018-10-05 NOTE — H&P ADULT - PROBLEM SELECTOR PLAN 2
-c/w prednisone  -c/w mupirocin  -c/w clobetasol, avoid on active cellulitic lesions  -consider derm eval

## 2018-10-05 NOTE — PATIENT PROFILE ADULT. - NS TRANSFER PATIENT BELONGINGS
Jewelry/Money (specify)/tablet, earrings, sneakers/Cell Phone/PDA (specify)/Electronic Device (specify)/Clothing

## 2018-10-05 NOTE — ED ADULT NURSE NOTE - CHPI ED NUR SYMPTOMS NEG
no vomiting/no headache/no shortness of breath/no cough/no fever/no rash/no diarrhea/no abdominal pain/no chills/no decreased eating/drinking

## 2018-10-05 NOTE — ED PROVIDER NOTE - OBJECTIVE STATEMENT
Attending MD Gonzalez.  Pt is a pleasant 68 yo female with pmhx of bullous pemphigoid, DMII (non-insulin dependent), HLD, hypothyroidism, remote hx of uterine CA in remission x 2 yrs with recent screening CT demonstrating no recurrence who presents to ED with recurrence of cellulitis.  Pt has RLE erythema and pain c/w cellulitis at site of previous bullae.  Pt seen by her dermatologist Dr. Pina on Tuesday and begun on bactrim.  Told to come to ED because the area is not sufficiently improving with bactrim. Pt has multiple abxs allergies but reports that she's gotten vancomycin previously.  DId have episode of red-man syndrome previously when run too quickly.   Denies recent fevers/chills/n/v/d/systemic illness despite PO bactrim.

## 2018-10-05 NOTE — ED ADULT NURSE NOTE - PMH
Adrenal mass  2014 incidental findings 2014  Ct SCAN 9/2015 unchannged  24 hour urine for VMA done by Dr DR Canas [phone] Neg asper patient  Anemia    Bullous pemphigoid  dx: 8/2014.  On Immunosupressants  Cellcept  Endometrial cancer  dx: 8/2015:  High grade Endometroid Adenocarcinoma  History of osteoarthritis    Hyperlipidemia    Hypertension    Hypothyroidism    Morbid obesity  BMI:  53.6  Type 2 diabetes mellitus    Vitamin D deficiency

## 2018-10-05 NOTE — H&P ADULT - ASSESSMENT
67 F PMH bullous pemphigoid, T2DM, HLD, hypothyroid, uterine ca in remission, Red man syndrome 2/2 vancomycin rapid infusion, p/w RLE cellulitis.

## 2018-10-05 NOTE — H&P ADULT - NSHPSOCIALHISTORY_GEN_ALL_CORE
Social History:    Marital Status:  (   x)    (   ) Single    (   )    (  )   Occupation: retired   Lives with: (  ) alone  (x  ) children   ( x ) spouse   (  ) parents  (  ) other    Substance Use (street drugs): ( x ) never used  (  ) other:  Tobacco Usage:  (   ) never smoked   (   x) former smoker   (   ) current smoker  (     ) pack year  (        ) last cigarette date

## 2018-10-05 NOTE — ED PROVIDER NOTE - MEDICAL DECISION MAKING DETAILS
Dr. Olvera called for admission and accepting pt to his service.  Pt stable for admission to medicine for cellulitis of RLE that failed outpt abxs with bactrim.

## 2018-10-06 LAB
ALBUMIN SERPL ELPH-MCNC: 3.7 G/DL — SIGNIFICANT CHANGE UP (ref 3.3–5)
ALP SERPL-CCNC: 55 U/L — SIGNIFICANT CHANGE UP (ref 40–120)
ALT FLD-CCNC: 6 U/L — LOW (ref 10–45)
ANION GAP SERPL CALC-SCNC: 9 MMOL/L — SIGNIFICANT CHANGE UP (ref 5–17)
AST SERPL-CCNC: 20 U/L — SIGNIFICANT CHANGE UP (ref 10–40)
BASOPHILS # BLD AUTO: 0.03 K/UL — SIGNIFICANT CHANGE UP (ref 0–0.2)
BASOPHILS NFR BLD AUTO: 0.8 % — SIGNIFICANT CHANGE UP (ref 0–2)
BILIRUB SERPL-MCNC: 0.2 MG/DL — SIGNIFICANT CHANGE UP (ref 0.2–1.2)
BUN SERPL-MCNC: 21 MG/DL — SIGNIFICANT CHANGE UP (ref 7–23)
CALCIUM SERPL-MCNC: 9.4 MG/DL — SIGNIFICANT CHANGE UP (ref 8.4–10.5)
CHLORIDE SERPL-SCNC: 102 MMOL/L — SIGNIFICANT CHANGE UP (ref 96–108)
CO2 SERPL-SCNC: 26 MMOL/L — SIGNIFICANT CHANGE UP (ref 22–31)
CREAT SERPL-MCNC: 1.04 MG/DL — SIGNIFICANT CHANGE UP (ref 0.5–1.3)
EOSINOPHIL # BLD AUTO: 0.02 K/UL — SIGNIFICANT CHANGE UP (ref 0–0.5)
EOSINOPHIL NFR BLD AUTO: 0.5 % — SIGNIFICANT CHANGE UP (ref 0–6)
GLUCOSE BLDC GLUCOMTR-MCNC: 110 MG/DL — HIGH (ref 70–99)
GLUCOSE BLDC GLUCOMTR-MCNC: 113 MG/DL — HIGH (ref 70–99)
GLUCOSE BLDC GLUCOMTR-MCNC: 127 MG/DL — HIGH (ref 70–99)
GLUCOSE SERPL-MCNC: 79 MG/DL — SIGNIFICANT CHANGE UP (ref 70–99)
HBA1C BLD-MCNC: 5.1 % — SIGNIFICANT CHANGE UP (ref 4–5.6)
HCT VFR BLD CALC: 32.5 % — LOW (ref 34.5–45)
HGB BLD-MCNC: 10.1 G/DL — LOW (ref 11.5–15.5)
IMM GRANULOCYTES NFR BLD AUTO: 0.5 % — SIGNIFICANT CHANGE UP (ref 0–1.5)
LYMPHOCYTES # BLD AUTO: 1.2 K/UL — SIGNIFICANT CHANGE UP (ref 1–3.3)
LYMPHOCYTES # BLD AUTO: 30.5 % — SIGNIFICANT CHANGE UP (ref 13–44)
MAGNESIUM SERPL-MCNC: 1.6 MG/DL — SIGNIFICANT CHANGE UP (ref 1.6–2.6)
MCHC RBC-ENTMCNC: 31.1 GM/DL — LOW (ref 32–36)
MCHC RBC-ENTMCNC: 31.8 PG — SIGNIFICANT CHANGE UP (ref 27–34)
MCV RBC AUTO: 102.2 FL — HIGH (ref 80–100)
MONOCYTES # BLD AUTO: 0.42 K/UL — SIGNIFICANT CHANGE UP (ref 0–0.9)
MONOCYTES NFR BLD AUTO: 10.7 % — SIGNIFICANT CHANGE UP (ref 2–14)
NEUTROPHILS # BLD AUTO: 2.25 K/UL — SIGNIFICANT CHANGE UP (ref 1.8–7.4)
NEUTROPHILS NFR BLD AUTO: 57 % — SIGNIFICANT CHANGE UP (ref 43–77)
PHOSPHATE SERPL-MCNC: 3.3 MG/DL — SIGNIFICANT CHANGE UP (ref 2.5–4.5)
PLATELET # BLD AUTO: 318 K/UL — SIGNIFICANT CHANGE UP (ref 150–400)
POTASSIUM SERPL-MCNC: 4.5 MMOL/L — SIGNIFICANT CHANGE UP (ref 3.5–5.3)
POTASSIUM SERPL-SCNC: 4.5 MMOL/L — SIGNIFICANT CHANGE UP (ref 3.5–5.3)
PROT SERPL-MCNC: 9.1 G/DL — HIGH (ref 6–8.3)
RBC # BLD: 3.18 M/UL — LOW (ref 3.8–5.2)
RBC # FLD: 14.8 % — HIGH (ref 10.3–14.5)
SODIUM SERPL-SCNC: 137 MMOL/L — SIGNIFICANT CHANGE UP (ref 135–145)
WBC # BLD: 3.94 K/UL — SIGNIFICANT CHANGE UP (ref 3.8–10.5)
WBC # FLD AUTO: 3.94 K/UL — SIGNIFICANT CHANGE UP (ref 3.8–10.5)

## 2018-10-06 PROCEDURE — 99222 1ST HOSP IP/OBS MODERATE 55: CPT

## 2018-10-06 RX ORDER — INSULIN LISPRO 100/ML
VIAL (ML) SUBCUTANEOUS
Qty: 0 | Refills: 0 | Status: DISCONTINUED | OUTPATIENT
Start: 2018-10-06 | End: 2018-10-10

## 2018-10-06 RX ORDER — DEXTROSE 50 % IN WATER 50 %
15 SYRINGE (ML) INTRAVENOUS ONCE
Qty: 0 | Refills: 0 | Status: DISCONTINUED | OUTPATIENT
Start: 2018-10-06 | End: 2018-10-10

## 2018-10-06 RX ORDER — CHOLECALCIFEROL (VITAMIN D3) 125 MCG
5000 CAPSULE ORAL DAILY
Qty: 0 | Refills: 0 | Status: DISCONTINUED | OUTPATIENT
Start: 2018-10-06 | End: 2018-10-10

## 2018-10-06 RX ORDER — HEPARIN SODIUM 5000 [USP'U]/ML
5000 INJECTION INTRAVENOUS; SUBCUTANEOUS EVERY 8 HOURS
Qty: 0 | Refills: 0 | Status: DISCONTINUED | OUTPATIENT
Start: 2018-10-06 | End: 2018-10-10

## 2018-10-06 RX ORDER — NIACIN 50 MG
500 TABLET ORAL EVERY 24 HOURS
Qty: 0 | Refills: 0 | Status: DISCONTINUED | OUTPATIENT
Start: 2018-10-06 | End: 2018-10-10

## 2018-10-06 RX ORDER — ASPIRIN/CALCIUM CARB/MAGNESIUM 324 MG
81 TABLET ORAL DAILY
Qty: 0 | Refills: 0 | Status: DISCONTINUED | OUTPATIENT
Start: 2018-10-06 | End: 2018-10-10

## 2018-10-06 RX ORDER — GLUCAGON INJECTION, SOLUTION 0.5 MG/.1ML
1 INJECTION, SOLUTION SUBCUTANEOUS ONCE
Qty: 0 | Refills: 0 | Status: DISCONTINUED | OUTPATIENT
Start: 2018-10-06 | End: 2018-10-10

## 2018-10-06 RX ORDER — TRAMADOL HYDROCHLORIDE 50 MG/1
50 TABLET ORAL EVERY 8 HOURS
Qty: 0 | Refills: 0 | Status: DISCONTINUED | OUTPATIENT
Start: 2018-10-06 | End: 2018-10-10

## 2018-10-06 RX ORDER — DEXTROSE 50 % IN WATER 50 %
12.5 SYRINGE (ML) INTRAVENOUS ONCE
Qty: 0 | Refills: 0 | Status: DISCONTINUED | OUTPATIENT
Start: 2018-10-06 | End: 2018-10-10

## 2018-10-06 RX ORDER — VANCOMYCIN HCL 1 G
1250 VIAL (EA) INTRAVENOUS EVERY 12 HOURS
Qty: 0 | Refills: 0 | Status: DISCONTINUED | OUTPATIENT
Start: 2018-10-06 | End: 2018-10-10

## 2018-10-06 RX ORDER — LEVOTHYROXINE SODIUM 125 MCG
300 TABLET ORAL
Qty: 0 | Refills: 0 | Status: DISCONTINUED | OUTPATIENT
Start: 2018-10-06 | End: 2018-10-10

## 2018-10-06 RX ORDER — LISINOPRIL 2.5 MG/1
2.5 TABLET ORAL DAILY
Qty: 0 | Refills: 0 | Status: DISCONTINUED | OUTPATIENT
Start: 2018-10-06 | End: 2018-10-10

## 2018-10-06 RX ORDER — FERROUS SULFATE 325(65) MG
325 TABLET ORAL DAILY
Qty: 0 | Refills: 0 | Status: DISCONTINUED | OUTPATIENT
Start: 2018-10-06 | End: 2018-10-10

## 2018-10-06 RX ORDER — OXYCODONE AND ACETAMINOPHEN 5; 325 MG/1; MG/1
2 TABLET ORAL EVERY 6 HOURS
Qty: 0 | Refills: 0 | Status: DISCONTINUED | OUTPATIENT
Start: 2018-10-06 | End: 2018-10-10

## 2018-10-06 RX ORDER — DEXTROSE 50 % IN WATER 50 %
25 SYRINGE (ML) INTRAVENOUS ONCE
Qty: 0 | Refills: 0 | Status: DISCONTINUED | OUTPATIENT
Start: 2018-10-06 | End: 2018-10-10

## 2018-10-06 RX ORDER — INSULIN LISPRO 100/ML
VIAL (ML) SUBCUTANEOUS AT BEDTIME
Qty: 0 | Refills: 0 | Status: DISCONTINUED | OUTPATIENT
Start: 2018-10-06 | End: 2018-10-10

## 2018-10-06 RX ORDER — LEVOTHYROXINE SODIUM 125 MCG
150 TABLET ORAL
Qty: 0 | Refills: 0 | Status: DISCONTINUED | OUTPATIENT
Start: 2018-10-06 | End: 2018-10-10

## 2018-10-06 RX ORDER — SODIUM CHLORIDE 9 MG/ML
1000 INJECTION, SOLUTION INTRAVENOUS
Qty: 0 | Refills: 0 | Status: DISCONTINUED | OUTPATIENT
Start: 2018-10-06 | End: 2018-10-10

## 2018-10-06 RX ORDER — FOLIC ACID 0.8 MG
1 TABLET ORAL DAILY
Qty: 0 | Refills: 0 | Status: DISCONTINUED | OUTPATIENT
Start: 2018-10-06 | End: 2018-10-10

## 2018-10-06 RX ORDER — MUPIROCIN 20 MG/G
1 OINTMENT TOPICAL
Qty: 0 | Refills: 0 | Status: DISCONTINUED | OUTPATIENT
Start: 2018-10-06 | End: 2018-10-10

## 2018-10-06 RX ADMIN — Medication 166.67 MILLIGRAM(S): at 05:48

## 2018-10-06 RX ADMIN — OXYCODONE AND ACETAMINOPHEN 2 TABLET(S): 5; 325 TABLET ORAL at 20:30

## 2018-10-06 RX ADMIN — Medication 10 MILLIGRAM(S): at 05:46

## 2018-10-06 RX ADMIN — HEPARIN SODIUM 5000 UNIT(S): 5000 INJECTION INTRAVENOUS; SUBCUTANEOUS at 05:46

## 2018-10-06 RX ADMIN — Medication 5000 UNIT(S): at 13:36

## 2018-10-06 RX ADMIN — Medication 325 MILLIGRAM(S): at 18:52

## 2018-10-06 RX ADMIN — Medication 150 MICROGRAM(S): at 05:53

## 2018-10-06 RX ADMIN — HEPARIN SODIUM 5000 UNIT(S): 5000 INJECTION INTRAVENOUS; SUBCUTANEOUS at 13:35

## 2018-10-06 RX ADMIN — OXYCODONE AND ACETAMINOPHEN 2 TABLET(S): 5; 325 TABLET ORAL at 13:35

## 2018-10-06 RX ADMIN — OXYCODONE AND ACETAMINOPHEN 2 TABLET(S): 5; 325 TABLET ORAL at 14:35

## 2018-10-06 RX ADMIN — Medication 1 MILLIGRAM(S): at 13:36

## 2018-10-06 RX ADMIN — MUPIROCIN 1 APPLICATION(S): 20 OINTMENT TOPICAL at 05:47

## 2018-10-06 RX ADMIN — Medication 1 APPLICATION(S): at 05:48

## 2018-10-06 RX ADMIN — Medication 1 TABLET(S): at 13:36

## 2018-10-06 RX ADMIN — OXYCODONE AND ACETAMINOPHEN 2 TABLET(S): 5; 325 TABLET ORAL at 05:43

## 2018-10-06 RX ADMIN — Medication 500 MILLIGRAM(S): at 05:44

## 2018-10-06 RX ADMIN — ATORVASTATIN CALCIUM 20 MILLIGRAM(S): 80 TABLET, FILM COATED ORAL at 21:18

## 2018-10-06 RX ADMIN — LISINOPRIL 2.5 MILLIGRAM(S): 2.5 TABLET ORAL at 05:45

## 2018-10-06 RX ADMIN — OXYCODONE AND ACETAMINOPHEN 2 TABLET(S): 5; 325 TABLET ORAL at 19:39

## 2018-10-06 RX ADMIN — Medication 1 APPLICATION(S): at 18:51

## 2018-10-06 RX ADMIN — Medication 166.67 MILLIGRAM(S): at 19:39

## 2018-10-06 RX ADMIN — Medication 81 MILLIGRAM(S): at 13:36

## 2018-10-06 RX ADMIN — TRAMADOL HYDROCHLORIDE 50 MILLIGRAM(S): 50 TABLET ORAL at 02:02

## 2018-10-06 RX ADMIN — MUPIROCIN 1 APPLICATION(S): 20 OINTMENT TOPICAL at 18:51

## 2018-10-06 RX ADMIN — HEPARIN SODIUM 5000 UNIT(S): 5000 INJECTION INTRAVENOUS; SUBCUTANEOUS at 21:18

## 2018-10-06 NOTE — PROGRESS NOTE ADULT - PROBLEM SELECTOR PLAN 1
-admit for IV abx 2/2 failure of PO therapy as outpatient  -s/p vanco in ER, no red man syndrome, tolerated well. c/w vanco  -f/u bcx  -appreciate ID

## 2018-10-06 NOTE — PROGRESS NOTE ADULT - PROBLEM SELECTOR PLAN 2
-c/w prednisone  -c/w mupirocin  -c/w clobetasol, avoid on active cellulitic lesions  -consider wound care consult Monday

## 2018-10-06 NOTE — CONSULT NOTE ADULT - SUBJECTIVE AND OBJECTIVE BOX
"HPI: 67 F PMH bullous pemphigoid, T2DM, HLD, hypothyroid, uterine ca in remission, Red man syndrome 2/2 vancomycin rapid infusion, p/w RLE cellulitis. Pt says that starting 3 days PTA, she noticed a blister that had developed on her RLE had popped and started getting red. The redness started spreading downward from shin to ankle and she alerted her dermatologist.  Pt had been taking prophylactic doxycycline bid; her dermatologist told her to d/c doxy and to start bactrim for treatment of cellulitis.  Pt notes that there was some initial improvement of erythema, but then eventually progressive sx. +erythema. +warmth +tenderness from mid shin to ankle. No foot or toe involvement. ROM intact. Able to ambulate but with pain. Denies trauma or puncture wounds. Denies purulence. Denies alternate sites of cellulitis. Denies f/c. Denies cp/sob/n/v/d/dysuria/cough/uri sx. Pt notes she is on IVIG, and has been taking it every 6 wks, recently reduced to q4 wks; last infusion was last week of September. Pt has been taking mupirocin cream and clobetasol cream to her LE pemphigus lesions.     VS: Tm 99.2, 70, 145/75, 18, 99% RA. Labs: no leukocytosis, chronic stable anemia, rest of cbc unrevealing, mild hypona 134 rest of cmp unrevealing, lactate 2.2. In ER pt received vancomycin, IVF, prior to medicine team involvement. (05 Oct 2018 22:33)"    Above reviewed. Patient confirms history. History bullous pemphigoid on prednisone cellcept, IVIG, then had worsening onset of RLE blister with surrounding redness. Has tenderness and warmth. No fevers, no chills. Never had any purulent drainage, although did have some fluid discharge from ruptured blister. Patient was given Bactrim (normally has doxycyline PPX). Did not have significant improvement on bactrim so sent to hospital for further eval. Otherwise no new complaints, no SOB/cough. No abd pain, no N/V/D. No dysuria, no pyuria. Otherwise well. Appears improved at the bedside and reports decreasing redness. ID called for further eval cellulitis.    PAST MEDICAL & SURGICAL HISTORY:  Adrenal mass:  incidental findings 2014  Ct SCAN 2015 unchannged  24 hour urine for VMA done by Dr DR Canas [phone] Neg asper patient  Hyperlipidemia  Endometrial cancer: dx: 2015:  High grade Endometroid Adenocarcinoma  Morbid obesity: BMI:  53.6  Vitamin D deficiency  Hypothyroidism  Anemia  History of osteoarthritis  Type 2 diabetes mellitus  Hypertension  Bullous pemphigoid: dx: 2014.  On Immunosupressants  Cellcept  S/P BSO (bilateral salpingo-oophorectomy)  S/P hysterectomy  Endometrial cancer: 2015:  Endometrial Biopsy: dx: High Grade Endometroid Adenocarcinoma  Status post total thyroidectomy: &#x27; 2001  History of  section: &#x27; 82-&#x27;95:  4 X    Allergies    Ancef (Rash)  daptomycin (Vomiting)  Keflex (Unknown)  penicillin (Pruritus; Rash; Hives)    ANTIMICROBIALS:  vancomycin  IVPB 1250 every 12 hours    OTHER MEDS:  aspirin enteric coated 81 milliGRAM(s) Oral daily  atorvastatin 20 milliGRAM(s) Oral at bedtime  cholecalciferol 5000 Unit(s) Oral daily  clobetasol 0.05% Cream 1 Application(s) Topical two times a day  dextrose 40% Gel 15 Gram(s) Oral once PRN  dextrose 5%. 1000 milliLiter(s) IV Continuous <Continuous>  dextrose 50% Injectable 12.5 Gram(s) IV Push once  dextrose 50% Injectable 25 Gram(s) IV Push once  dextrose 50% Injectable 25 Gram(s) IV Push once  ferrous    sulfate 325 milliGRAM(s) Oral daily  folic acid 1 milliGRAM(s) Oral daily  glucagon  Injectable 1 milliGRAM(s) IntraMuscular once PRN  heparin  Injectable 5000 Unit(s) SubCutaneous every 8 hours  insulin lispro (HumaLOG) corrective regimen sliding scale   SubCutaneous three times a day before meals  insulin lispro (HumaLOG) corrective regimen sliding scale   SubCutaneous at bedtime  levothyroxine 150 MICROGram(s) Oral <User Schedule>  levothyroxine 300 MICROGram(s) Oral <User Schedule>  lisinopril 2.5 milliGRAM(s) Oral daily  multivitamin 1 Tablet(s) Oral daily  mupirocin 2% Ointment 1 Application(s) Topical two times a day  niacin  milliGRAM(s) Oral every 24 hours  oxyCODONE    5 mG/acetaminophen 325 mG 2 Tablet(s) Oral every 6 hours PRN  predniSONE   Tablet 10 milliGRAM(s) Oral daily  traMADol 50 milliGRAM(s) Oral every 8 hours PRN    SOCIAL HISTORY: No tobacco, no alcohol, no illicit drugs    FAMILY HISTORY:  Family history of lymphoma (Father): father    Drug Dosing Weight  Height (cm): 165.1 (05 Oct 2018 20:23)  Weight (kg): 131 (05 Oct 2018 20:23)  BMI (kg/m2): 48.1 (05 Oct 2018 20:23)  BSA (m2): 2.31 (05 Oct 2018 20:23)    PE:    Vital Signs Last 24 Hrs  T(C): 36.9 (06 Oct 2018 08:58), Max: 37.3 (05 Oct 2018 20:23)  T(F): 98.4 (06 Oct 2018 08:58), Max: 99.2 (05 Oct 2018 20:23)  HR: 73 (06 Oct 2018 08:58) (70 - 93)  BP: 115/66 (06 Oct 2018 08:58) (115/66 - 160/83)  RR: 18 (06 Oct 2018 08:58) (18 - 19)  SpO2: 95% (06 Oct 2018 08:58) (95% - 99%)    Gen: AOx3, NAD, non-toxic, pleasant, OOB in chair  CV: S1+S2 normal, nontachycardic  Resp: Clear bilat, no resp distress, no crackles/wheezes  Abd: Soft, nontender, +BS  Ext: RLE shin--evidence of ruptured blister with shallow wound. No purulence. Surround erythema, although likely some redness is baseline for patient.  : No Alcazar, no suprapubic tenderness  IV/Skin: No thrombophlebitis, no other rashes  Msk: No low back pain, no arthralgias, no joint swelling  Neuro: No sensory deficits, no motor deficits    LABS                        10.1   3.94  )-----------( 318      ( 06 Oct 2018 08:45 )             32.5     10    137  |  102  |  21  ----------------------------<  79  4.5   |  26  |  1.04    Ca    9.4      06 Oct 2018 07:11  Phos  3.3     10-06  Mg     1.6     10-    TPro  9.1<H>  /  Alb  3.7  /  TBili  0.2  /  DBili  x   /  AST  20  /  ALT  6<L>  /  AlkPhos  55  10-    MICROBIOLOGY:    No new available    RADIOLOGY:     CT:    CHEST:     LUNGS AND LARGE AIRWAYS: Patent central airways.  Unchanged tiny   calcified granuloma in the left lower lobe (series 2, image 34).   Unchanged linear opacity in the left lower lobe which may represent   subsegmental atelectasis or scarring.  PLEURA: No pleural effusion.  VESSELS: Atherosclerotic calcifications of the aorta and coronary   arteries.  HEART: The heart size is normal. No pericardial effusion. Aortic valvular   and mitral annular calcifications.  MEDIASTINUM AND TIARA: No lymphadenopathy.  CHEST WALL AND LOWER NECK: Within normal limits.    ABDOMEN AND PELVIS:    LIVER: Within normal limits.  BILE DUCTS: Normal caliber.  GALLBLADDER: Calcified gallstone measuring 2.1 cm is unchanged.  SPLEEN: Within normal limits.  PANCREAS: Within normal limits.  ADRENALS: Stable 2 cm left adrenal nodule, previously described as   adenoma.  KIDNEYS/URETERS: Unchanged bilateral hypodense renal lesions, previously   characterized as cysts. No hydronephrosis or renal calculi.    BLADDER: Within normal limits.  REPRODUCTIVE ORGANS: Hysterectomy. Unchanged appearance of the vaginal   cuff. No adnexal masses.    BOWEL: No bowel obstruction or inflammation. Colonic diverticulosis   without evidence for acute diverticulitis. Appendix is not visualized.   Small hiatus hernia.  PERITONEUM: No ascites.  VESSELS:  Atherosclerotic calcifications.  RETROPERITONEUM: No lymphadenopathy.    ABDOMINAL WALL: Postsurgical changes of the ventral abdominal wall. No   evidence of hernia.  BONES: Multilevel degenerative spondylosis and severe arthrosis of the   bilateral hips. Vertebral body hemangioma is noted within the T6   vertebral body.    IMPRESSION:   No evidence of disease recurrence.  Cholelithiasis.

## 2018-10-06 NOTE — PROGRESS NOTE ADULT - SUBJECTIVE AND OBJECTIVE BOX
No fevers or chills last night; minimal pain in rt distal LE    Vital Signs Last 24 Hrs  T(C): 36.9 (06 Oct 2018 08:58), Max: 37.3 (05 Oct 2018 20:23)  T(F): 98.4 (06 Oct 2018 08:58), Max: 99.2 (05 Oct 2018 20:23)  HR: 73 (06 Oct 2018 08:58) (70 - 93)  BP: 115/66 (06 Oct 2018 08:58) (115/66 - 160/83)  BP(mean): --  RR: 18 (06 Oct 2018 08:58) (18 - 19)  SpO2: 95% (06 Oct 2018 08:58) (95% - 99%)	    GENERAL: NAD, well-groomed, morbidly obese  HEAD:  Atraumatic, Normocephalic  EYES: EOMI, PERRLA, conjunctiva and sclera clear  ENMT: No tonsillar erythema, exudates, or enlargement; Moist mucous membranes, Good dentition, No lesions  NECK: Supple, No JVD, Normal thyroid  CHEST/LUNG: Clear to percussion bilaterally; No rales, rhonchi, wheezing, or rubs no resp distress or accessory musc usage, talking in full  sentences  HEART: Regular rate and rhythm; No murmurs, rubs, or gallops, 1+ edema b/l LE  ABDOMEN: Soft, Nontender, Nondistended; Bowel sounds present no rebound/guarding  EXTREMITIES:  2+ Peripheral Pulses, No clubbing, cyanosis rom intact  LYMPH: No lymphadenopathy noted  SKIN: +R LE erythema from mid shin to ankle. +medial aspect popped blister with slight eschar, surrounding erytheema, no purulence or  crepitus/fluctuance. +warmth. similar sized blisters on R leg without infectious sx.   NERVOUS SYSTEM:  Alert & Oriented X3, Good concentration; Motor Strength 5/5 B/L upper and lower extremities    LABS:                        10.1   3.94  )-----------( 318      ( 06 Oct 2018 08:45 )             32.5     10-06    137  |  102  |  21  ----------------------------<  79  4.5   |  26  |  1.04    Ca    9.4      06 Oct 2018 07:11  Phos  3.3     10-06  Mg     1.6     10-06    TPro  9.1<H>  /  Alb  3.7  /  TBili  0.2  /  DBili  x   /  AST  20  /  ALT  6<L>  /  AlkPhos  55  10-06      CAPILLARY BLOOD GLUCOSE      POCT Blood Glucose.: 113 mg/dL (06 Oct 2018 08:50)  POCT Blood Glucose.: 110 mg/dL (06 Oct 2018 00:51)

## 2018-10-07 LAB
GLUCOSE BLDC GLUCOMTR-MCNC: 124 MG/DL — HIGH (ref 70–99)
GLUCOSE BLDC GLUCOMTR-MCNC: 130 MG/DL — HIGH (ref 70–99)
GLUCOSE BLDC GLUCOMTR-MCNC: 138 MG/DL — HIGH (ref 70–99)
GLUCOSE BLDC GLUCOMTR-MCNC: 261 MG/DL — HIGH (ref 70–99)
GLUCOSE BLDC GLUCOMTR-MCNC: 94 MG/DL — SIGNIFICANT CHANGE UP (ref 70–99)
VANCOMYCIN TROUGH SERPL-MCNC: 17 UG/ML — SIGNIFICANT CHANGE UP (ref 10–20)

## 2018-10-07 RX ADMIN — Medication 1: at 22:33

## 2018-10-07 RX ADMIN — Medication 81 MILLIGRAM(S): at 13:22

## 2018-10-07 RX ADMIN — LISINOPRIL 2.5 MILLIGRAM(S): 2.5 TABLET ORAL at 05:18

## 2018-10-07 RX ADMIN — HEPARIN SODIUM 5000 UNIT(S): 5000 INJECTION INTRAVENOUS; SUBCUTANEOUS at 13:22

## 2018-10-07 RX ADMIN — TRAMADOL HYDROCHLORIDE 50 MILLIGRAM(S): 50 TABLET ORAL at 13:22

## 2018-10-07 RX ADMIN — Medication 150 MICROGRAM(S): at 05:18

## 2018-10-07 RX ADMIN — Medication 10 MILLIGRAM(S): at 05:19

## 2018-10-07 RX ADMIN — Medication 1 MILLIGRAM(S): at 13:22

## 2018-10-07 RX ADMIN — HEPARIN SODIUM 5000 UNIT(S): 5000 INJECTION INTRAVENOUS; SUBCUTANEOUS at 05:20

## 2018-10-07 RX ADMIN — ATORVASTATIN CALCIUM 20 MILLIGRAM(S): 80 TABLET, FILM COATED ORAL at 22:34

## 2018-10-07 RX ADMIN — MUPIROCIN 1 APPLICATION(S): 20 OINTMENT TOPICAL at 05:20

## 2018-10-07 RX ADMIN — Medication 1 TABLET(S): at 13:22

## 2018-10-07 RX ADMIN — Medication 500 MILLIGRAM(S): at 05:42

## 2018-10-07 RX ADMIN — TRAMADOL HYDROCHLORIDE 50 MILLIGRAM(S): 50 TABLET ORAL at 14:22

## 2018-10-07 RX ADMIN — Medication 325 MILLIGRAM(S): at 13:23

## 2018-10-07 RX ADMIN — OXYCODONE AND ACETAMINOPHEN 2 TABLET(S): 5; 325 TABLET ORAL at 15:29

## 2018-10-07 RX ADMIN — OXYCODONE AND ACETAMINOPHEN 2 TABLET(S): 5; 325 TABLET ORAL at 22:39

## 2018-10-07 RX ADMIN — Medication 1 APPLICATION(S): at 05:20

## 2018-10-07 RX ADMIN — Medication 166.67 MILLIGRAM(S): at 05:19

## 2018-10-07 RX ADMIN — TRAMADOL HYDROCHLORIDE 50 MILLIGRAM(S): 50 TABLET ORAL at 05:19

## 2018-10-07 RX ADMIN — HEPARIN SODIUM 5000 UNIT(S): 5000 INJECTION INTRAVENOUS; SUBCUTANEOUS at 22:34

## 2018-10-07 RX ADMIN — OXYCODONE AND ACETAMINOPHEN 2 TABLET(S): 5; 325 TABLET ORAL at 01:55

## 2018-10-07 RX ADMIN — OXYCODONE AND ACETAMINOPHEN 2 TABLET(S): 5; 325 TABLET ORAL at 08:57

## 2018-10-07 RX ADMIN — OXYCODONE AND ACETAMINOPHEN 2 TABLET(S): 5; 325 TABLET ORAL at 23:10

## 2018-10-07 RX ADMIN — Medication 5000 UNIT(S): at 13:22

## 2018-10-07 RX ADMIN — Medication 1 APPLICATION(S): at 18:38

## 2018-10-07 RX ADMIN — OXYCODONE AND ACETAMINOPHEN 2 TABLET(S): 5; 325 TABLET ORAL at 16:30

## 2018-10-07 RX ADMIN — TRAMADOL HYDROCHLORIDE 50 MILLIGRAM(S): 50 TABLET ORAL at 07:03

## 2018-10-07 RX ADMIN — OXYCODONE AND ACETAMINOPHEN 2 TABLET(S): 5; 325 TABLET ORAL at 07:57

## 2018-10-07 RX ADMIN — MUPIROCIN 1 APPLICATION(S): 20 OINTMENT TOPICAL at 18:38

## 2018-10-07 RX ADMIN — Medication 166.67 MILLIGRAM(S): at 18:38

## 2018-10-07 NOTE — PROGRESS NOTE ADULT - SUBJECTIVE AND OBJECTIVE BOX
Tender where her rt leg blisters popped    Vital Signs Last 24 Hrs  T(C): 36.8 (07 Oct 2018 08:41), Max: 37.1 (06 Oct 2018 16:35)  T(F): 98.3 (07 Oct 2018 08:41), Max: 98.7 (06 Oct 2018 16:35)  HR: 74 (07 Oct 2018 08:41) (74 - 85)  BP: 152/70 (07 Oct 2018 08:41) (110/714 - 152/70)  BP(mean): --  RR: 18 (07 Oct 2018 08:41) (18 - 18)  SpO2: 97% (07 Oct 2018 08:41) (96% - 99%)    GENERAL: NAD, well-groomed, morbidly obese  HEAD:  Atraumatic, Normocephalic  EYES: EOMI, PERRLA, conjunctiva and sclera clear  ENMT: No tonsillar erythema, exudates, or enlargement; Moist mucous membranes, Good dentition, No lesions  NECK: Supple, No JVD, Normal thyroid  CHEST/LUNG: Clear to percussion bilaterally; No rales, rhonchi, wheezing, or rubs no resp distress or accessory musc usage, talking in full  sentences  HEART: Regular rate and rhythm; No murmurs, rubs, or gallops, 1+ edema b/l LE  ABDOMEN: Soft, Nontender, Nondistended; Bowel sounds present no rebound/guarding  EXTREMITIES:  2+ Peripheral Pulses, No clubbing, cyanosis rom intact  LYMPH: No lymphadenopathy noted  SKIN: +R LE erythema from mid shin to ankle. +medial aspect popped blister with slight eschar, surrounding erythema, no purulence or  crepitus/fluctuance. +warmth. similar sized blisters on R leg without infectious sx.   NERVOUS SYSTEM:  Alert & Oriented X3, Good concentration; Motor Strength 5/5 B/L upper and lower extremities    LABS:                        10.1   3.94  )-----------( 318      ( 06 Oct 2018 08:45 )             32.5     10-06    137  |  102  |  21  ----------------------------<  79  4.5   |  26  |  1.04    Ca    9.4      06 Oct 2018 07:11  Phos  3.3     10-06  Mg     1.6     10-06    TPro  9.1<H>  /  Alb  3.7  /  TBili  0.2  /  DBili  x   /  AST  20  /  ALT  6<L>  /  AlkPhos  55  10-06      CAPILLARY BLOOD GLUCOSE  124 (07 Oct 2018 08:14)  154 (06 Oct 2018 21:44)      POCT Blood Glucose.: 130 mg/dL (06 Oct 2018 17:24)  POCT Blood Glucose.: 127 mg/dL (06 Oct 2018 12:08)

## 2018-10-07 NOTE — PROGRESS NOTE ADULT - PROBLEM SELECTOR PLAN 1
-admit for IV abx 2/2 failure of PO therapy as outpatient  -s/p vanco in ER, no red man syndrome, tolerated well. c/w vanco  -blood cxs negative so far  -appreciate ID

## 2018-10-08 LAB
ANION GAP SERPL CALC-SCNC: 12 MMOL/L — SIGNIFICANT CHANGE UP (ref 5–17)
BUN SERPL-MCNC: 27 MG/DL — HIGH (ref 7–23)
CALCIUM SERPL-MCNC: 9 MG/DL — SIGNIFICANT CHANGE UP (ref 8.4–10.5)
CHLORIDE SERPL-SCNC: 102 MMOL/L — SIGNIFICANT CHANGE UP (ref 96–108)
CO2 SERPL-SCNC: 22 MMOL/L — SIGNIFICANT CHANGE UP (ref 22–31)
CREAT SERPL-MCNC: 0.93 MG/DL — SIGNIFICANT CHANGE UP (ref 0.5–1.3)
GLUCOSE BLDC GLUCOMTR-MCNC: 116 MG/DL — HIGH (ref 70–99)
GLUCOSE BLDC GLUCOMTR-MCNC: 125 MG/DL — HIGH (ref 70–99)
GLUCOSE BLDC GLUCOMTR-MCNC: 153 MG/DL — HIGH (ref 70–99)
GLUCOSE BLDC GLUCOMTR-MCNC: 154 MG/DL — HIGH (ref 70–99)
GLUCOSE BLDC GLUCOMTR-MCNC: 157 MG/DL — HIGH (ref 70–99)
GLUCOSE SERPL-MCNC: 104 MG/DL — HIGH (ref 70–99)
HCT VFR BLD CALC: 35.1 % — SIGNIFICANT CHANGE UP (ref 34.5–45)
HGB BLD-MCNC: 10.9 G/DL — LOW (ref 11.5–15.5)
MCHC RBC-ENTMCNC: 31.1 GM/DL — LOW (ref 32–36)
MCHC RBC-ENTMCNC: 31.2 PG — SIGNIFICANT CHANGE UP (ref 27–34)
MCV RBC AUTO: 100.6 FL — HIGH (ref 80–100)
PLATELET # BLD AUTO: 321 K/UL — SIGNIFICANT CHANGE UP (ref 150–400)
POTASSIUM SERPL-MCNC: 4.2 MMOL/L — SIGNIFICANT CHANGE UP (ref 3.5–5.3)
POTASSIUM SERPL-SCNC: 4.2 MMOL/L — SIGNIFICANT CHANGE UP (ref 3.5–5.3)
RBC # BLD: 3.49 M/UL — LOW (ref 3.8–5.2)
RBC # FLD: 14.9 % — HIGH (ref 10.3–14.5)
SODIUM SERPL-SCNC: 136 MMOL/L — SIGNIFICANT CHANGE UP (ref 135–145)
WBC # BLD: 6.13 K/UL — SIGNIFICANT CHANGE UP (ref 3.8–10.5)
WBC # FLD AUTO: 6.13 K/UL — SIGNIFICANT CHANGE UP (ref 3.8–10.5)

## 2018-10-08 PROCEDURE — 99232 SBSQ HOSP IP/OBS MODERATE 35: CPT

## 2018-10-08 RX ADMIN — HEPARIN SODIUM 5000 UNIT(S): 5000 INJECTION INTRAVENOUS; SUBCUTANEOUS at 22:30

## 2018-10-08 RX ADMIN — TRAMADOL HYDROCHLORIDE 50 MILLIGRAM(S): 50 TABLET ORAL at 09:41

## 2018-10-08 RX ADMIN — OXYCODONE AND ACETAMINOPHEN 2 TABLET(S): 5; 325 TABLET ORAL at 06:45

## 2018-10-08 RX ADMIN — OXYCODONE AND ACETAMINOPHEN 2 TABLET(S): 5; 325 TABLET ORAL at 12:24

## 2018-10-08 RX ADMIN — TRAMADOL HYDROCHLORIDE 50 MILLIGRAM(S): 50 TABLET ORAL at 01:15

## 2018-10-08 RX ADMIN — Medication 5000 UNIT(S): at 12:25

## 2018-10-08 RX ADMIN — Medication 166.67 MILLIGRAM(S): at 19:04

## 2018-10-08 RX ADMIN — TRAMADOL HYDROCHLORIDE 50 MILLIGRAM(S): 50 TABLET ORAL at 17:48

## 2018-10-08 RX ADMIN — HEPARIN SODIUM 5000 UNIT(S): 5000 INJECTION INTRAVENOUS; SUBCUTANEOUS at 06:14

## 2018-10-08 RX ADMIN — Medication 1: at 09:35

## 2018-10-08 RX ADMIN — OXYCODONE AND ACETAMINOPHEN 2 TABLET(S): 5; 325 TABLET ORAL at 06:14

## 2018-10-08 RX ADMIN — Medication 325 MILLIGRAM(S): at 12:24

## 2018-10-08 RX ADMIN — HEPARIN SODIUM 5000 UNIT(S): 5000 INJECTION INTRAVENOUS; SUBCUTANEOUS at 17:47

## 2018-10-08 RX ADMIN — MUPIROCIN 1 APPLICATION(S): 20 OINTMENT TOPICAL at 06:21

## 2018-10-08 RX ADMIN — Medication 10 MILLIGRAM(S): at 06:15

## 2018-10-08 RX ADMIN — Medication 1 MILLIGRAM(S): at 12:25

## 2018-10-08 RX ADMIN — Medication 166.67 MILLIGRAM(S): at 06:14

## 2018-10-08 RX ADMIN — ATORVASTATIN CALCIUM 20 MILLIGRAM(S): 80 TABLET, FILM COATED ORAL at 22:30

## 2018-10-08 RX ADMIN — LISINOPRIL 2.5 MILLIGRAM(S): 2.5 TABLET ORAL at 06:15

## 2018-10-08 RX ADMIN — Medication 1 TABLET(S): at 12:25

## 2018-10-08 RX ADMIN — Medication 81 MILLIGRAM(S): at 12:24

## 2018-10-08 RX ADMIN — Medication 1 APPLICATION(S): at 17:49

## 2018-10-08 RX ADMIN — Medication 1: at 17:47

## 2018-10-08 RX ADMIN — TRAMADOL HYDROCHLORIDE 50 MILLIGRAM(S): 50 TABLET ORAL at 00:45

## 2018-10-08 RX ADMIN — Medication 500 MILLIGRAM(S): at 06:15

## 2018-10-08 RX ADMIN — OXYCODONE AND ACETAMINOPHEN 2 TABLET(S): 5; 325 TABLET ORAL at 23:22

## 2018-10-08 RX ADMIN — Medication 1 APPLICATION(S): at 06:21

## 2018-10-08 RX ADMIN — MUPIROCIN 1 APPLICATION(S): 20 OINTMENT TOPICAL at 17:48

## 2018-10-08 RX ADMIN — Medication 150 MICROGRAM(S): at 09:35

## 2018-10-08 NOTE — PROGRESS NOTE ADULT - SUBJECTIVE AND OBJECTIVE BOX
tender when pressing on popped blister areas on her rt foreleg    Vital Signs Last 24 Hrs  T(C): 37.4 (08 Oct 2018 08:22), Max: 37.4 (08 Oct 2018 08:22)  T(F): 99.4 (08 Oct 2018 08:22), Max: 99.4 (08 Oct 2018 08:22)  HR: 96 (08 Oct 2018 08:22) (82 - 106)  BP: 111/61 (08 Oct 2018 08:22) (96/58 - 121/76)  BP(mean): --  RR: 18 (08 Oct 2018 08:22) (18 - 18)  SpO2: 96% (08 Oct 2018 08:22) (95% - 97%)    GENERAL: NAD, well-groomed, morbidly obese  HEAD:  Atraumatic, Normocephalic  EYES: EOMI  NECK: Supple, No JVD, Normal thyroid  CHEST/LUNG: Clear to percussion bilaterally; No rales, rhonchi, wheezing, or rubs no resp distress or accessory musc usage, talking in full  sentences  HEART: Regular rate and rhythm; No murmurs, rubs, or gallops, 1+ edema b/l LE  ABDOMEN: Soft, Nontender, Nondistended; Bowel sounds present no rebound/guarding  EXTREMITIES:  2+ Peripheral Pulses, No clubbing, cyanosis rom intact  SKIN: +R LE erythema from mid shin to ankle. +medial/anterior popped blisters with clean bases, slight eschar, minimal surrounding erythema, no purulence or  crepitus/fluctuance.    NERVOUS SYSTEM:  A+O x 3, nonfocal    LABS:                        10.9   6.13  )-----------( 321      ( 08 Oct 2018 08:07 )             35.1     10-08    136  |  102  |  27<H>  ----------------------------<  104<H>  4.2   |  22  |  0.93    Ca    9.0      08 Oct 2018 07:13        CAPILLARY BLOOD GLUCOSE      POCT Blood Glucose.: 153 mg/dL (08 Oct 2018 08:40)  POCT Blood Glucose.: 261 mg/dL (07 Oct 2018 22:22)  POCT Blood Glucose.: 94 mg/dL (07 Oct 2018 17:26)  POCT Blood Glucose.: 138 mg/dL (07 Oct 2018 12:16)

## 2018-10-08 NOTE — PROGRESS NOTE ADULT - ASSESSMENT
67-yo Female with PMHx bullous pemphigoid, T2DM, HLD, hypothyroid, uterine ca in remission, Red man syndrome 2/2 vancomycin rapid infusion, p/w RLE cellulitis.

## 2018-10-08 NOTE — PROGRESS NOTE ADULT - SUBJECTIVE AND OBJECTIVE BOX
CC: F/U for Cellulitis    Saw/spoke to patient. No fevers, no chills. Overall well. No new complaints.    Allergies  Ancef (Rash)  daptomycin (Vomiting)  Keflex (Unknown)  penicillin (Pruritus; Rash; Hives)    ANTIMICROBIALS:  vancomycin  IVPB 1250 every 12 hours    PE:    Vital Signs Last 24 Hrs  T(C): 37.4 (08 Oct 2018 08:22), Max: 37.4 (08 Oct 2018 08:22)  T(F): 99.4 (08 Oct 2018 08:22), Max: 99.4 (08 Oct 2018 08:22)  HR: 96 (08 Oct 2018 08:22) (82 - 106)  BP: 111/61 (08 Oct 2018 08:22) (96/58 - 121/76)  RR: 18 (08 Oct 2018 08:22) (18 - 18)  SpO2: 96% (08 Oct 2018 08:22) (95% - 97%)    Gen: AOx3, NAD, non-toxic, pleasant  CV: S1+S2 normal, nontachycardic  Resp: Clear bilat, no resp distress, no crackles/wheezes  Abd: Soft, nontender, +BS  Ext: RLE wound, mild surrounding redness (venous stasis vs cellulitis)    LABS:                        10.9   6.13  )-----------( 321      ( 08 Oct 2018 08:07 )             35.1     10-08    136  |  102  |  27<H>  ----------------------------<  104<H>  4.2   |  22  |  0.93    Ca    9.0      08 Oct 2018 07:13    MICROBIOLOGY:    .Blood Blood  10-05-18   No growth to date.      .Blood Blood  10-05-18   No growth to date.     (otherwise reviewed)    RADIOLOGY:    9/8 CT:    IMPRESSION:   No evidence of disease recurrence.  Cholelithiasis.

## 2018-10-08 NOTE — PROGRESS NOTE ADULT - ASSESSMENT
67 F PMH bullous pemphigoid, T2DM, HLD, hypothyroid, uterine ca in remission, Red man syndrome 2/2 vancomycin rapid infusion, p/w RLE cellulitis.  RLE redness in setting of ruptured blister  On IVIG, prednisone, cellcept (per patient)  No fevers, no leukocytosis  On exam, appears consistent with strep cellulitis without purulence  Note history of PCN allergy, and allergy to Keflex on challenging  Tolerating vanco despite history Red Man's  Overall, new RLE cellulitis, immunosuppressed, bullouse pemphigoid  - Vancomycin 1250mg q 12 (check trough before 4th dose)  - F/U BCX x 2--NGTD  - Wound care to RLE shin, monitor site for improvement  - When DC planning, as outpatient, would change to Zyvox PO 600mg q 12 to complete 7 day course. Would check that patient's insurance will cover this antibiotic as it can be expensive. Patient not presently on any serotonin activating meds presently (would avoid while on this medication).  - Will sign off. Please call with further questions or change in status.    Zeeshan Duran MD  Pager [phone]  After 5pm and on weekends call [phone]

## 2018-10-08 NOTE — PROGRESS NOTE ADULT - PROBLEM SELECTOR PLAN 1
-admit for IV abx 2/2 failure of PO therapy as outpatient  -s/p vanco in ER, no red man syndrome, tolerated well. c/w vanco  -blood cxs negative so far  -appreciate ID/wound care

## 2018-10-09 ENCOUNTER — TRANSCRIPTION ENCOUNTER (OUTPATIENT)
Age: 68
End: 2018-10-09

## 2018-10-09 DIAGNOSIS — Z02.9 ENCOUNTER FOR ADMINISTRATIVE EXAMINATIONS, UNSPECIFIED: ICD-10-CM

## 2018-10-09 LAB
GLUCOSE BLDC GLUCOMTR-MCNC: 120 MG/DL — HIGH (ref 70–99)
GLUCOSE BLDC GLUCOMTR-MCNC: 135 MG/DL — HIGH (ref 70–99)
GLUCOSE BLDC GLUCOMTR-MCNC: 141 MG/DL — HIGH (ref 70–99)
GLUCOSE BLDC GLUCOMTR-MCNC: 99 MG/DL — SIGNIFICANT CHANGE UP (ref 70–99)
HCT VFR BLD CALC: 32.9 % — LOW (ref 34.5–45)
HGB BLD-MCNC: 10.3 G/DL — LOW (ref 11.5–15.5)
MCHC RBC-ENTMCNC: 31.3 GM/DL — LOW (ref 32–36)
MCHC RBC-ENTMCNC: 31.5 PG — SIGNIFICANT CHANGE UP (ref 27–34)
MCV RBC AUTO: 100.6 FL — HIGH (ref 80–100)
PLATELET # BLD AUTO: 320 K/UL — SIGNIFICANT CHANGE UP (ref 150–400)
RBC # BLD: 3.27 M/UL — LOW (ref 3.8–5.2)
RBC # FLD: 14.8 % — HIGH (ref 10.3–14.5)
VANCOMYCIN TROUGH SERPL-MCNC: 24.2 UG/ML — HIGH (ref 10–20)
WBC # BLD: 2.97 K/UL — LOW (ref 3.8–10.5)
WBC # FLD AUTO: 2.97 K/UL — LOW (ref 3.8–10.5)

## 2018-10-09 RX ADMIN — Medication 166.67 MILLIGRAM(S): at 20:51

## 2018-10-09 RX ADMIN — Medication 1 APPLICATION(S): at 20:54

## 2018-10-09 RX ADMIN — Medication 325 MILLIGRAM(S): at 12:04

## 2018-10-09 RX ADMIN — Medication 1 APPLICATION(S): at 06:38

## 2018-10-09 RX ADMIN — OXYCODONE AND ACETAMINOPHEN 2 TABLET(S): 5; 325 TABLET ORAL at 20:10

## 2018-10-09 RX ADMIN — Medication 500 MILLIGRAM(S): at 06:38

## 2018-10-09 RX ADMIN — Medication 10 MILLIGRAM(S): at 06:38

## 2018-10-09 RX ADMIN — Medication 81 MILLIGRAM(S): at 12:04

## 2018-10-09 RX ADMIN — ATORVASTATIN CALCIUM 20 MILLIGRAM(S): 80 TABLET, FILM COATED ORAL at 20:54

## 2018-10-09 RX ADMIN — HEPARIN SODIUM 5000 UNIT(S): 5000 INJECTION INTRAVENOUS; SUBCUTANEOUS at 06:38

## 2018-10-09 RX ADMIN — MUPIROCIN 1 APPLICATION(S): 20 OINTMENT TOPICAL at 06:38

## 2018-10-09 RX ADMIN — TRAMADOL HYDROCHLORIDE 50 MILLIGRAM(S): 50 TABLET ORAL at 02:50

## 2018-10-09 RX ADMIN — Medication 1 TABLET(S): at 12:04

## 2018-10-09 RX ADMIN — HEPARIN SODIUM 5000 UNIT(S): 5000 INJECTION INTRAVENOUS; SUBCUTANEOUS at 20:54

## 2018-10-09 RX ADMIN — MUPIROCIN 1 APPLICATION(S): 20 OINTMENT TOPICAL at 20:54

## 2018-10-09 RX ADMIN — Medication 166.67 MILLIGRAM(S): at 07:08

## 2018-10-09 RX ADMIN — Medication 1 MILLIGRAM(S): at 12:04

## 2018-10-09 RX ADMIN — Medication 150 MICROGRAM(S): at 06:38

## 2018-10-09 RX ADMIN — LISINOPRIL 2.5 MILLIGRAM(S): 2.5 TABLET ORAL at 06:37

## 2018-10-09 RX ADMIN — OXYCODONE AND ACETAMINOPHEN 2 TABLET(S): 5; 325 TABLET ORAL at 06:54

## 2018-10-09 RX ADMIN — OXYCODONE AND ACETAMINOPHEN 2 TABLET(S): 5; 325 TABLET ORAL at 01:13

## 2018-10-09 RX ADMIN — TRAMADOL HYDROCHLORIDE 50 MILLIGRAM(S): 50 TABLET ORAL at 04:19

## 2018-10-09 RX ADMIN — OXYCODONE AND ACETAMINOPHEN 2 TABLET(S): 5; 325 TABLET ORAL at 19:30

## 2018-10-09 RX ADMIN — OXYCODONE AND ACETAMINOPHEN 2 TABLET(S): 5; 325 TABLET ORAL at 14:12

## 2018-10-09 RX ADMIN — OXYCODONE AND ACETAMINOPHEN 2 TABLET(S): 5; 325 TABLET ORAL at 13:12

## 2018-10-09 RX ADMIN — HEPARIN SODIUM 5000 UNIT(S): 5000 INJECTION INTRAVENOUS; SUBCUTANEOUS at 13:13

## 2018-10-09 RX ADMIN — Medication 5000 UNIT(S): at 12:04

## 2018-10-09 NOTE — DISCHARGE NOTE ADULT - CARE PLAN
Principal Discharge DX:	Cellulitis of right lower extremity  Goal:	improved with antibiotics  Assessment and plan of treatment:	Continue antibiotics and wound care to right lower extremity and follow up at the wound care center for post hospital care  Secondary Diagnosis:	Bullous pemphigoid  Assessment and plan of treatment:	continue prednisone and niacinamide ;  follow up with PMD  Secondary Diagnosis:	Hyperlipidemia  Assessment and plan of treatment:	Coronary artery disease is a condition where the arteries the supply the heart muscle get clogges with fatty deposits & puts you at risk for a heart attack  Call your doctor if you have any new pain, pressure, or discomfort in the center of your chest, pain, tingling or discomfort in arms, back, neck, jaw, or stomach, shortness of breath, nausea, vomiting, burping or heartburn, sweating, cold and clammy skin, racing or abnormal heartbeat for more than 10 minutes or if they keep coming & going.  Call 911 and do not tr to get to hospital by care  You can help yourself with lefestyle changes (quitting smoking if you smoke), eat lots of fruits & vegetables & low fat dairy products, not a lot of meat & fatty foods, walk or some form of physical activity most days of the week, lose weight if you are overweight  Take your cardiac medication as prescribed to lower cholesterol, to lower blood pressure, aspirin to prevent blood clots, and diabetes control  Make sure to keep appointments with doctor for cardiac follow up care  Secondary Diagnosis:	Type 2 diabetes mellitus  Assessment and plan of treatment:	HgA1C this admission.  Make sure you get your HgA1c checked every three months.  If you take oral diabetes medications, check your blood glucose two times a day.  If you take insulin, check your blood glucose before meals and at bedtime.  It's important not to skip any meals.  Keep a log of your blood glucose results and always take it with you to your doctor appointments.  Keep a list of your current medications including injectables and over the counter medications and bring this medication list with you to all your doctor appointments.  If you have not seen your ophthalmologist this year call for appointment.  Check your feet daily for redness, sores, or openings. Do not self treat. If no improvement in two days call your primary care physician for an appointment.  Low blood sugar (hypoglycemia) is a blood sugar below 70mg/dl. Check your blood sugar if you feel signs/symptoms of hypoglycemia. If your blood sugar is below 70 take 15 grams of carbohydrates (ex 4 oz of apple juice, 3-4 glucose tablets, or 4-6 oz of regular soda) wait 15 minutes and repeat blood sugar to make sure it comes up above 70.  If your blood sugar is above 70 and you are due for a meal, have a meal.  If you are not due for a meal have a snack.  This snack helps keeps your blood sugar at a safe range.  Secondary Diagnosis:	Vitamin D deficiency  Assessment and plan of treatment:	continue vitamin d supplement  Secondary Diagnosis:	Hypertension  Assessment and plan of treatment:	Follow up with your medical doctor to establish long term blood pressure treatment goals.

## 2018-10-09 NOTE — DISCHARGE NOTE ADULT - PLAN OF CARE
Follow up with your medical doctor to establish long term blood pressure treatment goals. improved with antibiotics Continue antibiotics and wound care to right lower extremity and follow up at the wound care center for post hospital care continue prednisone and niacinamide ;  follow up with PMD Coronary artery disease is a condition where the arteries the supply the heart muscle get clogges with fatty deposits & puts you at risk for a heart attack  Call your doctor if you have any new pain, pressure, or discomfort in the center of your chest, pain, tingling or discomfort in arms, back, neck, jaw, or stomach, shortness of breath, nausea, vomiting, burping or heartburn, sweating, cold and clammy skin, racing or abnormal heartbeat for more than 10 minutes or if they keep coming & going.  Call 911 and do not tr to get to hospital by care  You can help yourself with lefestyle changes (quitting smoking if you smoke), eat lots of fruits & vegetables & low fat dairy products, not a lot of meat & fatty foods, walk or some form of physical activity most days of the week, lose weight if you are overweight  Take your cardiac medication as prescribed to lower cholesterol, to lower blood pressure, aspirin to prevent blood clots, and diabetes control  Make sure to keep appointments with doctor for cardiac follow up care HgA1C this admission.  Make sure you get your HgA1c checked every three months.  If you take oral diabetes medications, check your blood glucose two times a day.  If you take insulin, check your blood glucose before meals and at bedtime.  It's important not to skip any meals.  Keep a log of your blood glucose results and always take it with you to your doctor appointments.  Keep a list of your current medications including injectables and over the counter medications and bring this medication list with you to all your doctor appointments.  If you have not seen your ophthalmologist this year call for appointment.  Check your feet daily for redness, sores, or openings. Do not self treat. If no improvement in two days call your primary care physician for an appointment.  Low blood sugar (hypoglycemia) is a blood sugar below 70mg/dl. Check your blood sugar if you feel signs/symptoms of hypoglycemia. If your blood sugar is below 70 take 15 grams of carbohydrates (ex 4 oz of apple juice, 3-4 glucose tablets, or 4-6 oz of regular soda) wait 15 minutes and repeat blood sugar to make sure it comes up above 70.  If your blood sugar is above 70 and you are due for a meal, have a meal.  If you are not due for a meal have a snack.  This snack helps keeps your blood sugar at a safe range. continue vitamin d supplement

## 2018-10-09 NOTE — DISCHARGE NOTE ADULT - MEDICATION SUMMARY - MEDICATIONS TO TAKE
I will START or STAY ON the medications listed below when I get home from the hospital:    predniSONE 10 mg oral tablet  -- 1 tab(s) by mouth once a day  -- Indication: For Bullous pemphigoid    traMADol 50 mg oral tablet  -- 1 tab(s) by mouth every 8 hours, As needed, Severe Pain (7 - 10)  -- Indication: For mild pain    Vicodin 5 mg-300 mg oral tablet  -- 1 tab(s) by mouth every 6 hours, As Needed  -- Indication: For mild pain    aspirin 81 mg oral delayed release tablet  -- 1 tab(s) by mouth once a day  -- Indication: For Cad    lisinopril 2.5 mg oral tablet  -- 1 tab(s) by mouth once a day  -- Indication: For HTN    metFORMIN 500 mg oral tablet, extended release  -- 500 am 1000 pm  -- Indication: For DiABETES     pioglitazone 15 mg oral tablet  -- 1 tab(s) by mouth once a day  -- Indication: For DiABETES    atorvastatin 20 mg oral tablet  -- 1 tab(s) by mouth once a day (at bedtime)  -- Indication: For CAD    Fosamax 35 mg oral tablet  -- 1 tab(s) by mouth once a week on Sunday  -- Indication: For OSTEOPOROSIS    clobetasol 0.05% topical ointment  -- 1 application on skin 2 times a day  -- Indication: For RASH    mupirocin 2% topical ointment  -- 1 application on skin 2 times a day  -- Indication: For CellulitS    ferrous sulfate 325 mg (65 mg elemental iron) oral tablet  -- 1 tab(s) by mouth once a day  -- Indication: For ANEMIA    Levaquin 750 mg oral tablet  -- 1 tab(s) by mouth once a day   -- Avoid prolonged or excessive exposure to direct and/or artificial sunlight while taking this medication.  Do not take dairy products, antacids, or iron preparations within one hour of this medication.  Finish all this medication unless otherwise directed by prescriber.  May cause drowsiness or dizziness.  Medication should be taken with plenty of water.    -- Indication: For Cellulitis of right lower extremity    levothyroxine 150 mcg (0.15 mg) oral tablet  -- 1 tab(s) by mouth   -- Indication: For Hypothyroidism    levothyroxine 300 mcg (0.3 mg) oral tablet  -- 1 tab(s) by mouth   -- Indication: For Hypothyroidism    Multiple Vitamins oral tablet  -- 1 tab(s) by mouth once a day  -- Indication: For supplementation    cholecalciferol oral tablet  -- 5000 unit(s) by mouth once a day  -- Indication: For supplementation    folic acid 1 mg oral tablet  -- 1 tab(s) by mouth once a day  -- Indication: For supplementation    Niacinamide 500 mg oral tablet  -- 1 tab(s) by mouth 2 times a day  -- Indication: For Bullous pemphigoid

## 2018-10-09 NOTE — PHYSICAL THERAPY INITIAL EVALUATION ADULT - ADDITIONAL COMMENTS
Lives with spouse in private home and he is her caregiver if needed. Lives with spouse in private home and pt's spouse assists as needed. Pt ambulates with two canes, but also owns rollator, transport w/c, shower chair.

## 2018-10-09 NOTE — PROGRESS NOTE ADULT - PROBLEM SELECTOR PLAN 1
-admit for IV abx 2/2 failure of PO therapy as outpatient  -started vanco 10/5/18  -blood cxs negative so far  -appreciate ID/PT wound care  -there is interaction between her tramadol and zyvox; let's ask ID if doxycycline is a viable option upon d/c

## 2018-10-09 NOTE — PHYSICAL THERAPY INITIAL EVALUATION ADULT - PERTINENT HX OF CURRENT PROBLEM, REHAB EVAL
66 yo F PMH bullous pemphigoid, T2DM, HLD, hypothyroid, uterine ca in remission, Red man syndrome 2/2 vancomycin rapid infusion, p/w RLE cellulitis. Pt says that starting 3 days PTA, she noticed a blister that had developed on her RLE had popped and started getting red. The redness started spreading downward from shin to ankle and she alerted her dermatologist. Cont'd...

## 2018-10-09 NOTE — DISCHARGE NOTE ADULT - CARE PROVIDERS DIRECT ADDRESSES
,DirectAddress_Unknown,[email or web address]

## 2018-10-09 NOTE — DISCHARGE NOTE ADULT - PATIENT PORTAL LINK FT
You can access the [facility] Patient Portal, offered by [facility], by registering with the following website: [email or web address]

## 2018-10-09 NOTE — DISCHARGE NOTE ADULT - HOSPITAL COURSE
66 y/o F w/ PMHx of bullous pemphigoid, T2DM, HLD, hypothyroid, uterine ca in remission, Red man syndrome 2/2 vancomycin rapid infusion, p/w RLE cellulitis.    Dx: RLE Cellulitis- IV Vanco   Pt was admitted with right leg cellulitis, she was seen by physical therapy wound care department and started on intravenous antibiotics by infectious disease. Right leg infection improved significantly.  Pt. advised to follow up with Dr Saldivar at the wound center and continue present treatment for bullous pemphigoid Pt has a history of diabetes and complaint with her medications . Pt will follow up with Dr. Canas her endocrinologist and Dr. Zhang her PCP for post hospital care. 68 y/o F w/ PMHx of bullous pemphigoid, T2DM, HLD, hypothyroid, uterine ca in remission, Red man syndrome 2/2 vancomycin rapid infusion, p/w RLE cellulitis.    Dx: RLE Cellulitis - IV Vanco   Pt was admitted with right leg cellulitis, she was seen by physical therapy wound care department and started on intravenous antibiotics by infectious disease. Right leg infection improved significantly.  Pt. advised to follow up with Dr Saldivar at the wound center and continue present treatment for bullous pemphigoid Pt has a history of diabetes and complaint with her medications . Pt will follow up with Dr. Canas her endocrinologist and Dr. Zhang her PCP for post hospital care.

## 2018-10-09 NOTE — PHYSICAL THERAPY INITIAL EVALUATION ADULT - MODALITIES TREATMENT COMMENTS
RLE wounds, medial measuring 1 cm x 1 cm x 0 cm; lateral wound measuring 2.2 cm x 2 cm x 0cm. No purulence, no erythema, no malodor.

## 2018-10-09 NOTE — PHYSICAL THERAPY INITIAL EVALUATION ADULT - BALANCE TRAINING, PT EVAL
GOAL: Pt will demonstrate improved static/dynamic balance to good, in order to improve stability, decrease fall risk and increase independence with ADLs within 4 weeks.

## 2018-10-09 NOTE — PHYSICAL THERAPY INITIAL EVALUATION ADULT - PRECAUTIONS/LIMITATIONS, REHAB EVAL
Pt had been taking prophylactic doxycycline bid; her dermatologist told her to d/c doxy and to start bactrim for treatment of cellulitis.  Pt notes that there was some initial improvement of erythema, but then eventually progressive sx. +erythema. +warmth +tenderness from mid shin to ankle. No foot or toe involvement. ROM intact. Able to ambulate but with pain. Denies trauma or puncture wounds. Denies purulence. Pt notes she is on IVIG, and has been taking it every 6 wks, recently reduced to q4 wks; last infusion was last week of September. Pt has been taking mupirocin cream and clobetasol cream to her LE pemphigus lesions. fall precautions/Pt had been taking prophylactic doxycycline bid; her dermatologist told her to d/c doxy and to start bactrim for treatment of cellulitis.  Pt notes that there was some initial improvement of erythema, but then eventually progressive sx. +erythema. +warmth +tenderness from mid shin to ankle. No foot or toe involvement. ROM intact. Able to ambulate but with pain. Denies trauma or puncture wounds. Denies purulence. Pt notes she is on IVIG, and has been taking it every 6 wks, recently reduced to q4 wks; last infusion was last week of September. Pt has been taking mupirocin cream and clobetasol cream to her LE pemphigus lesions.

## 2018-10-09 NOTE — PROGRESS NOTE ADULT - SUBJECTIVE AND OBJECTIVE BOX
No acute changes in how she feels overall    Vital Signs Last 24 Hrs  T(C): 36.9 (09 Oct 2018 15:40), Max: 37 (08 Oct 2018 16:35)  T(F): 98.4 (09 Oct 2018 15:40), Max: 98.6 (08 Oct 2018 16:35)  HR: 68 (09 Oct 2018 16:10) (68 - 86)  BP: 110/57 (09 Oct 2018 16:10) (90/52 - 117/70)  BP(mean): --  RR: 17 (09 Oct 2018 15:40) (16 - 18)  SpO2: 97% (09 Oct 2018 15:40) (97% - 99%)      GENERAL: NAD, well-groomed, morbidly obese  HEAD:  Atraumatic, Normocephalic  EYES: EOMI  NECK: Supple, No JVD, Normal thyroid  CHEST/LUNG: Clear to percussion bilaterally; No rales, rhonchi, wheezing, or rubs no resp distress or accessory musc usage, talking in full  sentences  HEART: Regular rate and rhythm; No murmurs, rubs, or gallops, 1+ edema b/l LE  ABDOMEN: Soft, Nontender, Nondistended; Bowel sounds present no rebound/guarding  EXTREMITIES:  2+ Peripheral Pulses, No clubbing, cyanosis rom intact  SKIN: s/p debridement of dead skin; minimal erythema remains; +medial/anterior popped blisters rt LE with clean bases, minimal surrounding erythema, no purulence or  crepitus/fluctuance.    NERVOUS SYSTEM:  A+O x 3, nonfocal    LABS:                        10.3   2.97  )-----------( 320      ( 09 Oct 2018 10:51 )             32.9     10-08    136  |  102  |  27<H>  ----------------------------<  104<H>  4.2   |  22  |  0.93    Ca    9.0      08 Oct 2018 07:13        CAPILLARY BLOOD GLUCOSE      POCT Blood Glucose.: 141 mg/dL (09 Oct 2018 12:34)  POCT Blood Glucose.: 135 mg/dL (09 Oct 2018 08:53)  POCT Blood Glucose.: 116 mg/dL (08 Oct 2018 21:27)  POCT Blood Glucose.: 157 mg/dL (08 Oct 2018 17:12)

## 2018-10-09 NOTE — DISCHARGE NOTE ADULT - CARE PROVIDER_API CALL
Olivia Ross), Internal Medicine  [address] [facility] [address]  [address]  [place] [address]  Phone: [phone]  Fax: [phone]    Jose Canas), EndocrinologyMetabDiabetes; Internal Medicine  [address] [facility]  [address]  [place] [address]  Phone: [phone]  Fax: [phone]

## 2018-10-10 VITALS
DIASTOLIC BLOOD PRESSURE: 69 MMHG | OXYGEN SATURATION: 98 % | SYSTOLIC BLOOD PRESSURE: 103 MMHG | TEMPERATURE: 99 F | RESPIRATION RATE: 17 BRPM | HEART RATE: 85 BPM

## 2018-10-10 LAB
CULTURE RESULTS: SIGNIFICANT CHANGE UP
CULTURE RESULTS: SIGNIFICANT CHANGE UP
GLUCOSE BLDC GLUCOMTR-MCNC: 145 MG/DL — HIGH (ref 70–99)
GLUCOSE BLDC GLUCOMTR-MCNC: 154 MG/DL — HIGH (ref 70–99)
SPECIMEN SOURCE: SIGNIFICANT CHANGE UP
SPECIMEN SOURCE: SIGNIFICANT CHANGE UP

## 2018-10-10 PROCEDURE — 83036 HEMOGLOBIN GLYCOSYLATED A1C: CPT

## 2018-10-10 PROCEDURE — 80053 COMPREHEN METABOLIC PANEL: CPT

## 2018-10-10 PROCEDURE — 82962 GLUCOSE BLOOD TEST: CPT

## 2018-10-10 PROCEDURE — 82803 BLOOD GASES ANY COMBINATION: CPT

## 2018-10-10 PROCEDURE — 84295 ASSAY OF SERUM SODIUM: CPT

## 2018-10-10 PROCEDURE — 82435 ASSAY OF BLOOD CHLORIDE: CPT

## 2018-10-10 PROCEDURE — 80202 ASSAY OF VANCOMYCIN: CPT

## 2018-10-10 PROCEDURE — 97162 PT EVAL MOD COMPLEX 30 MIN: CPT

## 2018-10-10 PROCEDURE — 99285 EMERGENCY DEPT VISIT HI MDM: CPT

## 2018-10-10 PROCEDURE — 83735 ASSAY OF MAGNESIUM: CPT

## 2018-10-10 PROCEDURE — 82947 ASSAY GLUCOSE BLOOD QUANT: CPT

## 2018-10-10 PROCEDURE — 83605 ASSAY OF LACTIC ACID: CPT

## 2018-10-10 PROCEDURE — 85014 HEMATOCRIT: CPT

## 2018-10-10 PROCEDURE — 84132 ASSAY OF SERUM POTASSIUM: CPT

## 2018-10-10 PROCEDURE — 82330 ASSAY OF CALCIUM: CPT

## 2018-10-10 PROCEDURE — 84100 ASSAY OF PHOSPHORUS: CPT

## 2018-10-10 PROCEDURE — 85027 COMPLETE CBC AUTOMATED: CPT

## 2018-10-10 PROCEDURE — 80048 BASIC METABOLIC PNL TOTAL CA: CPT

## 2018-10-10 PROCEDURE — 87040 BLOOD CULTURE FOR BACTERIA: CPT

## 2018-10-10 RX ORDER — MUPIROCIN 20 MG/G
1 OINTMENT TOPICAL
Qty: 0 | Refills: 0 | DISCHARGE
Start: 2018-10-10

## 2018-10-10 RX ORDER — LISINOPRIL 2.5 MG/1
1 TABLET ORAL
Qty: 0 | Refills: 0 | DISCHARGE
Start: 2018-10-10

## 2018-10-10 RX ORDER — ASPIRIN/CALCIUM CARB/MAGNESIUM 324 MG
1 TABLET ORAL
Qty: 0 | Refills: 0 | DISCHARGE
Start: 2018-10-10

## 2018-10-10 RX ORDER — ASPIRIN/CALCIUM CARB/MAGNESIUM 324 MG
1 TABLET ORAL
Qty: 0 | Refills: 0 | COMMUNITY

## 2018-10-10 RX ORDER — LEVOTHYROXINE SODIUM 125 MCG
1 TABLET ORAL
Qty: 0 | Refills: 0 | DISCHARGE
Start: 2018-10-10

## 2018-10-10 RX ORDER — MUPIROCIN 20 MG/G
1 OINTMENT TOPICAL
Qty: 0 | Refills: 0 | COMMUNITY

## 2018-10-10 RX ORDER — FOLIC ACID 0.8 MG
1 TABLET ORAL
Qty: 0 | Refills: 0 | DISCHARGE
Start: 2018-10-10

## 2018-10-10 RX ORDER — ATORVASTATIN CALCIUM 80 MG/1
1 TABLET, FILM COATED ORAL
Qty: 30 | Refills: 0
Start: 2018-10-10 | End: 2018-11-08

## 2018-10-10 RX ORDER — LISINOPRIL 2.5 MG/1
1 TABLET ORAL
Qty: 0 | Refills: 0 | COMMUNITY

## 2018-10-10 RX ORDER — TRAMADOL HYDROCHLORIDE 50 MG/1
1 TABLET ORAL
Qty: 0 | Refills: 0 | DISCHARGE
Start: 2018-10-10

## 2018-10-10 RX ORDER — CHOLECALCIFEROL (VITAMIN D3) 125 MCG
5 CAPSULE ORAL
Qty: 0 | Refills: 0 | COMMUNITY

## 2018-10-10 RX ORDER — CHOLECALCIFEROL (VITAMIN D3) 125 MCG
5000 CAPSULE ORAL
Qty: 0 | Refills: 0 | DISCHARGE
Start: 2018-10-10

## 2018-10-10 RX ORDER — FOLIC ACID 0.8 MG
1 TABLET ORAL
Qty: 0 | Refills: 0 | COMMUNITY

## 2018-10-10 RX ORDER — LEVOTHYROXINE SODIUM 125 MCG
2 TABLET ORAL
Qty: 0 | Refills: 0 | COMMUNITY

## 2018-10-10 RX ADMIN — Medication 150 MICROGRAM(S): at 08:26

## 2018-10-10 RX ADMIN — OXYCODONE AND ACETAMINOPHEN 2 TABLET(S): 5; 325 TABLET ORAL at 00:30

## 2018-10-10 RX ADMIN — Medication 325 MILLIGRAM(S): at 13:06

## 2018-10-10 RX ADMIN — Medication 10 MILLIGRAM(S): at 04:03

## 2018-10-10 RX ADMIN — LISINOPRIL 2.5 MILLIGRAM(S): 2.5 TABLET ORAL at 04:04

## 2018-10-10 RX ADMIN — Medication 500 MILLIGRAM(S): at 04:04

## 2018-10-10 RX ADMIN — Medication 1 APPLICATION(S): at 08:28

## 2018-10-10 RX ADMIN — OXYCODONE AND ACETAMINOPHEN 2 TABLET(S): 5; 325 TABLET ORAL at 09:29

## 2018-10-10 RX ADMIN — OXYCODONE AND ACETAMINOPHEN 2 TABLET(S): 5; 325 TABLET ORAL at 00:00

## 2018-10-10 RX ADMIN — Medication 1 TABLET(S): at 13:06

## 2018-10-10 RX ADMIN — Medication 5000 UNIT(S): at 13:06

## 2018-10-10 RX ADMIN — OXYCODONE AND ACETAMINOPHEN 2 TABLET(S): 5; 325 TABLET ORAL at 08:26

## 2018-10-10 RX ADMIN — Medication 81 MILLIGRAM(S): at 13:06

## 2018-10-10 RX ADMIN — TRAMADOL HYDROCHLORIDE 50 MILLIGRAM(S): 50 TABLET ORAL at 03:58

## 2018-10-10 RX ADMIN — Medication 1 MILLIGRAM(S): at 13:06

## 2018-10-10 RX ADMIN — OXYCODONE AND ACETAMINOPHEN 2 TABLET(S): 5; 325 TABLET ORAL at 15:04

## 2018-10-10 RX ADMIN — MUPIROCIN 1 APPLICATION(S): 20 OINTMENT TOPICAL at 08:27

## 2018-10-10 RX ADMIN — OXYCODONE AND ACETAMINOPHEN 2 TABLET(S): 5; 325 TABLET ORAL at 16:04

## 2018-10-10 RX ADMIN — Medication 1: at 13:05

## 2018-10-10 RX ADMIN — HEPARIN SODIUM 5000 UNIT(S): 5000 INJECTION INTRAVENOUS; SUBCUTANEOUS at 13:06

## 2018-10-10 RX ADMIN — Medication 166.67 MILLIGRAM(S): at 09:40

## 2018-10-10 RX ADMIN — HEPARIN SODIUM 5000 UNIT(S): 5000 INJECTION INTRAVENOUS; SUBCUTANEOUS at 04:03

## 2018-10-10 NOTE — CHART NOTE - NSCHARTNOTEFT_GEN_A_CORE
NP spoke with infectious disease Dr Duran to clarify discharge antibiotic medication . As per Dr. Duran , pt can be discharged on Levaquin 750mgs daily for 4 days totalling 7 days. Pt will continue Doxycycline 100 mg's 2x a day as previously ordered for he bullous pemphigoid. NP spoke with infectious disease Dr Duran to clarify discharge antibiotic medication . Pt was intially recommended to take zyvox but was found to be contraindicated with tramadol.  As per Dr. Duran , pt can be discharged on Levaquin 750mgs daily instead for 4 days totalling 7 days. Pt will continue Doxycycline 100 mg's 2x a day as previously ordered for he bullous pemphigoid.

## 2018-10-10 NOTE — CHART NOTE - NSCHARTNOTEFT_GEN_A_CORE
Pt seen and examined at bedside.   No overnight event.  Feeling better.  no cp, no sob, no n/v/d.   RLE cellulitis much improved.   d/c planning with abx per ID.    d/w pt and NP Rigoberto.     - Dr. SOLOMON Horne ([facility])  - [phone]

## 2018-10-17 NOTE — PROGRESS NOTE ADULT - PROBLEM/PLAN-5
REFERRING MD:  Carlos Wan M.D.    CHIEF COMPLAINT:  New patient being consulted for Mohs' surgery evaluation.    HISTORY OF PRESENT ILLNESS:  53 y.o. female presents with a 2 year(s) history of growth on the L medial frontal scalp.  Treated with cryosurgery twice, never healed. (+) itching.    Negative for scabbing.  Negative for crusting.  Negative for bleeding.  Positive for itching.    Biopsy consistent with basal cell carcinoma.     Prior treatment with cryosurgery.    Pacemaker: No  Defibrillator: No  Artificial joints: No  Artificial heart valves: No    PAST MEDICAL HISTORY:  Past Medical History:   Diagnosis Date    Anemia     Arthritis     Basal cell carcinoma     Broken ankle     Cancer 1985    Clotting disorder     Disorder of kidney and ureter     Fracture of elbow     left    Hypothyroidism     IBS (irritable bowel syndrome)     Seizures     Thyroid disease        PAST SURGICAL HISTORY:  Past Surgical History:   Procedure Laterality Date    APPENDECTOMY      BRAIN SURGERY      for glioblastoma      SECTION      CHOLECYSTECTOMY      HYSTERECTOMY      TUBAL LIGATION          SOCIAL HISTORY:  Dependencies:  never smoked    PERTINENT MEDICATIONS:  See medications list.  Xarelto    ALLERGIES:  Neurontin [gabapentin]    ROS:  Skin: See HPI  Constitutional: No fatigue, fever, malaise, weight loss, or night sweats.  Cardiovascular: No chest pain, palpitations, or edema.  Respiratory: No coughing, wheezing, SOB, or sputum production.    Physical Exam   HENT:   Head:             General: Mood and affect normal. Alert and orient X3. Normal appearance.  Scalp: L superior parietal scalp in partline with a 7 x 9 mm pink pearly papule located 8.5 cm superiorly from the left superior ear attachment.   Eyelids:  no suspicious lesions  Head/Face:  no suspicious lesions    IMPRESSION:  Biopsy proven nodular basal cell carcinoma, L medial frontal 
scalp, path# [id number].    PLAN:  The diagnosis and the pathology report were discussed in detail with the patient. Treatment options were reviewed, including Mohs Micrographic Surgery, radiation, topical therapy, and standard excision.  After careful review of patient's history and physical exam, and after discussion of treatment options, the decision was made to perform Mohs micrographic surgery.    Scheduled patient for Mohs Micrographic Surgery. Risks, benefits, and alternatives of Mohs' surgery discussed with the patient. Discussed repair options including complex closure, skin flap, skin graft and second intention healing with the patient.  My concern is this lesion is superior to scar tissue from glioblastoma surgery and that may limit the mobility of tissue in repair.  Disc in detail with patient - may need Plastics involvement if extensive.  Disc risk of scar, hair thinning.  Pre-operative instructions provided to the patient. Okay to stay on Xarelto.    Spent 30 minutes in coordination of care and/or consultation with patient discussing diagnosis, treatment options, risks and benefits of each. All questions answered.        
DISPLAY PLAN FREE TEXT

## 2018-10-22 ENCOUNTER — APPOINTMENT (OUTPATIENT)
Dept: RHEUMATOLOGY | Facility: CLINIC | Age: 68
End: 2018-10-22
Payer: MEDICARE

## 2018-10-22 ENCOUNTER — APPOINTMENT (OUTPATIENT)
Dept: DERMATOLOGY | Facility: CLINIC | Age: 68
End: 2018-10-22
Payer: MEDICARE

## 2018-10-22 VITALS — SYSTOLIC BLOOD PRESSURE: 130 MMHG | DIASTOLIC BLOOD PRESSURE: 70 MMHG

## 2018-10-22 PROCEDURE — 96366 THER/PROPH/DIAG IV INF ADDON: CPT

## 2018-10-22 PROCEDURE — 99214 OFFICE O/P EST MOD 30 MIN: CPT

## 2018-10-22 PROCEDURE — 96365 THER/PROPH/DIAG IV INF INIT: CPT

## 2018-10-24 ENCOUNTER — APPOINTMENT (OUTPATIENT)
Dept: RHEUMATOLOGY | Facility: CLINIC | Age: 68
End: 2018-10-24
Payer: MEDICARE

## 2018-10-24 PROCEDURE — 96366 THER/PROPH/DIAG IV INF ADDON: CPT

## 2018-10-24 PROCEDURE — 96365 THER/PROPH/DIAG IV INF INIT: CPT

## 2018-10-26 ENCOUNTER — APPOINTMENT (OUTPATIENT)
Dept: RHEUMATOLOGY | Facility: CLINIC | Age: 68
End: 2018-10-26
Payer: MEDICARE

## 2018-10-26 PROCEDURE — 96366 THER/PROPH/DIAG IV INF ADDON: CPT

## 2018-10-26 PROCEDURE — 96365 THER/PROPH/DIAG IV INF INIT: CPT

## 2018-11-26 ENCOUNTER — APPOINTMENT (OUTPATIENT)
Dept: RHEUMATOLOGY | Facility: CLINIC | Age: 68
End: 2018-11-26
Payer: MEDICARE

## 2018-11-26 PROCEDURE — 96366 THER/PROPH/DIAG IV INF ADDON: CPT

## 2018-11-26 PROCEDURE — 96365 THER/PROPH/DIAG IV INF INIT: CPT

## 2018-11-28 ENCOUNTER — APPOINTMENT (OUTPATIENT)
Dept: RHEUMATOLOGY | Facility: CLINIC | Age: 68
End: 2018-11-28
Payer: MEDICARE

## 2018-11-28 PROCEDURE — 96365 THER/PROPH/DIAG IV INF INIT: CPT

## 2018-11-28 PROCEDURE — 96366 THER/PROPH/DIAG IV INF ADDON: CPT

## 2018-11-30 ENCOUNTER — APPOINTMENT (OUTPATIENT)
Dept: RHEUMATOLOGY | Facility: CLINIC | Age: 68
End: 2018-11-30
Payer: MEDICARE

## 2018-11-30 PROCEDURE — 96365 THER/PROPH/DIAG IV INF INIT: CPT

## 2018-11-30 PROCEDURE — 96366 THER/PROPH/DIAG IV INF ADDON: CPT

## 2018-12-05 ENCOUNTER — APPOINTMENT (OUTPATIENT)
Dept: GYNECOLOGIC ONCOLOGY | Facility: CLINIC | Age: 68
End: 2018-12-05
Payer: MEDICARE

## 2018-12-05 VITALS
HEIGHT: 65 IN | SYSTOLIC BLOOD PRESSURE: 128 MMHG | BODY MASS INDEX: 46.48 KG/M2 | DIASTOLIC BLOOD PRESSURE: 84 MMHG | WEIGHT: 279 LBS

## 2018-12-05 PROCEDURE — 99213 OFFICE O/P EST LOW 20 MIN: CPT

## 2018-12-24 ENCOUNTER — APPOINTMENT (OUTPATIENT)
Dept: RHEUMATOLOGY | Facility: CLINIC | Age: 68
End: 2018-12-24
Payer: MEDICARE

## 2018-12-24 PROCEDURE — 96366 THER/PROPH/DIAG IV INF ADDON: CPT

## 2018-12-24 PROCEDURE — 96365 THER/PROPH/DIAG IV INF INIT: CPT

## 2018-12-26 ENCOUNTER — APPOINTMENT (OUTPATIENT)
Dept: RHEUMATOLOGY | Facility: CLINIC | Age: 68
End: 2018-12-26
Payer: MEDICARE

## 2018-12-26 PROCEDURE — 96366 THER/PROPH/DIAG IV INF ADDON: CPT

## 2018-12-26 PROCEDURE — 96365 THER/PROPH/DIAG IV INF INIT: CPT

## 2018-12-28 ENCOUNTER — APPOINTMENT (OUTPATIENT)
Dept: RHEUMATOLOGY | Facility: CLINIC | Age: 68
End: 2018-12-28
Payer: MEDICARE

## 2018-12-28 PROCEDURE — 96366 THER/PROPH/DIAG IV INF ADDON: CPT

## 2018-12-28 PROCEDURE — 96365 THER/PROPH/DIAG IV INF INIT: CPT

## 2019-01-23 ENCOUNTER — APPOINTMENT (OUTPATIENT)
Dept: RHEUMATOLOGY | Facility: CLINIC | Age: 69
End: 2019-01-23
Payer: MEDICARE

## 2019-01-23 PROCEDURE — 96366 THER/PROPH/DIAG IV INF ADDON: CPT

## 2019-01-23 PROCEDURE — 96365 THER/PROPH/DIAG IV INF INIT: CPT

## 2019-01-24 ENCOUNTER — APPOINTMENT (OUTPATIENT)
Dept: RHEUMATOLOGY | Facility: CLINIC | Age: 69
End: 2019-01-24

## 2019-01-25 ENCOUNTER — RX RENEWAL (OUTPATIENT)
Age: 69
End: 2019-01-25

## 2019-01-25 ENCOUNTER — APPOINTMENT (OUTPATIENT)
Dept: RHEUMATOLOGY | Facility: CLINIC | Age: 69
End: 2019-01-25
Payer: MEDICARE

## 2019-01-25 PROCEDURE — 96365 THER/PROPH/DIAG IV INF INIT: CPT

## 2019-01-25 PROCEDURE — 96366 THER/PROPH/DIAG IV INF ADDON: CPT

## 2019-01-28 ENCOUNTER — APPOINTMENT (OUTPATIENT)
Dept: RHEUMATOLOGY | Facility: CLINIC | Age: 69
End: 2019-01-28
Payer: MEDICARE

## 2019-01-28 PROCEDURE — 96366 THER/PROPH/DIAG IV INF ADDON: CPT

## 2019-01-28 PROCEDURE — 96365 THER/PROPH/DIAG IV INF INIT: CPT

## 2019-02-04 ENCOUNTER — APPOINTMENT (OUTPATIENT)
Dept: DERMATOLOGY | Facility: CLINIC | Age: 69
End: 2019-02-04
Payer: MEDICARE

## 2019-02-04 PROCEDURE — 99214 OFFICE O/P EST MOD 30 MIN: CPT

## 2019-02-04 NOTE — PHYSICAL EXAM
[Alert] : alert [Oriented x 3] : ~L oriented x 3 [Well Nourished] : well nourished [Conjunctiva Non-injected] : conjunctiva non-injected [No Visual Lymphadenopathy] : no visual  lymphadenopathy [No Clubbing] : no clubbing [No Edema] : no edema [No Bromhidrosis] : no bromhidrosis [No Chromhidrosis] : no chromhidrosis [FreeTextEntry3] : the bilateral LE with edema noted, erythema with areas of xerosis and scaling. No active bullae

## 2019-02-04 NOTE — HISTORY OF PRESENT ILLNESS
[FreeTextEntry1] : FU BP [de-identified] : 67 yo F with hx of uterine malignancy who is here for BP follow-up\par \par 1) BP - she has been doing well on Doxy 100mg BID, Nicotinamide, IVIG Q4 weeks, and Prednisone 10mg daily. She has not had any new bullae recently. She has been taking her PPI and Vitamin D, but not the calcium supplementation. Denies any itching or pain. No new rash present today. No fevers or chills.

## 2019-02-11 ENCOUNTER — APPOINTMENT (OUTPATIENT)
Dept: DERMATOLOGY | Facility: CLINIC | Age: 69
End: 2019-02-11
Payer: MEDICARE

## 2019-02-11 PROCEDURE — 99214 OFFICE O/P EST MOD 30 MIN: CPT

## 2019-02-25 ENCOUNTER — APPOINTMENT (OUTPATIENT)
Dept: RHEUMATOLOGY | Facility: CLINIC | Age: 69
End: 2019-02-25
Payer: MEDICARE

## 2019-02-25 ENCOUNTER — APPOINTMENT (OUTPATIENT)
Dept: DERMATOLOGY | Facility: CLINIC | Age: 69
End: 2019-02-25
Payer: MEDICARE

## 2019-02-25 PROCEDURE — 96365 THER/PROPH/DIAG IV INF INIT: CPT

## 2019-02-25 PROCEDURE — 99213 OFFICE O/P EST LOW 20 MIN: CPT

## 2019-02-25 PROCEDURE — 96366 THER/PROPH/DIAG IV INF ADDON: CPT

## 2019-02-27 ENCOUNTER — APPOINTMENT (OUTPATIENT)
Dept: RHEUMATOLOGY | Facility: CLINIC | Age: 69
End: 2019-02-27
Payer: MEDICARE

## 2019-02-27 PROCEDURE — 96365 THER/PROPH/DIAG IV INF INIT: CPT

## 2019-02-27 PROCEDURE — 96366 THER/PROPH/DIAG IV INF ADDON: CPT

## 2019-03-01 ENCOUNTER — APPOINTMENT (OUTPATIENT)
Dept: RHEUMATOLOGY | Facility: CLINIC | Age: 69
End: 2019-03-01
Payer: MEDICARE

## 2019-03-01 PROCEDURE — 96366 THER/PROPH/DIAG IV INF ADDON: CPT

## 2019-03-01 PROCEDURE — 96365 THER/PROPH/DIAG IV INF INIT: CPT

## 2019-03-25 ENCOUNTER — APPOINTMENT (OUTPATIENT)
Dept: RHEUMATOLOGY | Facility: CLINIC | Age: 69
End: 2019-03-25
Payer: MEDICARE

## 2019-03-25 PROCEDURE — 96366 THER/PROPH/DIAG IV INF ADDON: CPT

## 2019-03-25 PROCEDURE — 96365 THER/PROPH/DIAG IV INF INIT: CPT

## 2019-03-27 ENCOUNTER — APPOINTMENT (OUTPATIENT)
Dept: RHEUMATOLOGY | Facility: CLINIC | Age: 69
End: 2019-03-27
Payer: MEDICARE

## 2019-03-27 PROCEDURE — 96365 THER/PROPH/DIAG IV INF INIT: CPT

## 2019-03-27 PROCEDURE — 96366 THER/PROPH/DIAG IV INF ADDON: CPT

## 2019-03-29 ENCOUNTER — APPOINTMENT (OUTPATIENT)
Dept: RHEUMATOLOGY | Facility: CLINIC | Age: 69
End: 2019-03-29
Payer: MEDICARE

## 2019-03-29 PROCEDURE — 96366 THER/PROPH/DIAG IV INF ADDON: CPT

## 2019-03-29 PROCEDURE — 96365 THER/PROPH/DIAG IV INF INIT: CPT

## 2019-04-14 ENCOUNTER — RX RENEWAL (OUTPATIENT)
Age: 69
End: 2019-04-14

## 2019-04-22 ENCOUNTER — APPOINTMENT (OUTPATIENT)
Dept: RHEUMATOLOGY | Facility: CLINIC | Age: 69
End: 2019-04-22
Payer: MEDICARE

## 2019-04-22 ENCOUNTER — APPOINTMENT (OUTPATIENT)
Dept: DERMATOLOGY | Facility: CLINIC | Age: 69
End: 2019-04-22
Payer: MEDICARE

## 2019-04-22 PROCEDURE — 96366 THER/PROPH/DIAG IV INF ADDON: CPT

## 2019-04-22 PROCEDURE — 99214 OFFICE O/P EST MOD 30 MIN: CPT

## 2019-04-22 PROCEDURE — 96365 THER/PROPH/DIAG IV INF INIT: CPT

## 2019-04-23 NOTE — PROGRESS NOTE ADULT - ASSESSMENT
66F with h/o Uterine ca (s/p chemo and completed in April 2016 ;  in remission), Bullous Pemphigoid (diagnosed 2014 on Prednisone), HTN, HLD, DMT2, OA who presents with persistent right leg redness, swelling ,pain, warmth and  bullae concerning for recurrent cellulitis vs incomplete treatment.
65 yo with bullous pemphigoid-on chronic doxy per derm.  Admitted with acute RLE erythema and swelling.  ?Cellulitis ?stais? lipodermatosclerosis  Minimal improvement with IV Vanco suggesting infection less likely  Multiple antimicrobial allergies  No s/s any dissemination  can change to po bactrim-7 day total course  Other plan per derm,primary  case d/w Med NP  Will Follow.  Beeper [id number]-[id number]/afterhours/No response-[id number]
66F with h/o Uterine ca (s/p chemo and completed in April 2016 ;  in remission), Bullous Pemphigoid (diagnosed 2014 on Prednisone), HTN, HLD, DMT2, OA who presents with persistent right leg redness, swelling ,pain, warmth and  bullae.
66F with h/o Uterine ca (s/p chemo and completed in April 2016 ;  in remission), Bullous Pemphigoid (diagnosed 2014 on Prednisone), HTN, HLD, DMT2, OA who presents with persistent right leg redness, swelling ,pain, warmth and  bullae.
66F with h/o Uterine ca (s/p chemo and completed in April 2016 ;  in remission), Bullous Pemphigoid (diagnosed 2014 on chronic Prednisone), HTN, HLD, DMT2, OA who presents with persistent right leg redness, swelling , pain, warmth and  bullae.
67 yo with bullous pemphigoid-on chronic doxy per derm.  Admitted with acute RLE erythema and swelling.  ?Cellulitis ?stais? lipodermatosclerosis  Minimal improvement with IV avnco  Multiple antimicrobial allergies  No s/s any dissemination  can change to po bactrim-5-7 day total course  Other plan per derm,primary  case d/w Med NP  Will Follow.  Beeper [id number]-[id number]/afterhours/No response-[id number]
rolling walker

## 2019-04-24 ENCOUNTER — APPOINTMENT (OUTPATIENT)
Dept: RHEUMATOLOGY | Facility: CLINIC | Age: 69
End: 2019-04-24
Payer: MEDICARE

## 2019-04-24 PROCEDURE — 96365 THER/PROPH/DIAG IV INF INIT: CPT

## 2019-04-24 PROCEDURE — 96366 THER/PROPH/DIAG IV INF ADDON: CPT

## 2019-04-26 ENCOUNTER — APPOINTMENT (OUTPATIENT)
Dept: RHEUMATOLOGY | Facility: CLINIC | Age: 69
End: 2019-04-26
Payer: MEDICARE

## 2019-04-26 PROCEDURE — 96366 THER/PROPH/DIAG IV INF ADDON: CPT

## 2019-04-26 PROCEDURE — 96365 THER/PROPH/DIAG IV INF INIT: CPT

## 2019-05-20 ENCOUNTER — APPOINTMENT (OUTPATIENT)
Dept: RHEUMATOLOGY | Facility: CLINIC | Age: 69
End: 2019-05-20
Payer: MEDICARE

## 2019-05-20 PROCEDURE — 96366 THER/PROPH/DIAG IV INF ADDON: CPT

## 2019-05-20 PROCEDURE — 96365 THER/PROPH/DIAG IV INF INIT: CPT

## 2019-05-22 ENCOUNTER — OUTPATIENT (OUTPATIENT)
Dept: OUTPATIENT SERVICES | Facility: HOSPITAL | Age: 69
LOS: 1 days | Discharge: HOME | End: 2019-05-22

## 2019-05-22 ENCOUNTER — APPOINTMENT (OUTPATIENT)
Dept: RHEUMATOLOGY | Facility: CLINIC | Age: 69
End: 2019-05-22
Payer: MEDICARE

## 2019-05-22 DIAGNOSIS — Z98.89 OTHER SPECIFIED POSTPROCEDURAL STATES: Chronic | ICD-10-CM

## 2019-05-22 DIAGNOSIS — Z90.710 ACQUIRED ABSENCE OF BOTH CERVIX AND UTERUS: Chronic | ICD-10-CM

## 2019-05-22 DIAGNOSIS — Z90.722 ACQUIRED ABSENCE OF OVARIES, BILATERAL: Chronic | ICD-10-CM

## 2019-05-22 DIAGNOSIS — C54.1 MALIGNANT NEOPLASM OF ENDOMETRIUM: Chronic | ICD-10-CM

## 2019-05-22 DIAGNOSIS — C54.1 MALIGNANT NEOPLASM OF ENDOMETRIUM: ICD-10-CM

## 2019-05-22 PROCEDURE — 96366 THER/PROPH/DIAG IV INF ADDON: CPT

## 2019-05-22 PROCEDURE — 96365 THER/PROPH/DIAG IV INF INIT: CPT

## 2019-05-24 ENCOUNTER — APPOINTMENT (OUTPATIENT)
Dept: RHEUMATOLOGY | Facility: CLINIC | Age: 69
End: 2019-05-24
Payer: MEDICARE

## 2019-05-24 PROCEDURE — 96366 THER/PROPH/DIAG IV INF ADDON: CPT

## 2019-05-24 PROCEDURE — 96365 THER/PROPH/DIAG IV INF INIT: CPT

## 2019-05-29 ENCOUNTER — APPOINTMENT (OUTPATIENT)
Dept: HEMATOLOGY ONCOLOGY | Facility: CLINIC | Age: 69
End: 2019-05-29
Payer: MEDICARE

## 2019-05-29 ENCOUNTER — RESULT REVIEW (OUTPATIENT)
Age: 69
End: 2019-05-29

## 2019-05-29 VITALS
DIASTOLIC BLOOD PRESSURE: 74 MMHG | SYSTOLIC BLOOD PRESSURE: 122 MMHG | WEIGHT: 279.99 LBS | TEMPERATURE: 98.3 F | OXYGEN SATURATION: 97 % | RESPIRATION RATE: 16 BRPM | HEART RATE: 99 BPM | BODY MASS INDEX: 46.59 KG/M2

## 2019-05-29 DIAGNOSIS — T45.1X5A ANEMIA DUE TO ANTINEOPLASTIC CHEMOTHERAPY: ICD-10-CM

## 2019-05-29 DIAGNOSIS — D64.81 ANEMIA DUE TO ANTINEOPLASTIC CHEMOTHERAPY: ICD-10-CM

## 2019-05-29 LAB
BASOPHILS # BLD AUTO: 0 K/UL — SIGNIFICANT CHANGE UP (ref 0–0.2)
BASOPHILS NFR BLD AUTO: 0.8 % — SIGNIFICANT CHANGE UP (ref 0–2)
EOSINOPHIL # BLD AUTO: 0 K/UL — SIGNIFICANT CHANGE UP (ref 0–0.5)
EOSINOPHIL NFR BLD AUTO: 0.6 % — SIGNIFICANT CHANGE UP (ref 0–6)
HCT VFR BLD CALC: 32.6 % — LOW (ref 34.5–45)
HGB BLD-MCNC: 10.9 G/DL — LOW (ref 11.5–15.5)
LYMPHOCYTES # BLD AUTO: 0.7 K/UL — LOW (ref 1–3.3)
LYMPHOCYTES # BLD AUTO: 20.5 % — SIGNIFICANT CHANGE UP (ref 13–44)
MCHC RBC-ENTMCNC: 33.2 PG — SIGNIFICANT CHANGE UP (ref 27–34)
MCHC RBC-ENTMCNC: 33.6 G/DL — SIGNIFICANT CHANGE UP (ref 32–36)
MCV RBC AUTO: 98.6 FL — SIGNIFICANT CHANGE UP (ref 80–100)
MONOCYTES # BLD AUTO: 0.4 K/UL — SIGNIFICANT CHANGE UP (ref 0–0.9)
MONOCYTES NFR BLD AUTO: 11.3 % — SIGNIFICANT CHANGE UP (ref 2–14)
NEUTROPHILS # BLD AUTO: 2.2 K/UL — SIGNIFICANT CHANGE UP (ref 1.8–7.4)
NEUTROPHILS NFR BLD AUTO: 66.9 % — SIGNIFICANT CHANGE UP (ref 43–77)
PLATELET # BLD AUTO: 262 K/UL — SIGNIFICANT CHANGE UP (ref 150–400)
RBC # BLD: 3.3 M/UL — LOW (ref 3.8–5.2)
RBC # FLD: 13.2 % — SIGNIFICANT CHANGE UP (ref 10.3–14.5)
WBC # BLD: 3.3 K/UL — LOW (ref 3.8–10.5)
WBC # FLD AUTO: 3.3 K/UL — LOW (ref 3.8–10.5)

## 2019-05-29 PROCEDURE — 99214 OFFICE O/P EST MOD 30 MIN: CPT

## 2019-05-29 RX ORDER — SULFAMETHOXAZOLE AND TRIMETHOPRIM 800; 160 MG/1; MG/1
800-160 TABLET ORAL TWICE DAILY
Qty: 14 | Refills: 0 | Status: DISCONTINUED | COMMUNITY
Start: 2018-10-02 | End: 2019-05-29

## 2019-05-29 RX ORDER — PREDNISONE 10 MG/1
10 TABLET ORAL DAILY
Qty: 30 | Refills: 2 | Status: DISCONTINUED | COMMUNITY
Start: 2018-04-06 | End: 2019-05-29

## 2019-05-29 RX ORDER — LISINOPRIL 2.5 MG/1
2.5 TABLET ORAL
Refills: 0 | Status: ACTIVE | COMMUNITY
Start: 2017-05-05

## 2019-05-29 NOTE — PATIENT PROFILE ADULT. - PATIENT REPRESENTATIVE PHONE
2019      RE: Janay Kendall  [address] [facility] [id number]         Pediatric Cardiology Visit    Patient:  Janay Kendall MRN:  [id number]   YOB: 2010 Age:  9  year old 3  month old   Date of Visit:  May 29, 2019 PCP:  Jeremías Meeks     Dear Jeremías Meza:    We saw Janay Kendall at the [facility] on May 29, 2019 in consultation for  chest pain.       She initially had chest pain last December (). She notes daily symptoms. The pain is a stabbing sensation which seems to bother her most. This is the most pain she has ever felt but parents notes she does not cry nor appears in a lot of distress when these episodes occur. They typically last for around 5 seconds but can last up to 10 minutes. They have only been frequent for the past month and now occur multiple times per day. She notes the pain is worse when she touches the area during the pain episodes. She also notes it is a little painful to breath when this happens. It can happen while playing on the computer, while walking or during other episodes. A weighted blanket seems to help. This pain is mainly in the left or middle of the chest       She also notes a second pain episode which involves tightness and feeling like her heart is stopping. This nor the above episode occur while being active.  breathing. She feels this most while at rest or laying down. It is located on the middle or upper right part of the chest.    She denies palpitations, presyncope, lightheaded sensation.     She has had an episode of syncope in February after becoming dizzy at school on 19 after standing up quickly while playing a game. No episodes since have been noted.    Review of systems otherwise negative in 12-point ROS.    Past medical history:    Myringotomy tubes  Born at 37 weeks via repeat  (NICU for a  "week)      She has a current medication list which includes the following prescription(s): acetaminophen. Shehas No Known Allergies.  No past medical history on file.    Family and social history:    Asthma in her paternal grandfather; Cancer in her paternal grandmother; Cancer (age of onset: 35) in her mother; Endometriosis in her paternal aunt; Heart disease in her maternal grandfather and paternal grandfather; Hyperlipidemia in her maternal grandfather; Hypertension in her maternal grandfather and paternal grandfather; Migraines in her maternal grandmother; No Medical Problems in her brother, father, and sister; Osteoarthritis in her maternal grandmother; Pacemaker in her maternal great-grandfather; Vesicoureteral reflux in her sister.       Pediatric History   Patient Guardian Status     Mother:  lizzie egan     Father:  yas egan     Other Topics Concern     Not on file   Social History Narrative     Not on file   2 siblings and lives with parents    /66 (BP Location: Right arm, Patient Position: Supine, Cuff Size: Adult Regular)   Pulse 100   Resp 24   Ht 1.26 m (4' 1.61\")   Wt 31.1 kg (68 lb 9 oz)   SpO2 100%   BMI 19.59 kg/m         Physical Exam   Constitutional: She appears healthy.   HENT:   Nose: Nose normal.   Cardiovascular: Regular rhythm, S1 normal and S2 normal. Exam reveals no gallop.   No murmur heard.  Pulses:       Radial pulses are 2+ on the right side, and 2+ on the left side.        Dorsalis pedis pulses are 2+ on the right side, and 2+ on the left side.   Pulmonary/Chest: She has no wheezes. She exhibits no tenderness.   Musculoskeletal: Normal range of motion.   Neurological: She is alert.   Skin: Skin is dry.     Echo 5/29/19:  Normal echocardiogram. There is normal appearance and motion of the tricuspid, mitral, pulmonary and aortic valves. No atrial, ventricular or arterial level shunting. The left and right ventricles have normal chamber size, wall  thickness, and " systolic function. The calculated single plane left ventricular  ejection fraction from the 2 chamber view is 64 %.      In summary, Janay is a pleasant 9-year old female with history of chest pain secondary to costochondritis. I recommend ibuprofen 310mg po q12 hours for 7-14 days with food to help alleviate this pain. Given the feeling of her heart stopping with the pressure-like pain, we will obtain a Zio patch today for rhythm assessment. She has murmur on exam, slightly more harsh than expected for a Still's murmur, thus we obtained an echo. The echo demonstrated normal anatomy. She has no evidence of ST/Twave changes on her EKG today either. We will follow-up the Zio patch results and follow-up as needed only if these are normal and if her pain resolved. Mother's cell phone number is [phone].   Thank you for allowing me to participate in the care of this patient. Sincerely,      Dada Carrillo MD  Pediatric and Adult Congenital Electrophysiologist  [facility]           [phone]

## 2019-05-30 LAB
ALBUMIN SERPL ELPH-MCNC: 3.8 G/DL
ALP BLD-CCNC: 59 U/L
ALT SERPL-CCNC: 13 U/L
ANION GAP SERPL CALC-SCNC: 15 MMOL/L
AST SERPL-CCNC: 22 U/L
BILIRUB SERPL-MCNC: 0.3 MG/DL
BUN SERPL-MCNC: 28 MG/DL
CALCIUM SERPL-MCNC: 9.8 MG/DL
CANCER AG125 SERPL-ACNC: 7 U/ML
CHLORIDE SERPL-SCNC: 100 MMOL/L
CO2 SERPL-SCNC: 23 MMOL/L
CREAT SERPL-MCNC: 0.92 MG/DL
GLUCOSE SERPL-MCNC: 115 MG/DL
IRON SERPL-MCNC: 60 UG/DL
POTASSIUM SERPL-SCNC: 4.6 MMOL/L
PROT SERPL-MCNC: 9.5 G/DL
SODIUM SERPL-SCNC: 138 MMOL/L

## 2019-05-30 NOTE — PHYSICAL EXAM
[Ambulatory and capable of all self care but unable to carry out any work activities] : Status 2- Ambulatory and capable of all self care but unable to carry out any work activities. Up and about more than 50% of waking hours [Obese] : obese [Normal] : affect appropriate [de-identified] : BLE skin plaques

## 2019-05-30 NOTE — ASSESSMENT
[FreeTextEntry1] : She is a 67 y/o F obese  F with Stage I uterine carcinosarcoma and s/p TLH/ BSO Carbo/ taxol 6 cycles in 4/2016. She continues with surveillance with normal . Her cramping sensation in LLQ may be from gI upset. We will decrease iron to once a day given anemia improved with iron supplement. We reviewed CT chest/ abd/ pelvis to evaluate for any signs of recurrence. If CT negative, next follow up in 6 months.

## 2019-05-30 NOTE — REVIEW OF SYSTEMS
[Abdominal Pain] : abdominal pain [Vomiting] : no vomiting [Constipation] : no constipation [Diarrhea] : no diarrhea [Joint Pain] : joint pain [Joint Stiffness] : joint stiffness [Muscle Pain] : no muscle pain [Muscle Weakness] : no muscle weakness [Skin Rash] : skin rash [Skin Wound] : no skin wound [Negative] : Allergic/Immunologic

## 2019-05-30 NOTE — HISTORY OF PRESENT ILLNESS
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 She had CT of the abdomen/ pelvis which showed left adrenal nodule unchanged from 10/2014, bilateral renal cysts, and diverticulosis; the evaluation of the pelvis was limited due to her body habitus. She had CT of the chest at [facility] on 9/16/15 which showed stable 1 to 2mm lung nodules. On 10/22/15, she underwent robotic TLH/ BSO with omentectomy and pelvic lymph node sampling. The pathology showed carcinosarcoma of the endometrium with polypoid mass consisting of high grade serous carcinoma and negative pelvic lymph nodes. We reviewed the role of adjuvant chemotherapy to reduce the chance of recurrence and she agreed to carboplatin/ paclitaxel. She started her first cycle on 12/4/15 and completed Carboplatin and Paclitaxel 4/13/16. Follow up CT showed no evidence of metastatic disease.  [de-identified] : carcinosarcoma of the uterus with polypoid mass: high grade serous  [de-identified] : carbo/ taxol every 3 weeks: 12/4/15 to 4/13/16 [de-identified] : Has been receiving IVIG for the pemphigoid along with decreasing dose of prednisone. She has been having decreased mobility due to joint pain and now mainly in chair most of the day. She has been taking iron supplement twice a day for mild anemia which can occur with mild hemolysis from IVIG. She has LLQ pain: feels like menstrual cramps. Has only noticed today. Has not felt abdominal pain persistently. Has BM and denies increased gas. She denies any vaginal spotting or SOB or cough. She has few changes to medications: decreased prednisone dose and change from niacin to niacinamide.

## 2019-06-05 ENCOUNTER — APPOINTMENT (OUTPATIENT)
Dept: GYNECOLOGIC ONCOLOGY | Facility: CLINIC | Age: 69
End: 2019-06-05
Payer: MEDICARE

## 2019-06-05 VITALS
WEIGHT: 273 LBS | DIASTOLIC BLOOD PRESSURE: 65 MMHG | BODY MASS INDEX: 45.48 KG/M2 | SYSTOLIC BLOOD PRESSURE: 128 MMHG | HEIGHT: 65 IN

## 2019-06-05 PROCEDURE — 99214 OFFICE O/P EST MOD 30 MIN: CPT

## 2019-06-05 NOTE — HISTORY OF PRESENT ILLNESS
[FreeTextEntry1] : Stage IA carcinosarcoma of the uterus.\par RTLH, BSO and staging 10/22/15.\par Received systemic chemotherapy with Dr. Martinez. \par Carbo/Taxol x 6 cycles. 12/15 - 4/16. \par Needed RBC transfusions on several occasions. \par \par Overall tolerated chemotherapy well. \par Imaging CT CAP Sept 2018 was VAMSHI. \par Denies bleeding, pain or other disease associated symptoms. \par \par She has a history of bullous pemphigoid for which she takes steroids. She is currently experiencing a flare affecting her right lower leg. Now seeing AI group at [facility]. On IVIG. \par \par Mammogram in 5/2017 was abnormal. Ultimately had breast biopsies in 7/2017 which were benign.

## 2019-06-05 NOTE — PHYSICAL EXAM
[Abnormal] : Skin and subcutaneous tissue: Abnormal [Absent] : Adnexa(ae): Absent [Restricted in physically strenuous activity but ambulatory and able to carry out work of a light or sedentary nature] : Status 1- Restricted in physically strenuous activity but ambulatory and able to carry out work of a light or sedentary nature, e.g., light house work, office work [Normal] : Breasts: Normal [de-identified] : well healed LSC incisions, vertical scar, large pannus

## 2019-06-16 ENCOUNTER — FORM ENCOUNTER (OUTPATIENT)
Age: 69
End: 2019-06-16

## 2019-06-17 ENCOUNTER — APPOINTMENT (OUTPATIENT)
Dept: CT IMAGING | Facility: IMAGING CENTER | Age: 69
End: 2019-06-17
Payer: MEDICARE

## 2019-06-17 ENCOUNTER — APPOINTMENT (OUTPATIENT)
Dept: RHEUMATOLOGY | Facility: CLINIC | Age: 69
End: 2019-06-17
Payer: MEDICARE

## 2019-06-17 ENCOUNTER — OUTPATIENT (OUTPATIENT)
Dept: OUTPATIENT SERVICES | Facility: HOSPITAL | Age: 69
LOS: 1 days | End: 2019-06-17
Payer: MEDICARE

## 2019-06-17 DIAGNOSIS — C54.1 MALIGNANT NEOPLASM OF ENDOMETRIUM: Chronic | ICD-10-CM

## 2019-06-17 DIAGNOSIS — Z98.89 OTHER SPECIFIED POSTPROCEDURAL STATES: Chronic | ICD-10-CM

## 2019-06-17 DIAGNOSIS — Z90.722 ACQUIRED ABSENCE OF OVARIES, BILATERAL: Chronic | ICD-10-CM

## 2019-06-17 DIAGNOSIS — Z00.8 ENCOUNTER FOR OTHER GENERAL EXAMINATION: ICD-10-CM

## 2019-06-17 DIAGNOSIS — Z90.710 ACQUIRED ABSENCE OF BOTH CERVIX AND UTERUS: Chronic | ICD-10-CM

## 2019-06-17 PROCEDURE — 71260 CT THORAX DX C+: CPT | Mod: 26

## 2019-06-17 PROCEDURE — 71260 CT THORAX DX C+: CPT

## 2019-06-17 PROCEDURE — 96366 THER/PROPH/DIAG IV INF ADDON: CPT

## 2019-06-17 PROCEDURE — 96365 THER/PROPH/DIAG IV INF INIT: CPT

## 2019-06-17 PROCEDURE — 74177 CT ABD & PELVIS W/CONTRAST: CPT

## 2019-06-17 PROCEDURE — 74177 CT ABD & PELVIS W/CONTRAST: CPT | Mod: 26

## 2019-06-19 ENCOUNTER — APPOINTMENT (OUTPATIENT)
Dept: RHEUMATOLOGY | Facility: CLINIC | Age: 69
End: 2019-06-19
Payer: MEDICARE

## 2019-06-19 PROCEDURE — 96366 THER/PROPH/DIAG IV INF ADDON: CPT

## 2019-06-19 PROCEDURE — 96365 THER/PROPH/DIAG IV INF INIT: CPT

## 2019-06-21 ENCOUNTER — APPOINTMENT (OUTPATIENT)
Dept: RHEUMATOLOGY | Facility: CLINIC | Age: 69
End: 2019-06-21
Payer: MEDICARE

## 2019-06-21 PROCEDURE — 96365 THER/PROPH/DIAG IV INF INIT: CPT

## 2019-06-21 PROCEDURE — 96366 THER/PROPH/DIAG IV INF ADDON: CPT

## 2019-06-24 ENCOUNTER — APPOINTMENT (OUTPATIENT)
Dept: DERMATOLOGY | Facility: CLINIC | Age: 69
End: 2019-06-24
Payer: MEDICARE

## 2019-06-24 PROCEDURE — 99214 OFFICE O/P EST MOD 30 MIN: CPT

## 2019-06-24 NOTE — HISTORY OF PRESENT ILLNESS
[FreeTextEntry1] : f/u BP [de-identified] : 69 yo F with hx of Stage IA uterine carcinosarcoma, currently in remission here for f/u BP, currently on IVIG, as well as lipodermatosclerosis. Since LV, prednisone was titrated to 2.5mg QD by her endocrinologist. Continues IVIG [id number]. Notes a few bulla on the dorsal foot that respond to topical steroids. Otherwise only noting mild itch since decreasing her prednisone. Otherwise continues doxy and niacinamide without any problems.

## 2019-06-24 NOTE — PHYSICAL EXAM
[Oriented x 3] : ~L oriented x 3 [Alert] : alert [Well Nourished] : well nourished [Conjunctiva Non-injected] : conjunctiva non-injected [No Visual Lymphadenopathy] : no visual  lymphadenopathy [No Clubbing] : no clubbing [No Edema] : no edema [No Chromhidrosis] : no chromhidrosis [No Bromhidrosis] : no bromhidrosis [FreeTextEntry3] : the right LE with erythematous plaque that is firm to palpation, no induration noted no fluctuance. No TTP. Background inverted champagne-glass shape. Single non-inflamed bulla on the R ankle

## 2019-07-15 ENCOUNTER — APPOINTMENT (OUTPATIENT)
Dept: RHEUMATOLOGY | Facility: CLINIC | Age: 69
End: 2019-07-15
Payer: MEDICARE

## 2019-07-15 PROCEDURE — 96366 THER/PROPH/DIAG IV INF ADDON: CPT

## 2019-07-15 PROCEDURE — 96365 THER/PROPH/DIAG IV INF INIT: CPT

## 2019-07-17 ENCOUNTER — APPOINTMENT (OUTPATIENT)
Dept: RHEUMATOLOGY | Facility: CLINIC | Age: 69
End: 2019-07-17

## 2019-07-19 ENCOUNTER — APPOINTMENT (OUTPATIENT)
Dept: RHEUMATOLOGY | Facility: CLINIC | Age: 69
End: 2019-07-19

## 2019-08-06 ENCOUNTER — RX RENEWAL (OUTPATIENT)
Age: 69
End: 2019-08-06

## 2019-08-12 ENCOUNTER — APPOINTMENT (OUTPATIENT)
Dept: RHEUMATOLOGY | Facility: CLINIC | Age: 69
End: 2019-08-12
Payer: MEDICARE

## 2019-08-12 PROCEDURE — 96365 THER/PROPH/DIAG IV INF INIT: CPT

## 2019-08-12 PROCEDURE — 96366 THER/PROPH/DIAG IV INF ADDON: CPT

## 2019-08-13 ENCOUNTER — RX RENEWAL (OUTPATIENT)
Age: 69
End: 2019-08-13

## 2019-08-14 ENCOUNTER — APPOINTMENT (OUTPATIENT)
Dept: RHEUMATOLOGY | Facility: CLINIC | Age: 69
End: 2019-08-14
Payer: MEDICARE

## 2019-08-14 ENCOUNTER — RX RENEWAL (OUTPATIENT)
Age: 69
End: 2019-08-14

## 2019-08-14 PROCEDURE — 96366 THER/PROPH/DIAG IV INF ADDON: CPT

## 2019-08-14 PROCEDURE — 96365 THER/PROPH/DIAG IV INF INIT: CPT

## 2019-08-16 ENCOUNTER — APPOINTMENT (OUTPATIENT)
Dept: RHEUMATOLOGY | Facility: CLINIC | Age: 69
End: 2019-08-16

## 2019-09-09 ENCOUNTER — APPOINTMENT (OUTPATIENT)
Dept: DERMATOLOGY | Facility: CLINIC | Age: 69
End: 2019-09-09
Payer: MEDICARE

## 2019-09-09 ENCOUNTER — APPOINTMENT (OUTPATIENT)
Dept: RHEUMATOLOGY | Facility: CLINIC | Age: 69
End: 2019-09-09
Payer: MEDICARE

## 2019-09-09 PROCEDURE — 96365 THER/PROPH/DIAG IV INF INIT: CPT

## 2019-09-09 PROCEDURE — 99214 OFFICE O/P EST MOD 30 MIN: CPT

## 2019-09-09 PROCEDURE — 96366 THER/PROPH/DIAG IV INF ADDON: CPT

## 2019-09-10 NOTE — PHYSICAL EXAM
[Alert] : alert [Oriented x 3] : ~L oriented x 3 [Well Nourished] : well nourished [No Visual Lymphadenopathy] : no visual  lymphadenopathy [Conjunctiva Non-injected] : conjunctiva non-injected [No Clubbing] : no clubbing [No Edema] : no edema [No Bromhidrosis] : no bromhidrosis [FreeTextEntry3] : Crusted papules on the bilateral upper extremities\par the right LE with erythematous plaque that is firm to palpation, no induration noted no fluctuance. No TTP. Background inverted champagne-glass shape. Hemorrhagic crust on the R ankle [No Chromhidrosis] : no chromhidrosis

## 2019-09-10 NOTE — HISTORY OF PRESENT ILLNESS
[FreeTextEntry1] : f/u BP [de-identified] : 67 yo F with hx of Stage IA uterine carcinosarcoma, currently in remission here for f/u BP, currently on IVIG, as well as lipodermatosclerosis. Since LV, given national shortage of IVIG, patient had 1 dose in July, and 2 doses in August. Given limited IVIG, patient did note mild flare requiring uptitration of prednisone back to 5mg QD x 2 weeks. Recently decreased to 2.5mg QD x 2 weeks with overall improvement. Otherwise continues doxy and niacinamide without any problems.

## 2019-09-11 ENCOUNTER — APPOINTMENT (OUTPATIENT)
Dept: RHEUMATOLOGY | Facility: CLINIC | Age: 69
End: 2019-09-11
Payer: MEDICARE

## 2019-09-11 PROCEDURE — 96365 THER/PROPH/DIAG IV INF INIT: CPT

## 2019-09-11 PROCEDURE — 96366 THER/PROPH/DIAG IV INF ADDON: CPT

## 2019-09-13 ENCOUNTER — APPOINTMENT (OUTPATIENT)
Dept: RHEUMATOLOGY | Facility: CLINIC | Age: 69
End: 2019-09-13
Payer: MEDICARE

## 2019-09-13 PROCEDURE — 96365 THER/PROPH/DIAG IV INF INIT: CPT

## 2019-09-13 PROCEDURE — 96366 THER/PROPH/DIAG IV INF ADDON: CPT

## 2019-10-28 ENCOUNTER — APPOINTMENT (OUTPATIENT)
Dept: RHEUMATOLOGY | Facility: CLINIC | Age: 69
End: 2019-10-28
Payer: MEDICARE

## 2019-10-28 PROCEDURE — 96366 THER/PROPH/DIAG IV INF ADDON: CPT

## 2019-10-28 PROCEDURE — 96365 THER/PROPH/DIAG IV INF INIT: CPT

## 2019-10-30 ENCOUNTER — APPOINTMENT (OUTPATIENT)
Dept: RHEUMATOLOGY | Facility: CLINIC | Age: 69
End: 2019-10-30
Payer: MEDICARE

## 2019-10-30 PROCEDURE — 96365 THER/PROPH/DIAG IV INF INIT: CPT

## 2019-10-30 PROCEDURE — 96366 THER/PROPH/DIAG IV INF ADDON: CPT

## 2019-11-01 ENCOUNTER — APPOINTMENT (OUTPATIENT)
Dept: RHEUMATOLOGY | Facility: CLINIC | Age: 69
End: 2019-11-01
Payer: MEDICARE

## 2019-11-01 PROCEDURE — 96366 THER/PROPH/DIAG IV INF ADDON: CPT

## 2019-11-01 PROCEDURE — 90662 IIV NO PRSV INCREASED AG IM: CPT

## 2019-11-01 PROCEDURE — 96365 THER/PROPH/DIAG IV INF INIT: CPT

## 2019-11-01 PROCEDURE — G0008: CPT

## 2019-12-02 ENCOUNTER — APPOINTMENT (OUTPATIENT)
Dept: RHEUMATOLOGY | Facility: CLINIC | Age: 69
End: 2019-12-02

## 2019-12-02 ENCOUNTER — APPOINTMENT (OUTPATIENT)
Dept: RHEUMATOLOGY | Facility: CLINIC | Age: 69
End: 2019-12-02
Payer: MEDICARE

## 2019-12-02 PROCEDURE — 96365 THER/PROPH/DIAG IV INF INIT: CPT

## 2019-12-02 PROCEDURE — 96366 THER/PROPH/DIAG IV INF ADDON: CPT

## 2019-12-04 ENCOUNTER — APPOINTMENT (OUTPATIENT)
Dept: RHEUMATOLOGY | Facility: CLINIC | Age: 69
End: 2019-12-04
Payer: MEDICARE

## 2019-12-04 PROCEDURE — 96365 THER/PROPH/DIAG IV INF INIT: CPT

## 2019-12-04 PROCEDURE — 96366 THER/PROPH/DIAG IV INF ADDON: CPT

## 2019-12-06 ENCOUNTER — APPOINTMENT (OUTPATIENT)
Dept: RHEUMATOLOGY | Facility: CLINIC | Age: 69
End: 2019-12-06
Payer: MEDICARE

## 2019-12-06 PROCEDURE — 96366 THER/PROPH/DIAG IV INF ADDON: CPT

## 2019-12-06 PROCEDURE — 96365 THER/PROPH/DIAG IV INF INIT: CPT

## 2019-12-11 ENCOUNTER — APPOINTMENT (OUTPATIENT)
Dept: GYNECOLOGIC ONCOLOGY | Facility: CLINIC | Age: 69
End: 2019-12-11
Payer: MEDICARE

## 2019-12-11 VITALS
BODY MASS INDEX: 45.98 KG/M2 | SYSTOLIC BLOOD PRESSURE: 134 MMHG | WEIGHT: 276 LBS | HEIGHT: 65 IN | DIASTOLIC BLOOD PRESSURE: 82 MMHG

## 2019-12-11 PROCEDURE — 99213 OFFICE O/P EST LOW 20 MIN: CPT

## 2019-12-11 NOTE — HISTORY OF PRESENT ILLNESS
[FreeTextEntry1] : Stage IA carcinosarcoma of the uterus.\par RTLH, BSO and staging 10/22/15.\par Received systemic chemotherapy with Dr. Martinez. \par Carbo/Taxol x 6 cycles. 12/15 - 4/16. \par Needed RBC transfusions on several occasions. \par \par Overall tolerated chemotherapy well. \par Imaging CT CAP Sept 2019 was VAMSHI. \par Denies bleeding, pain or other disease associated symptoms. \par \par She has a history of bullous pemphigoid for which she takes steroids. She is currently experiencing a flare affecting her right lower leg. Now seeing AI group at [facility]. On IVIG. \par \par Mammogram in 5/2017 was abnormal. Ultimately had breast biopsies in 7/2017 which were benign.

## 2019-12-11 NOTE — PHYSICAL EXAM
[Abnormal] : Skin and subcutaneous tissue: Abnormal [Absent] : Adnexa(ae): Absent [Normal] : Recto-Vaginal Exam: Normal [Restricted in physically strenuous activity but ambulatory and able to carry out work of a light or sedentary nature] : Status 1- Restricted in physically strenuous activity but ambulatory and able to carry out work of a light or sedentary nature, e.g., light house work, office work [de-identified] : well healed LSC incisions, vertical scar, large pannus

## 2019-12-17 ENCOUNTER — APPOINTMENT (OUTPATIENT)
Dept: DERMATOLOGY | Facility: CLINIC | Age: 69
End: 2019-12-17
Payer: MEDICARE

## 2019-12-17 PROCEDURE — 99214 OFFICE O/P EST MOD 30 MIN: CPT

## 2019-12-17 RX ORDER — GABAPENTIN 800 MG/1
800 TABLET, COATED ORAL
Refills: 0 | Status: ACTIVE | COMMUNITY

## 2020-01-06 ENCOUNTER — APPOINTMENT (OUTPATIENT)
Dept: RHEUMATOLOGY | Facility: CLINIC | Age: 70
End: 2020-01-06
Payer: MEDICARE

## 2020-01-06 PROCEDURE — 96366 THER/PROPH/DIAG IV INF ADDON: CPT

## 2020-01-06 PROCEDURE — 96365 THER/PROPH/DIAG IV INF INIT: CPT

## 2020-01-08 ENCOUNTER — APPOINTMENT (OUTPATIENT)
Dept: RHEUMATOLOGY | Facility: CLINIC | Age: 70
End: 2020-01-08
Payer: MEDICARE

## 2020-01-08 PROCEDURE — 96366 THER/PROPH/DIAG IV INF ADDON: CPT

## 2020-01-08 PROCEDURE — 96365 THER/PROPH/DIAG IV INF INIT: CPT

## 2020-01-10 ENCOUNTER — APPOINTMENT (OUTPATIENT)
Dept: RHEUMATOLOGY | Facility: CLINIC | Age: 70
End: 2020-01-10
Payer: MEDICARE

## 2020-01-10 PROCEDURE — 96366 THER/PROPH/DIAG IV INF ADDON: CPT

## 2020-01-10 PROCEDURE — 96365 THER/PROPH/DIAG IV INF INIT: CPT

## 2020-02-03 ENCOUNTER — APPOINTMENT (OUTPATIENT)
Dept: RHEUMATOLOGY | Facility: CLINIC | Age: 70
End: 2020-02-03
Payer: COMMERCIAL

## 2020-02-03 PROCEDURE — 96366 THER/PROPH/DIAG IV INF ADDON: CPT

## 2020-02-03 PROCEDURE — 96365 THER/PROPH/DIAG IV INF INIT: CPT

## 2020-02-05 ENCOUNTER — APPOINTMENT (OUTPATIENT)
Dept: RHEUMATOLOGY | Facility: CLINIC | Age: 70
End: 2020-02-05
Payer: COMMERCIAL

## 2020-02-05 PROCEDURE — 96365 THER/PROPH/DIAG IV INF INIT: CPT

## 2020-02-05 PROCEDURE — 96366 THER/PROPH/DIAG IV INF ADDON: CPT

## 2020-02-07 ENCOUNTER — APPOINTMENT (OUTPATIENT)
Dept: RHEUMATOLOGY | Facility: CLINIC | Age: 70
End: 2020-02-07
Payer: MEDICARE

## 2020-02-07 PROCEDURE — 96365 THER/PROPH/DIAG IV INF INIT: CPT

## 2020-02-07 PROCEDURE — 96366 THER/PROPH/DIAG IV INF ADDON: CPT

## 2020-02-18 ENCOUNTER — RX RENEWAL (OUTPATIENT)
Age: 70
End: 2020-02-18

## 2020-03-02 ENCOUNTER — APPOINTMENT (OUTPATIENT)
Dept: RHEUMATOLOGY | Facility: CLINIC | Age: 70
End: 2020-03-02
Payer: MEDICARE

## 2020-03-02 PROCEDURE — 96366 THER/PROPH/DIAG IV INF ADDON: CPT

## 2020-03-02 PROCEDURE — 96365 THER/PROPH/DIAG IV INF INIT: CPT

## 2020-03-04 ENCOUNTER — APPOINTMENT (OUTPATIENT)
Dept: RHEUMATOLOGY | Facility: CLINIC | Age: 70
End: 2020-03-04
Payer: MEDICARE

## 2020-03-04 PROCEDURE — 96366 THER/PROPH/DIAG IV INF ADDON: CPT

## 2020-03-04 PROCEDURE — 96365 THER/PROPH/DIAG IV INF INIT: CPT

## 2020-03-06 ENCOUNTER — APPOINTMENT (OUTPATIENT)
Dept: RHEUMATOLOGY | Facility: CLINIC | Age: 70
End: 2020-03-06
Payer: MEDICARE

## 2020-03-06 PROCEDURE — 96365 THER/PROPH/DIAG IV INF INIT: CPT

## 2020-03-06 PROCEDURE — 96366 THER/PROPH/DIAG IV INF ADDON: CPT

## 2020-03-09 ENCOUNTER — LABORATORY RESULT (OUTPATIENT)
Age: 70
End: 2020-03-09

## 2020-03-09 ENCOUNTER — APPOINTMENT (OUTPATIENT)
Dept: DERMATOLOGY | Facility: CLINIC | Age: 70
End: 2020-03-09
Payer: MEDICARE

## 2020-03-09 PROCEDURE — 99214 OFFICE O/P EST MOD 30 MIN: CPT

## 2020-03-10 LAB
ALBUMIN SERPL ELPH-MCNC: 3.6 G/DL
ALP BLD-CCNC: 69 U/L
ALT SERPL-CCNC: 15 U/L
ANION GAP SERPL CALC-SCNC: 14 MMOL/L
AST SERPL-CCNC: 24 U/L
BASOPHILS # BLD AUTO: 0.01 K/UL
BASOPHILS NFR BLD AUTO: 0.3 %
BILIRUB SERPL-MCNC: 0.4 MG/DL
BUN SERPL-MCNC: 29 MG/DL
CALCIUM SERPL-MCNC: 9.6 MG/DL
CHLORIDE SERPL-SCNC: 103 MMOL/L
CO2 SERPL-SCNC: 23 MMOL/L
CREAT SERPL-MCNC: 0.8 MG/DL
EOSINOPHIL # BLD AUTO: 0.07 K/UL
EOSINOPHIL NFR BLD AUTO: 2 %
GLUCOSE SERPL-MCNC: 100 MG/DL
HCT VFR BLD CALC: 34.1 %
HGB BLD-MCNC: 10.6 G/DL
IMM GRANULOCYTES NFR BLD AUTO: 0.3 %
LYMPHOCYTES # BLD AUTO: 0.77 K/UL
LYMPHOCYTES NFR BLD AUTO: 21.6 %
MAN DIFF?: NORMAL
MCHC RBC-ENTMCNC: 31.1 GM/DL
MCHC RBC-ENTMCNC: 32.8 PG
MCV RBC AUTO: 105.6 FL
MONOCYTES # BLD AUTO: 0.39 K/UL
MONOCYTES NFR BLD AUTO: 10.9 %
NEUTROPHILS # BLD AUTO: 2.32 K/UL
NEUTROPHILS NFR BLD AUTO: 64.9 %
PLATELET # BLD AUTO: 210 K/UL
POTASSIUM SERPL-SCNC: 5.4 MMOL/L
PROT SERPL-MCNC: 9.6 G/DL
RBC # BLD: 3.23 M/UL
RBC # FLD: 14.3 %
SODIUM SERPL-SCNC: 140 MMOL/L
WBC # FLD AUTO: 3.57 K/UL

## 2020-03-12 ENCOUNTER — APPOINTMENT (OUTPATIENT)
Dept: WOUND CARE | Facility: CLINIC | Age: 70
End: 2020-03-12
Payer: MEDICARE

## 2020-03-12 VITALS — BODY MASS INDEX: 45.98 KG/M2 | HEIGHT: 65 IN | WEIGHT: 276 LBS

## 2020-03-12 VITALS
HEIGHT: 65 IN | TEMPERATURE: 98.1 F | HEART RATE: 83 BPM | WEIGHT: 273 LBS | BODY MASS INDEX: 45.48 KG/M2 | SYSTOLIC BLOOD PRESSURE: 135 MMHG | RESPIRATION RATE: 18 BRPM | DIASTOLIC BLOOD PRESSURE: 84 MMHG

## 2020-03-12 PROCEDURE — 99214 OFFICE O/P EST MOD 30 MIN: CPT

## 2020-03-19 NOTE — PHYSICAL EXAM
[Normal Breath Sounds] : Normal breath sounds [2+] : left 2+ [Ankle Swelling (On Exam)] : present [Ankle Swelling Bilaterally] : bilaterally  [Ankle Swelling On The Left] : moderate [] : of the right leg [Ankle Swelling On The Right] : mild [Skin Ulcer] : ulcer [Alert] : alert [Oriented to Person] : oriented to person [Oriented to Place] : oriented to place [Oriented to Time] : oriented to time [Calm] : calm [JVD] : no jugular venous distention  [Abdomen Tenderness] : ~T ~M No abdominal tenderness [de-identified] : NAD, arrives in WC, MO [de-identified] : supple [de-identified] : non labored [de-identified] :  obese [de-identified] : very limited ambulation [FreeTextEntry1] : medial leg [FreeTextEntry2] : 1.3 [FreeTextEntry3] : 2.2 [FreeTextEntry4] : .1 [de-identified] : blister anterior to eschar 3.5 x 1. 2\par \par ankle 24 right\par ankle 23 left [FreeTextEntry7] : posterior calf [FreeTextEntry8] : 2 [FreeTextEntry9] : 3.2 [de-identified] : 0.2 [TWNoteComboBox1] : Right [TWNoteComboBox6] : Other [de-identified] : 100% [TWNoteComboBox7] : Mechanical [TWNoteComboBox9] : Right [de-identified] : Other [TWNoteComboBox8] : Mechanical

## 2020-03-19 NOTE — PLAN
[FreeTextEntry1] : 3/12/20\par Plan - betadine area, foam, spandage to keep in place,\par  supplies ordered

## 2020-03-19 NOTE — REASON FOR VISIT
[Follow-Up: _____] : a [unfilled] follow-up visit [Spouse] : spouse [Other: _____] : [unfilled] [FreeTextEntry1] : leg wound

## 2020-03-19 NOTE — ASSESSMENT
[FreeTextEntry1] : 69 year old DM womanw/ open wound to her right lower leg  pemphigoid, on prednisone, leukopenic, anemic on ivig h/o tahbso uterine ca stage 1a\par   Patient and family instructed on how to place leg wraps\par chris low on left leg/ high pressure on right, palpable pulses/ no Vinsuff 2018- may need reeval\par  datacomplexity lab, xr, old rec, test resultsreviewed, visualize image \par risk-low-no surgery at this time\par \par   history of bullous phemphigoid, \par currently has an intact blister, eschar area,\par  one wound clean and superficial

## 2020-04-06 ENCOUNTER — APPOINTMENT (OUTPATIENT)
Dept: RHEUMATOLOGY | Facility: CLINIC | Age: 70
End: 2020-04-06
Payer: MEDICARE

## 2020-04-06 PROCEDURE — 96366 THER/PROPH/DIAG IV INF ADDON: CPT

## 2020-04-06 PROCEDURE — 96365 THER/PROPH/DIAG IV INF INIT: CPT

## 2020-04-08 ENCOUNTER — APPOINTMENT (OUTPATIENT)
Dept: RHEUMATOLOGY | Facility: CLINIC | Age: 70
End: 2020-04-08
Payer: MEDICARE

## 2020-04-08 PROCEDURE — 96365 THER/PROPH/DIAG IV INF INIT: CPT

## 2020-04-08 PROCEDURE — 96366 THER/PROPH/DIAG IV INF ADDON: CPT

## 2020-04-10 ENCOUNTER — APPOINTMENT (OUTPATIENT)
Dept: RHEUMATOLOGY | Facility: CLINIC | Age: 70
End: 2020-04-10
Payer: MEDICARE

## 2020-04-10 PROCEDURE — 96366 THER/PROPH/DIAG IV INF ADDON: CPT

## 2020-04-10 PROCEDURE — 96365 THER/PROPH/DIAG IV INF INIT: CPT

## 2020-04-21 ENCOUNTER — APPOINTMENT (OUTPATIENT)
Dept: WOUND CARE | Facility: CLINIC | Age: 70
End: 2020-04-21
Payer: MEDICARE

## 2020-04-21 DIAGNOSIS — Z87.891 PERSONAL HISTORY OF NICOTINE DEPENDENCE: ICD-10-CM

## 2020-04-21 DIAGNOSIS — G62.9 POLYNEUROPATHY, UNSPECIFIED: ICD-10-CM

## 2020-04-21 DIAGNOSIS — Z80.8 FAMILY HISTORY OF MALIGNANT NEOPLASM OF OTHER ORGANS OR SYSTEMS: ICD-10-CM

## 2020-04-21 DIAGNOSIS — Z84.0 FAMILY HISTORY OF DISEASES OF THE SKIN AND SUBCUTANEOUS TISSUE: ICD-10-CM

## 2020-04-21 PROCEDURE — 99214 OFFICE O/P EST MOD 30 MIN: CPT | Mod: 95

## 2020-04-21 NOTE — PLAN
[FreeTextEntry1] :  \par Plan -  gentamicin betadine area, foam, spandage to keep in place,\par  supplies ordered\par prontosan-

## 2020-04-21 NOTE — ASSESSMENT
[FreeTextEntry1] : 69 year old DM womanw/ open wound to her right lower leg  pemphigoid, on prednisone, leukopenic, anemic on ivig h/o tahbso uterine ca stage 1a\par has pain and willing to try something - gentamicin topical ordered\par one on the bottom left leg , responded to betadine\par right blister site is the painful site -looks like [place]- transverse wound\par has used adaptic/telfa- needs  sonya, 4x4\par  Patient and family instructed on how to place leg wraps\par chris low on left leg/ high pressure on right, palpable pulses/ no Vinsuff 2018- may need reeval\par  datacomplexity lab, xr, old rec, test resultsreviewed, visualize image \par risk-low-no surgery at this time\par \par   history of bullous phemphigoid, \par  technical difficuty:mod- called a few times was able to get through\par appoint for F-T-F-no\par virtual appointment 2 weeks\par referral to PCPna\par \par

## 2020-04-21 NOTE — REASON FOR VISIT
[Follow-Up: _____] : a [unfilled] follow-up visit [Other: _____] : [unfilled] [FreeTextEntry1] : leg wound- telehealth urgent visit

## 2020-04-21 NOTE — HISTORY OF PRESENT ILLNESS
[Home] : at home, [unfilled] , at the time of the visit. [Other Location: e.g. Home (Enter Location, City,State)___] : at [unfilled] [Patient] : the patient [Self] : self [FreeTextEntry4] :  [FreeTextEntry1] : 89 yo f with wounds for 3 months, treating with clobetasol, muprocin, had been treated in 2018\par \par Patient developed leg wounds since 12/2017. Takes igg treatment and prednisone\par using betadine, no blisters - previous one broke, now has open ulcer at that site\par \par right posterior leg very sore\par   h/o Bullous pemphigoid. Currently taking doxy 100mg BID, nicotinamide 500mg BID, \par prednisone 10mg daily, and IVIG [id number] (s/p 2 rounds since January, most recently at end of March). \par She feels that her BP is under best control in the 2 weeks immediately after her IVIG infusion.\par \par  She notes that her flares have decreased in intensity and duration since starting IVIG. \par Still developing bullae on b/l arms and legs. Denies oral or genital lesions. \par Tolerating her medications well but does note easy bruising while on prednisone--currently on 10mg QD. Also notes skin fragility.\par 6/1/18- Patient and  report that right leg wounds are significantly improved\par  \par 2) Lipodermatosclerosis. Has an erosion on R medial calf present since December that started as a blister. It was originally draining profusely per pt; she denies fevers, chills, foul smell.  Wound nurse is applying clob oint to periphery and Alginate dressing over the wound. \par \par BACKGROUND:PMH Stage IA carcinosarcoma of the uterus (s/p Carbo/Taxol x 6 cycles from 12/15 - 4/16), currently in remission, \par Biopsy and DIF from 8/2014 consistent with BP (scanned into file). \par Previously well controlled with Cellcept, prednisone, and clobetasol with flare after stopping Cellcept due to decreasing blood counts.  \par +H/o vaginal erosions\par Her primary dermatologist in the past was Dr Silva.  \par Pt previously stated that whenever predisone has been decreased below 10mg daily she flares.\par 9/5- leg wounds related to body habitus and pemphigus \par

## 2020-04-21 NOTE — PHYSICAL EXAM
[Normal Breath Sounds] : Normal breath sounds [Ankle Swelling Bilaterally] : bilaterally  [Ankle Swelling (On Exam)] : present [Ankle Swelling On The Left] : moderate [] : present [Ankle Swelling On The Right] : mild [Skin Ulcer] : ulcer [Alert] : alert [Oriented to Person] : oriented to person [Oriented to Place] : oriented to place [Oriented to Time] : oriented to time [Calm] : calm [JVD] : no jugular venous distention  [de-identified] : NAD, arrives in WC, MO [de-identified] : non labored [de-identified] : supple [de-identified] : very limited ambulation [de-identified] :  obese [FreeTextEntry1] : medial leg [FreeTextEntry2] : 1.3 [de-identified] : eschar [FreeTextEntry3] : 2.2 [FreeTextEntry4] : .1 [de-identified] :  see photos\par now open- bulla burst\par \par previous blister anterior to eschar 3.5 x 1. 2\par \par ankle 24 right\par ankle 23 left [de-identified] : telemedicine voist- see photos [FreeTextEntry7] : posterior calf [de-identified] : 0.2 [FreeTextEntry8] : 2 [FreeTextEntry9] : 3.2 [TWNoteComboBox6] : Other [de-identified] : painful [TWNoteComboBox1] : Right [de-identified] : 100% [TWNoteComboBox7] : Mechanical [de-identified] : 100% [de-identified] : Other [de-identified] : <20% [TWNoteComboBox9] : Right [de-identified] : >75% [TWNoteComboBox8] : Mechanical

## 2020-04-22 ENCOUNTER — OUTPATIENT (OUTPATIENT)
Dept: OUTPATIENT SERVICES | Facility: HOSPITAL | Age: 70
LOS: 1 days | Discharge: HOME | End: 2020-04-22

## 2020-04-22 DIAGNOSIS — Z90.710 ACQUIRED ABSENCE OF BOTH CERVIX AND UTERUS: Chronic | ICD-10-CM

## 2020-04-22 DIAGNOSIS — Z90.722 ACQUIRED ABSENCE OF OVARIES, BILATERAL: Chronic | ICD-10-CM

## 2020-04-22 DIAGNOSIS — C54.1 MALIGNANT NEOPLASM OF ENDOMETRIUM: ICD-10-CM

## 2020-04-22 DIAGNOSIS — Z98.89 OTHER SPECIFIED POSTPROCEDURAL STATES: Chronic | ICD-10-CM

## 2020-04-22 DIAGNOSIS — C54.1 MALIGNANT NEOPLASM OF ENDOMETRIUM: Chronic | ICD-10-CM

## 2020-04-29 ENCOUNTER — APPOINTMENT (OUTPATIENT)
Dept: HEMATOLOGY ONCOLOGY | Facility: CLINIC | Age: 70
End: 2020-04-29
Payer: MEDICARE

## 2020-04-29 PROCEDURE — 99442: CPT | Mod: CR

## 2020-04-29 NOTE — ASSESSMENT
[FreeTextEntry1] : She is a 67 y/o F obese  F with Stage I uterine carcinosarcoma and s/p TLH/ BSO Carbo/ taxol 6 cycles in 4/2016. She continues with surveillance. She remains asymptomatic and will plan on next follow up when Covid restrictions are lifted. We reviewed if any new symptoms, she will call. Will plan on follow up in July and will check  and repeat CBC with diff.

## 2020-04-29 NOTE — HISTORY OF PRESENT ILLNESS
[Home] : at home, [unfilled] , at the time of the visit. 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 She had CT of the abdomen/ pelvis which showed left adrenal nodule unchanged from 10/2014, bilateral renal cysts, and diverticulosis; the evaluation of the pelvis was limited due to her body habitus. She had CT of the chest at [facility] on 9/16/15 which showed stable 1 to 2mm lung nodules. On 10/22/15, she underwent robotic TLH/ BSO with omentectomy and pelvic lymph node sampling. The pathology showed carcinosarcoma of the endometrium with polypoid mass consisting of high grade serous carcinoma and negative pelvic lymph nodes. We reviewed the role of adjuvant chemotherapy to reduce the chance of recurrence and she agreed to carboplatin/ paclitaxel. She started her first cycle on 12/4/15 and completed Carboplatin and Paclitaxel 4/13/16. Follow up CT showed no evidence of metastatic disease.  [de-identified] : Due to coronavirus pandemic, telehealth visit made to decrease patient exposure. She consented to telehealth visit. Denies any new abdominal pain, vaginal spotting or cough. She feels at her baseline. Her main issue is with her skin. She is getting IVIG infusions. Last bloodwork done by Dr Pina in February. She denies any fever or chills. She denies any new medications. \par  [de-identified] : carbo/ taxol every 3 weeks: 12/4/15 to 4/13/16

## 2020-04-29 NOTE — REVIEW OF SYSTEMS
[Skin Rash] : no skin rash [Skin Wound] : no skin wound [Negative] : Allergic/Immunologic [de-identified] : skin blisters

## 2020-05-04 ENCOUNTER — APPOINTMENT (OUTPATIENT)
Dept: RHEUMATOLOGY | Facility: CLINIC | Age: 70
End: 2020-05-04
Payer: MEDICARE

## 2020-05-04 PROCEDURE — 96365 THER/PROPH/DIAG IV INF INIT: CPT

## 2020-05-04 PROCEDURE — 96366 THER/PROPH/DIAG IV INF ADDON: CPT

## 2020-05-05 ENCOUNTER — APPOINTMENT (OUTPATIENT)
Dept: WOUND CARE | Facility: CLINIC | Age: 70
End: 2020-05-05
Payer: MEDICARE

## 2020-05-05 DIAGNOSIS — I10 ESSENTIAL (PRIMARY) HYPERTENSION: ICD-10-CM

## 2020-05-05 DIAGNOSIS — Z80.7 FAMILY HISTORY OF OTHER MALIGNANT NEOPLASMS OF LYMPHOID, HEMATOPOIETIC AND RELATED TISSUES: ICD-10-CM

## 2020-05-05 PROCEDURE — 99214 OFFICE O/P EST MOD 30 MIN: CPT | Mod: 95

## 2020-05-06 ENCOUNTER — APPOINTMENT (OUTPATIENT)
Dept: RHEUMATOLOGY | Facility: CLINIC | Age: 70
End: 2020-05-06
Payer: MEDICARE

## 2020-05-06 PROCEDURE — 96365 THER/PROPH/DIAG IV INF INIT: CPT

## 2020-05-06 PROCEDURE — 96366 THER/PROPH/DIAG IV INF ADDON: CPT

## 2020-05-08 ENCOUNTER — APPOINTMENT (OUTPATIENT)
Dept: RHEUMATOLOGY | Facility: CLINIC | Age: 70
End: 2020-05-08
Payer: MEDICARE

## 2020-05-08 PROCEDURE — 96365 THER/PROPH/DIAG IV INF INIT: CPT

## 2020-05-08 PROCEDURE — 96366 THER/PROPH/DIAG IV INF ADDON: CPT

## 2020-05-08 NOTE — PHYSICAL EXAM
[Normal Breath Sounds] : Normal breath sounds [Ankle Swelling (On Exam)] : present [Ankle Swelling Bilaterally] : bilaterally  [Ankle Swelling On The Left] : moderate [] : of the right leg [Ankle Swelling On The Right] : mild [Skin Ulcer] : ulcer [Alert] : alert [Oriented to Person] : oriented to person [Oriented to Place] : oriented to place [Oriented to Time] : oriented to time [Calm] : calm [JVD] : no jugular venous distention  [de-identified] : NAD, arrives in WC, [place] [de-identified] : supple [de-identified] : non labored [de-identified] :  obese [de-identified] : very limited ambulation [FreeTextEntry1] : medial leg [FreeTextEntry2] : 1.3 [FreeTextEntry3] : 2.2 [FreeTextEntry4] : .1 [de-identified] : eschar [de-identified] : some in previous blister site [de-identified] : telemedicine vist- see photos [de-identified] :  see photos\par new blister, already flat\par now open- bulla burst\par \par previous blister anterior to eschar 3.5 x 1. 2\par \par ankle 24 right\par ankle 23 left [FreeTextEntry7] : posterior calf [FreeTextEntry8] : 2 [FreeTextEntry9] : 3.2 [de-identified] : 0.2 [de-identified] : painful-\par  [TWNoteComboBox1] : Right [de-identified] : 50% [TWNoteComboBox6] : Other [de-identified] : 50% [TWNoteComboBox7] : Mechanical [TWNoteComboBox9] : Right [de-identified] : Other [de-identified] : <20% [de-identified] : >75% [de-identified] : Yes [TWNoteComboBox8] : Mechanical

## 2020-05-08 NOTE — ASSESSMENT
[FreeTextEntry1] : 69 year old DM womanw/ open wound to her right lower leg  pemphigoid, on prednisone, leukopenic, anemic on ivig h/o tahbso uterine ca stage 1a\par has pain and willing to try something - gentamicin topical helping\par \par one on the bottom left leg , responded to betadine\par right blister site is the painful site -looks like [place]- transverse wound\par has used adaptic/telfa- needs  sonya, 4x4\par  Patient and family instructed on how to place leg wraps\par \par chris low on left leg/ high pressure on right, palpable pulses/ no Vinsuff 2018- may need reeval\par  datacomplexity lab, xr, old rec, test resultsreviewed, visualize image \par risk-low-no surgery at this time\par \par  history of bullous phemphigoid, \par  technical difficuty:mod- called a few times was able to get through\par appoint for F-T-F-no\par virtual appointment 2 weeks\par referral to [facility]\par \par

## 2020-05-08 NOTE — HISTORY OF PRESENT ILLNESS
[Other Location: e.g. Home (Enter Location, City,State)___] : at [unfilled] [Home] : at home, [unfilled] , at the time of the visit. [Self] : self [Patient] : the patient [FreeTextEntry1] : 68 yo f with wounds for 3 months, treating with clobetasol, muprocin, had been treated in 2018\par using telfa , 1 new blister, old ones derided with dressing\par Patient developed leg wounds since 12/2017. Takes igg treatment and prednisone\par using betadine, no blisters - previous one broke, now has open ulcer at that site\par \par right posterior leg very sore\par   h/o Bullous pemphigoid. Currently taking doxy 100mg BID, nicotinamide 500mg BID, \par prednisone 10mg daily, and IVIG [id number] (s/p 2 rounds since January, most recently at end of March). \par She feels that her BP is under best control in the 2 weeks immediately after her IVIG infusion.\par \par  She notes that her flares have decreased in intensity and duration since starting IVIG. \par Still developing bullae on b/l arms and legs. Denies oral or genital lesions. \par Tolerating her medications well but does note easy bruising while on prednisone--currently on 10mg QD. Also notes skin fragility.\par 6/1/18- Patient and  report that right leg wounds are significantly improved\par  \par 2) Lipodermatosclerosis. Has an erosion on R medial calf present since December that started as a blister. It was originally draining profusely per pt; she denies fevers, chills, foul smell.  Wound nurse is applying clob oint to periphery and Alginate dressing over the wound. \par \par BACKGROUND:PMH Stage IA carcinosarcoma of the uterus (s/p Carbo/Taxol x 6 cycles from 12/15 - 4/16), currently in remission, \par Biopsy and DIF from 8/2014 consistent with BP (scanned into file). \par Previously well controlled with Cellcept, prednisone, and clobetasol with flare after stopping Cellcept due to decreasing blood counts.  \par +H/o vaginal erosions\par Her primary dermatologist in the past was Dr Silva.  \par Pt previously stated that whenever predisone has been decreased below 10mg daily she flares.\par 9/5- leg wounds related to body habitus and pemphigus \par  [FreeTextEntry4] :

## 2020-05-12 ENCOUNTER — APPOINTMENT (OUTPATIENT)
Dept: WOUND CARE | Facility: CLINIC | Age: 70
End: 2020-05-12
Payer: MEDICARE

## 2020-05-12 DIAGNOSIS — D64.9 ANEMIA, UNSPECIFIED: ICD-10-CM

## 2020-05-12 PROCEDURE — 99213 OFFICE O/P EST LOW 20 MIN: CPT | Mod: 95

## 2020-05-18 ENCOUNTER — RX RENEWAL (OUTPATIENT)
Age: 70
End: 2020-05-18

## 2020-06-01 ENCOUNTER — APPOINTMENT (OUTPATIENT)
Dept: RHEUMATOLOGY | Facility: CLINIC | Age: 70
End: 2020-06-01
Payer: MEDICARE

## 2020-06-01 PROCEDURE — 96365 THER/PROPH/DIAG IV INF INIT: CPT

## 2020-06-01 PROCEDURE — 96366 THER/PROPH/DIAG IV INF ADDON: CPT

## 2020-06-03 ENCOUNTER — APPOINTMENT (OUTPATIENT)
Dept: RHEUMATOLOGY | Facility: CLINIC | Age: 70
End: 2020-06-03
Payer: MEDICARE

## 2020-06-03 PROCEDURE — 96365 THER/PROPH/DIAG IV INF INIT: CPT

## 2020-06-03 PROCEDURE — 96366 THER/PROPH/DIAG IV INF ADDON: CPT

## 2020-06-04 ENCOUNTER — APPOINTMENT (OUTPATIENT)
Dept: WOUND CARE | Facility: CLINIC | Age: 70
End: 2020-06-04
Payer: MEDICARE

## 2020-06-04 DIAGNOSIS — Z87.2 PERSONAL HISTORY OF DISEASES OF THE SKIN AND SUBCUTANEOUS TISSUE: ICD-10-CM

## 2020-06-04 PROCEDURE — 99213 OFFICE O/P EST LOW 20 MIN: CPT | Mod: 95

## 2020-06-04 NOTE — HISTORY OF PRESENT ILLNESS
[Home] : at home, [unfilled] , at the time of the visit. [FreeTextEntry1] : 68 yo f with wounds for 3 months, treating with clobetasol, muprocin, had been treated in 2018\par right leg terible, opened in 2 other places\par no fever, seeing derm next week\par would like vna- left leg also opened, but settled down with gent\par \par using telfa ,1 new blister, old ones derided with dressing\par still bleeding thru telfa on back of leg, but \par Patient developed leg wounds since 12/2017. Takes igg treatment and prednisone\par had been using betadine, no blisters - previous one broke, now has open ulcer at that site\par \par right posterior leg very sore\par \par   h/o Bullous pemphigoid. Currently still taking doxy 100mg BID, nicotinamide 500mg BID, \par prednisone 10mg daily, and IVIG x6gouar (s/p 2 rounds since January, most recently at end of March). \par She feels that her BP is under best control in the 2 weeks immediately after her IVIG infusion.\par \par  She notes that her flares have decreased in intensity and duration since starting IVIG. \par Still developing bullae on b/l arms and legs. Denies oral or genital lesions. \par Tolerating her medications well but does note easy bruising while on prednisone--currently on 10mg QD. Also notes skin fragility.\par 6/1/18- Patient and  report that right leg wounds are significantly improved\par  \par 2) Lipodermatosclerosis. Has an erosion on R medial calf present since December that started as a blister. It was originally draining profusely per pt; she denies fevers, chills, foul smell.  Wound nurse is applying clob oint to periphery and Alginate dressing over the wound. \par \par BACKGROUND:PMH Stage IA carcinosarcoma of the uterus (s/p Carbo/Taxol x 6 cycles from 12/15 - 4/16), currently in remission, \par Biopsy and DIF from 8/2014 consistent with BP (scanned into file). \par Previously well controlled with Cellcept, prednisone, and clobetasol with flare after stopping Cellcept due to decreasing blood counts.  \par +H/o vaginal erosions\par Her primary dermatologist in the past was Dr Silva.  \par Pt previously stated that whenever predisone has been decreased below 10mg daily she flares.\par 9/5- leg wounds related to body habitus and pemphigus \par  [Other Location: e.g. Home (Enter Location, City,State)___] : at [unfilled] [Patient] : the patient [Self] : self [FreeTextEntry4] :

## 2020-06-04 NOTE — PLAN
[FreeTextEntry1] :  \par Plan -  gentamicin   area, foam, spandage to keep in place,- \par will order vna\par  supplies ordered- foam/ xeroform non stick\par prontosan-

## 2020-06-04 NOTE — PHYSICAL EXAM
[JVD] : no jugular venous distention  [Normal Breath Sounds] : Normal breath sounds [Ankle Swelling Bilaterally] : bilaterally  [Ankle Swelling (On Exam)] : present [Ankle Swelling On The Left] : moderate [] : of the right leg [Ankle Swelling On The Right] : mild [Skin Ulcer] : ulcer [Alert] : alert [Oriented to Person] : oriented to person [Oriented to Place] : oriented to place [Oriented to Time] : oriented to time [Calm] : calm [de-identified] : NAD, arrives in WC, MO [de-identified] : very limited ambulation [FreeTextEntry2] : 3 [FreeTextEntry1] : medial leg [FreeTextEntry3] : 4 [de-identified] : eschar [FreeTextEntry4] : .1 [de-identified] : some in previous blister site [de-identified] : telemedicine vist- see photos [de-identified] : \par previous 1.3x2.2x.1\par  see photos\par new blister, already flat\par now open- bulla burst\par \par previous blister anterior to eschar 3.5 x 1. 2\par \par ankle 24 right\par ankle 23 left [FreeTextEntry8] : 2 [FreeTextEntry7] : posterior calf [FreeTextEntry9] : 3.2 [de-identified] : 0.2 [de-identified] : bleeding and slough [de-identified] : gent/non stick dressing [de-identified] : painful- larger\par  [de-identified] : left leg [de-identified] : 4 [de-identified] : 1 [de-identified] : .1 [de-identified] : other [TWNoteComboBox1] : Right [de-identified] : 50% [TWNoteComboBox6] : Other [de-identified] : None [TWNoteComboBox7] : Mechanical [de-identified] : Other [TWNoteComboBox9] : Right [de-identified] : None [de-identified] : >75% [de-identified] : Yes [TWNoteComboBox8] : Mechanical [de-identified] : Venous

## 2020-06-04 NOTE — ASSESSMENT
[FreeTextEntry1] : 69 year old DM womanw/ open wound to her right lower leg  pemphigoid, on prednisone, leukopenic, anemic on ivig h/o tahbso uterine ca stage 1a- no fever \par kaldestat- too painful, restart gent on that side, taking doxycycline\par seeing derm next week\par has pain and willing to try something - gentamicin topical \par order for vna for bilateral wounds legs\par needs more prontosan, foam/ would like compression with unna/dynaflex\par toes pink- not ischemic\par \par one on the bottom left leg , responded to betadine- closed- reopened\par right blister site is the painful site -looks like [place]- transverse wound\par has used adaptic/telfa- needs  sonya, 4x4- has xeroform - telfa too sticky\par  Patient and family instructed on how to place leg wraps\par \par chris low on left leg/ high pressure on right, palpable pulses/ no Vinsuff 2018- may need reeval\par  datacomplexity lab, xr, old rec, test resultsreviewed, visualize image \par risk-low-no surgery at this time\par \par  history of bullous phemphigoid, \par  technical difficuty:mod- called a few times was able to get through\par appoint for F-T-F-no\par virtual appointment 2 weeks\par referral to [facility]\par \par

## 2020-06-05 ENCOUNTER — APPOINTMENT (OUTPATIENT)
Dept: RHEUMATOLOGY | Facility: CLINIC | Age: 70
End: 2020-06-05
Payer: MEDICARE

## 2020-06-05 PROCEDURE — 96365 THER/PROPH/DIAG IV INF INIT: CPT

## 2020-06-05 PROCEDURE — 96366 THER/PROPH/DIAG IV INF ADDON: CPT

## 2020-06-09 ENCOUNTER — APPOINTMENT (OUTPATIENT)
Dept: DERMATOLOGY | Facility: CLINIC | Age: 70
End: 2020-06-09

## 2020-06-12 NOTE — HISTORY OF PRESENT ILLNESS
[FreeTextEntry4] :  [FreeTextEntry1] : 70 yo f with wounds for 3 months, treating with clobetasol, muprocin, had been treated in 2018\par using telfa , 1 new blister, old ones derided with dressing\par still bleeding thru telfa on back of leg, but \par Patient developed leg wounds since 12/2017. Takes igg treatment and prednisone\par had been using betadine, no blisters - previous one broke, now has open ulcer at that site\par \par right posterior leg very sore\par \par   h/o Bullous pemphigoid. Currently still taking doxy 100mg BID, nicotinamide 500mg BID, \par prednisone 10mg daily, and IVIG [id number] (s/p 2 rounds since January, most recently at end of March). \par She feels that her BP is under best control in the 2 weeks immediately after her IVIG infusion.\par \par  She notes that her flares have decreased in intensity and duration since starting IVIG. \par Still developing bullae on b/l arms and legs. Denies oral or genital lesions. \par Tolerating her medications well but does note easy bruising while on prednisone--currently on 10mg QD. Also notes skin fragility.\par 6/1/18- Patient and  report that right leg wounds are significantly improved\par  \par 2) Lipodermatosclerosis. Has an erosion on R medial calf present since December that started as a blister. It was originally draining profusely per pt; she denies fevers, chills, foul smell.  Wound nurse is applying clob oint to periphery and Alginate dressing over the wound. \par \par BACKGROUND:PMH Stage IA carcinosarcoma of the uterus (s/p Carbo/Taxol x 6 cycles from 12/15 - 4/16), currently in remission, \par Biopsy and DIF from 8/2014 consistent with BP (scanned into file). \par Previously well controlled with Cellcept, prednisone, and clobetasol with flare after stopping Cellcept due to decreasing blood counts.  \par +H/o vaginal erosions\par Her primary dermatologist in the past was Dr Silva.  \par Pt previously stated that whenever predisone has been decreased below 10mg daily she flares.\par 9/5- leg wounds related to body habitus and pemphigus \par

## 2020-06-12 NOTE — ASSESSMENT
[FreeTextEntry1] : 69 year old DM womanw/ open wound to her right lower leg  pemphigoid, on prednisone, leukopenic, anemic on ivig h/o tahbso uterine ca stage 1a\par kaldestat\par has pain and willing to try something - gentamicin topical helping\par \par one on the bottom left leg , responded to betadine- closed\par right blister site is the painful site -looks like [place]- transverse wound\par has used adaptic/telfa- needs  sonya, 4x4\par  Patient and family instructed on how to place leg wraps\par \par chris low on left leg/ high pressure on right, palpable pulses/ no Vinsuff 2018- may need reeval\par  datacomplexity lab, xr, old rec, test resultsreviewed, visualize image \par risk-low-no surgery at this time\par \par  history of bullous phemphigoid, \par  technical difficuty:mod- called a few times was able to get through\par appoint for F-T-F-no\par virtual appointment 2 weeks\par referral to [facility]\par \par

## 2020-06-12 NOTE — PHYSICAL EXAM
[JVD] : no jugular venous distention  [de-identified] : supple [de-identified] : NAD, arrives in WC, [place] [de-identified] :  obese [de-identified] : non labored [de-identified] : very limited ambulation [FreeTextEntry4] : .1 [FreeTextEntry3] : 2.2 [FreeTextEntry1] : medial leg [FreeTextEntry2] : 1.3 [de-identified] : eschar [de-identified] : telemedicine vist- see photos [de-identified] : some in previous blister site [FreeTextEntry8] : 2 [FreeTextEntry7] : posterior calf [de-identified] :  see photos\par new blister, already flat\par now open- bulla burst\par \par previous blister anterior to eschar 3.5 x 1. 2\par \par ankle 24 right\par ankle 23 left [de-identified] : 0.2 [FreeTextEntry9] : 3.2 [de-identified] : painful-\par  [de-identified] : 50% [TWNoteComboBox6] : Other [TWNoteComboBox1] : Right [de-identified] : 50% [TWNoteComboBox7] : Mechanical [TWNoteComboBox9] : Right [de-identified] : Other [de-identified] : <20% [de-identified] : >75% [de-identified] : Yes [TWNoteComboBox8] : Mechanical

## 2020-06-29 ENCOUNTER — APPOINTMENT (OUTPATIENT)
Dept: RHEUMATOLOGY | Facility: CLINIC | Age: 70
End: 2020-06-29
Payer: MEDICARE

## 2020-06-29 PROCEDURE — 96366 THER/PROPH/DIAG IV INF ADDON: CPT

## 2020-06-29 PROCEDURE — 96365 THER/PROPH/DIAG IV INF INIT: CPT

## 2020-07-01 ENCOUNTER — APPOINTMENT (OUTPATIENT)
Dept: RHEUMATOLOGY | Facility: CLINIC | Age: 70
End: 2020-07-01
Payer: MEDICARE

## 2020-07-01 PROCEDURE — 96365 THER/PROPH/DIAG IV INF INIT: CPT

## 2020-07-01 PROCEDURE — 96366 THER/PROPH/DIAG IV INF ADDON: CPT

## 2020-07-06 ENCOUNTER — APPOINTMENT (OUTPATIENT)
Dept: RHEUMATOLOGY | Facility: CLINIC | Age: 70
End: 2020-07-06
Payer: MEDICARE

## 2020-07-06 PROCEDURE — 96366 THER/PROPH/DIAG IV INF ADDON: CPT

## 2020-07-06 PROCEDURE — 96365 THER/PROPH/DIAG IV INF INIT: CPT

## 2020-07-20 ENCOUNTER — RX RENEWAL (OUTPATIENT)
Age: 70
End: 2020-07-20

## 2020-07-29 ENCOUNTER — APPOINTMENT (OUTPATIENT)
Dept: GYNECOLOGIC ONCOLOGY | Facility: CLINIC | Age: 70
End: 2020-07-29
Payer: MEDICARE

## 2020-07-29 VITALS
WEIGHT: 273 LBS | HEART RATE: 103 BPM | DIASTOLIC BLOOD PRESSURE: 71 MMHG | HEIGHT: 65 IN | SYSTOLIC BLOOD PRESSURE: 111 MMHG | BODY MASS INDEX: 45.48 KG/M2

## 2020-07-29 PROCEDURE — 99213 OFFICE O/P EST LOW 20 MIN: CPT

## 2020-07-29 NOTE — HISTORY OF PRESENT ILLNESS
[FreeTextEntry1] : Stage IA carcinosarcoma of the uterus.\par RTLH, BSO and staging 10/22/15.\par Received systemic chemotherapy with Dr. Martinez. \par Carbo/Taxol x 6 cycles. 12/15 - 4/16. \par Needed RBC transfusions on several occasions. \par \par Overall tolerated chemotherapy well. \par Imaging CT CAP Sept 2019 was VAMSHI. \par Denies bleeding, pain or other disease associated symptoms. \par \par She has a history of bullous pemphigoid for which she takes steroids. She is currently experiencing a severe flare affecting both of her legs. Now seeing [facility] group at [facility]. On IVIG. \par \par Mammogram in 5/2017 was abnormal. Ultimately had breast biopsies in 7/2017 which were benign.

## 2020-07-29 NOTE — PHYSICAL EXAM
[Abnormal] : Skin and subcutaneous tissue: Abnormal [Absent] : Uterus: Absent [Normal] : Recto-Vaginal Exam: Normal [de-identified] : well healed LSC incisions, vertical scar, large pannus [Ambulatory and capable of all self care but unable to carry out any work activities] : Status 2- Ambulatory and capable of all self care but unable to carry out any work activities. Up and about more than 50% of waking hours

## 2020-08-03 ENCOUNTER — APPOINTMENT (OUTPATIENT)
Dept: RHEUMATOLOGY | Facility: CLINIC | Age: 70
End: 2020-08-03
Payer: MEDICARE

## 2020-08-03 PROCEDURE — 96365 THER/PROPH/DIAG IV INF INIT: CPT

## 2020-08-03 PROCEDURE — 96366 THER/PROPH/DIAG IV INF ADDON: CPT

## 2020-08-05 ENCOUNTER — APPOINTMENT (OUTPATIENT)
Dept: RHEUMATOLOGY | Facility: CLINIC | Age: 70
End: 2020-08-05
Payer: MEDICARE

## 2020-08-05 PROCEDURE — 96365 THER/PROPH/DIAG IV INF INIT: CPT

## 2020-08-05 PROCEDURE — 96366 THER/PROPH/DIAG IV INF ADDON: CPT

## 2020-08-06 ENCOUNTER — APPOINTMENT (OUTPATIENT)
Dept: WOUND CARE | Facility: CLINIC | Age: 70
End: 2020-08-06

## 2020-08-07 ENCOUNTER — APPOINTMENT (OUTPATIENT)
Dept: RHEUMATOLOGY | Facility: CLINIC | Age: 70
End: 2020-08-07
Payer: MEDICARE

## 2020-08-07 PROCEDURE — 96366 THER/PROPH/DIAG IV INF ADDON: CPT

## 2020-08-07 PROCEDURE — 96365 THER/PROPH/DIAG IV INF INIT: CPT

## 2020-08-11 ENCOUNTER — APPOINTMENT (OUTPATIENT)
Dept: DERMATOLOGY | Facility: CLINIC | Age: 70
End: 2020-08-11
Payer: MEDICARE

## 2020-08-11 ENCOUNTER — OUTPATIENT (OUTPATIENT)
Dept: OUTPATIENT SERVICES | Facility: HOSPITAL | Age: 70
LOS: 1 days | Discharge: HOME | End: 2020-08-11

## 2020-08-11 VITALS — TEMPERATURE: 96.4 F

## 2020-08-11 DIAGNOSIS — Z98.89 OTHER SPECIFIED POSTPROCEDURAL STATES: Chronic | ICD-10-CM

## 2020-08-11 DIAGNOSIS — Z90.710 ACQUIRED ABSENCE OF BOTH CERVIX AND UTERUS: Chronic | ICD-10-CM

## 2020-08-11 DIAGNOSIS — C54.1 MALIGNANT NEOPLASM OF ENDOMETRIUM: Chronic | ICD-10-CM

## 2020-08-11 DIAGNOSIS — Z90.722 ACQUIRED ABSENCE OF OVARIES, BILATERAL: Chronic | ICD-10-CM

## 2020-08-11 DIAGNOSIS — C54.1 MALIGNANT NEOPLASM OF ENDOMETRIUM: ICD-10-CM

## 2020-08-11 PROCEDURE — 99214 OFFICE O/P EST MOD 30 MIN: CPT | Mod: GC

## 2020-08-14 ENCOUNTER — RESULT REVIEW (OUTPATIENT)
Age: 70
End: 2020-08-14

## 2020-08-14 ENCOUNTER — APPOINTMENT (OUTPATIENT)
Dept: HEMATOLOGY ONCOLOGY | Facility: CLINIC | Age: 70
End: 2020-08-14
Payer: MEDICARE

## 2020-08-14 VITALS
TEMPERATURE: 98.6 F | RESPIRATION RATE: 17 BRPM | HEART RATE: 97 BPM | BODY MASS INDEX: 42.79 KG/M2 | OXYGEN SATURATION: 95 % | HEIGHT: 64.96 IN | DIASTOLIC BLOOD PRESSURE: 69 MMHG | SYSTOLIC BLOOD PRESSURE: 112 MMHG | WEIGHT: 256.82 LBS

## 2020-08-14 LAB
BASOPHILS # BLD AUTO: 0.02 K/UL — SIGNIFICANT CHANGE UP (ref 0–0.2)
BASOPHILS NFR BLD AUTO: 0.4 % — SIGNIFICANT CHANGE UP (ref 0–2)
EOSINOPHIL # BLD AUTO: 0.05 K/UL — SIGNIFICANT CHANGE UP (ref 0–0.5)
EOSINOPHIL NFR BLD AUTO: 1 % — SIGNIFICANT CHANGE UP (ref 0–6)
HCT VFR BLD CALC: 31 % — LOW (ref 34.5–45)
HGB BLD-MCNC: 9.7 G/DL — LOW (ref 11.5–15.5)
IMM GRANULOCYTES NFR BLD AUTO: 0.8 % — SIGNIFICANT CHANGE UP (ref 0–1.5)
LYMPHOCYTES # BLD AUTO: 0.49 K/UL — LOW (ref 1–3.3)
LYMPHOCYTES # BLD AUTO: 9.6 % — LOW (ref 13–44)
MCHC RBC-ENTMCNC: 31.3 GM/DL — LOW (ref 32–36)
MCHC RBC-ENTMCNC: 32 PG — SIGNIFICANT CHANGE UP (ref 27–34)
MCV RBC AUTO: 102.3 FL — HIGH (ref 80–100)
MONOCYTES # BLD AUTO: 0.33 K/UL — SIGNIFICANT CHANGE UP (ref 0–0.9)
MONOCYTES NFR BLD AUTO: 6.4 % — SIGNIFICANT CHANGE UP (ref 2–14)
NEUTROPHILS # BLD AUTO: 4.19 K/UL — SIGNIFICANT CHANGE UP (ref 1.8–7.4)
NEUTROPHILS NFR BLD AUTO: 81.8 % — HIGH (ref 43–77)
NRBC # BLD: 0 /100 WBCS — SIGNIFICANT CHANGE UP (ref 0–0)
PLATELET # BLD AUTO: 310 K/UL — SIGNIFICANT CHANGE UP (ref 150–400)
RBC # BLD: 3.03 M/UL — LOW (ref 3.8–5.2)
RBC # FLD: 15 % — HIGH (ref 10.3–14.5)
WBC # BLD: 5.12 K/UL — SIGNIFICANT CHANGE UP (ref 3.8–10.5)
WBC # FLD AUTO: 5.12 K/UL — SIGNIFICANT CHANGE UP (ref 3.8–10.5)

## 2020-08-14 PROCEDURE — 99213 OFFICE O/P EST LOW 20 MIN: CPT

## 2020-08-15 NOTE — REVIEW OF SYSTEMS
[Skin Rash] : skin rash [Skin Wound] : no skin wound [Fainting] : no fainting [Dizziness] : no dizziness [Confused] : no confusion [Difficulty Walking] : difficulty walking [Negative] : Allergic/Immunologic

## 2020-08-15 NOTE — ASSESSMENT
[FreeTextEntry1] : She is a 68 y/o F obese  F with Stage I uterine carcinosarcoma and s/p TLH/ BSO Carbo/ taxol 6 cycles in 4/2016. She continues with surveillance. She remains asymptomatic. We reviewed she will be completing 5 years of surveillance this year and will see as needed. We reviewed her question on methotrexate use for bullous pemphigoid and secondary cancers. Will obtain CT of the chest/ abd/ pelvis baseline at the 5 year isaias. Reviewed carcinosarcoma is unlikely to recur after 5 years and reviewed potential symptoms which would warrant follow up or evaluation.

## 2020-08-15 NOTE — PHYSICAL EXAM
[Obese] : obese [de-identified] : BLE skin plaques with excoriation over the upper extremities; RLE in Unna Boot  [Normal] : affect appropriate [de-identified] : seen in wheelchair

## 2020-08-15 NOTE — HISTORY OF PRESENT ILLNESS
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 She had CT of the abdomen/ pelvis which showed left adrenal nodule unchanged from 10/2014, bilateral renal cysts, and diverticulosis; the evaluation of the pelvis was limited due to her body habitus. She had CT [facility] on 9/16/15 which showed stable 1 to 2mm lung nodules. On 10/22/15, she underwent robotic TLH/ BSO with omentectomy and pelvic lymph node sampling. The pathology showed carcinosarcoma of the endometrium with polypoid mass consisting of high grade serous carcinoma and negative pelvic lymph nodes. We reviewed the role of adjuvant chemotherapy to reduce the chance of recurrence and she agreed to carboplatin/ paclitaxel. She started her first cycle on 12/4/15 and completed Carboplatin and Paclitaxel 4/13/16. Follow up CT showed no evidence of metastatic disease.  [de-identified] : Her family has been well during covid. She denies any new abdominal pain or bloating. She saw Dr Bird and up to date with GYN exam. No vaginal bleeding. Continues to have ulcerations and wearing unna boot over the R leg. She will have repeat IVIG treatment. Dr Pina is considering methotrexate therapy for her and she has questions given risk of secondary cancers.

## 2020-08-18 LAB
ALBUMIN SERPL ELPH-MCNC: 3.8 G/DL
ALP BLD-CCNC: 60 U/L
ALT SERPL-CCNC: 12 U/L
ANION GAP SERPL CALC-SCNC: 13 MMOL/L
AST SERPL-CCNC: 20 U/L
BILIRUB SERPL-MCNC: 0.3 MG/DL
BUN SERPL-MCNC: 24 MG/DL
CALCIUM SERPL-MCNC: 9.2 MG/DL
CANCER AG125 SERPL-ACNC: 7 U/ML
CHLORIDE SERPL-SCNC: 101 MMOL/L
CO2 SERPL-SCNC: 23 MMOL/L
CREAT SERPL-MCNC: 0.8 MG/DL
GLUCOSE SERPL-MCNC: 114 MG/DL
POTASSIUM SERPL-SCNC: 4.5 MMOL/L
PROT SERPL-MCNC: 8.6 G/DL
SODIUM SERPL-SCNC: 137 MMOL/L

## 2020-08-24 ENCOUNTER — RX RENEWAL (OUTPATIENT)
Age: 70
End: 2020-08-24

## 2020-08-25 ENCOUNTER — APPOINTMENT (OUTPATIENT)
Dept: WOUND CARE | Facility: CLINIC | Age: 70
End: 2020-08-25
Payer: MEDICARE

## 2020-08-25 ENCOUNTER — APPOINTMENT (OUTPATIENT)
Dept: DERMATOLOGY | Facility: CLINIC | Age: 70
End: 2020-08-25
Payer: MEDICARE

## 2020-08-25 PROCEDURE — 99213 OFFICE O/P EST LOW 20 MIN: CPT

## 2020-08-25 PROCEDURE — 99214 OFFICE O/P EST MOD 30 MIN: CPT

## 2020-08-25 RX ORDER — GENTAMICIN SULFATE 1 MG/G
0.1 OINTMENT TOPICAL DAILY
Qty: 1 | Refills: 2 | Status: ACTIVE | COMMUNITY
Start: 2020-04-21 | End: 1900-01-01

## 2020-08-25 NOTE — ASSESSMENT
[FreeTextEntry1] : 69 year old DM womanw/ open wound to her right lower leg  pemphigoid, on prednisone,new blister on left leg\par  leukopenic, anemic on ivig h/o tahbso uterine ca stage 1a- no fever \par gent and gauze on new left arm wound, seeing derm today\par kaldestat-- not bleeding\par  restart gent on that side, taking doxycycline\par  discussed nutrition/ mvi\par has pain  helps gentamicin topical \par order for vna for bilateral wounds legs\par needs more prontosan, foam/ would like compression with unna  on right\par  coban on left telfa\par toes pink- not ischemic\par \par \par one on the bottom left leg , responded to betadine- closed- reopened\par   [place]- transverse wound healed\par has used adaptic/telfa-    has used xeroform - telfa too sticky\par  Patient and family instructed on how to place leg wraps\par \par chris low on left leg/ high pressure on right, palpable pulses/ no Vinsuff 2018- may need reeval\par  datacomplexity lab, xr, old rec, test resultsreviewed, visualize image \par risk-low-no surgery at this time\par \par  history of bullous phemphigoid, \par   virtual or in person appointmen/ 4 weeks/referral to [facility]\par \par

## 2020-08-25 NOTE — PLAN
[FreeTextEntry1] :  \par Plan -  gentamicin   area, foam,  right leg\par spandage to keep in place,- left arm, coban left ,leg, right leg unna\par   vna \par kaldestat if needed\par  supplies ordered- foam/ xeroform non stick prn\par prontosan-

## 2020-08-25 NOTE — DISCUSSION/SUMMARY
[FreeTextEntry1] : Certification for medicare -covered home health services under a home health plan of care, including contacts with [facility] and review of reports of patient status required to affirm the initial implementation of the plan of care (POC) that meets the patient's needs this certification period (6/06/2020-8/04/2020dates)\par Eligibility requirements: \par A.. Homebound- confined to home- \par                                                            #1.because of illness or injury, needs aid of supportive devices(crutch,cane,wheelchair,walker); \par                                                                   special transportation, or assist of another person to leave their residence and\par                                                            #2 There is a normal inability to leave home and requires considerable and taxing effort ( indicate : cardiovascular stress, \par                                                                 pulmonary stress-lack of oxygen, poor mobility, pain requiring narcotics, incontinence, confusion,safety concerns\par \par B.Skilled need- nurse with special judgement, knowledge and skill on intermittent /part time basis due to wound on : \par                                                                              ( specify wound/ site) l12.0 / injury due to ( PAD, , Venous HTN, other,e11.9\par C Face to face - date (from 90 days before start of care(soc) or 30 days after soc)\par \par I certify that this patient is confined to his/her home and needs intermittent skilled nursing care, PT, OT and/or  speech therapy\par This patient is under my care and I have authorized the services on this plan of care, and will periodically review the plan.\par I further certify that the patient  had a face to face encounter onjune4/2020 by a MD or Medicare allowed non physician practitioner that was related to the primary reason the patient requires home health services.

## 2020-08-25 NOTE — PHYSICAL EXAM
[Ankle Swelling (On Exam)] : present [Normal Breath Sounds] : Normal breath sounds [Ankle Swelling Bilaterally] : bilaterally  [Ankle Swelling On The Left] : moderate [Ankle Swelling On The Right] : mild [] : of the right leg [Skin Ulcer] : ulcer [Oriented to Place] : oriented to place [Alert] : alert [Oriented to Person] : oriented to person [Calm] : calm [Oriented to Time] : oriented to time [JVD] : no jugular venous distention  [de-identified] : NAD, arrives in WC, MO [de-identified] : very limited ambulation [FreeTextEntry1] : medial leg [FreeTextEntry2] : 3 [FreeTextEntry3] : 4 [de-identified] : eschar [FreeTextEntry4] : .1 [de-identified] : some in previous blister site [de-identified] : \par previous 1.3x2.2x.1\par  see photos\par new blister, already flat\par now open- bulla burst\par \par previous blister anterior to eschar 3.5 x 1. 2\par \par ankle 24 right\par ankle 23 left [de-identified] : telemedicine vist- see photos [FreeTextEntry7] : posterior calf [FreeTextEntry8] : 2 [FreeTextEntry9] : 3.2 [de-identified] : bleeding and slough [de-identified] : gent/non stick dressing [de-identified] : 0.2 [de-identified] : 1 [de-identified] : 4 [de-identified] : painful- larger\par  [de-identified] : left leg [de-identified] : other [TWNoteComboBox1] : Right [de-identified] : .1 [TWNoteComboBox6] : Other [TWNoteComboBox7] : Mechanical [de-identified] : None [de-identified] : 50% [TWNoteComboBox9] : Right [de-identified] : Other [de-identified] : None [de-identified] : >75% [de-identified] : Venous [TWNoteComboBox8] : Mechanical [de-identified] : Yes

## 2020-08-25 NOTE — HISTORY OF PRESENT ILLNESS
[FreeTextEntry1] : 68 yo f with wounds for 3 months, treating with clobetasol, using clobetasol, using gent on blisters helps with pain, able to tolerate foam and gent, bullous pemphigoid, new left arm and left lower leg blister\par  muprocin, had been treated in 2018\par right leg terible, opened in 2 other places/ no fever, seeing dermtoday\par using gammagard 80 igg infusion last one aug 7\par would like vna- left leg also opened, but settled down with gent\par \par using telfa ,1 new blister, old ones derided with dressing\par still bleeding thru telfa on back of leg, but \par Patient developed leg wounds since 12/2017. Takes igg treatment and prednisone\par had been using betadine, no blisters - previous one broke, now has open ulcer at that site\par \par right posterior leg very sore\par \par   h/o Bullous pemphigoid. Currently still taking doxy 100mg BID, nicotinamide 500mg BID, \par prednisone 10mg daily, and IVIG [id number] (s/p 2 rounds since January, most recently at end of March). \par She feels that her BP is under best control in the 2 weeks immediately after her IVIG infusion.\par \par  She notes that her flares have decreased in intensity and duration since starting IVIG. \par Still developing bullae on b/l arms and legs. Denies oral or genital lesions. \par Tolerating her medications well but does note easy bruising while on prednisone--currently on 10mg QD. Also notes skin fragility.\par 6/1/18- Patient and  report that right leg wounds are significantly improved\par  \par 2) Lipodermatosclerosis. Has an erosion on R medial calf present since December that started as a blister. It was originally draining profusely per pt; she denies fevers, chills, foul smell.  Wound nurse is applying clob oint to periphery and Alginate dressing over the wound. \par \par BACKGROUND:PMH Stage IA carcinosarcoma of the uterus (s/p Carbo/Taxol x 6 cycles from 12/15 - 4/16), currently in remission, \par Biopsy and DIF from 8/2014 consistent with BP (scanned into file). \par Previously well controlled with Cellcept, prednisone, and clobetasol with flare after stopping Cellcept due to decreasing blood counts.  \par +H/o vaginal erosions\par Her primary dermatologist in the past was Dr Silva.  \par Pt previously stated that whenever predisone has been decreased below 10mg daily she flares.\par 9/5- leg wounds related to body habitus and pemphigus \par

## 2020-08-31 ENCOUNTER — APPOINTMENT (OUTPATIENT)
Dept: RHEUMATOLOGY | Facility: CLINIC | Age: 70
End: 2020-08-31
Payer: MEDICARE

## 2020-08-31 DIAGNOSIS — L03.115 CELLULITIS OF RIGHT LOWER LIMB: ICD-10-CM

## 2020-08-31 DIAGNOSIS — R23.8 OTHER SKIN CHANGES: ICD-10-CM

## 2020-08-31 PROCEDURE — 96365 THER/PROPH/DIAG IV INF INIT: CPT

## 2020-08-31 PROCEDURE — G0179 MD RECERTIFICATION HHA PT: CPT

## 2020-08-31 PROCEDURE — 96366 THER/PROPH/DIAG IV INF ADDON: CPT

## 2020-09-08 ENCOUNTER — APPOINTMENT (OUTPATIENT)
Dept: DERMATOLOGY | Facility: CLINIC | Age: 70
End: 2020-09-08

## 2020-09-09 ENCOUNTER — APPOINTMENT (OUTPATIENT)
Dept: RHEUMATOLOGY | Facility: CLINIC | Age: 70
End: 2020-09-09
Payer: MEDICARE

## 2020-09-09 PROCEDURE — 96365 THER/PROPH/DIAG IV INF INIT: CPT

## 2020-09-09 PROCEDURE — 96366 THER/PROPH/DIAG IV INF ADDON: CPT

## 2020-09-11 ENCOUNTER — APPOINTMENT (OUTPATIENT)
Dept: RHEUMATOLOGY | Facility: CLINIC | Age: 70
End: 2020-09-11
Payer: MEDICARE

## 2020-09-11 PROCEDURE — 96366 THER/PROPH/DIAG IV INF ADDON: CPT

## 2020-09-11 PROCEDURE — 96365 THER/PROPH/DIAG IV INF INIT: CPT

## 2020-09-14 ENCOUNTER — APPOINTMENT (OUTPATIENT)
Dept: RHEUMATOLOGY | Facility: CLINIC | Age: 70
End: 2020-09-14

## 2020-09-14 ENCOUNTER — APPOINTMENT (OUTPATIENT)
Dept: WOUND CARE | Facility: CLINIC | Age: 70
End: 2020-09-14

## 2020-09-14 ENCOUNTER — RX RENEWAL (OUTPATIENT)
Age: 70
End: 2020-09-14

## 2020-09-29 ENCOUNTER — APPOINTMENT (OUTPATIENT)
Dept: WOUND CARE | Facility: CLINIC | Age: 70
End: 2020-09-29
Payer: MEDICARE

## 2020-09-29 VITALS — TEMPERATURE: 97.2 F

## 2020-09-29 DIAGNOSIS — N30.80 OTHER CYSTITIS W/OUT HEMATURIA: ICD-10-CM

## 2020-09-29 PROCEDURE — 99213 OFFICE O/P EST LOW 20 MIN: CPT

## 2020-09-30 PROBLEM — N30.80: Status: ACTIVE | Noted: 2020-03-09

## 2020-10-05 ENCOUNTER — APPOINTMENT (OUTPATIENT)
Dept: RHEUMATOLOGY | Facility: CLINIC | Age: 70
End: 2020-10-05
Payer: MEDICARE

## 2020-10-05 DIAGNOSIS — Z23 ENCOUNTER FOR IMMUNIZATION: ICD-10-CM

## 2020-10-05 PROCEDURE — 96366 THER/PROPH/DIAG IV INF ADDON: CPT

## 2020-10-05 PROCEDURE — 96365 THER/PROPH/DIAG IV INF INIT: CPT

## 2020-10-05 PROCEDURE — G0008: CPT

## 2020-10-05 PROCEDURE — 90662 IIV NO PRSV INCREASED AG IM: CPT

## 2020-10-06 ENCOUNTER — RX RENEWAL (OUTPATIENT)
Age: 70
End: 2020-10-06

## 2020-10-07 ENCOUNTER — APPOINTMENT (OUTPATIENT)
Dept: RHEUMATOLOGY | Facility: CLINIC | Age: 70
End: 2020-10-07
Payer: COMMERCIAL

## 2020-10-07 PROCEDURE — 96365 THER/PROPH/DIAG IV INF INIT: CPT

## 2020-10-07 PROCEDURE — 96366 THER/PROPH/DIAG IV INF ADDON: CPT

## 2020-10-09 ENCOUNTER — APPOINTMENT (OUTPATIENT)
Dept: RHEUMATOLOGY | Facility: CLINIC | Age: 70
End: 2020-10-09
Payer: MEDICARE

## 2020-10-09 PROCEDURE — 96366 THER/PROPH/DIAG IV INF ADDON: CPT

## 2020-10-09 PROCEDURE — 96365 THER/PROPH/DIAG IV INF INIT: CPT

## 2020-10-12 ENCOUNTER — APPOINTMENT (OUTPATIENT)
Dept: DERMATOLOGY | Facility: CLINIC | Age: 70
End: 2020-10-12
Payer: MEDICARE

## 2020-10-12 ENCOUNTER — APPOINTMENT (OUTPATIENT)
Dept: RHEUMATOLOGY | Facility: CLINIC | Age: 70
End: 2020-10-12

## 2020-10-12 PROCEDURE — 99214 OFFICE O/P EST MOD 30 MIN: CPT

## 2020-10-14 ENCOUNTER — APPOINTMENT (OUTPATIENT)
Dept: RHEUMATOLOGY | Facility: CLINIC | Age: 70
End: 2020-10-14

## 2020-10-16 ENCOUNTER — APPOINTMENT (OUTPATIENT)
Dept: RHEUMATOLOGY | Facility: CLINIC | Age: 70
End: 2020-10-16

## 2020-10-26 ENCOUNTER — NON-APPOINTMENT (OUTPATIENT)
Age: 70
End: 2020-10-26

## 2020-10-27 ENCOUNTER — APPOINTMENT (OUTPATIENT)
Dept: WOUND CARE | Facility: CLINIC | Age: 70
End: 2020-10-27
Payer: MEDICARE

## 2020-10-27 PROCEDURE — 99213 OFFICE O/P EST LOW 20 MIN: CPT | Mod: 95

## 2020-10-27 NOTE — REASON FOR VISIT
[Follow-Up: _____] : a [unfilled] follow-up visit [Spouse] : spouse [FreeTextEntry1] : leg , arm, buttock wound-   urgent telehealth

## 2020-10-27 NOTE — PHYSICAL EXAM
[Normal Breath Sounds] : Normal breath sounds [Ankle Swelling (On Exam)] : present [Ankle Swelling Bilaterally] : bilaterally  [Ankle Swelling On The Left] : moderate [] : of the right leg [Ankle Swelling On The Right] : mild [Skin Ulcer] : ulcer [Alert] : alert [Oriented to Person] : oriented to person [Oriented to Place] : oriented to place [Oriented to Time] : oriented to time [Calm] : calm [JVD] : no jugular venous distention  [de-identified] : NAD, arrives in WC, [place] [de-identified] : very limited ambulation [FreeTextEntry1] : medial leg [FreeTextEntry2] : 3 [FreeTextEntry3] : 4 [FreeTextEntry4] : .1 [de-identified] : eschar [de-identified] : some in previous blister site [de-identified] : telemedicine vist- see photos [de-identified] : \par previous 1.3x2.2x.1\par  see photos\par new blister, already flat\par now open- bulla burst\par \par previous blister anterior to eschar 3.5 x 1. 2\par \par ankle 24 right\par ankle 23 left [FreeTextEntry7] : posterior calf [FreeTextEntry8] : 2 [FreeTextEntry9] : 3.2 [de-identified] : 0.2 [de-identified] : bleeding and slough [de-identified] : gent/non stick dressing [de-identified] : painful- larger\par  [de-identified] : left leg [de-identified] : 4 [de-identified] : 1 [de-identified] : .1 [de-identified] : other [TWNoteComboBox1] : Right [TWNoteComboBox6] : Other [de-identified] : None [de-identified] : 50% [TWNoteComboBox7] : Mechanical [TWNoteComboBox9] : Right [de-identified] : Other [de-identified] : None [de-identified] : >75% [de-identified] : Yes [TWNoteComboBox8] : Mechanical [de-identified] : Venous

## 2020-10-27 NOTE — HISTORY OF PRESENT ILLNESS
[FreeTextEntry1] : 70 yo f with wounds for 3 months, treating with clobetasol, using clobetasol, using gent on blisters helps with pain, able to tolerate foam and gent, bullous pemphigoid, new left arm and left lower leg blister\par  muprocin, had been treated in 2018\par right leg terible, opened in 2 other places/ no fever, seeing dermtoday\par using gammagard 80 igg infusion last one aug 7\par would like vna- left leg also opened, but settled down with gent\par \par using telfa ,1 new blister, old ones derided with dressing\par still bleeding thru telfa on back of leg, but \par Patient developed leg wounds since 12/2017. Takes igg treatment and prednisone\par had been using betadine, no blisters - previous one broke, now has open ulcer at that site\par \par right posterior leg very sore\par \par   h/o Bullous pemphigoid. Currently still taking doxy 100mg BID, nicotinamide 500mg BID, \par prednisone 10mg daily, and IVIG [id number] (s/p 2 rounds since January, most recently at end of March). \par She feels that her BP is under best control in the 2 weeks immediately after her IVIG infusion.\par \par  She notes that her flares have decreased in intensity and duration since starting IVIG. \par Still developing bullae on b/l arms and legs. Denies oral or genital lesions. \par Tolerating her medications well but does note easy bruising while on prednisone--currently on 10mg QD. Also notes skin fragility.\par 6/1/18- Patient and  report that right leg wounds are significantly improved\par  \par 2) Lipodermatosclerosis. Has an erosion on R medial calf present since December that started as a blister. It was originally draining profusely per pt; she denies fevers, chills, foul smell.  Wound nurse is applying clob oint to periphery and Alginate dressing over the wound. \par \par BACKGROUND:PMH Stage IA carcinosarcoma of the uterus (s/p Carbo/Taxol x 6 cycles from 12/15 - 4/16), currently in remission, \par Biopsy and DIF from 8/2014 consistent with BP (scanned into file). \par Previously well controlled with Cellcept, prednisone, and clobetasol with flare after stopping Cellcept due to decreasing blood counts.  \par +H/o vaginal erosions\par Her primary dermatologist in the past was Dr Silva.  \par Pt previously stated that whenever predisone has been decreased below 10mg daily she flares.\par 9/5- leg wounds related to body habitus and pemphigus \par

## 2020-10-27 NOTE — PHYSICAL EXAM
[Normal Breath Sounds] : Normal breath sounds [Ankle Swelling (On Exam)] : present [Ankle Swelling Bilaterally] : bilaterally  [Ankle Swelling On The Left] : moderate [] : of the right leg [Ankle Swelling On The Right] : mild [Skin Ulcer] : ulcer [Alert] : alert [Oriented to Person] : oriented to person [Oriented to Place] : oriented to place [Oriented to Time] : oriented to time [Calm] : calm [JVD] : no jugular venous distention  [de-identified] : NAD, arrives in WC, [place] [de-identified] : very limited ambulation [FreeTextEntry1] : medial leg [FreeTextEntry2] : 3 [FreeTextEntry3] : 4 [FreeTextEntry4] : .1 [de-identified] : eschar [de-identified] : some in previous blister site [de-identified] : telemedicine vist- see photos [de-identified] : \par previous 1.3x2.2x.1\par  see photos\par new blister, already flat\par now open- bulla burst\par \par previous blister anterior to eschar 3.5 x 1. 2\par \par ankle 24 right\par ankle 23 left [FreeTextEntry7] : posterior calf [FreeTextEntry8] : 2 [FreeTextEntry9] : 3.2 [de-identified] : 0.2 [de-identified] : bleeding and slough [de-identified] : gent/non stick dressing [de-identified] : painful- larger\par  [de-identified] : left leg [de-identified] : 4 [de-identified] : 1 [de-identified] : .1 [de-identified] : other [TWNoteComboBox1] : Right [TWNoteComboBox6] : Other [de-identified] : None [de-identified] : 50% [TWNoteComboBox7] : Mechanical [TWNoteComboBox9] : Right [de-identified] : Other [de-identified] : None [de-identified] : >75% [de-identified] : Yes [TWNoteComboBox8] : Mechanical [de-identified] : Venous

## 2020-10-27 NOTE — ASSESSMENT
[FreeTextEntry1] : 70  year old DM womanw/ open wound to her right lower leg  pemphigoid, on prednisone,\par new blister on tyhighs both leg\par  leukopenic, anemic on ivig h/o tahbso uterine ca stage 1a- no fever \par gent and gauze on new left arm wound, seeing derm today\par kaldestat-- not bleeding\par betadine, taking doxycycline\par  discussed nutrition/ mvi\par has painhas/   helps gentamicin topical \par order for vna for bilateral wounds legs\par needs more prontosan, foam/ would like compression with unna  on right\par  coban on left telfa\par toes pink- not ischemic\par \par \par one on the bottom left leg , responded to betadine- closed- reopened\par   [place]- transverse wound healed\par has used adaptic/telfa-    has used xeroform - telfa too sticky\par  Patient and family instructed on how to place leg wraps\par \par chris low on left leg/ high pressure on right, palpable pulses/ no Vinsuff 2018- may need reeval\par  datacomplexity lab, xr, old rec, test resultsreviewed, visualize image \par risk-low- \par  history of bullous phemphigoid, \par \par 10/27/20\par Scabbed erosions noted on abdomen and bilateral arms, one open area on right anterior arm, bilateral legs with some intact blisters, and opened blisters, open areas across buttocks noted.\par Pt. on prednisone 20 mg daily\par

## 2020-10-27 NOTE — PLAN
[FreeTextEntry1] : 10/27/20\par Plan - wash area ( arms and abdomen)  gently with soap and water apply betadine to dried up areas on arms/abdomen, c/w compression gent. /foam to legs\par fup with derm \par Follow up telehealth - will call to reschedule

## 2020-10-27 NOTE — HISTORY OF PRESENT ILLNESS
[Home] : at home, [unfilled] , at the time of the visit. [Medical Office: ([facility])___] : at the medical office located in  [Verbal consent obtained from patient] : the patient, [unfilled] [FreeTextEntry4] : Maria Isabel NP [FreeTextEntry1] : 70 yo f with wounds for 3 months, treating with clobetasol, using clobetasol, using gent on blisters\par new ones on buttocks leaking  helps with pain, able to tolerate foam and gent, bullous pemphigoid, new left arm and left lower leg blister\par  muprocin, had been treated in 2018\par right leg terible, opened in 2 other places/ no fever, seeing dermtoday\par using gammagard 80 igg infusion last one aug 7\par would like vna- left leg also opened, but settled down with gent\par   virtual or in person appointmen/ 4 weeks/referral to PCPna\par previously  9/29/20 right leg with intact blisters and superficial open areas, reports that foam often sticks to wounds, intact blisters above knee drained today\par \par using telfa ,1 new blister, old ones derided with dressing\par still bleeding thru telfa on back of leg, but \par Patient developed leg wounds since 12/2017. Takes igg treatment and prednisone\par had been using betadine, no blisters - previous one broke, now has open ulcer at that site\par \par right posterior leg very sore\par \par   h/o Bullous pemphigoid. Currently still taking doxy 100mg BID, nicotinamide 500mg BID, \par prednisone 10mg daily, and IVIG [id number] (s/p 2 rounds since January, most recently at end of March). \par She feels that her BP is under best control in the 2 weeks immediately after her IVIG infusion.\par \par  She notes that her flares have decreased in intensity and duration since starting IVIG. \par Still developing bullae on b/l arms and legs. Denies oral or genital lesions. \par Tolerating her medications well but does note easy bruising while on prednisone--currently on 10mg QD. Also notes skin fragility.\par 6/1/18- Patient and  report that right leg wounds are significantly improved\par  \par 2) Lipodermatosclerosis. Has an erosion on R medial calf present since December that started as a blister. It was originally draining profusely per pt; she denies fevers, chills, foul smell.  Wound nurse is applying clob oint to periphery and Alginate dressing over the wound. \par \par BACKGROUND:PMH Stage IA carcinosarcoma of the uterus (s/p Carbo/Taxol x 6 cycles from 12/15 - 4/16), currently in remission, \par Biopsy and DIF from 8/2014 consistent with BP (scanned into file). \par Previously well controlled with Cellcept, prednisone, and clobetasol with flare after stopping Cellcept due to decreasing blood counts.  \par +H/o vaginal erosions\par Her primary dermatologist in the past was Dr Silva.  \par Pt previously stated that whenever predisone has been decreased below 10mg daily she flares.\par 9/5- leg wounds related to body habitus and pemphigus \par

## 2020-11-02 ENCOUNTER — APPOINTMENT (OUTPATIENT)
Dept: RHEUMATOLOGY | Facility: CLINIC | Age: 70
End: 2020-11-02
Payer: MEDICARE

## 2020-11-02 PROCEDURE — 96365 THER/PROPH/DIAG IV INF INIT: CPT

## 2020-11-02 PROCEDURE — 96366 THER/PROPH/DIAG IV INF ADDON: CPT

## 2020-11-04 ENCOUNTER — APPOINTMENT (OUTPATIENT)
Dept: RHEUMATOLOGY | Facility: CLINIC | Age: 70
End: 2020-11-04
Payer: MEDICARE

## 2020-11-04 PROCEDURE — 96365 THER/PROPH/DIAG IV INF INIT: CPT

## 2020-11-04 PROCEDURE — 96366 THER/PROPH/DIAG IV INF ADDON: CPT

## 2020-11-06 ENCOUNTER — APPOINTMENT (OUTPATIENT)
Dept: RHEUMATOLOGY | Facility: CLINIC | Age: 70
End: 2020-11-06
Payer: MEDICARE

## 2020-11-06 PROCEDURE — 96366 THER/PROPH/DIAG IV INF ADDON: CPT

## 2020-11-06 PROCEDURE — 96365 THER/PROPH/DIAG IV INF INIT: CPT

## 2020-11-10 ENCOUNTER — APPOINTMENT (OUTPATIENT)
Dept: WOUND CARE | Facility: CLINIC | Age: 70
End: 2020-11-10
Payer: MEDICARE

## 2020-11-10 DIAGNOSIS — S81.802D UNSPECIFIED OPEN WOUND, LEFT LOWER LEG, SUBSEQUENT ENCOUNTER: ICD-10-CM

## 2020-11-10 PROCEDURE — 99214 OFFICE O/P EST MOD 30 MIN: CPT | Mod: 95

## 2020-11-10 RX ORDER — LIDOCAINE AND PRILOCAINE 25; 25 MG/G; MG/G
2.5-2.5 CREAM TOPICAL
Qty: 1 | Refills: 5 | Status: ACTIVE | COMMUNITY
Start: 2020-11-10 | End: 1900-01-01

## 2020-11-16 ENCOUNTER — NON-APPOINTMENT (OUTPATIENT)
Age: 70
End: 2020-11-16

## 2020-11-18 ENCOUNTER — APPOINTMENT (OUTPATIENT)
Dept: CT IMAGING | Facility: IMAGING CENTER | Age: 70
End: 2020-11-18

## 2020-11-23 ENCOUNTER — APPOINTMENT (OUTPATIENT)
Dept: DERMATOLOGY | Facility: CLINIC | Age: 70
End: 2020-11-23

## 2020-11-23 NOTE — ASSESSMENT
[FreeTextEntry1] : 70  year old DM womanw/ open wound to her right lower leg  pemphigoid, on prednisone,\par new blister on thighs, both leg\par  leukopenic, anemic on ivig h/o tahbso uterine ca stage 1a- no fever \par gent and gauze on new left arm wound, seeing derm today\par kaldestat-- not bleeding\par betadine, taking doxycycline\par  discussed nutrition/ mvi\par has pain /   helps gentamicin topical \par order for vna for bilateral wounds legs\par needs more prontosan, foam/ would like compression with unna  on right\par  coban on left telfa\par toes pink- not ischemic\par \par chris low on left leg/ high pressure on right, palpable pulses/ no Vinsuff 2018- may need reeval\par  datacomplexity-mod  lab, xr, old rec, test resultsreviewed, visualize image \par risk-low- \par  history of bullous phemphigoid, \par \par \par

## 2020-11-23 NOTE — HISTORY OF PRESENT ILLNESS
[Home] : at home, [unfilled] , at the time of the visit. [Medical Office: ([facility])___] : at the medical office located in  [Verbal consent obtained from patient] : the patient, [unfilled] [FreeTextEntry1] : 68 yo f with wounds for 3 months, treating with clobetasol, using clobetasol, using gent on blisters\par new ones on buttocks leaking  helps with pain, able to tolerate foam and gent, bullous pemphigoid, new left arm and left lower leg blister, last visit 10/27/20 Scabbed erosions noted on abdomen and bilateral arms, one open area on right anterior arm, bilateral legs with some intact blisters, and opened blisters, open areas across buttocks noted.\par Pt. on prednisone 20 mg daily\par  muprocin, had been treated in 2018, one on the bottom left leg , responded to betadine- closed- reopened\par   [place]- transverse wound healed\par has used adaptic/telfa-    has used xeroform - telfa too sticky\par  Patient and family instructed on how to place leg wraps\par right leg terible, opened in 2 other places/ no fever, seeing dermtoday\par using gammagard 80 igg infusion last one aug 7\par would like vna- left leg also opened, but settled down with gent\par   virtual or in person appointmen/ 4 weeks/referral to PCPna\par previously  9/29/20 right leg with intact blisters and superficial open areas, reports that foam often sticks to wounds, intact blisters above knee drained today\par \par using telfa ,1 new blister, old ones derided with dressing\par still bleeding thru telfa on back of leg, but \par Patient developed leg wounds since 12/2017. Takes igg treatment and prednisone\par had been using betadine, no blisters - previous one broke, now has open ulcer at that site\par \par right posterior leg very sore\par \par   h/o Bullous pemphigoid. Currently still taking doxy 100mg BID, nicotinamide 500mg BID, \par prednisone 10mg daily, and IVIG [id number] (s/p 2 rounds since January, most recently at end of March). \par She feels that her BP is under best control in the 2 weeks immediately after her IVIG infusion.\par \par  She notes that her flares have decreased in intensity and duration since starting IVIG. \par Still developing bullae on b/l arms and legs. Denies oral or genital lesions. \par Tolerating her medications well but does note easy bruising while on prednisone--currently on 10mg QD. Also notes skin fragility.\par 6/1/18- Patient and  report that right leg wounds are significantly improved\par  \par 2) Lipodermatosclerosis. Has an erosion on R medial calf present since December that started as a blister. It was originally draining profusely per pt; she denies fevers, chills, foul smell.  Wound nurse is applying clob oint to periphery and Alginate dressing over the wound. \par \par BACKGROUND:PMH Stage IA carcinosarcoma of the uterus (s/p Carbo/Taxol x 6 cycles from 12/15 - 4/16), currently in remission, \par Biopsy and DIF from 8/2014 consistent with BP (scanned into file). \par Previously well controlled with Cellcept, prednisone, and clobetasol with flare after stopping Cellcept due to decreasing blood counts.  \par +H/o vaginal erosions\par Her primary dermatologist in the past was Dr Silva.  \par Pt previously stated that whenever predisone has been decreased below 10mg daily she flares.\par 9/5- leg wounds related to body habitus and pemphigus \par  [FreeTextEntry4] : Maria Isabel NP

## 2020-11-23 NOTE — PLAN
[FreeTextEntry1] :  \par Plan - wash area ( arms and abdomen)  gently with soap and water apply betadine to dried up areas on arms/abdomen, c/w compression gent. /foam to legs\par fup with derm \par Follow up telehealth - will call to reschedule

## 2020-11-23 NOTE — PHYSICAL EXAM
[Normal Breath Sounds] : Normal breath sounds [Ankle Swelling (On Exam)] : present [Ankle Swelling Bilaterally] : bilaterally  [Ankle Swelling On The Left] : moderate [] : of the right leg [Ankle Swelling On The Right] : mild [Skin Ulcer] : ulcer [Alert] : alert [Oriented to Person] : oriented to person [Oriented to Place] : oriented to place [Oriented to Time] : oriented to time [Calm] : calm [JVD] : no jugular venous distention  [de-identified] : NAD, arrives in WC, [place] [de-identified] : very limited ambulation [Please See PDF for [employer]] : Please See PDF for [employer]. [FreeTextEntry1] : medial leg [FreeTextEntry2] : 3 [FreeTextEntry3] : 4 [FreeTextEntry4] : .1 [de-identified] : eschar [de-identified] : some in previous blister site [de-identified] : telemedicine vist- see photos [de-identified] : \par previous 1.3x2.2x.1\par  see photos\par new blister, already flat\par now open- bulla burst\par \par previous blister anterior to eschar 3.5 x 1. 2\par \par ankle 24 right\par ankle 23 left [FreeTextEntry7] : posterior calf [FreeTextEntry8] : 2 [FreeTextEntry9] : 3.2 [de-identified] : 0.2 [de-identified] : bleeding and slough [de-identified] : gent/non stick dressing [de-identified] : painful- larger\par  [de-identified] : left leg [de-identified] : 4 [de-identified] : 1 [de-identified] : .1 [de-identified] : other [TWNoteComboBox1] : Right [TWNoteComboBox6] : Other [de-identified] : None [de-identified] : 50% [TWNoteComboBox7] : Mechanical [TWNoteComboBox9] : Right [de-identified] : Other [de-identified] : None [de-identified] : >75% [de-identified] : Yes [TWNoteComboBox8] : Mechanical [de-identified] : Venous

## 2020-11-30 ENCOUNTER — APPOINTMENT (OUTPATIENT)
Dept: RHEUMATOLOGY | Facility: CLINIC | Age: 70
End: 2020-11-30
Payer: MEDICARE

## 2020-11-30 PROCEDURE — 96365 THER/PROPH/DIAG IV INF INIT: CPT

## 2020-11-30 PROCEDURE — 96366 THER/PROPH/DIAG IV INF ADDON: CPT

## 2020-12-02 ENCOUNTER — APPOINTMENT (OUTPATIENT)
Dept: RHEUMATOLOGY | Facility: CLINIC | Age: 70
End: 2020-12-02
Payer: MEDICARE

## 2020-12-02 PROCEDURE — 96365 THER/PROPH/DIAG IV INF INIT: CPT

## 2020-12-02 PROCEDURE — 96366 THER/PROPH/DIAG IV INF ADDON: CPT

## 2020-12-04 ENCOUNTER — APPOINTMENT (OUTPATIENT)
Dept: RHEUMATOLOGY | Facility: CLINIC | Age: 70
End: 2020-12-04
Payer: MEDICARE

## 2020-12-04 PROCEDURE — 96366 THER/PROPH/DIAG IV INF ADDON: CPT

## 2020-12-04 PROCEDURE — 96365 THER/PROPH/DIAG IV INF INIT: CPT

## 2020-12-08 ENCOUNTER — LABORATORY RESULT (OUTPATIENT)
Age: 70
End: 2020-12-08

## 2020-12-08 ENCOUNTER — APPOINTMENT (OUTPATIENT)
Dept: DERMATOLOGY | Facility: CLINIC | Age: 70
End: 2020-12-08
Payer: MEDICARE

## 2020-12-08 PROCEDURE — 99214 OFFICE O/P EST MOD 30 MIN: CPT

## 2020-12-09 LAB
ALBUMIN SERPL ELPH-MCNC: 2.8 G/DL
ALP BLD-CCNC: 63 U/L
ALT SERPL-CCNC: 18 U/L
ANION GAP SERPL CALC-SCNC: 10 MMOL/L
AST SERPL-CCNC: 18 U/L
BASOPHILS # BLD AUTO: 0 K/UL
BASOPHILS NFR BLD AUTO: 0 %
BILIRUB SERPL-MCNC: 0.2 MG/DL
BUN SERPL-MCNC: 43 MG/DL
CALCIUM SERPL-MCNC: 8.6 MG/DL
CHLORIDE SERPL-SCNC: 101 MMOL/L
CO2 SERPL-SCNC: 26 MMOL/L
CREAT SERPL-MCNC: 0.93 MG/DL
EOSINOPHIL # BLD AUTO: 0 K/UL
EOSINOPHIL NFR BLD AUTO: 0 %
GLUCOSE SERPL-MCNC: 295 MG/DL
HBV CORE IGG+IGM SER QL: REACTIVE
HBV SURFACE AB SER QL: REACTIVE
HBV SURFACE AG SER QL: NONREACTIVE
HCT VFR BLD CALC: 29.7 %
HCV AB SER QL: NONREACTIVE
HCV S/CO RATIO: 0.18 S/CO
HGB BLD-MCNC: 9.1 G/DL
LYMPHOCYTES # BLD AUTO: 0.47 K/UL
LYMPHOCYTES NFR BLD AUTO: 4.4 %
MAN DIFF?: NORMAL
MCHC RBC-ENTMCNC: 30.6 GM/DL
MCHC RBC-ENTMCNC: 31.8 PG
MCV RBC AUTO: 103.8 FL
MONOCYTES # BLD AUTO: 0.46 K/UL
MONOCYTES NFR BLD AUTO: 4.3 %
NEUTROPHILS # BLD AUTO: 9.73 K/UL
NEUTROPHILS NFR BLD AUTO: 91.3 %
PLATELET # BLD AUTO: 303 K/UL
POTASSIUM SERPL-SCNC: 5.2 MMOL/L
PROT SERPL-MCNC: 7.6 G/DL
RBC # BLD: 2.86 M/UL
RBC # FLD: 16.5 %
SODIUM SERPL-SCNC: 137 MMOL/L
WBC # FLD AUTO: 10.66 K/UL

## 2020-12-11 ENCOUNTER — NON-APPOINTMENT (OUTPATIENT)
Age: 70
End: 2020-12-11

## 2020-12-15 ENCOUNTER — APPOINTMENT (OUTPATIENT)
Dept: CT IMAGING | Facility: IMAGING CENTER | Age: 70
End: 2020-12-15

## 2020-12-15 ENCOUNTER — OUTPATIENT (OUTPATIENT)
Dept: OUTPATIENT SERVICES | Facility: HOSPITAL | Age: 70
LOS: 1 days | End: 2020-12-15
Payer: MEDICARE

## 2020-12-15 DIAGNOSIS — Z98.89 OTHER SPECIFIED POSTPROCEDURAL STATES: Chronic | ICD-10-CM

## 2020-12-15 DIAGNOSIS — C54.1 MALIGNANT NEOPLASM OF ENDOMETRIUM: ICD-10-CM

## 2020-12-15 DIAGNOSIS — C54.1 MALIGNANT NEOPLASM OF ENDOMETRIUM: Chronic | ICD-10-CM

## 2020-12-15 DIAGNOSIS — Z90.710 ACQUIRED ABSENCE OF BOTH CERVIX AND UTERUS: Chronic | ICD-10-CM

## 2020-12-15 DIAGNOSIS — Z90.722 ACQUIRED ABSENCE OF OVARIES, BILATERAL: Chronic | ICD-10-CM

## 2020-12-15 PROCEDURE — 74177 CT ABD & PELVIS W/CONTRAST: CPT | Mod: 26

## 2020-12-15 PROCEDURE — 71260 CT THORAX DX C+: CPT

## 2020-12-15 PROCEDURE — 71260 CT THORAX DX C+: CPT | Mod: 26

## 2020-12-15 PROCEDURE — 74177 CT ABD & PELVIS W/CONTRAST: CPT

## 2020-12-22 ENCOUNTER — APPOINTMENT (OUTPATIENT)
Dept: DERMATOLOGY | Facility: CLINIC | Age: 70
End: 2020-12-22
Payer: MEDICARE

## 2020-12-22 DIAGNOSIS — Z51.81 ENCOUNTER FOR THERAPEUTIC DRUG LVL MONITORING: ICD-10-CM

## 2020-12-22 PROCEDURE — 99213 OFFICE O/P EST LOW 20 MIN: CPT | Mod: 25,PD

## 2020-12-22 PROCEDURE — 96401 CHEMO ANTI-NEOPL SQ/IM: CPT | Mod: PD

## 2020-12-23 ENCOUNTER — INPATIENT (INPATIENT)
Facility: HOSPITAL | Age: 70
LOS: 14 days | Discharge: NURSING, REHAB OR LONG-TERM CARE | DRG: 872 | End: 2021-01-07
Attending: STUDENT IN AN ORGANIZED HEALTH CARE EDUCATION/TRAINING PROGRAM | Admitting: STUDENT IN AN ORGANIZED HEALTH CARE EDUCATION/TRAINING PROGRAM
Payer: MEDICARE

## 2020-12-23 VITALS
SYSTOLIC BLOOD PRESSURE: 97 MMHG | TEMPERATURE: 99 F | RESPIRATION RATE: 22 BRPM | HEART RATE: 122 BPM | OXYGEN SATURATION: 98 % | DIASTOLIC BLOOD PRESSURE: 61 MMHG | WEIGHT: 274.92 LBS | HEIGHT: 65 IN

## 2020-12-23 DIAGNOSIS — E11.65 TYPE 2 DIABETES MELLITUS WITH HYPERGLYCEMIA: ICD-10-CM

## 2020-12-23 DIAGNOSIS — Z90.710 ACQUIRED ABSENCE OF BOTH CERVIX AND UTERUS: Chronic | ICD-10-CM

## 2020-12-23 DIAGNOSIS — C54.1 MALIGNANT NEOPLASM OF ENDOMETRIUM: Chronic | ICD-10-CM

## 2020-12-23 DIAGNOSIS — Z98.89 OTHER SPECIFIED POSTPROCEDURAL STATES: Chronic | ICD-10-CM

## 2020-12-23 DIAGNOSIS — Z02.9 ENCOUNTER FOR ADMINISTRATIVE EXAMINATIONS, UNSPECIFIED: ICD-10-CM

## 2020-12-23 DIAGNOSIS — Z90.722 ACQUIRED ABSENCE OF OVARIES, BILATERAL: Chronic | ICD-10-CM

## 2020-12-23 DIAGNOSIS — Z29.9 ENCOUNTER FOR PROPHYLACTIC MEASURES, UNSPECIFIED: ICD-10-CM

## 2020-12-23 DIAGNOSIS — F44.89 OTHER DISSOCIATIVE AND CONVERSION DISORDERS: ICD-10-CM

## 2020-12-23 DIAGNOSIS — E89.0 POSTPROCEDURAL HYPOTHYROIDISM: ICD-10-CM

## 2020-12-23 DIAGNOSIS — L03.119 CELLULITIS OF UNSPECIFIED PART OF LIMB: ICD-10-CM

## 2020-12-23 DIAGNOSIS — R50.9 FEVER, UNSPECIFIED: ICD-10-CM

## 2020-12-23 LAB
ALBUMIN SERPL ELPH-MCNC: 3.3 G/DL — SIGNIFICANT CHANGE UP (ref 3.3–5)
ALP SERPL-CCNC: 74 U/L — SIGNIFICANT CHANGE UP (ref 40–120)
ALT FLD-CCNC: 13 U/L — SIGNIFICANT CHANGE UP (ref 10–45)
ANION GAP SERPL CALC-SCNC: 14 MMOL/L — SIGNIFICANT CHANGE UP (ref 5–17)
ANISOCYTOSIS BLD QL: SLIGHT — SIGNIFICANT CHANGE UP
APTT BLD: 27.8 SEC — SIGNIFICANT CHANGE UP (ref 27.5–35.5)
AST SERPL-CCNC: 17 U/L — SIGNIFICANT CHANGE UP (ref 10–40)
BASOPHILS # BLD AUTO: 0 K/UL — SIGNIFICANT CHANGE UP (ref 0–0.2)
BASOPHILS NFR BLD AUTO: 0 % — SIGNIFICANT CHANGE UP (ref 0–2)
BILIRUB SERPL-MCNC: 0.3 MG/DL — SIGNIFICANT CHANGE UP (ref 0.2–1.2)
BUN SERPL-MCNC: 30 MG/DL — HIGH (ref 7–23)
CALCIUM SERPL-MCNC: 8.8 MG/DL — SIGNIFICANT CHANGE UP (ref 8.4–10.5)
CHLORIDE SERPL-SCNC: 97 MMOL/L — SIGNIFICANT CHANGE UP (ref 96–108)
CO2 SERPL-SCNC: 23 MMOL/L — SIGNIFICANT CHANGE UP (ref 22–31)
CREAT SERPL-MCNC: 1.2 MG/DL — SIGNIFICANT CHANGE UP (ref 0.5–1.3)
DACRYOCYTES BLD QL SMEAR: SLIGHT — SIGNIFICANT CHANGE UP
EOSINOPHIL # BLD AUTO: 0 K/UL — SIGNIFICANT CHANGE UP (ref 0–0.5)
EOSINOPHIL NFR BLD AUTO: 0 % — SIGNIFICANT CHANGE UP (ref 0–6)
GLUCOSE SERPL-MCNC: 222 MG/DL — HIGH (ref 70–99)
HCT VFR BLD CALC: 30.1 % — LOW (ref 34.5–45)
HGB BLD-MCNC: 9.1 G/DL — LOW (ref 11.5–15.5)
HYPOCHROMIA BLD QL: SLIGHT — SIGNIFICANT CHANGE UP
INR BLD: 1.04 RATIO — SIGNIFICANT CHANGE UP (ref 0.88–1.16)
LACTATE BLDV-MCNC: 1.3 MMOL/L — SIGNIFICANT CHANGE UP (ref 0.7–2)
LACTATE BLDV-MCNC: 2.8 MMOL/L — HIGH (ref 0.7–2)
LYMPHOCYTES # BLD AUTO: 0.37 K/UL — LOW (ref 1–3.3)
LYMPHOCYTES # BLD AUTO: 2.6 % — LOW (ref 13–44)
MACROCYTES BLD QL: SIGNIFICANT CHANGE UP
MANUAL SMEAR VERIFICATION: SIGNIFICANT CHANGE UP
MCHC RBC-ENTMCNC: 30.2 GM/DL — LOW (ref 32–36)
MCHC RBC-ENTMCNC: 31.8 PG — SIGNIFICANT CHANGE UP (ref 27–34)
MCV RBC AUTO: 105.2 FL — HIGH (ref 80–100)
METAMYELOCYTES # FLD: 0.9 % — HIGH (ref 0–0)
MONOCYTES # BLD AUTO: 0.5 K/UL — SIGNIFICANT CHANGE UP (ref 0–0.9)
MONOCYTES NFR BLD AUTO: 3.5 % — SIGNIFICANT CHANGE UP (ref 2–14)
NEUTROPHILS # BLD AUTO: 13.35 K/UL — HIGH (ref 1.8–7.4)
NEUTROPHILS NFR BLD AUTO: 93 % — HIGH (ref 43–77)
OVALOCYTES BLD QL SMEAR: SLIGHT — SIGNIFICANT CHANGE UP
PLAT MORPH BLD: ABNORMAL
PLATELET # BLD AUTO: 354 K/UL — SIGNIFICANT CHANGE UP (ref 150–400)
POIKILOCYTOSIS BLD QL AUTO: SLIGHT — SIGNIFICANT CHANGE UP
POLYCHROMASIA BLD QL SMEAR: SLIGHT — SIGNIFICANT CHANGE UP
POTASSIUM SERPL-MCNC: 4.7 MMOL/L — SIGNIFICANT CHANGE UP (ref 3.5–5.3)
POTASSIUM SERPL-SCNC: 4.7 MMOL/L — SIGNIFICANT CHANGE UP (ref 3.5–5.3)
PROT SERPL-MCNC: 7.3 G/DL — SIGNIFICANT CHANGE UP (ref 6–8.3)
PROTHROM AB SERPL-ACNC: 12.4 SEC — SIGNIFICANT CHANGE UP (ref 10.6–13.6)
RBC # BLD: 2.86 M/UL — LOW (ref 3.8–5.2)
RBC # FLD: 17.9 % — HIGH (ref 10.3–14.5)
RBC BLD AUTO: ABNORMAL
SARS-COV-2 RNA SPEC QL NAA+PROBE: SIGNIFICANT CHANGE UP
SODIUM SERPL-SCNC: 134 MMOL/L — LOW (ref 135–145)
WBC # BLD: 14.35 K/UL — HIGH (ref 3.8–10.5)
WBC # FLD AUTO: 14.35 K/UL — HIGH (ref 3.8–10.5)

## 2020-12-23 PROCEDURE — 99223 1ST HOSP IP/OBS HIGH 75: CPT

## 2020-12-23 PROCEDURE — 73590 X-RAY EXAM OF LOWER LEG: CPT | Mod: 26,50

## 2020-12-23 PROCEDURE — 93010 ELECTROCARDIOGRAM REPORT: CPT

## 2020-12-23 PROCEDURE — 99285 EMERGENCY DEPT VISIT HI MDM: CPT | Mod: CS

## 2020-12-23 PROCEDURE — 93308 TTE F-UP OR LMTD: CPT | Mod: 26

## 2020-12-23 PROCEDURE — 71045 X-RAY EXAM CHEST 1 VIEW: CPT | Mod: 26

## 2020-12-23 RX ORDER — LEVOTHYROXINE SODIUM 125 MCG
150 TABLET ORAL DAILY
Refills: 0 | Status: DISCONTINUED | OUTPATIENT
Start: 2020-12-23 | End: 2021-01-01

## 2020-12-23 RX ORDER — ONDANSETRON 8 MG/1
4 TABLET, FILM COATED ORAL ONCE
Refills: 0 | Status: COMPLETED | OUTPATIENT
Start: 2020-12-23 | End: 2020-12-23

## 2020-12-23 RX ORDER — DEXTROSE 50 % IN WATER 50 %
12.5 SYRINGE (ML) INTRAVENOUS ONCE
Refills: 0 | Status: DISCONTINUED | OUTPATIENT
Start: 2020-12-23 | End: 2021-01-07

## 2020-12-23 RX ORDER — CHOLECALCIFEROL (VITAMIN D3) 125 MCG
2000 CAPSULE ORAL
Refills: 0 | Status: DISCONTINUED | OUTPATIENT
Start: 2020-12-23 | End: 2021-01-07

## 2020-12-23 RX ORDER — LISINOPRIL 2.5 MG/1
5 TABLET ORAL DAILY
Refills: 0 | Status: DISCONTINUED | OUTPATIENT
Start: 2020-12-24 | End: 2021-01-07

## 2020-12-23 RX ORDER — AZTREONAM 2 G
VIAL (EA) INJECTION
Refills: 0 | Status: DISCONTINUED | OUTPATIENT
Start: 2020-12-23 | End: 2020-12-24

## 2020-12-23 RX ORDER — DEXTROSE 50 % IN WATER 50 %
25 SYRINGE (ML) INTRAVENOUS ONCE
Refills: 0 | Status: DISCONTINUED | OUTPATIENT
Start: 2020-12-23 | End: 2021-01-07

## 2020-12-23 RX ORDER — SODIUM CHLORIDE 9 MG/ML
1000 INJECTION INTRAMUSCULAR; INTRAVENOUS; SUBCUTANEOUS ONCE
Refills: 0 | Status: COMPLETED | OUTPATIENT
Start: 2020-12-23 | End: 2020-12-23

## 2020-12-23 RX ORDER — INSULIN LISPRO 100/ML
VIAL (ML) SUBCUTANEOUS AT BEDTIME
Refills: 0 | Status: DISCONTINUED | OUTPATIENT
Start: 2020-12-23 | End: 2021-01-07

## 2020-12-23 RX ORDER — GLUCAGON INJECTION, SOLUTION 0.5 MG/.1ML
1 INJECTION, SOLUTION SUBCUTANEOUS ONCE
Refills: 0 | Status: DISCONTINUED | OUTPATIENT
Start: 2020-12-23 | End: 2021-01-07

## 2020-12-23 RX ORDER — DEXTROSE 50 % IN WATER 50 %
15 SYRINGE (ML) INTRAVENOUS ONCE
Refills: 0 | Status: DISCONTINUED | OUTPATIENT
Start: 2020-12-23 | End: 2021-01-07

## 2020-12-23 RX ORDER — ASPIRIN/CALCIUM CARB/MAGNESIUM 324 MG
81 TABLET ORAL DAILY
Refills: 0 | Status: DISCONTINUED | OUTPATIENT
Start: 2020-12-24 | End: 2021-01-07

## 2020-12-23 RX ORDER — ATORVASTATIN CALCIUM 80 MG/1
20 TABLET, FILM COATED ORAL AT BEDTIME
Refills: 0 | Status: DISCONTINUED | OUTPATIENT
Start: 2020-12-23 | End: 2021-01-07

## 2020-12-23 RX ORDER — OXYCODONE HYDROCHLORIDE 5 MG/1
5 TABLET ORAL EVERY 4 HOURS
Refills: 0 | Status: DISCONTINUED | OUTPATIENT
Start: 2020-12-23 | End: 2020-12-30

## 2020-12-23 RX ORDER — INSULIN GLARGINE 100 [IU]/ML
12 INJECTION, SOLUTION SUBCUTANEOUS AT BEDTIME
Refills: 0 | Status: DISCONTINUED | OUTPATIENT
Start: 2020-12-24 | End: 2021-01-06

## 2020-12-23 RX ORDER — AZTREONAM 2 G
2000 VIAL (EA) INJECTION ONCE
Refills: 0 | Status: COMPLETED | OUTPATIENT
Start: 2020-12-23 | End: 2020-12-23

## 2020-12-23 RX ORDER — ENOXAPARIN SODIUM 100 MG/ML
40 INJECTION SUBCUTANEOUS EVERY 12 HOURS
Refills: 0 | Status: DISCONTINUED | OUTPATIENT
Start: 2020-12-24 | End: 2021-01-07

## 2020-12-23 RX ORDER — SODIUM CHLORIDE 9 MG/ML
1000 INJECTION, SOLUTION INTRAVENOUS
Refills: 0 | Status: DISCONTINUED | OUTPATIENT
Start: 2020-12-23 | End: 2021-01-07

## 2020-12-23 RX ORDER — GABAPENTIN 400 MG/1
1 CAPSULE ORAL
Qty: 0 | Refills: 0 | DISCHARGE

## 2020-12-23 RX ORDER — INSULIN LISPRO 100/ML
VIAL (ML) SUBCUTANEOUS
Refills: 0 | Status: DISCONTINUED | OUTPATIENT
Start: 2020-12-23 | End: 2021-01-02

## 2020-12-23 RX ORDER — TRAMADOL HYDROCHLORIDE 50 MG/1
50 TABLET ORAL EVERY 8 HOURS
Refills: 0 | Status: DISCONTINUED | OUTPATIENT
Start: 2020-12-23 | End: 2020-12-23

## 2020-12-23 RX ORDER — SODIUM CHLORIDE 9 MG/ML
1000 INJECTION INTRAMUSCULAR; INTRAVENOUS; SUBCUTANEOUS
Refills: 0 | Status: DISCONTINUED | OUTPATIENT
Start: 2020-12-23 | End: 2021-01-02

## 2020-12-23 RX ORDER — ACETAMINOPHEN 500 MG
650 TABLET ORAL EVERY 6 HOURS
Refills: 0 | Status: DISCONTINUED | OUTPATIENT
Start: 2020-12-23 | End: 2021-01-07

## 2020-12-23 RX ORDER — ACETAMINOPHEN 500 MG
975 TABLET ORAL ONCE
Refills: 0 | Status: DISCONTINUED | OUTPATIENT
Start: 2020-12-23 | End: 2020-12-23

## 2020-12-23 RX ORDER — ONDANSETRON 8 MG/1
4 TABLET, FILM COATED ORAL EVERY 12 HOURS
Refills: 0 | Status: DISCONTINUED | OUTPATIENT
Start: 2020-12-23 | End: 2020-12-25

## 2020-12-23 RX ORDER — AZTREONAM 2 G
2000 VIAL (EA) INJECTION EVERY 8 HOURS
Refills: 0 | Status: DISCONTINUED | OUTPATIENT
Start: 2020-12-24 | End: 2020-12-24

## 2020-12-23 RX ADMIN — Medication 100 MILLIGRAM(S): at 22:09

## 2020-12-23 RX ADMIN — Medication 100 MILLIGRAM(S): at 16:03

## 2020-12-23 RX ADMIN — ONDANSETRON 4 MILLIGRAM(S): 8 TABLET, FILM COATED ORAL at 16:00

## 2020-12-23 RX ADMIN — Medication 650 MILLIGRAM(S): at 21:47

## 2020-12-23 RX ADMIN — ATORVASTATIN CALCIUM 20 MILLIGRAM(S): 80 TABLET, FILM COATED ORAL at 21:47

## 2020-12-23 RX ADMIN — SODIUM CHLORIDE 1000 MILLILITER(S): 9 INJECTION INTRAMUSCULAR; INTRAVENOUS; SUBCUTANEOUS at 16:22

## 2020-12-23 RX ADMIN — Medication 900 MILLIGRAM(S): at 16:30

## 2020-12-23 RX ADMIN — SODIUM CHLORIDE 1000 MILLILITER(S): 9 INJECTION INTRAMUSCULAR; INTRAVENOUS; SUBCUTANEOUS at 16:56

## 2020-12-23 RX ADMIN — SODIUM CHLORIDE 80 MILLILITER(S): 9 INJECTION INTRAMUSCULAR; INTRAVENOUS; SUBCUTANEOUS at 21:49

## 2020-12-23 NOTE — H&P ADULT - NSICDXPASTMEDICALHX_GEN_ALL_CORE_FT
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  On Immunosupressants  Cellcept    Endometrial cancer dx: 8/2015:  High grade Endometroid Adenocarcinoma    History of osteoarthritis     Hyperlipidemia     Hypertension     Hypothyroidism     Morbid obesity BMI:  53.6    Type 2 diabetes mellitus     Vitamin D deficiency

## 2020-12-23 NOTE — H&P ADULT - NSHPLABSRESULTS_GEN_ALL_CORE
WBC 14.3   93% N.    Hgb 9.1    Platelets of 354K.    K+ 4.7    Random glucose of 222  HCO3 23  AG 14.    Cr 1.2    alb 3.3    chest radiograph reviewed with no infiltrate or effusion.    COVID-19 PCR>>negative.    Lactate 2.8>>1.3    EKG tracing reviewed with sinus tachycardia at 120 with poor anterior R wave progression.

## 2020-12-23 NOTE — ED PROVIDER NOTE - ATTENDING CONTRIBUTION TO CARE
Attending Statement (MAXINE Castro MD):    HPI: 71 y/o F with h/o HLD, HTN, Hypothyroidism, bullous pemphigoid presenting with rigors at home, feeling unwell; chronic pain to legs; arrives by EMS.    Review of Systems:  -General: +chills  -ENT: no congestion, no difficulty swallowing  -Pulmonary: no cough, no shortness of breath  -Cardiac: no chest pain, no palpitations  -Gastrointestinal: no abdominal pain, no nausea, no vomiting, and no diarrhea.  -Genitourinary: no blood or pain with urination  -Musculoskeletal: no back or neck pain  -Skin: no rashes  -Endocrine: No h/o diabetes or thyroid disease  -Neurologic: No focal weakness or numbness    All else negative unless otherwise specified elsewhere in this note.    PSH/PMH as noted above    On Physical Exam:  General: morbidly obese, diffuse skin wounds, foul smelling wounds on legs  HEENT: PERRL, MMM  Neck: no neck tenderness, no nuchal rigidity  Cardiac: normal s1, s2; RRR; no MGR  Lungs: CTABL  Abdomen: soft nontender/nondistended  : no bladder tenderness or distension  Skin: diffuse patchy rashes with bullae and sloughing, scattered erythema mostly nonblanching on lower extremities  Neuro: no gross neurologic deficits    MDM: 71F presumed sepsis 2/2 likely cellulitis from wounds. Will obtain sepsis labs, start empiric antibiotics and fluids. Attending Statement (MAXINE Castro MD):    HPI: 71 y/o F with h/o HLD, HTN, Hypothyroidism, bullous pemphigoid presenting with rigors at home, feeling unwell; chronic pain to legs; arrives by EMS.    Review of Systems:  -General: +chills  -ENT: no congestion, no difficulty swallowing  -Pulmonary: no cough, no shortness of breath  -Cardiac: no chest pain, no palpitations  -Gastrointestinal: no abdominal pain, no nausea, no vomiting, and no diarrhea.  -Genitourinary: no blood or pain with urination  -Musculoskeletal: + chronic lower back pain  -Skin: see hpi; h/o bullous pemphigoid  -Endocrine: No h/o diabetes or thyroid disease  -Neurologic: No focal weakness or numbness    All else negative unless otherwise specified elsewhere in this note.    PSH/PMH as noted above    On Physical Exam:  General: morbidly obese, diffuse skin wounds, foul smelling wounds on legs  HEENT: PERRL, MMM  Neck: no neck tenderness, no nuchal rigidity  Cardiac: normal s1, s2; RRR; no MGR  Lungs: CTABL  Abdomen: soft nontender/nondistended  : no bladder tenderness or distension  Skin: diffuse patchy rashes with bullae and sloughing, scattered erythema mostly nonblanching on lower extremities  Neuro: no gross neurologic deficits    MDM: 71F presumed sepsis 2/2 likely cellulitis from wounds. Will obtain sepsis labs, start empiric antibiotics and fluids. to be admitted

## 2020-12-23 NOTE — ED PROVIDER NOTE - PMH
Adrenal mass  2014 incidental findings 2014  Ct SCAN 9/2015 unchannged  24 hour urine for VMA done by Dr DR Canas [id number] Neg asper patient  Anemia    Bullous pemphigoid  dx: 8/2014.  On Immunosupressants  Cellcept  Endometrial cancer  dx: 8/2015:  High grade Endometroid Adenocarcinoma  History of osteoarthritis    Hyperlipidemia    Hypertension    Hypothyroidism    Morbid obesity  BMI:  53.6  Type 2 diabetes mellitus    Vitamin D deficiency

## 2020-12-23 NOTE — H&P ADULT - ATTENDING COMMENTS
NIGHT HOSPITALIST:   Patient/ spouse aware of course and agree with plan/care as above.   Given patient's comorbidities, patient's long term prognosis is guarded.    Emotional support provided to patient/ spouse.   Care reviewed with covering NP/PA for endorsement to the [facility].    Kvng Leroy MD  [phone]

## 2020-12-23 NOTE — ED ADULT NURSE NOTE - OBJECTIVE STATEMENT
Pt is a 70 yr old female with pmh of Bullious Pemphigoid on IVIG and DM on PO meds coming in by EMS from home for fevers, chills and nausea since last night. Pt states she was placed on a new medication (Dupixent 300 mg BID) and started it yesterday which was the same time the fevers started. Pt was given Tylenol for a fever of 100 and in the ride over to the ED came down to 98.1. Repeat was 99.4 in the ED (no Tylenol given due to time frame). Pt has wounds everywhere on her body which are dressing changed 3x a week by a visiting wound nurse- painful to palpation and rolling. Pt also has a hematoma on the back of her left arm- and open wounds on the sides and back of her right leg. Both lower legs are wrapped. Pt normally ambulates independently but since last night has been unable to due to painful wounds and back pain. Pt is on Primafit. Vitals show tachycardia, hypotension, normal oxygen on RA, and low grade temp. Pt is a/o x 3- no chest pain, no sob, no vomiting or diarrhea, and no lightheadedness or dizziness. Pt will receive full sepsis workup.

## 2020-12-23 NOTE — H&P ADULT - ASSESSMENT
NIGHT HOSPITALIST:   Presentation of patient with rigors and now resolved confusional state in the setting of patient with a complex medical history of bullous pemphigoid NIGHT HOSPITALIST:   Presentation of patient with rigors and now resolved confusional state in the setting of patient with a complex medical history of bullous pemphigoid steroid dependent, surgical hypothyroidism on high doses of Synthroid (patient/spouse not sure of day of week of increased dose), poorly controlled type 2 DM, uterine CA presumed to be disease free.        Suspected RIGHT> left cellulitis>> received dose of IV Clindamycin in the ER but will provide IV Azactam (will defer IV vancomycin with intolerance with Red Man Syndrome) but would consider formal ID evaluation in the AM.    Will obtain B/L LE tibia / fibula radiographs to exclude tissue air.    Would also consider contacting dermatology in the AM.    Will provide a conservative (0.1U/kg/24 H) Lantus for tomorrow bedtime with FS S/S.    Would clarify patient's Synthroid dosing in the AM.    Suspect now resolved confusional state from the patient's underlying SIRS but also suspect patient's high dose of Neurontin>>would clarify the dose of this in the AM.   CTT head ordered.    Patient with severe hypoalbuminemia.   Will check UA, urine protein/Cr and microalbumin.    Patient agrees to pharmacologic DVT prophylaxis.

## 2020-12-23 NOTE — H&P ADULT - NSICDXPASTSURGICALHX_GEN_ALL_CORE_FT
PAST SURGICAL HISTORY:  Endometrial cancer 2015:  Endometrial Biopsy: dx: High Grade Endometroid Adenocarcinoma    History of  section -:  4 X    S/P BSO (bilateral salpingo-oophorectomy)     S/P hysterectomy     Status post total thyroidectomy 2001

## 2020-12-23 NOTE — ED ADULT TRIAGE NOTE - CHIEF COMPLAINT QUOTE
weakness, nausea, and malaise since last night c/o weakness, nausea, and malaise since last night. Denies SOB, chest pain.

## 2020-12-23 NOTE — H&P ADULT - NSHPPHYSICALEXAM_GEN_ALL_CORE
Physical exam with a middle aged, morbidly obese, nontoxic but ill appearing F, Cushingoid features    Tm 99.4F     HR  102    RR 14.  /85   95% on RA    HEENT< PERRL< EOMI< oropharynx with no ulcers noted.  Neck supple  NO thyromegaly noted.  Chest clear  Cor tachycardia  Declined breast exam.  Abdomen obese, soft nontender, normal bowel sounds.  Skin diffuse scattered superficial ulcers healed LEFT anterior shoulder, but confluent superficial ulcers RIGHT > left lower leg, ulcers B/L posterior lower legs with NO crepitus or gangrene but erythematous and warm.  Ext see above.   Poor nail hygiene.  Neurologic AxOx3.   Speech fluent.  Cognition intact.  UE/LE 5/5.  NO SI/HI.

## 2020-12-23 NOTE — H&P ADULT - NSICDXFAMILYHX_GEN_ALL_CORE_FT
FAMILY HISTORY:  Father  Still living? Unknown  Family history of lymphoma, Age at diagnosis: Age Unknown

## 2020-12-23 NOTE — ED PROVIDER NOTE - CLINICAL SUMMARY MEDICAL DECISION MAKING FREE TEXT BOX
71yo F pmhx htn hld dm bullous pemphigoid p/w CC fevers, will start sepsis work up, skin infection possible source will cover with clinda, supportive care, admit.

## 2020-12-23 NOTE — H&P ADULT - PROBLEM SELECTOR PLAN 4
See above.   No delirium at present, but suspect related to SIRS versus patient's Neurontin dose>>CTT head ordered.   Would clarify patient's Neurontin dose in the AM.

## 2020-12-23 NOTE — H&P ADULT - PROBLEM SELECTOR PLAN 6
Transitions of Care Status:  1.  Name of PCP:     Olivia Ross MD (PCP) [phone]  2.  PCP Contacted on Admission: [ ] Y    [x ] N    3.  PCP contacted at Discharge: [ ] Y    [ ] N    [ ] N/A  4.  Post-Discharge Appointment Date and Location:  5.  Summary of Handoff given to PCP:

## 2020-12-23 NOTE — H&P ADULT - PROBLEM SELECTOR PLAN 3
See above.   Will continue with Synthroid 150 mcg daily but would clarify which day is the double dose day with patient's PCP.

## 2020-12-23 NOTE — H&P ADULT - PROBLEM SELECTOR PLAN 1
See above.  Received dose of IV Clindamycin in the ER>>IV Azactam for now.  Tramadol and low dose Oxycodone IR 5 mg PRN for pain relief.   See I Stop.  Would consider ID evaluation in the AM.  B/L LE tib/fib films to r/o air.  Would consider contacting patient's dermatologist in the AM.

## 2020-12-23 NOTE — H&P ADULT - HISTORY OF PRESENT ILLNESS
NIGHT HOSPITALIST:   Patient UNKNOWN to me previously, assigned to me at this point via the ER and by Dr. Boykin of the [facility] to admit this 69 y/o F--followed by her physicians above--patient with a history of bullous pemphigoid for 6 years currently steroid dependent, type 2 DM on oral Rx, surgical hypothyroidism following total thyroidectomy for a large obstructing goiter (on a 150 mcg /6 days and one day of 300 mcg/week -patient/spouse not sure which day), Red Man Syndrome from IV vancomycin, chronic pain syndrome with patient apparently on escalating doses of Neurontin (per spouse now on 800 mg 4xDay) with PRN Tramadol and Vicodin due to patient severe pain from her skin lesions, with last admission to [facility] in 10/2018 for RIGHT LE cellulitis, endometrial CA diagnosed in 2015 with  [facility] BSO and presumed to be in remission, with patient with home visiting RN with an Unna's boot on the RIGHT LE but both are wrapped since Monday, with self referral following rigors and shaking chills since last night.   Spouse noted increased confusion by patient at home last night.

## 2020-12-23 NOTE — H&P ADULT - NSHPSOURCEINFOTX_GEN_ALL_CORE
Partial Medex reviewed with patient's spouse by phone, French Hannon .   [facility] (patient's pharmacy is not open at this hour) [phone] Partial Medex reviewed with patient's spouse by phone, French Hannon .   [facility] (patient's pharmacy is not open at this hour) .   [facility] I Stop # [phone] in chart.

## 2020-12-23 NOTE — ED PROCEDURE NOTE - ATTENDING CONTRIBUTION TO CARE
I have participated in and supervised all key portions of the above procedures and agree with the above documentation. AUGUSTUS Castro MD

## 2020-12-23 NOTE — ED PROVIDER NOTE - OBJECTIVE STATEMENT
72yo F pmhx bullous pemphigoid with diffuse skin lesions, HTN HLD DM2, morbid obesity presents with CC fevers, per EMS  called 911 because patient appeared to have rigors at home. Pt. denies cough cp sob n/v/d abd pain urinary symptoms.

## 2020-12-23 NOTE — H&P ADULT - NSHPREVIEWOFSYSTEMS_GEN_ALL_CORE
ROS from patient, who is AxOx3.    No fever but chills and rigors as above.  NO HA, no focal weakness.  NO chest pain/pressure.  NO palpitations.  NO abdominal pain, no red blood per rectum or melena.  No back pain, no tearing back pain.    No dysuria, no hematuria.  NO weight loss or anorexia.  No cough, no dyspnoea, no wheezing.  NO joint pain.  NO thyroid symptoms.  NO vaginal bleeding.  No SI/HI.

## 2020-12-23 NOTE — ED PROVIDER NOTE - SKIN, MLM
+Diffuse skin lesions c/w chronic condition, wound on right upper thigh appear purulent and foul smelling.

## 2020-12-24 LAB
A1C WITH ESTIMATED AVERAGE GLUCOSE RESULT: 6.6 % — HIGH (ref 4–5.6)
ANION GAP SERPL CALC-SCNC: 9 MMOL/L — SIGNIFICANT CHANGE UP (ref 5–17)
ANISOCYTOSIS BLD QL: SLIGHT — SIGNIFICANT CHANGE UP
APPEARANCE UR: CLEAR — SIGNIFICANT CHANGE UP
BACTERIA # UR AUTO: ABNORMAL
BASOPHILS # BLD AUTO: 0.18 K/UL — SIGNIFICANT CHANGE UP (ref 0–0.2)
BASOPHILS NFR BLD AUTO: 1.7 % — SIGNIFICANT CHANGE UP (ref 0–2)
BILIRUB UR-MCNC: NEGATIVE — SIGNIFICANT CHANGE UP
BUN SERPL-MCNC: 16 MG/DL — SIGNIFICANT CHANGE UP (ref 7–23)
CALCIUM SERPL-MCNC: 8.3 MG/DL — LOW (ref 8.4–10.5)
CHLORIDE SERPL-SCNC: 99 MMOL/L — SIGNIFICANT CHANGE UP (ref 96–108)
CO2 SERPL-SCNC: 25 MMOL/L — SIGNIFICANT CHANGE UP (ref 22–31)
COLOR SPEC: YELLOW — SIGNIFICANT CHANGE UP
CREAT ?TM UR-MCNC: 94 MG/DL — SIGNIFICANT CHANGE UP
CREAT ?TM UR-MCNC: 96 MG/DL — SIGNIFICANT CHANGE UP
CREAT SERPL-MCNC: 0.83 MG/DL — SIGNIFICANT CHANGE UP (ref 0.5–1.3)
DIFF PNL FLD: NEGATIVE — SIGNIFICANT CHANGE UP
ELLIPTOCYTES BLD QL SMEAR: SLIGHT — SIGNIFICANT CHANGE UP
EOSINOPHIL # BLD AUTO: 0 K/UL — SIGNIFICANT CHANGE UP (ref 0–0.5)
EOSINOPHIL NFR BLD AUTO: 0 % — SIGNIFICANT CHANGE UP (ref 0–6)
EPI CELLS # UR: 0 /HPF — SIGNIFICANT CHANGE UP
ESTIMATED AVERAGE GLUCOSE: 143 MG/DL — HIGH (ref 68–114)
GLUCOSE SERPL-MCNC: 150 MG/DL — HIGH (ref 70–99)
GLUCOSE UR QL: NEGATIVE — SIGNIFICANT CHANGE UP
HCT VFR BLD CALC: 27.2 % — LOW (ref 34.5–45)
HCV AB S/CO SERPL IA: 0.14 S/CO — SIGNIFICANT CHANGE UP (ref 0–0.99)
HCV AB SERPL-IMP: SIGNIFICANT CHANGE UP
HGB BLD-MCNC: 8.2 G/DL — LOW (ref 11.5–15.5)
HYALINE CASTS # UR AUTO: 2 /LPF — SIGNIFICANT CHANGE UP (ref 0–2)
HYPOCHROMIA BLD QL: SLIGHT — SIGNIFICANT CHANGE UP
KETONES UR-MCNC: NEGATIVE — SIGNIFICANT CHANGE UP
LEUKOCYTE ESTERASE UR-ACNC: NEGATIVE — SIGNIFICANT CHANGE UP
LYMPHOCYTES # BLD AUTO: 0 % — LOW (ref 13–44)
LYMPHOCYTES # BLD AUTO: 0 K/UL — LOW (ref 1–3.3)
MACROCYTES BLD QL: SLIGHT — SIGNIFICANT CHANGE UP
MANUAL SMEAR VERIFICATION: SIGNIFICANT CHANGE UP
MCHC RBC-ENTMCNC: 30.1 GM/DL — LOW (ref 32–36)
MCHC RBC-ENTMCNC: 32.2 PG — SIGNIFICANT CHANGE UP (ref 27–34)
MCV RBC AUTO: 106.7 FL — HIGH (ref 80–100)
MICROALBUMIN UR-MCNC: <1.2 MG/DL — SIGNIFICANT CHANGE UP
MICROALBUMIN/CREAT UR-RTO: SIGNIFICANT CHANGE UP MG/G (ref 0–30)
MONOCYTES # BLD AUTO: 0.28 K/UL — SIGNIFICANT CHANGE UP (ref 0–0.9)
MONOCYTES NFR BLD AUTO: 2.6 % — SIGNIFICANT CHANGE UP (ref 2–14)
NEUTROPHILS # BLD AUTO: 10.15 K/UL — HIGH (ref 1.8–7.4)
NEUTROPHILS NFR BLD AUTO: 95.7 % — HIGH (ref 43–77)
NITRITE UR-MCNC: POSITIVE
PH UR: 6 — SIGNIFICANT CHANGE UP (ref 5–8)
PLAT MORPH BLD: NORMAL — SIGNIFICANT CHANGE UP
PLATELET # BLD AUTO: 266 K/UL — SIGNIFICANT CHANGE UP (ref 150–400)
POIKILOCYTOSIS BLD QL AUTO: SLIGHT — SIGNIFICANT CHANGE UP
POLYCHROMASIA BLD QL SMEAR: SLIGHT — SIGNIFICANT CHANGE UP
POTASSIUM SERPL-MCNC: 4.3 MMOL/L — SIGNIFICANT CHANGE UP (ref 3.5–5.3)
POTASSIUM SERPL-SCNC: 4.3 MMOL/L — SIGNIFICANT CHANGE UP (ref 3.5–5.3)
PROT ?TM UR-MCNC: 18 MG/DL — HIGH (ref 0–12)
PROT UR-MCNC: ABNORMAL
PROT/CREAT UR-RTO: 0.2 RATIO — SIGNIFICANT CHANGE UP (ref 0–0.2)
RBC # BLD: 2.55 M/UL — LOW (ref 3.8–5.2)
RBC # FLD: 17.7 % — HIGH (ref 10.3–14.5)
RBC BLD AUTO: ABNORMAL
RBC CASTS # UR COMP ASSIST: 6 /HPF — HIGH (ref 0–4)
SODIUM SERPL-SCNC: 133 MMOL/L — LOW (ref 135–145)
SP GR SPEC: 1.02 — SIGNIFICANT CHANGE UP (ref 1.01–1.02)
STOMATOCYTES BLD QL SMEAR: SLIGHT — SIGNIFICANT CHANGE UP
TSH SERPL-MCNC: 6.38 UIU/ML — HIGH (ref 0.27–4.2)
UROBILINOGEN FLD QL: NEGATIVE — SIGNIFICANT CHANGE UP
WBC # BLD: 10.61 K/UL — HIGH (ref 3.8–10.5)
WBC # FLD AUTO: 10.61 K/UL — HIGH (ref 3.8–10.5)
WBC UR QL: 1 /HPF — SIGNIFICANT CHANGE UP (ref 0–5)

## 2020-12-24 PROCEDURE — 99221 1ST HOSP IP/OBS SF/LOW 40: CPT

## 2020-12-24 PROCEDURE — 99223 1ST HOSP IP/OBS HIGH 75: CPT

## 2020-12-24 PROCEDURE — 70450 CT HEAD/BRAIN W/O DYE: CPT | Mod: 26

## 2020-12-24 RX ORDER — VANCOMYCIN HCL 1 G
1500 VIAL (EA) INTRAVENOUS ONCE
Refills: 0 | Status: COMPLETED | OUTPATIENT
Start: 2020-12-24 | End: 2020-12-24

## 2020-12-24 RX ORDER — VANCOMYCIN HCL 1 G
VIAL (EA) INTRAVENOUS
Refills: 0 | Status: DISCONTINUED | OUTPATIENT
Start: 2020-12-24 | End: 2020-12-29

## 2020-12-24 RX ORDER — VANCOMYCIN HCL 1 G
1500 VIAL (EA) INTRAVENOUS EVERY 12 HOURS
Refills: 0 | Status: DISCONTINUED | OUTPATIENT
Start: 2020-12-25 | End: 2020-12-29

## 2020-12-24 RX ADMIN — Medication 3: at 12:19

## 2020-12-24 RX ADMIN — Medication 40 MILLIGRAM(S): at 05:37

## 2020-12-24 RX ADMIN — Medication 100 MILLIGRAM(S): at 05:35

## 2020-12-24 RX ADMIN — Medication 250 MILLIGRAM(S): at 18:04

## 2020-12-24 RX ADMIN — OXYCODONE HYDROCHLORIDE 5 MILLIGRAM(S): 5 TABLET ORAL at 12:10

## 2020-12-24 RX ADMIN — ENOXAPARIN SODIUM 40 MILLIGRAM(S): 100 INJECTION SUBCUTANEOUS at 18:05

## 2020-12-24 RX ADMIN — Medication 150 MICROGRAM(S): at 05:36

## 2020-12-24 RX ADMIN — ATORVASTATIN CALCIUM 20 MILLIGRAM(S): 80 TABLET, FILM COATED ORAL at 21:46

## 2020-12-24 RX ADMIN — Medication 2: at 08:29

## 2020-12-24 RX ADMIN — ENOXAPARIN SODIUM 40 MILLIGRAM(S): 100 INJECTION SUBCUTANEOUS at 05:35

## 2020-12-24 RX ADMIN — Medication 1 TABLET(S): at 11:54

## 2020-12-24 RX ADMIN — Medication 2000 UNIT(S): at 05:37

## 2020-12-24 RX ADMIN — ONDANSETRON 4 MILLIGRAM(S): 8 TABLET, FILM COATED ORAL at 20:05

## 2020-12-24 RX ADMIN — LISINOPRIL 5 MILLIGRAM(S): 2.5 TABLET ORAL at 05:37

## 2020-12-24 RX ADMIN — Medication 1: at 18:06

## 2020-12-24 RX ADMIN — OXYCODONE HYDROCHLORIDE 5 MILLIGRAM(S): 5 TABLET ORAL at 19:30

## 2020-12-24 RX ADMIN — OXYCODONE HYDROCHLORIDE 5 MILLIGRAM(S): 5 TABLET ORAL at 19:02

## 2020-12-24 RX ADMIN — Medication 100 MILLIGRAM(S): at 15:15

## 2020-12-24 RX ADMIN — Medication 81 MILLIGRAM(S): at 11:54

## 2020-12-24 RX ADMIN — Medication 2000 UNIT(S): at 18:05

## 2020-12-24 RX ADMIN — OXYCODONE HYDROCHLORIDE 5 MILLIGRAM(S): 5 TABLET ORAL at 08:26

## 2020-12-24 RX ADMIN — OXYCODONE HYDROCHLORIDE 5 MILLIGRAM(S): 5 TABLET ORAL at 04:06

## 2020-12-24 RX ADMIN — SODIUM CHLORIDE 80 MILLILITER(S): 9 INJECTION INTRAMUSCULAR; INTRAVENOUS; SUBCUTANEOUS at 19:02

## 2020-12-24 RX ADMIN — OXYCODONE HYDROCHLORIDE 5 MILLIGRAM(S): 5 TABLET ORAL at 11:36

## 2020-12-24 RX ADMIN — INSULIN GLARGINE 12 UNIT(S): 100 INJECTION, SOLUTION SUBCUTANEOUS at 22:42

## 2020-12-24 RX ADMIN — OXYCODONE HYDROCHLORIDE 5 MILLIGRAM(S): 5 TABLET ORAL at 09:00

## 2020-12-24 RX ADMIN — Medication 650 MILLIGRAM(S): at 04:06

## 2020-12-24 RX ADMIN — OXYCODONE HYDROCHLORIDE 5 MILLIGRAM(S): 5 TABLET ORAL at 03:13

## 2020-12-24 NOTE — CONSULT NOTE ADULT - SUBJECTIVE AND OBJECTIVE BOX
Wound Surgery Consult Note:    HPI:  NIGHT HOSPITALIST:   Patient UNKNOWN to me previously, assigned to me at this point via the ER and by Dr. Boykin of the [facility] [facility] to admit this 69 y/o F--followed by her physicians above--patient with a history of bullous pemphigoid for 6 years currently steroid dependent, type 2 DM on oral Rx, surgical hypothyroidism following total thyroidectomy for a large obstructing goiter (on a 150 mcg /6 days and one day of 300 mcg/week -patient/spouse not sure which day), Red Man Syndrome from IV vancomycin, chronic pain syndrome with patient apparently on escalating doses of Neurontin (per spouse now on 800 mg 4xDay) with PRN Tramadol and Vicodin due to patient severe pain from her skin lesions, with last admission to [facility] in 10/2018 for RIGHT LE cellulitis, endometrial CA diagnosed in  with  [facility] BSO and presumed to be in remission, with patient with home visiting RN with an Unna's boot on the RIGHT LE but both are wrapped since Monday, with self referral following rigors and shaking chills since last night.   Spouse noted increased confusion by patient at home last night. (23 Dec 2020 20:47)    Request for wound care consult for multiple areas of denuded skin received. Ms. Hannon has a long history of bullous pemphigoid and chronic pain. She endorsed sever pain with being moved and turned for examination. She is unable to move in bed independently. A Dermatology consult is pending. Recommendations below represent suggestion for topical dressing to enhance comfort. Will defer to dermatology for definitive management and will not actively follow.    PAST MEDICAL & SURGICAL HISTORY:  Adrenal mass   incidental findings   Ct SCAN 2015 unchannged  24 hour urine for VMA done by Dr DR Canas [phone] Neg asper patient  Hyperlipidemia  Endometrial cancer, dx: 2015:  High grade Endometroid Adenocarcinoma  Morbid obesity, BMI:  53.6  Vitamin D deficiency  Hypothyroidism  Anemia  History of osteoarthritis  Type 2 diabetes mellitus  Hypertension  Bullous pemphigoid, dx: 2014.  On Immunosupressants  Cellcept  S/P BSO (bilateral salpingo-oophorectomy)  S/P hysterectomy  Endometrial cancer, 2015:  Endometrial Biopsy: dx: High Grade Endometroid Adenocarcinoma  Status post total thyroidectomy, &#x27;   History of  section, &#x27; 82-&#x27;95:  4 X    REVIEW OF SYSTEMS  General:	+ chills and rigors  Respiratory and Thorax:  no SOB or cough  Cardiovascular:	no CP  Gastrointestinal:	 no n/v  Genitourinary:	no dysuria  Musculoskeletal:	 non ambulatory  Neurological: no LOC	  Hematology/Lymphatics:	 on immunosuppressives  Endocrine:	DM  Skin: bullous pemphigoid    MEDICATIONS  (STANDING):  aspirin enteric coated 81 milliGRAM(s) Oral daily  atorvastatin 20 milliGRAM(s) Oral at bedtime  aztreonam  IVPB 2000 milliGRAM(s) IV Intermittent every 8 hours  aztreonam  IVPB      cholecalciferol 2000 Unit(s) Oral two times a day  dextrose 40% Gel 15 Gram(s) Oral once  dextrose 5%. 1000 milliLiter(s) (50 mL/Hr) IV Continuous <Continuous>  dextrose 5%. 1000 milliLiter(s) (100 mL/Hr) IV Continuous <Continuous>  dextrose 50% Injectable 25 Gram(s) IV Push once  dextrose 50% Injectable 12.5 Gram(s) IV Push once  dextrose 50% Injectable 25 Gram(s) IV Push once  enoxaparin Injectable 40 milliGRAM(s) SubCutaneous every 12 hours  glucagon  Injectable 1 milliGRAM(s) IntraMuscular once  insulin glargine Injectable (LANTUS) 12 Unit(s) SubCutaneous at bedtime  insulin lispro (ADMELOG) corrective regimen sliding scale   SubCutaneous three times a day before meals  insulin lispro (ADMELOG) corrective regimen sliding scale   SubCutaneous at bedtime  levothyroxine 150 MICROGram(s) Oral daily  lisinopril 5 milliGRAM(s) Oral daily  multivitamin 1 Tablet(s) Oral daily  predniSONE   Tablet 40 milliGRAM(s) Oral daily  sodium chloride 0.9%. 1000 milliLiter(s) (80 mL/Hr) IV Continuous <Continuous>    MEDICATIONS  (PRN):  acetaminophen   Tablet .. 650 milliGRAM(s) Oral every 6 hours PRN Temp greater or equal to 38C (100.4F), Mild Pain (1 - 3)  ondansetron Injectable 4 milliGRAM(s) IV Push every 12 hours PRN Nausea  oxyCODONE    IR 5 milliGRAM(s) Oral every 4 hours PRN Severe Pain (7 - 10)  traMADol 50 milliGRAM(s) Oral every 8 hours PRN Moderate Pain (4 - 6)    Allergies    Ancef (Rash)  daptomycin (Vomiting)  Keflex (Unknown)  penicillin (Pruritus; Rash; Hives)    Intolerances    vancomycin (Other)    SOCIAL HISTORY:  Denies smoking, ETOH, drugs    FAMILY HISTORY:  Family history of lymphoma (Father)    Vital Signs Last 24 Hrs  T(C): 37.1 (24 Dec 2020 14:05), Max: 37.9 (23 Dec 2020 21:38)  T(F): 98.7 (24 Dec 2020 14:05), Max: 100.2 (23 Dec 2020 21:38)  HR: 93 (24 Dec 2020 14:05) (85 - 122)  BP: 94/59 (24 Dec 2020 14:05) (78/50 - 126/79)  BP(mean): 100 (23 Dec 2020 17:15) (100 - 100)  RR: 20 (24 Dec 2020 14:05) (13 - 23)  SpO2: 94% (24 Dec 2020 14:05) (94% - 100%)    Physical Exam:  General: A&Ox3, MO  Respiratory: no SOB on room air  Gastrointestinal: soft NT/ND  Neurology: weakened strength & sensation grossly intact  Musculoskeletal: no contractures:  Vascular: BLE edema equal, BLE equally warm  Skin:  Multiple superficially denuded skin areas on trunk, BLE and BUE, larged on the poaterior Right thigh/buttock, no necrotic tissue, small amount of serosanguinous drainage, +TTP, No odor, erythema, increased warmth, induration, fluctuance    LABS:      133<L>  |  99  |  16  ----------------------------<  150<H>  4.3   |  25  |  0.83    Ca    8.3<L>      24 Dec 2020 10:39    TPro  7.3  /  Alb  3.3  /  TBili  0.3  /  DBili  x   /  AST  17  /  ALT  13  /  AlkPhos  74                            8.2    10.61 )-----------( 266      ( 24 Dec 2020 10:39 )             27.2     PT/INR - ( 23 Dec 2020 16:05 )   PT: 12.4 sec;   INR: 1.04 ratio         PTT - ( 23 Dec 2020 16:05 )  PTT:27.8 sec  Urinalysis Basic - ( 24 Dec 2020 00:10 )    Color: Yellow / Appearance: Clear / S.023 / pH: x  Gluc: x / Ketone: Negative  / Bili: Negative / Urobili: Negative   Blood: x / Protein: Trace / Nitrite: Positive   Leuk Esterase: Negative / RBC: 6 /hpf / WBC 1 /HPF   Sq Epi: x / Non Sq Epi: 0 /hpf / Bacteria: Many        RADIOLOGY & ADDITIONAL STUDIES:    EXAM:  TIBIA AND FIBULA AP AND LAT BI                        PROCEDURE DATE:  2020    INTERPRETATION:  CLINICAL INDICATION: Cellulitis. Evaluate for air.  EXAM: AP and crosstable lateral views of the bilateral tibias/fibula  COMPARISON: None  IMPRESSION:  No acute fracture dislocation. There is severe bilateral tricompartmental knee arthrosis. Chronic periosteal reaction is visualized along the bilateral fibula. There are dystrophic calcifications within the bilateral subcutaneous tissues. No evidence of air.

## 2020-12-24 NOTE — CONSULT NOTE ADULT - ATTENDING COMMENTS
Infectious Diseases will continue to follow. Please call with any questions.   Ruchi Gonzales M.D.  [facility], Division of Infectious Diseases [phone]  For over the weekend and after hours, please call [phone]

## 2020-12-24 NOTE — CONSULT NOTE ADULT - ASSESSMENT
#Fevers and rigors in setting of active BP on pred; favor infectious etiology  Although primary team concerned for possible reaction to dupixent given continued fevers and rigors few days after injection, at this time infectious etiology favor  Patient presented with AMS which is now improving s/p antibiotics as well as redness of skin and pain most prominent on L inner thigh in the setting of long term prednisone use  Would favor infectious etiology to fevers and other symptoms, cellulitis in particular  Although multiple areas of skin may be infected, favor L thigh as primary site as this area has increased warmth, induration and TTP.   Agree with continuing antibiotics per ID. Will defer choice of antibiotics to ID given complexities with past allergies and reactions. Would also rule out other possible non skin sources of infection.   Continue prednisone 40 mg daily for bullous pemphigoid. Will wean down as outpatient as we continue dupixent  Dupixent reaction extremely rare and would be expected within minutes to hours of injection    Dermatology will follow and touch base with patient upon discharge to continue care of bullous pemphigoid    Bhaskar Aly MD  Resident Physician, PGY3  [facility] Dermatology  Pager: [phone]  Office: [phone]    The patient's chart was reviewed in addition to being seen and examined at bedside with the dermatology attending Dr. Pina  Recommendations were communicated with the primary team.  Please page [phone] for further related questions.

## 2020-12-24 NOTE — CONSULT NOTE ADULT - ASSESSMENT
Impression:    Right buttock wounds  Multiple trunk, BLE and BUE wounds  Bullous Pemphigoid  DM with skin complications    Recommend:  1.) topical therapy: trunk wounds- apply adaptic to denuded painful areas Q day  2.) Dermatology consult, will defer to dermatology for definitive management  3.) Pressure ulcer prophylaxis  4.) Nutrition optimization  5.) Glycemic control     Care as per medicine will follow w/ you  Upon discharge f/u as outpatient at [facility] [address] [place] [address] [phone]  Seen and discussed with clinical nurse  Thank you for this consult  Cinthia Foss, NP-c, CWOCN [id number]

## 2020-12-24 NOTE — PROVIDER CONTACT NOTE (OTHER) - ASSESSMENT
pt is alert and oriented x4. pt received from ED with vs: 100.2. 105/73, 107, 97% on RA, 20. tylenol was administered, pt is on ABT and IVF.

## 2020-12-24 NOTE — PROVIDER CONTACT NOTE (OTHER) - RECOMMENDATIONS
straight cath
administer oxy and place air-tap. co-ordinate CT appt for pt
continue plan of care. continue to monitor for s/s of infection

## 2020-12-24 NOTE — PROGRESS NOTE ADULT - SUBJECTIVE AND OBJECTIVE BOX
Patient is a 70y old  Female who presents with a chief complaint of Rigors, chills since last night (23 Dec 2020 20:47)      SUBJECTIVE / OVERNIGHT EVENTS:    Patient seen and examined. denies cp sob. states she saw derm      Vital Signs Last 24 Hrs  T(C): 36.9 (24 Dec 2020 10:15), Max: 37.9 (23 Dec 2020 21:38)  T(F): 98.4 (24 Dec 2020 10:15), Max: 100.2 (23 Dec 2020 21:38)  HR: 90 (24 Dec 2020 10:15) (85 - 122)  BP: 97/60 (24 Dec 2020 10:15) (78/50 - 126/79)  BP(mean): 100 (23 Dec 2020 17:15) (100 - 100)  RR: 20 (24 Dec 2020 10:15) (13 - 23)  SpO2: 96% (24 Dec 2020 10:15) (94% - 100%)  I&O's Summary    23 Dec 2020 07:01  -  24 Dec 2020 07:00  --------------------------------------------------------  IN: 1412 mL / OUT: 900 mL / NET: 512 mL    24 Dec 2020 07:01  -  24 Dec 2020 11:33  --------------------------------------------------------  IN: 240 mL / OUT: 0 mL / NET: 240 mL        PE:  GENERAL: NAD, AAOx3, obese  HEAD:  Atraumatic, Normocephalic  CHEST/LUNG: CTABL, No wheeze  HEART: Regular rate and rhythm; no murmur  ABDOMEN: Soft, Nontender, Nondistended; Bowel sounds present  EXTREMITIES:  2+ Peripheral Pulses, No clubbing, cyanosis, or edema  SKIN: bl le with bullous phemphigoid, RLE mild warm to touch and erythema  NEURO: No focal deficits    LABS:                        8.2    10.61 )-----------( 266      ( 24 Dec 2020 10:39 )             27.2     12-24    133<L>  |  99  |  16  ----------------------------<  150<H>  4.3   |  25  |  0.83    Ca    8.3<L>      24 Dec 2020 10:39    TPro  7.3  /  Alb  3.3  /  TBili  0.3  /  DBili  x   /  AST  17  /  ALT  13  /  AlkPhos  74  12-23    PT/INR - ( 23 Dec 2020 16:05 )   PT: 12.4 sec;   INR: 1.04 ratio         PTT - ( 23 Dec 2020 16:05 )  PTT:27.8 sec  CAPILLARY BLOOD GLUCOSE      POCT Blood Glucose.: 227 mg/dL (24 Dec 2020 08:19)  POCT Blood Glucose.: 160 mg/dL (23 Dec 2020 23:11)  POCT Blood Glucose.: 174 mg/dL (23 Dec 2020 21:23)        Urinalysis Basic - ( 24 Dec 2020 00:10 )    Color: Yellow / Appearance: Clear / S.023 / pH: x  Gluc: x / Ketone: Negative  / Bili: Negative / Urobili: Negative   Blood: x / Protein: Trace / Nitrite: Positive   Leuk Esterase: Negative / RBC: 6 /hpf / WBC 1 /HPF   Sq Epi: x / Non Sq Epi: 0 /hpf / Bacteria: Many        RADIOLOGY & ADDITIONAL TESTS:    Imaging Personally Reviewed:  [x] YES  [ ] NO    Consultant(s) Notes Reviewed:  [x] YES  [ ] NO    MEDICATIONS  (STANDING):  aspirin enteric coated 81 milliGRAM(s) Oral daily  atorvastatin 20 milliGRAM(s) Oral at bedtime  aztreonam  IVPB 2000 milliGRAM(s) IV Intermittent every 8 hours  aztreonam  IVPB      cholecalciferol 2000 Unit(s) Oral two times a day  dextrose 40% Gel 15 Gram(s) Oral once  dextrose 5%. 1000 milliLiter(s) (50 mL/Hr) IV Continuous <Continuous>  dextrose 5%. 1000 milliLiter(s) (100 mL/Hr) IV Continuous <Continuous>  dextrose 50% Injectable 25 Gram(s) IV Push once  dextrose 50% Injectable 12.5 Gram(s) IV Push once  dextrose 50% Injectable 25 Gram(s) IV Push once  enoxaparin Injectable 40 milliGRAM(s) SubCutaneous every 12 hours  glucagon  Injectable 1 milliGRAM(s) IntraMuscular once  insulin glargine Injectable (LANTUS) 12 Unit(s) SubCutaneous at bedtime  insulin lispro (ADMELOG) corrective regimen sliding scale   SubCutaneous three times a day before meals  insulin lispro (ADMELOG) corrective regimen sliding scale   SubCutaneous at bedtime  levothyroxine 150 MICROGram(s) Oral daily  lisinopril 5 milliGRAM(s) Oral daily  multivitamin 1 Tablet(s) Oral daily  predniSONE   Tablet 40 milliGRAM(s) Oral daily  sodium chloride 0.9%. 1000 milliLiter(s) (80 mL/Hr) IV Continuous <Continuous>    MEDICATIONS  (PRN):  acetaminophen   Tablet .. 650 milliGRAM(s) Oral every 6 hours PRN Temp greater or equal to 38C (100.4F), Mild Pain (1 - 3)  ondansetron Injectable 4 milliGRAM(s) IV Push every 12 hours PRN Nausea  oxyCODONE    IR 5 milliGRAM(s) Oral every 4 hours PRN Severe Pain (7 - 10)  traMADol 50 milliGRAM(s) Oral every 8 hours PRN Moderate Pain (4 - 6)      Care Discussed with Consultants/Other Providers [x] YES  [ ] NO    HEALTH ISSUES - PROBLEM Dx:  Discharge planning issues  Discharge planning issues    Need for prophylactic measure  Need for prophylactic measure    Confusional state  Confusional state    Postoperative hypothyroidism  Postoperative hypothyroidism    Type 2 diabetes mellitus with hyperglycemia, without long-term current use of insulin  Type 2 diabetes mellitus with hyperglycemia, without long-term current use of insulin    Cellulitis of lower extremity, unspecified laterality  Cellulitis of lower extremity, unspecified laterality

## 2020-12-24 NOTE — PROVIDER CONTACT NOTE (OTHER) - ASSESSMENT
pt is A+Ox4. received call from CT scan that pt is refusing to be transferred to stretcher without administration of pain medication prior to the exam and without being placed on air-tap (assistive tranfer aid). pt is currently coming back to the floor pt is A+Ox4. received call from CT scan that pt is refusing to be transferred to stretcher without administration of pain medication prior to the exam and without being placed on air-tap (assistive transfer aid). pt is currently coming back to the floor

## 2020-12-24 NOTE — CONSULT NOTE ADULT - ASSESSMENT
Pt is a 70W w/ PMHx of bullous pemphigoid dx 2014 on steroids, surgical hypothyroidism following total thyroidectomy, chronic pain syndrome, endometrial CA diagnosed in 2015 with [facility] BSO, now in remission p/w RLE pain/and worsening erythema, concerned for cellulitis    Sepsis 2/2 b/l LE cellulitis  -pending BCx x2  Pt started on aztreonam--unfortunately this is not great coverage for cellulitis as most of the organisms implicated in cellulitis are gram-positive and aztreonam only covers gram negative  Ideally would prefer B lactams in this patient however pt w/ penicillin allergy  Given extensiveness of cellulitis, would actually start vancomycin on this patient.   Red-man syndrome is not a contraindication to give this medication; however must administer extremely slowly  -start vancomycin 1gm     Red-Man syndrome  -ok to administer vancomycin as above    Penicillin Allergy  -pt w/ extensive skin lesions from underlying pemphigoid disease  -would follow w/ allergy as outpatient for desensitization to penicillins as cellulitis will be a recurring problem in this patient and B-lactams are ideal for cellulitis therapy    Bullous pemphigoid   -steroid dependent  -sx can be multifactorial 2/2 cellulitis and flare  -appreciate derm recs    DM2  -strict glucose control to promote resolution of infection and wound healing

## 2020-12-24 NOTE — CONSULT NOTE ADULT - SUBJECTIVE AND OBJECTIVE BOX
[facility], Division of Infectious Diseases  MAYDA Braxton, ILANA Williamson  [phone]    JAKE ZIMMERMAN  70y, Female  [id number]    HPI--  HPI:  71 y/o F--followed by her physicians above--patient with a history of bullous pemphigoid for 6 years currently steroid dependent, type 2 DM on oral Rx, surgical hypothyroidism following total thyroidectomy for a large obstructing goiter (on a 150 mcg /6 days and one day of 300 mcg/week -patient/spouse not sure which day), Red Man Syndrome from IV vancomycin, chronic pain syndrome with patient apparently on escalating doses of Neurontin (per spouse now on 800 mg 4xDay) with PRN Tramadol and Vicodin due to patient severe pain from her skin lesions, with last admission to [place] in 10/2018 for RIGHT LE cellulitis, endometrial CA diagnosed in  with  [facility] BSO and presumed to be in remission, with patient with home visiting RN with an Unna's boot on the RIGHT LE but both are wrapped since Monday, with self referral following rigors and shaking chills since last night.   Spouse noted increased confusion by patient at home last night.   Pt seen and examined at bedside.  Was being seen by wound care; pt was on moderate pain while being moved  Pt reporting that she has had cellulitis in the past  reports allergy to penicillin and keflex.   reports red man to vancomycin    Active Medications--  acetaminophen   Tablet .. 650 milliGRAM(s) Oral every 6 hours PRN  aspirin enteric coated 81 milliGRAM(s) Oral daily  atorvastatin 20 milliGRAM(s) Oral at bedtime  aztreonam  IVPB 2000 milliGRAM(s) IV Intermittent every 8 hours  aztreonam  IVPB      cholecalciferol 2000 Unit(s) Oral two times a day  dextrose 40% Gel 15 Gram(s) Oral once  dextrose 5%. 1000 milliLiter(s) IV Continuous <Continuous>  dextrose 5%. 1000 milliLiter(s) IV Continuous <Continuous>  dextrose 50% Injectable 25 Gram(s) IV Push once  dextrose 50% Injectable 12.5 Gram(s) IV Push once  dextrose 50% Injectable 25 Gram(s) IV Push once  enoxaparin Injectable 40 milliGRAM(s) SubCutaneous every 12 hours  glucagon  Injectable 1 milliGRAM(s) IntraMuscular once  insulin glargine Injectable (LANTUS) 12 Unit(s) SubCutaneous at bedtime  insulin lispro (ADMELOG) corrective regimen sliding scale   SubCutaneous three times a day before meals  insulin lispro (ADMELOG) corrective regimen sliding scale   SubCutaneous at bedtime  levothyroxine 150 MICROGram(s) Oral daily  lisinopril 5 milliGRAM(s) Oral daily  multivitamin 1 Tablet(s) Oral daily  ondansetron Injectable 4 milliGRAM(s) IV Push every 12 hours PRN  oxyCODONE    IR 5 milliGRAM(s) Oral every 4 hours PRN  predniSONE   Tablet 40 milliGRAM(s) Oral daily  sodium chloride 0.9%. 1000 milliLiter(s) IV Continuous <Continuous>  traMADol 50 milliGRAM(s) Oral every 8 hours PRN    Antimicrobials:   aztreonam  IVPB 2000 milliGRAM(s) IV Intermittent every 8 hours  aztreonam  IVPB        Immunologic:     ROS:  CONSTITUTIONAL: No fevers or chills. No weakness or headache. No weight changes.  EYES/ENT: No visual or hearing changes. No sore throat or throat pain .  NECK: No pain or stiffness  RESPIRATORY: No cough, wheezing, or hemoptysis. No shortness of breath  CARDIOVASCULAR: No chest pain or palpitations  GASTROINTESTINAL: No abdominal pain. No nausea or vomiting. No diarrhea or constipation.  GENITOURINARY: No dysuria, frequency or hematuria  NEUROLOGICAL: No numbness or weakness  SKIN: No itching or rashes  PSYCHIATRIC: Pleasant. Appropriate affect    Allergies: Ancef (Rash)  daptomycin (Vomiting)  Keflex (Unknown)  penicillin (Pruritus; Rash; Hives)    PMH -- Adrenal mass    Hyperlipidemia    Endometrial cancer    Multiparity    Morbid obesity    Vitamin D deficiency    Hypothyroidism    Anemia    History of osteoarthritis    Type 2 diabetes mellitus    Hypercholesterolemia    Hypertension    Bullous pemphigoid      PSH -- S/P BSO (bilateral salpingo-oophorectomy)    S/P hysterectomy    Endometrial cancer    Status post total thyroidectomy    History of  section      FH -- Family history of lymphoma (Father)      Social History --  EtOH: denies   Tobacco: denies   Drug Use: denies     Travel/Environmental/Occupational History:    Physical Exam--  Vital Signs Last 24 Hrs  T(F): 98.7 (24 Dec 2020 14:05), Max: 100.2 (23 Dec 2020 21:38)  HR: 93 (24 Dec 2020 14:05) (85 - 122)  BP: 94/59 (24 Dec 2020 14:05) (78/50 - 126/79)  RR: 20 (24 Dec 2020 14:05) (13 - 23)  SpO2: 94% (24 Dec 2020 14:05) (94% - 100%)  General: nontoxic-appearing, no acute distress  HEENT: NC/AT, EOMI, anicteric, conjunctiva pink and moist, oropharynx clear, dentition fair  Neck: Not rigid. No sense of mass. No LAD  Lungs: Clear bilaterally without rales, wheezing or rhonchi  Heart: Regular rate and rhythm. No murmur, rub or gallop.  Abdomen: Soft. Nondistended. Nontender. Bowel sounds present. No organomegaly.  Back: No spinal tenderness. No costovertebral angle tenderness.  Extremities: No cyanosis or clubbing. No edema.   Skin: b/l LE and UE evidence of bullous pemphigoid w/ superimposed erythema.     Laboratory & Imaging Data--  CBC:                       8.2    10.61 )-----------( 266      ( 24 Dec 2020 10:39 )             27.2     CMP: 12-    133<L>  |  99  |  16  ----------------------------<  150<H>  4.3   |  25  |  0.83    Ca    8.3<L>      24 Dec 2020 10:39    TPro  7.3  /  Alb  3.3  /  TBili  0.3  /  DBili  x   /  AST  17  /  ALT  13  /  AlkPhos  74  12-23    LIVER FUNCTIONS - ( 23 Dec 2020 16:05 )  Alb: 3.3 g/dL / Pro: 7.3 g/dL / ALK PHOS: 74 U/L / ALT: 13 U/L / AST: 17 U/L / GGT: x           Urinalysis Basic - ( 24 Dec 2020 00:10 )    Color: Yellow / Appearance: Clear / S.023 / pH: x  Gluc: x / Ketone: Negative  / Bili: Negative / Urobili: Negative   Blood: x / Protein: Trace / Nitrite: Positive   Leuk Esterase: Negative / RBC: 6 /hpf / WBC 1 /HPF   Sq Epi: x / Non Sq Epi: 0 /hpf / Bacteria: Many        Microbiology: reviewed      Radiology: reviewed  < from: CT Head No Cont (20 @ 09:01) >    EXAM:  CT BRAIN                            PROCEDURE DATE:  2020            INTERPRETATION:  Noncontrast CT of the brain.    CLINICAL INDICATION:  SEPSIS/CONFUSION AT HOME    TECHNIQUE : Axial CT scanning of the brain was obtained from the skull base to the vertex without the administration of intravenous contrast. Sagittal and coronal reformats were provided.    COMPARISON: None available    FINDINGS:    2.1 x 1.7 x 1.2 cm (maximal anterior-posterior by transverse by craniocaudad dimensions) left anteromedial frontal hyperdense lesion with subjacent hyperostosis, likely a meningioma. Minimal, if any underlying vasogenic edema. No vasogenic edema elsewhere in the brain.    No hydrocephalus, midline shift, or acute intracranial hemorrhage. No CT evidence for acute territorial infarct. Mild white matter microvascular ischemic disease.    Chronic left sphenoid sinus mucosal thickening. Remaining visualized paranasal sinuses and mastoid air cells are clear.    IMPRESSION:    Left anteromedial frontal 2.1 cm probable meningioma. Minimal, if any underlying vasogenic edema. No vasogenic edema elsewhere in the brain.    No hydrocephalus, acute intracranial hemorrhage, or CT evidence for acute territorial infarct.                                JOSEFINA ORELLANA MD; Attending Radiologist  This document has been electronically signed. Dec 24 2020 10:17AM    < end of copied text >  < from: Xray Tibia + Fibula 2 Views, Bilateral (20 @ 23:06) >    EXAM:  TIBIA AND FIBULA AP AND LAT BI                            PROCEDURE DATE:  2020            INTERPRETATION:  CLINICAL INDICATION: Cellulitis. Evaluate for air.    EXAM: AP and crosstable lateral views of the bilateral tibias/fibula    COMPARISON: None      IMPRESSION:  No acute fracture dislocation. There is severe bilateral tricompartmental knee arthrosis. Chronic periosteal reaction is visualized along the bilateral fibula. There are dystrophic calcifications within the bilateral subcutaneous tissues. No evidence of air.                EVETTE GILES MD; Attending Radiologist  This document has been electronically signed. Dec 24 2020 12:06PM    < end of copied text >  < from: Xray Chest 1 View- PORTABLE-Urgent (20 @ 16:52) >    EXAM:  XR CHEST PORTABLE URGENT 1V                            PROCEDURE DATE:  2020            INTERPRETATION:  CLINICAL INFORMATION: Sepsis.    TECHNIQUE: Frontal radiograph of the chest.    COMPARISON: Chest CT dated 12/15/2020 and chest x-ray dated 2016.    FINDINGS:    LUNGS: Mildly prominent interstitial markings without focal consolidation.    PLEURA: No pleural effusion or pneumothorax.    HEART AND MEDIASTINUM: Heart size is within normal limits.    SKELETON: Unremarkable skeletal structures.      IMPRESSION:    No focal consolidation.              JOSEFINA GOODWIN MD; Resident Radiology  This document has been electronically signed.  ERIK ARNOLD MD; Attending Radiologist  This document has been electronically signed. Dec 23 2020  6:21PM    < end of copied text >

## 2020-12-24 NOTE — PROVIDER CONTACT NOTE (OTHER) - REASON
pt unable to void
pt refusing CT head until oxycodone administered and pt placed on air tap
pt has low grade fever and tachycardia

## 2020-12-24 NOTE — PROVIDER CONTACT NOTE (OTHER) - ASSESSMENT
pt is alert and oriented x4. pt unable to void, c/o of abdominal discomfort, bladder distended. bladder scan was done with 702ml of urine. pt reports she did not void all day.

## 2020-12-24 NOTE — PROGRESS NOTE ADULT - ASSESSMENT
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 (on a 150 mcg /6 days and one day of 300 mcg/week -patient/spouse not sure which day), Red Man Syndrome from IV vancomycin, chronic pain syndrome with patient apparently on escalating doses of Neurontin (per spouse now on 800 mg 4xDay) with PRN Tramadol and Vicodin due to patient severe pain from her skin lesions, with last admission to [place] in 10/2018 for RIGHT LE cellulitis, endometrial CA diagnosed in 2015 with [facility] BSO and presumed to be in remission, with patient with home visiting RN with an Unna's boot on the RIGHT LE but both are wrapped since Monday, with self referral following rigors and shaking chills since last night.   Spouse noted increased confusion by patient at home last night.      # sepsis 2/2 cellulitis  # AMS likely 2/2 infection  # bullous pemphigoid steroid dependent ro flare  # surgical hypothyroidism  # poorly controlled type 2 DM  # uterine CA presumed to be disease free  # RIGHT> left LE cellulitis    IV Azactam  ID consult for cellulitis  fu BC  Derm consult ro bullous pemphigoid flare  cont prednisone 40mg  FS ACHS, SSI, lantus  CT head meningioma, no infarct  can resume neurontin, outpt takes neurontin 800mg QID rx by Dr Gonzales pain management  outpt takes tramadol 300mg extended release 24 hrs qd and norco 10-325mg q8 prn  cont Tramadol and low dose Oxycodone IR 5 mg PRN for pain relief  cont statin  PT    pharmacologic DVT prophylaxis.    PCP:     Olivia Ross MD (PCP) [phone]

## 2020-12-24 NOTE — CONSULT NOTE ADULT - SUBJECTIVE AND OBJECTIVE BOX
HPI:  71 yo F with a history of bullous pemphigoid for 6 years currently steroid dependent, type 2 DM on oral Rx, surgical hypothyroidism following total thyroidectomy for a large obstructing goiter (on a 150 mcg /6 days and one day of 300 mcg/week -patient/spouse not sure which day), Red Man Syndrome from IV vancomycin, chronic pain syndrome with patient apparently on escalating doses of Neurontin (per spouse now on 800 mg 4xDay) with PRN Tramadol and Vicodin due to patient severe pain from her skin lesions, with last admission to [place] in 10/2018 for RIGHT LE cellulitis, endometrial CA diagnosed in  with  [facility] BSO and presumed to be in remission, with patient with home visiting RN with an Unna's boot on the RIGHT LE but both are wrapped since Monday, with self referral following rigors and shaking chills x 2 days as well as AMS noted by .    Dermatology consulted due to history of BP and new onset fevers/rigors since having dupixent injection . Patient feels much better today and more oriented. Still with pain most prominent on L inner thigh.     PAST MEDICAL & SURGICAL HISTORY:  Adrenal mass   incidental findings   Ct SCAN 2015 unchannged  24 hour urine for VMA done by Dr DR Canas [phone] Neg asper patient    Hyperlipidemia    Endometrial cancer  dx: 2015:  High grade Endometroid Adenocarcinoma    Morbid obesity  BMI:  53.6    Vitamin D deficiency    Hypothyroidism    Anemia    History of osteoarthritis    Type 2 diabetes mellitus    Hypertension    Bullous pemphigoid  dx: 2014.  On Immunosupressants  Cellcept    S/P BSO (bilateral salpingo-oophorectomy)    S/P hysterectomy    Endometrial cancer  2015:  Endometrial Biopsy: dx: High Grade Endometroid Adenocarcinoma    Status post total thyroidectomy  &#x27;     History of  section  &#x27; 82-&#x27;95:  4 X        REVIEW OF SYSTEMS      General: no fevers/chills, no lethargy    Skin/Breast: see HPI  	  Ophthalmologic: no eye pain or change in vision  	  ENMT: no dysphagia or change in hearing    Respiratory and Thorax: no SOB or cough  	  Cardiovascular: no palpitations or chest pain    Gastrointestinal: no abdominal pain or blood in stool     Genitourinary: no dysuria or frequency    Musculoskeletal: no joint pains or weakness	    Neurological: no weakness, numbness , or tingling    MEDICATIONS  (STANDING):  aspirin enteric coated 81 milliGRAM(s) Oral daily  atorvastatin 20 milliGRAM(s) Oral at bedtime  cholecalciferol 2000 Unit(s) Oral two times a day  dextrose 40% Gel 15 Gram(s) Oral once  dextrose 5%. 1000 milliLiter(s) (50 mL/Hr) IV Continuous <Continuous>  dextrose 5%. 1000 milliLiter(s) (100 mL/Hr) IV Continuous <Continuous>  dextrose 50% Injectable 25 Gram(s) IV Push once  dextrose 50% Injectable 12.5 Gram(s) IV Push once  dextrose 50% Injectable 25 Gram(s) IV Push once  enoxaparin Injectable 40 milliGRAM(s) SubCutaneous every 12 hours  glucagon  Injectable 1 milliGRAM(s) IntraMuscular once  insulin glargine Injectable (LANTUS) 12 Unit(s) SubCutaneous at bedtime  insulin lispro (ADMELOG) corrective regimen sliding scale   SubCutaneous three times a day before meals  insulin lispro (ADMELOG) corrective regimen sliding scale   SubCutaneous at bedtime  levothyroxine 150 MICROGram(s) Oral daily  lisinopril 5 milliGRAM(s) Oral daily  multivitamin 1 Tablet(s) Oral daily  predniSONE   Tablet 40 milliGRAM(s) Oral daily  sodium chloride 0.9%. 1000 milliLiter(s) (80 mL/Hr) IV Continuous <Continuous>  vancomycin  IVPB      vancomycin  IVPB 1500 milliGRAM(s) IV Intermittent once    MEDICATIONS  (PRN):  acetaminophen   Tablet .. 650 milliGRAM(s) Oral every 6 hours PRN Temp greater or equal to 38C (100.4F), Mild Pain (1 - 3)  ondansetron Injectable 4 milliGRAM(s) IV Push every 12 hours PRN Nausea  oxyCODONE    IR 5 milliGRAM(s) Oral every 4 hours PRN Severe Pain (7 - 10)  traMADol 50 milliGRAM(s) Oral every 8 hours PRN Moderate Pain (4 - 6)      Allergies    Ancef (Rash)  daptomycin (Vomiting)  Keflex (Unknown)  penicillin (Pruritus; Rash; Hives)    Intolerances    vancomycin (Other)      SOCIAL HISTORY:    FAMILY HISTORY:  Family history of lymphoma (Father)  father        Vital Signs Last 24 Hrs  T(C): 37.1 (24 Dec 2020 14:05), Max: 37.9 (23 Dec 2020 21:38)  T(F): 98.7 (24 Dec 2020 14:05), Max: 100.2 (23 Dec 2020 21:38)  HR: 93 (24 Dec 2020 14:05) (85 - 109)  BP: 94/59 (24 Dec 2020 14:05) (78/65 - 126/79)  BP(mean): 100 (23 Dec 2020 17:15) (100 - 100)  RR: 20 (24 Dec 2020 14:05) (13 - 23)  SpO2: 94% (24 Dec 2020 14:05) (94% - 100%)    PHYSICAL EXAM:     The patient was alert and oriented X 3, well nourished, and in no  apparent distress.  OP showed no ulcerations  There was no visible lymphadenopathy.  Conjunctiva were non injected  The scalp, hair, face, eyebrows, lips, OP, neck, chest, back,   extremities X 4, nails were examined.  There was no hyperhidrosis or bromhidrosis.    Of note on skin exam:   Numerous tense bullae intermixed with some flaccid bullae on trunk and extremities. Crusted erosions and erosions with large areas of surrounding erythema on R thigh, right lower extremities, inner thighs (L >R) and upper back  Area on L inner thigh, very tender to palpation with increased warmth and notable induration    LABS:                        8.2    10.61 )-----------( 266      ( 24 Dec 2020 10:39 )             27.2     12-24    133<L>  |  99  |  16  ----------------------------<  150<H>  4.3   |  25  |  0.83    Ca    8.3<L>      24 Dec 2020 10:39    TPro  7.3  /  Alb  3.3  /  TBili  0.3  /  DBili  x   /  AST  17  /  ALT  13  /  AlkPhos  74  12-23    PT/INR - ( 23 Dec 2020 16:05 )   PT: 12.4 sec;   INR: 1.04 ratio         PTT - ( 23 Dec 2020 16:05 )  PTT:27.8 sec  Urinalysis Basic - ( 24 Dec 2020 00:10 )    Color: Yellow / Appearance: Clear / S.023 / pH: x  Gluc: x / Ketone: Negative  / Bili: Negative / Urobili: Negative   Blood: x / Protein: Trace / Nitrite: Positive   Leuk Esterase: Negative / RBC: 6 /hpf / WBC 1 /HPF   Sq Epi: x / Non Sq Epi: 0 /hpf / Bacteria: Many        RADIOLOGY & ADDITIONAL STUDIES:

## 2020-12-24 NOTE — PROVIDER CONTACT NOTE (OTHER) - ACTION/TREATMENT ORDERED:
continue plan of care. labs pending. recheck temp after tylenol administration.
PHAN Lyons was made aware with new order to straight cath. straight cath was done with 900ml of urine output.
agree with recommendations above. f/u with progress

## 2020-12-24 NOTE — PROVIDER CONTACT NOTE (OTHER) - SITUATION
pt refusing CT head until oxycodone administered and pt placed on air tap
pt has low grade fever and tachycardia
pt unable to void, c/o of abdominal discomfort, bladder distended

## 2020-12-25 LAB
ANION GAP SERPL CALC-SCNC: 9 MMOL/L — SIGNIFICANT CHANGE UP (ref 5–17)
BUN SERPL-MCNC: 15 MG/DL — SIGNIFICANT CHANGE UP (ref 7–23)
CALCIUM SERPL-MCNC: 8.1 MG/DL — LOW (ref 8.4–10.5)
CHLORIDE SERPL-SCNC: 102 MMOL/L — SIGNIFICANT CHANGE UP (ref 96–108)
CO2 SERPL-SCNC: 25 MMOL/L — SIGNIFICANT CHANGE UP (ref 22–31)
CREAT SERPL-MCNC: 0.85 MG/DL — SIGNIFICANT CHANGE UP (ref 0.5–1.3)
GLUCOSE SERPL-MCNC: 97 MG/DL — SIGNIFICANT CHANGE UP (ref 70–99)
HCT VFR BLD CALC: 26 % — LOW (ref 34.5–45)
HGB BLD-MCNC: 7.7 G/DL — LOW (ref 11.5–15.5)
MCHC RBC-ENTMCNC: 29.6 GM/DL — LOW (ref 32–36)
MCHC RBC-ENTMCNC: 31.8 PG — SIGNIFICANT CHANGE UP (ref 27–34)
MCV RBC AUTO: 107.4 FL — HIGH (ref 80–100)
NRBC # BLD: 0 /100 WBCS — SIGNIFICANT CHANGE UP (ref 0–0)
PLATELET # BLD AUTO: 242 K/UL — SIGNIFICANT CHANGE UP (ref 150–400)
POTASSIUM SERPL-MCNC: 3.9 MMOL/L — SIGNIFICANT CHANGE UP (ref 3.5–5.3)
POTASSIUM SERPL-SCNC: 3.9 MMOL/L — SIGNIFICANT CHANGE UP (ref 3.5–5.3)
RBC # BLD: 2.42 M/UL — LOW (ref 3.8–5.2)
RBC # FLD: 17.2 % — HIGH (ref 10.3–14.5)
SODIUM SERPL-SCNC: 136 MMOL/L — SIGNIFICANT CHANGE UP (ref 135–145)
WBC # BLD: 7.64 K/UL — SIGNIFICANT CHANGE UP (ref 3.8–10.5)
WBC # FLD AUTO: 7.64 K/UL — SIGNIFICANT CHANGE UP (ref 3.8–10.5)

## 2020-12-25 RX ORDER — AZTREONAM 2 G
2000 VIAL (EA) INJECTION EVERY 8 HOURS
Refills: 0 | Status: DISCONTINUED | OUTPATIENT
Start: 2020-12-25 | End: 2020-12-30

## 2020-12-25 RX ORDER — AZTREONAM 2 G
VIAL (EA) INJECTION
Refills: 0 | Status: DISCONTINUED | OUTPATIENT
Start: 2020-12-25 | End: 2020-12-30

## 2020-12-25 RX ORDER — AZTREONAM 2 G
2000 VIAL (EA) INJECTION ONCE
Refills: 0 | Status: COMPLETED | OUTPATIENT
Start: 2020-12-25 | End: 2020-12-25

## 2020-12-25 RX ORDER — ONDANSETRON 8 MG/1
4 TABLET, FILM COATED ORAL EVERY 6 HOURS
Refills: 0 | Status: DISCONTINUED | OUTPATIENT
Start: 2020-12-25 | End: 2021-01-07

## 2020-12-25 RX ADMIN — Medication 81 MILLIGRAM(S): at 11:27

## 2020-12-25 RX ADMIN — Medication 300 MILLIGRAM(S): at 17:49

## 2020-12-25 RX ADMIN — OXYCODONE HYDROCHLORIDE 5 MILLIGRAM(S): 5 TABLET ORAL at 21:24

## 2020-12-25 RX ADMIN — Medication 1: at 08:52

## 2020-12-25 RX ADMIN — Medication 2: at 13:02

## 2020-12-25 RX ADMIN — OXYCODONE HYDROCHLORIDE 5 MILLIGRAM(S): 5 TABLET ORAL at 04:09

## 2020-12-25 RX ADMIN — Medication 2000 UNIT(S): at 17:49

## 2020-12-25 RX ADMIN — OXYCODONE HYDROCHLORIDE 5 MILLIGRAM(S): 5 TABLET ORAL at 22:30

## 2020-12-25 RX ADMIN — ONDANSETRON 4 MILLIGRAM(S): 8 TABLET, FILM COATED ORAL at 09:53

## 2020-12-25 RX ADMIN — Medication 40 MILLIGRAM(S): at 05:54

## 2020-12-25 RX ADMIN — OXYCODONE HYDROCHLORIDE 5 MILLIGRAM(S): 5 TABLET ORAL at 08:57

## 2020-12-25 RX ADMIN — ATORVASTATIN CALCIUM 20 MILLIGRAM(S): 80 TABLET, FILM COATED ORAL at 21:24

## 2020-12-25 RX ADMIN — LISINOPRIL 5 MILLIGRAM(S): 2.5 TABLET ORAL at 05:55

## 2020-12-25 RX ADMIN — Medication 1 TABLET(S): at 11:27

## 2020-12-25 RX ADMIN — OXYCODONE HYDROCHLORIDE 5 MILLIGRAM(S): 5 TABLET ORAL at 03:13

## 2020-12-25 RX ADMIN — ENOXAPARIN SODIUM 40 MILLIGRAM(S): 100 INJECTION SUBCUTANEOUS at 17:49

## 2020-12-25 RX ADMIN — Medication 3: at 17:49

## 2020-12-25 RX ADMIN — OXYCODONE HYDROCHLORIDE 5 MILLIGRAM(S): 5 TABLET ORAL at 09:30

## 2020-12-25 RX ADMIN — Medication 300 MILLIGRAM(S): at 05:55

## 2020-12-25 RX ADMIN — Medication 150 MICROGRAM(S): at 05:55

## 2020-12-25 RX ADMIN — ONDANSETRON 4 MILLIGRAM(S): 8 TABLET, FILM COATED ORAL at 21:25

## 2020-12-25 RX ADMIN — Medication 100 MILLIGRAM(S): at 11:24

## 2020-12-25 RX ADMIN — Medication 2000 UNIT(S): at 05:54

## 2020-12-25 RX ADMIN — ENOXAPARIN SODIUM 40 MILLIGRAM(S): 100 INJECTION SUBCUTANEOUS at 05:55

## 2020-12-25 RX ADMIN — Medication 100 MILLIGRAM(S): at 21:24

## 2020-12-25 RX ADMIN — INSULIN GLARGINE 12 UNIT(S): 100 INJECTION, SOLUTION SUBCUTANEOUS at 22:08

## 2020-12-25 RX ADMIN — ONDANSETRON 4 MILLIGRAM(S): 8 TABLET, FILM COATED ORAL at 14:38

## 2020-12-25 NOTE — DIETITIAN INITIAL EVALUATION ADULT. - PROBLEM SELECTOR PLAN 3
See above.   Will continue with Synthroid 150 mcg daily but would clarify which day is the double dose day with patient's PCP. Double Island Pedicle Flap Text: The defect edges were debeveled with a #15 scalpel blade.  Given the location of the defect, shape of the defect and the proximity to free margins a double island pedicle advancement flap was deemed most appropriate.  Using a sterile surgical marker, an appropriate advancement flap was drawn incorporating the defect, outlining the appropriate donor tissue and placing the expected incisions within the relaxed skin tension lines where possible.    The area thus outlined was incised deep to adipose tissue with a #15 scalpel blade.  The skin margins were undermined to an appropriate distance in all directions around the primary defect and laterally outward around the island pedicle utilizing iris scissors.  There was minimal undermining beneath the pedicle flap.

## 2020-12-25 NOTE — PROGRESS NOTE ADULT - ASSESSMENT
69 y/o F PMHx bullous pemphigoid for 6 years currently steroid dependent, type 2 DM on oral Rx, surgical hypothyroidism following total thyroidectomy for a large obstructing goiter (on a 150 mcg /6 days and one day of 300 mcg/week -patient/spouse not sure which day), Red Man Syndrome from IV vancomycin, chronic pain syndrome with patient apparently on escalating doses of Neurontin (per spouse now on 800 mg 4xDay) with PRN Tramadol and Vicodin due to patient severe pain from her skin lesions, with last admission to [place] in 10/2018 for RIGHT LE cellulitis, endometrial CA diagnosed in 2015 with [facility] BSO and presumed to be in remission, with patient with home visiting RN with an Unna's boot on the RIGHT LE but both are wrapped since Monday, with self referral following rigors and shaking chills since last night.   Spouse noted increased confusion by patient at home last night.    # sepsis 2/2 cellulitis  # AMS likely 2/2 infection  # bullous pemphigoid steroid dependent ro flare  # surgical hypothyroidism  # poorly controlled type 2 DM  # uterine CA presumed to be disease free  # RIGHT> left LE cellulitis    IV Azactam and vanco, monitor for red man syndrome  appreciate ID recs  BC NTD  appreciate derm recs  cont prednisone 40mg  FS ACHS, SSI, lantus  CT head meningioma, no infarct  can resume neurontin, outpt takes neurontin 800mg QID rx by Dr Gonzales pain management  outpt takes tramadol 300mg extended release 24 hrs qd and norco 10-325mg q8 prn  cont Tramadol and low dose Oxycodone IR 5 mg PRN for pain relief  cont statin  PT    pharmacologic DVT prophylaxis.    PCP:     Olivia Ross MD (PCP) [phone]

## 2020-12-25 NOTE — PHYSICAL THERAPY INITIAL EVALUATION ADULT - PRECAUTIONS/LIMITATIONS, REHAB EVAL
(per spouse now on 800 mg 4xDay) with PRN Tramadol and Vicodin due to patient severe pain from her skin lesions, with last admission to [place] in 10/2018 for RIGHT LE cellulitis, endometrial CA diagnosed in 2015 with [facility] BSO and presumed to be in remission, with patient with home visiting RN with an Unna's boot on the RIGHT LE but both are wrapped since Monday, with self referral following rigors and shaking chills since last night.   Spouse noted increased confusion by patient at home last night. (per spouse now on 800 mg 4xDay) with PRN Tramadol and Vicodin due to patient severe pain from her skin lesions, with last admission to [place] in 10/2018 for RIGHT LE cellulitis, endometrial CA diagnosed in 2015 with [facility] BSO and presumed to be in remission, with patient with home visiting RN with an Unna's boot on the RIGHT LE but both are wrapped since Monday, with self referral following rigors and shaking chills since last night.   Spouse noted increased confusion by patient at home last night. Now in remission who presented with RLE pain/and worsening erythema, concerned for cellulitis/fall precautions

## 2020-12-25 NOTE — PROGRESS NOTE ADULT - ASSESSMENT
Pt is a 70W w/ PMHx of bullous pemphigoid dx 2014 on steroids, surgical hypothyroidism following total thyroidectomy, chronic pain syndrome, endometrial CA diagnosed in 2015 with [facility] BSO, now in remission p/w RLE pain/and worsening erythema, concerned for cellulitis    Sepsis 2/2 b/l LE cellulitis  -pending BCx x2  -start vancomycin 1.5gm IV q12h pt will need level prior to 4th dose with goal trough 15-20  Please continue to administer extremely slowly    Sepsis 2/2 UTI?  -UA reviewed, inconsistent w/ infection however UCx +GNR and pt w/ mild sx when questioned  -will re-order aztreonam to cover this, plan for short 5 day course pending above BCx remain negative    Red-Man syndrome  -ok to administer vancomycin as above    Penicillin Allergy  -pt w/ extensive skin lesions from underlying pemphigoid disease  -would follow w/ allergy as outpatient for desensitization to penicillins as cellulitis will be a recurring problem in this patient and B-lactams are ideal for cellulitis therapy    Bullous pemphigoid   -steroid dependent  -sx can be multifactorial 2/2 cellulitis and flare  -appreciate derm recs    DM2  -strict glucose control to promote resolution of infection and wound healing

## 2020-12-25 NOTE — DIETITIAN INITIAL EVALUATION ADULT. - OTHER INFO
Visited pt at bedside. Pt reports having a good appetite both PTA and in-house; consuming >75% of most meals. Pt denies any known food allergies or intolerances. Pt denies any chewing/swallowing difficulty, self-feeding difficulty, nausea/vomiting, constipation, or diarrhea. Last BM 12/24 per flowsheet. Pt endorses B12 supplementation at home. Pt unwilling to discuss weight hx at this time.     Education: Encouraged balanced meals with adequate protein for healing; sources discussed. Pt not interested in diet education or further interview at this time but does state that her  brings her Ensure Max protein and prescribed by her outpatient wound care MD. Pt declined in-house supplements at this time. Pt made aware that RD remains available as needed.

## 2020-12-25 NOTE — PROGRESS NOTE ADULT - SUBJECTIVE AND OBJECTIVE BOX
[facility], Division of Infectious Diseases  MAYDA Braxton Y. Patel, S. Shah  [phone]    Name: JAKE ZIMMERMAN  Age: 70y  Gender: Female  MRN: [id number]  Note Date: 20    Interval History:  Patient seen and examined at bedside this morning  No acute overnight events. Eating breakfast  Still has mild pain 3/10, just received pain medications  Tolerated vancomycin well  Notes reviewed    Antibiotics:  vancomycin  IVPB      vancomycin  IVPB 1500 milliGRAM(s) IV Intermittent every 12 hours      Medications:  acetaminophen   Tablet .. 650 milliGRAM(s) Oral every 6 hours PRN  aspirin enteric coated 81 milliGRAM(s) Oral daily  atorvastatin 20 milliGRAM(s) Oral at bedtime  cholecalciferol 2000 Unit(s) Oral two times a day  dextrose 40% Gel 15 Gram(s) Oral once  dextrose 5%. 1000 milliLiter(s) IV Continuous <Continuous>  dextrose 5%. 1000 milliLiter(s) IV Continuous <Continuous>  dextrose 50% Injectable 25 Gram(s) IV Push once  dextrose 50% Injectable 12.5 Gram(s) IV Push once  dextrose 50% Injectable 25 Gram(s) IV Push once  enoxaparin Injectable 40 milliGRAM(s) SubCutaneous every 12 hours  glucagon  Injectable 1 milliGRAM(s) IntraMuscular once  insulin glargine Injectable (LANTUS) 12 Unit(s) SubCutaneous at bedtime  insulin lispro (ADMELOG) corrective regimen sliding scale   SubCutaneous three times a day before meals  insulin lispro (ADMELOG) corrective regimen sliding scale   SubCutaneous at bedtime  levothyroxine 150 MICROGram(s) Oral daily  lisinopril 5 milliGRAM(s) Oral daily  multivitamin 1 Tablet(s) Oral daily  ondansetron Injectable 4 milliGRAM(s) IV Push every 12 hours PRN  oxyCODONE    IR 5 milliGRAM(s) Oral every 4 hours PRN  predniSONE   Tablet 40 milliGRAM(s) Oral daily  sodium chloride 0.9%. 1000 milliLiter(s) IV Continuous <Continuous>  traMADol 50 milliGRAM(s) Oral every 8 hours PRN  vancomycin  IVPB      vancomycin  IVPB 1500 milliGRAM(s) IV Intermittent every 12 hours      Review of Systems:  A 10-point review of systems was obtained.     Pertinent positives and negatives--  Constitutional: No fevers. No Chills. No Rigors.   Cardiovascular: No chest pain. No palpitations.  Respiratory: No shortness of breath. No cough.  Gastrointestinal: No nausea or vomiting. No diarrhea or constipation.   Psychiatric: Pleasant. Appropriate affect.    Review of systems otherwise negative except as previously noted.    Allergies: Ancef (Rash)  daptomycin (Vomiting)  Keflex (Unknown)  penicillin (Pruritus; Rash; Hives)    For details regarding the patient's past medical history, social history, family history, and other miscellaneous elements, please refer the initial infectious diseases consultation and/or the admitting history and physical examination for this admission.    Objective:  Vitals:   T(C): 36.8 (20 @ 09:38), Max: 37.6 (20 @ 22:47)  HR: 85 (20 @ 09:38) (83 - 93)  BP: 99/63 (20 @ 09:38) (87/56 - 111/70)  RR: 18 (20 @ 09:38) (18 - 20)  SpO2: 95% (20 @ 09:38) (94% - 100%)    Physical Examination:  General: no acute distress  HEENT: NC/AT, EOMI, anicteric, no oral lesions  Neck: supple, no palpable LAD  Cardio: S1, S2 heard, RRR, no murmurs  Resp: breath sounds heard bilaterally, no rales, wheezes or rhonchi  Abd: soft, NT, ND, + bowel sounds  Neuro: AAOx3, no obvious focal deficits  Ext: b/l extremities w/ erythema and evidence of pemphigoid disease  Skin: warm, dry, no visible rash      Laboratory Studies:  CBC:                       7.7    7.64  )-----------( 242      ( 25 Dec 2020 07:17 )             26.0     CMP:     136  |  102  |  15  ----------------------------<  97  3.9   |  25  |  0.85    Ca    8.1<L>      25 Dec 2020 07:17    TPro  7.3  /  Alb  3.3  /  TBili  0.3  /  DBili  x   /  AST  17  /  ALT  13  /  AlkPhos  74  12-23    LIVER FUNCTIONS - ( 23 Dec 2020 16:05 )  Alb: 3.3 g/dL / Pro: 7.3 g/dL / ALK PHOS: 74 U/L / ALT: 13 U/L / AST: 17 U/L / GGT: x           Urinalysis Basic - ( 24 Dec 2020 00:10 )    Color: Yellow / Appearance: Clear / S.023 / pH: x  Gluc: x / Ketone: Negative  / Bili: Negative / Urobili: Negative   Blood: x / Protein: Trace / Nitrite: Positive   Leuk Esterase: Negative / RBC: 6 /hpf / WBC 1 /HPF   Sq Epi: x / Non Sq Epi: 0 /hpf / Bacteria: Many      Microbiology: reviewed    Culture - Urine (collected 20 @ 02:24)  Source: .Urine Clean Catch (Midstream)  Preliminary Report (20 @ 07:54):    50,000 - 99,000 CFU/mL Gram Negative Rods    Culture - Blood (collected 20 @ 19:03)  Source: .Blood Blood-Peripheral  Preliminary Report (20 @ 20:01):    No growth to date.    Culture - Blood (collected 20 @ 18:58)  Source: .Blood Blood-Peripheral  Preliminary Report (20 @ 19:01):    No growth to date.        Radiology: reviewed

## 2020-12-25 NOTE — DIETITIAN INITIAL EVALUATION ADULT. - PERTINENT MEDS FT
MEDICATIONS  (STANDING):  aspirin enteric coated 81 milliGRAM(s) Oral daily  atorvastatin 20 milliGRAM(s) Oral at bedtime  cholecalciferol 2000 Unit(s) Oral two times a day  dextrose 40% Gel 15 Gram(s) Oral once  dextrose 5%. 1000 milliLiter(s) (50 mL/Hr) IV Continuous <Continuous>  dextrose 5%. 1000 milliLiter(s) (100 mL/Hr) IV Continuous <Continuous>  dextrose 50% Injectable 25 Gram(s) IV Push once  dextrose 50% Injectable 12.5 Gram(s) IV Push once  dextrose 50% Injectable 25 Gram(s) IV Push once  enoxaparin Injectable 40 milliGRAM(s) SubCutaneous every 12 hours  glucagon  Injectable 1 milliGRAM(s) IntraMuscular once  insulin glargine Injectable (LANTUS) 12 Unit(s) SubCutaneous at bedtime  insulin lispro (ADMELOG) corrective regimen sliding scale   SubCutaneous three times a day before meals  insulin lispro (ADMELOG) corrective regimen sliding scale   SubCutaneous at bedtime  levothyroxine 150 MICROGram(s) Oral daily  lisinopril 5 milliGRAM(s) Oral daily  multivitamin 1 Tablet(s) Oral daily  predniSONE   Tablet 40 milliGRAM(s) Oral daily  sodium chloride 0.9%. 1000 milliLiter(s) (80 mL/Hr) IV Continuous <Continuous>  vancomycin  IVPB      vancomycin  IVPB 1500 milliGRAM(s) IV Intermittent every 12 hours    MEDICATIONS  (PRN):  acetaminophen   Tablet .. 650 milliGRAM(s) Oral every 6 hours PRN Temp greater or equal to 38C (100.4F), Mild Pain (1 - 3)  ondansetron Injectable 4 milliGRAM(s) IV Push every 12 hours PRN Nausea  oxyCODONE    IR 5 milliGRAM(s) Oral every 4 hours PRN Severe Pain (7 - 10)  traMADol 50 milliGRAM(s) Oral every 8 hours PRN Moderate Pain (4 - 6)

## 2020-12-25 NOTE — PROGRESS NOTE ADULT - SUBJECTIVE AND OBJECTIVE BOX
Patient is a 70y old  Female who presents with a chief complaint of Rigors, chills since last night (25 Dec 2020 10:15)      SUBJECTIVE / OVERNIGHT EVENTS:    Patient seen and examined. denies cp sob. co leg pain. afebrile.      Vital Signs Last 24 Hrs  T(C): 36.8 (25 Dec 2020 09:38), Max: 37.6 (24 Dec 2020 22:47)  T(F): 98.2 (25 Dec 2020 09:38), Max: 99.6 (24 Dec 2020 22:47)  HR: 85 (25 Dec 2020 09:38) (83 - 93)  BP: 99/63 (25 Dec 2020 09:38) (87/56 - 111/70)  BP(mean): --  RR: 18 (25 Dec 2020 09:38) (18 - 20)  SpO2: 95% (25 Dec 2020 09:38) (94% - 100%)  I&O's Summary    24 Dec 2020 07:01  -  25 Dec 2020 07:00  --------------------------------------------------------  IN: 3720 mL / OUT: 1755 mL / NET: 1965 mL        PE:  GENERAL: NAD, AAOx3, obese  HEAD:  Atraumatic, Normocephalic  CHEST/LUNG: CTABL, No wheeze  HEART: Regular rate and rhythm; no murmur  ABDOMEN: Soft, Nontender, Nondistended; Bowel sounds present  EXTREMITIES:  2+ Peripheral Pulses, No clubbing, cyanosis, or edema  SKIN: bl le with bullous phemphigoid, RLE mild warm to touch and erythema, bullae bl thighs  NEURO: No focal deficits    LABS:                        7.7    7.64  )-----------( 242      ( 25 Dec 2020 07:17 )             26.0     12-    136  |  102  |  15  ----------------------------<  97  3.9   |  25  |  0.85    Ca    8.1<L>      25 Dec 2020 07:17    TPro  7.3  /  Alb  3.3  /  TBili  0.3  /  DBili  x   /  AST  17  /  ALT  13  /  AlkPhos  74  12-23    PT/INR - ( 23 Dec 2020 16:05 )   PT: 12.4 sec;   INR: 1.04 ratio         PTT - ( 23 Dec 2020 16:05 )  PTT:27.8 sec  CAPILLARY BLOOD GLUCOSE      POCT Blood Glucose.: 172 mg/dL (25 Dec 2020 08:25)  POCT Blood Glucose.: 238 mg/dL (24 Dec 2020 22:16)  POCT Blood Glucose.: 196 mg/dL (24 Dec 2020 17:29)  POCT Blood Glucose.: 265 mg/dL (24 Dec 2020 12:14)        Urinalysis Basic - ( 24 Dec 2020 00:10 )    Color: Yellow / Appearance: Clear / S.023 / pH: x  Gluc: x / Ketone: Negative  / Bili: Negative / Urobili: Negative   Blood: x / Protein: Trace / Nitrite: Positive   Leuk Esterase: Negative / RBC: 6 /hpf / WBC 1 /HPF   Sq Epi: x / Non Sq Epi: 0 /hpf / Bacteria: Many        RADIOLOGY & ADDITIONAL TESTS:    Imaging Personally Reviewed:  [x] YES  [ ] NO    Consultant(s) Notes Reviewed:  [x] YES  [ ] NO    MEDICATIONS  (STANDING):  aspirin enteric coated 81 milliGRAM(s) Oral daily  atorvastatin 20 milliGRAM(s) Oral at bedtime  aztreonam  IVPB      aztreonam  IVPB 2000 milliGRAM(s) IV Intermittent once  aztreonam  IVPB 2000 milliGRAM(s) IV Intermittent every 8 hours  cholecalciferol 2000 Unit(s) Oral two times a day  dextrose 40% Gel 15 Gram(s) Oral once  dextrose 5%. 1000 milliLiter(s) (50 mL/Hr) IV Continuous <Continuous>  dextrose 5%. 1000 milliLiter(s) (100 mL/Hr) IV Continuous <Continuous>  dextrose 50% Injectable 25 Gram(s) IV Push once  dextrose 50% Injectable 12.5 Gram(s) IV Push once  dextrose 50% Injectable 25 Gram(s) IV Push once  enoxaparin Injectable 40 milliGRAM(s) SubCutaneous every 12 hours  glucagon  Injectable 1 milliGRAM(s) IntraMuscular once  insulin glargine Injectable (LANTUS) 12 Unit(s) SubCutaneous at bedtime  insulin lispro (ADMELOG) corrective regimen sliding scale   SubCutaneous three times a day before meals  insulin lispro (ADMELOG) corrective regimen sliding scale   SubCutaneous at bedtime  levothyroxine 150 MICROGram(s) Oral daily  lisinopril 5 milliGRAM(s) Oral daily  multivitamin 1 Tablet(s) Oral daily  predniSONE   Tablet 40 milliGRAM(s) Oral daily  sodium chloride 0.9%. 1000 milliLiter(s) (80 mL/Hr) IV Continuous <Continuous>  vancomycin  IVPB      vancomycin  IVPB 1500 milliGRAM(s) IV Intermittent every 12 hours    MEDICATIONS  (PRN):  acetaminophen   Tablet .. 650 milliGRAM(s) Oral every 6 hours PRN Temp greater or equal to 38C (100.4F), Mild Pain (1 - 3)  ondansetron Injectable 4 milliGRAM(s) IV Push every 12 hours PRN Nausea  oxyCODONE    IR 5 milliGRAM(s) Oral every 4 hours PRN Severe Pain (7 - 10)  traMADol 50 milliGRAM(s) Oral every 8 hours PRN Moderate Pain (4 - 6)      Care Discussed with Consultants/Other Providers [x] YES  [ ] NO    HEALTH ISSUES - PROBLEM Dx:  Discharge planning issues  Discharge planning issues    Need for prophylactic measure  Need for prophylactic measure    Confusional state  Confusional state    Postoperative hypothyroidism  Postoperative hypothyroidism    Type 2 diabetes mellitus with hyperglycemia, without long-term current use of insulin  Type 2 diabetes mellitus with hyperglycemia, without long-term current use of insulin    Cellulitis of lower extremity, unspecified laterality  Cellulitis of lower extremity, unspecified laterality

## 2020-12-25 NOTE — PHYSICAL THERAPY INITIAL EVALUATION ADULT - PLANNED THERAPY INTERVENTIONS, PT EVAL
Goal: Pt will be able to negotiate one flight of stairs with CGA/Supervision with single handrail and cane using step to step pattern in 4 weeks./balance training/bed mobility training/gait training/strengthening/transfer training

## 2020-12-25 NOTE — DIETITIAN NUTRITION RISK NOTIFICATION - TREATMENT: THE FOLLOWING DIET HAS BEEN RECOMMENDED
Diet, Soft:   Consistent Carbohydrate {No Snacks} (CSTCHO)  DASH/TLC {Sodium & Cholesterol Restricted} (DASH) (12-23-20 @ 21:44) [Active]

## 2020-12-25 NOTE — PHYSICAL THERAPY INITIAL EVALUATION ADULT - GAIT DEVIATIONS NOTED, PT EVAL
decreased abiola/increased time in double stance/decreased step length/decreased stride length/decreased weight-shifting ability

## 2020-12-25 NOTE — PHYSICAL THERAPY INITIAL EVALUATION ADULT - GENERAL OBSERVATIONS, REHAB EVAL
Pt encountered in semifowlers position in NAD. +IVL, A/Ox3 Rec'd supine in bed, in NAD, +IV, +primafit, +multiple wounds/scabs all over body (bullous pemphigoid). A&Ox4.

## 2020-12-25 NOTE — PHYSICAL THERAPY INITIAL EVALUATION ADULT - ADDITIONAL COMMENTS
Pt lives in a pvt house with her  and 4 steps to enter. Pt states that she was independent prior to admission. Pt lives in a pvt house with her  and son and has 4 steps to enter (+rail). Pt states that she was independent prior to admission with use of RW. Son and/or  assists her with stair negotiation. Pt able to amb short distances around house independently. Pt has been sleeping in power recliner chair for past few months 2/2 inability to get in/out of bed.

## 2020-12-25 NOTE — DIETITIAN INITIAL EVALUATION ADULT. - PERTINENT LABORATORY DATA
12-25 Na 136 mmol/L Glu 97 mg/dL K+ 3.9 mmol/L Cr  0.85 mg/dL BUN 15 mg/dL Phos n/a   Alb n/a   PAB n/a   Hgb 7.7 g/dL<L> Hct 26.0 %<L>  A1C 6.6(H)  CAPILLARY BLOOD GLUCOSE      POCT Blood Glucose.: 172 mg/dL (25 Dec 2020 08:25)  POCT Blood Glucose.: 238 mg/dL (24 Dec 2020 22:16)  POCT Blood Glucose.: 196 mg/dL (24 Dec 2020 17:29)  POCT Blood Glucose.: 265 mg/dL (24 Dec 2020 12:14)

## 2020-12-25 NOTE — PHYSICAL THERAPY INITIAL EVALUATION ADULT - ACTIVE RANGE OF MOTION EXAMINATION, REHAB EVAL
limited 2/2 pain/bilateral upper extremity Active ROM was WFL (within functional limits)/bilateral  lower extremity Active ROM was WFL (within functional limits)

## 2020-12-25 NOTE — PHYSICAL THERAPY INITIAL EVALUATION ADULT - PERTINENT HX OF CURRENT PROBLEM, REHAB EVAL
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 (on a 150 mcg /6 days and one day of 300 mcg/week -patient/spouse not sure which day), Red Man Syndrome from IV vancomycin, chronic pain syndrome with patient apparently on escalating doses of Neurontin

## 2020-12-26 LAB
-  AMIKACIN: SIGNIFICANT CHANGE UP
-  AMOXICILLIN/CLAVULANIC ACID: SIGNIFICANT CHANGE UP
-  AMPICILLIN/SULBACTAM: SIGNIFICANT CHANGE UP
-  AMPICILLIN: SIGNIFICANT CHANGE UP
-  AZTREONAM: SIGNIFICANT CHANGE UP
-  CEFAZOLIN: SIGNIFICANT CHANGE UP
-  CEFEPIME: SIGNIFICANT CHANGE UP
-  CEFOXITIN: SIGNIFICANT CHANGE UP
-  CEFTRIAXONE: SIGNIFICANT CHANGE UP
-  CIPROFLOXACIN: SIGNIFICANT CHANGE UP
-  ERTAPENEM: SIGNIFICANT CHANGE UP
-  GENTAMICIN: SIGNIFICANT CHANGE UP
-  IMIPENEM: SIGNIFICANT CHANGE UP
-  LEVOFLOXACIN: SIGNIFICANT CHANGE UP
-  MEROPENEM: SIGNIFICANT CHANGE UP
-  NITROFURANTOIN: SIGNIFICANT CHANGE UP
-  PIPERACILLIN/TAZOBACTAM: SIGNIFICANT CHANGE UP
-  TIGECYCLINE: SIGNIFICANT CHANGE UP
-  TOBRAMYCIN: SIGNIFICANT CHANGE UP
-  TRIMETHOPRIM/SULFAMETHOXAZOLE: SIGNIFICANT CHANGE UP
CULTURE RESULTS: SIGNIFICANT CHANGE UP
HCT VFR BLD CALC: 26.4 % — LOW (ref 34.5–45)
HGB BLD-MCNC: 8 G/DL — LOW (ref 11.5–15.5)
MCHC RBC-ENTMCNC: 30.3 GM/DL — LOW (ref 32–36)
MCHC RBC-ENTMCNC: 31.9 PG — SIGNIFICANT CHANGE UP (ref 27–34)
MCV RBC AUTO: 105.2 FL — HIGH (ref 80–100)
METHOD TYPE: SIGNIFICANT CHANGE UP
NRBC # BLD: 0 /100 WBCS — SIGNIFICANT CHANGE UP (ref 0–0)
OB PNL STL: NEGATIVE — SIGNIFICANT CHANGE UP
ORGANISM # SPEC MICROSCOPIC CNT: SIGNIFICANT CHANGE UP
ORGANISM # SPEC MICROSCOPIC CNT: SIGNIFICANT CHANGE UP
PLATELET # BLD AUTO: 251 K/UL — SIGNIFICANT CHANGE UP (ref 150–400)
RBC # BLD: 2.51 M/UL — LOW (ref 3.8–5.2)
RBC # FLD: 16.6 % — HIGH (ref 10.3–14.5)
SPECIMEN SOURCE: SIGNIFICANT CHANGE UP
VANCOMYCIN TROUGH SERPL-MCNC: 18.3 UG/ML — SIGNIFICANT CHANGE UP (ref 10–20)
WBC # BLD: 6.48 K/UL — SIGNIFICANT CHANGE UP (ref 3.8–10.5)
WBC # FLD AUTO: 6.48 K/UL — SIGNIFICANT CHANGE UP (ref 3.8–10.5)

## 2020-12-26 RX ADMIN — Medication 1 TABLET(S): at 12:18

## 2020-12-26 RX ADMIN — ENOXAPARIN SODIUM 40 MILLIGRAM(S): 100 INJECTION SUBCUTANEOUS at 18:06

## 2020-12-26 RX ADMIN — Medication 2000 UNIT(S): at 05:54

## 2020-12-26 RX ADMIN — ENOXAPARIN SODIUM 40 MILLIGRAM(S): 100 INJECTION SUBCUTANEOUS at 05:55

## 2020-12-26 RX ADMIN — Medication 300 MILLIGRAM(S): at 07:35

## 2020-12-26 RX ADMIN — Medication 2000 UNIT(S): at 18:06

## 2020-12-26 RX ADMIN — Medication 100 MILLIGRAM(S): at 12:19

## 2020-12-26 RX ADMIN — Medication 3: at 12:17

## 2020-12-26 RX ADMIN — Medication 81 MILLIGRAM(S): at 12:18

## 2020-12-26 RX ADMIN — LISINOPRIL 5 MILLIGRAM(S): 2.5 TABLET ORAL at 05:55

## 2020-12-26 RX ADMIN — OXYCODONE HYDROCHLORIDE 5 MILLIGRAM(S): 5 TABLET ORAL at 18:06

## 2020-12-26 RX ADMIN — ATORVASTATIN CALCIUM 20 MILLIGRAM(S): 80 TABLET, FILM COATED ORAL at 21:43

## 2020-12-26 RX ADMIN — Medication 1: at 08:36

## 2020-12-26 RX ADMIN — ONDANSETRON 4 MILLIGRAM(S): 8 TABLET, FILM COATED ORAL at 07:35

## 2020-12-26 RX ADMIN — Medication 40 MILLIGRAM(S): at 05:54

## 2020-12-26 RX ADMIN — Medication 100 MILLIGRAM(S): at 05:55

## 2020-12-26 RX ADMIN — Medication 150 MICROGRAM(S): at 05:55

## 2020-12-26 RX ADMIN — OXYCODONE HYDROCHLORIDE 5 MILLIGRAM(S): 5 TABLET ORAL at 22:45

## 2020-12-26 RX ADMIN — OXYCODONE HYDROCHLORIDE 5 MILLIGRAM(S): 5 TABLET ORAL at 11:18

## 2020-12-26 RX ADMIN — Medication 300 MILLIGRAM(S): at 18:08

## 2020-12-26 RX ADMIN — OXYCODONE HYDROCHLORIDE 5 MILLIGRAM(S): 5 TABLET ORAL at 05:54

## 2020-12-26 RX ADMIN — OXYCODONE HYDROCHLORIDE 5 MILLIGRAM(S): 5 TABLET ORAL at 06:43

## 2020-12-26 RX ADMIN — OXYCODONE HYDROCHLORIDE 5 MILLIGRAM(S): 5 TABLET ORAL at 22:12

## 2020-12-26 RX ADMIN — ONDANSETRON 4 MILLIGRAM(S): 8 TABLET, FILM COATED ORAL at 22:15

## 2020-12-26 RX ADMIN — Medication 1: at 18:05

## 2020-12-26 NOTE — PROGRESS NOTE ADULT - ASSESSMENT
Pt is a 70W w/ PMHx of bullous pemphigoid dx 2014 on steroids, surgical hypothyroidism following total thyroidectomy, chronic pain syndrome, endometrial CA diagnosed in 2015 with [facility] BSO, now in remission p/w RLE pain/and worsening erythema, concerned for cellulitis    Sepsis 2/2 b/l LE cellulitis  -BCx NGTD  -c/w vancomycin 1.5gm IV q12h  Vanc level therapeutic.   Pending clinical improvement will plan for 10-14 day course    Sepsis 2/2 UTI?  -UA reviewed, inconsistent w/ infection however UCx +E.coli  -c/w aztreonam x5 day course until 12/29  Red-Man syndrome  -ok to administer vancomycin as above    Penicillin Allergy  -pt w/ extensive skin lesions from underlying pemphigoid disease  -would follow w/ allergy as outpatient for desensitization to penicillins as cellulitis will be a recurring problem in this patient and B-lactams are ideal for cellulitis therapy    Bullous pemphigoid   -steroid dependent  -sx can be multifactorial 2/2 cellulitis and flare  -appreciate derm recs    DM2  -strict glucose control to promote resolution of infection and wound healing

## 2020-12-26 NOTE — PROGRESS NOTE ADULT - SUBJECTIVE AND OBJECTIVE BOX
[facility], Division of Infectious Diseases  MAYDA Braxton Y. Patel, S. Shah  [phone]    Name: JAKE ZIMMERMAN  Age: 70y  Gender: Female  MRN: [id number]  Note Date: 12-26-20    Interval History:  Patient seen and examined at bedside this morning  No acute overnight events. febrile yesterday PM to 102F; chills overnight  Notes reviewed    Antibiotics:  aztreonam  IVPB      aztreonam  IVPB 2000 milliGRAM(s) IV Intermittent every 8 hours  vancomycin  IVPB      vancomycin  IVPB 1500 milliGRAM(s) IV Intermittent every 12 hours      Medications:  acetaminophen   Tablet .. 650 milliGRAM(s) Oral every 6 hours PRN  aspirin enteric coated 81 milliGRAM(s) Oral daily  atorvastatin 20 milliGRAM(s) Oral at bedtime  aztreonam  IVPB      aztreonam  IVPB 2000 milliGRAM(s) IV Intermittent every 8 hours  cholecalciferol 2000 Unit(s) Oral two times a day  dextrose 40% Gel 15 Gram(s) Oral once  dextrose 5%. 1000 milliLiter(s) IV Continuous <Continuous>  dextrose 5%. 1000 milliLiter(s) IV Continuous <Continuous>  dextrose 50% Injectable 25 Gram(s) IV Push once  dextrose 50% Injectable 12.5 Gram(s) IV Push once  dextrose 50% Injectable 25 Gram(s) IV Push once  enoxaparin Injectable 40 milliGRAM(s) SubCutaneous every 12 hours  glucagon  Injectable 1 milliGRAM(s) IntraMuscular once  insulin glargine Injectable (LANTUS) 12 Unit(s) SubCutaneous at bedtime  insulin lispro (ADMELOG) corrective regimen sliding scale   SubCutaneous three times a day before meals  insulin lispro (ADMELOG) corrective regimen sliding scale   SubCutaneous at bedtime  levothyroxine 150 MICROGram(s) Oral daily  lisinopril 5 milliGRAM(s) Oral daily  multivitamin 1 Tablet(s) Oral daily  ondansetron Injectable 4 milliGRAM(s) IV Push every 6 hours PRN  oxyCODONE    IR 5 milliGRAM(s) Oral every 4 hours PRN  predniSONE   Tablet 40 milliGRAM(s) Oral daily  sodium chloride 0.9%. 1000 milliLiter(s) IV Continuous <Continuous>  traMADol 50 milliGRAM(s) Oral every 8 hours PRN  vancomycin  IVPB      vancomycin  IVPB 1500 milliGRAM(s) IV Intermittent every 12 hours      Review of Systems:  A 10-point review of systems was obtained.     Pertinent positives and negatives--  Constitutional: No fevers. No Chills. No Rigors.   Cardiovascular: No chest pain. No palpitations.  Respiratory: No shortness of breath. No cough.  Gastrointestinal: No nausea or vomiting. No diarrhea or constipation.   Psychiatric: Pleasant. Appropriate affect.    Review of systems otherwise negative except as previously noted.    Allergies: Ancef (Rash)  daptomycin (Vomiting)  Keflex (Unknown)  penicillin (Pruritus; Rash; Hives)    For details regarding the patient's past medical history, social history, family history, and other miscellaneous elements, please refer the initial infectious diseases consultation and/or the admitting history and physical examination for this admission.    Objective:  Vitals:   T(C): 36.9 (12-26-20 @ 08:29), Max: 38.9 (12-25-20 @ 15:53)  HR: 88 (12-26-20 @ 08:29) (76 - 88)  BP: 127/67 (12-26-20 @ 08:29) (94/58 - 127/67)  RR: 18 (12-26-20 @ 08:29) (18 - 20)  SpO2: 94% (12-26-20 @ 08:29) (94% - 97%)    Physical Examination:  General: no acute distress  HEENT: NC/AT, EOMI, anicteric, no oral lesions  Neck: supple, no palpable LAD  Cardio: S1, S2 heard, RRR, no murmurs  Resp: breath sounds heard bilaterally, no rales, wheezes or rhonchi  Abd: soft, NT, ND, + bowel sounds  Neuro: AAOx3, no obvious focal deficits  Ext: no edema or cyanosis  Skin: warm, dry, no visible rash    Laboratory Studies:  CBC:                       8.0    6.48  )-----------( 251      ( 26 Dec 2020 06:52 )             26.4     CMP: 12-25    136  |  102  |  15  ----------------------------<  97  3.9   |  25  |  0.85    Ca    8.1<L>      25 Dec 2020 07:17          Microbiology: reviewed    Culture - Urine (collected 12-24-20 @ 02:24)  Source: .Urine Clean Catch (Midstream)  Final Report (12-26-20 @ 08:50):    50,000 - 99,000 CFU/mL Escherichia coli  Organism: Escherichia coli (12-26-20 @ 08:50)  Organism: Escherichia coli (12-26-20 @ 08:50)      -  Amikacin: S <=16      -  Amoxicillin/Clavulanic Acid: S <=8/4      -  Ampicillin: S <=8 These ampicillin results predict results for amoxicillin      -  Ampicillin/Sulbactam: S <=4/2 Enterobacter, Citrobacter, and Serratia may develop resistance during prolonged therapy (3-4 days)      -  Aztreonam: S <=4      -  Cefazolin: S <=2 (MIC_CL_COM_ENTERIC_[id number]) For uncomplicated UTI with K. pneumoniae, E. coli, or P. mirablis: ARIANE <=16 is sensitive and ARIANE >=32 is resistant. This also predicts results for oral agents cefaclor, cefdinir, cefpodoxime, cefprozil, cefuroxime axetil, cephalexin and locarbef for uncomplicated UTI. Note that some isolates may be susceptible to these agents while testing resistant to cefazolin.      -  Cefepime: S <=2      -  Cefoxitin: S <=8      -  Ceftriaxone: S <=1 Enterobacter, Citrobacter, and Serratia may develop resistance during prolonged therapy      -  Ciprofloxacin: S <=0.25      -  Ertapenem: S <=0.5      -  Gentamicin: S <=2      -  Imipenem: S <=1      -  Levofloxacin: S <=0.5      -  Meropenem: S <=1      -  Nitrofurantoin: S <=32 Should not be used to treat pyelonephritis      -  Piperacillin/Tazobactam: S <=8      -  Tigecycline: S <=2      -  Tobramycin: S <=2      -  Trimethoprim/Sulfamethoxazole: S <=0.5/9.5      Method Type: ARIANE    Culture - Blood (collected 12-23-20 @ 19:03)  Source: .Blood Blood-Peripheral  Preliminary Report (12-24-20 @ 20:01):    No growth to date.    Culture - Blood (collected 12-23-20 @ 18:58)  Source: .Blood Blood-Peripheral  Preliminary Report (12-24-20 @ 19:01):    No growth to date.        Radiology: reviewed

## 2020-12-26 NOTE — PROGRESS NOTE ADULT - ASSESSMENT
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 (on a 150 mcg /6 days and one day of 300 mcg/week -patient/spouse not sure which day), Red Man Syndrome from IV vancomycin, chronic pain syndrome with patient apparently on escalating doses of Neurontin (per spouse now on 800 mg 4xDay) with PRN Tramadol and Vicodin due to patient severe pain from her skin lesions, with last admission to [facility] in 10/2018 for RIGHT LE cellulitis, endometrial CA diagnosed in 2015 with [facility] BSO and presumed to be in remission, with patient with home visiting RN with an Unna's boot on the RIGHT LE but both are wrapped since Monday, with self referral following rigors and shaking chills since last night.   Spouse noted increased confusion by patient at home last night.    # sepsis 2/2 cellulitis  # AMS likely 2/2 infection  # bullous pemphigoid steroid dependent ro flare  # surgical hypothyroidism  # poorly controlled type 2 DM  # uterine CA presumed to be disease free  # RIGHT> left LE cellulitis    IV Azactam and vanco, monitor for red man syndrome  appreciate ID recs  BC NTD  appreciate derm recs  cont prednisone 40mg  FS ACHS, SSI, lantus  CT head meningioma, no infarct  can resume neurontin, outpt takes neurontin 800mg QID rx by Dr Gonzales pain management  outpt takes tramadol 300mg extended release 24 hrs qd and norco 10-325mg q8 prn  cont Tramadol and low dose Oxycodone IR 5 mg PRN for pain relief  cont statin  PT    DVT prophylaxis    PCP:     Olivia Ross MD (PCP) [phone]

## 2020-12-26 NOTE — PROGRESS NOTE ADULT - SUBJECTIVE AND OBJECTIVE BOX
Patient is a 70y old  Female who presents with a chief complaint of Rigors, chills since last night (25 Dec 2020 10:37)      SUBJECTIVE / OVERNIGHT EVENTS:    Patient seen and examined. denies cp sob. t max 102      Vital Signs Last 24 Hrs  T(C): 36.9 (26 Dec 2020 08:29), Max: 38.9 (25 Dec 2020 15:53)  T(F): 98.4 (26 Dec 2020 08:29), Max: 102 (25 Dec 2020 15:53)  HR: 88 (26 Dec 2020 08:29) (76 - 88)  BP: 127/67 (26 Dec 2020 08:29) (94/58 - 127/67)  BP(mean): --  RR: 18 (26 Dec 2020 08:29) (18 - 20)  SpO2: 94% (26 Dec 2020 08:29) (94% - 97%)  I&O's Summary    25 Dec 2020 07:01  -  26 Dec 2020 07:00  --------------------------------------------------------  IN: 3950 mL / OUT: 2900 mL / NET: 1050 mL        PE:  GENERAL: NAD, AAOx3, obese  HEAD:  Atraumatic, Normocephalic  CHEST/LUNG: CTABL, No wheeze  HEART: Regular rate and rhythm; no murmur  ABDOMEN: Soft, Nontender, Nondistended; Bowel sounds present  EXTREMITIES:  2+ Peripheral Pulses, No clubbing, cyanosis, or edema  SKIN: bl le with bullous phemphigoid, RLE mild warm to touch and erythema, bullae bl thighs  NEURO: No focal deficits    LABS:                        8.0    6.48  )-----------( 251      ( 26 Dec 2020 06:52 )             26.4     12-25    136  |  102  |  15  ----------------------------<  97  3.9   |  25  |  0.85    Ca    8.1<L>      25 Dec 2020 07:17        CAPILLARY BLOOD GLUCOSE      POCT Blood Glucose.: 169 mg/dL (26 Dec 2020 08:26)  POCT Blood Glucose.: 211 mg/dL (25 Dec 2020 21:27)  POCT Blood Glucose.: 265 mg/dL (25 Dec 2020 17:18)  POCT Blood Glucose.: 243 mg/dL (25 Dec 2020 12:21)            RADIOLOGY & ADDITIONAL TESTS:    Imaging Personally Reviewed:  [x] YES  [ ] NO    Consultant(s) Notes Reviewed:  [x] YES  [ ] NO    MEDICATIONS  (STANDING):  aspirin enteric coated 81 milliGRAM(s) Oral daily  atorvastatin 20 milliGRAM(s) Oral at bedtime  aztreonam  IVPB      aztreonam  IVPB 2000 milliGRAM(s) IV Intermittent every 8 hours  cholecalciferol 2000 Unit(s) Oral two times a day  dextrose 40% Gel 15 Gram(s) Oral once  dextrose 5%. 1000 milliLiter(s) (50 mL/Hr) IV Continuous <Continuous>  dextrose 5%. 1000 milliLiter(s) (100 mL/Hr) IV Continuous <Continuous>  dextrose 50% Injectable 25 Gram(s) IV Push once  dextrose 50% Injectable 12.5 Gram(s) IV Push once  dextrose 50% Injectable 25 Gram(s) IV Push once  enoxaparin Injectable 40 milliGRAM(s) SubCutaneous every 12 hours  glucagon  Injectable 1 milliGRAM(s) IntraMuscular once  insulin glargine Injectable (LANTUS) 12 Unit(s) SubCutaneous at bedtime  insulin lispro (ADMELOG) corrective regimen sliding scale   SubCutaneous three times a day before meals  insulin lispro (ADMELOG) corrective regimen sliding scale   SubCutaneous at bedtime  levothyroxine 150 MICROGram(s) Oral daily  lisinopril 5 milliGRAM(s) Oral daily  multivitamin 1 Tablet(s) Oral daily  predniSONE   Tablet 40 milliGRAM(s) Oral daily  sodium chloride 0.9%. 1000 milliLiter(s) (80 mL/Hr) IV Continuous <Continuous>  vancomycin  IVPB      vancomycin  IVPB 1500 milliGRAM(s) IV Intermittent every 12 hours    MEDICATIONS  (PRN):  acetaminophen   Tablet .. 650 milliGRAM(s) Oral every 6 hours PRN Temp greater or equal to 38C (100.4F), Mild Pain (1 - 3)  ondansetron Injectable 4 milliGRAM(s) IV Push every 6 hours PRN Nausea and/or Vomiting  oxyCODONE    IR 5 milliGRAM(s) Oral every 4 hours PRN Severe Pain (7 - 10)  traMADol 50 milliGRAM(s) Oral every 8 hours PRN Moderate Pain (4 - 6)      Care Discussed with Consultants/Other Providers [x] YES  [ ] NO    HEALTH ISSUES - PROBLEM Dx:  Discharge planning issues  Discharge planning issues    Need for prophylactic measure  Need for prophylactic measure    Confusional state  Confusional state    Postoperative hypothyroidism  Postoperative hypothyroidism    Type 2 diabetes mellitus with hyperglycemia, without long-term current use of insulin  Type 2 diabetes mellitus with hyperglycemia, without long-term current use of insulin    Cellulitis of lower extremity, unspecified laterality  Cellulitis of lower extremity, unspecified laterality

## 2020-12-27 LAB
ANION GAP SERPL CALC-SCNC: 11 MMOL/L — SIGNIFICANT CHANGE UP (ref 5–17)
BUN SERPL-MCNC: 21 MG/DL — SIGNIFICANT CHANGE UP (ref 7–23)
CALCIUM SERPL-MCNC: 8.3 MG/DL — LOW (ref 8.4–10.5)
CHLORIDE SERPL-SCNC: 104 MMOL/L — SIGNIFICANT CHANGE UP (ref 96–108)
CO2 SERPL-SCNC: 22 MMOL/L — SIGNIFICANT CHANGE UP (ref 22–31)
CREAT SERPL-MCNC: 0.72 MG/DL — SIGNIFICANT CHANGE UP (ref 0.5–1.3)
GLUCOSE SERPL-MCNC: 122 MG/DL — HIGH (ref 70–99)
HCT VFR BLD CALC: 27.3 % — LOW (ref 34.5–45)
HGB BLD-MCNC: 8.4 G/DL — LOW (ref 11.5–15.5)
MCHC RBC-ENTMCNC: 30.8 GM/DL — LOW (ref 32–36)
MCHC RBC-ENTMCNC: 31.9 PG — SIGNIFICANT CHANGE UP (ref 27–34)
MCV RBC AUTO: 103.8 FL — HIGH (ref 80–100)
NRBC # BLD: 0 /100 WBCS — SIGNIFICANT CHANGE UP (ref 0–0)
PLATELET # BLD AUTO: 288 K/UL — SIGNIFICANT CHANGE UP (ref 150–400)
POTASSIUM SERPL-MCNC: 4.3 MMOL/L — SIGNIFICANT CHANGE UP (ref 3.5–5.3)
POTASSIUM SERPL-SCNC: 4.3 MMOL/L — SIGNIFICANT CHANGE UP (ref 3.5–5.3)
RBC # BLD: 2.63 M/UL — LOW (ref 3.8–5.2)
RBC # FLD: 16.4 % — HIGH (ref 10.3–14.5)
SODIUM SERPL-SCNC: 137 MMOL/L — SIGNIFICANT CHANGE UP (ref 135–145)
VANCOMYCIN TROUGH SERPL-MCNC: 18.2 UG/ML — SIGNIFICANT CHANGE UP (ref 10–20)
WBC # BLD: 7.16 K/UL — SIGNIFICANT CHANGE UP (ref 3.8–10.5)
WBC # FLD AUTO: 7.16 K/UL — SIGNIFICANT CHANGE UP (ref 3.8–10.5)

## 2020-12-27 RX ADMIN — Medication 150 MICROGRAM(S): at 05:20

## 2020-12-27 RX ADMIN — SODIUM CHLORIDE 80 MILLILITER(S): 9 INJECTION INTRAMUSCULAR; INTRAVENOUS; SUBCUTANEOUS at 02:15

## 2020-12-27 RX ADMIN — Medication 3: at 12:27

## 2020-12-27 RX ADMIN — OXYCODONE HYDROCHLORIDE 5 MILLIGRAM(S): 5 TABLET ORAL at 09:30

## 2020-12-27 RX ADMIN — INSULIN GLARGINE 12 UNIT(S): 100 INJECTION, SOLUTION SUBCUTANEOUS at 00:16

## 2020-12-27 RX ADMIN — Medication 2000 UNIT(S): at 05:25

## 2020-12-27 RX ADMIN — Medication 2: at 17:41

## 2020-12-27 RX ADMIN — OXYCODONE HYDROCHLORIDE 5 MILLIGRAM(S): 5 TABLET ORAL at 02:15

## 2020-12-27 RX ADMIN — OXYCODONE HYDROCHLORIDE 5 MILLIGRAM(S): 5 TABLET ORAL at 17:46

## 2020-12-27 RX ADMIN — Medication 40 MILLIGRAM(S): at 05:20

## 2020-12-27 RX ADMIN — LISINOPRIL 5 MILLIGRAM(S): 2.5 TABLET ORAL at 05:20

## 2020-12-27 RX ADMIN — Medication 81 MILLIGRAM(S): at 12:00

## 2020-12-27 RX ADMIN — ONDANSETRON 4 MILLIGRAM(S): 8 TABLET, FILM COATED ORAL at 23:32

## 2020-12-27 RX ADMIN — INSULIN GLARGINE 12 UNIT(S): 100 INJECTION, SOLUTION SUBCUTANEOUS at 22:17

## 2020-12-27 RX ADMIN — Medication 300 MILLIGRAM(S): at 18:19

## 2020-12-27 RX ADMIN — Medication 1: at 08:32

## 2020-12-27 RX ADMIN — Medication 100 MILLIGRAM(S): at 00:15

## 2020-12-27 RX ADMIN — OXYCODONE HYDROCHLORIDE 5 MILLIGRAM(S): 5 TABLET ORAL at 18:32

## 2020-12-27 RX ADMIN — OXYCODONE HYDROCHLORIDE 5 MILLIGRAM(S): 5 TABLET ORAL at 22:08

## 2020-12-27 RX ADMIN — Medication 1: at 22:16

## 2020-12-27 RX ADMIN — Medication 100 MILLIGRAM(S): at 05:20

## 2020-12-27 RX ADMIN — Medication 100 MILLIGRAM(S): at 13:50

## 2020-12-27 RX ADMIN — Medication 2000 UNIT(S): at 17:38

## 2020-12-27 RX ADMIN — ENOXAPARIN SODIUM 40 MILLIGRAM(S): 100 INJECTION SUBCUTANEOUS at 05:20

## 2020-12-27 RX ADMIN — Medication 1 TABLET(S): at 12:00

## 2020-12-27 RX ADMIN — OXYCODONE HYDROCHLORIDE 5 MILLIGRAM(S): 5 TABLET ORAL at 22:38

## 2020-12-27 RX ADMIN — ONDANSETRON 4 MILLIGRAM(S): 8 TABLET, FILM COATED ORAL at 16:28

## 2020-12-27 RX ADMIN — ENOXAPARIN SODIUM 40 MILLIGRAM(S): 100 INJECTION SUBCUTANEOUS at 17:38

## 2020-12-27 RX ADMIN — ATORVASTATIN CALCIUM 20 MILLIGRAM(S): 80 TABLET, FILM COATED ORAL at 22:09

## 2020-12-27 RX ADMIN — ONDANSETRON 4 MILLIGRAM(S): 8 TABLET, FILM COATED ORAL at 09:56

## 2020-12-27 RX ADMIN — Medication 300 MILLIGRAM(S): at 06:35

## 2020-12-27 RX ADMIN — OXYCODONE HYDROCHLORIDE 5 MILLIGRAM(S): 5 TABLET ORAL at 02:45

## 2020-12-27 RX ADMIN — OXYCODONE HYDROCHLORIDE 5 MILLIGRAM(S): 5 TABLET ORAL at 08:31

## 2020-12-27 NOTE — PROGRESS NOTE ADULT - SUBJECTIVE AND OBJECTIVE BOX
Patient is a 70y old  Female who presents with a chief complaint of Rigors, chills since last night (26 Dec 2020 11:43)      SUBJECTIVE / OVERNIGHT EVENTS:    Patient seen and examined. co severe pain in legs last night and bleeding from blisters and ulcers.      Vital Signs Last 24 Hrs  T(C): 36.8 (27 Dec 2020 08:12), Max: 37.6 (27 Dec 2020 00:12)  T(F): 98.2 (27 Dec 2020 08:12), Max: 99.7 (27 Dec 2020 00:12)  HR: 89 (27 Dec 2020 08:12) (72 - 89)  BP: 122/76 (27 Dec 2020 08:12) (104/77 - 122/76)  BP(mean): --  RR: 18 (27 Dec 2020 08:12) (18 - 18)  SpO2: 98% (27 Dec 2020 08:12) (96% - 98%)  I&O's Summary    26 Dec 2020 07:01  -  27 Dec 2020 07:00  --------------------------------------------------------  IN: 1990 mL / OUT: 900 mL / NET: 1090 mL        PE:  GENERAL: NAD, AAOx3, obese  HEAD:  Atraumatic, Normocephalic  CHEST/LUNG: CTABL, No wheeze  HEART: Regular rate and rhythm; no murmur  ABDOMEN: Soft, Nontender, Nondistended; Bowel sounds present  EXTREMITIES:  2+ Peripheral Pulses, No clubbing, cyanosis, or edema  SKIN: bl le with bullous phemphigoid, erythema left thigh improved, bullae bl thighs thin skin   NEURO: No focal deficits    LABS:                        8.4    7.16  )-----------( 288      ( 27 Dec 2020 07:34 )             27.3     12-27    137  |  104  |  21  ----------------------------<  122<H>  4.3   |  22  |  0.72    Ca    8.3<L>      27 Dec 2020 07:34        CAPILLARY BLOOD GLUCOSE      POCT Blood Glucose.: 171 mg/dL (27 Dec 2020 08:10)  POCT Blood Glucose.: 201 mg/dL (27 Dec 2020 00:14)  POCT Blood Glucose.: 222 mg/dL (26 Dec 2020 22:22)  POCT Blood Glucose.: 184 mg/dL (26 Dec 2020 17:27)  POCT Blood Glucose.: 269 mg/dL (26 Dec 2020 12:07)            RADIOLOGY & ADDITIONAL TESTS:    Imaging Personally Reviewed:  [x] YES  [ ] NO    Consultant(s) Notes Reviewed:  [x] YES  [ ] NO    MEDICATIONS  (STANDING):  aspirin enteric coated 81 milliGRAM(s) Oral daily  atorvastatin 20 milliGRAM(s) Oral at bedtime  aztreonam  IVPB      aztreonam  IVPB 2000 milliGRAM(s) IV Intermittent every 8 hours  cholecalciferol 2000 Unit(s) Oral two times a day  dextrose 40% Gel 15 Gram(s) Oral once  dextrose 5%. 1000 milliLiter(s) (50 mL/Hr) IV Continuous <Continuous>  dextrose 5%. 1000 milliLiter(s) (100 mL/Hr) IV Continuous <Continuous>  dextrose 50% Injectable 25 Gram(s) IV Push once  dextrose 50% Injectable 12.5 Gram(s) IV Push once  dextrose 50% Injectable 25 Gram(s) IV Push once  enoxaparin Injectable 40 milliGRAM(s) SubCutaneous every 12 hours  glucagon  Injectable 1 milliGRAM(s) IntraMuscular once  insulin glargine Injectable (LANTUS) 12 Unit(s) SubCutaneous at bedtime  insulin lispro (ADMELOG) corrective regimen sliding scale   SubCutaneous three times a day before meals  insulin lispro (ADMELOG) corrective regimen sliding scale   SubCutaneous at bedtime  levothyroxine 150 MICROGram(s) Oral daily  lisinopril 5 milliGRAM(s) Oral daily  multivitamin 1 Tablet(s) Oral daily  predniSONE   Tablet 40 milliGRAM(s) Oral daily  sodium chloride 0.9%. 1000 milliLiter(s) (80 mL/Hr) IV Continuous <Continuous>  vancomycin  IVPB      vancomycin  IVPB 1500 milliGRAM(s) IV Intermittent every 12 hours    MEDICATIONS  (PRN):  acetaminophen   Tablet .. 650 milliGRAM(s) Oral every 6 hours PRN Temp greater or equal to 38C (100.4F), Mild Pain (1 - 3)  ondansetron Injectable 4 milliGRAM(s) IV Push every 6 hours PRN Nausea and/or Vomiting  oxyCODONE    IR 5 milliGRAM(s) Oral every 4 hours PRN Severe Pain (7 - 10)  traMADol 50 milliGRAM(s) Oral every 8 hours PRN Moderate Pain (4 - 6)      Care Discussed with Consultants/Other Providers [x] YES  [ ] NO    HEALTH ISSUES - PROBLEM Dx:  Discharge planning issues  Discharge planning issues    Need for prophylactic measure  Need for prophylactic measure    Confusional state  Confusional state    Postoperative hypothyroidism  Postoperative hypothyroidism    Type 2 diabetes mellitus with hyperglycemia, without long-term current use of insulin  Type 2 diabetes mellitus with hyperglycemia, without long-term current use of insulin    Cellulitis of lower extremity, unspecified laterality  Cellulitis of lower extremity, unspecified laterality

## 2020-12-27 NOTE — PROGRESS NOTE ADULT - SUBJECTIVE AND OBJECTIVE BOX
[facility], Division of Infectious Diseases  MAYDA Braxton Y. Patel, S. Shah  [phone]    Name: JAKE ZIMMERMAN  Age: 70y  Gender: Female  MRN: [id number]  Note Date: 12-27-20    Interval History:  Patient seen and examined at bedside this morning  Has episode of bleeding from blisters  Continues to have severe pain from   Notes reviewed    Antibiotics:  aztreonam  IVPB      aztreonam  IVPB 2000 milliGRAM(s) IV Intermittent every 8 hours  vancomycin  IVPB      vancomycin  IVPB 1500 milliGRAM(s) IV Intermittent every 12 hours      Medications:  acetaminophen   Tablet .. 650 milliGRAM(s) Oral every 6 hours PRN  aspirin enteric coated 81 milliGRAM(s) Oral daily  atorvastatin 20 milliGRAM(s) Oral at bedtime  aztreonam  IVPB      aztreonam  IVPB 2000 milliGRAM(s) IV Intermittent every 8 hours  cholecalciferol 2000 Unit(s) Oral two times a day  dextrose 40% Gel 15 Gram(s) Oral once  dextrose 5%. 1000 milliLiter(s) IV Continuous <Continuous>  dextrose 5%. 1000 milliLiter(s) IV Continuous <Continuous>  dextrose 50% Injectable 25 Gram(s) IV Push once  dextrose 50% Injectable 12.5 Gram(s) IV Push once  dextrose 50% Injectable 25 Gram(s) IV Push once  enoxaparin Injectable 40 milliGRAM(s) SubCutaneous every 12 hours  glucagon  Injectable 1 milliGRAM(s) IntraMuscular once  insulin glargine Injectable (LANTUS) 12 Unit(s) SubCutaneous at bedtime  insulin lispro (ADMELOG) corrective regimen sliding scale   SubCutaneous three times a day before meals  insulin lispro (ADMELOG) corrective regimen sliding scale   SubCutaneous at bedtime  levothyroxine 150 MICROGram(s) Oral daily  lisinopril 5 milliGRAM(s) Oral daily  multivitamin 1 Tablet(s) Oral daily  ondansetron Injectable 4 milliGRAM(s) IV Push every 6 hours PRN  oxyCODONE    IR 5 milliGRAM(s) Oral every 4 hours PRN  predniSONE   Tablet 40 milliGRAM(s) Oral daily  sodium chloride 0.9%. 1000 milliLiter(s) IV Continuous <Continuous>  traMADol 50 milliGRAM(s) Oral every 8 hours PRN  vancomycin  IVPB      vancomycin  IVPB 1500 milliGRAM(s) IV Intermittent every 12 hours      Review of Systems:  A 10-point review of systems was obtained.     Pertinent positives and negatives--  Constitutional: No fevers. No Chills. No Rigors.   Cardiovascular: No chest pain. No palpitations.  Respiratory: No shortness of breath. No cough.  Gastrointestinal: No nausea or vomiting. No diarrhea or constipation.   Psychiatric: Pleasant. Appropriate affect.    Review of systems otherwise negative except as previously noted.    Allergies: Ancef (Rash)  daptomycin (Vomiting)  Keflex (Unknown)  penicillin (Pruritus; Rash; Hives)    For details regarding the patient's past medical history, social history, family history, and other miscellaneous elements, please refer the initial infectious diseases consultation and/or the admitting history and physical examination for this admission.    Objective:  Vitals:   T(C): 36.8 (12-27-20 @ 08:12), Max: 37.6 (12-27-20 @ 00:12)  HR: 89 (12-27-20 @ 08:12) (72 - 89)  BP: 122/76 (12-27-20 @ 08:12) (104/77 - 122/76)  RR: 18 (12-27-20 @ 08:12) (18 - 18)  SpO2: 98% (12-27-20 @ 08:12) (96% - 98%)    Physical Examination:  General: no acute distress  HEENT: NC/AT, EOMI, anicteric, no oral lesions  Neck: supple, no palpable LAD  Cardio: S1, S2 heard, RRR, no murmurs  Resp: breath sounds heard bilaterally, no rales, wheezes or rhonchi  Abd: soft, NT, ND, + bowel sounds  Neuro: AAOx3, no obvious focal deficits  Ext: no edema or cyanosis  Skin: extensive redness under thigh of RLE. blistering on LLE, w/ ointment placed    Laboratory Studies:  CBC:                       8.4    7.16  )-----------( 288      ( 27 Dec 2020 07:34 )             27.3     CMP: 12-27    137  |  104  |  21  ----------------------------<  122<H>  4.3   |  22  |  0.72    Ca    8.3<L>      27 Dec 2020 07:34          Microbiology: reviewed    Culture - Urine (collected 12-24-20 @ 02:24)  Source: .Urine Clean Catch (Midstream)  Final Report (12-26-20 @ 08:50):    50,000 - 99,000 CFU/mL Escherichia coli  Organism: Escherichia coli (12-26-20 @ 08:50)  Organism: Escherichia coli (12-26-20 @ 08:50)      -  Amikacin: S <=16      -  Amoxicillin/Clavulanic Acid: S <=8/4      -  Ampicillin: S <=8 These ampicillin results predict results for amoxicillin      -  Ampicillin/Sulbactam: S <=4/2 Enterobacter, Citrobacter, and Serratia may develop resistance during prolonged therapy (3-4 days)      -  Aztreonam: S <=4      -  Cefazolin: S <=2 (MIC_CL_COM_ENTERIC_CEFAZU) For uncomplicated UTI with K. pneumoniae, E. coli, or P. mirablis: ARIANE <=16 is sensitive and ARIANE >=32 is resistant. This also predicts results for oral agents cefaclor, cefdinir, cefpodoxime, cefprozil, cefuroxime axetil, cephalexin and locarbef for uncomplicated UTI. Note that some isolates may be susceptible to these agents while testing resistant to cefazolin.      -  Cefepime: S <=2      -  Cefoxitin: S <=8      -  Ceftriaxone: S <=1 Enterobacter, Citrobacter, and Serratia may develop resistance during prolonged therapy      -  Ciprofloxacin: S <=0.25      -  Ertapenem: S <=0.5      -  Gentamicin: S <=2      -  Imipenem: S <=1      -  Levofloxacin: S <=0.5      -  Meropenem: S <=1      -  Nitrofurantoin: S <=32 Should not be used to treat pyelonephritis      -  Piperacillin/Tazobactam: S <=8      -  Tigecycline: S <=2      -  Tobramycin: S <=2      -  Trimethoprim/Sulfamethoxazole: S <=0.5/9.5      Method Type: ARIANE    Culture - Blood (collected 12-23-20 @ 19:03)  Source: .Blood Blood-Peripheral  Preliminary Report (12-24-20 @ 20:01):    No growth to date.    Culture - Blood (collected 12-23-20 @ 18:58)  Source: .Blood Blood-Peripheral  Preliminary Report (12-24-20 @ 19:01):    No growth to date.        Radiology: reviewed

## 2020-12-27 NOTE — PROGRESS NOTE ADULT - ASSESSMENT
Pt is a 70W w/ PMHx of bullous pemphigoid dx 2014 on steroids, surgical hypothyroidism following total thyroidectomy, chronic pain syndrome, endometrial CA diagnosed in 2015 with [facility] BSO, now in remission p/w RLE pain/and worsening erythema, concerned for cellulitis    Sepsis 2/2 b/l LE cellulitis  -BCx NGTD  -c/w vancomycin 1.5gm IV q12h  Vanc level therapeutic, can obtain weekly  C/w IV Abx while inpatient  Pending clinical improvement will plan for 10-14 day course  Will consider bactrim as outpatient PO option when ready for discharge    Sepsis 2/2 UTI?  -UA reviewed, inconsistent w/ infection however UCx +E.coli  -c/w aztreonam x5 day course until 12/29    Candidiasis in groin region  -apply topical nystatin     Red-Man syndrome  -ok to administer vancomycin as above    Penicillin Allergy  -pt w/ extensive skin lesions from underlying pemphigoid disease  -would follow w/ allergy as outpatient for desensitization to penicillins as cellulitis will be a recurring problem in this patient and B-lactams are ideal for cellulitis therapy    Bullous pemphigoid   -steroid dependent  -sx can be multifactorial 2/2 cellulitis and flare  -appreciate derm recs    DM2  -strict glucose control to promote resolution of infection and wound healing

## 2020-12-27 NOTE — PROGRESS NOTE ADULT - ASSESSMENT
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 (on a 150 mcg /6 days and one day of 300 mcg/week -patient/spouse not sure which day), Red Man Syndrome from IV vancomycin, chronic pain syndrome with patient apparently on escalating doses of Neurontin (per spouse now on 800 mg 4xDay) with PRN Tramadol and Vicodin due to patient severe pain from her skin lesions, with last admission to [facility] in 10/2018 for RIGHT LE cellulitis, endometrial CA diagnosed in 2015 with [facility] BSO and presumed to be in remission, with patient with home visiting RN with an Unna's boot on the RIGHT LE but both are wrapped since Monday, with self referral following rigors and shaking chills since last night.   Spouse noted increased confusion by patient at home last night.    # sepsis 2/2 cellulitis  # AMS likely 2/2 infection  # bullous pemphigoid steroid dependent ro flare  # surgical hypothyroidism  # poorly controlled type 2 DM  # uterine CA presumed to be disease free  # RIGHT> left LE cellulitis    IV Azactam and vanco, monitor for red man syndrome  appreciate ID recs  BC NTD  appreciate derm recs  cont prednisone 40mg  FS ACHS, SSI, lantus  CT head meningioma, no infarct  outpt takes neurontin 800mg QID rx by Dr Gonzales pain management, neurontin on hold  outpt takes tramadol 300mg extended release 24 hrs qd and norco 10-325mg q8 prn  cont Tramadol and low dose Oxycodone IR 5 mg PRN for pain relief  cont statin  PT    DVT prophylaxis    PCP:     Olivia Ross MD (PCP) [phone]

## 2020-12-28 ENCOUNTER — APPOINTMENT (OUTPATIENT)
Dept: RHEUMATOLOGY | Facility: CLINIC | Age: 70
End: 2020-12-28

## 2020-12-28 LAB
ANION GAP SERPL CALC-SCNC: 11 MMOL/L — SIGNIFICANT CHANGE UP (ref 5–17)
BUN SERPL-MCNC: 20 MG/DL — SIGNIFICANT CHANGE UP (ref 7–23)
CALCIUM SERPL-MCNC: 8.6 MG/DL — SIGNIFICANT CHANGE UP (ref 8.4–10.5)
CHLORIDE SERPL-SCNC: 101 MMOL/L — SIGNIFICANT CHANGE UP (ref 96–108)
CO2 SERPL-SCNC: 25 MMOL/L — SIGNIFICANT CHANGE UP (ref 22–31)
CREAT SERPL-MCNC: 0.64 MG/DL — SIGNIFICANT CHANGE UP (ref 0.5–1.3)
CULTURE RESULTS: SIGNIFICANT CHANGE UP
CULTURE RESULTS: SIGNIFICANT CHANGE UP
GLUCOSE SERPL-MCNC: 120 MG/DL — HIGH (ref 70–99)
HCT VFR BLD CALC: 27 % — LOW (ref 34.5–45)
HGB BLD-MCNC: 8.1 G/DL — LOW (ref 11.5–15.5)
MCHC RBC-ENTMCNC: 30 GM/DL — LOW (ref 32–36)
MCHC RBC-ENTMCNC: 31.6 PG — SIGNIFICANT CHANGE UP (ref 27–34)
MCV RBC AUTO: 105.5 FL — HIGH (ref 80–100)
NRBC # BLD: 0 /100 WBCS — SIGNIFICANT CHANGE UP (ref 0–0)
PLATELET # BLD AUTO: 327 K/UL — SIGNIFICANT CHANGE UP (ref 150–400)
POTASSIUM SERPL-MCNC: 4.1 MMOL/L — SIGNIFICANT CHANGE UP (ref 3.5–5.3)
POTASSIUM SERPL-SCNC: 4.1 MMOL/L — SIGNIFICANT CHANGE UP (ref 3.5–5.3)
RBC # BLD: 2.56 M/UL — LOW (ref 3.8–5.2)
RBC # FLD: 16.4 % — HIGH (ref 10.3–14.5)
SODIUM SERPL-SCNC: 137 MMOL/L — SIGNIFICANT CHANGE UP (ref 135–145)
SPECIMEN SOURCE: SIGNIFICANT CHANGE UP
SPECIMEN SOURCE: SIGNIFICANT CHANGE UP
WBC # BLD: 7.25 K/UL — SIGNIFICANT CHANGE UP (ref 3.8–10.5)
WBC # FLD AUTO: 7.25 K/UL — SIGNIFICANT CHANGE UP (ref 3.8–10.5)

## 2020-12-28 RX ORDER — ACETAMINOPHEN 500 MG
650 TABLET ORAL ONCE
Refills: 0 | Status: COMPLETED | OUTPATIENT
Start: 2020-12-28 | End: 2020-12-28

## 2020-12-28 RX ORDER — DIPHENHYDRAMINE HCL 50 MG
25 CAPSULE ORAL ONCE
Refills: 0 | Status: COMPLETED | OUTPATIENT
Start: 2020-12-28 | End: 2020-12-28

## 2020-12-28 RX ORDER — SENNA PLUS 8.6 MG/1
2 TABLET ORAL AT BEDTIME
Refills: 0 | Status: DISCONTINUED | OUTPATIENT
Start: 2020-12-28 | End: 2021-01-07

## 2020-12-28 RX ORDER — IMMUNE GLOBULIN (HUMAN) 10 G/100ML
80 INJECTION INTRAVENOUS; SUBCUTANEOUS ONCE
Refills: 0 | Status: COMPLETED | OUTPATIENT
Start: 2020-12-28 | End: 2020-12-28

## 2020-12-28 RX ADMIN — Medication 81 MILLIGRAM(S): at 12:42

## 2020-12-28 RX ADMIN — Medication 100 MILLIGRAM(S): at 00:02

## 2020-12-28 RX ADMIN — Medication 300 MILLIGRAM(S): at 17:24

## 2020-12-28 RX ADMIN — Medication 100 MILLIGRAM(S): at 14:38

## 2020-12-28 RX ADMIN — Medication 3: at 12:43

## 2020-12-28 RX ADMIN — ENOXAPARIN SODIUM 40 MILLIGRAM(S): 100 INJECTION SUBCUTANEOUS at 17:24

## 2020-12-28 RX ADMIN — LISINOPRIL 5 MILLIGRAM(S): 2.5 TABLET ORAL at 05:31

## 2020-12-28 RX ADMIN — IMMUNE GLOBULIN (HUMAN) 400 GRAM(S): 10 INJECTION INTRAVENOUS; SUBCUTANEOUS at 22:12

## 2020-12-28 RX ADMIN — SENNA PLUS 2 TABLET(S): 8.6 TABLET ORAL at 21:25

## 2020-12-28 RX ADMIN — ENOXAPARIN SODIUM 40 MILLIGRAM(S): 100 INJECTION SUBCUTANEOUS at 05:31

## 2020-12-28 RX ADMIN — Medication 1: at 22:12

## 2020-12-28 RX ADMIN — Medication 40 MILLIGRAM(S): at 05:31

## 2020-12-28 RX ADMIN — Medication 2000 UNIT(S): at 05:49

## 2020-12-28 RX ADMIN — Medication 25 MILLIGRAM(S): at 21:30

## 2020-12-28 RX ADMIN — Medication 2000 UNIT(S): at 17:24

## 2020-12-28 RX ADMIN — OXYCODONE HYDROCHLORIDE 5 MILLIGRAM(S): 5 TABLET ORAL at 12:42

## 2020-12-28 RX ADMIN — Medication 650 MILLIGRAM(S): at 22:00

## 2020-12-28 RX ADMIN — Medication 1 TABLET(S): at 12:43

## 2020-12-28 RX ADMIN — Medication 150 MICROGRAM(S): at 05:31

## 2020-12-28 RX ADMIN — Medication 2: at 17:25

## 2020-12-28 RX ADMIN — Medication 650 MILLIGRAM(S): at 21:30

## 2020-12-28 RX ADMIN — Medication 2: at 09:11

## 2020-12-28 RX ADMIN — OXYCODONE HYDROCHLORIDE 5 MILLIGRAM(S): 5 TABLET ORAL at 02:59

## 2020-12-28 RX ADMIN — INSULIN GLARGINE 12 UNIT(S): 100 INJECTION, SOLUTION SUBCUTANEOUS at 22:12

## 2020-12-28 RX ADMIN — ONDANSETRON 4 MILLIGRAM(S): 8 TABLET, FILM COATED ORAL at 22:12

## 2020-12-28 RX ADMIN — SODIUM CHLORIDE 80 MILLILITER(S): 9 INJECTION INTRAMUSCULAR; INTRAVENOUS; SUBCUTANEOUS at 02:59

## 2020-12-28 RX ADMIN — Medication 100 MILLIGRAM(S): at 05:30

## 2020-12-28 RX ADMIN — ONDANSETRON 4 MILLIGRAM(S): 8 TABLET, FILM COATED ORAL at 14:37

## 2020-12-28 RX ADMIN — ATORVASTATIN CALCIUM 20 MILLIGRAM(S): 80 TABLET, FILM COATED ORAL at 21:25

## 2020-12-28 RX ADMIN — Medication 300 MILLIGRAM(S): at 06:22

## 2020-12-28 RX ADMIN — OXYCODONE HYDROCHLORIDE 5 MILLIGRAM(S): 5 TABLET ORAL at 03:30

## 2020-12-28 NOTE — PROGRESS NOTE ADULT - SUBJECTIVE AND OBJECTIVE BOX
Patient is a 70y old  Female who presents with a chief complaint of Rigors, chills since last night (28 Dec 2020 11:14)      SUBJECTIVE / OVERNIGHT EVENTS:    Patient seen and examined. no acute events. afebrile.      Vital Signs Last 24 Hrs  T(C): 36.6 (28 Dec 2020 04:28), Max: 36.9 (27 Dec 2020 16:16)  T(F): 97.9 (28 Dec 2020 04:28), Max: 98.4 (27 Dec 2020 16:16)  HR: 93 (28 Dec 2020 10:27) (76 - 98)  BP: 93/60 (28 Dec 2020 10:27) (93/60 - 133/71)  BP(mean): --  RR: 18 (28 Dec 2020 10:27) (18 - 20)  SpO2: 96% (28 Dec 2020 10:27) (95% - 99%)  I&O's Summary    27 Dec 2020 07:01  -  28 Dec 2020 07:00  --------------------------------------------------------  IN: 4270 mL / OUT: 3700 mL / NET: 570 mL        PE:  GENERAL: NAD, AAOx3, obese  HEAD:  Atraumatic, Normocephalic  CHEST/LUNG: CTABL, No wheeze  HEART: Regular rate and rhythm; no murmur  ABDOMEN: Soft, Nontender, Nondistended; Bowel sounds present  EXTREMITIES:  2+ Peripheral Pulses, No clubbing, cyanosis, or edema  SKIN: bl le with bullous phemphigoid, erythema left thigh improved, bullae bl thighs thin skin   NEURO: No focal deficits    LABS:                        8.1    7.25  )-----------( 327      ( 28 Dec 2020 07:17 )             27.0     12-28    137  |  101  |  20  ----------------------------<  120<H>  4.1   |  25  |  0.64    Ca    8.6      28 Dec 2020 07:14        CAPILLARY BLOOD GLUCOSE      POCT Blood Glucose.: 208 mg/dL (28 Dec 2020 08:26)  POCT Blood Glucose.: 260 mg/dL (27 Dec 2020 21:53)  POCT Blood Glucose.: 244 mg/dL (27 Dec 2020 17:33)  POCT Blood Glucose.: 280 mg/dL (27 Dec 2020 12:17)            RADIOLOGY & ADDITIONAL TESTS:    Imaging Personally Reviewed:  [x] YES  [ ] NO    Consultant(s) Notes Reviewed:  [x] YES  [ ] NO    MEDICATIONS  (STANDING):  aspirin enteric coated 81 milliGRAM(s) Oral daily  atorvastatin 20 milliGRAM(s) Oral at bedtime  aztreonam  IVPB      aztreonam  IVPB 2000 milliGRAM(s) IV Intermittent every 8 hours  cholecalciferol 2000 Unit(s) Oral two times a day  dextrose 40% Gel 15 Gram(s) Oral once  dextrose 5%. 1000 milliLiter(s) (50 mL/Hr) IV Continuous <Continuous>  dextrose 5%. 1000 milliLiter(s) (100 mL/Hr) IV Continuous <Continuous>  dextrose 50% Injectable 25 Gram(s) IV Push once  dextrose 50% Injectable 12.5 Gram(s) IV Push once  dextrose 50% Injectable 25 Gram(s) IV Push once  enoxaparin Injectable 40 milliGRAM(s) SubCutaneous every 12 hours  glucagon  Injectable 1 milliGRAM(s) IntraMuscular once  insulin glargine Injectable (LANTUS) 12 Unit(s) SubCutaneous at bedtime  insulin lispro (ADMELOG) corrective regimen sliding scale   SubCutaneous three times a day before meals  insulin lispro (ADMELOG) corrective regimen sliding scale   SubCutaneous at bedtime  levothyroxine 150 MICROGram(s) Oral daily  lisinopril 5 milliGRAM(s) Oral daily  multivitamin 1 Tablet(s) Oral daily  predniSONE   Tablet 40 milliGRAM(s) Oral daily  sodium chloride 0.9%. 1000 milliLiter(s) (80 mL/Hr) IV Continuous <Continuous>  vancomycin  IVPB      vancomycin  IVPB 1500 milliGRAM(s) IV Intermittent every 12 hours    MEDICATIONS  (PRN):  acetaminophen   Tablet .. 650 milliGRAM(s) Oral every 6 hours PRN Temp greater or equal to 38C (100.4F), Mild Pain (1 - 3)  ondansetron Injectable 4 milliGRAM(s) IV Push every 6 hours PRN Nausea and/or Vomiting  oxyCODONE    IR 5 milliGRAM(s) Oral every 4 hours PRN Severe Pain (7 - 10)  traMADol 50 milliGRAM(s) Oral every 8 hours PRN Moderate Pain (4 - 6)      Care Discussed with Consultants/Other Providers [x] YES  [ ] NO    HEALTH ISSUES - PROBLEM Dx:  Discharge planning issues  Discharge planning issues    Need for prophylactic measure  Need for prophylactic measure    Confusional state  Confusional state    Postoperative hypothyroidism  Postoperative hypothyroidism    Type 2 diabetes mellitus with hyperglycemia, without long-term current use of insulin  Type 2 diabetes mellitus with hyperglycemia, without long-term current use of insulin    Cellulitis of lower extremity, unspecified laterality  Cellulitis of lower extremity, unspecified laterality

## 2020-12-28 NOTE — CHART NOTE - NSCHARTNOTEFT_GEN_A_CORE
Pt is getting routine IVIG treatment every 4 week for BP as outpatient regimen. Pt is due for IVIG treatment this week. Pt is getting Gammagard 20 GM/200ML Injection Solution; Infuse 80gm/day QOD over 1 week (M, W, F).  Dose is 2gm/kg, but for total dose would be 80gm/day as per Dr. Aly (Derm).  Benadryl 25mg PO and Tylenol 650mg PO ordered as premedication. Confirmed premedication w/ patient and pharmacy. 10% Gammagard 80g x1 dose ordered. Discussed w/ Dr. Boykin and dermatology.     Ling Hutchins PA-C  [id number]

## 2020-12-28 NOTE — PROGRESS NOTE ADULT - ASSESSMENT
Patient is a 70 female with PMH of bullous pemphigoid diagnosed in 2014 on steroids, surgical hypothyroidism following total thyroidectomy, chronic pain syndrome, endometrial CA diagnosed in 2015 with [facility] BSO, now in remission who presented with RLE pain/and worsening erythema, concerned for cellulitis    Sepsis 2/2 b/l LE cellulitis  -sepsis resolved  -BCx NGTD  -c/w vancomycin 1.5gm IV q12h  Vanc level therapeutic, can obtain weekly  C/w IV Abx while inpatient  Pending clinical improvement will plan for 10-14 day course  Will consider bactrim as outpatient PO option when ready for discharge    Sepsis 2/2 UTI?  -UA reviewed, inconsistent with infection and patient asymptomatic, UCx +E.coli  -c/w aztreonam x5 day course until tomorrow 12/29    Candidiasis in groin region  -apply topical nystatin     Red-Man syndrome  -ok to administer vancomycin as above    Penicillin Allergy  -pt w/ extensive skin lesions from underlying pemphigoid disease  -would follow w/ allergy as outpatient for desensitization to penicillins as cellulitis will be a recurring problem in this patient and B-lactams are ideal for cellulitis therapy    Bullous pemphigoid   -steroid dependent  -sx can be multifactorial 2/2 cellulitis and flare  -appreciate derm recs    DM2  -strict glucose control to promote resolution of infection and wound healing

## 2020-12-28 NOTE — PROGRESS NOTE ADULT - ASSESSMENT
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 (on a 150 mcg /6 days and one day of 300 mcg/week -patient/spouse not sure which day), Red Man Syndrome from IV vancomycin, chronic pain syndrome with patient apparently on escalating doses of Neurontin (per spouse now on 800 mg 4xDay) with PRN Tramadol and Vicodin due to patient severe pain from her skin lesions, with last admission to [facility] in 10/2018 for RIGHT LE cellulitis, endometrial CA diagnosed in 2015 with [facility] BSO and presumed to be in remission, with patient with home visiting RN with an Unna's boot on the RIGHT LE but both are wrapped since Monday, with self referral following rigors and shaking chills since last night.   Spouse noted increased confusion by patient at home last night.    # sepsis 2/2 cellulitis  # AMS likely 2/2 infection  # bullous pemphigoid steroid dependent ro flare  # surgical hypothyroidism  # poorly controlled type 2 DM  # uterine CA presumed to be disease free  # RIGHT> left LE cellulitis    IV Azactam and vanco, monitor for red man syndrome  possible change to bactrim tomorrow  appreciate ID recs  BC NTD  appreciate derm recs, can fu outpt with derm for IVIG, was scheduled today and next wednesday  cont prednisone 40mg  FS ACHS, SSI, lantus  CT head meningioma, no infarct  outpt takes neurontin 800mg QID rx by Dr Gonzales pain management, neurontin on hold  outpt takes tramadol 300mg extended release 24 hrs qd and norco 10-325mg q8 prn  cont Tramadol and low dose Oxycodone IR 5 mg PRN for pain relief  cont statin  PT    DVT prophylaxis    PCP:     Olivia Ross MD (PCP) [phone]

## 2020-12-28 NOTE — PROGRESS NOTE ADULT - SUBJECTIVE AND OBJECTIVE BOX
Wound Surgery Follow up Note:    HPI:  NIGHT HOSPITALIST:   Patient UNKNOWN to me previously, assigned to me at this point via the ER and by Dr. Boykin of the [facility] [employer] to admit this 69 y/o F--followed by her physicians above--patient with a history of bullous pemphigoid for 6 years currently steroid dependent, type 2 DM on oral Rx, surgical hypothyroidism following total thyroidectomy for a large obstructing goiter (on a 150 mcg /6 days and one day of 300 mcg/week -patient/spouse not sure which day), Red Man Syndrome from IV vancomycin, chronic pain syndrome with patient apparently on escalating doses of Neurontin (per spouse now on 800 mg 4xDay) with PRN Tramadol and Vicodin due to patient severe pain from her skin lesions, with last admission to [facility] in 10/2018 for RIGHT LE cellulitis, endometrial CA diagnosed in  with  [facility] BSO and presumed to be in remission, with patient with home visiting RN with an Unna's boot on the RIGHT LE but both are wrapped since Monday, with self referral following rigors and shaking chills since last night.   Spouse noted increased confusion by patient at home last night. (23 Dec 2020 20:47)    Follow up visit to patient for management of BLE edema, weeping and wounds. Ms. Hannon is a long-standing patient of the [facility] outpatient wound care center. Her treatment there has included compression therapy for BLE edema and weeping and various topical therapies for open wounds. BLE edema not particularly impressive, no weeping noted, wounds appear consistent with BP and are dry with dry hemorrhagic crust.    PAST MEDICAL & SURGICAL HISTORY:  Adrenal mass   incidental findings   Ct SCAN 2015 unchannged  24 hour urine for VMA done by Dr DR Canas [phone] Neg asper patient  Hyperlipidemia  Endometrial cancer, dx: 2015:  High grade Endometroid Adenocarcinoma  Morbid obesity, BMI:  53.6  Vitamin D deficiency  Hypothyroidism  Anemia  History of osteoarthritis  Type 2 diabetes mellitus  Hypertension  Bullous pemphigoid, dx: 2014.  On Immunosupressants  Cellcept  S/P BSO (bilateral salpingo-oophorectomy)  S/P hysterectomy  Endometrial cancer, 2015:  Endometrial Biopsy: dx: High Grade Endometroid Adenocarcinoma  Status post total thyroidectomy, &#x27;   History of  section, &#x27; 82-&#x27;95:  4 X    REVIEW OF SYSTEMS  General:	MO  Skin: bullous pemphigoid    MEDICATIONS  (STANDING):  aspirin enteric coated 81 milliGRAM(s) Oral daily  atorvastatin 20 milliGRAM(s) Oral at bedtime  aztreonam  IVPB      aztreonam  IVPB 2000 milliGRAM(s) IV Intermittent every 8 hours  cholecalciferol 2000 Unit(s) Oral two times a day  dextrose 40% Gel 15 Gram(s) Oral once  dextrose 5%. 1000 milliLiter(s) (50 mL/Hr) IV Continuous <Continuous>  dextrose 5%. 1000 milliLiter(s) (100 mL/Hr) IV Continuous <Continuous>  dextrose 50% Injectable 25 Gram(s) IV Push once  dextrose 50% Injectable 12.5 Gram(s) IV Push once  dextrose 50% Injectable 25 Gram(s) IV Push once  enoxaparin Injectable 40 milliGRAM(s) SubCutaneous every 12 hours  glucagon  Injectable 1 milliGRAM(s) IntraMuscular once  insulin glargine Injectable (LANTUS) 12 Unit(s) SubCutaneous at bedtime  insulin lispro (ADMELOG) corrective regimen sliding scale   SubCutaneous three times a day before meals  insulin lispro (ADMELOG) corrective regimen sliding scale   SubCutaneous at bedtime  levothyroxine 150 MICROGram(s) Oral daily  lisinopril 5 milliGRAM(s) Oral daily  multivitamin 1 Tablet(s) Oral daily  predniSONE   Tablet 40 milliGRAM(s) Oral daily  sodium chloride 0.9%. 1000 milliLiter(s) (80 mL/Hr) IV Continuous <Continuous>  vancomycin  IVPB      vancomycin  IVPB 1500 milliGRAM(s) IV Intermittent every 12 hours    MEDICATIONS  (PRN):  acetaminophen   Tablet .. 650 milliGRAM(s) Oral every 6 hours PRN Temp greater or equal to 38C (100.4F), Mild Pain (1 - 3)  ondansetron Injectable 4 milliGRAM(s) IV Push every 6 hours PRN Nausea and/or Vomiting  oxyCODONE    IR 5 milliGRAM(s) Oral every 4 hours PRN Severe Pain (7 - 10)  traMADol 50 milliGRAM(s) Oral every 8 hours PRN Moderate Pain (4 - 6)    Allergies    Ancef (Rash)  daptomycin (Vomiting)  Keflex (Unknown)  penicillin (Pruritus; Rash; Hives)    Intolerances    vancomycin (Other)    Vital Signs Last 24 Hrs  T(C): 37.2 (28 Dec 2020 12:54), Max: 37.2 (28 Dec 2020 12:54)  T(F): 98.9 (28 Dec 2020 12:54), Max: 98.9 (28 Dec 2020 12:54)  HR: 85 (28 Dec 2020 12:54) (76 - 93)  BP: 92/62 (28 Dec 2020 12:54) (92/62 - 106/66)  BP(mean): --  RR: 18 (28 Dec 2020 12:54) (18 - 20)  SpO2: 96% (28 Dec 2020 12:54) (96% - 98%)    Physical Exam:  General: A&Ox3, MO   Respiratory: no SOB on room air  Gastrointestinal: soft NT/ND  Neurology: weakened strength & sensation grossly intact  Musculoskeletal: no contractures:  Vascular: BLE edema equal, BLE equally warm  Skin:  Multiple superficially denuded skin areas on trunk, BLE and BUE, larged on the poaterior Right thigh/buttock, no necrotic tissue, small amount of serosanguinous drainage, +TTP,   BLE with gaiter area lipodermatosclerosis, and hyperpigmentation, no weeping, mild edema, scattered dry, hemorrhagic crusts with no drainage noted,  No odor, erythema, increased warmth, induration, fluctuance    LABS:      137  |  101  |  20  ----------------------------<  120<H>  4.1   |  25  |  0.64    Ca    8.6      28 Dec 2020 07:14                            8.1    7.25  )-----------( 327      ( 28 Dec 2020 07:17 )             27.0

## 2020-12-28 NOTE — PROGRESS NOTE ADULT - SUBJECTIVE AND OBJECTIVE BOX
[facility], Division of Infectious Diseases  MAYDA Braxton Y. Patel, S. Shah  [phone]  ([phone] - weekdays after 5pm and weekends)    Name: JAKE ZIMMERMAN  Age/Gender: 70y Female  MRN: [id number]    Interval History:  Patient seen this morning, no new complaints, feels erythema is better and wounds are healing.  Denies fever, chills, chest pain, dyspnea, cough, abd pain, n/v/d.  ROS reviewed, pertinent positives and negatives as above.   Notes reviewed. Afebrile     Allergies: Ancef (Rash)  daptomycin (Vomiting)  Keflex (Unknown)  penicillin (Pruritus; Rash; Hives)    Objective:  Vitals:   T(F): 97.9 (12-28-20 @ 04:28), Max: 98.4 (12-27-20 @ 16:16)  HR: 81 (12-28-20 @ 04:28) (76 - 98)  BP: 103/69 (12-28-20 @ 04:28) (103/66 - 133/71)  RR: 18 (12-28-20 @ 04:28) (18 - 20)  SpO2: 97% (12-28-20 @ 04:28) (95% - 99%)    Physical Examination:  General: no acute distress, obese  HEENT: NC/AT, EOMI, anicteric, neck supple  Cardio: S1, S2 heard, RRR, murmur heard  Resp: CTA bilaterally, no rales/wheezes/rhonchi  Abd: soft, NT, ND, + BS  Neuro: AAOx3, no obvious focal deficits  Ext: no edema or cyanosis, moving extremities  Skin: RLE erythema, bullae/wounds with some scabbing, no drainage or bleeding  Psych: appropriate affect and mood for situation  Lines: PIV    Laboratory Studies:  CBC:                       8.1    7.25  )-----------( 327      ( 28 Dec 2020 07:17 )             27.0     CMP: 12-28    137  |  101  |  20  ----------------------------<  120<H>  4.1   |  25  |  0.64    Ca    8.6      28 Dec 2020 07:14    Vancomycin Level, Trough: 18.2 ug/mL (12-27-20 @ 17:02)  Vancomycin Level, Trough: 18.3 ug/mL (12-26-20 @ 06:52)    Microbiology:  Culture - Urine (collected 12-24-20 @ 02:24)  Source: .Urine Clean Catch (Midstream)  Final Report (12-26-20 @ 08:50):    50,000 - 99,000 CFU/mL Escherichia coli  Organism: Escherichia coli (12-26-20 @ 08:50)  Organism: Escherichia coli (12-26-20 @ 08:50)      -  Amikacin: S <=16      -  Amoxicillin/Clavulanic Acid: S <=8/4      -  Ampicillin: S <=8 These ampicillin results predict results for amoxicillin      -  Ampicillin/Sulbactam: S <=4/2 Enterobacter, Citrobacter, and Serratia may develop resistance during prolonged therapy (3-4 days)      -  Aztreonam: S <=4      -  Cefazolin: S <=2 (MIC_CL_COM_ENTERIC_CEFAZU) For uncomplicated UTI with K. pneumoniae, E. coli, or P. mirablis: ARIANE <=16 is sensitive and ARIANE >=32 is resistant. This also predicts results for oral agents cefaclor, cefdinir, cefpodoxime, cefprozil, cefuroxime axetil, cephalexin and locarbef for uncomplicated UTI. Note that some isolates may be susceptible to these agents while testing resistant to cefazolin.      -  Cefepime: S <=2      -  Cefoxitin: S <=8      -  Ceftriaxone: S <=1 Enterobacter, Citrobacter, and Serratia may develop resistance during prolonged therapy      -  Ciprofloxacin: S <=0.25      -  Ertapenem: S <=0.5      -  Gentamicin: S <=2      -  Imipenem: S <=1      -  Levofloxacin: S <=0.5      -  Meropenem: S <=1      -  Nitrofurantoin: S <=32 Should not be used to treat pyelonephritis      -  Piperacillin/Tazobactam: S <=8      -  Tigecycline: S <=2      -  Tobramycin: S <=2      -  Trimethoprim/Sulfamethoxazole: S <=0.5/9.5      Method Type: ARIANE    Culture - Blood (collected 12-23-20 @ 19:03)  Source: .Blood Blood-Peripheral  Preliminary Report (12-24-20 @ 20:01):    No growth to date.    Culture - Blood (collected 12-23-20 @ 18:58)  Source: .Blood Blood-Peripheral  Preliminary Report (12-24-20 @ 19:01):    No growth to date.    Radiology: reviewed, no new imaging in last 48h    Medications:  acetaminophen   Tablet .. 650 milliGRAM(s) Oral every 6 hours PRN  aspirin enteric coated 81 milliGRAM(s) Oral daily  atorvastatin 20 milliGRAM(s) Oral at bedtime  aztreonam  IVPB      aztreonam  IVPB 2000 milliGRAM(s) IV Intermittent every 8 hours  cholecalciferol 2000 Unit(s) Oral two times a day  dextrose 40% Gel 15 Gram(s) Oral once  dextrose 5%. 1000 milliLiter(s) IV Continuous <Continuous>  dextrose 5%. 1000 milliLiter(s) IV Continuous <Continuous>  dextrose 50% Injectable 25 Gram(s) IV Push once  dextrose 50% Injectable 12.5 Gram(s) IV Push once  dextrose 50% Injectable 25 Gram(s) IV Push once  enoxaparin Injectable 40 milliGRAM(s) SubCutaneous every 12 hours  glucagon  Injectable 1 milliGRAM(s) IntraMuscular once  insulin glargine Injectable (LANTUS) 12 Unit(s) SubCutaneous at bedtime  insulin lispro (ADMELOG) corrective regimen sliding scale   SubCutaneous three times a day before meals  insulin lispro (ADMELOG) corrective regimen sliding scale   SubCutaneous at bedtime  levothyroxine 150 MICROGram(s) Oral daily  lisinopril 5 milliGRAM(s) Oral daily  multivitamin 1 Tablet(s) Oral daily  ondansetron Injectable 4 milliGRAM(s) IV Push every 6 hours PRN  oxyCODONE    IR 5 milliGRAM(s) Oral every 4 hours PRN  predniSONE   Tablet 40 milliGRAM(s) Oral daily  sodium chloride 0.9%. 1000 milliLiter(s) IV Continuous <Continuous>  traMADol 50 milliGRAM(s) Oral every 8 hours PRN  vancomycin  IVPB      vancomycin  IVPB 1500 milliGRAM(s) IV Intermittent every 12 hours    Antimicrobials:  aztreonam  IVPB      aztreonam  IVPB 2000 milliGRAM(s) IV Intermittent every 8 hours  vancomycin  IVPB      vancomycin  IVPB 1500 milliGRAM(s) IV Intermittent every 12 hours

## 2020-12-28 NOTE — CHART NOTE - NSCHARTNOTEFT_GEN_A_CORE
Dermatology    Per primary team request, patient's dosing for IVIG on outpatient setting for BP has been    Gammagard 200gm/200mL- Infuse 80 gm/day QOD (M, W, F) for 1 week and repeat every 4 weeks    The total dose should be 2gm/kg    Bhaskar Aly MD  Resident Physician, PGY3  [facility] Dermatology  Pager: [phone]  Office: [phone]

## 2020-12-28 NOTE — PROGRESS NOTE ADULT - ASSESSMENT
Impression:    Right buttock wounds  Multiple trunk, BLE and BUE wounds  Bullous Pemphigoid  DM with skin complications  Venous stasis dermatitis  venous insufficiency    Recommend:  1.) topical therapy: trunk wounds- apply adaptic to denuded painful areas Q day  BLE - keep clean and dry, would not cover or compress   2.) Dermatology consult noted; will defer to dermatology for definitive management  3.) Pressure ulcer prophylaxis  4.) Nutrition optimization  5.) Glycemic control  6.) BLE elevation  7.) Follow up in [facility] outpatient wound clinic to future compression needs     Care as per medicine will follow w/ you  Upon discharge f/u as outpatient at Wound Center [address] [place] [address] [phone]  Seen and discussed with clinical nurse  Thank you for this consult  Cinthia Foss, NICK-c, CWOCN [id number]

## 2020-12-29 LAB
ANION GAP SERPL CALC-SCNC: 8 MMOL/L — SIGNIFICANT CHANGE UP (ref 5–17)
BUN SERPL-MCNC: 29 MG/DL — HIGH (ref 7–23)
CALCIUM SERPL-MCNC: 8.7 MG/DL — SIGNIFICANT CHANGE UP (ref 8.4–10.5)
CHLORIDE SERPL-SCNC: 104 MMOL/L — SIGNIFICANT CHANGE UP (ref 96–108)
CO2 SERPL-SCNC: 24 MMOL/L — SIGNIFICANT CHANGE UP (ref 22–31)
CREAT SERPL-MCNC: 0.6 MG/DL — SIGNIFICANT CHANGE UP (ref 0.5–1.3)
GLUCOSE SERPL-MCNC: 205 MG/DL — HIGH (ref 70–99)
HCT VFR BLD CALC: 23.1 % — LOW (ref 34.5–45)
HCT VFR BLD CALC: 24.7 % — LOW (ref 34.5–45)
HGB BLD-MCNC: 7.1 G/DL — LOW (ref 11.5–15.5)
HGB BLD-MCNC: 7.3 G/DL — LOW (ref 11.5–15.5)
MCHC RBC-ENTMCNC: 29.6 GM/DL — LOW (ref 32–36)
MCHC RBC-ENTMCNC: 30.7 GM/DL — LOW (ref 32–36)
MCHC RBC-ENTMCNC: 31.5 PG — SIGNIFICANT CHANGE UP (ref 27–34)
MCHC RBC-ENTMCNC: 31.7 PG — SIGNIFICANT CHANGE UP (ref 27–34)
MCV RBC AUTO: 103.1 FL — HIGH (ref 80–100)
MCV RBC AUTO: 106.5 FL — HIGH (ref 80–100)
NRBC # BLD: 1 /100 WBCS — HIGH (ref 0–0)
NRBC # BLD: 2 /100 WBCS — HIGH (ref 0–0)
PLATELET # BLD AUTO: 298 K/UL — SIGNIFICANT CHANGE UP (ref 150–400)
PLATELET # BLD AUTO: 313 K/UL — SIGNIFICANT CHANGE UP (ref 150–400)
POTASSIUM SERPL-MCNC: 4 MMOL/L — SIGNIFICANT CHANGE UP (ref 3.5–5.3)
POTASSIUM SERPL-SCNC: 4 MMOL/L — SIGNIFICANT CHANGE UP (ref 3.5–5.3)
RBC # BLD: 2.24 M/UL — LOW (ref 3.8–5.2)
RBC # BLD: 2.32 M/UL — LOW (ref 3.8–5.2)
RBC # FLD: 16.1 % — HIGH (ref 10.3–14.5)
RBC # FLD: 16.4 % — HIGH (ref 10.3–14.5)
SODIUM SERPL-SCNC: 136 MMOL/L — SIGNIFICANT CHANGE UP (ref 135–145)
VANCOMYCIN TROUGH SERPL-MCNC: 13.6 UG/ML — SIGNIFICANT CHANGE UP (ref 10–20)
VANCOMYCIN TROUGH SERPL-MCNC: 22.5 UG/ML — HIGH (ref 10–20)
WBC # BLD: 6.87 K/UL — SIGNIFICANT CHANGE UP (ref 3.8–10.5)
WBC # BLD: 6.95 K/UL — SIGNIFICANT CHANGE UP (ref 3.8–10.5)
WBC # FLD AUTO: 6.87 K/UL — SIGNIFICANT CHANGE UP (ref 3.8–10.5)
WBC # FLD AUTO: 6.95 K/UL — SIGNIFICANT CHANGE UP (ref 3.8–10.5)

## 2020-12-29 RX ORDER — VANCOMYCIN HCL 1 G
1250 VIAL (EA) INTRAVENOUS EVERY 12 HOURS
Refills: 0 | Status: DISCONTINUED | OUTPATIENT
Start: 2020-12-29 | End: 2020-12-31

## 2020-12-29 RX ADMIN — Medication 100 MILLIGRAM(S): at 10:03

## 2020-12-29 RX ADMIN — OXYCODONE HYDROCHLORIDE 5 MILLIGRAM(S): 5 TABLET ORAL at 14:19

## 2020-12-29 RX ADMIN — Medication 150 MICROGRAM(S): at 05:17

## 2020-12-29 RX ADMIN — SODIUM CHLORIDE 80 MILLILITER(S): 9 INJECTION INTRAMUSCULAR; INTRAVENOUS; SUBCUTANEOUS at 04:38

## 2020-12-29 RX ADMIN — LISINOPRIL 5 MILLIGRAM(S): 2.5 TABLET ORAL at 05:17

## 2020-12-29 RX ADMIN — ATORVASTATIN CALCIUM 20 MILLIGRAM(S): 80 TABLET, FILM COATED ORAL at 22:19

## 2020-12-29 RX ADMIN — Medication 100 MILLIGRAM(S): at 00:56

## 2020-12-29 RX ADMIN — OXYCODONE HYDROCHLORIDE 5 MILLIGRAM(S): 5 TABLET ORAL at 19:57

## 2020-12-29 RX ADMIN — Medication 1 ENEMA: at 14:53

## 2020-12-29 RX ADMIN — INSULIN GLARGINE 12 UNIT(S): 100 INJECTION, SOLUTION SUBCUTANEOUS at 22:19

## 2020-12-29 RX ADMIN — Medication 125 MILLIGRAM(S): at 19:35

## 2020-12-29 RX ADMIN — SODIUM CHLORIDE 80 MILLILITER(S): 9 INJECTION INTRAMUSCULAR; INTRAVENOUS; SUBCUTANEOUS at 14:20

## 2020-12-29 RX ADMIN — Medication 2000 UNIT(S): at 17:59

## 2020-12-29 RX ADMIN — Medication 3: at 12:50

## 2020-12-29 RX ADMIN — Medication 100 MILLIGRAM(S): at 17:58

## 2020-12-29 RX ADMIN — Medication 81 MILLIGRAM(S): at 12:51

## 2020-12-29 RX ADMIN — OXYCODONE HYDROCHLORIDE 5 MILLIGRAM(S): 5 TABLET ORAL at 14:50

## 2020-12-29 RX ADMIN — OXYCODONE HYDROCHLORIDE 5 MILLIGRAM(S): 5 TABLET ORAL at 20:27

## 2020-12-29 RX ADMIN — SENNA PLUS 2 TABLET(S): 8.6 TABLET ORAL at 22:19

## 2020-12-29 RX ADMIN — Medication 40 MILLIGRAM(S): at 05:18

## 2020-12-29 RX ADMIN — ENOXAPARIN SODIUM 40 MILLIGRAM(S): 100 INJECTION SUBCUTANEOUS at 17:59

## 2020-12-29 RX ADMIN — ENOXAPARIN SODIUM 40 MILLIGRAM(S): 100 INJECTION SUBCUTANEOUS at 05:18

## 2020-12-29 RX ADMIN — OXYCODONE HYDROCHLORIDE 5 MILLIGRAM(S): 5 TABLET ORAL at 05:47

## 2020-12-29 RX ADMIN — ONDANSETRON 4 MILLIGRAM(S): 8 TABLET, FILM COATED ORAL at 09:47

## 2020-12-29 RX ADMIN — OXYCODONE HYDROCHLORIDE 5 MILLIGRAM(S): 5 TABLET ORAL at 05:17

## 2020-12-29 RX ADMIN — OXYCODONE HYDROCHLORIDE 5 MILLIGRAM(S): 5 TABLET ORAL at 10:20

## 2020-12-29 RX ADMIN — Medication 2000 UNIT(S): at 05:17

## 2020-12-29 RX ADMIN — Medication 2: at 17:58

## 2020-12-29 RX ADMIN — Medication 2: at 08:22

## 2020-12-29 RX ADMIN — Medication 1 TABLET(S): at 12:50

## 2020-12-29 RX ADMIN — OXYCODONE HYDROCHLORIDE 5 MILLIGRAM(S): 5 TABLET ORAL at 09:53

## 2020-12-29 NOTE — PROGRESS NOTE ADULT - ASSESSMENT
Patient is a 70 female with PMH of bullous pemphigoid diagnosed in 2014 on steroids, surgical hypothyroidism following total thyroidectomy, chronic pain syndrome, endometrial CA diagnosed in 2015 with [facility] BSO, now in remission who presented with RLE pain/and worsening erythema, concerned for cellulitis    Sepsis 2/2 b/l LE cellulitis  sepsis resolved  BCx negative   Vanc level supratherapeutic, dose adjusted to vancomycin 1.25mg IV Q12h with repeat trough prior to 4th dose, goal trough 15-20  C/w IV Abx while inpatient, plan for 10 day course   Can switch to oral bactrim when ready for discharge, currently patient will remain inpt for IVIG until Friday    Sepsis 2/2 UTI?  -UA reviewed, inconsistent with infection and patient asymptomatic, UCx +E.coli  -c/w aztreonam x5 day course - last day today 12/29    Candidiasis in groin region  -apply topical nystatin     Red-Man syndrome  -ok to administer vancomycin as above    Penicillin Allergy  -pt w/ extensive skin lesions from underlying pemphigoid disease  -would follow w/ allergy as outpatient for desensitization to penicillins as cellulitis will be a recurring problem in this patient and B-lactams are ideal for cellulitis therapy    Bullous pemphigoid   -steroid dependent  -sx can be multifactorial 2/2 cellulitis and flare  -appreciate derm recs    DM2  -strict glucose control to promote resolution of infection and wound healing

## 2020-12-29 NOTE — PROGRESS NOTE ADULT - SUBJECTIVE AND OBJECTIVE BOX
[facility], Division of Infectious Diseases  MAYDA Braxton Y. Patel, S. Shah  [phone]  ([phone] - weekdays after 5pm and weekends)    Name: JAKE ZIMMERMAN  Age/Gender: 70y Female  MRN: [id number]    Interval History:  Patient seen this morning, she has no new complaints, was given IVIG yesterday.   Denies fever, chills, chest pain, dyspnea, cough, abd pain, n/v/d.  ROS reviewed, pertinent positives and negatives as above.   Notes reviewed. Afebrile     Allergies: Ancef (Rash)  daptomycin (Vomiting)  Keflex (Unknown)  penicillin (Pruritus; Rash; Hives)    Objective:  Vitals:   T(F): 98.2 (12-29-20 @ 12:25), Max: 98.9 (12-28-20 @ 12:54)  HR: 90 (12-29-20 @ 12:25) (85 - 90)  BP: 102/64 (12-29-20 @ 12:25) (92/62 - 122/75)  RR: 18 (12-29-20 @ 12:25) (18 - 18)  SpO2: 95% (12-29-20 @ 12:25) (94% - 99%)    Physical Examination:  General: no acute distress, obese  HEENT: NC/AT, EOMI, anicteric, neck supple  Cardio: S1, S2 heard, RRR, murmur heard  Resp: CTA bilaterally, no rales/wheezes/rhonchi  Abd: soft, NT, ND, + BS  Neuro: AAOx3, no obvious focal deficits  Ext: no edema or cyanosis, moving extremities  Skin: RLE erythema, bullae/wounds with some scabbing, no drainage or bleeding  Psych: appropriate affect and mood for situation  Lines: PIV    Laboratory Studies:  CBC:                       7.3    6.95  )-----------( 298      ( 29 Dec 2020 09:25 )             24.7     CMP: 12-29    136  |  104  |  29<H>  ----------------------------<  205<H>  4.0   |  24  |  0.60    Ca    8.7      29 Dec 2020 04:51    Vancomycin Level, Trough: 22.5  ug/mL (12.29.20 @ 04:51)  Vancomycin Level, Trough: 18.2 ug/mL (12-27-20 @ 17:02)  Vancomycin Level, Trough: 18.3 ug/mL (12-26-20 @ 06:52)    Microbiology:  12/24 - urine culture - 50,000 - 99,000 CFU/mL Escherichia coli (pansensitive)  12/23 - blood cultures - negative     Radiology: reviewed, no new imaging in last 48h    Medications:  MEDICATIONS  (STANDING):  aspirin enteric coated 81 milliGRAM(s) Oral daily  atorvastatin 20 milliGRAM(s) Oral at bedtime     aztreonam  IVPB 2000 milliGRAM(s) IV Intermittent every 8 hours  cholecalciferol 2000 Unit(s) Oral two times a day  dextrose 40% Gel 15 Gram(s) Oral once  dextrose 5%. 1000 milliLiter(s) (50 mL/Hr) IV Continuous <Continuous>  dextrose 5%. 1000 milliLiter(s) (100 mL/Hr) IV Continuous <Continuous>  dextrose 50% Injectable 25 Gram(s) IV Push once  dextrose 50% Injectable 12.5 Gram(s) IV Push once  dextrose 50% Injectable 25 Gram(s) IV Push once  enoxaparin Injectable 40 milliGRAM(s) SubCutaneous every 12 hours  glucagon  Injectable 1 milliGRAM(s) IntraMuscular once  insulin glargine Injectable (LANTUS) 12 Unit(s) SubCutaneous at bedtime  insulin lispro (ADMELOG) corrective regimen sliding scale   SubCutaneous three times a day before meals  insulin lispro (ADMELOG) corrective regimen sliding scale   SubCutaneous at bedtime  levothyroxine 150 MICROGram(s) Oral daily  lisinopril 5 milliGRAM(s) Oral daily  multivitamin 1 Tablet(s) Oral daily  predniSONE   Tablet 40 milliGRAM(s) Oral daily  senna 2 Tablet(s) Oral at bedtime  sodium chloride 0.9%. 1000 milliLiter(s) (80 mL/Hr) IV Continuous <Continuous>  vancomycin  IVPB 1250 milliGRAM(s) IV Intermittent every 12 hours    Antimicrobials:  aztreonam  IVPB 2000 milliGRAM(s) IV Intermittent every 8 hours  vancomycin  IVPB 1250 milliGRAM(s) IV Intermittent every 12 hours

## 2020-12-29 NOTE — PROGRESS NOTE ADULT - SUBJECTIVE AND OBJECTIVE BOX
Patient is a 70y old  Female who presents with a chief complaint of Rigors, chills since last night (28 Dec 2020 16:18)      SUBJECTIVE / OVERNIGHT EVENTS:    Patient seen and examined.   denies cp sob. sp IVIG last night. afebrile.    Vital Signs Last 24 Hrs  T(C): 37 (29 Dec 2020 09:31), Max: 37.2 (28 Dec 2020 12:54)  T(F): 98.6 (29 Dec 2020 09:31), Max: 98.9 (28 Dec 2020 12:54)  HR: 86 (29 Dec 2020 09:31) (85 - 89)  BP: 122/75 (29 Dec 2020 09:31) (92/62 - 122/75)  BP(mean): --  RR: 18 (29 Dec 2020 09:31) (18 - 18)  SpO2: 98% (29 Dec 2020 09:31) (94% - 99%)  I&O's Summary    28 Dec 2020 07:01  -  29 Dec 2020 07:00  --------------------------------------------------------  IN: 3370 mL / OUT: 2700 mL / NET: 670 mL    29 Dec 2020 07:01  -  29 Dec 2020 10:58  --------------------------------------------------------  IN: 300 mL / OUT: 0 mL / NET: 300 mL        PE:  GENERAL: NAD, AAOx3, obese  HEAD:  Atraumatic, Normocephalic  CHEST/LUNG: CTABL, No wheeze  HEART: Regular rate and rhythm; no murmur  ABDOMEN: Soft, Nontender, Nondistended; Bowel sounds present  EXTREMITIES:  2+ Peripheral Pulses, No clubbing, cyanosis, or edema  SKIN: bl le with bullous phemphigoid, erythema left thigh improved, bullae bl thighs thin skin   NEURO: No focal deficits    LABS:                        7.3    6.95  )-----------( 298      ( 29 Dec 2020 09:25 )             24.7     12-29    136  |  104  |  29<H>  ----------------------------<  205<H>  4.0   |  24  |  0.60    Ca    8.7      29 Dec 2020 04:51        CAPILLARY BLOOD GLUCOSE      POCT Blood Glucose.: 201 mg/dL (29 Dec 2020 07:47)  POCT Blood Glucose.: 263 mg/dL (28 Dec 2020 21:36)  POCT Blood Glucose.: 233 mg/dL (28 Dec 2020 17:05)  POCT Blood Glucose.: 257 mg/dL (28 Dec 2020 12:40)            RADIOLOGY & ADDITIONAL TESTS:    Imaging Personally Reviewed:  [x] YES  [ ] NO    Consultant(s) Notes Reviewed:  [x] YES  [ ] NO    MEDICATIONS  (STANDING):  aspirin enteric coated 81 milliGRAM(s) Oral daily  atorvastatin 20 milliGRAM(s) Oral at bedtime  aztreonam  IVPB      aztreonam  IVPB 2000 milliGRAM(s) IV Intermittent every 8 hours  cholecalciferol 2000 Unit(s) Oral two times a day  dextrose 40% Gel 15 Gram(s) Oral once  dextrose 5%. 1000 milliLiter(s) (50 mL/Hr) IV Continuous <Continuous>  dextrose 5%. 1000 milliLiter(s) (100 mL/Hr) IV Continuous <Continuous>  dextrose 50% Injectable 25 Gram(s) IV Push once  dextrose 50% Injectable 12.5 Gram(s) IV Push once  dextrose 50% Injectable 25 Gram(s) IV Push once  enoxaparin Injectable 40 milliGRAM(s) SubCutaneous every 12 hours  glucagon  Injectable 1 milliGRAM(s) IntraMuscular once  insulin glargine Injectable (LANTUS) 12 Unit(s) SubCutaneous at bedtime  insulin lispro (ADMELOG) corrective regimen sliding scale   SubCutaneous three times a day before meals  insulin lispro (ADMELOG) corrective regimen sliding scale   SubCutaneous at bedtime  levothyroxine 150 MICROGram(s) Oral daily  lisinopril 5 milliGRAM(s) Oral daily  multivitamin 1 Tablet(s) Oral daily  predniSONE   Tablet 40 milliGRAM(s) Oral daily  senna 2 Tablet(s) Oral at bedtime  sodium chloride 0.9%. 1000 milliLiter(s) (80 mL/Hr) IV Continuous <Continuous>  vancomycin  IVPB 1250 milliGRAM(s) IV Intermittent every 12 hours    MEDICATIONS  (PRN):  acetaminophen   Tablet .. 650 milliGRAM(s) Oral every 6 hours PRN Temp greater or equal to 38C (100.4F), Mild Pain (1 - 3)  ondansetron Injectable 4 milliGRAM(s) IV Push every 6 hours PRN Nausea and/or Vomiting  oxyCODONE    IR 5 milliGRAM(s) Oral every 4 hours PRN Severe Pain (7 - 10)  traMADol 50 milliGRAM(s) Oral every 8 hours PRN Moderate Pain (4 - 6)      Care Discussed with Consultants/Other Providers [x] YES  [ ] NO    HEALTH ISSUES - PROBLEM Dx:  Discharge planning issues  Discharge planning issues    Need for prophylactic measure  Need for prophylactic measure    Confusional state  Confusional state    Postoperative hypothyroidism  Postoperative hypothyroidism    Type 2 diabetes mellitus with hyperglycemia, without long-term current use of insulin  Type 2 diabetes mellitus with hyperglycemia, without long-term current use of insulin    Cellulitis of lower extremity, unspecified laterality  Cellulitis of lower extremity, unspecified laterality

## 2020-12-29 NOTE — PROGRESS NOTE ADULT - ASSESSMENT
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 (on a 150 mcg /6 days and one day of 300 mcg/week -patient/spouse not sure which day), Red Man Syndrome from IV vancomycin, chronic pain syndrome with patient apparently on escalating doses of Neurontin (per spouse now on 800 mg 4xDay) with PRN Tramadol and Vicodin due to patient severe pain from her skin lesions, with last admission to [facility] in 10/2018 for RIGHT LE cellulitis, endometrial CA diagnosed in 2015 with [facility] BSO and presumed to be in remission, with patient with home visiting RN with an Unna's boot on the RIGHT LE but both are wrapped since Monday, with self referral following rigors and shaking chills since last night.   Spouse noted increased confusion by patient at home last night.    # sepsis 2/2 cellulitis  # AMS likely 2/2 infection  # bullous pemphigoid steroid dependent ro flare  # surgical hypothyroidism  # poorly controlled type 2 DM  # uterine CA presumed to be disease free  # RIGHT> left LE cellulitis    IV Azactam and vanco, monitor for red man syndrome  appreciate ID recs  BC NTD  appreciate derm recs  will given IVIG in house MWF per derm dosing for bullous phemphigoid to prevent flare (Gammagard 200gm/200mL- Infuse 80 gm/day QOD (M, W, F) for 1 week and repeat every 4 weeks)  cont prednisone 40mg  FS ACHS, SSI, lantus  CT head meningioma, no infarct  outpt takes neurontin 800mg QID rx by Dr Gonzales pain management, neurontin on hold  outpt takes tramadol 300mg extended release 24 hrs qd and norco 10-325mg q8 prn  cont Tramadol and low dose Oxycodone IR 5 mg PRN for pain relief  cont statin  PT    DVT prophylaxis    PCP:     Olivia Ross MD (PCP) [phone]    dispo: IVIG last dose friday, dc planning to rehab after

## 2020-12-29 NOTE — CHART NOTE - NSCHARTNOTEFT_GEN_A_CORE
PA NOTE:    RN notified w/ critical lab of vancomycin trough of 22.5  Vancomycin Level, Trough (12.29.20 @ 04:51)    Vancomycin Level, Trough: 22.5    Pt was on 1500mg Q12, changed order to 1250mg Q12 and will recheck trough level before 4th dose.   Will endorse to day team.     Talha Odell  Dept of Medicine   #[id number] PA NOTE:    RN notified w/ critical lab of vancomycin trough of 22.5  Vancomycin Level, Trough (12.29.20 @ 04:51)    Vancomycin Level, Trough: 22.5    Pt was on 1500mg Q12, changed order to 1250mg Q12 and will recheck trough level before 4th dose.   Endorsed to RN to give the next dose at 6PM and collect vancomycin trough before dose.   Will endorse to day team.     Talha Odell  Dept of Medicine   #[id number]

## 2020-12-30 ENCOUNTER — APPOINTMENT (OUTPATIENT)
Dept: RHEUMATOLOGY | Facility: CLINIC | Age: 70
End: 2020-12-30

## 2020-12-30 LAB
ANION GAP SERPL CALC-SCNC: 6 MMOL/L — SIGNIFICANT CHANGE UP (ref 5–17)
BLD GP AB SCN SERPL QL: NEGATIVE — SIGNIFICANT CHANGE UP
BUN SERPL-MCNC: 31 MG/DL — HIGH (ref 7–23)
CALCIUM SERPL-MCNC: 8.3 MG/DL — LOW (ref 8.4–10.5)
CHLORIDE SERPL-SCNC: 103 MMOL/L — SIGNIFICANT CHANGE UP (ref 96–108)
CO2 SERPL-SCNC: 26 MMOL/L — SIGNIFICANT CHANGE UP (ref 22–31)
CREAT SERPL-MCNC: 0.68 MG/DL — SIGNIFICANT CHANGE UP (ref 0.5–1.3)
GLUCOSE SERPL-MCNC: 186 MG/DL — HIGH (ref 70–99)
HCT VFR BLD CALC: 20.3 % — CRITICAL LOW (ref 34.5–45)
HGB BLD-MCNC: 6 G/DL — CRITICAL LOW (ref 11.5–15.5)
MCHC RBC-ENTMCNC: 29.6 GM/DL — LOW (ref 32–36)
MCHC RBC-ENTMCNC: 31.7 PG — SIGNIFICANT CHANGE UP (ref 27–34)
MCV RBC AUTO: 107.4 FL — HIGH (ref 80–100)
NRBC # BLD: 3 /100 WBCS — HIGH (ref 0–0)
PLATELET # BLD AUTO: 338 K/UL — SIGNIFICANT CHANGE UP (ref 150–400)
POTASSIUM SERPL-MCNC: 4.1 MMOL/L — SIGNIFICANT CHANGE UP (ref 3.5–5.3)
POTASSIUM SERPL-SCNC: 4.1 MMOL/L — SIGNIFICANT CHANGE UP (ref 3.5–5.3)
RBC # BLD: 1.89 M/UL — LOW (ref 3.8–5.2)
RBC # FLD: 16.9 % — HIGH (ref 10.3–14.5)
RH IG SCN BLD-IMP: POSITIVE — SIGNIFICANT CHANGE UP
SODIUM SERPL-SCNC: 135 MMOL/L — SIGNIFICANT CHANGE UP (ref 135–145)
WBC # BLD: 8.43 K/UL — SIGNIFICANT CHANGE UP (ref 3.8–10.5)
WBC # FLD AUTO: 8.43 K/UL — SIGNIFICANT CHANGE UP (ref 3.8–10.5)

## 2020-12-30 RX ORDER — DIPHENHYDRAMINE HCL 50 MG
25 CAPSULE ORAL ONCE
Refills: 0 | Status: COMPLETED | OUTPATIENT
Start: 2020-12-30 | End: 2020-12-30

## 2020-12-30 RX ORDER — OXYCODONE HYDROCHLORIDE 5 MG/1
5 TABLET ORAL EVERY 4 HOURS
Refills: 0 | Status: DISCONTINUED | OUTPATIENT
Start: 2020-12-30 | End: 2021-01-06

## 2020-12-30 RX ORDER — IMMUNE GLOBULIN (HUMAN) 10 G/100ML
80 INJECTION INTRAVENOUS; SUBCUTANEOUS ONCE
Refills: 0 | Status: COMPLETED | OUTPATIENT
Start: 2020-12-30 | End: 2020-12-31

## 2020-12-30 RX ORDER — ACETAMINOPHEN 500 MG
650 TABLET ORAL ONCE
Refills: 0 | Status: COMPLETED | OUTPATIENT
Start: 2020-12-30 | End: 2020-12-30

## 2020-12-30 RX ADMIN — SODIUM CHLORIDE 80 MILLILITER(S): 9 INJECTION INTRAMUSCULAR; INTRAVENOUS; SUBCUTANEOUS at 05:23

## 2020-12-30 RX ADMIN — OXYCODONE HYDROCHLORIDE 5 MILLIGRAM(S): 5 TABLET ORAL at 00:41

## 2020-12-30 RX ADMIN — Medication 650 MILLIGRAM(S): at 23:59

## 2020-12-30 RX ADMIN — OXYCODONE HYDROCHLORIDE 5 MILLIGRAM(S): 5 TABLET ORAL at 15:15

## 2020-12-30 RX ADMIN — Medication 100 MILLIGRAM(S): at 02:00

## 2020-12-30 RX ADMIN — Medication 3: at 17:38

## 2020-12-30 RX ADMIN — ONDANSETRON 4 MILLIGRAM(S): 8 TABLET, FILM COATED ORAL at 15:15

## 2020-12-30 RX ADMIN — ATORVASTATIN CALCIUM 20 MILLIGRAM(S): 80 TABLET, FILM COATED ORAL at 22:05

## 2020-12-30 RX ADMIN — OXYCODONE HYDROCHLORIDE 5 MILLIGRAM(S): 5 TABLET ORAL at 05:52

## 2020-12-30 RX ADMIN — Medication 2000 UNIT(S): at 17:38

## 2020-12-30 RX ADMIN — OXYCODONE HYDROCHLORIDE 5 MILLIGRAM(S): 5 TABLET ORAL at 11:40

## 2020-12-30 RX ADMIN — Medication 2: at 08:48

## 2020-12-30 RX ADMIN — Medication 125 MILLIGRAM(S): at 20:08

## 2020-12-30 RX ADMIN — OXYCODONE HYDROCHLORIDE 5 MILLIGRAM(S): 5 TABLET ORAL at 11:09

## 2020-12-30 RX ADMIN — OXYCODONE HYDROCHLORIDE 5 MILLIGRAM(S): 5 TABLET ORAL at 20:50

## 2020-12-30 RX ADMIN — OXYCODONE HYDROCHLORIDE 5 MILLIGRAM(S): 5 TABLET ORAL at 05:22

## 2020-12-30 RX ADMIN — Medication 40 MILLIGRAM(S): at 05:22

## 2020-12-30 RX ADMIN — Medication 2000 UNIT(S): at 05:22

## 2020-12-30 RX ADMIN — LISINOPRIL 5 MILLIGRAM(S): 2.5 TABLET ORAL at 05:22

## 2020-12-30 RX ADMIN — Medication 81 MILLIGRAM(S): at 12:16

## 2020-12-30 RX ADMIN — OXYCODONE HYDROCHLORIDE 5 MILLIGRAM(S): 5 TABLET ORAL at 00:11

## 2020-12-30 RX ADMIN — Medication 1 TABLET(S): at 12:16

## 2020-12-30 RX ADMIN — OXYCODONE HYDROCHLORIDE 5 MILLIGRAM(S): 5 TABLET ORAL at 21:20

## 2020-12-30 RX ADMIN — INSULIN GLARGINE 12 UNIT(S): 100 INJECTION, SOLUTION SUBCUTANEOUS at 22:11

## 2020-12-30 RX ADMIN — Medication 125 MILLIGRAM(S): at 05:22

## 2020-12-30 RX ADMIN — Medication 25 MILLIGRAM(S): at 23:59

## 2020-12-30 RX ADMIN — ENOXAPARIN SODIUM 40 MILLIGRAM(S): 100 INJECTION SUBCUTANEOUS at 17:38

## 2020-12-30 RX ADMIN — Medication 150 MICROGRAM(S): at 05:22

## 2020-12-30 RX ADMIN — OXYCODONE HYDROCHLORIDE 5 MILLIGRAM(S): 5 TABLET ORAL at 15:45

## 2020-12-30 RX ADMIN — Medication 4: at 12:16

## 2020-12-30 RX ADMIN — ENOXAPARIN SODIUM 40 MILLIGRAM(S): 100 INJECTION SUBCUTANEOUS at 05:22

## 2020-12-30 RX ADMIN — SENNA PLUS 2 TABLET(S): 8.6 TABLET ORAL at 21:55

## 2020-12-30 NOTE — PROGRESS NOTE ADULT - SUBJECTIVE AND OBJECTIVE BOX
Patient is a 70y old  Female who presents with a chief complaint of Rigors, chills since last night (30 Dec 2020 10:01)      INTERVAL HPI/OVERNIGHT EVENTS: noted  pt seen and examined  feels tired  low Hb, for 1u prbc  denies any blood loss, no n/v/black stools/bloody bm      Vital Signs Last 24 Hrs  T(C): 36.7 (30 Dec 2020 19:45), Max: 36.9 (30 Dec 2020 13:34)  T(F): 98.1 (30 Dec 2020 19:45), Max: 98.4 (30 Dec 2020 13:34)  HR: 82 (30 Dec 2020 19:45) (72 - 101)  BP: 102/58 (30 Dec 2020 20:51) (93/55 - 109/76)  BP(mean): --  RR: 18 (30 Dec 2020 19:45) (18 - 18)  SpO2: 99% (30 Dec 2020 19:45) (99% - 99%)    acetaminophen   Tablet .. 650 milliGRAM(s) Oral every 6 hours PRN  acetaminophen   Tablet .. 650 milliGRAM(s) Oral once  aspirin enteric coated 81 milliGRAM(s) Oral daily  atorvastatin 20 milliGRAM(s) Oral at bedtime  cholecalciferol 2000 Unit(s) Oral two times a day  dextrose 40% Gel 15 Gram(s) Oral once  dextrose 5%. 1000 milliLiter(s) IV Continuous <Continuous>  dextrose 5%. 1000 milliLiter(s) IV Continuous <Continuous>  dextrose 50% Injectable 25 Gram(s) IV Push once  dextrose 50% Injectable 12.5 Gram(s) IV Push once  dextrose 50% Injectable 25 Gram(s) IV Push once  diphenhydrAMINE 25 milliGRAM(s) Oral once  enoxaparin Injectable 40 milliGRAM(s) SubCutaneous every 12 hours  glucagon  Injectable 1 milliGRAM(s) IntraMuscular once  immune   globulin 10% (GAMMAGARD) IVPB 80 Gram(s) IV Intermittent once  insulin glargine Injectable (LANTUS) 12 Unit(s) SubCutaneous at bedtime  insulin lispro (ADMELOG) corrective regimen sliding scale   SubCutaneous three times a day before meals  insulin lispro (ADMELOG) corrective regimen sliding scale   SubCutaneous at bedtime  levothyroxine 150 MICROGram(s) Oral daily  lisinopril 5 milliGRAM(s) Oral daily  multivitamin 1 Tablet(s) Oral daily  ondansetron Injectable 4 milliGRAM(s) IV Push every 6 hours PRN  oxyCODONE    IR 5 milliGRAM(s) Oral every 4 hours PRN  predniSONE   Tablet 40 milliGRAM(s) Oral daily  senna 2 Tablet(s) Oral at bedtime  sodium chloride 0.9%. 1000 milliLiter(s) IV Continuous <Continuous>  traMADol 50 milliGRAM(s) Oral every 8 hours PRN  vancomycin  IVPB 1250 milliGRAM(s) IV Intermittent every 12 hours      PHYSICAL EXAM:  GENERAL: NAD,   EYES: conjunctiva and sclera clear  ENMT: Moist mucous membranes  NECK: Supple, No JVD, Normal thyroid  CHEST/LUNG: non labored, cta b/l  HEART: Regular rate and rhythm; No murmurs, rubs, or gallops  ABDOMEN: Soft, Nontender, Nondistended; Bowel sounds present  EXTREMITIES:  2+ Peripheral Pulses, No clubbing, cyanosis, or edema  LYMPH: No lymphadenopathy noted  SKIN: No rashes or lesions    Consultant(s) Notes Reviewed:  [x ] YES  [ ] NO  Care Discussed with Consultants/Other Providers [ x] YES  [ ] NO    LABS:                        6.0    8.43  )-----------( 338      ( 30 Dec 2020 07:36 )             20.3     12-30    135  |  103  |  31<H>  ----------------------------<  186<H>  4.1   |  26  |  0.68    Ca    8.3<L>      30 Dec 2020 07:35          CAPILLARY BLOOD GLUCOSE      POCT Blood Glucose.: 248 mg/dL (30 Dec 2020 22:06)  POCT Blood Glucose.: 258 mg/dL (30 Dec 2020 17:37)  POCT Blood Glucose.: 301 mg/dL (30 Dec 2020 12:02)  POCT Blood Glucose.: 241 mg/dL (30 Dec 2020 08:23)              RADIOLOGY & ADDITIONAL TESTS:    Imaging Personally Reviewed:  [x ] YES  [ ] NO

## 2020-12-30 NOTE — PROGRESS NOTE ADULT - ASSESSMENT
69 y/o F PMHx bullous pemphigoid for 6 years currently steroid dependent, type 2 DM on oral Rx, surgical hypothyroidism following total thyroidectomy for a large obstructing goiter (on a 150 mcg /6 days and one day of 300 mcg/week -patient/spouse not sure which day), Red Man Syndrome from IV vancomycin, chronic pain syndrome with patient apparently on escalating doses of Neurontin (per spouse now on 800 mg 4xDay) with PRN Tramadol and Vicodin due to patient severe pain from her skin lesions, with last admission to [facility] in 10/2018 for RIGHT LE cellulitis, endometrial CA diagnosed in 2015 with [facility] BSO and presumed to be in remission, with patient with home visiting RN with an Unna's boot on the RIGHT LE but both are wrapped since Monday, with self referral following rigors and shaking chills since last night.   Spouse noted increased confusion by patient at home last night.    # sepsis 2/2 cellulitis  # AMS likely 2/2 infection  # bullous pemphigoid steroid dependent ro flare  # surgical hypothyroidism  # poorly controlled type 2 DM  # uterine CA presumed to be disease free  # RIGHT> left LE cellulitis    IV Azactam and vanco, monitor for red man syndrome  appreciate ID recs  BC NTD  appreciate derm recs  will given IVIG in house MWF per derm dosing for bullous phemphigoid to prevent flare (Gammagard 200gm/200mL- Infuse 80 gm/day QOD (M, W, F) for 1 week and repeat every 4 weeks)  cont prednisone 40mg  FS ACHS, SSI, lantus  CT head meningioma, no infarct  outpt takes neurontin 800mg QID rx by Dr Gonzales pain management, neurontin on hold  outpt takes tramadol 300mg extended release 24 hrs qd and norco 10-325mg q8 prn  cont Tramadol and low dose Oxycodone IR 5 mg PRN for pain relief  cont statin  PT    #Anemia- 1u prbc  anemia mullen- iron studies, hemolytic mullen, folate, b12    DVT prophylaxis    PCP:     Olivia Ross MD (PCP) [phone]    dispo: IVIG last dose friday, dc planning to rehab after

## 2020-12-30 NOTE — PROGRESS NOTE ADULT - ASSESSMENT
Patient is a 70 female with PMH of bullous pemphigoid diagnosed in 2014 on steroids, surgical hypothyroidism following total thyroidectomy, chronic pain syndrome, endometrial CA diagnosed in 2015 with [facility] BSO, now in remission who presented with RLE pain/and worsening erythema, concerned for cellulitis    Sepsis 2/2 b/l LE cellulitis  sepsis resolved  BCx negative   Vanc level supratherapeutic, dose adjusted to vancomycin 1.25mg IV Q12h with repeat trough prior to 4th dose, goal trough 15-20  C/w IV Abx while inpatient, plan for 10 day course - end 1/2/21  Can switch to oral bactrim when ready for discharge, currently patient will remain inpt for IVIG until Friday    Sepsis 2/2 UTI?  -UA reviewed, inconsistent with infection and patient asymptomatic, UCx +E.coli  -completed aztreonam x5 day on 12/29    Candidiasis in groin region  -apply topical nystatin     Red-Man syndrome  -ok to administer vancomycin as above    Penicillin Allergy  -pt w/ extensive skin lesions from underlying pemphigoid disease  -would follow w/ allergy as outpatient for desensitization to penicillins as cellulitis will be a recurring problem in this patient and B-lactams are ideal for cellulitis therapy    Bullous pemphigoid   -steroid dependent  -sx can be multifactorial 2/2 cellulitis and flare  -appreciate derm recs    DM2  -strict glucose control to promote resolution of infection and wound healing

## 2020-12-31 ENCOUNTER — APPOINTMENT (OUTPATIENT)
Dept: RHEUMATOLOGY | Facility: CLINIC | Age: 70
End: 2020-12-31

## 2020-12-31 LAB
ANION GAP SERPL CALC-SCNC: 8 MMOL/L — SIGNIFICANT CHANGE UP (ref 5–17)
ANISOCYTOSIS BLD QL: SLIGHT — SIGNIFICANT CHANGE UP
BASOPHILS # BLD AUTO: 0 K/UL — SIGNIFICANT CHANGE UP (ref 0–0.2)
BASOPHILS NFR BLD AUTO: 0 % — SIGNIFICANT CHANGE UP (ref 0–2)
BUN SERPL-MCNC: 24 MG/DL — HIGH (ref 7–23)
CALCIUM SERPL-MCNC: 8.2 MG/DL — LOW (ref 8.4–10.5)
CHLORIDE SERPL-SCNC: 100 MMOL/L — SIGNIFICANT CHANGE UP (ref 96–108)
CO2 SERPL-SCNC: 25 MMOL/L — SIGNIFICANT CHANGE UP (ref 22–31)
CREAT SERPL-MCNC: 0.68 MG/DL — SIGNIFICANT CHANGE UP (ref 0.5–1.3)
DACRYOCYTES BLD QL SMEAR: SLIGHT — SIGNIFICANT CHANGE UP
DAT POLY-SP REAG RBC QL: NEGATIVE — SIGNIFICANT CHANGE UP
ELLIPTOCYTES BLD QL SMEAR: SLIGHT — SIGNIFICANT CHANGE UP
EOSINOPHIL # BLD AUTO: 0 K/UL — SIGNIFICANT CHANGE UP (ref 0–0.5)
EOSINOPHIL NFR BLD AUTO: 0 % — SIGNIFICANT CHANGE UP (ref 0–6)
FERRITIN SERPL-MCNC: 44 NG/ML — SIGNIFICANT CHANGE UP (ref 15–150)
FOLATE SERPL-MCNC: 13.9 NG/ML — SIGNIFICANT CHANGE UP
GLUCOSE SERPL-MCNC: 175 MG/DL — HIGH (ref 70–99)
HAPTOGLOB SERPL-MCNC: 174 MG/DL — SIGNIFICANT CHANGE UP (ref 34–200)
HCT VFR BLD CALC: 20.2 % — CRITICAL LOW (ref 34.5–45)
HCT VFR BLD CALC: 20.2 % — CRITICAL LOW (ref 34.5–45)
HGB BLD-MCNC: 6 G/DL — CRITICAL LOW (ref 11.5–15.5)
HGB BLD-MCNC: 6.1 G/DL — CRITICAL LOW (ref 11.5–15.5)
IRON SATN MFR SERPL: 32 UG/DL — SIGNIFICANT CHANGE UP (ref 30–160)
LYMPHOCYTES # BLD AUTO: 0.34 K/UL — LOW (ref 1–3.3)
LYMPHOCYTES # BLD AUTO: 5.2 % — LOW (ref 13–44)
MACROCYTES BLD QL: SLIGHT — SIGNIFICANT CHANGE UP
MANUAL SMEAR VERIFICATION: SIGNIFICANT CHANGE UP
MCHC RBC-ENTMCNC: 29.7 GM/DL — LOW (ref 32–36)
MCHC RBC-ENTMCNC: 30.2 GM/DL — LOW (ref 32–36)
MCHC RBC-ENTMCNC: 30.6 PG — SIGNIFICANT CHANGE UP (ref 27–34)
MCHC RBC-ENTMCNC: 31 PG — SIGNIFICANT CHANGE UP (ref 27–34)
MCV RBC AUTO: 102.5 FL — HIGH (ref 80–100)
MCV RBC AUTO: 103.1 FL — HIGH (ref 80–100)
MONOCYTES # BLD AUTO: 0.17 K/UL — SIGNIFICANT CHANGE UP (ref 0–0.9)
MONOCYTES NFR BLD AUTO: 2.6 % — SIGNIFICANT CHANGE UP (ref 2–14)
MYELOCYTES NFR BLD: 2.6 % — HIGH (ref 0–0)
NEUTROPHILS # BLD AUTO: 5.9 K/UL — SIGNIFICANT CHANGE UP (ref 1.8–7.4)
NEUTROPHILS NFR BLD AUTO: 89.6 % — HIGH (ref 43–77)
NRBC # BLD: 1 /100 — HIGH (ref 0–0)
NRBC # BLD: 3 /100 WBCS — HIGH (ref 0–0)
PLAT MORPH BLD: SIGNIFICANT CHANGE UP
PLATELET # BLD AUTO: 311 K/UL — SIGNIFICANT CHANGE UP (ref 150–400)
PLATELET # BLD AUTO: 320 K/UL — SIGNIFICANT CHANGE UP (ref 150–400)
POLYCHROMASIA BLD QL SMEAR: SLIGHT — SIGNIFICANT CHANGE UP
POTASSIUM SERPL-MCNC: 3.8 MMOL/L — SIGNIFICANT CHANGE UP (ref 3.5–5.3)
POTASSIUM SERPL-SCNC: 3.8 MMOL/L — SIGNIFICANT CHANGE UP (ref 3.5–5.3)
RBC # BLD: 1.96 M/UL — LOW (ref 3.8–5.2)
RBC # BLD: 1.96 M/UL — LOW (ref 3.8–5.2)
RBC # BLD: 1.97 M/UL — LOW (ref 3.8–5.2)
RBC # FLD: 18.4 % — HIGH (ref 10.3–14.5)
RBC # FLD: 18.6 % — HIGH (ref 10.3–14.5)
RBC BLD AUTO: ABNORMAL
RETICS #: 103.9 K/UL — SIGNIFICANT CHANGE UP (ref 25–125)
RETICS/RBC NFR: 5.3 % — HIGH (ref 0.5–2.5)
SODIUM SERPL-SCNC: 133 MMOL/L — LOW (ref 135–145)
TSH SERPL-MCNC: 17.9 UIU/ML — HIGH (ref 0.27–4.2)
VANCOMYCIN TROUGH SERPL-MCNC: 13.7 UG/ML — SIGNIFICANT CHANGE UP (ref 10–20)
VIT B12 SERPL-MCNC: 929 PG/ML — SIGNIFICANT CHANGE UP (ref 232–1245)
WBC # BLD: 6.3 K/UL — SIGNIFICANT CHANGE UP (ref 3.8–10.5)
WBC # BLD: 6.59 K/UL — SIGNIFICANT CHANGE UP (ref 3.8–10.5)
WBC # FLD AUTO: 6.3 K/UL — SIGNIFICANT CHANGE UP (ref 3.8–10.5)
WBC # FLD AUTO: 6.59 K/UL — SIGNIFICANT CHANGE UP (ref 3.8–10.5)

## 2020-12-31 PROCEDURE — 99222 1ST HOSP IP/OBS MODERATE 55: CPT | Mod: GC

## 2020-12-31 RX ADMIN — Medication 1: at 09:03

## 2020-12-31 RX ADMIN — OXYCODONE HYDROCHLORIDE 5 MILLIGRAM(S): 5 TABLET ORAL at 03:24

## 2020-12-31 RX ADMIN — Medication 40 MILLIGRAM(S): at 06:24

## 2020-12-31 RX ADMIN — INSULIN GLARGINE 12 UNIT(S): 100 INJECTION, SOLUTION SUBCUTANEOUS at 22:04

## 2020-12-31 RX ADMIN — Medication 81 MILLIGRAM(S): at 12:51

## 2020-12-31 RX ADMIN — Medication 2: at 17:47

## 2020-12-31 RX ADMIN — OXYCODONE HYDROCHLORIDE 5 MILLIGRAM(S): 5 TABLET ORAL at 10:16

## 2020-12-31 RX ADMIN — Medication 125 MILLIGRAM(S): at 10:18

## 2020-12-31 RX ADMIN — OXYCODONE HYDROCHLORIDE 5 MILLIGRAM(S): 5 TABLET ORAL at 02:54

## 2020-12-31 RX ADMIN — OXYCODONE HYDROCHLORIDE 5 MILLIGRAM(S): 5 TABLET ORAL at 21:42

## 2020-12-31 RX ADMIN — ENOXAPARIN SODIUM 40 MILLIGRAM(S): 100 INJECTION SUBCUTANEOUS at 18:25

## 2020-12-31 RX ADMIN — ONDANSETRON 4 MILLIGRAM(S): 8 TABLET, FILM COATED ORAL at 18:25

## 2020-12-31 RX ADMIN — Medication 650 MILLIGRAM(S): at 00:29

## 2020-12-31 RX ADMIN — LISINOPRIL 5 MILLIGRAM(S): 2.5 TABLET ORAL at 06:24

## 2020-12-31 RX ADMIN — ATORVASTATIN CALCIUM 20 MILLIGRAM(S): 80 TABLET, FILM COATED ORAL at 21:42

## 2020-12-31 RX ADMIN — Medication 150 MICROGRAM(S): at 06:24

## 2020-12-31 RX ADMIN — SENNA PLUS 2 TABLET(S): 8.6 TABLET ORAL at 21:42

## 2020-12-31 RX ADMIN — OXYCODONE HYDROCHLORIDE 5 MILLIGRAM(S): 5 TABLET ORAL at 15:04

## 2020-12-31 RX ADMIN — Medication 2000 UNIT(S): at 18:26

## 2020-12-31 RX ADMIN — Medication 2000 UNIT(S): at 06:24

## 2020-12-31 RX ADMIN — IMMUNE GLOBULIN (HUMAN) 133.33 GRAM(S): 10 INJECTION INTRAVENOUS; SUBCUTANEOUS at 00:43

## 2020-12-31 RX ADMIN — Medication 1 TABLET(S): at 12:51

## 2020-12-31 RX ADMIN — OXYCODONE HYDROCHLORIDE 5 MILLIGRAM(S): 5 TABLET ORAL at 22:12

## 2020-12-31 RX ADMIN — Medication 3: at 12:50

## 2020-12-31 RX ADMIN — Medication 2 TABLET(S): at 18:30

## 2020-12-31 RX ADMIN — ENOXAPARIN SODIUM 40 MILLIGRAM(S): 100 INJECTION SUBCUTANEOUS at 07:32

## 2020-12-31 NOTE — PROGRESS NOTE ADULT - ASSESSMENT
69 y/o F PMHx bullous pemphigoid for 6 years currently steroid dependent, type 2 DM on oral Rx, surgical hypothyroidism following total thyroidectomy for a large obstructing goiter (on a 150 mcg /6 days and one day of 300 mcg/week -patient/spouse not sure which day), Red Man Syndrome from IV vancomycin, chronic pain syndrome with patient apparently on escalating doses of Neurontin (per spouse now on 800 mg 4xDay) with PRN Tramadol and Vicodin due to patient severe pain from her skin lesions, with last admission to [facility] in 10/2018 for RIGHT LE cellulitis, endometrial CA diagnosed in 2015 with [facility] BSO and presumed to be in remission, with patient with home visiting RN with an Unna's boot on the RIGHT LE but both are wrapped since Monday, with self referral following rigors and shaking chills since last night.   Spouse noted increased confusion by patient at home last night.    # sepsis 2/2 cellulitis  # AMS likely 2/2 infection  # bullous pemphigoid steroid dependent ro flare  # surgical hypothyroidism  # poorly controlled type 2 DM  # uterine CA presumed to be disease free  # RIGHT> left LE cellulitis    IV Azactam and vanco, monitor for red man syndrome  appreciate ID recs  BC NTD  appreciate derm recs  will given IVIG in house MWF per derm dosing for bullous phemphigoid to prevent flare (Gammagard 200gm/200mL- Infuse 80 gm/day QOD (M, W, F) for 1 week and repeat every 4 weeks)  cont prednisone 40mg  FS ACHS, SSI, lantus  CT head meningioma, no infarct  outpt takes neurontin 800mg QID rx by Dr Gonzales pain management, neurontin on hold  outpt takes tramadol 300mg extended release 24 hrs qd and norco 10-325mg q8 prn  cont Tramadol and low dose Oxycodone IR 5 mg PRN for pain relief  cont statin  PT    #Anemia- sp1u prbc, Hb low, no e/o active bleeding  anemia mullen- iron studies, hemolytic mullen, folate, b12  heme cs fu    DVT prophylaxis    PCP:     Olivia Ross MD (PCP) [phone]

## 2020-12-31 NOTE — PROGRESS NOTE ADULT - SUBJECTIVE AND OBJECTIVE BOX
Patient is a 70y old  Female who presents with a chief complaint of Rigors, chills since last night (31 Dec 2020 10:31)      INTERVAL HPI/OVERNIGHT EVENTS: noted  pt seen and examined  feels tired  denies       Vital Signs Last 24 Hrs  T(C): 37.1 (31 Dec 2020 12:15), Max: 37.3 (31 Dec 2020 00:24)  T(F): 98.8 (31 Dec 2020 12:15), Max: 99.2 (31 Dec 2020 00:24)  HR: 85 (31 Dec 2020 12:15) (74 - 85)  BP: 100/57 (31 Dec 2020 12:15) (93/55 - 102/58)  BP(mean): --  RR: 18 (31 Dec 2020 12:15) (18 - 18)  SpO2: 100% (31 Dec 2020 12:15) (96% - 100%)    acetaminophen   Tablet .. 650 milliGRAM(s) Oral every 6 hours PRN  aspirin enteric coated 81 milliGRAM(s) Oral daily  atorvastatin 20 milliGRAM(s) Oral at bedtime  cholecalciferol 2000 Unit(s) Oral two times a day  dextrose 40% Gel 15 Gram(s) Oral once  dextrose 5%. 1000 milliLiter(s) IV Continuous <Continuous>  dextrose 5%. 1000 milliLiter(s) IV Continuous <Continuous>  dextrose 50% Injectable 25 Gram(s) IV Push once  dextrose 50% Injectable 12.5 Gram(s) IV Push once  dextrose 50% Injectable 25 Gram(s) IV Push once  enoxaparin Injectable 40 milliGRAM(s) SubCutaneous every 12 hours  glucagon  Injectable 1 milliGRAM(s) IntraMuscular once  insulin glargine Injectable (LANTUS) 12 Unit(s) SubCutaneous at bedtime  insulin lispro (ADMELOG) corrective regimen sliding scale   SubCutaneous three times a day before meals  insulin lispro (ADMELOG) corrective regimen sliding scale   SubCutaneous at bedtime  levothyroxine 150 MICROGram(s) Oral daily  lisinopril 5 milliGRAM(s) Oral daily  multivitamin 1 Tablet(s) Oral daily  ondansetron Injectable 4 milliGRAM(s) IV Push every 6 hours PRN  oxyCODONE    IR 5 milliGRAM(s) Oral every 4 hours PRN  predniSONE   Tablet 40 milliGRAM(s) Oral daily  senna 2 Tablet(s) Oral at bedtime  sodium chloride 0.9%. 1000 milliLiter(s) IV Continuous <Continuous>  trimethoprim  160 mG/sulfamethoxazole 800 mG 2 Tablet(s) Oral every 12 hours      PHYSICAL EXAM:  GENERAL: NAD,   EYES: conjunctiva and sclera clear  ENMT: Moist mucous membranes  NECK: Supple, No JVD, Normal thyroid  CHEST/LUNG: non labored, cta b/l  HEART: Regular rate and rhythm; No murmurs, rubs, or gallops  ABDOMEN: Soft, Nontender, Nondistended; Bowel sounds present  EXTREMITIES:  2+ Peripheral Pulses, No clubbing, cyanosis, or edema  LYMPH: No lymphadenopathy noted  SKIN: No rashes or lesions    Consultant(s) Notes Reviewed:  [x ] YES  [ ] NO  Care Discussed with Consultants/Other Providers [ x] YES  [ ] NO    LABS:                        6.0    6.59  )-----------( 320      ( 31 Dec 2020 14:07 )             20.2     12-31    133<L>  |  100  |  24<H>  ----------------------------<  175<H>  3.8   |  25  |  0.68    Ca    8.2<L>      31 Dec 2020 07:09    TPro  6.8  /  Alb  2.6<L>  /  TBili  0.2  /  DBili  <0.1  /  AST  19  /  ALT  19  /  AlkPhos  64  12-31        CAPILLARY BLOOD GLUCOSE      POCT Blood Glucose.: 217 mg/dL (31 Dec 2020 17:10)  POCT Blood Glucose.: 267 mg/dL (31 Dec 2020 12:01)  POCT Blood Glucose.: 171 mg/dL (31 Dec 2020 08:04)  POCT Blood Glucose.: 248 mg/dL (30 Dec 2020 22:06)              RADIOLOGY & ADDITIONAL TESTS:    Imaging Personally Reviewed:  [x ] YES  [ ] NO

## 2020-12-31 NOTE — CHART NOTE - NSCHARTNOTEFT_GEN_A_CORE
Nutrition Follow Up Note  Patient seen for: routine follow up     Source: Comprehensive chart review and pt    Chart reviewed, events noted.     Diet : Diet, Soft:   Consistent Carbohydrate {No Snacks} (CSTCHO)  DASH/TLC {Sodium & Cholesterol Restricted} (DASH) (12-23-20 @ 21:44)      Subjective: Pt reports having a continued good appetite in-house; consuming >75% of most meals. Pt c/o not getting enough food. Encouraged pt to place orders with diet tech or with diet office as needed. No new food preferences at this time. Pt reports having some constipation but states it was resolved after receiving enema; last BM 12/29 per flowsheet. Pt note amenable to diet education at this time. Pt declines in-house supplements stating that she would rather keep getting the Ensure Max from home. Pt made aware that RD remains available as needed.      PO intake : %     Source for PO intake: flowsheet and pt     Enteral /Parenteral Nutrition: n/a      Daily   % Weight Change - no new weights noted    Pertinent Medications: MEDICATIONS  (STANDING):  aspirin enteric coated 81 milliGRAM(s) Oral daily  atorvastatin 20 milliGRAM(s) Oral at bedtime  cholecalciferol 2000 Unit(s) Oral two times a day  dextrose 40% Gel 15 Gram(s) Oral once  dextrose 5%. 1000 milliLiter(s) (50 mL/Hr) IV Continuous <Continuous>  dextrose 5%. 1000 milliLiter(s) (100 mL/Hr) IV Continuous <Continuous>  dextrose 50% Injectable 25 Gram(s) IV Push once  dextrose 50% Injectable 12.5 Gram(s) IV Push once  dextrose 50% Injectable 25 Gram(s) IV Push once  enoxaparin Injectable 40 milliGRAM(s) SubCutaneous every 12 hours  glucagon  Injectable 1 milliGRAM(s) IntraMuscular once  insulin glargine Injectable (LANTUS) 12 Unit(s) SubCutaneous at bedtime  insulin lispro (ADMELOG) corrective regimen sliding scale   SubCutaneous three times a day before meals  insulin lispro (ADMELOG) corrective regimen sliding scale   SubCutaneous at bedtime  levothyroxine 150 MICROGram(s) Oral daily  lisinopril 5 milliGRAM(s) Oral daily  multivitamin 1 Tablet(s) Oral daily  predniSONE   Tablet 40 milliGRAM(s) Oral daily  senna 2 Tablet(s) Oral at bedtime  sodium chloride 0.9%. 1000 milliLiter(s) (80 mL/Hr) IV Continuous <Continuous>  trimethoprim  160 mG/sulfamethoxazole 800 mG 2 Tablet(s) Oral every 12 hours    MEDICATIONS  (PRN):  acetaminophen   Tablet .. 650 milliGRAM(s) Oral every 6 hours PRN Temp greater or equal to 38C (100.4F), Mild Pain (1 - 3)  ondansetron Injectable 4 milliGRAM(s) IV Push every 6 hours PRN Nausea and/or Vomiting  oxyCODONE    IR 5 milliGRAM(s) Oral every 4 hours PRN Severe Pain (7 - 10)    Pertinent Labs: 12-31 @ 07:09: Na 133<L>, BUN 24<H>, Cr 0.68, <H>, K+ 3.8, Phos --, Mg --, Alk Phos 64, ALT/SGPT 19, AST/SGOT 19, HbA1c --    Finger Sticks:  POCT Blood Glucose.: 267 mg/dL (12-31 @ 12:01)  POCT Blood Glucose.: 171 mg/dL (12-31 @ 08:04)  POCT Blood Glucose.: 248 mg/dL (12-30 @ 22:06)  POCT Blood Glucose.: 258 mg/dL (12-30 @ 17:37)      Skin per nursing documentation: stage 2 sacral ulcer  Edema: 2+ generalized    Estimated Needs:   [X] no change since previous assessment  [ ] recalculated:     Previous Nutrition Diagnosis: overweight/obesity and Increased Nutrient Needs  Nutrition Diagnosis is: ongoing; addressed with therapeutic diet      Interventions:     Recommend  1) Continue current diet order as tolerated.     Monitoring and Evaluation:     Continue to monitor Nutritional intake, Tolerance to diet prescription, weights, labs, skin integrity    RD remains available upon request and will follow up per protocol    Beatris Ruiz RD pager # [phone]

## 2020-12-31 NOTE — PROGRESS NOTE ADULT - SUBJECTIVE AND OBJECTIVE BOX
[facility], Division of Infectious Diseases  MAYDA Braxton Y. Patel, S. [place]  [phone]  ([phone] - weekdays after 5pm and weekends)    Name: JAKE ZIMMERMAN  Age/Gender: 70y Female  MRN: [id number]    Interval History:  Patient sitting up in chair, no new complaints this morning.   Denies fever, chills, chest pain, dyspnea, cough, abd pain, n/v/d.  ROS reviewed, pertinent positives and negatives as above.   Notes reviewed. Afebrile.    Allergies: Ancef (Rash)  daptomycin (Vomiting)  Keflex (Unknown)  penicillin (Pruritus; Rash; Hives)    Objective:  Vitals:   T(F): 98.6 (12-31-20 @ 09:04), Max: 99.2 (12-31-20 @ 00:24)  HR: 77 (12-31-20 @ 09:04) (72 - 82)  BP: 101/61 (12-31-20 @ 09:04) (93/55 - 109/76)  RR: 18 (12-31-20 @ 09:04) (18 - 18)  SpO2: 96% (12-31-20 @ 09:04) (96% - 99%)    Physical Examination:  General: no acute distress, obese  HEENT: NC/AT, EOMI, anicteric, neck supple  Cardio: S1, S2 heard, RRR, murmur heard  Resp: CTA bilaterally, no rales/wheezes/rhonchi  Abd: soft, NT, ND, + BS  Neuro: AAOx3, no obvious focal deficits  Ext: no edema or cyanosis, moving extremities  Skin: RLE erythema improved, bullae/wounds with some scabbing  Psych: appropriate affect and mood for situation  Lines: PIV    Laboratory Studies:  CBC:                       6.1    6.30  )-----------( 311      ( 31 Dec 2020 07:09 )             20.2     CMP: 12-31    133<L>  |  100  |  24<H>  ----------------------------<  175<H>  3.8   |  25  |  0.68    Ca    8.2<L>      31 Dec 2020 07:09    Vancomycin Level, Trough: 13.7 ug/mL (12.31.20 @ 09:08)  Vancomycin Level, Trough: 13.6: ug/mL (12.29.20 @ 16:43)  Vancomycin Level, Trough: 22.5  ug/mL (12.29.20 @ 04:51)  Vancomycin Level, Trough: 18.2 ug/mL (12-27-20 @ 17:02)  Vancomycin Level, Trough: 18.3 ug/mL (12-26-20 @ 06:52)    Microbiology:  12/24 - urine culture - 50,000 - 99,000 CFU/mL Escherichia coli (pansensitive)  12/23 - blood cultures - negative     Radiology: reviewed, no new imaging in last 48h    Medications:  MEDICATIONS  (STANDING):  aspirin enteric coated 81 milliGRAM(s) Oral daily  atorvastatin 20 milliGRAM(s) Oral at bedtime  cholecalciferol 2000 Unit(s) Oral two times a day  dextrose 40% Gel 15 Gram(s) Oral once  dextrose 5%. 1000 milliLiter(s) (50 mL/Hr) IV Continuous <Continuous>  dextrose 5%. 1000 milliLiter(s) (100 mL/Hr) IV Continuous <Continuous>  dextrose 50% Injectable 25 Gram(s) IV Push once  dextrose 50% Injectable 12.5 Gram(s) IV Push once  dextrose 50% Injectable 25 Gram(s) IV Push once  enoxaparin Injectable 40 milliGRAM(s) SubCutaneous every 12 hours  glucagon  Injectable 1 milliGRAM(s) IntraMuscular once  insulin glargine Injectable (LANTUS) 12 Unit(s) SubCutaneous at bedtime  insulin lispro (ADMELOG) corrective regimen sliding scale   SubCutaneous three times a day before meals  insulin lispro (ADMELOG) corrective regimen sliding scale   SubCutaneous at bedtime  levothyroxine 150 MICROGram(s) Oral daily  lisinopril 5 milliGRAM(s) Oral daily  multivitamin 1 Tablet(s) Oral daily  predniSONE   Tablet 40 milliGRAM(s) Oral daily  senna 2 Tablet(s) Oral at bedtime  sodium chloride 0.9%. 1000 milliLiter(s) (80 mL/Hr) IV Continuous <Continuous>  vancomycin  IVPB 1250 milliGRAM(s) IV Intermittent every 12 hours    Antimicrobials:  vancomycin  IVPB 1250 milliGRAM(s) IV Intermittent every 12 hours - started 12/24  s/p aztreonam

## 2020-12-31 NOTE — CONSULT NOTE ADULT - SUBJECTIVE AND OBJECTIVE BOX
HPI:  69 y/o F w history of bullous pemphigoid for 6 years currently steroid dependent, type 2 DM on oral Rx, surgical hypothyroidism following total thyroidectomy for a large obstructing goiter, chronic pain syndrome with patient apparently on escalating doses of Neurontin (per spouse now on 800 mg 4xDay) with PRN Tramadol and Vicodin due to patient severe pain from her skin lesions, with last admission to [place] in 10/2018 for RIGHT LE cellulitis, endometrial CA diagnosed in  with  [facility] BSO and presumed to be in remission, with patient with home visiting RN with an Unna's boot on the RIGHT LE but both are wrapped since Monday, with self referral following rigors and shaking chills since last night.   Spouse noted increased confusion by patient at home last night.    Hematology consulted for acute on chronic anemia.  Usually her hgb runs ~9, but now 6.  Denies any bleeding.  No s/s of infection.  Only new medication is Dupitent.        Allergies    Ancef (Rash)  daptomycin (Vomiting)  Keflex (Unknown)  penicillin (Pruritus; Rash; Hives)    Intolerances    vancomycin (Other)      MEDICATIONS  (STANDING):  aspirin enteric coated 81 milliGRAM(s) Oral daily  atorvastatin 20 milliGRAM(s) Oral at bedtime  cholecalciferol 2000 Unit(s) Oral two times a day  dextrose 40% Gel 15 Gram(s) Oral once  dextrose 5%. 1000 milliLiter(s) (50 mL/Hr) IV Continuous <Continuous>  dextrose 5%. 1000 milliLiter(s) (100 mL/Hr) IV Continuous <Continuous>  dextrose 50% Injectable 25 Gram(s) IV Push once  dextrose 50% Injectable 25 Gram(s) IV Push once  dextrose 50% Injectable 12.5 Gram(s) IV Push once  enoxaparin Injectable 40 milliGRAM(s) SubCutaneous every 12 hours  glucagon  Injectable 1 milliGRAM(s) IntraMuscular once  insulin glargine Injectable (LANTUS) 12 Unit(s) SubCutaneous at bedtime  insulin lispro (ADMELOG) corrective regimen sliding scale   SubCutaneous three times a day before meals  insulin lispro (ADMELOG) corrective regimen sliding scale   SubCutaneous at bedtime  levothyroxine 150 MICROGram(s) Oral daily  lisinopril 5 milliGRAM(s) Oral daily  multivitamin 1 Tablet(s) Oral daily  predniSONE   Tablet 40 milliGRAM(s) Oral daily  senna 2 Tablet(s) Oral at bedtime  sodium chloride 0.9%. 1000 milliLiter(s) (80 mL/Hr) IV Continuous <Continuous>  trimethoprim  160 mG/sulfamethoxazole 800 mG 2 Tablet(s) Oral every 12 hours    MEDICATIONS  (PRN):  acetaminophen   Tablet .. 650 milliGRAM(s) Oral every 6 hours PRN Temp greater or equal to 38C (100.4F), Mild Pain (1 - 3)  ondansetron Injectable 4 milliGRAM(s) IV Push every 6 hours PRN Nausea and/or Vomiting  oxyCODONE    IR 5 milliGRAM(s) Oral every 4 hours PRN Severe Pain (7 - 10)      PAST MEDICAL & SURGICAL HISTORY:  Adrenal mass   incidental findings   Ct SCAN 2015 unchannged  24 hour urine for VMA done by Dr DR Canas [phone] Neg asper patient    Hyperlipidemia    Endometrial cancer  dx: 2015:  High grade Endometroid Adenocarcinoma    Morbid obesity  BMI:  53.6    Vitamin D deficiency    Hypothyroidism    Anemia    History of osteoarthritis    Type 2 diabetes mellitus    Hypertension    Bullous pemphigoid  dx: 2014.  On Immunosupressants  Cellcept    S/P BSO (bilateral salpingo-oophorectomy)    S/P hysterectomy    Endometrial cancer  2015:  Endometrial Biopsy: dx: High Grade Endometroid Adenocarcinoma    Status post total thyroidectomy  &#x27; 2001    History of  section  &#x27; 82-&#x27;95:  4 X        FAMILY HISTORY:  Family history of lymphoma (Father)  father        SOCIAL HISTORY: No EtOH, no tobacco    REVIEW OF SYSTEMS:    CONSTITUTIONAL: No weakness, fevers or chills  EYES/ENT: No visual changes;  No vertigo or throat pain   NECK: No pain or stiffness  RESPIRATORY: No cough, wheezing, hemoptysis; No shortness of breath  CARDIOVASCULAR: No chest pain or palpitations  GASTROINTESTINAL: No abdominal or epigastric pain. No nausea, vomiting, or hematemesis; No diarrhea or constipation. No melena or hematochezia.  GENITOURINARY: No dysuria, frequency or hematuria  NEUROLOGICAL: No numbness or weakness  SKIN: No itching, burning, rashes, or lesions   All other review of systems is negative unless indicated above.        T(F): 98.2 (20 @ 23:51), Max: 98.8 (20 @ 12:15)  HR: 84 (20 @ 23:51)  BP: 100/57 (20 @ 23:51)  RR: 18 (20 @ 23:51)  SpO2: 98% (20 @ 23:51)  Wt(kg): --    GENERAL: NAD, well-developed  HEAD:  Atraumatic, Normocephalic  EYES: EOMI, PERRLA, conjunctiva and sclera clear  NECK: Supple, No JVD  CHEST/LUNG: Clear to auscultation bilaterally; No wheeze  HEART: Regular rate and rhythm; No murmurs, rubs, or gallops  ABDOMEN: Soft, Nontender, Nondistended; Bowel sounds present  EXTREMITIES:  2+ Peripheral Pulses, No clubbing, cyanosis, or edema  NEUROLOGY: non-focal  SKIN: No rashes or lesions                          6.0    6.59  )-----------( 320      ( 31 Dec 2020 14:07 )             20.2           133<L>  |  100  |  24<H>  ----------------------------<  175<H>  3.8   |  25  |  0.68    Ca    8.2<L>      31 Dec 2020 07:09    TPro  6.8  /  Alb  2.6<L>  /  TBili  0.2  /  DBili  <0.1  /  AST  19  /  ALT  19  /  AlkPhos  64        Lactate Dehydrogenase, Serum: 235 U/L ( @ 07:09)       HPI:  71 y/o F w history of high grade stage 1 uterine carcinosarcoma s/p TLH/BSO and adjuvant carbo/taxol in 2016, bullous pemphigoid and LDH on IVIG and prednisone, DM, surgical hypothyroidism following total thyroidectomy for a large obstructing goiter, chronic pain syndrome with patient apparently on escalating doses of Neurontin (per spouse now on 800 mg 4xDay) with PRN Tramadol and Vicodin due to patient severe pain from her skin lesions, last admission in 10/2018 for RIGHT LE cellulitis p/w rigors and shaking chills, AMS admitted for sepsis 2/2 to cellulitis infection on IV abx.      Hematology consulted for acute on chronic anemia.  Usually her hgb runs ~9, but now 6.  Plts wnl.  Normal renal function.  Vit B12 929, folate 13.  Haptoglobin 174, bili 0.2, direct dedrick neg.  Iron sat 11%, ferritin 44.  Denies any bleeding.  Otherwise asx. Only new medication is Dupixent for eczema.      Allergies  Ancef (Rash)  daptomycin (Vomiting)  Keflex (Unknown)  penicillin (Pruritus; Rash; Hives)    Intolerances    vancomycin (Other)      MEDICATIONS  (STANDING):  aspirin enteric coated 81 milliGRAM(s) Oral daily  atorvastatin 20 milliGRAM(s) Oral at bedtime  cholecalciferol 2000 Unit(s) Oral two times a day  dextrose 40% Gel 15 Gram(s) Oral once  dextrose 5%. 1000 milliLiter(s) (50 mL/Hr) IV Continuous <Continuous>  dextrose 5%. 1000 milliLiter(s) (100 mL/Hr) IV Continuous <Continuous>  dextrose 50% Injectable 25 Gram(s) IV Push once  dextrose 50% Injectable 25 Gram(s) IV Push once  dextrose 50% Injectable 12.5 Gram(s) IV Push once  enoxaparin Injectable 40 milliGRAM(s) SubCutaneous every 12 hours  glucagon  Injectable 1 milliGRAM(s) IntraMuscular once  insulin glargine Injectable (LANTUS) 12 Unit(s) SubCutaneous at bedtime  insulin lispro (ADMELOG) corrective regimen sliding scale   SubCutaneous three times a day before meals  insulin lispro (ADMELOG) corrective regimen sliding scale   SubCutaneous at bedtime  levothyroxine 150 MICROGram(s) Oral daily  lisinopril 5 milliGRAM(s) Oral daily  multivitamin 1 Tablet(s) Oral daily  predniSONE   Tablet 40 milliGRAM(s) Oral daily  senna 2 Tablet(s) Oral at bedtime  sodium chloride 0.9%. 1000 milliLiter(s) (80 mL/Hr) IV Continuous <Continuous>  trimethoprim  160 mG/sulfamethoxazole 800 mG 2 Tablet(s) Oral every 12 hours    MEDICATIONS  (PRN):  acetaminophen   Tablet .. 650 milliGRAM(s) Oral every 6 hours PRN Temp greater or equal to 38C (100.4F), Mild Pain (1 - 3)  ondansetron Injectable 4 milliGRAM(s) IV Push every 6 hours PRN Nausea and/or Vomiting  oxyCODONE    IR 5 milliGRAM(s) Oral every 4 hours PRN Severe Pain (7 - 10)      PAST MEDICAL & SURGICAL HISTORY:  Adrenal mass   incidental findings   Ct SCAN 2015 unchannged  24 hour urine for VMA done by Dr DR Canas [phone] Neg asper patient    Hyperlipidemia    Endometrial cancer  dx: 2015:  High grade Endometroid Adenocarcinoma    Morbid obesity  BMI:  53.6    Vitamin D deficiency    Hypothyroidism    Anemia    History of osteoarthritis    Type 2 diabetes mellitus    Hypertension    Bullous pemphigoid  dx: 2014.  On Immunosupressants  Cellcept    S/P BSO (bilateral salpingo-oophorectomy)    S/P hysterectomy    Endometrial cancer  2015:  Endometrial Biopsy: dx: High Grade Endometroid Adenocarcinoma    Status post total thyroidectomy  &#x27; 2001    History of  section  &#x27; 82-&#x27;95:  4 X        FAMILY HISTORY:  Family history of lymphoma (Father)  father        SOCIAL HISTORY: No EtOH, no tobacco    REVIEW OF SYSTEMS:    CONSTITUTIONAL: No weakness, fevers or chills  EYES/ENT: No visual changes;  No vertigo or throat pain   NECK: No pain or stiffness  RESPIRATORY: No cough, wheezing, hemoptysis; No shortness of breath  CARDIOVASCULAR: No chest pain or palpitations  GASTROINTESTINAL: No abdominal or epigastric pain. No nausea, vomiting, or hematemesis; No diarrhea or constipation. No melena or hematochezia.  GENITOURINARY: No dysuria, frequency or hematuria  NEUROLOGICAL: No numbness or weakness  SKIN: No itching, burning, rashes, or lesions   All other review of systems is negative unless indicated above.        T(F): 98.2 (20 @ 23:51), Max: 98.8 (20 @ 12:15)  HR: 84 (20 @ 23:51)  BP: 100/57 (20 @ 23:51)  RR: 18 (20 @ 23:51)  SpO2: 98% (20 @ 23:51)  Wt(kg): --    GENERAL: NAD, well-developed  HEAD:  Atraumatic, Normocephalic  EYES: EOMI, PERRLA, conjunctiva and sclera clear  NECK: Supple, No JVD  CHEST/LUNG: Clear to auscultation bilaterally; No wheeze  HEART: Regular rate and rhythm; No murmurs, rubs, or gallops  ABDOMEN: Soft, Nontender, Nondistended; Bowel sounds present  EXTREMITIES:  2+ Peripheral Pulses, No clubbing, cyanosis, or edema  NEUROLOGY: non-focal  SKIN: No rashes or lesions                          6.0    6.59  )-----------( 320      ( 31 Dec 2020 14:07 )             20.2           133<L>  |  100  |  24<H>  ----------------------------<  175<H>  3.8   |  25  |  0.68    Ca    8.2<L>      31 Dec 2020 07:09    TPro  6.8  /  Alb  2.6<L>  /  TBili  0.2  /  DBili  <0.1  /  AST  19  /  ALT  19  /  AlkPhos  64        Lactate Dehydrogenase, Serum: 235 U/L ( @ 07:09)

## 2021-01-01 LAB
ANION GAP SERPL CALC-SCNC: 7 MMOL/L — SIGNIFICANT CHANGE UP (ref 5–17)
BUN SERPL-MCNC: 26 MG/DL — HIGH (ref 7–23)
CALCIUM SERPL-MCNC: 8.1 MG/DL — LOW (ref 8.4–10.5)
CHLORIDE SERPL-SCNC: 102 MMOL/L — SIGNIFICANT CHANGE UP (ref 96–108)
CO2 SERPL-SCNC: 27 MMOL/L — SIGNIFICANT CHANGE UP (ref 22–31)
CREAT SERPL-MCNC: 0.77 MG/DL — SIGNIFICANT CHANGE UP (ref 0.5–1.3)
GLUCOSE SERPL-MCNC: 184 MG/DL — HIGH (ref 70–99)
HCT VFR BLD CALC: 19.7 % — CRITICAL LOW (ref 34.5–45)
HGB BLD-MCNC: 5.8 G/DL — CRITICAL LOW (ref 11.5–15.5)
IRON SATN MFR SERPL: 11 % — LOW (ref 14–50)
IRON SATN MFR SERPL: 32 UG/DL — SIGNIFICANT CHANGE UP (ref 30–160)
MCHC RBC-ENTMCNC: 29.4 GM/DL — LOW (ref 32–36)
MCHC RBC-ENTMCNC: 30.2 PG — SIGNIFICANT CHANGE UP (ref 27–34)
MCV RBC AUTO: 102.6 FL — HIGH (ref 80–100)
NRBC # BLD: 4 /100 WBCS — HIGH (ref 0–0)
PLATELET # BLD AUTO: 315 K/UL — SIGNIFICANT CHANGE UP (ref 150–400)
POTASSIUM SERPL-MCNC: 4 MMOL/L — SIGNIFICANT CHANGE UP (ref 3.5–5.3)
POTASSIUM SERPL-SCNC: 4 MMOL/L — SIGNIFICANT CHANGE UP (ref 3.5–5.3)
RBC # BLD: 1.92 M/UL — LOW (ref 3.8–5.2)
RBC # FLD: 18.2 % — HIGH (ref 10.3–14.5)
SODIUM SERPL-SCNC: 136 MMOL/L — SIGNIFICANT CHANGE UP (ref 135–145)
TIBC SERPL-MCNC: 299 UG/DL — SIGNIFICANT CHANGE UP (ref 220–430)
UIBC SERPL-MCNC: 267 UG/DL — SIGNIFICANT CHANGE UP (ref 110–370)
WBC # BLD: 5.87 K/UL — SIGNIFICANT CHANGE UP (ref 3.8–10.5)
WBC # FLD AUTO: 5.87 K/UL — SIGNIFICANT CHANGE UP (ref 3.8–10.5)

## 2021-01-01 PROCEDURE — 74176 CT ABD & PELVIS W/O CONTRAST: CPT | Mod: 26

## 2021-01-01 RX ORDER — LEVOTHYROXINE SODIUM 125 MCG
300 TABLET ORAL DAILY
Refills: 0 | Status: DISCONTINUED | OUTPATIENT
Start: 2021-01-01 | End: 2021-01-05

## 2021-01-01 RX ADMIN — ATORVASTATIN CALCIUM 20 MILLIGRAM(S): 80 TABLET, FILM COATED ORAL at 22:04

## 2021-01-01 RX ADMIN — ENOXAPARIN SODIUM 40 MILLIGRAM(S): 100 INJECTION SUBCUTANEOUS at 17:43

## 2021-01-01 RX ADMIN — OXYCODONE HYDROCHLORIDE 5 MILLIGRAM(S): 5 TABLET ORAL at 18:24

## 2021-01-01 RX ADMIN — OXYCODONE HYDROCHLORIDE 5 MILLIGRAM(S): 5 TABLET ORAL at 04:10

## 2021-01-01 RX ADMIN — Medication 2 TABLET(S): at 06:09

## 2021-01-01 RX ADMIN — Medication 2000 UNIT(S): at 06:09

## 2021-01-01 RX ADMIN — Medication 81 MILLIGRAM(S): at 12:27

## 2021-01-01 RX ADMIN — OXYCODONE HYDROCHLORIDE 5 MILLIGRAM(S): 5 TABLET ORAL at 04:40

## 2021-01-01 RX ADMIN — Medication 2: at 17:40

## 2021-01-01 RX ADMIN — OXYCODONE HYDROCHLORIDE 5 MILLIGRAM(S): 5 TABLET ORAL at 22:29

## 2021-01-01 RX ADMIN — Medication 2000 UNIT(S): at 17:44

## 2021-01-01 RX ADMIN — Medication 2 TABLET(S): at 17:44

## 2021-01-01 RX ADMIN — OXYCODONE HYDROCHLORIDE 5 MILLIGRAM(S): 5 TABLET ORAL at 19:00

## 2021-01-01 RX ADMIN — SENNA PLUS 2 TABLET(S): 8.6 TABLET ORAL at 22:04

## 2021-01-01 RX ADMIN — Medication 40 MILLIGRAM(S): at 06:10

## 2021-01-01 RX ADMIN — Medication 1: at 22:05

## 2021-01-01 RX ADMIN — Medication 4: at 12:27

## 2021-01-01 RX ADMIN — LISINOPRIL 5 MILLIGRAM(S): 2.5 TABLET ORAL at 06:09

## 2021-01-01 RX ADMIN — Medication 150 MICROGRAM(S): at 06:10

## 2021-01-01 RX ADMIN — OXYCODONE HYDROCHLORIDE 5 MILLIGRAM(S): 5 TABLET ORAL at 22:59

## 2021-01-01 RX ADMIN — Medication 1 TABLET(S): at 12:27

## 2021-01-01 RX ADMIN — INSULIN GLARGINE 12 UNIT(S): 100 INJECTION, SOLUTION SUBCUTANEOUS at 22:04

## 2021-01-01 RX ADMIN — Medication 2: at 08:43

## 2021-01-01 RX ADMIN — OXYCODONE HYDROCHLORIDE 5 MILLIGRAM(S): 5 TABLET ORAL at 16:40

## 2021-01-01 RX ADMIN — ENOXAPARIN SODIUM 40 MILLIGRAM(S): 100 INJECTION SUBCUTANEOUS at 06:10

## 2021-01-01 RX ADMIN — OXYCODONE HYDROCHLORIDE 5 MILLIGRAM(S): 5 TABLET ORAL at 14:12

## 2021-01-01 NOTE — PROGRESS NOTE ADULT - SUBJECTIVE AND OBJECTIVE BOX
[facility], Division of Infectious Diseases  MAYDA Braxton Y. Patel, S[address] [place]  [phone]  ([phone] - weekdays after 5pm and weekends)    Name: JAKE ZIMMERMAN  Age/Gender: 70y Female  MRN: [id number]    Interval History:  Patient sitting up comfortably in chair, no new complaints this morning.  Denies fever, chills, chest pain, dyspnea, cough, abd pain, n/v/d.  ROS reviewed, pertinent positives and negatives as above.   Notes reviewed. Afebrile. Heme following for anemia.     Allergies: Ancef (Rash)  daptomycin (Vomiting)  Keflex (Unknown)  penicillin (Pruritus; Rash; Hives)    Objective:  Vitals:   CBC:                       5.8    5.87  )-----------( 315      ( 01 Jan 2021 07:07 )             19.7     CMP: 01-01    136  |  102  |  26<H>  ----------------------------<  184<H>  4.0   |  27  |  0.77    Ca    8.1<L>      01 Jan 2021 06:31    TPro  6.8  /  Alb  2.6<L>  /  TBili  0.2  /  DBili  <0.1  /  AST  19  /  ALT  19  /  AlkPhos  64  12-31    LIVER FUNCTIONS - ( 31 Dec 2020 07:09 )  Alb: 2.6 g/dL / Pro: 6.8 g/dL / ALK PHOS: 64 U/L / ALT: 19 U/L / AST: 19 U/L / GGT: x           Physical Examination:  General: no acute distress, obese  HEENT: NC/AT, EOMI, anicteric, neck supple  Cardio: S1, S2 heard, RRR, murmur heard  Resp: CTA bilaterally, no rales/wheezes/rhonchi  Abd: soft, NT, ND, + BS  Neuro: AAOx3, no obvious focal deficits  Ext: no edema or cyanosis, moving extremities  Skin: RLE erythema improved, bullae/wounds with some scabbing  Psych: appropriate affect and mood for situation  Lines: PIV    Laboratory Studies:  CBC:                       5.8    5.87  )-----------( 315      ( 01 Jan 2021 07:07 )             19.7     CMP: 01-01    136  |  102  |  26<H>  ----------------------------<  184<H>  4.0   |  27  |  0.77    Ca    8.1<L>      01 Jan 2021 06:31    TPro  6.8  /  Alb  2.6<L>  /  TBili  0.2  /  DBili  <0.1  /  AST  19  /  ALT  19  /  AlkPhos  64  12-31    LIVER FUNCTIONS - ( 31 Dec 2020 07:09 )  Alb: 2.6 g/dL / Pro: 6.8 g/dL / ALK PHOS: 64 U/L / ALT: 19 U/L / AST: 19 U/L / GGT: x           Microbiology:  12/24 - urine culture - 50,000 - 99,000 CFU/mL Escherichia coli (pansensitive)  12/23 - blood cultures - negative     Radiology: reviewed, no new imaging in last 48h    Medications:  MEDICATIONS  (STANDING):  aspirin enteric coated 81 milliGRAM(s) Oral daily  atorvastatin 20 milliGRAM(s) Oral at bedtime  cholecalciferol 2000 Unit(s) Oral two times a day  dextrose 40% Gel 15 Gram(s) Oral once  dextrose 5%. 1000 milliLiter(s) (50 mL/Hr) IV Continuous <Continuous>  dextrose 5%. 1000 milliLiter(s) (100 mL/Hr) IV Continuous <Continuous>  dextrose 50% Injectable 12.5 Gram(s) IV Push once  dextrose 50% Injectable 25 Gram(s) IV Push once  dextrose 50% Injectable 25 Gram(s) IV Push once  enoxaparin Injectable 40 milliGRAM(s) SubCutaneous every 12 hours  glucagon  Injectable 1 milliGRAM(s) IntraMuscular once  insulin glargine Injectable (LANTUS) 12 Unit(s) SubCutaneous at bedtime  insulin lispro (ADMELOG) corrective regimen sliding scale   SubCutaneous three times a day before meals  insulin lispro (ADMELOG) corrective regimen sliding scale   SubCutaneous at bedtime  levothyroxine 150 MICROGram(s) Oral daily  lisinopril 5 milliGRAM(s) Oral daily  multivitamin 1 Tablet(s) Oral daily  predniSONE   Tablet 40 milliGRAM(s) Oral daily  senna 2 Tablet(s) Oral at bedtime  sodium chloride 0.9%. 1000 milliLiter(s) (80 mL/Hr) IV Continuous <Continuous>  trimethoprim  160 mG/sulfamethoxazole 800 mG 2 Tablet(s) Oral every 12 hours    Antimicrobials:  trimethoprim  160 mG/sulfamethoxazole 800 mG 2 Tablet(s) Oral every 12 hours - started 12/31  vancomycin 12/24-12/31  s/p aztreonam

## 2021-01-01 NOTE — PROGRESS NOTE ADULT - SUBJECTIVE AND OBJECTIVE BOX
Patient is a 70y old  Female who presents with a chief complaint of Rigors, chills since last night (01 Jan 2021 11:45)      INTERVAL HPI/OVERNIGHT EVENTS: noted  pt seen and examined  Hb 5.8- to receive 2u prbc  no active bleeding, no new bruises  denies flank pain/back pain      Vital Signs Last 24 Hrs  T(C): 37.3 (01 Jan 2021 20:09), Max: 37.3 (01 Jan 2021 20:09)  T(F): 99.1 (01 Jan 2021 20:09), Max: 99.1 (01 Jan 2021 20:09)  HR: 85 (01 Jan 2021 20:09) (82 - 86)  BP: 106/58 (01 Jan 2021 20:09) (94/60 - 123/75)  BP(mean): --  RR: 18 (01 Jan 2021 20:09) (18 - 18)  SpO2: 97% (01 Jan 2021 20:09) (97% - 100%)    acetaminophen   Tablet .. 650 milliGRAM(s) Oral every 6 hours PRN  aspirin enteric coated 81 milliGRAM(s) Oral daily  atorvastatin 20 milliGRAM(s) Oral at bedtime  cholecalciferol 2000 Unit(s) Oral two times a day  dextrose 40% Gel 15 Gram(s) Oral once  dextrose 5%. 1000 milliLiter(s) IV Continuous <Continuous>  dextrose 5%. 1000 milliLiter(s) IV Continuous <Continuous>  dextrose 50% Injectable 25 Gram(s) IV Push once  dextrose 50% Injectable 12.5 Gram(s) IV Push once  dextrose 50% Injectable 25 Gram(s) IV Push once  enoxaparin Injectable 40 milliGRAM(s) SubCutaneous every 12 hours  glucagon  Injectable 1 milliGRAM(s) IntraMuscular once  insulin glargine Injectable (LANTUS) 12 Unit(s) SubCutaneous at bedtime  insulin lispro (ADMELOG) corrective regimen sliding scale   SubCutaneous three times a day before meals  insulin lispro (ADMELOG) corrective regimen sliding scale   SubCutaneous at bedtime  levothyroxine 300 MICROGram(s) Oral daily  lisinopril 5 milliGRAM(s) Oral daily  multivitamin 1 Tablet(s) Oral daily  ondansetron Injectable 4 milliGRAM(s) IV Push every 6 hours PRN  oxyCODONE    IR 5 milliGRAM(s) Oral every 4 hours PRN  predniSONE   Tablet 40 milliGRAM(s) Oral daily  senna 2 Tablet(s) Oral at bedtime  sodium chloride 0.9%. 1000 milliLiter(s) IV Continuous <Continuous>  trimethoprim  160 mG/sulfamethoxazole 800 mG 2 Tablet(s) Oral every 12 hours      PHYSICAL EXAM:  GENERAL: NAD,   EYES: conjunctiva and sclera clear  ENMT: Moist mucous membranes  NECK: Supple, No JVD, Normal thyroid  CHEST/LUNG: non labored, cta b/l  HEART: Regular rate and rhythm; No murmurs, rubs, or gallops  ABDOMEN: Soft, Nontender, Nondistended; Bowel sounds present  EXTREMITIES:  2+ Peripheral Pulses, No clubbing, cyanosis, or edema  LYMPH: No lymphadenopathy noted  SKIN: No rashes or lesions    Consultant(s) Notes Reviewed:  [x ] YES  [ ] NO  Care Discussed with Consultants/Other Providers [ x] YES  [ ] NO    LABS:                        5.8    5.87  )-----------( 315      ( 01 Jan 2021 07:07 )             19.7     01-01    136  |  102  |  26<H>  ----------------------------<  184<H>  4.0   |  27  |  0.77    Ca    8.1<L>      01 Jan 2021 06:31    TPro  6.8  /  Alb  2.6<L>  /  TBili  0.2  /  DBili  <0.1  /  AST  19  /  ALT  19  /  AlkPhos  64  12-31        CAPILLARY BLOOD GLUCOSE      POCT Blood Glucose.: 293 mg/dL (01 Jan 2021 21:35)  POCT Blood Glucose.: 231 mg/dL (01 Jan 2021 17:09)  POCT Blood Glucose.: 307 mg/dL (01 Jan 2021 11:57)  POCT Blood Glucose.: 215 mg/dL (01 Jan 2021 08:05)              RADIOLOGY & ADDITIONAL TESTS:    Imaging Personally Reviewed:  [x ] YES  [ ] NO

## 2021-01-01 NOTE — PROGRESS NOTE ADULT - ASSESSMENT
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 (on a 150 mcg /6 days and one day of 300 mcg/week -patient/spouse not sure which day), Red Man Syndrome from IV vancomycin, chronic pain syndrome with patient apparently on escalating doses of Neurontin (per spouse now on 800 mg 4xDay) with PRN Tramadol and Vicodin due to patient severe pain from her skin lesions, with last admission to [place] in 10/2018 for RIGHT LE cellulitis, endometrial CA diagnosed in 2015 with [facility] BSO and presumed to be in remission, with patient with home visiting RN with an Unna's boot on the RIGHT LE but both are wrapped since Monday, with self referral following rigors and shaking chills since last night.   Spouse noted increased confusion by patient at home last night.    # sepsis 2/2 cellulitis  # AMS likely 2/2 infection  # bullous pemphigoid steroid dependent ro flare  # surgical hypothyroidism  # poorly controlled type 2 DM  # uterine CA presumed to be disease free  # RIGHT> left LE cellulitis    IV Azactam and vanco, monitor for red man syndrome  appreciate ID recs  BC NTD  appreciate derm recs  will given IVIG in house MWF per derm dosing for bullous phemphigoid to prevent flare (Gammagard 200gm/200mL- Infuse 80 gm/day QOD (M, W, F) for 1 week and repeat every 4 weeks)  cont prednisone 40mg  FS ACHS, SSI, lantus  CT head meningioma, no infarct  outpt takes neurontin 800mg QID rx by Dr Gonzales pain management, neurontin on hold  outpt takes tramadol 300mg extended release 24 hrs qd and norco 10-325mg q8 prn  cont Tramadol and low dose Oxycodone IR 5 mg PRN for pain relief  cont statin  PT    #Anemia- sp1u prbc, Hb remains low, no e/o active bleeding  transfuse to keep hb >7  anemia mullen- iron studies, hemolytic mullen, folate, b12 - reveiwed  heme cs noted  cta/p -no rp bleed  likely need bM bx    Hypothyroidism: elev tsh, pt currently on 150mcg/d but home dose- takes 300 twice wkly, and 150mcg remaining days  will adjust synthroid dosing to 300mcg qd for a week and switch back to home dose as above    DVT prophylaxis    PCP:     Olivia Ross MD (PCP) [phone]

## 2021-01-01 NOTE — PROGRESS NOTE ADULT - ASSESSMENT
Patient is a 70 female with PMH of bullous pemphigoid diagnosed in 2014 on steroids, surgical hypothyroidism following total thyroidectomy, chronic pain syndrome, endometrial CA diagnosed in 2015 with [facility] BSO, now in remission who presented with RLE pain/and worsening erythema, concerned for cellulitis    Sepsis 2/2 b/l LE cellulitis  sepsis resolved, LE cellulitis much improved  BCx negative   On Vancomycin 1.25mg IV Q12h, vancomycin ;level ~13  Discontinued vancomycin  Continue Bactrim DS 2 tablets PO Q12h - complete total 10 day course - end tomorrow 1/2/21    Sepsis 2/2 UTI?  -UA reviewed, inconsistent with infection and patient asymptomatic, UCx +E.coli  -completed aztreonam x5 day on 12/29    Candidiasis in groin region  -apply topical nystatin     Red-Man syndrome  -ok to administer vancomycin as above    Penicillin Allergy  -pt w/ extensive skin lesions from underlying pemphigoid disease  -would follow w/ allergy as outpatient for desensitization to penicillins as cellulitis will be a recurring problem in this patient and B-lactams are ideal for cellulitis therapy    Bullous pemphigoid   -steroid dependent  -sx can be multifactorial 2/2 cellulitis and flare  -appreciate derm recs    Chronic anemia  Hematology following  Transfusing as needed    DM2  -strict glucose control to promote resolution of infection and wound healing

## 2021-01-02 LAB
ANION GAP SERPL CALC-SCNC: 7 MMOL/L — SIGNIFICANT CHANGE UP (ref 5–17)
BUN SERPL-MCNC: 22 MG/DL — SIGNIFICANT CHANGE UP (ref 7–23)
CALCIUM SERPL-MCNC: 7.9 MG/DL — LOW (ref 8.4–10.5)
CHLORIDE SERPL-SCNC: 102 MMOL/L — SIGNIFICANT CHANGE UP (ref 96–108)
CO2 SERPL-SCNC: 24 MMOL/L — SIGNIFICANT CHANGE UP (ref 22–31)
CREAT SERPL-MCNC: 0.73 MG/DL — SIGNIFICANT CHANGE UP (ref 0.5–1.3)
GLUCOSE SERPL-MCNC: 175 MG/DL — HIGH (ref 70–99)
HCT VFR BLD CALC: 22.8 % — LOW (ref 34.5–45)
HCT VFR BLD CALC: 23.5 % — LOW (ref 34.5–45)
HGB BLD-MCNC: 7.5 G/DL — LOW (ref 11.5–15.5)
HGB BLD-MCNC: 7.7 G/DL — LOW (ref 11.5–15.5)
MCHC RBC-ENTMCNC: 32.8 GM/DL — SIGNIFICANT CHANGE UP (ref 32–36)
MCHC RBC-ENTMCNC: 32.8 PG — SIGNIFICANT CHANGE UP (ref 27–34)
MCHC RBC-ENTMCNC: 32.9 GM/DL — SIGNIFICANT CHANGE UP (ref 32–36)
MCHC RBC-ENTMCNC: 33.3 PG — SIGNIFICANT CHANGE UP (ref 27–34)
MCV RBC AUTO: 100 FL — SIGNIFICANT CHANGE UP (ref 80–100)
MCV RBC AUTO: 101.3 FL — HIGH (ref 80–100)
NRBC # BLD: 5 /100 WBCS — HIGH (ref 0–0)
NRBC # BLD: 5 /100 WBCS — HIGH (ref 0–0)
PLATELET # BLD AUTO: 319 K/UL — SIGNIFICANT CHANGE UP (ref 150–400)
PLATELET # BLD AUTO: 325 K/UL — SIGNIFICANT CHANGE UP (ref 150–400)
POTASSIUM SERPL-MCNC: 4.1 MMOL/L — SIGNIFICANT CHANGE UP (ref 3.5–5.3)
POTASSIUM SERPL-SCNC: 4.1 MMOL/L — SIGNIFICANT CHANGE UP (ref 3.5–5.3)
RBC # BLD: 2.25 M/UL — LOW (ref 3.8–5.2)
RBC # BLD: 2.35 M/UL — LOW (ref 3.8–5.2)
RBC # FLD: 19.5 % — HIGH (ref 10.3–14.5)
RBC # FLD: 20.2 % — HIGH (ref 10.3–14.5)
SODIUM SERPL-SCNC: 133 MMOL/L — LOW (ref 135–145)
WBC # BLD: 7.56 K/UL — SIGNIFICANT CHANGE UP (ref 3.8–10.5)
WBC # BLD: 8.28 K/UL — SIGNIFICANT CHANGE UP (ref 3.8–10.5)
WBC # FLD AUTO: 7.56 K/UL — SIGNIFICANT CHANGE UP (ref 3.8–10.5)
WBC # FLD AUTO: 8.28 K/UL — SIGNIFICANT CHANGE UP (ref 3.8–10.5)

## 2021-01-02 RX ORDER — IMMUNE GLOBULIN (HUMAN) 10 G/100ML
80 INJECTION INTRAVENOUS; SUBCUTANEOUS ONCE
Refills: 0 | Status: COMPLETED | OUTPATIENT
Start: 2021-01-02 | End: 2021-01-02

## 2021-01-02 RX ORDER — DIPHENHYDRAMINE HCL 50 MG
25 CAPSULE ORAL ONCE
Refills: 0 | Status: COMPLETED | OUTPATIENT
Start: 2021-01-02 | End: 2021-01-02

## 2021-01-02 RX ORDER — ACETAMINOPHEN 500 MG
650 TABLET ORAL ONCE
Refills: 0 | Status: COMPLETED | OUTPATIENT
Start: 2021-01-02 | End: 2021-01-02

## 2021-01-02 RX ORDER — INSULIN LISPRO 100/ML
VIAL (ML) SUBCUTANEOUS
Refills: 0 | Status: DISCONTINUED | OUTPATIENT
Start: 2021-01-02 | End: 2021-01-07

## 2021-01-02 RX ADMIN — IMMUNE GLOBULIN (HUMAN) 133.33 GRAM(S): 10 INJECTION INTRAVENOUS; SUBCUTANEOUS at 20:57

## 2021-01-02 RX ADMIN — ENOXAPARIN SODIUM 40 MILLIGRAM(S): 100 INJECTION SUBCUTANEOUS at 17:02

## 2021-01-02 RX ADMIN — OXYCODONE HYDROCHLORIDE 5 MILLIGRAM(S): 5 TABLET ORAL at 05:14

## 2021-01-02 RX ADMIN — ATORVASTATIN CALCIUM 20 MILLIGRAM(S): 80 TABLET, FILM COATED ORAL at 21:06

## 2021-01-02 RX ADMIN — Medication 6: at 17:48

## 2021-01-02 RX ADMIN — OXYCODONE HYDROCHLORIDE 5 MILLIGRAM(S): 5 TABLET ORAL at 10:08

## 2021-01-02 RX ADMIN — Medication 1: at 08:35

## 2021-01-02 RX ADMIN — Medication 2 TABLET(S): at 05:19

## 2021-01-02 RX ADMIN — Medication 40 MILLIGRAM(S): at 05:19

## 2021-01-02 RX ADMIN — INSULIN GLARGINE 12 UNIT(S): 100 INJECTION, SOLUTION SUBCUTANEOUS at 22:00

## 2021-01-02 RX ADMIN — Medication 2000 UNIT(S): at 05:19

## 2021-01-02 RX ADMIN — OXYCODONE HYDROCHLORIDE 5 MILLIGRAM(S): 5 TABLET ORAL at 19:47

## 2021-01-02 RX ADMIN — ENOXAPARIN SODIUM 40 MILLIGRAM(S): 100 INJECTION SUBCUTANEOUS at 05:19

## 2021-01-02 RX ADMIN — LISINOPRIL 5 MILLIGRAM(S): 2.5 TABLET ORAL at 05:19

## 2021-01-02 RX ADMIN — Medication 4: at 12:38

## 2021-01-02 RX ADMIN — Medication 2000 UNIT(S): at 17:02

## 2021-01-02 RX ADMIN — OXYCODONE HYDROCHLORIDE 5 MILLIGRAM(S): 5 TABLET ORAL at 20:00

## 2021-01-02 RX ADMIN — Medication 81 MILLIGRAM(S): at 11:17

## 2021-01-02 RX ADMIN — Medication 1 TABLET(S): at 11:17

## 2021-01-02 RX ADMIN — SENNA PLUS 2 TABLET(S): 8.6 TABLET ORAL at 21:06

## 2021-01-02 RX ADMIN — SODIUM CHLORIDE 80 MILLILITER(S): 9 INJECTION INTRAMUSCULAR; INTRAVENOUS; SUBCUTANEOUS at 12:37

## 2021-01-02 RX ADMIN — Medication 25 MILLIGRAM(S): at 19:48

## 2021-01-02 RX ADMIN — Medication 650 MILLIGRAM(S): at 19:48

## 2021-01-02 RX ADMIN — Medication 650 MILLIGRAM(S): at 20:00

## 2021-01-02 RX ADMIN — OXYCODONE HYDROCHLORIDE 5 MILLIGRAM(S): 5 TABLET ORAL at 04:44

## 2021-01-02 RX ADMIN — Medication 300 MICROGRAM(S): at 05:19

## 2021-01-02 RX ADMIN — Medication 2 TABLET(S): at 17:02

## 2021-01-02 RX ADMIN — OXYCODONE HYDROCHLORIDE 5 MILLIGRAM(S): 5 TABLET ORAL at 11:01

## 2021-01-02 NOTE — PROGRESS NOTE ADULT - ASSESSMENT
Patient is a 70 female with PMH of bullous pemphigoid diagnosed in 2014 on steroids, surgical hypothyroidism following total thyroidectomy, chronic pain syndrome, endometrial CA diagnosed in 2015 with [facility] BSO, now in remission who presented with RLE pain/and worsening erythema, concerned for cellulitis    Sepsis 2/2 b/l LE cellulitis  sepsis resolved, LE cellulitis improved  BCx negative   Discontinued vancomycin  Continue Bactrim DS 2 tablets PO Q12h - completes total 10 day course - last day today 1/2/21    Sepsis 2/2 UTI?  -UA reviewed, inconsistent with infection and patient asymptomatic, UCx +E.coli  -completed aztreonam x5 day on 12/29    Candidiasis in groin region  -apply topical nystatin     Red-Man syndrome  -ok to administer vancomycin as above    Penicillin Allergy  -pt w/ extensive skin lesions from underlying pemphigoid disease  -would follow w/ allergy as outpatient for desensitization to penicillins as cellulitis will be a recurring problem in this patient and B-lactams are ideal for cellulitis therapy    Bullous pemphigoid   -steroid dependent  -sx can be multifactorial 2/2 cellulitis and flare, improved  -appreciate derm recs    Chronic anemia  CTAP with no RP bleed  Hematology following  Transfusing as needed    DM2  -strict glucose control to promote resolution of infection and wound healing

## 2021-01-02 NOTE — PROGRESS NOTE ADULT - SUBJECTIVE AND OBJECTIVE BOX
[facility], Division of Infectious Diseases  MAYDA Braxton Y. Patel, S. Shah  [phone]  ([phone] - weekdays after 5pm and weekends)    Name: JAKE ZIMMERMAN  Age/Gender: 70y Female  MRN: [id number]    Interval History:  Patient with no complaints this morning, sitting up comfortably in chair.   Denies fever, chills, chest pain, dyspnea, cough, abd pain, n/v/d.  ROS reviewed, pertinent positives and negatives as above.   Notes reviewed. Afebrile.    Allergies: Ancef (Rash)  daptomycin (Vomiting)  Keflex (Unknown)  penicillin (Pruritus; Rash; Hives)    Objective:  Vitals:   T(F): 98.8 (01-02-21 @ 14:06), Max: 99.1 (01-01-21 @ 20:09)  HR: 83 (01-02-21 @ 14:06) (76 - 86)  BP: 105/62 (01-02-21 @ 14:06) (94/60 - 108/65)  RR: 18 (01-02-21 @ 14:06) (18 - 18)  SpO2: 96% (01-02-21 @ 14:06) (96% - 99%)    Physical Examination:  General: no acute distress, obese  HEENT: NC/AT, EOMI, anicteric, neck supple  Cardio: S1, S2 heard, RRR, murmur heard  Resp: CTA bilaterally, no rales/wheezes/rhonchi  Abd: soft, NT, ND, + BS  Neuro: AAOx3, no obvious focal deficits  Ext: no edema or cyanosis, moving extremities  Skin: chronic stasis changes LE, scabs noted  Psych: appropriate affect and mood for situation  Lines: PIV    Laboratory Studies:  CBC:                       7.5    7.56  )-----------( 319      ( 02 Jan 2021 06:11 )             22.8     CMP: 01-02    133<L>  |  102  |  22  ----------------------------<  175<H>  4.1   |  24  |  0.73    Ca    7.9<L>      02 Jan 2021 06:11    Microbiology:  12/24 - urine culture - 50,000 - 99,000 CFU/mL Escherichia coli (pansensitive)  12/23 - blood cultures - negative     Radiology:   CT Abdomen and Pelvis No Cont (01.01.21 @ 14:29) > IMPRESSION: No retroperitoneal hematoma. Moderate colonic stool burden. New trace left pleural effusion.    Medications:  MEDICATIONS  (STANDING):  aspirin enteric coated 81 milliGRAM(s) Oral daily  atorvastatin 20 milliGRAM(s) Oral at bedtime  cholecalciferol 2000 Unit(s) Oral two times a day  dextrose 40% Gel 15 Gram(s) Oral once  dextrose 5%. 1000 milliLiter(s) (50 mL/Hr) IV Continuous <Continuous>  dextrose 5%. 1000 milliLiter(s) (100 mL/Hr) IV Continuous <Continuous>  dextrose 50% Injectable 25 Gram(s) IV Push once  dextrose 50% Injectable 25 Gram(s) IV Push once  dextrose 50% Injectable 12.5 Gram(s) IV Push once  enoxaparin Injectable 40 milliGRAM(s) SubCutaneous every 12 hours  glucagon  Injectable 1 milliGRAM(s) IntraMuscular once  insulin glargine Injectable (LANTUS) 12 Unit(s) SubCutaneous at bedtime  insulin lispro (ADMELOG) corrective regimen sliding scale   SubCutaneous at bedtime  insulin lispro (ADMELOG) corrective regimen sliding scale   SubCutaneous three times a day before meals  levothyroxine 300 MICROGram(s) Oral daily  lisinopril 5 milliGRAM(s) Oral daily  multivitamin 1 Tablet(s) Oral daily  predniSONE   Tablet 40 milliGRAM(s) Oral daily  senna 2 Tablet(s) Oral at bedtime  sodium chloride 0.9%. 1000 milliLiter(s) (80 mL/Hr) IV Continuous <Continuous>  trimethoprim  160 mG/sulfamethoxazole 800 mG 2 Tablet(s) Oral every 12 hours    Antimicrobials:  trimethoprim  160 mG/sulfamethoxazole 800 mG 2 Tablet(s) Oral every 12 hours - started 12/31  vancomycin 12/24-12/31  s/p aztreonam

## 2021-01-02 NOTE — PROGRESS NOTE ADULT - SUBJECTIVE AND OBJECTIVE BOX
Patient is a 70y old  Female who presents with a chief complaint of Rigors, chills since last night (02 Jan 2021 14:13)      INTERVAL HPI/OVERNIGHT EVENTS: noted  pt seen and examined  feels well, no new events/complaints      Vital Signs Last 24 Hrs  T(C): 36.4 (02 Jan 2021 19:58), Max: 37.2 (02 Jan 2021 00:05)  T(F): 97.5 (02 Jan 2021 19:58), Max: 98.9 (02 Jan 2021 00:05)  HR: 95 (02 Jan 2021 19:58) (76 - 95)  BP: 98/60 (02 Jan 2021 19:58) (97/62 - 108/65)  BP(mean): --  RR: 18 (02 Jan 2021 19:58) (18 - 18)  SpO2: 97% (02 Jan 2021 19:58) (96% - 99%)    acetaminophen   Tablet .. 650 milliGRAM(s) Oral every 6 hours PRN  aspirin enteric coated 81 milliGRAM(s) Oral daily  atorvastatin 20 milliGRAM(s) Oral at bedtime  cholecalciferol 2000 Unit(s) Oral two times a day  dextrose 40% Gel 15 Gram(s) Oral once  dextrose 5%. 1000 milliLiter(s) IV Continuous <Continuous>  dextrose 5%. 1000 milliLiter(s) IV Continuous <Continuous>  dextrose 50% Injectable 25 Gram(s) IV Push once  dextrose 50% Injectable 12.5 Gram(s) IV Push once  dextrose 50% Injectable 25 Gram(s) IV Push once  enoxaparin Injectable 40 milliGRAM(s) SubCutaneous every 12 hours  glucagon  Injectable 1 milliGRAM(s) IntraMuscular once  immune   globulin 10% (GAMMAGARD) IVPB 80 Gram(s) IV Intermittent once  insulin glargine Injectable (LANTUS) 12 Unit(s) SubCutaneous at bedtime  insulin lispro (ADMELOG) corrective regimen sliding scale   SubCutaneous at bedtime  insulin lispro (ADMELOG) corrective regimen sliding scale   SubCutaneous three times a day before meals  levothyroxine 300 MICROGram(s) Oral daily  lisinopril 5 milliGRAM(s) Oral daily  multivitamin 1 Tablet(s) Oral daily  ondansetron Injectable 4 milliGRAM(s) IV Push every 6 hours PRN  oxyCODONE    IR 5 milliGRAM(s) Oral every 4 hours PRN  predniSONE   Tablet 40 milliGRAM(s) Oral daily  senna 2 Tablet(s) Oral at bedtime  trimethoprim  160 mG/sulfamethoxazole 800 mG 2 Tablet(s) Oral every 12 hours      PHYSICAL EXAM:  GENERAL: NAD,   EYES: conjunctiva and sclera clear  ENMT: Moist mucous membranes  NECK: Supple, No JVD, Normal thyroid  CHEST/LUNG: non labored, cta b/l  HEART: Regular rate and rhythm; No murmurs, rubs, or gallops  ABDOMEN: Soft, Nontender, Nondistended; Bowel sounds present  EXTREMITIES:  2+ Peripheral Pulses, No clubbing, cyanosis, or edema  LYMPH: No lymphadenopathy noted  SKIN: No rashes or lesions    Consultant(s) Notes Reviewed:  [x ] YES  [ ] NO  Care Discussed with Consultants/Other Providers [ x] YES  [ ] NO    LABS:                        7.5    7.56  )-----------( 319      ( 02 Jan 2021 06:11 )             22.8     01-02    133<L>  |  102  |  22  ----------------------------<  175<H>  4.1   |  24  |  0.73    Ca    7.9<L>      02 Jan 2021 06:11          CAPILLARY BLOOD GLUCOSE      POCT Blood Glucose.: 260 mg/dL (02 Jan 2021 17:06)  POCT Blood Glucose.: 317 mg/dL (02 Jan 2021 12:01)  POCT Blood Glucose.: 189 mg/dL (02 Jan 2021 08:22)  POCT Blood Glucose.: 293 mg/dL (01 Jan 2021 21:35)              RADIOLOGY & ADDITIONAL TESTS:    Imaging Personally Reviewed:  [x ] YES  [ ] NO

## 2021-01-02 NOTE — PROGRESS NOTE ADULT - ASSESSMENT
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 (on a 150 mcg /6 days and one day of 300 mcg/week -patient/spouse not sure which day), Red Man Syndrome from IV vancomycin, chronic pain syndrome with patient apparently on escalating doses of Neurontin (per spouse now on 800 mg 4xDay) with PRN Tramadol and Vicodin due to patient severe pain from her skin lesions, with last admission to [place] in 10/2018 for RIGHT LE cellulitis, endometrial CA diagnosed in 2015 with [facility] BSO and presumed to be in remission, with patient with home visiting RN with an Unna's boot on the RIGHT LE but both are wrapped since Monday, with self referral following rigors and shaking chills since last night.   Spouse noted increased confusion by patient at home last night.    # sepsis 2/2 cellulitis  # AMS likely 2/2 infection  # bullous pemphigoid steroid dependent ro flare  # surgical hypothyroidism  # poorly controlled type 2 DM  # uterine CA presumed to be disease free  # RIGHT> left LE cellulitis    IV Azactam and vanco, monitor for red man syndrome  appreciate ID recs  BC NTD  appreciate derm recs  will given IVIG in house MWF per derm dosing for bullous phemphigoid to prevent flare (Gammagard 200gm/200mL- Infuse 80 gm/day QOD (M, W, F) for 1 week and repeat every 4 weeks)  cont prednisone 40mg  FS ACHS, SSI, lantus  CT head meningioma, no infarct  outpt takes neurontin 800mg QID rx by Dr Gonzales pain management, neurontin on hold  outpt takes tramadol 300mg extended release 24 hrs qd and norco 10-325mg q8 prn  cont Tramadol and low dose Oxycodone IR 5 mg PRN for pain relief  cont statin  PT    #Anemia- sp3u prbc, Hb stable, no e/o active bleeding  transfuse to keep hb >7  anemia mullen- iron studies, hemolytic mullen, folate, b12 - reviewed  heme cs noted  cta/p -no rp bleed  likely need bM bx    Hypothyroidism: elev tsh, pt currently on 150mcg/d but home dose- takes 300 twice wkly, and 150mcg remaining days  will adjust synthroid dosing to 300mcg qd for a week and switch back to home dose as above    DVT prophylaxis    PCP:     Olivia Ross MD (PCP) [phone]

## 2021-01-03 LAB
OB PNL STL: POSITIVE
SARS-COV-2 RNA SPEC QL NAA+PROBE: SIGNIFICANT CHANGE UP

## 2021-01-03 RX ADMIN — Medication 2 TABLET(S): at 05:10

## 2021-01-03 RX ADMIN — Medication 2000 UNIT(S): at 17:50

## 2021-01-03 RX ADMIN — OXYCODONE HYDROCHLORIDE 5 MILLIGRAM(S): 5 TABLET ORAL at 05:16

## 2021-01-03 RX ADMIN — Medication 1 TABLET(S): at 13:00

## 2021-01-03 RX ADMIN — Medication 40 MILLIGRAM(S): at 05:09

## 2021-01-03 RX ADMIN — OXYCODONE HYDROCHLORIDE 5 MILLIGRAM(S): 5 TABLET ORAL at 15:48

## 2021-01-03 RX ADMIN — OXYCODONE HYDROCHLORIDE 5 MILLIGRAM(S): 5 TABLET ORAL at 06:00

## 2021-01-03 RX ADMIN — OXYCODONE HYDROCHLORIDE 5 MILLIGRAM(S): 5 TABLET ORAL at 14:15

## 2021-01-03 RX ADMIN — OXYCODONE HYDROCHLORIDE 5 MILLIGRAM(S): 5 TABLET ORAL at 21:48

## 2021-01-03 RX ADMIN — Medication 81 MILLIGRAM(S): at 13:00

## 2021-01-03 RX ADMIN — OXYCODONE HYDROCHLORIDE 5 MILLIGRAM(S): 5 TABLET ORAL at 09:35

## 2021-01-03 RX ADMIN — LISINOPRIL 5 MILLIGRAM(S): 2.5 TABLET ORAL at 05:10

## 2021-01-03 RX ADMIN — ONDANSETRON 4 MILLIGRAM(S): 8 TABLET, FILM COATED ORAL at 14:18

## 2021-01-03 RX ADMIN — Medication 2: at 17:50

## 2021-01-03 RX ADMIN — Medication 300 MICROGRAM(S): at 05:10

## 2021-01-03 RX ADMIN — ENOXAPARIN SODIUM 40 MILLIGRAM(S): 100 INJECTION SUBCUTANEOUS at 05:08

## 2021-01-03 RX ADMIN — ENOXAPARIN SODIUM 40 MILLIGRAM(S): 100 INJECTION SUBCUTANEOUS at 17:51

## 2021-01-03 RX ADMIN — ATORVASTATIN CALCIUM 20 MILLIGRAM(S): 80 TABLET, FILM COATED ORAL at 21:15

## 2021-01-03 RX ADMIN — OXYCODONE HYDROCHLORIDE 5 MILLIGRAM(S): 5 TABLET ORAL at 02:12

## 2021-01-03 RX ADMIN — Medication 2000 UNIT(S): at 05:09

## 2021-01-03 RX ADMIN — INSULIN GLARGINE 12 UNIT(S): 100 INJECTION, SOLUTION SUBCUTANEOUS at 22:12

## 2021-01-03 RX ADMIN — OXYCODONE HYDROCHLORIDE 5 MILLIGRAM(S): 5 TABLET ORAL at 10:17

## 2021-01-03 RX ADMIN — OXYCODONE HYDROCHLORIDE 5 MILLIGRAM(S): 5 TABLET ORAL at 21:18

## 2021-01-03 RX ADMIN — OXYCODONE HYDROCHLORIDE 5 MILLIGRAM(S): 5 TABLET ORAL at 01:39

## 2021-01-03 RX ADMIN — Medication 2: at 08:39

## 2021-01-03 RX ADMIN — Medication 6: at 12:59

## 2021-01-03 NOTE — PROGRESS NOTE ADULT - ASSESSMENT
69 y/o F PMHx bullous pemphigoid for 6 years currently steroid dependent, type 2 DM on oral Rx, surgical hypothyroidism following total thyroidectomy for a large obstructing goiter (on a 150 mcg /6 days and one day of 300 mcg/week -patient/spouse not sure which day), Red Man Syndrome from IV vancomycin, chronic pain syndrome with patient apparently on escalating doses of Neurontin (per spouse now on 800 mg 4xDay) with PRN Tramadol and Vicodin due to patient severe pain from her skin lesions, with last admission to [place] in 10/2018 for RIGHT LE cellulitis, endometrial CA diagnosed in 2015 with [facility] BSO and presumed to be in remission, with patient with home visiting RN with an Unna's boot on the RIGHT LE but both are wrapped since Monday, with self referral following rigors and shaking chills since last night.   Spouse noted increased confusion by patient at home last night.    # sepsis 2/2 cellulitis  # AMS likely 2/2 infection  # bullous pemphigoid steroid dependent ro flare  # surgical hypothyroidism  # poorly controlled type 2 DM  # uterine CA presumed to be disease free  # RIGHT> left LE cellulitis    IV Azactam and vanco, monitor for red man syndrome  appreciate ID recs  BC NTD  appreciate derm recs  will given IVIG in house MWF per derm dosing for bullous phemphigoid to prevent flare (Gammagard 200gm/200mL- Infuse 80 gm/day QOD (M, W, F) for 1 week and repeat every 4 weeks)  cont prednisone 40mg  FS ACHS, SSI, lantus  CT head meningioma, no infarct  outpt takes neurontin 800mg QID rx by Dr Gonzales pain management, neurontin on hold  outpt takes tramadol 300mg extended release 24 hrs qd and norco 10-325mg q8 prn  cont Tramadol and low dose Oxycodone IR 5 mg PRN for pain relief  cont statin  PT    #Anemia- sp3u prbc, Hb stable, no e/o active bleeding  transfuse to keep hb >7  anemia mullen- iron studies, hemolytic mullen, folate, b12 - reviewed  heme cs noted  cta/p -no rp bleed  likely need bM bx  occult bld  positive-GI cs am    Hypothyroidism: elev tsh, pt currently on 150mcg/d but home dose- takes 300 twice wkly, and 150mcg remaining days  will adjust synthroid dosing to 300mcg qd for a week and switch back to home dose as above    DVT prophylaxis    PCP:     Olivia Ross MD (PCP) [phone]

## 2021-01-03 NOTE — PROGRESS NOTE ADULT - SUBJECTIVE AND OBJECTIVE BOX
Patient is a 70y old  Female who presents with a chief complaint of Rigors, chills since last night (02 Jan 2021 14:13)      INTERVAL HPI/OVERNIGHT EVENTS: noted  pt seen and examined  hb stable  no new events/complaints      Vital Signs Last 24 Hrs  T(C): 36.6 (03 Jan 2021 15:39), Max: 37.1 (03 Jan 2021 10:51)  T(F): 97.9 (03 Jan 2021 15:39), Max: 98.8 (03 Jan 2021 10:51)  HR: 79 (03 Jan 2021 15:39) (75 - 95)  BP: 99/55 (03 Jan 2021 15:39) (97/62 - 115/56)  BP(mean): --  RR: 20 (03 Jan 2021 15:39) (18 - 20)  SpO2: 100% (03 Jan 2021 15:39) (96% - 100%)    acetaminophen   Tablet .. 650 milliGRAM(s) Oral every 6 hours PRN  aspirin enteric coated 81 milliGRAM(s) Oral daily  atorvastatin 20 milliGRAM(s) Oral at bedtime  cholecalciferol 2000 Unit(s) Oral two times a day  dextrose 40% Gel 15 Gram(s) Oral once  dextrose 5%. 1000 milliLiter(s) IV Continuous <Continuous>  dextrose 5%. 1000 milliLiter(s) IV Continuous <Continuous>  dextrose 50% Injectable 25 Gram(s) IV Push once  dextrose 50% Injectable 12.5 Gram(s) IV Push once  dextrose 50% Injectable 25 Gram(s) IV Push once  enoxaparin Injectable 40 milliGRAM(s) SubCutaneous every 12 hours  glucagon  Injectable 1 milliGRAM(s) IntraMuscular once  insulin glargine Injectable (LANTUS) 12 Unit(s) SubCutaneous at bedtime  insulin lispro (ADMELOG) corrective regimen sliding scale   SubCutaneous three times a day before meals  insulin lispro (ADMELOG) corrective regimen sliding scale   SubCutaneous at bedtime  levothyroxine 300 MICROGram(s) Oral daily  lisinopril 5 milliGRAM(s) Oral daily  multivitamin 1 Tablet(s) Oral daily  ondansetron Injectable 4 milliGRAM(s) IV Push every 6 hours PRN  oxyCODONE    IR 5 milliGRAM(s) Oral every 4 hours PRN  predniSONE   Tablet 40 milliGRAM(s) Oral daily  senna 2 Tablet(s) Oral at bedtime      PHYSICAL EXAM:  GENERAL: NAD,   EYES: conjunctiva and sclera clear  ENMT: Moist mucous membranes  NECK: Supple, No JVD, Normal thyroid  CHEST/LUNG: non labored, cta b/l  HEART: Regular rate and rhythm; No murmurs, rubs, or gallops  ABDOMEN: Soft, Nontender, Nondistended; Bowel sounds present  EXTREMITIES:  2+ Peripheral Pulses, No clubbing, cyanosis, or edema  LYMPH: No lymphadenopathy noted  SKIN: No rashes or lesions    Consultant(s) Notes Reviewed:  [x ] YES  [ ] NO  Care Discussed with Consultants/Other Providers [ x] YES  [ ] NO    LABS:                        7.5    7.56  )-----------( 319      ( 02 Jan 2021 06:11 )             22.8     01-02    133<L>  |  102  |  22  ----------------------------<  175<H>  4.1   |  24  |  0.73    Ca    7.9<L>      02 Jan 2021 06:11          CAPILLARY BLOOD GLUCOSE      POCT Blood Glucose.: 253 mg/dL (03 Jan 2021 12:39)  POCT Blood Glucose.: 177 mg/dL (03 Jan 2021 08:14)  POCT Blood Glucose.: 203 mg/dL (02 Jan 2021 21:42)  POCT Blood Glucose.: 260 mg/dL (02 Jan 2021 17:06)              RADIOLOGY & ADDITIONAL TESTS:    Imaging Personally Reviewed:  [x ] YES  [ ] NO

## 2021-01-04 ENCOUNTER — APPOINTMENT (OUTPATIENT)
Dept: RHEUMATOLOGY | Facility: CLINIC | Age: 71
End: 2021-01-04

## 2021-01-04 ENCOUNTER — TRANSCRIPTION ENCOUNTER (OUTPATIENT)
Age: 71
End: 2021-01-04

## 2021-01-04 LAB
ANION GAP SERPL CALC-SCNC: 8 MMOL/L — SIGNIFICANT CHANGE UP (ref 5–17)
B2 MICROGLOB SERPL-MCNC: 3 MG/L — HIGH (ref 0.8–2.2)
BUN SERPL-MCNC: 28 MG/DL — HIGH (ref 7–23)
CALCIUM SERPL-MCNC: 8.5 MG/DL — SIGNIFICANT CHANGE UP (ref 8.4–10.5)
CHLORIDE SERPL-SCNC: 100 MMOL/L — SIGNIFICANT CHANGE UP (ref 96–108)
CO2 SERPL-SCNC: 24 MMOL/L — SIGNIFICANT CHANGE UP (ref 22–31)
CREAT SERPL-MCNC: 0.88 MG/DL — SIGNIFICANT CHANGE UP (ref 0.5–1.3)
GLUCOSE SERPL-MCNC: 150 MG/DL — HIGH (ref 70–99)
HCT VFR BLD CALC: 22.8 % — LOW (ref 34.5–45)
HGB BLD-MCNC: 7.2 G/DL — LOW (ref 11.5–15.5)
LDH SERPL L TO P-CCNC: 286 U/L — HIGH (ref 50–242)
MCHC RBC-ENTMCNC: 31.6 GM/DL — LOW (ref 32–36)
MCHC RBC-ENTMCNC: 32.4 PG — SIGNIFICANT CHANGE UP (ref 27–34)
MCV RBC AUTO: 102.7 FL — HIGH (ref 80–100)
NRBC # BLD: 6 /100 WBCS — HIGH (ref 0–0)
PLATELET # BLD AUTO: 429 K/UL — HIGH (ref 150–400)
POTASSIUM SERPL-MCNC: 4.5 MMOL/L — SIGNIFICANT CHANGE UP (ref 3.5–5.3)
POTASSIUM SERPL-SCNC: 4.5 MMOL/L — SIGNIFICANT CHANGE UP (ref 3.5–5.3)
PROT SERPL-MCNC: 8 G/DL — SIGNIFICANT CHANGE UP (ref 6–8.3)
PROT SERPL-MCNC: 8 G/DL — SIGNIFICANT CHANGE UP (ref 6–8.3)
RBC # BLD: 2.22 M/UL — LOW (ref 3.8–5.2)
RBC # FLD: 21 % — HIGH (ref 10.3–14.5)
SODIUM SERPL-SCNC: 132 MMOL/L — LOW (ref 135–145)
WBC # BLD: 9.96 K/UL — SIGNIFICANT CHANGE UP (ref 3.8–10.5)
WBC # FLD AUTO: 9.96 K/UL — SIGNIFICANT CHANGE UP (ref 3.8–10.5)

## 2021-01-04 PROCEDURE — 99232 SBSQ HOSP IP/OBS MODERATE 35: CPT | Mod: GC

## 2021-01-04 RX ORDER — FOLIC ACID 0.8 MG
1 TABLET ORAL DAILY
Refills: 0 | Status: DISCONTINUED | OUTPATIENT
Start: 2021-01-04 | End: 2021-01-07

## 2021-01-04 RX ORDER — FERROUS SULFATE 325(65) MG
325 TABLET ORAL DAILY
Refills: 0 | Status: DISCONTINUED | OUTPATIENT
Start: 2021-01-04 | End: 2021-01-07

## 2021-01-04 RX ADMIN — SENNA PLUS 2 TABLET(S): 8.6 TABLET ORAL at 22:02

## 2021-01-04 RX ADMIN — OXYCODONE HYDROCHLORIDE 5 MILLIGRAM(S): 5 TABLET ORAL at 02:38

## 2021-01-04 RX ADMIN — OXYCODONE HYDROCHLORIDE 5 MILLIGRAM(S): 5 TABLET ORAL at 23:48

## 2021-01-04 RX ADMIN — Medication 2: at 08:32

## 2021-01-04 RX ADMIN — OXYCODONE HYDROCHLORIDE 5 MILLIGRAM(S): 5 TABLET ORAL at 06:39

## 2021-01-04 RX ADMIN — Medication 8: at 12:15

## 2021-01-04 RX ADMIN — OXYCODONE HYDROCHLORIDE 5 MILLIGRAM(S): 5 TABLET ORAL at 16:31

## 2021-01-04 RX ADMIN — Medication 2000 UNIT(S): at 05:06

## 2021-01-04 RX ADMIN — ENOXAPARIN SODIUM 40 MILLIGRAM(S): 100 INJECTION SUBCUTANEOUS at 05:07

## 2021-01-04 RX ADMIN — Medication 81 MILLIGRAM(S): at 11:56

## 2021-01-04 RX ADMIN — OXYCODONE HYDROCHLORIDE 5 MILLIGRAM(S): 5 TABLET ORAL at 02:08

## 2021-01-04 RX ADMIN — Medication 6: at 17:55

## 2021-01-04 RX ADMIN — ATORVASTATIN CALCIUM 20 MILLIGRAM(S): 80 TABLET, FILM COATED ORAL at 22:02

## 2021-01-04 RX ADMIN — Medication 300 MICROGRAM(S): at 05:06

## 2021-01-04 RX ADMIN — Medication 1 TABLET(S): at 11:57

## 2021-01-04 RX ADMIN — ENOXAPARIN SODIUM 40 MILLIGRAM(S): 100 INJECTION SUBCUTANEOUS at 18:17

## 2021-01-04 RX ADMIN — OXYCODONE HYDROCHLORIDE 5 MILLIGRAM(S): 5 TABLET ORAL at 07:01

## 2021-01-04 RX ADMIN — LISINOPRIL 5 MILLIGRAM(S): 2.5 TABLET ORAL at 05:06

## 2021-01-04 RX ADMIN — OXYCODONE HYDROCHLORIDE 5 MILLIGRAM(S): 5 TABLET ORAL at 17:57

## 2021-01-04 RX ADMIN — INSULIN GLARGINE 12 UNIT(S): 100 INJECTION, SOLUTION SUBCUTANEOUS at 22:02

## 2021-01-04 RX ADMIN — Medication 40 MILLIGRAM(S): at 05:10

## 2021-01-04 RX ADMIN — Medication 2000 UNIT(S): at 18:17

## 2021-01-04 RX ADMIN — Medication 1: at 22:02

## 2021-01-04 NOTE — CONSULT NOTE ADULT - SUBJECTIVE AND OBJECTIVE BOX
71 y/o F w bullous pemphigoid for 6 years currently steroid dependent, type 2 DM on oral Rx, surgical hypothyroidism following total thyroidectomy for a large obstructing goiter (on a 150 mcg /6 days and one day of 300 mcg/week -patient/spouse not sure which day), Red Man Syndrome from IV vancomycin, chronic pain syndrome with patient apparently on escalating doses of Neurontin (per spouse now on 800 mg 4xDay) with PRN Tramadol and Vicodin due to patient severe pain from her skin lesions, with last admission to [place] in 10/2018 for RIGHT LE cellulitis, endometrial CA diagnosed in  with  [facility] BSO and presumed to be in remission, with patient with home visiting RN with an Unna's boot on the RIGHT LE but both are wrapped since Monday, with self referral following rigors and shaking chills since last night.   Spouse noted increased confusion by patient at home last night. (23 Dec 2020 20:47)  ----------  GI team consulted for evaluation of chronic anemia.  Patient has longstanding anemia for 8-10 years. Has had a BM biopsy in the past.   Reports mild nausea w/o vomiting. No GERD. No abd pain. No diarrhea. 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  On Immunosupressants  Cellcept    S/P BSO (bilateral salpingo-oophorectomy)    S/P hysterectomy    Endometrial cancer  2015:  Endometrial Biopsy: dx: High Grade Endometroid Adenocarcinoma    Status post total thyroidectomy  &#x27;     History of  section  &#x27; 82-&#x27;95:  4 X        MEDICATIONS  (STANDING):  aspirin enteric coated 81 milliGRAM(s) Oral daily  atorvastatin 20 milliGRAM(s) Oral at bedtime  cholecalciferol 2000 Unit(s) Oral two times a day  dextrose 40% Gel 15 Gram(s) Oral once  dextrose 5%. 1000 milliLiter(s) (50 mL/Hr) IV Continuous <Continuous>  dextrose 5%. 1000 milliLiter(s) (100 mL/Hr) IV Continuous <Continuous>  dextrose 50% Injectable 25 Gram(s) IV Push once  dextrose 50% Injectable 12.5 Gram(s) IV Push once  dextrose 50% Injectable 25 Gram(s) IV Push once  enoxaparin Injectable 40 milliGRAM(s) SubCutaneous every 12 hours  glucagon  Injectable 1 milliGRAM(s) IntraMuscular once  insulin glargine Injectable (LANTUS) 12 Unit(s) SubCutaneous at bedtime  insulin lispro (ADMELOG) corrective regimen sliding scale   SubCutaneous at bedtime  insulin lispro (ADMELOG) corrective regimen sliding scale   SubCutaneous three times a day before meals  levothyroxine 300 MICROGram(s) Oral daily  lisinopril 5 milliGRAM(s) Oral daily  multivitamin 1 Tablet(s) Oral daily  predniSONE   Tablet 40 milliGRAM(s) Oral daily  senna 2 Tablet(s) Oral at bedtime    MEDICATIONS  (PRN):  acetaminophen   Tablet .. 650 milliGRAM(s) Oral every 6 hours PRN Temp greater or equal to 38C (100.4F), Mild Pain (1 - 3)  ondansetron Injectable 4 milliGRAM(s) IV Push every 6 hours PRN Nausea and/or Vomiting  oxyCODONE    IR 5 milliGRAM(s) Oral every 4 hours PRN Severe Pain (7 - 10)      Allergies    Ancef (Rash)  daptomycin (Vomiting)  Keflex (Unknown)  penicillin (Pruritus; Rash; Hives)    Intolerances    vancomycin (Other)      Review of Systems:    General:  No wt loss, +fevers, chills, night sweats,fatigue,   CV:  No pain, palpitatioins, hypo/hypertension  Resp:  No dyspnea, cough, tachypnea, wheezing  GI:  see hpi  :  No pain, bleeding, incontinence, nocturia  Muscle:  No pain, weakness  Neuro:  No weakness, tingling, memory problems  Psych:  No fatigue, insomnia, mood problems, depression  Endocrine:  No polyuria, polydypsia, cold/heat intolerance  Heme:  No petechiae, ecchymosis, easy bruisability  Skin:  + pemphigus rash, tattoos, scars, edema      Vital Signs Last 24 Hrs  T(C): 37 (2021 04:39), Max: 37.2 (2021 19:57)  T(F): 98.6 (2021 04:39), Max: 99 (2021 19:57)  HR: 86 (2021 04:39) (75 - 90)  BP: 100/64 (2021 04:39) (99/53 - 115/56)  BP(mean): --  RR: 18 (2021 04:39) (18 - 20)  SpO2: 97% (2021 04:39) (97% - 100%)    PHYSICAL EXAM:    Constitutional: NAD, well-developed, obese  Respiratory: +BS  Cardiovascular: S1 and S2,  Gastrointestinal: BS+, soft, NT/ND,  Neurological: A/O x 3, no focal deficits  Psychiatric: Normal mood, normal affect        LABS:                        7.2    9.96  )-----------( 429      ( 2021 06:28 )             22.8                         7.5    7.56  )-----------( 319      ( 2021 06:11 )             22.8                         7.7    8.28  )-----------( 325      ( 2021 00:41 )             23.5     01-04    132<L>  |  100  |  28<H>  ----------------------------<  150<H>  4.5   |  24  |  0.88    Ca    8.5      2021 06:28                  RADIOLOGY & ADDITIONAL TESTS:

## 2021-01-04 NOTE — PROGRESS NOTE ADULT - SUBJECTIVE AND OBJECTIVE BOX
INTERVAL HPI/OVERNIGHT EVENTS:  Patient S&E at bedside. No o/n events. Patient feels well this morning.     VITAL SIGNS:  T(F): 98.6 (01-04-21 @ 04:39)  HR: 86 (01-04-21 @ 04:39)  BP: 100/64 (01-04-21 @ 04:39)  RR: 18 (01-04-21 @ 04:39)  SpO2: 97% (01-04-21 @ 04:39)  Wt(kg): --    PHYSICAL EXAM:    Constitutional: NAD  Eyes: EOMI, sclera non-icteric  Neck: supple  Respiratory: CTAB, no wheezes or crackles   Cardiovascular: RRR  Gastrointestinal: soft, NTND, + BS  Extremities: no cyanosis, clubbing or edema   Neurological: awake and alert      MEDICATIONS  (STANDING):  aspirin enteric coated 81 milliGRAM(s) Oral daily  atorvastatin 20 milliGRAM(s) Oral at bedtime  cholecalciferol 2000 Unit(s) Oral two times a day  dextrose 40% Gel 15 Gram(s) Oral once  dextrose 5%. 1000 milliLiter(s) (50 mL/Hr) IV Continuous <Continuous>  dextrose 5%. 1000 milliLiter(s) (100 mL/Hr) IV Continuous <Continuous>  dextrose 50% Injectable 25 Gram(s) IV Push once  dextrose 50% Injectable 12.5 Gram(s) IV Push once  dextrose 50% Injectable 25 Gram(s) IV Push once  enoxaparin Injectable 40 milliGRAM(s) SubCutaneous every 12 hours  glucagon  Injectable 1 milliGRAM(s) IntraMuscular once  insulin glargine Injectable (LANTUS) 12 Unit(s) SubCutaneous at bedtime  insulin lispro (ADMELOG) corrective regimen sliding scale   SubCutaneous at bedtime  insulin lispro (ADMELOG) corrective regimen sliding scale   SubCutaneous three times a day before meals  levothyroxine 300 MICROGram(s) Oral daily  lisinopril 5 milliGRAM(s) Oral daily  multivitamin 1 Tablet(s) Oral daily  predniSONE   Tablet 40 milliGRAM(s) Oral daily  senna 2 Tablet(s) Oral at bedtime    MEDICATIONS  (PRN):  acetaminophen   Tablet .. 650 milliGRAM(s) Oral every 6 hours PRN Temp greater or equal to 38C (100.4F), Mild Pain (1 - 3)  ondansetron Injectable 4 milliGRAM(s) IV Push every 6 hours PRN Nausea and/or Vomiting  oxyCODONE    IR 5 milliGRAM(s) Oral every 4 hours PRN Severe Pain (7 - 10)      Allergies    Ancef (Rash)  daptomycin (Vomiting)  Keflex (Unknown)  penicillin (Pruritus; Rash; Hives)    Intolerances    vancomycin (Other)      LABS:                        7.2    9.96  )-----------( 429      ( 04 Jan 2021 06:28 )             22.8     01-04    132<L>  |  100  |  28<H>  ----------------------------<  150<H>  4.5   |  24  |  0.88    Ca    8.5      04 Jan 2021 06:28            RADIOLOGY & ADDITIONAL TESTS:  Studies reviewed.

## 2021-01-04 NOTE — PROGRESS NOTE ADULT - SUBJECTIVE AND OBJECTIVE BOX
INTERVAL HPI/OVERNIGHT EVENTS:  Patient S&E at bedside. No o/n events. FOBT positive, but patient refusing inpatient EGD.     VITAL SIGNS:  T(F): 98.6 (01-04-21 @ 04:39)  HR: 86 (01-04-21 @ 04:39)  BP: 100/64 (01-04-21 @ 04:39)  RR: 18 (01-04-21 @ 04:39)  SpO2: 97% (01-04-21 @ 04:39)  Wt(kg): --    PHYSICAL EXAM:    Constitutional: NAD  Eyes: EOMI, sclera non-icteric  Neck: supple  Respiratory: CTAB, no wheezes or crackles   Cardiovascular: RRR  Gastrointestinal: soft, NTND, + BS  Extremities: no cyanosis, clubbing or edema   Neurological: awake and alert      MEDICATIONS  (STANDING):  aspirin enteric coated 81 milliGRAM(s) Oral daily  atorvastatin 20 milliGRAM(s) Oral at bedtime  cholecalciferol 2000 Unit(s) Oral two times a day  dextrose 40% Gel 15 Gram(s) Oral once  dextrose 5%. 1000 milliLiter(s) (50 mL/Hr) IV Continuous <Continuous>  dextrose 5%. 1000 milliLiter(s) (100 mL/Hr) IV Continuous <Continuous>  dextrose 50% Injectable 25 Gram(s) IV Push once  dextrose 50% Injectable 12.5 Gram(s) IV Push once  dextrose 50% Injectable 25 Gram(s) IV Push once  enoxaparin Injectable 40 milliGRAM(s) SubCutaneous every 12 hours  glucagon  Injectable 1 milliGRAM(s) IntraMuscular once  insulin glargine Injectable (LANTUS) 12 Unit(s) SubCutaneous at bedtime  insulin lispro (ADMELOG) corrective regimen sliding scale   SubCutaneous at bedtime  insulin lispro (ADMELOG) corrective regimen sliding scale   SubCutaneous three times a day before meals  levothyroxine 300 MICROGram(s) Oral daily  lisinopril 5 milliGRAM(s) Oral daily  multivitamin 1 Tablet(s) Oral daily  predniSONE   Tablet 40 milliGRAM(s) Oral daily  senna 2 Tablet(s) Oral at bedtime    MEDICATIONS  (PRN):  acetaminophen   Tablet .. 650 milliGRAM(s) Oral every 6 hours PRN Temp greater or equal to 38C (100.4F), Mild Pain (1 - 3)  ondansetron Injectable 4 milliGRAM(s) IV Push every 6 hours PRN Nausea and/or Vomiting  oxyCODONE    IR 5 milliGRAM(s) Oral every 4 hours PRN Severe Pain (7 - 10)      Allergies    Ancef (Rash)  daptomycin (Vomiting)  Keflex (Unknown)  penicillin (Pruritus; Rash; Hives)    Intolerances    vancomycin (Other)      LABS:                        7.2    9.96  )-----------( 429      ( 04 Jan 2021 06:28 )             22.8     01-04    132<L>  |  100  |  28<H>  ----------------------------<  150<H>  4.5   |  24  |  0.88    Ca    8.5      04 Jan 2021 06:28            RADIOLOGY & ADDITIONAL TESTS:  Studies reviewed.

## 2021-01-04 NOTE — DISCHARGE NOTE PROVIDER - CARE PROVIDER_API CALL
Hung Pina  DERMATOLOGY  [address], [address]  [place] [address]  Phone: [phone]  Fax: [phone]  Follow Up Time:     Zeeshan Ko  INTERNAL MEDICINE  [address] [place] [address]  Phone: [phone]  Fax: [phone]  Follow Up Time:     Goldberg, Bradley H  [facility]  [address] [place] [address]  Phone: [phone]  Fax: [phone]  Follow Up Time:    Cephalic Hung Pina  DERMATOLOGY  [address], [address]  [place] [address]  Phone: [phone]  Fax: [phone]  Follow Up Time:     Zeeshan Ko  INTERNAL MEDICINE  [address] [place] [address]  Phone: [phone]  Fax: [phone]  Follow Up Time:     Goldberg, Bradley [facility]  [address] [place] [address]  Phone: [phone]  Fax: [phone]  Follow Up Time:     Jose Canas  ENDOCRINOLOGY/METAB/DIABETES  [address], [address]  [place] [address]  Phone: [phone]  Fax: [phone]  Follow Up Time:

## 2021-01-04 NOTE — DISCHARGE NOTE PROVIDER - CARE PROVIDERS DIRECT ADDRESSES
,[email or web address]

## 2021-01-04 NOTE — PROGRESS NOTE ADULT - ASSESSMENT
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 (on a 150 mcg /6 days and one day of 300 mcg/week -patient/spouse not sure which day), Red Man Syndrome from IV vancomycin, chronic pain syndrome with patient apparently on escalating doses of Neurontin (per spouse now on 800 mg 4xDay) with PRN Tramadol and Vicodin due to patient severe pain from her skin lesions, with last admission to [place] in 10/2018 for RIGHT LE cellulitis, endometrial CA diagnosed in 2015 with [facility] BSO and presumed to be in remission, with patient with home visiting RN with an Unna's boot on the RIGHT LE but both are wrapped since Monday, with self referral following rigors and shaking chills since last night.   Spouse noted increased confusion by patient at home last night.    # sepsis 2/2 cellulitis  # AMS likely 2/2 infection  # bullous pemphigoid steroid dependent ro flare  # surgical hypothyroidism  # poorly controlled type 2 DM  # uterine CA presumed to be disease free  # RIGHT> left LE cellulitis    sp IV Azactam and vanco  appreciate ID recs  BC NTD  appreciate derm recs  will given IVIG in house MWF per derm dosing for bullous phemphigoid to prevent flare (Gammagard 200gm/200mL- Infuse 80 gm/day QOD (M, W, F) for 1 week and repeat every 4 weeks)  cont prednisone 40mg  FS ACHS, SSI, lantus  CT head meningioma, no infarct  outpt takes neurontin 800mg QID rx by Dr Gonzales pain management, neurontin on hold  outpt takes tramadol 300mg extended release 24 hrs qd and norco 10-325mg q8 prn  cont Tramadol and low dose Oxycodone IR 5 mg PRN for pain relief  cont statin  PT    #Anemia- sp3u prbc, Hb stable, no e/o active bleeding  transfuse to keep hb >7  anemia mullen- iron studies, hemolytic mullen, folate, b12 - reviewed  heme cs noted  cta/p -no rp bleed  likely need bM bx- fu heme  occult bld  positive-GI cs - outpt fu    Hypothyroidism: elev tsh, pt currently on 150mcg/d but home dose- takes 300 twice wkly, and 150mcg remaining days  will adjust synthroid dosing to 300mcg qd for a week and switch back to home dose as above    DVT prophylaxis    PCP:     Olivia Ross MD (PCP) [phone]

## 2021-01-04 NOTE — DISCHARGE NOTE PROVIDER - HOSPITAL COURSE
Patient is a 70 female with PMH of bullous pemphigoid diagnosed in 2014 on steroids, surgical hypothyroidism following total thyroidectomy, chronic pain syndrome, endometrial CA diagnosed in 2015 with [facility] BSO, now in remission who presented with RLE pain/and worsening erythema, concerned for cellulitis    Sepsis 2/2 b/l LE cellulitis  sepsis resolved, LE cellulitis improved  BCx negative   Discontinued vancomycin  completed Bactrim DS 2 tablets PO Q12h - 10 day course - last day today 1/2/21    -UA reviewed, inconsistent with infection and patient asymptomatic, UCx +E.coli  -completed aztreonam x5 day on 12/29    Candidiasis in groin region  -apply topical nystatin       Bullous pemphigoid   -steroid dependent  -sx can be multifactorial 2/2 cellulitis and flare, improved  -appreciate derm recs    Chronic anemia  CTAP with no RP bleed  Hematology following; GI follow up  acute on chronic anemia  FOBT + but patient states that she has intermittent BRBPR due to hemorrhoids  Colonoscopy 2015 w Dr Ko - diverticulosis and small benign HP polyp resected.  Offered EGD however patient does not want to undergo EGD as inpatient. Information provided for Dr Ko to follow up and arrange outpatient EGD/Colonoscopy.   Patient is a 70 female with PMH of bullous pemphigoid diagnosed in 2014 on steroids, surgical hypothyroidism following total thyroidectomy, chronic pain syndrome, endometrial CA diagnosed in 2015 with [facility] BSO, now in remission who presented with RLE pain/and worsening erythema, concerned for cellulitis  Sepsis 2/2 b/l LE cellulitis; sepsis resolved, LE cellulitis improved  BCx negative ;Discontinued vancomycin  completed Bactrim DS 2 tablets PO Q12h - 10 day course -1/2/21  UA reviewed, inconsistent with infection and patient asymptomatic, UCx +E.coli  -completed aztreonam x5 day on 12/2  Bullous pemphigoid ; steroid dependent  -sx can be multifactorial 2/2 cellulitis and flare, improved  -appreciate derm recs  Chronic anemia  CTAP with no RP bleed  Hematology following; GI follow up  acute on chronic anemia; had blood transfusion  FOBT + but patient states that she has intermittent BRBPR due to hemorrhoids  Colonoscopy 2015 w Dr Ko - diverticulosis and small benign HP polyp resected.  Offered EGD however patient does not want to undergo EGD as inpatient. Information provided for Dr Ko to follow up and arrange outpatient EGD/Colonoscopy.  remained hospitalized for IVIG, MWF per derm dosing for bullous phemphigoid to prevent flare (Gammagard 200gm/200mL- Infuse 80 gm/day QOD (M, W, F) for 1 week and repeat every 4 weeks), next dose in 4 weeks, will not received IVIG at rehab  received dupixent on 1/5/21not due for 2 weeks Patient is a 70 female with PMH of bullous pemphigoid diagnosed in 2014 on steroids, surgical hypothyroidism following total thyroidectomy, chronic pain syndrome, endometrial CA diagnosed in 2015 with [facility] BSO, now in remission who presented with RLE pain/and worsening erythema, concerned for cellulitis  Sepsis 2/2 b/l LE cellulitis; sepsis resolved, LE cellulitis improved  BCx negative ;Discontinued vancomycin  completed Bactrim DS 2 tablets PO Q12h - 10 day course -1/2/21  UA reviewed, inconsistent with infection and patient asymptomatic, UCx +E.coli  -completed aztreonam x5 day on 12/2  Bullous pemphigoid ; steroid dependent  -sx can be multifactorial 2/2 cellulitis and flare, improved  -appreciate derm recs  Chronic anemia  CTAP with no RP bleed  Hematology following; GI follow up  acute on chronic anemia; had blood transfusion  FOBT + but patient states that she has intermittent BRBPR due to hemorrhoids  Colonoscopy 2015 w Dr Ko - diverticulosis and small benign HP polyp resected.  Offered EGD however patient does not want to undergo EGD as inpatient. Information provided for Dr Ko to follow up and arrange outpatient EGD/Colonoscopy.  remained hospitalized for IVIG, MWF per derm dosing for bullous phemphigoid to prevent flare (Gammagard 200gm/200mL- Infuse 80 gm/day QOD (M, W, F) for 1 week and repeat every 4 weeks), next dose in 4 weeks, will not received IVIG at rehab  received dupixent on 1/5/21not due for 2 weeks  Medically cleared for dc today by Dr Boykin

## 2021-01-04 NOTE — DISCHARGE NOTE PROVIDER - NSDCMRMEDTOKEN_GEN_ALL_CORE_FT
aspirin 81 mg oral delayed release tablet: 1 tab(s) orally once a day  atorvastatin 20 mg oral tablet: 1 tab(s) orally once a day (at bedtime)  cholecalciferol oral tablet: 5000 unit(s) orally once a day  clobetasol 0.05% topical ointment: 1 application topically 2 times a day  ferrous sulfate 325 mg (65 mg elemental iron) oral tablet: 1 tab(s) orally once a day  folic acid 1 mg oral tablet: 1 tab(s) orally once a day  Fosamax 35 mg oral tablet: 1 tab(s) orally once a week on Sunday  gabapentin 800 mg oral tablet: 1 tab(s) orally 4 times a day  levothyroxine 150 mcg (0.15 mg) oral tablet: 1 tab(s) orally  SIX DAYS IN A WEEK  levothyroxine 300 mcg (0.3 mg) oral tablet: 1 tab(s) orally  ONCE A WEEK  lisinopril 5 mg oral tablet: 1 tab(s) orally once a day  metFORMIN 500 mg oral tablet, extended release: 500 am 1000 pm  Multiple Vitamins oral tablet: 1 tab(s) orally once a day  mupirocin 2% topical ointment: 1 application topically 2 times a day  Niacinamide 500 mg oral tablet: 1 tab(s) orally 2 times a day  pioglitazone 15 mg oral tablet: 1 tab(s) orally once a day  predniSONE 20 mg oral tablet: 2 tab(s) orally once a day  traMADol 50 mg oral tablet: 1 tab(s) orally every 8 hours, As needed, Severe Pain (7 - 10)  Vicodin 5 mg-300 mg oral tablet: 1 tab(s) orally every 6 hours, As Needed   acetaminophen 325 mg oral tablet: 2 tab(s) orally every 6 hours, As needed, for Mild and Moderate Pain (1 - 6)  aspirin 81 mg oral delayed release tablet: 1 tab(s) orally once a day  atorvastatin 20 mg oral tablet: 1 tab(s) orally once a day (at bedtime)  cholecalciferol oral tablet: 5000 unit(s) orally once a day  ferrous sulfate 325 mg (65 mg elemental iron) oral tablet: 1 tab(s) orally once a day with a meal  folic acid 1 mg oral tablet: 1 tab(s) orally once a day  Fosamax 35 mg oral tablet: 1 tab(s) orally once a week on Sunday  levothyroxine 200 mcg (0.2 mg) oral tablet: 1 tab(s) orally once a day  lisinopril 5 mg oral tablet: 1 tab(s) orally once a day  metFORMIN 500 mg oral tablet, extended release: 500 am 1000 pm  Multiple Vitamins oral tablet: 1 tab(s) orally once a day  oxyCODONE 5 mg oral tablet: 1 tab(s) orally every 4 hours, As needed, Severe Pain (7 - 10)  petrolatum topical ointment: 1 application topically once a day to affected area  pioglitazone 15 mg oral tablet: 1 tab(s) orally once a day  predniSONE 20 mg oral tablet: 2 tab(s) orally once a day  senna oral tablet: 2 tab(s) orally once a day (at bedtime)   acetaminophen 325 mg oral tablet: 2 tab(s) orally every 6 hours, As needed, for Mild and Moderate Pain (1 - 6)  aspirin 81 mg oral delayed release tablet: 1 tab(s) orally once a day  atorvastatin 20 mg oral tablet: 1 tab(s) orally once a day (at bedtime)  cholecalciferol oral tablet: 5000 unit(s) orally once a day  ferrous sulfate 325 mg (65 mg elemental iron) oral tablet: 1 tab(s) orally once a day with a meal  folic acid 1 mg oral tablet: 1 tab(s) orally once a day  Fosamax 35 mg oral tablet: 1 tab(s) orally once a week on Sunday  HumaLOG 100 units/mL subcutaneous solution: 14 unit(s) subcutaneous 3 times a day (with meals)  Lantus 100 units/mL subcutaneous solution: 20 unit(s) subcutaneous once a day (at bedtime)  levothyroxine 200 mcg (0.2 mg) oral tablet: 1 tab(s) orally once a day  lisinopril 5 mg oral tablet: 1 tab(s) orally once a day  Multiple Vitamins oral tablet: 1 tab(s) orally once a day  oxyCODONE 5 mg oral tablet: 1 tab(s) orally every 4 hours, As needed, Severe Pain (7 - 10)  petrolatum topical ointment: 1 application topically once a day to affected area  predniSONE 20 mg oral tablet: 2 tab(s) orally once a day  senna oral tablet: 2 tab(s) orally once a day (at bedtime)

## 2021-01-04 NOTE — DISCHARGE NOTE PROVIDER - NSDCFUSCHEDAPPT_GEN_ALL_CORE_FT
JAKE ZIMMERMAN ; 01/26/2021 ; [facility] Gynonc [address] JAKE Leblanc ; 02/01/2021 ; [facility]um [address] ; 02/02/2021 ; [facility] Derm [address] JAKE Malone ; 02/03/2021 ; [facility] Rheum [address]  JAKE ZIMMERMAN ; 02/05/2021 ; [facility] Med Rheum [address]  JAKE ZIMMERMAN ; 03/01/2021 ; [facility] Med Rheum [address] ; 03/03/2021 ; [facility] Med Rheum [address]  JAKE ZIMMERMAN ; 03/05/2021 ; [facility] Rheum [address] ; 03/29/2021 ; [facility] Med Rheum [address]  JAKE ZIMMERMAN ; 03/31/2021 ; [facility] Med Rheum [address]  JAKE ZIMMERMAN ; 04/02/2021 ; [facility] Med Rheum [address]

## 2021-01-04 NOTE — DISCHARGE NOTE PROVIDER - DETAILS OF MALNUTRITION DIAGNOSIS/DIAGNOSES
This patient has been assessed with a concern for Malnutrition and was treated during this hospitalization for the following Nutrition diagnosis/diagnoses:     -  12/25/2020: Morbid obesity (BMI > 40)   This patient has been assessed with a concern for Malnutrition and was treated during this hospitalization for the following Nutrition diagnosis/diagnoses:     -  12/25/2020: Morbid obesity (BMI > 40)    This patient has been assessed with a concern for Malnutrition and was treated during this hospitalization for the following Nutrition diagnosis/diagnoses:     -  12/25/2020: Morbid obesity (BMI > 40)   This patient has been assessed with a concern for Malnutrition and was treated during this hospitalization for the following Nutrition diagnosis/diagnoses:     -  12/25/2020: Morbid obesity (BMI > 40)    This patient has been assessed with a concern for Malnutrition and was treated during this hospitalization for the following Nutrition diagnosis/diagnoses:     -  12/25/2020: Morbid obesity (BMI > 40)    This patient has been assessed with a concern for Malnutrition and was treated during this hospitalization for the following Nutrition diagnosis/diagnoses:     -  12/25/2020: Morbid obesity (BMI > 40)   This patient has been assessed with a concern for Malnutrition and was treated during this hospitalization for the following Nutrition diagnosis/diagnoses:     -  12/25/2020: Morbid obesity (BMI > 40)    This patient has been assessed with a concern for Malnutrition and was treated during this hospitalization for the following Nutrition diagnosis/diagnoses:     -  12/25/2020: Morbid obesity (BMI > 40)    This patient has been assessed with a concern for Malnutrition and was treated during this hospitalization for the following Nutrition diagnosis/diagnoses:     -  12/25/2020: Morbid obesity (BMI > 40)    This patient has been assessed with a concern for Malnutrition and was treated during this hospitalization for the following Nutrition diagnosis/diagnoses:     -  12/25/2020: Morbid obesity (BMI > 40)   This patient has been assessed with a concern for Malnutrition and was treated during this hospitalization for the following Nutrition diagnosis/diagnoses:     -  12/25/2020: Morbid obesity (BMI > 40)    This patient has been assessed with a concern for Malnutrition and was treated during this hospitalization for the following Nutrition diagnosis/diagnoses:     -  12/25/2020: Morbid obesity (BMI > 40)    This patient has been assessed with a concern for Malnutrition and was treated during this hospitalization for the following Nutrition diagnosis/diagnoses:     -  12/25/2020: Morbid obesity (BMI > 40)    This patient has been assessed with a concern for Malnutrition and was treated during this hospitalization for the following Nutrition diagnosis/diagnoses:     -  12/25/2020: Morbid obesity (BMI > 40)    This patient has been assessed with a concern for Malnutrition and was treated during this hospitalization for the following Nutrition diagnosis/diagnoses:     -  12/25/2020: Morbid obesity (BMI > 40)

## 2021-01-04 NOTE — PROGRESS NOTE ADULT - ASSESSMENT
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  Hematology consulted for acute on chronic anemia.  Macrocytic anemia.    #Macrocytic Anemia  -No evidence of hemolysis -  Haptoglobin 174, bili 0.2, direct dedrick neg.  -No evidence of nutritional deficiencies - B12 and folate wnl.    -iron sat 11% though ferritin 44.  Start iron supplementation, pt is on iron b12 and folate at home.  -Check SPEP with YULI, UPEP w YULI, IG levels, FLC. I have ordered these.   -PBS reviewed with anisocytosis, elliptocytes, polychromasia, no schistocytes, hypersegmented and dysplastic neutrophils - concerned for underlying BM disorder exacerbated by infection  -Trend cbc with diff daily  -if no improvement, likely will need bone marrow biopsy next week to further evaluate. Given high BMI, this will need to be done with IR. Will discuss with patient.   -c/w supportive transfusions if hgb <7  -fu Dr. Martinez upon discharge    Trisha Aguillon MD  Hematology/Oncology Fellow, PGY-4  Pager: [phone]  After 5pm and on weekends please page on-call fellow 71 y/o F w history of high grade stage 1 uterine carcinosarcoma s/p TLH/BSO and adjuvant carbo/taxol in 4/2016, bullous pemphigoid and LDH on IVIG and prednisone admitted for sepsis 2/2 to cellulitis infection on IV abx.  Hematology consulted for acute on chronic anemia.  Macrocytic anemia.    #Macrocytic Anemia  -No evidence of hemolysis -  Haptoglobin 174, bili 0.2, direct dedrick neg.  -No evidence of nutritional deficiencies - B12 and folate wnl.    -iron sat 11% though ferritin 44.  Start iron supplementation, pt is on iron b12 and folate at home.  -Check SPEP with YULI, UPEP w YULI, IG levels, FLC. I have ordered these.   -PBS reviewed with anisocytosis, elliptocytes, polychromasia, no schistocytes, hypersegmented and dysplastic neutrophils - concerned for underlying BM disorder exacerbated by infection  -Trend cbc with diff daily  -Patient will need a BMBx to evaluate for MDS, however this can be done outpatient  -Patient is more agreeable to an EGD. Would inform GI to possibly plan for inpatient EGD?  -c/w supportive transfusions if hgb <7  -fu Dr. Martinez upon discharge    Trisha Aguillon MD  Hematology/Oncology Fellow, PGY-4  Pager: [phone]  After 5pm and on weekends please page on-call fellow

## 2021-01-04 NOTE — DISCHARGE NOTE PROVIDER - NSDCCPCAREPLAN_GEN_ALL_CORE_FT
PRINCIPAL DISCHARGE DIAGNOSIS  Diagnosis: Cellulitis of lower extremity, unspecified laterality  Assessment and Plan of Treatment: Cellulitis of lower extremity, unspecified laterality  completed antibiotic  monitor      SECONDARY DISCHARGE DIAGNOSES  Diagnosis: Anemia  Assessment and Plan of Treatment: GI/hematology follow up     PRINCIPAL DISCHARGE DIAGNOSIS  Diagnosis: Cellulitis of lower extremity, unspecified laterality  Assessment and Plan of Treatment: Cellulitis of lower extremity, unspecified laterality  completed antibiotic  monitor      SECONDARY DISCHARGE DIAGNOSES  Diagnosis: Anemia  Assessment and Plan of Treatment: had blood transfusion  GI/hematology follow up as outpatient    Diagnosis: Type 2 diabetes mellitus with hyperglycemia, without long-term current use of insulin  Assessment and Plan of Treatment: Type 2 diabetes mellitus with hyperglycemia, without long-term current use of insulin  continue diabetic diet  monitor finger stick  continue diabetic medication  follow up with endocrinologist     PRINCIPAL DISCHARGE DIAGNOSIS  Diagnosis: Cellulitis of lower extremity, unspecified laterality  Assessment and Plan of Treatment: You have completed antibiotic treatment.  Follow up with your primary doctor 1 week after discharge from Rehab.  If the affected cellulitic area increases in redness, warmth, pain or swelling call your Health Care Provider.  If you develop fever, chills, and/or malaise, call your Health Care Provider.      SECONDARY DISCHARGE DIAGNOSES  Diagnosis: Anemia  Assessment and Plan of Treatment: You received multiple blood transfusions during hospitalization.  Follow up with your primary doctor for repeat blood work.   Your last blood count today is 8.4/26.7.  Anemia is when you have a low blood count of hemoglobin (HGB) and/or hematocrit (HCT), or RBC (red blood cells).If your hgb is <13 (women) or <12 (men), then you are considered to be anemic.  Losing a moderate to large amount of blood cause anemia.  Although, there are several different types of anemia that are not due to blood loss.  The primary symptoms of anemia is difficulty breathing with exertion or at rest, fatigue, bounding pulses, and/or palpitations.  If you are persistently having these symptoms, you will need to seek help from your healthcare provider.    Diagnosis: Confusional state  Assessment and Plan of Treatment: Condition resolved.    Diagnosis: Bullous pemphigoid  Assessment and Plan of Treatment: Continue with steroid as prescribed.  Follow up with Dermatology regrding Dupixent; Last dose given 1/5/21  Next dose due 1/19/21.    Diagnosis: Hypothyroidism  Assessment and Plan of Treatment: continue with medications as prescribed.    Diagnosis: Vitamin D deficiency  Assessment and Plan of Treatment: Continue with Vitamin D supplement.    Diagnosis: Type 2 diabetes mellitus with hyperglycemia, without long-term current use of insulin  Assessment and Plan of Treatment: HgA1C this admission on 12/24/20 was 6.6  Make sure you get your HgA1c checked every three months.  If you take oral diabetes medications, check your blood glucose two times a day.  If you take insulin, check your blood glucose before meals and at bedtime.  It's important not to skip any meals.  Keep a log of your blood glucose results and always take it with you to your doctor appointments.  Keep a list of your current medications including injectables and over the counter medications and bring this medication list with you to all your doctor appointments.  If you have not seen your ophthalmologist this year call for appointment.  Check your feet daily for redness, sores, or openings. Do not self treat. If no improvement in two days call your primary care physician for an appointment.

## 2021-01-04 NOTE — DISCHARGE NOTE PROVIDER - PROVIDER TOKENS
PROVIDER:[TOKEN:[[id number]]],PROVIDER:[TOKEN:[[id number]]],PROVIDER:[TOKEN:[[id number]]] PROVIDER:[TOKEN:[[id number]]],PROVIDER:[TOKEN:[[id number]]],PROVIDER:[TOKEN:[[id number]]],PROVIDER:[TOKEN:[[id number]]]

## 2021-01-04 NOTE — PROGRESS NOTE ADULT - SUBJECTIVE AND OBJECTIVE BOX
Patient is a 70y old  Female who presents with a chief complaint of Rigors, chills since last night (04 Jan 2021 14:59)      INTERVAL HPI/OVERNIGHT EVENTS: noted  pt seen and examined  feels well  hb stable      Vital Signs Last 24 Hrs  T(C): 36.7 (04 Jan 2021 13:59), Max: 37.2 (03 Jan 2021 19:57)  T(F): 98.1 (04 Jan 2021 13:59), Max: 99 (03 Jan 2021 19:57)  HR: 88 (04 Jan 2021 13:59) (86 - 90)  BP: 106/58 (04 Jan 2021 13:59) (99/53 - 106/58)  BP(mean): --  RR: 18 (04 Jan 2021 13:59) (18 - 20)  SpO2: 97% (04 Jan 2021 13:59) (97% - 97%)    acetaminophen   Tablet .. 650 milliGRAM(s) Oral every 6 hours PRN  aspirin enteric coated 81 milliGRAM(s) Oral daily  atorvastatin 20 milliGRAM(s) Oral at bedtime  cholecalciferol 2000 Unit(s) Oral two times a day  dextrose 40% Gel 15 Gram(s) Oral once  dextrose 5%. 1000 milliLiter(s) IV Continuous <Continuous>  dextrose 5%. 1000 milliLiter(s) IV Continuous <Continuous>  dextrose 50% Injectable 12.5 Gram(s) IV Push once  dextrose 50% Injectable 25 Gram(s) IV Push once  dextrose 50% Injectable 25 Gram(s) IV Push once  enoxaparin Injectable 40 milliGRAM(s) SubCutaneous every 12 hours  ferrous    sulfate 325 milliGRAM(s) Oral daily  folic acid 1 milliGRAM(s) Oral daily  glucagon  Injectable 1 milliGRAM(s) IntraMuscular once  insulin glargine Injectable (LANTUS) 12 Unit(s) SubCutaneous at bedtime  insulin lispro (ADMELOG) corrective regimen sliding scale   SubCutaneous at bedtime  insulin lispro (ADMELOG) corrective regimen sliding scale   SubCutaneous three times a day before meals  levothyroxine 300 MICROGram(s) Oral daily  lisinopril 5 milliGRAM(s) Oral daily  multivitamin 1 Tablet(s) Oral daily  ondansetron Injectable 4 milliGRAM(s) IV Push every 6 hours PRN  oxyCODONE    IR 5 milliGRAM(s) Oral every 4 hours PRN  predniSONE   Tablet 40 milliGRAM(s) Oral daily  senna 2 Tablet(s) Oral at bedtime      PHYSICAL EXAM:  GENERAL: NAD,   EYES: conjunctiva and sclera clear  ENMT: Moist mucous membranes  NECK: Supple, No JVD, Normal thyroid  CHEST/LUNG: non labored, cta b/l  HEART: Regular rate and rhythm; No murmurs, rubs, or gallops  ABDOMEN: Soft, Nontender, Nondistended; Bowel sounds present  EXTREMITIES:  2+ Peripheral Pulses, No clubbing, cyanosis, or edema  LYMPH: No lymphadenopathy noted  SKIN: No rashes or lesions    Consultant(s) Notes Reviewed:  [x ] YES  [ ] NO  Care Discussed with Consultants/Other Providers [ x] YES  [ ] NO    LABS:                        7.2    9.96  )-----------( 429      ( 04 Jan 2021 06:28 )             22.8     01-04    132<L>  |  100  |  28<H>  ----------------------------<  150<H>  4.5   |  24  |  0.88    Ca    8.5      04 Jan 2021 06:28          CAPILLARY BLOOD GLUCOSE      POCT Blood Glucose.: 285 mg/dL (04 Jan 2021 17:12)  POCT Blood Glucose.: 309 mg/dL (04 Jan 2021 12:10)  POCT Blood Glucose.: 193 mg/dL (04 Jan 2021 08:24)  POCT Blood Glucose.: 224 mg/dL (03 Jan 2021 21:45)              RADIOLOGY & ADDITIONAL TESTS:    Imaging Personally Reviewed:  [x ] YES  [ ] NO

## 2021-01-04 NOTE — CONSULT NOTE ADULT - ASSESSMENT
69 yo F w/ acute on chronic anemia  FOBT + but patient states that she has intermittent BRBPR due to hemorrhoids  Colonoscopy 2015 w Dr Ko - diverticulosis and small benign HP polyp resected.  Offered EGD however patient does not want to undergo EGD as inpatient. Information provided for Dr Ko to follow up and arrange outpatient EGD/Colonoscopy.  Further care per hematology and primary team.  Will sign off. Please call with questions    [phone]

## 2021-01-05 DIAGNOSIS — E55.9 VITAMIN D DEFICIENCY, UNSPECIFIED: ICD-10-CM

## 2021-01-05 LAB
% ALBUMIN: 35.4 % — SIGNIFICANT CHANGE UP
% ALPHA 1: 4.3 % — SIGNIFICANT CHANGE UP
% ALPHA 2: 6.9 % — SIGNIFICANT CHANGE UP
% BETA: 9.2 % — SIGNIFICANT CHANGE UP
% GAMMA: 44.2 % — SIGNIFICANT CHANGE UP
ALBUMIN SERPL ELPH-MCNC: 2.8 G/DL — LOW (ref 3.6–5.5)
ALBUMIN/GLOB SERPL ELPH: 0.5 RATIO — SIGNIFICANT CHANGE UP
ALPHA1 GLOB SERPL ELPH-MCNC: 0.3 G/DL — SIGNIFICANT CHANGE UP (ref 0.1–0.4)
ALPHA2 GLOB SERPL ELPH-MCNC: 0.6 G/DL — SIGNIFICANT CHANGE UP (ref 0.5–1)
ANION GAP SERPL CALC-SCNC: 10 MMOL/L — SIGNIFICANT CHANGE UP (ref 5–17)
B-GLOBULIN SERPL ELPH-MCNC: 0.7 G/DL — SIGNIFICANT CHANGE UP (ref 0.5–1)
BASOPHILS # BLD AUTO: 0 K/UL — SIGNIFICANT CHANGE UP (ref 0–0.2)
BASOPHILS NFR BLD AUTO: 0 % — SIGNIFICANT CHANGE UP (ref 0–2)
BLD GP AB SCN SERPL QL: NEGATIVE — SIGNIFICANT CHANGE UP
BUN SERPL-MCNC: 27 MG/DL — HIGH (ref 7–23)
CALCIUM SERPL-MCNC: 8.3 MG/DL — LOW (ref 8.4–10.5)
CHLORIDE SERPL-SCNC: 99 MMOL/L — SIGNIFICANT CHANGE UP (ref 96–108)
CO2 SERPL-SCNC: 22 MMOL/L — SIGNIFICANT CHANGE UP (ref 22–31)
CREAT SERPL-MCNC: 0.77 MG/DL — SIGNIFICANT CHANGE UP (ref 0.5–1.3)
EOSINOPHIL # BLD AUTO: 0.15 K/UL — SIGNIFICANT CHANGE UP (ref 0–0.5)
EOSINOPHIL NFR BLD AUTO: 1.8 % — SIGNIFICANT CHANGE UP (ref 0–6)
GAMMA GLOBULIN: 3.5 G/DL — HIGH (ref 0.6–1.6)
GIANT PLATELETS BLD QL SMEAR: PRESENT — SIGNIFICANT CHANGE UP
GLUCOSE SERPL-MCNC: 162 MG/DL — HIGH (ref 70–99)
HCT VFR BLD CALC: 23.1 % — LOW (ref 34.5–45)
HGB BLD-MCNC: 7 G/DL — CRITICAL LOW (ref 11.5–15.5)
IGA FLD-MCNC: 205 MG/DL — SIGNIFICANT CHANGE UP (ref 84–499)
IGG FLD-MCNC: 3927 MG/DL — HIGH (ref 610–1660)
IGM SERPL-MCNC: 53 MG/DL — SIGNIFICANT CHANGE UP (ref 35–242)
INTERPRETATION SERPL IFE-IMP: SIGNIFICANT CHANGE UP
KAPPA LC SER QL IFE: 2.69 MG/DL — HIGH (ref 0.33–1.94)
KAPPA/LAMBDA FREE LIGHT CHAIN RATIO, SERUM: 1.37 RATIO — SIGNIFICANT CHANGE UP (ref 0.26–1.65)
LAMBDA LC SER QL IFE: 1.97 MG/DL — SIGNIFICANT CHANGE UP (ref 0.57–2.63)
LYMPHOCYTES # BLD AUTO: 1.42 K/UL — SIGNIFICANT CHANGE UP (ref 1–3.3)
LYMPHOCYTES # BLD AUTO: 17.3 % — SIGNIFICANT CHANGE UP (ref 13–44)
MANUAL SMEAR VERIFICATION: SIGNIFICANT CHANGE UP
MCHC RBC-ENTMCNC: 30.3 GM/DL — LOW (ref 32–36)
MCHC RBC-ENTMCNC: 31.7 PG — SIGNIFICANT CHANGE UP (ref 27–34)
MCV RBC AUTO: 104.5 FL — HIGH (ref 80–100)
MONOCYTES # BLD AUTO: 0.38 K/UL — SIGNIFICANT CHANGE UP (ref 0–0.9)
MONOCYTES NFR BLD AUTO: 4.6 % — SIGNIFICANT CHANGE UP (ref 2–14)
MYELOCYTES NFR BLD: 1.8 % — HIGH (ref 0–0)
NEUTROPHILS # BLD AUTO: 6.13 K/UL — SIGNIFICANT CHANGE UP (ref 1.8–7.4)
NEUTROPHILS NFR BLD AUTO: 72.7 % — SIGNIFICANT CHANGE UP (ref 43–77)
NEUTS BAND # BLD: 1.8 % — SIGNIFICANT CHANGE UP (ref 0–8)
NRBC # BLD: 4 /100 — HIGH (ref 0–0)
PLAT MORPH BLD: ABNORMAL
PLATELET # BLD AUTO: 462 K/UL — HIGH (ref 150–400)
POTASSIUM SERPL-MCNC: 4.1 MMOL/L — SIGNIFICANT CHANGE UP (ref 3.5–5.3)
POTASSIUM SERPL-SCNC: 4.1 MMOL/L — SIGNIFICANT CHANGE UP (ref 3.5–5.3)
PROT PATTERN SERPL ELPH-IMP: SIGNIFICANT CHANGE UP
RBC # BLD: 2.21 M/UL — LOW (ref 3.8–5.2)
RBC # FLD: 21.7 % — HIGH (ref 10.3–14.5)
RBC BLD AUTO: SIGNIFICANT CHANGE UP
RH IG SCN BLD-IMP: POSITIVE — SIGNIFICANT CHANGE UP
SODIUM SERPL-SCNC: 131 MMOL/L — LOW (ref 135–145)
T3FREE SERPL-MCNC: 1.51 PG/ML — LOW (ref 1.8–4.6)
T4 AB SER-ACNC: 6.1 UG/DL — SIGNIFICANT CHANGE UP (ref 4.6–12)
WBC # BLD: 8.23 K/UL — SIGNIFICANT CHANGE UP (ref 3.8–10.5)
WBC # FLD AUTO: 8.23 K/UL — SIGNIFICANT CHANGE UP (ref 3.8–10.5)

## 2021-01-05 PROCEDURE — 99232 SBSQ HOSP IP/OBS MODERATE 35: CPT

## 2021-01-05 PROCEDURE — 99223 1ST HOSP IP/OBS HIGH 75: CPT

## 2021-01-05 RX ORDER — DUPILUMAB 300 MG/2ML
300 INJECTION, SOLUTION SUBCUTANEOUS ONCE
Refills: 0 | Status: COMPLETED | OUTPATIENT
Start: 2021-01-05 | End: 2021-01-05

## 2021-01-05 RX ORDER — INSULIN LISPRO 100/ML
12 VIAL (ML) SUBCUTANEOUS
Refills: 0 | Status: DISCONTINUED | OUTPATIENT
Start: 2021-01-05 | End: 2021-01-06

## 2021-01-05 RX ORDER — LEVOTHYROXINE SODIUM 125 MCG
200 TABLET ORAL DAILY
Refills: 0 | Status: DISCONTINUED | OUTPATIENT
Start: 2021-01-06 | End: 2021-01-07

## 2021-01-05 RX ADMIN — Medication 300 MICROGRAM(S): at 06:37

## 2021-01-05 RX ADMIN — Medication 1 TABLET(S): at 12:26

## 2021-01-05 RX ADMIN — Medication 1 MILLIGRAM(S): at 12:26

## 2021-01-05 RX ADMIN — OXYCODONE HYDROCHLORIDE 5 MILLIGRAM(S): 5 TABLET ORAL at 19:24

## 2021-01-05 RX ADMIN — OXYCODONE HYDROCHLORIDE 5 MILLIGRAM(S): 5 TABLET ORAL at 16:40

## 2021-01-05 RX ADMIN — OXYCODONE HYDROCHLORIDE 5 MILLIGRAM(S): 5 TABLET ORAL at 10:33

## 2021-01-05 RX ADMIN — INSULIN GLARGINE 12 UNIT(S): 100 INJECTION, SOLUTION SUBCUTANEOUS at 22:28

## 2021-01-05 RX ADMIN — Medication 6: at 12:26

## 2021-01-05 RX ADMIN — DUPILUMAB 300 MILLIGRAM(S): 300 INJECTION, SOLUTION SUBCUTANEOUS at 16:49

## 2021-01-05 RX ADMIN — ENOXAPARIN SODIUM 40 MILLIGRAM(S): 100 INJECTION SUBCUTANEOUS at 16:43

## 2021-01-05 RX ADMIN — Medication 2: at 08:49

## 2021-01-05 RX ADMIN — OXYCODONE HYDROCHLORIDE 5 MILLIGRAM(S): 5 TABLET ORAL at 21:50

## 2021-01-05 RX ADMIN — ATORVASTATIN CALCIUM 20 MILLIGRAM(S): 80 TABLET, FILM COATED ORAL at 22:28

## 2021-01-05 RX ADMIN — ENOXAPARIN SODIUM 40 MILLIGRAM(S): 100 INJECTION SUBCUTANEOUS at 06:37

## 2021-01-05 RX ADMIN — Medication 325 MILLIGRAM(S): at 12:26

## 2021-01-05 RX ADMIN — Medication 4: at 17:35

## 2021-01-05 RX ADMIN — OXYCODONE HYDROCHLORIDE 5 MILLIGRAM(S): 5 TABLET ORAL at 06:41

## 2021-01-05 RX ADMIN — OXYCODONE HYDROCHLORIDE 5 MILLIGRAM(S): 5 TABLET ORAL at 00:00

## 2021-01-05 RX ADMIN — Medication 40 MILLIGRAM(S): at 06:37

## 2021-01-05 RX ADMIN — OXYCODONE HYDROCHLORIDE 5 MILLIGRAM(S): 5 TABLET ORAL at 11:25

## 2021-01-05 RX ADMIN — Medication 2000 UNIT(S): at 16:43

## 2021-01-05 RX ADMIN — OXYCODONE HYDROCHLORIDE 5 MILLIGRAM(S): 5 TABLET ORAL at 14:52

## 2021-01-05 RX ADMIN — Medication 2000 UNIT(S): at 06:38

## 2021-01-05 RX ADMIN — Medication 81 MILLIGRAM(S): at 12:26

## 2021-01-05 NOTE — CHART NOTE - NSCHARTNOTEFT_GEN_A_CORE
informed by RN pt with abnormal  lab  low hemoglobin 7.0; no bleeding reported  Patient is a 70y old  Female who presents with a chief complaint of Rigors, chills since last night (05 Jan 2021 09:42)    HPI:  NIGHT HOSPITALIST:   Patient UNKNOWN to me previously, assigned to me at this point via the ER and by Dr. Boykin of the [facility] to admit this 71 y/o F--followed by her physicians above--patient with a history of bullous pemphigoid for 6 years currently steroid dependent, type 2 DM on oral Rx, surgical hypothyroidism following total thyroidectomy for a large obstructing goiter (on a 150 mcg /6 days and one day of 300 mcg/week -patient/spouse not sure which day), Red Man Syndrome from IV vancomycin, chronic pain syndrome with patient apparently on escalating doses of Neurontin (per spouse now on 800 mg 4xDay) with PRN Tramadol and Vicodin due to patient severe pain from her skin lesions, with last admission to [place] in 10/2018 for RIGHT LE cellulitis, endometrial CA diagnosed in 2015 with  [facility] BSO and presumed to be in remission, with patient with home visiting RN with an Unna's boot on the RIGHT LE but both are wrapped since Monday, with self referral following rigors and shaking chills since last night.   Spouse noted increased confusion by patient at home last night. (23 Dec 2020 20:47)    Vital Signs Last 24 Hrs  T(C): 37.2 (05 Jan 2021 09:39), Max: 37.2 (04 Jan 2021 20:45)  T(F): 99 (05 Jan 2021 09:39), Max: 99 (04 Jan 2021 20:45)  HR: 92 (05 Jan 2021 09:39) (78 - 92)  BP: 95/58 (05 Jan 2021 09:39) (95/58 - 121/70)  BP(mean): --  RR: 18 (05 Jan 2021 09:39) (17 - 18)  SpO2: 98% (05 Jan 2021 09:39) (97% - 99%)                        7.0    8.23  )-----------( 462      ( 05 Jan 2021 07:21 )             23.1     01-05    131<L>  |  99  |  27<H>  ----------------------------<  162<H>  4.1   |  22  |  0.77    Ca    8.3<L>      05 Jan 2021 07:21    TPro  8.0  /  Alb  x   /  TBili  x   /  DBili  x   /  AST  x   /  ALT  x   /  AlkPhos  x   01-04      pt is alert and orientedx3  denies any rectal bleed/SOB/CP  Respiratory: CTA B/L  CV: RRR, S1S2,   Abdominal: Soft, NT, ND, bowel sounds present  MSK: No edema, able to move all extremities;     AP  70 female with PMH of bullous pemphigoid diagnosed in 2014 on steroids, surgical hypothyroidism following total thyroidectomy, chronic pain syndrome, endometrial CA diagnosed in 2015 with [facility] BSO, now in remission who presented with RLE pain/and worsening erythema, concerned for cellulitis  Sepsis 2/2 b/l LE cellulitis; completed antibiotics;  acute on chronic macrocytic anemia  FOBT + but patient states that she has intermittent BRBPR due to hemorrhoids  Colonoscopy 2015 w Dr Ko - diverticulosis and small benign HP polyp resected.  seen by GI and offered EGD however patient does not want to undergo EGD as inpatient. GI to  arrange outpatient EGD/Colonoscopy.  seen by Heme; bone marrow biopsy as outpatient per d/w pt  pt with low hemoglobin/hematocrit; per d/w GI/MD, 1 unit of PRBC today; monitor;   f/u CBC in AM  f/u by GI/heme

## 2021-01-05 NOTE — CONSULT NOTE ADULT - PROBLEM SELECTOR RECOMMENDATION 2
-patient with hyperglycemia due to use of only correctional scale and basal in the setting of prednisone 40mg po qdaily.   -Continue Lantus 12 units sq qam as serum glucose was at goal  -Add admelog 12 units sq tid-ac  -Moderate scale tid-ac/qhs

## 2021-01-05 NOTE — PROGRESS NOTE ADULT - SUBJECTIVE AND OBJECTIVE BOX
GI team consulted for evaluation of chronic anemia.  Patient has longstanding anemia for 8-10 years. Has had a BM biopsy in the past.   Reports mild nausea w/o vomiting. No GERD. No abd pain. No diarrhea. No overt blood in stool  Last colonoscopy with Dr Ko  - diverticulosis and a small HP polyp  No prior EGD    No overt bleeding. Hb remains ~ 7    PAST MEDICAL & SURGICAL HISTORY:  Adrenal mass   incidental findings   Ct SCAN 2015 unchannged  24 hour urine for VMA done by Dr DR Canas [phone] Neg asper patient    Hyperlipidemia    Endometrial cancer  dx: 2015:  High grade Endometroid Adenocarcinoma    Morbid obesity  BMI:  53.6    Vitamin D deficiency    Hypothyroidism    Anemia    History of osteoarthritis    Type 2 diabetes mellitus    Hypertension    Bullous pemphigoid  dx: 2014.  On Immunosupressants  Cellcept    S/P BSO (bilateral salpingo-oophorectomy)    S/P hysterectomy    Endometrial cancer  2015:  Endometrial Biopsy: dx: High Grade Endometroid Adenocarcinoma    Status post total thyroidectomy  &#x27; 2001    History of  section  &#x27; 82-&#x27;95:  4 X        MEDICATIONS  (STANDING):  aspirin enteric coated 81 milliGRAM(s) Oral daily  atorvastatin 20 milliGRAM(s) Oral at bedtime  cholecalciferol 2000 Unit(s) Oral two times a day  dextrose 40% Gel 15 Gram(s) Oral once  dextrose 5%. 1000 milliLiter(s) (50 mL/Hr) IV Continuous <Continuous>  dextrose 5%. 1000 milliLiter(s) (100 mL/Hr) IV Continuous <Continuous>  dextrose 50% Injectable 25 Gram(s) IV Push once  dextrose 50% Injectable 12.5 Gram(s) IV Push once  dextrose 50% Injectable 25 Gram(s) IV Push once  enoxaparin Injectable 40 milliGRAM(s) SubCutaneous every 12 hours  glucagon  Injectable 1 milliGRAM(s) IntraMuscular once  insulin glargine Injectable (LANTUS) 12 Unit(s) SubCutaneous at bedtime  insulin lispro (ADMELOG) corrective regimen sliding scale   SubCutaneous at bedtime  insulin lispro (ADMELOG) corrective regimen sliding scale   SubCutaneous three times a day before meals  levothyroxine 300 MICROGram(s) Oral daily  lisinopril 5 milliGRAM(s) Oral daily  multivitamin 1 Tablet(s) Oral daily  predniSONE   Tablet 40 milliGRAM(s) Oral daily  senna 2 Tablet(s) Oral at bedtime    MEDICATIONS  (PRN):  acetaminophen   Tablet .. 650 milliGRAM(s) Oral every 6 hours PRN Temp greater or equal to 38C (100.4F), Mild Pain (1 - 3)  ondansetron Injectable 4 milliGRAM(s) IV Push every 6 hours PRN Nausea and/or Vomiting  oxyCODONE    IR 5 milliGRAM(s) Oral every 4 hours PRN Severe Pain (7 - 10)      Allergies    Ancef (Rash)  daptomycin (Vomiting)  Keflex (Unknown)  penicillin (Pruritus; Rash; Hives)    Intolerances    vancomycin (Other)      Review of Systems:    General:  No wt loss, +fevers, chills, night sweats,fatigue,   CV:  No pain, palpitatioins, hypo/hypertension  Resp:  No dyspnea, cough, tachypnea, wheezing  GI:  see hpi  :  No pain, bleeding, incontinence, nocturia  Muscle:  No pain, weakness  Neuro:  No weakness, tingling, memory problems  Psych:  No fatigue, insomnia, mood problems, depression  Endocrine:  No polyuria, polydypsia, cold/heat intolerance  Heme:  No petechiae, ecchymosis, easy bruisability  Skin:  + pemphigus rash, tattoos, scars, edema    Vital Signs:  Vital Signs Last 24 Hrs  T(C): 37.2 (2021 09:39), Max: 37.2 (2021 20:45)  T(F): 99 (2021 09:39), Max: 99 (2021 20:45)  HR: 92 (2021 09:39) (78 - 92)  BP: 95/58 (2021 09:39) (95/58 - 121/70)  BP(mean): --  RR: 18 (2021 09:39) (17 - 18)  SpO2: 98% (2021 09:39) (97% - 99%)  Daily     Daily           PHYSICAL EXAM:    Constitutional: NAD, well-developed, obese  Respiratory: +BS  Cardiovascular: S1 and S2,  Gastrointestinal: BS+, soft, NT/ND,  Neurological: A/O x 3, no focal deficits  Psychiatric: Normal mood, normal affect      LABS:                        7.0    8.23  )-----------( 462      ( 2021 07:21 )             23.1     -    131<L>  |  99  |  27<H>  ----------------------------<  162<H>  4.1   |  22  |  0.77    Ca    8.3<L>      2021 07:21    TPro  8.0  /  Alb  x   /  TBili  x   /  DBili  x   /  AST  x   /  ALT  x   /  AlkPhos  x       LIVER FUNCTIONS - ( 2021 15:19 )  Alb: x     / Pro: 8.0 g/dL / ALK PHOS: x     / ALT: x     / AST: x     / GGT: x                               RADIOLOGY & ADDITIONAL TESTS:

## 2021-01-05 NOTE — CONSULT NOTE ADULT - REASON FOR ADMISSION
Rigors, chills since last night

## 2021-01-05 NOTE — CONSULT NOTE ADULT - PROBLEM SELECTOR RECOMMENDATION 9
-based on her weight she needs 198 mcg po qdaily so would stop 300mcg and change her to Synthroid 200mcg po qdaily - an hour before other meds and food. recheck in 4-6 week  -unlikely the cause of her anemia as her TSH on 12/24 was 6 and then it went up to 17 as the patient had been given the wrong dose while inpatient

## 2021-01-05 NOTE — CONSULT NOTE ADULT - SUBJECTIVE AND OBJECTIVE BOX
HPI:  NIGHT HOSPITALIST:   Patient UNKNOWN to me previously, assigned to me at this point via the ER and by Dr. Boykin of the [facility] to admit this 69 y/o F--followed by her physicians above--patient with a history of bullous pemphigoid for 6 years currently steroid dependent, type 2 DM on oral Rx, surgical hypothyroidism following total thyroidectomy for a large obstructing goiter (on a 150 mcg /6 days and one day of 300 mcg/week -patient/spouse not sure which day), Red Man Syndrome from IV vancomycin, chronic pain syndrome with patient apparently on escalating doses of Neurontin (per spouse now on 800 mg 4xDay) with PRN Tramadol and Vicodin due to patient severe pain from her skin lesions, with last admission to [facility] in 10/2018 for RIGHT LE cellulitis, endometrial CA diagnosed in  with  [facility] BSO and presumed to be in remission, with patient with home visiting RN with an Unna's boot on the RIGHT LE but both are wrapped since Monday, with self referral following rigors and shaking chills since last night.   Spouse noted increased confusion by patient at home last night. (23 Dec 2020 20:47)      PAST MEDICAL & SURGICAL HISTORY:  Adrenal mass  2014 incidental findings   Ct SCAN 2015 unchannged  24 hour urine for VMA done by Dr DR Canas [phone] Neg asper patient    Hyperlipidemia    Endometrial cancer  dx: 2015:  High grade Endometroid Adenocarcinoma    Morbid obesity  BMI:  53.6    Vitamin D deficiency    Hypothyroidism    Anemia    History of osteoarthritis    Type 2 diabetes mellitus    Hypertension    Bullous pemphigoid  dx: 2014.  On Immunosupressants  Cellcept    S/P BSO (bilateral salpingo-oophorectomy)    S/P hysterectomy    Endometrial cancer  2015:  Endometrial Biopsy: dx: High Grade Endometroid Adenocarcinoma    Status post total thyroidectomy  &#x27; 2001    History of  section  &#x27; 82-&#x27;95:  4 X        FAMILY HISTORY:  Family history of lymphoma (Father)  father        Social History:  Tobacco  ETOH  Illicits  Occupation    Outpatient Medications:    MEDICATIONS  (STANDING):  aspirin enteric coated 81 milliGRAM(s) Oral daily  atorvastatin 20 milliGRAM(s) Oral at bedtime  cholecalciferol 2000 Unit(s) Oral two times a day  dextrose 40% Gel 15 Gram(s) Oral once  dextrose 5%. 1000 milliLiter(s) (50 mL/Hr) IV Continuous <Continuous>  dextrose 5%. 1000 milliLiter(s) (100 mL/Hr) IV Continuous <Continuous>  dextrose 50% Injectable 25 Gram(s) IV Push once  dextrose 50% Injectable 12.5 Gram(s) IV Push once  dextrose 50% Injectable 25 Gram(s) IV Push once  dupilumab Injectable 300 milliGRAM(s) SubCutaneous once  enoxaparin Injectable 40 milliGRAM(s) SubCutaneous every 12 hours  ferrous    sulfate 325 milliGRAM(s) Oral daily  folic acid 1 milliGRAM(s) Oral daily  glucagon  Injectable 1 milliGRAM(s) IntraMuscular once  insulin glargine Injectable (LANTUS) 12 Unit(s) SubCutaneous at bedtime  insulin lispro (ADMELOG) corrective regimen sliding scale   SubCutaneous at bedtime  insulin lispro (ADMELOG) corrective regimen sliding scale   SubCutaneous three times a day before meals  lisinopril 5 milliGRAM(s) Oral daily  multivitamin 1 Tablet(s) Oral daily  predniSONE   Tablet 40 milliGRAM(s) Oral daily  senna 2 Tablet(s) Oral at bedtime    MEDICATIONS  (PRN):  acetaminophen   Tablet .. 650 milliGRAM(s) Oral every 6 hours PRN Temp greater or equal to 38C (100.4F), Mild Pain (1 - 3)  ondansetron Injectable 4 milliGRAM(s) IV Push every 6 hours PRN Nausea and/or Vomiting  oxyCODONE    IR 5 milliGRAM(s) Oral every 4 hours PRN Severe Pain (7 - 10)      Allergies    Ancef (Rash)  daptomycin (Vomiting)  Keflex (Unknown)  penicillin (Pruritus; Rash; Hives)    Intolerances    vancomycin (Other)    Review of Systems:  Constitutional: No fever, good appetite/po intake  Eyes: No blurry vision, diplopia  Neuro: No tremors  HEENT: No pain  Cardiovascular: No chest pain, palpitations  Respiratory: No SOB, no cough  GI: No nausea, vomiting,   : No dysuria, hematuria  Skin: no rash  Psych: no depression  Endocrine: no polyuria, polydipsia  Hem/lymph: no swelling  Osteoporosis: no fractures    ALL OTHER SYSTEMS REVIEWED AND NEGATIVE    UNABLE TO OBTAIN    PHYSICAL EXAM:  VITALS: T(C): 36.6 (21 @ 16:18)  T(F): 97.8 (21 @ 16:18), Max: 99 (21 @ 20:45)  HR: 88 (21 @ 16:18) (78 - 95)  BP: 100/56 (21 @ 16:18) (95/58 - 121/70)  RR:  (17 - 18)  SpO2:  (96% - 99%)  Wt(kg): --  GENERAL: NAD, well-groomed, well-developed  EYES: No proptosis, no lid lag, anicteric  HEENT:  Atraumatic, Normocephalic, moist mucous membranes  THYROID: Normal size, no palpable nodules  RESPIRATORY: Clear to auscultation bilaterally; No rales, rhonchi, wheezing, or rubs  CARDIOVASCULAR: Regular rate and rhythm; No murmurs; no peripheral edema  GI: Soft, nontender, non distended, normal bowel sounds  SKIN: Dry, intact, No rashes or lesions  NEURO: sensation intact, extraocular movements intact, no tremor, normal reflexes  PSYCH: reactive affect, euthymic mood  CUSHING'S SIGNS: no striae    POCT Blood Glucose.: 270 mg/dL (21 @ 12:04)  POCT Blood Glucose.: 193 mg/dL (21 @ 08:42)  POCT Blood Glucose.: 269 mg/dL (21 @ 21:51)  POCT Blood Glucose.: 285 mg/dL (21 @ 17:12)  POCT Blood Glucose.: 309 mg/dL (21 @ 12:10)  POCT Blood Glucose.: 193 mg/dL (21 @ 08:24)  POCT Blood Glucose.: 224 mg/dL (21 @ 21:45)  POCT Blood Glucose.: 186 mg/dL (21 @ 17:13)  POCT Blood Glucose.: 253 mg/dL (21 @ 12:39)  POCT Blood Glucose.: 177 mg/dL (21 @ 08:14)  POCT Blood Glucose.: 203 mg/dL (21 @ 21:42)  POCT Blood Glucose.: 260 mg/dL (21 @ 17:06)                            7.0    8.23  )-----------( 462      ( 2021 07:21 )             23.1           131<L>  |  99  |  27<H>  ----------------------------<  162<H>  4.1   |  22  |  0.77    EGFR if : 92  EGFR if non : 79    Ca    8.3<L>          TPro  8.0  /  Alb  2.8<L>  /  TBili  x   /  DBili  x   /  AST  x   /  ALT  x   /  AlkPhos  x         Thyroid Function Tests:   @ 09:41 TSH 17.90 FreeT4 -- T3 -- Anti TPO -- Anti Thyroglobulin Ab -- TSI --   @ 14:10 TSH 6.38 FreeT4 -- T3 -- Anti TPO -- Anti Thyroglobulin Ab -- TSI --              Radiology:     A/P: yo male/female with hx of ................................... here with ..........         HPI: 71 yo female with hx of goiter s/p thyroidectomy in  on thyroid replacement, T2D (HbA1c 6.6%) uncontrolled due to steroids, pemphigus bullous, and uterine cancer s/p TRENT-BSO with chemo who was sent to the ED by her dermatologist Dr. Pina for IV abx for b/l LE cellulitis. The patient has completed her abx therapy but is now on a higher dose of prednisone 40mg po qdaily (as opposed to 20mg po qdaily). She has also been started on a biweekly injection which is being used off-label for her pemphigus.   She was started on Synthroid in  when she had a thyroidectomy for a compressive goiter. She notes that the goiter was due to Hashimoto's. For the past six to seven months she has been on Synthroid 300mcg po 2x/week and 150mcg po 5x/week (averages to 171mcg po qdaily), She f/u with Dr. Jose Canas at [facility]. She has been on 175mg in the past as well. She takes her Synthroid on an empty stomach without other pills. On admission her TSH was 6 and she was started on Synthroid 150mcg po qdaily. She has been subsequently increased to 300mcg po qdaily as her TSH on  was 17.        PAST MEDICAL & SURGICAL HISTORY:  Adrenal mass   incidental findings   Ct SCAN 2015 unchanged  24 hour urine for VMA done by Dr DR Canas [phone] Neg asper patient  Hyperlipidemia  Endometrial cancer dx: 2015:  High grade Endometrioid Adenocarcinoma  Morbid obesity  BMI:  53.6  Vitamin D deficiency  Hypothyroidism  Anemia  History of osteoarthritis  Type 2 diabetes mellitus  Hypertension  Bullous pemphigoid dx: 2014.  On Immunosupressants  Cellcept  S/P BSO (bilateral salpingo-oophorectomy)  S/P hysterectomy  Endometrial cancer  2015:  Endometrial Biopsy: dx: High Grade Endometroid Adenocarcinoma  Status post total thyroidectomy   History of  section     FAMILY HISTORY:  No thyroid disease  Celiac disease: mom    Social History:  Tobacco: quit 40 years ago  ETOH: denies  Retired     Outpatient Medications: Synthroid 300mcg po 2x/week and 150mcg po 5x/week    MEDICATIONS  (STANDING):  aspirin enteric coated 81 milliGRAM(s) Oral daily  atorvastatin 20 milliGRAM(s) Oral at bedtime  cholecalciferol 2000 Unit(s) Oral two times a day  dextrose 40% Gel 15 Gram(s) Oral once  dextrose 5%. 1000 milliLiter(s) (50 mL/Hr) IV Continuous <Continuous>  dextrose 5%. 1000 milliLiter(s) (100 mL/Hr) IV Continuous <Continuous>  dextrose 50% Injectable 25 Gram(s) IV Push once  dextrose 50% Injectable 12.5 Gram(s) IV Push once  dextrose 50% Injectable 25 Gram(s) IV Push once  dupilumab Injectable 300 milliGRAM(s) SubCutaneous once  enoxaparin Injectable 40 milliGRAM(s) SubCutaneous every 12 hours  ferrous    sulfate 325 milliGRAM(s) Oral daily  folic acid 1 milliGRAM(s) Oral daily  glucagon  Injectable 1 milliGRAM(s) IntraMuscular once  insulin glargine Injectable (LANTUS) 12 Unit(s) SubCutaneous at bedtime  insulin lispro (ADMELOG) corrective regimen sliding scale   SubCutaneous at bedtime  insulin lispro (ADMELOG) corrective regimen sliding scale   SubCutaneous three times a day before meals  lisinopril 5 milliGRAM(s) Oral daily  multivitamin 1 Tablet(s) Oral daily  predniSONE   Tablet 40 milliGRAM(s) Oral daily  senna 2 Tablet(s) Oral at bedtime    MEDICATIONS  (PRN):  acetaminophen   Tablet .. 650 milliGRAM(s) Oral every 6 hours PRN Temp greater or equal to 38C (100.4F), Mild Pain (1 - 3)  ondansetron Injectable 4 milliGRAM(s) IV Push every 6 hours PRN Nausea and/or Vomiting  oxyCODONE    IR 5 milliGRAM(s) Oral every 4 hours PRN Severe Pain (7 - 10)      Allergies    Ancef (Rash)  daptomycin (Vomiting)  Keflex (Unknown)  penicillin (Pruritus; Rash; Hives)    Intolerances    vancomycin (Other)    Review of Systems:  Constitutional: No fever, chills, no weight gain  Eyes: No blurry vision, diplopia  MS: +b/l knee pain due to OA,, +weakness  HEENT: no dysphagia, dysphonia  Cardiovascular: No chest pain, palpitations  Respiratory: No SOB, no cough  GI: +nausea, no vomiting,   : No dysuria, hematuria, vaginal bleeding  ALL OTHER SYSTEMS REVIEWED AND NEGATIVE      PHYSICAL EXAM:  VITALS: T(C): 36.6 (21 @ 16:18)  T(F): 97.8 (21 @ 16:18), Max: 99 (21 @ 20:45)  HR: 88 (21 @ 16:18) (78 - 95)  BP: 100/56 (21 @ 16:18) (95/58 - 121/70)  RR:  (17 - 18)  SpO2:  (96% - 99%)  Wt(kg): --  GENERAL: NAD, well-groomed, well-developed  EYES: No proptosis, no lid lag, anicteric  HEENT:  Atraumatic, Normocephalic, moist mucous membranes  THYROID: Normal size, no palpable nodules  RESPIRATORY: Clear to auscultation bilaterally; No rales, rhonchi, wheezing, or rubs  CARDIOVASCULAR: Regular rate and rhythm; No murmurs; no peripheral edema  GI: Soft, nontender, non distended, normal bowel sounds  SKIN: Dry, intact, No rashes or lesions  NEURO: sensation intact, extraocular movements intact, no tremor, normal reflexes  PSYCH: reactive affect, euthymic mood      POCT Blood Glucose.: 270 mg/dL (21 @ 12:04)  POCT Blood Glucose.: 193 mg/dL (21 @ 08:42)  POCT Blood Glucose.: 269 mg/dL (21 @ 21:51)  POCT Blood Glucose.: 285 mg/dL (21 @ 17:12)  POCT Blood Glucose.: 309 mg/dL (21 @ 12:10)  POCT Blood Glucose.: 193 mg/dL (21 @ 08:24)  POCT Blood Glucose.: 224 mg/dL (21 @ 21:45)  POCT Blood Glucose.: 186 mg/dL (21 @ 17:13)  POCT Blood Glucose.: 253 mg/dL (21 @ 12:39)  POCT Blood Glucose.: 177 mg/dL (21 @ 08:14)  POCT Blood Glucose.: 203 mg/dL (21 @ 21:42)  POCT Blood Glucose.: 260 mg/dL (21 @ 17:06)                            7.0    8.23  )-----------( 462      ( 2021 07:21 )             23.1           131<L>  |  99  |  27<H>  ----------------------------<  162<H>  4.1   |  22  |  0.77    EGFR if : 92  EGFR if non : 79    Ca    8.3<L>          TPro  8.0  /  Alb  2.8<L>  /  TBili  x   /  DBili  x   /  AST  x   /  ALT  x   /  AlkPhos  x         Thyroid Function Tests:   @ 09:41 TSH 17.90    @ 14:10 TSH 6.38

## 2021-01-05 NOTE — PROVIDER CONTACT NOTE (CRITICAL VALUE NOTIFICATION) - ACTION/TREATMENT ORDERED:
N.P. aware - awaiting further instructions - Pt sitting in chair at this time - no s/s of distress noted - will continue to monitor.
NP Juan R Luna notified.
NP made aware.
NP on unit and made aware.

## 2021-01-05 NOTE — PROGRESS NOTE ADULT - SUBJECTIVE AND OBJECTIVE BOX
Patient is a 70y old  Female who presents with a chief complaint of Rigors, chills since last night (05 Jan 2021 16:45)      INTERVAL HPI/OVERNIGHT EVENTS: noted  pt seen and examined  feels well  no cp/sob/dizziness      Vital Signs Last 24 Hrs  T(C): 36.6 (05 Jan 2021 16:18), Max: 37.2 (04 Jan 2021 20:45)  T(F): 97.8 (05 Jan 2021 16:18), Max: 99 (04 Jan 2021 20:45)  HR: 88 (05 Jan 2021 16:18) (78 - 95)  BP: 100/56 (05 Jan 2021 16:18) (95/58 - 121/70)  BP(mean): --  RR: 18 (05 Jan 2021 16:18) (17 - 18)  SpO2: 96% (05 Jan 2021 16:18) (96% - 99%)    acetaminophen   Tablet .. 650 milliGRAM(s) Oral every 6 hours PRN  aspirin enteric coated 81 milliGRAM(s) Oral daily  atorvastatin 20 milliGRAM(s) Oral at bedtime  cholecalciferol 2000 Unit(s) Oral two times a day  dextrose 40% Gel 15 Gram(s) Oral once  dextrose 5%. 1000 milliLiter(s) IV Continuous <Continuous>  dextrose 5%. 1000 milliLiter(s) IV Continuous <Continuous>  dextrose 50% Injectable 25 Gram(s) IV Push once  dextrose 50% Injectable 12.5 Gram(s) IV Push once  dextrose 50% Injectable 25 Gram(s) IV Push once  enoxaparin Injectable 40 milliGRAM(s) SubCutaneous every 12 hours  ferrous    sulfate 325 milliGRAM(s) Oral daily  folic acid 1 milliGRAM(s) Oral daily  glucagon  Injectable 1 milliGRAM(s) IntraMuscular once  insulin glargine Injectable (LANTUS) 12 Unit(s) SubCutaneous at bedtime  insulin lispro (ADMELOG) corrective regimen sliding scale   SubCutaneous at bedtime  insulin lispro (ADMELOG) corrective regimen sliding scale   SubCutaneous three times a day before meals  insulin lispro Injectable (ADMELOG) 12 Unit(s) SubCutaneous three times a day with meals  lisinopril 5 milliGRAM(s) Oral daily  multivitamin 1 Tablet(s) Oral daily  ondansetron Injectable 4 milliGRAM(s) IV Push every 6 hours PRN  oxyCODONE    IR 5 milliGRAM(s) Oral every 4 hours PRN  predniSONE   Tablet 40 milliGRAM(s) Oral daily  senna 2 Tablet(s) Oral at bedtime      PHYSICAL EXAM:  GENERAL: NAD,   EYES: conjunctiva and sclera clear  ENMT: Moist mucous membranes  NECK: Supple, No JVD, Normal thyroid  CHEST/LUNG: non labored, cta b/l  HEART: Regular rate and rhythm; No murmurs, rubs, or gallops  ABDOMEN: Soft, Nontender, Nondistended; Bowel sounds present  EXTREMITIES:  2+ Peripheral Pulses, No clubbing, cyanosis, or edema  LYMPH: No lymphadenopathy noted  SKIN: No rashes or lesions    Consultant(s) Notes Reviewed:  [x ] YES  [ ] NO  Care Discussed with Consultants/Other Providers [ x] YES  [ ] NO    LABS:                        7.0    8.23  )-----------( 462      ( 05 Jan 2021 07:21 )             23.1     01-05    131<L>  |  99  |  27<H>  ----------------------------<  162<H>  4.1   |  22  |  0.77    Ca    8.3<L>      05 Jan 2021 07:21    TPro  8.0  /  Alb  2.8<L>  /  TBili  x   /  DBili  x   /  AST  x   /  ALT  x   /  AlkPhos  x   01-04        CAPILLARY BLOOD GLUCOSE      POCT Blood Glucose.: 243 mg/dL (05 Jan 2021 17:23)  POCT Blood Glucose.: 270 mg/dL (05 Jan 2021 12:04)  POCT Blood Glucose.: 193 mg/dL (05 Jan 2021 08:42)  POCT Blood Glucose.: 269 mg/dL (04 Jan 2021 21:51)              RADIOLOGY & ADDITIONAL TESTS:    Imaging Personally Reviewed:  [x ] YES  [ ] NO

## 2021-01-05 NOTE — CONSULT NOTE ADULT - PROBLEM SELECTOR RECOMMENDATION 3
-on vit D 2000 IU po qdaily  -needs outpt DXA  -Discussed with attg and NICK Ruby as well as patient  -f/u with endocrine Dr. Missael Oliver MD  Endocrinology Attending  Mondays and Tuesdays 9am-6pm: [phone] (pager)  Other days, night and weekend: [phone]

## 2021-01-05 NOTE — CONSULT NOTE ADULT - ASSESSMENT
69 yo female with hx of goiter s/p thyroidectomy in 2000 on thyroid replacement, T2D (HbA1c 6.6%) uncontrolled due to steroids, pemphigus bullous, and uterine cancer s/p TRENT-BSO with chemo who was sent to the ED by her dermatologist Dr. Pina for IV abx for b/l LE cellulitis.

## 2021-01-05 NOTE — PROGRESS NOTE ADULT - ATTENDING COMMENTS
Anand Edmondson M.D.  [facility], Division of Infectious Diseases  [phone]  After 5pm on weekdays and all day on weekends - please call [phone]
Dr. Parrish will take over care tomorrow.  Please contact with any questions or concerns [phone].
Anand Edmondson M.D.  [facility], Division of Infectious Diseases  [phone]  After 5pm on weekdays and all day on weekends - please call [phone]
Anand Edmondson M.D.  [facility], Division of Infectious Diseases  [phone]  After 5pm on weekdays and all day on weekends - please call [phone]
Anand Edmondson M.D.  [facility], Division of Infectious Diseases  [phone]  After 5pm on weekdays and all day on weekends - please call [phone]
Infectious Diseases will continue to follow. Please call with any questions.   Ruchi Gonzales M.D.  [facility], Division of Infectious Diseases [phone]  For over the weekend and after hours, please call [phone]  I am covering the service this weekend.   Dr. Edmondson to resume [facility] ID service on Monday 12/28
Dr Boykin will be covering  starting 01/06/21  please call [facility] @ [phone] for questions or concerns
Infectious Diseases will continue to follow. Please call with any questions.   Ruchi Gonzales M.D.  [facility], Division of Infectious Diseases [phone]  For over the weekend and after hours, please call [phone]  I will be covering the service this holiday weekend
Infectious Diseases will continue to follow. Please call with any questions.   Ruhci Gonzales M.D.  [facility], Division of Infectious Diseases [phone]  For over the weekend and after hours, please call [phone]  I will be covering the service this holiday weekend
Anand Edmondson M.D.  [facility], Division of Infectious Diseases  [phone]  After 5pm on weekdays and all day on weekends - please call [phone]
ID will sign off at this time but remains available for any further questions/concerns.    Anand Edmonsdon M.D.  [facility], Division of Infectious Diseases  [phone]  After 5pm on weekdays and all day on weekends - please call [phone]
69 y/o F w history of high grade stage 1 uterine carcinosarcoma s/p TLH/BSO and adjuvant carbo/taxol in 4/2016, also with history of bullous pemphigoid and LDH on IVIG and prednisone admitted for sepsis 2/2 to cellulitis infection on IV abx.  Consulted for macrocytic anemia.     Although there is concern for underlying MDS given chronic level macrocytic anemia, possible acute drop in hemoglobin more related to GI losses. Would favor bone marrow biopsy on outpatient basis. Another possible cause of macrocytic anemia is hypothyroidism, given high TSH ordered thyroid hormone levels for AM. Will follow up.

## 2021-01-05 NOTE — PROGRESS NOTE ADULT - ASSESSMENT
71 yo F w/ acute on chronic macrocytic anemia  FOBT + but patient states that she has intermittent BRBPR due to hemorrhoids  Colonoscopy 2015 w Dr Ko - diverticulosis and small benign HP polyp resected.  Offered EGD however patient does not want to undergo EGD as inpatient. Information provided for Dr Ko to follow up and arrange outpatient EGD/Colonoscopy.  Further care per hematology and primary team.  Please call with questions    [phone]

## 2021-01-05 NOTE — CONSULT NOTE ADULT - CONSULT REQUESTED DATE/TIME
24-Dec-2020 15:04
05-Jan-2021 16:45
31-Dec-2020 19:00
24-Dec-2020 15:00
24-Dec-2020 16:17
04-Jan-2021 09:50

## 2021-01-06 LAB
HCT VFR BLD CALC: 25.9 % — LOW (ref 34.5–45)
HGB BLD-MCNC: 8 G/DL — LOW (ref 11.5–15.5)
INTERPRETATION 24H UR IFE-IMP: SIGNIFICANT CHANGE UP
INTERPRETATION 24H UR IFE-IMP: SIGNIFICANT CHANGE UP
MCHC RBC-ENTMCNC: 30.9 GM/DL — LOW (ref 32–36)
MCHC RBC-ENTMCNC: 31.5 PG — SIGNIFICANT CHANGE UP (ref 27–34)
MCV RBC AUTO: 102 FL — HIGH (ref 80–100)
NRBC # BLD: 5 /100 WBCS — HIGH (ref 0–0)
PLATELET # BLD AUTO: 467 K/UL — HIGH (ref 150–400)
RBC # BLD: 2.54 M/UL — LOW (ref 3.8–5.2)
RBC # FLD: 23.9 % — HIGH (ref 10.3–14.5)
SARS-COV-2 RNA SPEC QL NAA+PROBE: SIGNIFICANT CHANGE UP
WBC # BLD: 8.57 K/UL — SIGNIFICANT CHANGE UP (ref 3.8–10.5)
WBC # FLD AUTO: 8.57 K/UL — SIGNIFICANT CHANGE UP (ref 3.8–10.5)

## 2021-01-06 RX ORDER — INSULIN GLARGINE 100 [IU]/ML
20 INJECTION, SOLUTION SUBCUTANEOUS AT BEDTIME
Refills: 0 | Status: DISCONTINUED | OUTPATIENT
Start: 2021-01-06 | End: 2021-01-07

## 2021-01-06 RX ORDER — INSULIN LISPRO 100/ML
12 VIAL (ML) SUBCUTANEOUS
Refills: 0 | Status: DISCONTINUED | OUTPATIENT
Start: 2021-01-06 | End: 2021-01-07

## 2021-01-06 RX ORDER — INSULIN LISPRO 100/ML
15 VIAL (ML) SUBCUTANEOUS
Refills: 0 | Status: DISCONTINUED | OUTPATIENT
Start: 2021-01-06 | End: 2021-01-07

## 2021-01-06 RX ORDER — PETROLATUM,WHITE
1 JELLY (GRAM) TOPICAL DAILY
Refills: 0 | Status: DISCONTINUED | OUTPATIENT
Start: 2021-01-06 | End: 2021-01-07

## 2021-01-06 RX ADMIN — INSULIN GLARGINE 20 UNIT(S): 100 INJECTION, SOLUTION SUBCUTANEOUS at 21:44

## 2021-01-06 RX ADMIN — SENNA PLUS 2 TABLET(S): 8.6 TABLET ORAL at 21:38

## 2021-01-06 RX ADMIN — SENNA PLUS 2 TABLET(S): 8.6 TABLET ORAL at 05:04

## 2021-01-06 RX ADMIN — Medication 4: at 09:28

## 2021-01-06 RX ADMIN — Medication 325 MILLIGRAM(S): at 09:34

## 2021-01-06 RX ADMIN — Medication 1 MILLIGRAM(S): at 09:34

## 2021-01-06 RX ADMIN — OXYCODONE HYDROCHLORIDE 5 MILLIGRAM(S): 5 TABLET ORAL at 09:34

## 2021-01-06 RX ADMIN — Medication 4: at 12:36

## 2021-01-06 RX ADMIN — OXYCODONE HYDROCHLORIDE 5 MILLIGRAM(S): 5 TABLET ORAL at 17:16

## 2021-01-06 RX ADMIN — OXYCODONE HYDROCHLORIDE 5 MILLIGRAM(S): 5 TABLET ORAL at 04:36

## 2021-01-06 RX ADMIN — ATORVASTATIN CALCIUM 20 MILLIGRAM(S): 80 TABLET, FILM COATED ORAL at 21:38

## 2021-01-06 RX ADMIN — ENOXAPARIN SODIUM 40 MILLIGRAM(S): 100 INJECTION SUBCUTANEOUS at 05:04

## 2021-01-06 RX ADMIN — Medication 2000 UNIT(S): at 17:16

## 2021-01-06 RX ADMIN — Medication 12 UNIT(S): at 09:27

## 2021-01-06 RX ADMIN — Medication 12 UNIT(S): at 12:36

## 2021-01-06 RX ADMIN — Medication 1 APPLICATION(S): at 12:37

## 2021-01-06 RX ADMIN — Medication 1 TABLET(S): at 09:34

## 2021-01-06 RX ADMIN — Medication 12 UNIT(S): at 17:16

## 2021-01-06 RX ADMIN — Medication 2000 UNIT(S): at 05:03

## 2021-01-06 RX ADMIN — Medication 40 MILLIGRAM(S): at 05:02

## 2021-01-06 RX ADMIN — Medication 200 MICROGRAM(S): at 05:03

## 2021-01-06 RX ADMIN — LISINOPRIL 5 MILLIGRAM(S): 2.5 TABLET ORAL at 05:02

## 2021-01-06 RX ADMIN — Medication 81 MILLIGRAM(S): at 09:34

## 2021-01-06 NOTE — PROGRESS NOTE ADULT - ASSESSMENT
69 y/o F PMHx bullous pemphigoid for 6 years currently steroid dependent, type 2 DM on oral Rx, surgical hypothyroidism following total thyroidectomy for a large obstructing goiter (on a 150 mcg /6 days and one day of 300 mcg/week -patient/spouse not sure which day), Red Man Syndrome from IV vancomycin, chronic pain syndrome with patient apparently on escalating doses of Neurontin (per spouse now on 800 mg 4xDay) with PRN Tramadol and Vicodin due to patient severe pain from her skin lesions, with last admission to [facility] in 10/2018 for RIGHT LE cellulitis, endometrial CA diagnosed in 2015 with [facility] BSO and presumed to be in remission, with patient with home visiting RN with an Unna's boot on the RIGHT LE but both are wrapped since Monday, with self referral following rigors and shaking chills since last night.   Spouse noted increased confusion by patient at home last night.    # sepsis 2/2 cellulitis  # AMS likely 2/2 infection  # bullous pemphigoid steroid dependent ro flare  # surgical hypothyroidism  # poorly controlled type 2 DM  # uterine CA presumed to be disease free  # RIGHT> left LE cellulitis  # anemia    completed abx  appreciate ID recs  BC NTD  appreciate derm recs  remained hospitalized for IVIG, MWF per derm dosing for bullous phemphigoid to prevent flare (Gammagard 200gm/200mL- Infuse 80 gm/day QOD (M, W, F) for 1 week and repeat every 4 weeks), next dose in 4 weeks, will not received IVIG at rehab  received dupixent yesterday, not due for 2 weeks  cont prednisone 40mg  FS ACHS, SSI, lantus  CT head meningioma, no infarct  outpt takes neurontin 800mg QID rx by Dr Gonzales pain management, neurontin has been on hold  outpt takes tramadol 300mg extended release 24 hrs qd and norco 10-325mg q8 prn  cont Tramadol and low dose Oxycodone IR 5 mg PRN for pain relief  cont statin  PT  sp 1 unit prbc yesterday, hgb 8 today, no e/o active bleeding, transfuse to keep hb >7  cta/p -no rp bleed  as hb stable- bM bx can be done at later time per heme  occult bld positive, GI cs no interventions as hb stable - outpt fu    Hypothyroidism: elev tsh, pt currently on 150mcg/d but home dose- takes 300 twice wkly, and 150mcg remaining days  will adjust synthroid dosing to 300mcg qd for a week and switch back to home dose as above  endo cs appreciated, change to 200mcg qd upon dc    DVT prophylaxis    PCP:   Olivia Ross MD (PCP) [phone]    dispo dc planning to rehab  received dupixent yesterday, not due for 2 weeks  will not received IVIG at rehab

## 2021-01-06 NOTE — CHART NOTE - NSCHARTNOTEFT_GEN_A_CORE
Chart reviewed including glucose values and insulin requirements currently remains on prednisone 40mg daily. Not enough data to make full adjustment as standing Admelog doses were just started this morning. Can increase Lantus to 20 units qhs and Admelog to 15 units at breakfast for now. Monitor on 12 units at lunch and dinner until we have more information based on insulin requirements the rest of today.     Adan Guzman D.O  [phone]

## 2021-01-06 NOTE — PROGRESS NOTE ADULT - SUBJECTIVE AND OBJECTIVE BOX
Patient is a 70y old  Female who presents with a chief complaint of Rigors, chills since last night (05 Jan 2021 19:40)      SUBJECTIVE / OVERNIGHT EVENTS:    Patient seen and examined. denies brbpr and active blding. no bleeding from hemorroids. denies cp sob dizziness.       Vital Signs Last 24 Hrs  T(C): 36.5 (06 Jan 2021 10:32), Max: 37.1 (05 Jan 2021 14:16)  T(F): 97.7 (06 Jan 2021 10:32), Max: 98.8 (05 Jan 2021 14:16)  HR: 84 (06 Jan 2021 10:32) (80 - 95)  BP: 103/61 (06 Jan 2021 10:32) (100/56 - 128/64)  BP(mean): --  RR: 18 (06 Jan 2021 10:32) (18 - 18)  SpO2: 95% (06 Jan 2021 10:32) (95% - 98%)  I&O's Summary    05 Jan 2021 07:01  -  06 Jan 2021 07:00  --------------------------------------------------------  IN: 1040 mL / OUT: 1300 mL / NET: -260 mL    06 Jan 2021 07:01  -  06 Jan 2021 12:42  --------------------------------------------------------  IN: 240 mL / OUT: 0 mL / NET: 240 mL        PE:  GENERAL: NAD, AAOx3, obese  HEAD:  Atraumatic, Normocephalic  CHEST/LUNG: CTABL, No wheeze  HEART: Regular rate and rhythm; no murmur  ABDOMEN: Soft, Nontender, Nondistended; Bowel sounds present  SKIN: bullae over bl LE and erythema  NEURO: No focal deficits    LABS:                        8.0    8.57  )-----------( 467      ( 06 Jan 2021 07:28 )             25.9     01-05    131<L>  |  99  |  27<H>  ----------------------------<  162<H>  4.1   |  22  |  0.77    Ca    8.3<L>      05 Jan 2021 07:21    TPro  8.0  /  Alb  2.8<L>  /  TBili  x   /  DBili  x   /  AST  x   /  ALT  x   /  AlkPhos  x   01-04      CAPILLARY BLOOD GLUCOSE      POCT Blood Glucose.: 222 mg/dL (06 Jan 2021 12:18)  POCT Blood Glucose.: 236 mg/dL (06 Jan 2021 08:40)  POCT Blood Glucose.: 230 mg/dL (05 Jan 2021 21:45)  POCT Blood Glucose.: 243 mg/dL (05 Jan 2021 17:23)            RADIOLOGY & ADDITIONAL TESTS:    Imaging Personally Reviewed:  [x] YES  [ ] NO    Consultant(s) Notes Reviewed:  [x] YES  [ ] NO    MEDICATIONS  (STANDING):  AQUAPHOR (petrolatum Ointment) 1 Application(s) Topical daily  aspirin enteric coated 81 milliGRAM(s) Oral daily  atorvastatin 20 milliGRAM(s) Oral at bedtime  cholecalciferol 2000 Unit(s) Oral two times a day  dextrose 40% Gel 15 Gram(s) Oral once  dextrose 5%. 1000 milliLiter(s) (50 mL/Hr) IV Continuous <Continuous>  dextrose 5%. 1000 milliLiter(s) (100 mL/Hr) IV Continuous <Continuous>  dextrose 50% Injectable 25 Gram(s) IV Push once  dextrose 50% Injectable 12.5 Gram(s) IV Push once  dextrose 50% Injectable 25 Gram(s) IV Push once  enoxaparin Injectable 40 milliGRAM(s) SubCutaneous every 12 hours  ferrous    sulfate 325 milliGRAM(s) Oral daily  folic acid 1 milliGRAM(s) Oral daily  glucagon  Injectable 1 milliGRAM(s) IntraMuscular once  insulin glargine Injectable (LANTUS) 12 Unit(s) SubCutaneous at bedtime  insulin lispro (ADMELOG) corrective regimen sliding scale   SubCutaneous at bedtime  insulin lispro (ADMELOG) corrective regimen sliding scale   SubCutaneous three times a day before meals  insulin lispro Injectable (ADMELOG) 12 Unit(s) SubCutaneous three times a day with meals  levothyroxine 200 MICROGram(s) Oral daily  lisinopril 5 milliGRAM(s) Oral daily  multivitamin 1 Tablet(s) Oral daily  predniSONE   Tablet 40 milliGRAM(s) Oral daily  senna 2 Tablet(s) Oral at bedtime    MEDICATIONS  (PRN):  acetaminophen   Tablet .. 650 milliGRAM(s) Oral every 6 hours PRN Temp greater or equal to 38C (100.4F), Mild Pain (1 - 3)  ondansetron Injectable 4 milliGRAM(s) IV Push every 6 hours PRN Nausea and/or Vomiting  oxyCODONE    IR 5 milliGRAM(s) Oral every 4 hours PRN Severe Pain (7 - 10)      Care Discussed with Consultants/Other Providers [x] YES  [ ] NO    HEALTH ISSUES - PROBLEM Dx:  Vitamin D deficiency  Vitamin D deficiency    Discharge planning issues  Discharge planning issues    Need for prophylactic measure  Need for prophylactic measure    Confusional state  Confusional state    Postoperative hypothyroidism  Postoperative hypothyroidism    Type 2 diabetes mellitus with hyperglycemia, without long-term current use of insulin  Type 2 diabetes mellitus with hyperglycemia, without long-term current use of insulin    Cellulitis of lower extremity, unspecified laterality  Cellulitis of lower extremity, unspecified laterality

## 2021-01-06 NOTE — PROGRESS NOTE ADULT - ASSESSMENT
71 yo F w/ acute on chronic macrocytic anemia  FOBT + but patient states that she has intermittent BRBPR due to hemorrhoids  Colonoscopy 2015 w Dr Ko - diverticulosis and small benign HP polyp resected.  Offered EGD however patient does not want to undergo EGD as inpatient. H/H responded appropriately to blood transfusion. Information provided for Dr Ko to follow up and arrange outpatient EGD/Colonoscopy.  Further care per hematology and primary team.  Please call with questions    [phone]

## 2021-01-06 NOTE — PROGRESS NOTE ADULT - SUBJECTIVE AND OBJECTIVE BOX
H/H responded appropriately to blood transfusion      Vital Signs Last 24 Hrs  T(C): 36.7 (06 Jan 2021 16:15), Max: 37.3 (06 Jan 2021 14:13)  T(F): 98.1 (06 Jan 2021 16:15), Max: 99.1 (06 Jan 2021 14:13)  HR: 95 (06 Jan 2021 16:15) (80 - 99)  BP: 135/78 (06 Jan 2021 16:15) (91/52 - 135/78)  BP(mean): --  RR: 20 (06 Jan 2021 16:15) (18 - 20)  SpO2: 97% (06 Jan 2021 16:15) (95% - 98%)    PHYSICAL EXAM:    Constitutional: NAD, well-developed  Neck: No LAD, supple  Respiratory: CTA and P  Cardiovascular: S1 and S2, RRR, no M  Gastrointestinal: BS+, soft, NT/ND, neg HSM,  Extremities: No peripheral edema, neg clubing, cyanosis  Vascular: 2+ peripheral pulses  Neurological: A/O x 3, no focal deficits  Psychiatric: Normal mood, normal affect  Skin: No rashes    MEDICATIONS  (STANDING):  AQUAPHOR (petrolatum Ointment) 1 Application(s) Topical daily  aspirin enteric coated 81 milliGRAM(s) Oral daily  atorvastatin 20 milliGRAM(s) Oral at bedtime  cholecalciferol 2000 Unit(s) Oral two times a day  dextrose 40% Gel 15 Gram(s) Oral once  dextrose 5%. 1000 milliLiter(s) (50 mL/Hr) IV Continuous <Continuous>  dextrose 5%. 1000 milliLiter(s) (100 mL/Hr) IV Continuous <Continuous>  dextrose 50% Injectable 25 Gram(s) IV Push once  dextrose 50% Injectable 12.5 Gram(s) IV Push once  dextrose 50% Injectable 25 Gram(s) IV Push once  enoxaparin Injectable 40 milliGRAM(s) SubCutaneous every 12 hours  ferrous    sulfate 325 milliGRAM(s) Oral daily  folic acid 1 milliGRAM(s) Oral daily  glucagon  Injectable 1 milliGRAM(s) IntraMuscular once  insulin glargine Injectable (LANTUS) 20 Unit(s) SubCutaneous at bedtime  insulin lispro (ADMELOG) corrective regimen sliding scale   SubCutaneous at bedtime  insulin lispro (ADMELOG) corrective regimen sliding scale   SubCutaneous three times a day before meals  insulin lispro Injectable (ADMELOG) 15 Unit(s) SubCutaneous before breakfast  insulin lispro Injectable (ADMELOG) 12 Unit(s) SubCutaneous before lunch  insulin lispro Injectable (ADMELOG) 12 Unit(s) SubCutaneous before dinner  levothyroxine 200 MICROGram(s) Oral daily  lisinopril 5 milliGRAM(s) Oral daily  multivitamin 1 Tablet(s) Oral daily  predniSONE   Tablet 40 milliGRAM(s) Oral daily  senna 2 Tablet(s) Oral at bedtime    MEDICATIONS  (PRN):  acetaminophen   Tablet .. 650 milliGRAM(s) Oral every 6 hours PRN Temp greater or equal to 38C (100.4F), Mild Pain (1 - 3)  ondansetron Injectable 4 milliGRAM(s) IV Push every 6 hours PRN Nausea and/or Vomiting  oxyCODONE    IR 5 milliGRAM(s) Oral every 4 hours PRN Severe Pain (7 - 10)      Allergies    Ancef (Rash)  daptomycin (Vomiting)  Keflex (Unknown)  penicillin (Pruritus; Rash; Hives)    Intolerances    vancomycin (Other)        LABS:                        8.0    8.57  )-----------( 467      ( 06 Jan 2021 07:28 )             25.9                         7.0    8.23  )-----------( 462      ( 05 Jan 2021 07:21 )             23.1                         7.2    9.96  )-----------( 429      ( 04 Jan 2021 06:28 )             22.8     01-05    131<L>  |  99  |  27<H>  ----------------------------<  162<H>  4.1   |  22  |  0.77    Ca    8.3<L>      05 Jan 2021 07:21          LIVER FUNCTIONS - ( 04 Jan 2021 15:19 )  Alb: 2.8 g/dL / Pro: 8.0 g/dL / ALK PHOS: x     / ALT: x     / AST: x     / GGT: x               RADIOLOGY & ADDITIONAL TESTS:

## 2021-01-07 ENCOUNTER — TRANSCRIPTION ENCOUNTER (OUTPATIENT)
Age: 71
End: 2021-01-07

## 2021-01-07 VITALS
HEART RATE: 89 BPM | SYSTOLIC BLOOD PRESSURE: 103 MMHG | OXYGEN SATURATION: 100 % | TEMPERATURE: 98 F | DIASTOLIC BLOOD PRESSURE: 55 MMHG | RESPIRATION RATE: 18 BRPM

## 2021-01-07 DIAGNOSIS — E03.9 HYPOTHYROIDISM, UNSPECIFIED: ICD-10-CM

## 2021-01-07 LAB
HCT VFR BLD CALC: 26.7 % — LOW (ref 34.5–45)
HGB BLD-MCNC: 8.4 G/DL — LOW (ref 11.5–15.5)
MCHC RBC-ENTMCNC: 31.5 GM/DL — LOW (ref 32–36)
MCHC RBC-ENTMCNC: 32.1 PG — SIGNIFICANT CHANGE UP (ref 27–34)
MCV RBC AUTO: 101.9 FL — HIGH (ref 80–100)
NRBC # BLD: 3 /100 WBCS — HIGH (ref 0–0)
PLATELET # BLD AUTO: 480 K/UL — HIGH (ref 150–400)
RBC # BLD: 2.62 M/UL — LOW (ref 3.8–5.2)
RBC # FLD: 23.9 % — HIGH (ref 10.3–14.5)
WBC # BLD: 7.79 K/UL — SIGNIFICANT CHANGE UP (ref 3.8–10.5)
WBC # FLD AUTO: 7.79 K/UL — SIGNIFICANT CHANGE UP (ref 3.8–10.5)

## 2021-01-07 PROCEDURE — 99232 SBSQ HOSP IP/OBS MODERATE 35: CPT

## 2021-01-07 RX ORDER — PETROLATUM,WHITE
1 JELLY (GRAM) TOPICAL
Qty: 0 | Refills: 0 | DISCHARGE
Start: 2021-01-07

## 2021-01-07 RX ORDER — OXYCODONE HYDROCHLORIDE 5 MG/1
1 TABLET ORAL
Qty: 0 | Refills: 0 | DISCHARGE
Start: 2021-01-07

## 2021-01-07 RX ORDER — LEVOTHYROXINE SODIUM 125 MCG
1 TABLET ORAL
Qty: 0 | Refills: 0 | DISCHARGE
Start: 2021-01-07

## 2021-01-07 RX ORDER — NIACINAMIDE 500 MG
1 TABLET ORAL
Qty: 0 | Refills: 0 | DISCHARGE

## 2021-01-07 RX ORDER — FOLIC ACID 0.8 MG
1 TABLET ORAL
Qty: 0 | Refills: 0 | DISCHARGE
Start: 2021-01-07

## 2021-01-07 RX ORDER — FERROUS SULFATE 325(65) MG
1 TABLET ORAL
Qty: 0 | Refills: 0 | DISCHARGE

## 2021-01-07 RX ORDER — METFORMIN HYDROCHLORIDE 850 MG/1
0 TABLET ORAL
Qty: 0 | Refills: 0 | DISCHARGE

## 2021-01-07 RX ORDER — INSULIN LISPRO 100/ML
18 VIAL (ML) SUBCUTANEOUS
Refills: 0 | Status: DISCONTINUED | OUTPATIENT
Start: 2021-01-07 | End: 2021-01-07

## 2021-01-07 RX ORDER — INSULIN GLARGINE 100 [IU]/ML
22 INJECTION, SOLUTION SUBCUTANEOUS
Qty: 0 | Refills: 0 | DISCHARGE

## 2021-01-07 RX ORDER — SENNA PLUS 8.6 MG/1
2 TABLET ORAL
Qty: 0 | Refills: 0 | DISCHARGE
Start: 2021-01-07

## 2021-01-07 RX ORDER — ACETAMINOPHEN 500 MG
2 TABLET ORAL
Qty: 0 | Refills: 0 | DISCHARGE
Start: 2021-01-07

## 2021-01-07 RX ORDER — INSULIN LISPRO 100/ML
15 VIAL (ML) SUBCUTANEOUS
Refills: 0 | Status: DISCONTINUED | OUTPATIENT
Start: 2021-01-07 | End: 2021-01-07

## 2021-01-07 RX ORDER — INSULIN GLARGINE 100 [IU]/ML
22 INJECTION, SOLUTION SUBCUTANEOUS AT BEDTIME
Refills: 0 | Status: DISCONTINUED | OUTPATIENT
Start: 2021-01-07 | End: 2021-01-07

## 2021-01-07 RX ORDER — ATORVASTATIN CALCIUM 80 MG/1
1 TABLET, FILM COATED ORAL
Qty: 0 | Refills: 0 | DISCHARGE
Start: 2021-01-07

## 2021-01-07 RX ORDER — OXYCODONE HYDROCHLORIDE 5 MG/1
5 TABLET ORAL EVERY 4 HOURS
Refills: 0 | Status: DISCONTINUED | OUTPATIENT
Start: 2021-01-07 | End: 2021-01-07

## 2021-01-07 RX ORDER — GABAPENTIN 400 MG/1
1 CAPSULE ORAL
Qty: 0 | Refills: 0 | DISCHARGE

## 2021-01-07 RX ADMIN — OXYCODONE HYDROCHLORIDE 5 MILLIGRAM(S): 5 TABLET ORAL at 12:35

## 2021-01-07 RX ADMIN — ENOXAPARIN SODIUM 40 MILLIGRAM(S): 100 INJECTION SUBCUTANEOUS at 05:48

## 2021-01-07 RX ADMIN — Medication 40 MILLIGRAM(S): at 05:49

## 2021-01-07 RX ADMIN — Medication 15 UNIT(S): at 09:03

## 2021-01-07 RX ADMIN — OXYCODONE HYDROCHLORIDE 5 MILLIGRAM(S): 5 TABLET ORAL at 04:36

## 2021-01-07 RX ADMIN — Medication 200 MICROGRAM(S): at 06:04

## 2021-01-07 RX ADMIN — Medication 325 MILLIGRAM(S): at 09:02

## 2021-01-07 RX ADMIN — Medication 2000 UNIT(S): at 05:48

## 2021-01-07 RX ADMIN — LISINOPRIL 5 MILLIGRAM(S): 2.5 TABLET ORAL at 05:49

## 2021-01-07 RX ADMIN — OXYCODONE HYDROCHLORIDE 5 MILLIGRAM(S): 5 TABLET ORAL at 06:00

## 2021-01-07 RX ADMIN — Medication 1 MILLIGRAM(S): at 09:03

## 2021-01-07 RX ADMIN — Medication 12 UNIT(S): at 12:35

## 2021-01-07 RX ADMIN — Medication 81 MILLIGRAM(S): at 09:02

## 2021-01-07 RX ADMIN — Medication 1 TABLET(S): at 09:03

## 2021-01-07 RX ADMIN — Medication 8: at 12:35

## 2021-01-07 RX ADMIN — OXYCODONE HYDROCHLORIDE 5 MILLIGRAM(S): 5 TABLET ORAL at 00:11

## 2021-01-07 RX ADMIN — Medication 2: at 09:03

## 2021-01-07 NOTE — PROGRESS NOTE ADULT - SUBJECTIVE AND OBJECTIVE BOX
Patient is a 70y old  Female who presents with a chief complaint of Rigors, chills since last night (07 Jan 2021 10:45)      SUBJECTIVE / OVERNIGHT EVENTS:    Patient seen and examined. no acute events.      Vital Signs Last 24 Hrs  T(C): 36.7 (07 Jan 2021 04:52), Max: 37.3 (06 Jan 2021 14:13)  T(F): 98 (07 Jan 2021 04:52), Max: 99.1 (06 Jan 2021 14:13)  HR: 86 (07 Jan 2021 04:52) (86 - 99)  BP: 127/64 (07 Jan 2021 04:52) (91/52 - 135/78)  BP(mean): --  RR: 18 (07 Jan 2021 04:52) (18 - 20)  SpO2: 98% (07 Jan 2021 04:52) (96% - 98%)  I&O's Summary    06 Jan 2021 07:01  -  07 Jan 2021 07:00  --------------------------------------------------------  IN: 480 mL / OUT: 300 mL / NET: 180 mL        PE:  GENERAL: NAD, AAOx3, obese  HEAD:  Atraumatic, Normocephalic  CHEST/LUNG: CTABL, No wheeze  HEART: Regular rate and rhythm; no murmur  ABDOMEN: Soft, Nontender, Nondistended; Bowel sounds present  SKIN: bullae over bl LE and erythema  NEURO: No focal deficits    LABS:                        8.4    7.79  )-----------( 480      ( 07 Jan 2021 06:55 )             26.7             CAPILLARY BLOOD GLUCOSE      POCT Blood Glucose.: 183 mg/dL (07 Jan 2021 08:16)  POCT Blood Glucose.: 212 mg/dL (06 Jan 2021 21:39)  POCT Blood Glucose.: 148 mg/dL (06 Jan 2021 17:11)  POCT Blood Glucose.: 222 mg/dL (06 Jan 2021 12:18)            RADIOLOGY & ADDITIONAL TESTS:    Imaging Personally Reviewed:  [x] YES  [ ] NO    Consultant(s) Notes Reviewed:  [x] YES  [ ] NO    MEDICATIONS  (STANDING):  AQUAPHOR (petrolatum Ointment) 1 Application(s) Topical daily  aspirin enteric coated 81 milliGRAM(s) Oral daily  atorvastatin 20 milliGRAM(s) Oral at bedtime  cholecalciferol 2000 Unit(s) Oral two times a day  dextrose 40% Gel 15 Gram(s) Oral once  dextrose 5%. 1000 milliLiter(s) (50 mL/Hr) IV Continuous <Continuous>  dextrose 5%. 1000 milliLiter(s) (100 mL/Hr) IV Continuous <Continuous>  dextrose 50% Injectable 25 Gram(s) IV Push once  dextrose 50% Injectable 12.5 Gram(s) IV Push once  dextrose 50% Injectable 25 Gram(s) IV Push once  enoxaparin Injectable 40 milliGRAM(s) SubCutaneous every 12 hours  ferrous    sulfate 325 milliGRAM(s) Oral daily  folic acid 1 milliGRAM(s) Oral daily  glucagon  Injectable 1 milliGRAM(s) IntraMuscular once  insulin glargine Injectable (LANTUS) 20 Unit(s) SubCutaneous at bedtime  insulin lispro (ADMELOG) corrective regimen sliding scale   SubCutaneous three times a day before meals  insulin lispro (ADMELOG) corrective regimen sliding scale   SubCutaneous at bedtime  insulin lispro Injectable (ADMELOG) 12 Unit(s) SubCutaneous before dinner  insulin lispro Injectable (ADMELOG) 15 Unit(s) SubCutaneous before breakfast  insulin lispro Injectable (ADMELOG) 12 Unit(s) SubCutaneous before lunch  levothyroxine 200 MICROGram(s) Oral daily  lisinopril 5 milliGRAM(s) Oral daily  multivitamin 1 Tablet(s) Oral daily  predniSONE   Tablet 40 milliGRAM(s) Oral daily  senna 2 Tablet(s) Oral at bedtime    MEDICATIONS  (PRN):  acetaminophen   Tablet .. 650 milliGRAM(s) Oral every 6 hours PRN Temp greater or equal to 38C (100.4F), Mild Pain (1 - 3)  ondansetron Injectable 4 milliGRAM(s) IV Push every 6 hours PRN Nausea and/or Vomiting  oxyCODONE    IR 5 milliGRAM(s) Oral every 4 hours PRN Severe Pain (7 - 10)      Care Discussed with Consultants/Other Providers [x] YES  [ ] NO    HEALTH ISSUES - PROBLEM Dx:  Vitamin D deficiency  Vitamin D deficiency    Discharge planning issues  Discharge planning issues    Need for prophylactic measure  Need for prophylactic measure    Confusional state  Confusional state    Postoperative hypothyroidism  Postoperative hypothyroidism    Type 2 diabetes mellitus with hyperglycemia, without long-term current use of insulin  Type 2 diabetes mellitus with hyperglycemia, without long-term current use of insulin    Cellulitis of lower extremity, unspecified laterality  Cellulitis of lower extremity, unspecified laterality

## 2021-01-07 NOTE — PROGRESS NOTE ADULT - PROVIDER SPECIALTY LIST ADULT
Endocrinology
Gastroenterology
Heme/Onc
Infectious Disease
Wound Care
Heme/Onc
Infectious Disease
Internal Medicine
Gastroenterology
Gastroenterology
Infectious Disease
Infectious Disease
Internal Medicine
Infectious Disease
Internal Medicine
Infectious Disease
Internal Medicine

## 2021-01-07 NOTE — PROGRESS NOTE ADULT - ASSESSMENT
71 yo female with hx of goiter s/p thyroidectomy in 2000 on thyroid replacement, T2D (HbA1c 6.6%) uncontrolled due to steroids, pemphigus bullous, and uterine cancer s/p TRENT-BSO with chemo who was sent to the ED by her dermatologist Dr. Pina for IV abx for b/l LE cellulitis.       1. Postoperative hypothyroidism.   -Recommend to continue with LT4 200mcg daily   -FU TSH, free T4 outpatient      2. Type 2 diabetes mellitus with hyperglycemia, without long-term current use of insulin.  Recommendation: -patient with hyperglycemia due to use of only correctional scale and basal in the setting of prednisone 40mg po qdaily.   -Recommend to increase Lantus to 22 units at bedtime.   -Recommend to increase Admleog to 18 units with breakfast,  12 units with lunch and 15 units with dinner.    -Continuee with MDSS qac/hs.     Patient will be leaving on prednisone 40mg daily per team, recommend to d/c on Lantus/Basaglar/Treseiba/Levemir  20 units at bedtime, Admelog/Humalog/Novolog  14 units tid with meals until further instructions from her own endocrinologist.  She is going to be on a steroid taper as well therefore need close communication with Dr. Canas regarding steroid taper and further adjustment of insulin dosage and oral medication.  Communicated the above with primary team..     3. Vitamin D deficiency.  Recommendation: -on vit D 2000 IU po qdaily  -needs outpt DXA  -Patient takes Alendronate outpatient, should continue    -f/u with endocrine Dr. Canas

## 2021-01-07 NOTE — PROGRESS NOTE ADULT - ASSESSMENT
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 (on a 150 mcg /6 days and one day of 300 mcg/week -patient/spouse not sure which day), Red Man Syndrome from IV vancomycin, chronic pain syndrome with patient apparently on escalating doses of Neurontin (per spouse now on 800 mg 4xDay) with PRN Tramadol and Vicodin due to patient severe pain from her skin lesions, with last admission to [facility] in 10/2018 for RIGHT LE cellulitis, endometrial CA diagnosed in 2015 with [facility] BSO and presumed to be in remission, with patient with home visiting RN with an Unna's boot on the RIGHT LE but both are wrapped since Monday, with self referral following rigors and shaking chills since last night.   Spouse noted increased confusion by patient at home last night.    # sepsis 2/2 cellulitis  # AMS likely 2/2 infection  # bullous pemphigoid steroid dependent ro flare  # surgical hypothyroidism  # poorly controlled type 2 DM  # uterine CA presumed to be disease free  # RIGHT> left LE cellulitis  # anemia    completed abx  appreciate ID recs  BC NTD  appreciate derm recs  remained hospitalized for IVIG, MWF per derm dosing for bullous phemphigoid to prevent flare (Gammagard 200gm/200mL- Infuse 80 gm/day QOD (M, W, F) for 1 week and repeat every 4 weeks), next dose in 4 weeks, will not received IVIG at rehab  received dupixent yesterday, not due for 2 weeks  cont prednisone 40mg  FS ACHS, SSI, lantus  CT head meningioma, no infarct  outpt takes neurontin 800mg QID rx by Dr Gonzales pain management, neurontin has been on hold  outpt takes tramadol 300mg extended release 24 hrs qd and norco 10-325mg q8 prn  cont Tramadol and low dose Oxycodone IR 5 mg PRN for pain relief  cont statin  PT  hgb 8.4 today, no e/o active bleeding, transfuse to keep hb >7  cta/p -no rp bleed  as hb stable- bM bx can be done at later time per heme  occult bld positive, GI cs no interventions as hb stable - outpt fu    Hypothyroidism: elev tsh, pt currently on 150mcg/d but home dose- takes 300 twice wkly, and 150mcg remaining days  will adjust synthroid dosing to 300mcg qd for a week and switch back to home dose as above  endo cs appreciated, change to 200mcg qd upon dc    DVT prophylaxis    PCP:   Olivia Ross MD (PCP) [phone]    dispo dc planning to rehab

## 2021-01-07 NOTE — PROGRESS NOTE ADULT - SUBJECTIVE AND OBJECTIVE BOX
Contact info:   Morgan Fry MD  pager [phone], please provide 10 digit call back number.   Please note that this patient may be followed by another provider tomorrow.   If no answer or after hours, please contact [phone]    History:69 yo female with hx of goiter s/p thyroidectomy in 2000 on thyroid replacement, T2D (HbA1c 6.6%) uncontrolled due to steroids, pemphigus bullous, and uterine cancer s/p TRENT-BSO with chemo who was sent to the ED by her dermatologist Dr. Pina for IV abx for b/l LE cellulitis.       Interval History/Subjective: Admleog 12 units tid changed to Admelog 15 units for breakfast, 12 units with lunch and dinner.  Received changed does yesterday.      MEDICATIONS  (STANDING):  AQUAPHOR (petrolatum Ointment) 1 Application(s) Topical daily  aspirin enteric coated 81 milliGRAM(s) Oral daily  atorvastatin 20 milliGRAM(s) Oral at bedtime  cholecalciferol 2000 Unit(s) Oral two times a day  dextrose 40% Gel 15 Gram(s) Oral once  dextrose 5%. 1000 milliLiter(s) (50 mL/Hr) IV Continuous <Continuous>  dextrose 5%. 1000 milliLiter(s) (100 mL/Hr) IV Continuous <Continuous>  dextrose 50% Injectable 25 Gram(s) IV Push once  dextrose 50% Injectable 12.5 Gram(s) IV Push once  dextrose 50% Injectable 25 Gram(s) IV Push once  enoxaparin Injectable 40 milliGRAM(s) SubCutaneous every 12 hours  ferrous    sulfate 325 milliGRAM(s) Oral daily  folic acid 1 milliGRAM(s) Oral daily  glucagon  Injectable 1 milliGRAM(s) IntraMuscular once  insulin glargine Injectable (LANTUS) 20 Unit(s) SubCutaneous at bedtime  insulin lispro (ADMELOG) corrective regimen sliding scale   SubCutaneous three times a day before meals (MDSS)   insulin lispro (ADMELOG) corrective regimen sliding scale   SubCutaneous at bedtime (LDSS)   insulin lispro Injectable (ADMELOG) 15 Unit(s) SubCutaneous before breakfast  insulin lispro Injectable (ADMELOG) 12 Unit(s) SubCutaneous before lunch  insulin lispro Injectable (ADMELOG) 12 Unit(s) SubCutaneous before dinner  levothyroxine 200 MICROGram(s) Oral daily  lisinopril 5 milliGRAM(s) Oral daily  multivitamin 1 Tablet(s) Oral daily  predniSONE   Tablet 40 milliGRAM(s) Oral daily  senna 2 Tablet(s) Oral at bedtime    MEDICATIONS  (PRN):  acetaminophen   Tablet .. 650 milliGRAM(s) Oral every 6 hours PRN Temp greater or equal to 38C (100.4F), Mild Pain (1 - 3)  ondansetron Injectable 4 milliGRAM(s) IV Push every 6 hours PRN Nausea and/or Vomiting  oxyCODONE    IR 5 milliGRAM(s) Oral every 4 hours PRN Severe Pain (7 - 10)      Allergies    Ancef (Rash)  daptomycin (Vomiting)  Keflex (Unknown)  penicillin (Pruritus; Rash; Hives)    Intolerances    vancomycin (Other)    Review of Systems:  Constitutional: No fever  Eyes: No blurry vision  Neuro: No tremors  HEENT: No pain  Cardiovascular: No chest pain, palpitations  Respiratory: No SOB, no cough  GI: No nausea, vomiting, abdominal pain  : No dysuria  Skin: no rash  Psych: no depression  Endocrine: no polyuria, polydipsia  Hem/lymph: no swelling    ALL OTHER SYSTEMS REVIEWED AND NEGATIVE    PHYSICAL EXAM:  VITALS: T(C): 36.7 (01-07-21 @ 04:52)  T(F): 98 (01-07-21 @ 04:52), Max: 99.1 (01-06-21 @ 14:13)  HR: 86 (01-07-21 @ 04:52) (84 - 99)  BP: 127/64 (01-07-21 @ 04:52) (91/52 - 135/78)  RR:  (18 - 20)  SpO2:  (95% - 98%)  Wt(kg): --  GENERAL: NAD, well-groomed, well-developed  EYES: No proptosis, no lid lag, anicteric  HEENT:  Atraumatic, Normocephalic, moist mucous membranes  THYROID: Normal size, no palpable nodules  RESPIRATORY: Clear to auscultation bilaterally; No rales, rhonchi, wheezing, or rubs  CARDIOVASCULAR: Regular rate and rhythm; No murmurs; no peripheral edema  GI: Soft, nontender, non distended, normal bowel sounds  SKIN: Dry, intact, No rashes or lesions  MUSCULOSKELETAL: Full range of motion, normal strength  NEURO: sensation intact, extraocular movements intact, no tremor, normal reflexes  PSYCH: Alert and oriented x 3, normal affect, normal mood  CUSHING'S SIGNS: no striae    POCT Blood Glucose.: 183 mg/dL (01-07-21 @ 08:16)  POCT Blood Glucose.: 212 mg/dL (01-06-21 @ 21:39)  POCT Blood Glucose.: 148 mg/dL (01-06-21 @ 17:11)  POCT Blood Glucose.: 222 mg/dL (01-06-21 @ 12:18)  POCT Blood Glucose.: 236 mg/dL (01-06-21 @ 08:40)  POCT Blood Glucose.: 230 mg/dL (01-05-21 @ 21:45)  POCT Blood Glucose.: 243 mg/dL (01-05-21 @ 17:23)  POCT Blood Glucose.: 270 mg/dL (01-05-21 @ 12:04)  POCT Blood Glucose.: 193 mg/dL (01-05-21 @ 08:42)  POCT Blood Glucose.: 269 mg/dL (01-04-21 @ 21:51)  POCT Blood Glucose.: 285 mg/dL (01-04-21 @ 17:12)  POCT Blood Glucose.: 309 mg/dL (01-04-21 @ 12:10)      01-05    131<L>  |  99  |  27<H>  ----------------------------<  162<H>  4.1   |  22  |  0.77    EGFR if : 92  EGFR if non : 79    Ca    8.3<L>      01-05    TPro  8.0  /  Alb  2.8<L>  /  TBili  x   /  DBili  x   /  AST  x   /  ALT  x   /  AlkPhos  x   01-04        Thyroid Function Tests:  12-31 @ 09:41 TSH 17.90 FreeT4 -- T3 -- Anti TPO -- Anti Thyroglobulin Ab -- TSI --  12-24 @ 14:10 TSH 6.38 FreeT4 -- T3 -- Anti TPO -- Anti Thyroglobulin Ab -- TSI --                     Contact info:   Morgan Fry MD  pager [phone], please provide 10 digit call back number.   Please note that this patient may be followed by another provider tomorrow.   If no answer or after hours, please contact [phone]    History:69 yo female with hx of goiter s/p thyroidectomy in 2000 on thyroid replacement, T2D (HbA1c 6.6%) uncontrolled due to steroids, pemphigus bullous, and uterine cancer s/p TRENT-BSO with chemo who was sent to the ED by her dermatologist Dr. Pian for IV abx for b/l LE cellulitis.       Interval History/Subjective: Admleog 12 units tid changed to Admelog 15 units for breakfast, 12 units with lunch and dinner.  Received changed does yesterday.  Lunch glucose elevated.      MEDICATIONS  (STANDING):  AQUAPHOR (petrolatum Ointment) 1 Application(s) Topical daily  aspirin enteric coated 81 milliGRAM(s) Oral daily  atorvastatin 20 milliGRAM(s) Oral at bedtime  cholecalciferol 2000 Unit(s) Oral two times a day  dextrose 40% Gel 15 Gram(s) Oral once  dextrose 5%. 1000 milliLiter(s) (50 mL/Hr) IV Continuous <Continuous>  dextrose 5%. 1000 milliLiter(s) (100 mL/Hr) IV Continuous <Continuous>  dextrose 50% Injectable 25 Gram(s) IV Push once  dextrose 50% Injectable 12.5 Gram(s) IV Push once  dextrose 50% Injectable 25 Gram(s) IV Push once  enoxaparin Injectable 40 milliGRAM(s) SubCutaneous every 12 hours  ferrous    sulfate 325 milliGRAM(s) Oral daily  folic acid 1 milliGRAM(s) Oral daily  glucagon  Injectable 1 milliGRAM(s) IntraMuscular once  insulin glargine Injectable (LANTUS) 20 Unit(s) SubCutaneous at bedtime  insulin lispro (ADMELOG) corrective regimen sliding scale   SubCutaneous three times a day before meals (MDSS)   insulin lispro (ADMELOG) corrective regimen sliding scale   SubCutaneous at bedtime (LDSS)   insulin lispro Injectable (ADMELOG) 15 Unit(s) SubCutaneous before breakfast  insulin lispro Injectable (ADMELOG) 12 Unit(s) SubCutaneous before lunch  insulin lispro Injectable (ADMELOG) 12 Unit(s) SubCutaneous before dinner  levothyroxine 200 MICROGram(s) Oral daily  lisinopril 5 milliGRAM(s) Oral daily  multivitamin 1 Tablet(s) Oral daily  predniSONE   Tablet 40 milliGRAM(s) Oral daily  senna 2 Tablet(s) Oral at bedtime    MEDICATIONS  (PRN):  acetaminophen   Tablet .. 650 milliGRAM(s) Oral every 6 hours PRN Temp greater or equal to 38C (100.4F), Mild Pain (1 - 3)  ondansetron Injectable 4 milliGRAM(s) IV Push every 6 hours PRN Nausea and/or Vomiting  oxyCODONE    IR 5 milliGRAM(s) Oral every 4 hours PRN Severe Pain (7 - 10)      Allergies    Ancef (Rash)  daptomycin (Vomiting)  Keflex (Unknown)  penicillin (Pruritus; Rash; Hives)    Intolerances    vancomycin (Other)    Review of Systems:  Constitutional: No fever  Eyes: No blurry vision  Neuro: No tremors  HEENT: No pain  Cardiovascular: No chest pain, palpitations  Respiratory: No SOB, no cough  GI: No nausea, vomiting, abdominal pain  : No dysuria  Skin: no rash  Psych: no depression  Endocrine: no polyuria, polydipsia  Hem/lymph: no swelling    ALL OTHER SYSTEMS REVIEWED AND NEGATIVE    PHYSICAL EXAM:  VITALS: T(C): 36.7 (01-07-21 @ 04:52)  T(F): 98 (01-07-21 @ 04:52), Max: 99.1 (01-06-21 @ 14:13)  HR: 86 (01-07-21 @ 04:52) (84 - 99)  BP: 127/64 (01-07-21 @ 04:52) (91/52 - 135/78)  RR:  (18 - 20)  SpO2:  (95% - 98%)  Wt(kg): --  GENERAL: NAD, well-groomed, well-developed  EYES: No proptosis, no lid lag, anicteric  HEENT:  Atraumatic, Normocephalic, moist mucous membranes  GI: Soft, nontender, non distended, normal bowel sounds  SKIN: Dry, intact, No rashes or lesions  NEURO: sensation intact, extraocular movements intact, no tremor, normal reflexes  PSYCH: Alert and oriented x 3, normal affect, normal mood    CAPILLARY BLOOD GLUCOSE    POCT Blood Glucose.: 303 mg/dL (07 Jan 2021 12:16)  POCT Blood Glucose.: 183 mg/dL (01-07-21 @ 08:16)  POCT Blood Glucose.: 212 mg/dL (01-06-21 @ 21:39)  POCT Blood Glucose.: 148 mg/dL (01-06-21 @ 17:11)  POCT Blood Glucose.: 222 mg/dL (01-06-21 @ 12:18)  POCT Blood Glucose.: 236 mg/dL (01-06-21 @ 08:40)  POCT Blood Glucose.: 230 mg/dL (01-05-21 @ 21:45)  POCT Blood Glucose.: 243 mg/dL (01-05-21 @ 17:23)  POCT Blood Glucose.: 270 mg/dL (01-05-21 @ 12:04)  POCT Blood Glucose.: 193 mg/dL (01-05-21 @ 08:42)  POCT Blood Glucose.: 269 mg/dL (01-04-21 @ 21:51)  POCT Blood Glucose.: 285 mg/dL (01-04-21 @ 17:12)  POCT Blood Glucose.: 309 mg/dL (01-04-21 @ 12:10)      01-05    131<L>  |  99  |  27<H>  ----------------------------<  162<H>  4.1   |  22  |  0.77    EGFR if : 92  EGFR if non : 79    Ca    8.3<L>      01-05    TPro  8.0  /  Alb  2.8<L>  /  TBili  x   /  DBili  x   /  AST  x   /  ALT  x   /  AlkPhos  x   01-04        Thyroid Function Tests:  12-31 @ 09:41 TSH 17.90 FreeT4 -- T3 -- Anti TPO -- Anti Thyroglobulin Ab -- TSI --  12-24 @ 14:10 TSH 6.38 FreeT4 -- T3 -- Anti TPO -- Anti Thyroglobulin Ab -- TSI --

## 2021-01-07 NOTE — DISCHARGE NOTE NURSING/CASE MANAGEMENT/SOCIAL WORK - PATIENT PORTAL LINK FT
You can access the FollowMyHealth Patient Portal offered by [facility] by registering at the following website: [email or web address]. By joining [employer]’s FollowMyHealth portal, you will also be able to view your health information using other applications (apps) compatible with our system.

## 2021-01-07 NOTE — PROGRESS NOTE ADULT - ASSESSMENT
69 yo F w/ acute on chronic macrocytic anemia  FOBT + but patient states that she has intermittent BRBPR due to hemorrhoids  Colonoscopy 2015 w Dr Ko - diverticulosis and small benign HP polyp resected.  H/H responded appropriately to blood transfusion. Information provided for Dr Ko to follow up and arrange outpatient EGD/Colonoscopy.  Further care per hematology and primary team.  Please call with questions    [phone]

## 2021-01-07 NOTE — PROGRESS NOTE ADULT - SUBJECTIVE AND OBJECTIVE BOX
H/H responded appropriately to blood transfusion      Vital Signs:  Vital Signs Last 24 Hrs  T(C): 36.7 (07 Jan 2021 04:52), Max: 37.3 (06 Jan 2021 14:13)  T(F): 98 (07 Jan 2021 04:52), Max: 99.1 (06 Jan 2021 14:13)  HR: 86 (07 Jan 2021 04:52) (86 - 99)  BP: 127/64 (07 Jan 2021 04:52) (91/52 - 135/78)  BP(mean): --  RR: 18 (07 Jan 2021 04:52) (18 - 20)  SpO2: 98% (07 Jan 2021 04:52) (96% - 98%)  Daily     Daily       PHYSICAL EXAM:    Constitutional: NAD, well-developed  Neck: No LAD, supple  Respiratory: CTA and P  Cardiovascular: S1 and S2, RRR, no M  Gastrointestinal: BS+, soft, NT/ND, neg HSM,  Extremities: No peripheral edema, neg clubing, cyanosis  Vascular: 2+ peripheral pulses  Neurological: A/O x 3, no focal deficits  Psychiatric: Normal mood, normal affect  Skin: No rashes    MEDICATIONS  (STANDING):  AQUAPHOR (petrolatum Ointment) 1 Application(s) Topical daily  aspirin enteric coated 81 milliGRAM(s) Oral daily  atorvastatin 20 milliGRAM(s) Oral at bedtime  cholecalciferol 2000 Unit(s) Oral two times a day  dextrose 40% Gel 15 Gram(s) Oral once  dextrose 5%. 1000 milliLiter(s) (50 mL/Hr) IV Continuous <Continuous>  dextrose 5%. 1000 milliLiter(s) (100 mL/Hr) IV Continuous <Continuous>  dextrose 50% Injectable 25 Gram(s) IV Push once  dextrose 50% Injectable 12.5 Gram(s) IV Push once  dextrose 50% Injectable 25 Gram(s) IV Push once  enoxaparin Injectable 40 milliGRAM(s) SubCutaneous every 12 hours  ferrous    sulfate 325 milliGRAM(s) Oral daily  folic acid 1 milliGRAM(s) Oral daily  glucagon  Injectable 1 milliGRAM(s) IntraMuscular once  insulin glargine Injectable (LANTUS) 20 Unit(s) SubCutaneous at bedtime  insulin lispro (ADMELOG) corrective regimen sliding scale   SubCutaneous at bedtime  insulin lispro (ADMELOG) corrective regimen sliding scale   SubCutaneous three times a day before meals  insulin lispro Injectable (ADMELOG) 15 Unit(s) SubCutaneous before breakfast  insulin lispro Injectable (ADMELOG) 12 Unit(s) SubCutaneous before lunch  insulin lispro Injectable (ADMELOG) 12 Unit(s) SubCutaneous before dinner  levothyroxine 200 MICROGram(s) Oral daily  lisinopril 5 milliGRAM(s) Oral daily  multivitamin 1 Tablet(s) Oral daily  predniSONE   Tablet 40 milliGRAM(s) Oral daily  senna 2 Tablet(s) Oral at bedtime    MEDICATIONS  (PRN):  acetaminophen   Tablet .. 650 milliGRAM(s) Oral every 6 hours PRN Temp greater or equal to 38C (100.4F), Mild Pain (1 - 3)  ondansetron Injectable 4 milliGRAM(s) IV Push every 6 hours PRN Nausea and/or Vomiting  oxyCODONE    IR 5 milliGRAM(s) Oral every 4 hours PRN Severe Pain (7 - 10)      Allergies    Ancef (Rash)  daptomycin (Vomiting)  Keflex (Unknown)  penicillin (Pruritus; Rash; Hives)    Intolerances    vancomycin (Other)                 LABS:                        8.4    7.79  )-----------( 480      ( 07 Jan 2021 06:55 )             26.7           RADIOLOGY & ADDITIONAL TESTS:

## 2021-01-07 NOTE — PROGRESS NOTE ADULT - REASON FOR ADMISSION
Rigors, chills since last night

## 2021-01-14 ENCOUNTER — NON-APPOINTMENT (OUTPATIENT)
Age: 71
End: 2021-01-14

## 2021-01-20 PROCEDURE — 82570 ASSAY OF URINE CREATININE: CPT

## 2021-01-20 PROCEDURE — 86850 RBC ANTIBODY SCREEN: CPT

## 2021-01-20 PROCEDURE — 70450 CT HEAD/BRAIN W/O DYE: CPT

## 2021-01-20 PROCEDURE — 83540 ASSAY OF IRON: CPT

## 2021-01-20 PROCEDURE — 74176 CT ABD & PELVIS W/O CONTRAST: CPT

## 2021-01-20 PROCEDURE — 84165 PROTEIN E-PHORESIS SERUM: CPT

## 2021-01-20 PROCEDURE — 85045 AUTOMATED RETICULOCYTE COUNT: CPT

## 2021-01-20 PROCEDURE — 83010 ASSAY OF HAPTOGLOBIN QUANT: CPT

## 2021-01-20 PROCEDURE — 96375 TX/PRO/DX INJ NEW DRUG ADDON: CPT

## 2021-01-20 PROCEDURE — 36430 TRANSFUSION BLD/BLD COMPNT: CPT

## 2021-01-20 PROCEDURE — 82962 GLUCOSE BLOOD TEST: CPT

## 2021-01-20 PROCEDURE — 85027 COMPLETE CBC AUTOMATED: CPT

## 2021-01-20 PROCEDURE — U0005: CPT

## 2021-01-20 PROCEDURE — 80076 HEPATIC FUNCTION PANEL: CPT

## 2021-01-20 PROCEDURE — 81001 URINALYSIS AUTO W/SCOPE: CPT

## 2021-01-20 PROCEDURE — 97116 GAIT TRAINING THERAPY: CPT

## 2021-01-20 PROCEDURE — 86900 BLOOD TYPING SEROLOGIC ABO: CPT

## 2021-01-20 PROCEDURE — 82728 ASSAY OF FERRITIN: CPT

## 2021-01-20 PROCEDURE — 93005 ELECTROCARDIOGRAM TRACING: CPT

## 2021-01-20 PROCEDURE — 83615 LACTATE (LD) (LDH) ENZYME: CPT

## 2021-01-20 PROCEDURE — 83521 IG LIGHT CHAINS FREE EACH: CPT

## 2021-01-20 PROCEDURE — 85610 PROTHROMBIN TIME: CPT

## 2021-01-20 PROCEDURE — 86335 IMMUNFIX E-PHORSIS/URINE/CSF: CPT

## 2021-01-20 PROCEDURE — P9040: CPT

## 2021-01-20 PROCEDURE — U0003: CPT

## 2021-01-20 PROCEDURE — 84155 ASSAY OF PROTEIN SERUM: CPT

## 2021-01-20 PROCEDURE — 80048 BASIC METABOLIC PNL TOTAL CA: CPT

## 2021-01-20 PROCEDURE — 97162 PT EVAL MOD COMPLEX 30 MIN: CPT

## 2021-01-20 PROCEDURE — 97110 THERAPEUTIC EXERCISES: CPT

## 2021-01-20 PROCEDURE — 84436 ASSAY OF TOTAL THYROXINE: CPT

## 2021-01-20 PROCEDURE — 87040 BLOOD CULTURE FOR BACTERIA: CPT

## 2021-01-20 PROCEDURE — 86880 COOMBS TEST DIRECT: CPT

## 2021-01-20 PROCEDURE — 82607 VITAMIN B-12: CPT

## 2021-01-20 PROCEDURE — 71045 X-RAY EXAM CHEST 1 VIEW: CPT

## 2021-01-20 PROCEDURE — 82043 UR ALBUMIN QUANTITATIVE: CPT

## 2021-01-20 PROCEDURE — 83550 IRON BINDING TEST: CPT

## 2021-01-20 PROCEDURE — 82784 ASSAY IGA/IGD/IGG/IGM EACH: CPT

## 2021-01-20 PROCEDURE — 82746 ASSAY OF FOLIC ACID SERUM: CPT

## 2021-01-20 PROCEDURE — 86901 BLOOD TYPING SEROLOGIC RH(D): CPT

## 2021-01-20 PROCEDURE — 86923 COMPATIBILITY TEST ELECTRIC: CPT

## 2021-01-20 PROCEDURE — 93308 TTE F-UP OR LMTD: CPT

## 2021-01-20 PROCEDURE — 83036 HEMOGLOBIN GLYCOSYLATED A1C: CPT

## 2021-01-20 PROCEDURE — 83605 ASSAY OF LACTIC ACID: CPT

## 2021-01-20 PROCEDURE — 84156 ASSAY OF PROTEIN URINE: CPT

## 2021-01-20 PROCEDURE — 73590 X-RAY EXAM OF LOWER LEG: CPT

## 2021-01-20 PROCEDURE — 80202 ASSAY OF VANCOMYCIN: CPT

## 2021-01-20 PROCEDURE — 85730 THROMBOPLASTIN TIME PARTIAL: CPT

## 2021-01-20 PROCEDURE — 84481 FREE ASSAY (FT-3): CPT

## 2021-01-20 PROCEDURE — 87086 URINE CULTURE/COLONY COUNT: CPT

## 2021-01-20 PROCEDURE — 86334 IMMUNOFIX E-PHORESIS SERUM: CPT

## 2021-01-20 PROCEDURE — 85025 COMPLETE CBC W/AUTO DIFF WBC: CPT

## 2021-01-20 PROCEDURE — 96374 THER/PROPH/DIAG INJ IV PUSH: CPT

## 2021-01-20 PROCEDURE — 99285 EMERGENCY DEPT VISIT HI MDM: CPT | Mod: 25

## 2021-01-20 PROCEDURE — 86803 HEPATITIS C AB TEST: CPT

## 2021-01-20 PROCEDURE — 82272 OCCULT BLD FECES 1-3 TESTS: CPT

## 2021-01-20 PROCEDURE — 82232 ASSAY OF BETA-2 PROTEIN: CPT

## 2021-01-20 PROCEDURE — 84443 ASSAY THYROID STIM HORMONE: CPT

## 2021-01-20 PROCEDURE — 87186 SC STD MICRODIL/AGAR DIL: CPT

## 2021-01-20 PROCEDURE — 80053 COMPREHEN METABOLIC PANEL: CPT

## 2021-01-26 ENCOUNTER — APPOINTMENT (OUTPATIENT)
Dept: GYNECOLOGIC ONCOLOGY | Facility: CLINIC | Age: 71
End: 2021-01-26

## 2021-01-28 NOTE — ED ADULT NURSE NOTE - NS ED NURSE LEVEL OF CONSCIOUSNESS SPEECH
The patient was called for notification of a test result for COVID-19  The patient did not answer the phone and a voicemail was left requesting a call back to [phone], Option 7  Speaking Coherently

## 2021-02-01 ENCOUNTER — APPOINTMENT (OUTPATIENT)
Dept: RHEUMATOLOGY | Facility: CLINIC | Age: 71
End: 2021-02-01

## 2021-02-02 ENCOUNTER — APPOINTMENT (OUTPATIENT)
Dept: DERMATOLOGY | Facility: CLINIC | Age: 71
End: 2021-02-02

## 2021-02-03 ENCOUNTER — NON-APPOINTMENT (OUTPATIENT)
Age: 71
End: 2021-02-03

## 2021-02-03 ENCOUNTER — APPOINTMENT (OUTPATIENT)
Dept: RHEUMATOLOGY | Facility: CLINIC | Age: 71
End: 2021-02-03

## 2021-02-05 ENCOUNTER — NON-APPOINTMENT (OUTPATIENT)
Age: 71
End: 2021-02-05

## 2021-02-05 ENCOUNTER — APPOINTMENT (OUTPATIENT)
Dept: RHEUMATOLOGY | Facility: CLINIC | Age: 71
End: 2021-02-05

## 2021-03-01 ENCOUNTER — APPOINTMENT (OUTPATIENT)
Dept: RHEUMATOLOGY | Facility: CLINIC | Age: 71
End: 2021-03-01

## 2021-03-02 ENCOUNTER — NON-APPOINTMENT (OUTPATIENT)
Age: 71
End: 2021-03-02

## 2021-03-03 ENCOUNTER — APPOINTMENT (OUTPATIENT)
Dept: RHEUMATOLOGY | Facility: CLINIC | Age: 71
End: 2021-03-03

## 2021-03-05 ENCOUNTER — APPOINTMENT (OUTPATIENT)
Dept: RHEUMATOLOGY | Facility: CLINIC | Age: 71
End: 2021-03-05

## 2021-03-29 ENCOUNTER — RX RENEWAL (OUTPATIENT)
Age: 71
End: 2021-03-29

## 2021-03-29 ENCOUNTER — APPOINTMENT (OUTPATIENT)
Dept: RHEUMATOLOGY | Facility: CLINIC | Age: 71
End: 2021-03-29

## 2021-03-31 ENCOUNTER — APPOINTMENT (OUTPATIENT)
Dept: RHEUMATOLOGY | Facility: CLINIC | Age: 71
End: 2021-03-31

## 2021-04-02 ENCOUNTER — APPOINTMENT (OUTPATIENT)
Dept: RHEUMATOLOGY | Facility: CLINIC | Age: 71
End: 2021-04-02

## 2021-04-13 ENCOUNTER — INPATIENT (INPATIENT)
Facility: HOSPITAL | Age: 71
LOS: 5 days | Discharge: HOME HEALTH | DRG: 871 | End: 2021-04-19
Attending: INTERNAL MEDICINE | Admitting: INTERNAL MEDICINE
Payer: MEDICARE

## 2021-04-13 VITALS
DIASTOLIC BLOOD PRESSURE: 47 MMHG | HEART RATE: 130 BPM | SYSTOLIC BLOOD PRESSURE: 108 MMHG | TEMPERATURE: 103 F | RESPIRATION RATE: 18 BRPM | OXYGEN SATURATION: 97 % | WEIGHT: 240.08 LBS | HEIGHT: 65 IN

## 2021-04-13 DIAGNOSIS — Z90.710 ACQUIRED ABSENCE OF BOTH CERVIX AND UTERUS: Chronic | ICD-10-CM

## 2021-04-13 DIAGNOSIS — L12.0 BULLOUS PEMPHIGOID: ICD-10-CM

## 2021-04-13 DIAGNOSIS — I10 ESSENTIAL (PRIMARY) HYPERTENSION: ICD-10-CM

## 2021-04-13 DIAGNOSIS — C54.1 MALIGNANT NEOPLASM OF ENDOMETRIUM: ICD-10-CM

## 2021-04-13 DIAGNOSIS — L03.90 CELLULITIS, UNSPECIFIED: ICD-10-CM

## 2021-04-13 DIAGNOSIS — Z98.89 OTHER SPECIFIED POSTPROCEDURAL STATES: Chronic | ICD-10-CM

## 2021-04-13 DIAGNOSIS — E03.9 HYPOTHYROIDISM, UNSPECIFIED: ICD-10-CM

## 2021-04-13 DIAGNOSIS — C54.1 MALIGNANT NEOPLASM OF ENDOMETRIUM: Chronic | ICD-10-CM

## 2021-04-13 DIAGNOSIS — Z90.722 ACQUIRED ABSENCE OF OVARIES, BILATERAL: Chronic | ICD-10-CM

## 2021-04-13 DIAGNOSIS — U07.1 COVID-19: ICD-10-CM

## 2021-04-13 DIAGNOSIS — Z29.9 ENCOUNTER FOR PROPHYLACTIC MEASURES, UNSPECIFIED: ICD-10-CM

## 2021-04-13 DIAGNOSIS — E11.9 TYPE 2 DIABETES MELLITUS WITHOUT COMPLICATIONS: ICD-10-CM

## 2021-04-13 LAB
-  ESBL: SIGNIFICANT CHANGE UP
A1C WITH ESTIMATED AVERAGE GLUCOSE RESULT: 6.7 % — HIGH (ref 4–5.6)
ALBUMIN SERPL ELPH-MCNC: 3.5 G/DL — SIGNIFICANT CHANGE UP (ref 3.3–5)
ALBUMIN SERPL ELPH-MCNC: 3.9 G/DL — SIGNIFICANT CHANGE UP (ref 3.3–5)
ALP SERPL-CCNC: 72 U/L — SIGNIFICANT CHANGE UP (ref 40–120)
ALP SERPL-CCNC: 92 U/L — SIGNIFICANT CHANGE UP (ref 40–120)
ALT FLD-CCNC: 17 U/L — SIGNIFICANT CHANGE UP (ref 10–45)
ALT FLD-CCNC: 19 U/L — SIGNIFICANT CHANGE UP (ref 10–45)
ANION GAP SERPL CALC-SCNC: 13 MMOL/L — SIGNIFICANT CHANGE UP (ref 5–17)
ANION GAP SERPL CALC-SCNC: 15 MMOL/L — SIGNIFICANT CHANGE UP (ref 5–17)
APPEARANCE UR: ABNORMAL
APTT BLD: 29.1 SEC — SIGNIFICANT CHANGE UP (ref 27.5–35.5)
AST SERPL-CCNC: 19 U/L — SIGNIFICANT CHANGE UP (ref 10–40)
AST SERPL-CCNC: 21 U/L — SIGNIFICANT CHANGE UP (ref 10–40)
BACTERIA # UR AUTO: ABNORMAL
BASE EXCESS BLDV CALC-SCNC: 1.5 MMOL/L — SIGNIFICANT CHANGE UP (ref -2–2)
BASOPHILS # BLD AUTO: 0 K/UL — SIGNIFICANT CHANGE UP (ref 0–0.2)
BASOPHILS # BLD AUTO: 0.03 K/UL — SIGNIFICANT CHANGE UP (ref 0–0.2)
BASOPHILS NFR BLD AUTO: 0 % — SIGNIFICANT CHANGE UP (ref 0–2)
BASOPHILS NFR BLD AUTO: 0.1 % — SIGNIFICANT CHANGE UP (ref 0–2)
BILIRUB SERPL-MCNC: 0.3 MG/DL — SIGNIFICANT CHANGE UP (ref 0.2–1.2)
BILIRUB SERPL-MCNC: 0.5 MG/DL — SIGNIFICANT CHANGE UP (ref 0.2–1.2)
BILIRUB UR-MCNC: NEGATIVE — SIGNIFICANT CHANGE UP
BUN SERPL-MCNC: 28 MG/DL — HIGH (ref 7–23)
BUN SERPL-MCNC: 28 MG/DL — HIGH (ref 7–23)
CA-I SERPL-SCNC: 1.17 MMOL/L — SIGNIFICANT CHANGE UP (ref 1.12–1.3)
CALCIUM SERPL-MCNC: 8.7 MG/DL — SIGNIFICANT CHANGE UP (ref 8.4–10.5)
CALCIUM SERPL-MCNC: 9.6 MG/DL — SIGNIFICANT CHANGE UP (ref 8.4–10.5)
CHLORIDE BLDV-SCNC: 101 MMOL/L — SIGNIFICANT CHANGE UP (ref 96–108)
CHLORIDE SERPL-SCNC: 102 MMOL/L — SIGNIFICANT CHANGE UP (ref 96–108)
CHLORIDE SERPL-SCNC: 98 MMOL/L — SIGNIFICANT CHANGE UP (ref 96–108)
CO2 BLDV-SCNC: 28 MMOL/L — SIGNIFICANT CHANGE UP (ref 22–30)
CO2 SERPL-SCNC: 23 MMOL/L — SIGNIFICANT CHANGE UP (ref 22–31)
CO2 SERPL-SCNC: 24 MMOL/L — SIGNIFICANT CHANGE UP (ref 22–31)
COLOR SPEC: YELLOW — SIGNIFICANT CHANGE UP
CORTIS AM PEAK SERPL-MCNC: 126 UG/DL — HIGH (ref 6–18.4)
CREAT SERPL-MCNC: 0.9 MG/DL — SIGNIFICANT CHANGE UP (ref 0.5–1.3)
CREAT SERPL-MCNC: 0.91 MG/DL — SIGNIFICANT CHANGE UP (ref 0.5–1.3)
DIFF PNL FLD: NEGATIVE — SIGNIFICANT CHANGE UP
E COLI DNA BLD POS QL NAA+NON-PROBE: SIGNIFICANT CHANGE UP
EOSINOPHIL # BLD AUTO: 0 K/UL — SIGNIFICANT CHANGE UP (ref 0–0.5)
EOSINOPHIL # BLD AUTO: 0.04 K/UL — SIGNIFICANT CHANGE UP (ref 0–0.5)
EOSINOPHIL NFR BLD AUTO: 0 % — SIGNIFICANT CHANGE UP (ref 0–6)
EOSINOPHIL NFR BLD AUTO: 0.2 % — SIGNIFICANT CHANGE UP (ref 0–6)
EPI CELLS # UR: 0 /HPF — SIGNIFICANT CHANGE UP
ESTIMATED AVERAGE GLUCOSE: 146 MG/DL — HIGH (ref 68–114)
GAS PNL BLDV: 138 MMOL/L — SIGNIFICANT CHANGE UP (ref 135–145)
GAS PNL BLDV: SIGNIFICANT CHANGE UP
GLUCOSE BLDC GLUCOMTR-MCNC: 181 MG/DL — HIGH (ref 70–99)
GLUCOSE BLDC GLUCOMTR-MCNC: 191 MG/DL — HIGH (ref 70–99)
GLUCOSE BLDC GLUCOMTR-MCNC: 205 MG/DL — HIGH (ref 70–99)
GLUCOSE BLDC GLUCOMTR-MCNC: 230 MG/DL — HIGH (ref 70–99)
GLUCOSE BLDV-MCNC: 214 MG/DL — HIGH (ref 70–99)
GLUCOSE SERPL-MCNC: 181 MG/DL — HIGH (ref 70–99)
GLUCOSE SERPL-MCNC: 222 MG/DL — HIGH (ref 70–99)
GLUCOSE UR QL: ABNORMAL
GRAM STN FLD: SIGNIFICANT CHANGE UP
GRAM STN FLD: SIGNIFICANT CHANGE UP
HCO3 BLDV-SCNC: 27 MMOL/L — SIGNIFICANT CHANGE UP (ref 21–29)
HCT VFR BLD CALC: 34.7 % — SIGNIFICANT CHANGE UP (ref 34.5–45)
HCT VFR BLD CALC: 40.7 % — SIGNIFICANT CHANGE UP (ref 34.5–45)
HCT VFR BLDA CALC: 40 % — SIGNIFICANT CHANGE UP (ref 39–50)
HGB BLD CALC-MCNC: 13.1 G/DL — SIGNIFICANT CHANGE UP (ref 11.5–15.5)
HGB BLD-MCNC: 10.9 G/DL — LOW (ref 11.5–15.5)
HGB BLD-MCNC: 12.5 G/DL — SIGNIFICANT CHANGE UP (ref 11.5–15.5)
HYALINE CASTS # UR AUTO: 1 /LPF — SIGNIFICANT CHANGE UP (ref 0–2)
IMM GRANULOCYTES NFR BLD AUTO: 1.1 % — SIGNIFICANT CHANGE UP (ref 0–1.5)
INR BLD: 1.01 RATIO — SIGNIFICANT CHANGE UP (ref 0.88–1.16)
KETONES UR-MCNC: ABNORMAL
LACTATE BLDV-MCNC: 1.4 MMOL/L — SIGNIFICANT CHANGE UP (ref 0.7–2)
LACTATE BLDV-MCNC: 3.6 MMOL/L — HIGH (ref 0.7–2)
LEUKOCYTE ESTERASE UR-ACNC: ABNORMAL
LYMPHOCYTES # BLD AUTO: 0.13 K/UL — LOW (ref 1–3.3)
LYMPHOCYTES # BLD AUTO: 0.41 K/UL — LOW (ref 1–3.3)
LYMPHOCYTES # BLD AUTO: 0.9 % — LOW (ref 13–44)
LYMPHOCYTES # BLD AUTO: 1.9 % — LOW (ref 13–44)
MAGNESIUM SERPL-MCNC: 1.5 MG/DL — LOW (ref 1.6–2.6)
MANUAL SMEAR VERIFICATION: SIGNIFICANT CHANGE UP
MCHC RBC-ENTMCNC: 30.6 PG — SIGNIFICANT CHANGE UP (ref 27–34)
MCHC RBC-ENTMCNC: 30.7 GM/DL — LOW (ref 32–36)
MCHC RBC-ENTMCNC: 31.1 PG — SIGNIFICANT CHANGE UP (ref 27–34)
MCHC RBC-ENTMCNC: 31.4 GM/DL — LOW (ref 32–36)
MCV RBC AUTO: 99.1 FL — SIGNIFICANT CHANGE UP (ref 80–100)
MCV RBC AUTO: 99.5 FL — SIGNIFICANT CHANGE UP (ref 80–100)
METHOD TYPE: SIGNIFICANT CHANGE UP
MONOCYTES # BLD AUTO: 0.13 K/UL — SIGNIFICANT CHANGE UP (ref 0–0.9)
MONOCYTES # BLD AUTO: 0.65 K/UL — SIGNIFICANT CHANGE UP (ref 0–0.9)
MONOCYTES NFR BLD AUTO: 0.9 % — LOW (ref 2–14)
MONOCYTES NFR BLD AUTO: 3.1 % — SIGNIFICANT CHANGE UP (ref 2–14)
NEUTROPHILS # BLD AUTO: 13.93 K/UL — HIGH (ref 1.8–7.4)
NEUTROPHILS # BLD AUTO: 19.69 K/UL — HIGH (ref 1.8–7.4)
NEUTROPHILS NFR BLD AUTO: 93.6 % — HIGH (ref 43–77)
NEUTROPHILS NFR BLD AUTO: 98.2 % — HIGH (ref 43–77)
NITRITE UR-MCNC: POSITIVE
NRBC # BLD: 0 /100 WBCS — SIGNIFICANT CHANGE UP (ref 0–0)
OTHER CELLS CSF MANUAL: 9 ML/DL — LOW (ref 18–22)
PCO2 BLDV: 48 MMHG — HIGH (ref 39–42)
PH BLDV: 7.37 — SIGNIFICANT CHANGE UP (ref 7.35–7.45)
PH UR: 6 — SIGNIFICANT CHANGE UP (ref 5–8)
PHOSPHATE SERPL-MCNC: 2.4 MG/DL — LOW (ref 2.5–4.5)
PLAT MORPH BLD: NORMAL — SIGNIFICANT CHANGE UP
PLATELET # BLD AUTO: 233 K/UL — SIGNIFICANT CHANGE UP (ref 150–400)
PLATELET # BLD AUTO: 271 K/UL — SIGNIFICANT CHANGE UP (ref 150–400)
PO2 BLDV: 30 MMHG — SIGNIFICANT CHANGE UP (ref 25–45)
POIKILOCYTOSIS BLD QL AUTO: SLIGHT — SIGNIFICANT CHANGE UP
POTASSIUM BLDV-SCNC: 4 MMOL/L — SIGNIFICANT CHANGE UP (ref 3.5–5.3)
POTASSIUM SERPL-MCNC: 3.9 MMOL/L — SIGNIFICANT CHANGE UP (ref 3.5–5.3)
POTASSIUM SERPL-MCNC: 4 MMOL/L — SIGNIFICANT CHANGE UP (ref 3.5–5.3)
POTASSIUM SERPL-SCNC: 3.9 MMOL/L — SIGNIFICANT CHANGE UP (ref 3.5–5.3)
POTASSIUM SERPL-SCNC: 4 MMOL/L — SIGNIFICANT CHANGE UP (ref 3.5–5.3)
PROT SERPL-MCNC: 6.1 G/DL — SIGNIFICANT CHANGE UP (ref 6–8.3)
PROT SERPL-MCNC: 7.1 G/DL — SIGNIFICANT CHANGE UP (ref 6–8.3)
PROT UR-MCNC: ABNORMAL
PROTHROM AB SERPL-ACNC: 12.1 SEC — SIGNIFICANT CHANGE UP (ref 10.6–13.6)
RBC # BLD: 3.5 M/UL — LOW (ref 3.8–5.2)
RBC # BLD: 4.09 M/UL — SIGNIFICANT CHANGE UP (ref 3.8–5.2)
RBC # FLD: 14.4 % — SIGNIFICANT CHANGE UP (ref 10.3–14.5)
RBC # FLD: 14.5 % — SIGNIFICANT CHANGE UP (ref 10.3–14.5)
RBC BLD AUTO: SIGNIFICANT CHANGE UP
RBC CASTS # UR COMP ASSIST: 1 /HPF — SIGNIFICANT CHANGE UP (ref 0–4)
SAO2 % BLDV: 48 % — LOW (ref 67–88)
SARS-COV-2 RNA SPEC QL NAA+PROBE: SIGNIFICANT CHANGE UP
SODIUM SERPL-SCNC: 136 MMOL/L — SIGNIFICANT CHANGE UP (ref 135–145)
SODIUM SERPL-SCNC: 139 MMOL/L — SIGNIFICANT CHANGE UP (ref 135–145)
SP GR SPEC: 1.02 — SIGNIFICANT CHANGE UP (ref 1.01–1.02)
SPECIMEN SOURCE: SIGNIFICANT CHANGE UP
UROBILINOGEN FLD QL: NEGATIVE — SIGNIFICANT CHANGE UP
WBC # BLD: 14.19 K/UL — HIGH (ref 3.8–10.5)
WBC # BLD: 21.05 K/UL — HIGH (ref 3.8–10.5)
WBC # FLD AUTO: 14.19 K/UL — HIGH (ref 3.8–10.5)
WBC # FLD AUTO: 21.05 K/UL — HIGH (ref 3.8–10.5)
WBC UR QL: 27 /HPF — HIGH (ref 0–5)

## 2021-04-13 PROCEDURE — 93010 ELECTROCARDIOGRAM REPORT: CPT | Mod: GC

## 2021-04-13 PROCEDURE — 71045 X-RAY EXAM CHEST 1 VIEW: CPT | Mod: 26

## 2021-04-13 PROCEDURE — 99223 1ST HOSP IP/OBS HIGH 75: CPT

## 2021-04-13 PROCEDURE — 99223 1ST HOSP IP/OBS HIGH 75: CPT | Mod: GC

## 2021-04-13 PROCEDURE — 99285 EMERGENCY DEPT VISIT HI MDM: CPT | Mod: CS,GC

## 2021-04-13 RX ORDER — AZTREONAM 2 G
2000 VIAL (EA) INJECTION ONCE
Refills: 0 | Status: COMPLETED | OUTPATIENT
Start: 2021-04-13 | End: 2021-04-13

## 2021-04-13 RX ORDER — ASPIRIN/CALCIUM CARB/MAGNESIUM 324 MG
81 TABLET ORAL DAILY
Refills: 0 | Status: DISCONTINUED | OUTPATIENT
Start: 2021-04-13 | End: 2021-04-19

## 2021-04-13 RX ORDER — HEPARIN SODIUM 5000 [USP'U]/ML
5000 INJECTION INTRAVENOUS; SUBCUTANEOUS EVERY 8 HOURS
Refills: 0 | Status: DISCONTINUED | OUTPATIENT
Start: 2021-04-13 | End: 2021-04-19

## 2021-04-13 RX ORDER — INSULIN GLARGINE 100 [IU]/ML
15 INJECTION, SOLUTION SUBCUTANEOUS AT BEDTIME
Refills: 0 | Status: DISCONTINUED | OUTPATIENT
Start: 2021-04-13 | End: 2021-04-13

## 2021-04-13 RX ORDER — GABAPENTIN 400 MG/1
800 CAPSULE ORAL
Refills: 0 | Status: DISCONTINUED | OUTPATIENT
Start: 2021-04-13 | End: 2021-04-19

## 2021-04-13 RX ORDER — LEVOTHYROXINE SODIUM 125 MCG
150 TABLET ORAL DAILY
Refills: 0 | Status: DISCONTINUED | OUTPATIENT
Start: 2021-04-13 | End: 2021-04-19

## 2021-04-13 RX ORDER — SENNA PLUS 8.6 MG/1
2 TABLET ORAL AT BEDTIME
Refills: 0 | Status: DISCONTINUED | OUTPATIENT
Start: 2021-04-13 | End: 2021-04-19

## 2021-04-13 RX ORDER — ATORVASTATIN CALCIUM 80 MG/1
20 TABLET, FILM COATED ORAL AT BEDTIME
Refills: 0 | Status: DISCONTINUED | OUTPATIENT
Start: 2021-04-13 | End: 2021-04-19

## 2021-04-13 RX ORDER — DEXTROSE 50 % IN WATER 50 %
15 SYRINGE (ML) INTRAVENOUS ONCE
Refills: 0 | Status: DISCONTINUED | OUTPATIENT
Start: 2021-04-13 | End: 2021-04-19

## 2021-04-13 RX ORDER — SODIUM CHLORIDE 9 MG/ML
1000 INJECTION, SOLUTION INTRAVENOUS
Refills: 0 | Status: DISCONTINUED | OUTPATIENT
Start: 2021-04-13 | End: 2021-04-19

## 2021-04-13 RX ORDER — AZTREONAM 2 G
VIAL (EA) INJECTION
Refills: 0 | Status: DISCONTINUED | OUTPATIENT
Start: 2021-04-13 | End: 2021-04-13

## 2021-04-13 RX ORDER — OXYCODONE HYDROCHLORIDE 5 MG/1
5 TABLET ORAL EVERY 4 HOURS
Refills: 0 | Status: DISCONTINUED | OUTPATIENT
Start: 2021-04-13 | End: 2021-04-19

## 2021-04-13 RX ORDER — HYDROCORTISONE 20 MG
150 TABLET ORAL ONCE
Refills: 0 | Status: COMPLETED | OUTPATIENT
Start: 2021-04-13 | End: 2021-04-13

## 2021-04-13 RX ORDER — INSULIN LISPRO 100/ML
VIAL (ML) SUBCUTANEOUS
Refills: 0 | Status: DISCONTINUED | OUTPATIENT
Start: 2021-04-13 | End: 2021-04-19

## 2021-04-13 RX ORDER — GABAPENTIN 400 MG/1
800 CAPSULE ORAL THREE TIMES A DAY
Refills: 0 | Status: DISCONTINUED | OUTPATIENT
Start: 2021-04-13 | End: 2021-04-13

## 2021-04-13 RX ORDER — SODIUM CHLORIDE 9 MG/ML
1750 INJECTION INTRAMUSCULAR; INTRAVENOUS; SUBCUTANEOUS ONCE
Refills: 0 | Status: COMPLETED | OUTPATIENT
Start: 2021-04-13 | End: 2021-04-13

## 2021-04-13 RX ORDER — HYDROCORTISONE 20 MG
50 TABLET ORAL EVERY 6 HOURS
Refills: 0 | Status: DISCONTINUED | OUTPATIENT
Start: 2021-04-13 | End: 2021-04-15

## 2021-04-13 RX ORDER — AZTREONAM 2 G
2000 VIAL (EA) INJECTION EVERY 12 HOURS
Refills: 0 | Status: DISCONTINUED | OUTPATIENT
Start: 2021-04-13 | End: 2021-04-13

## 2021-04-13 RX ORDER — LEVOTHYROXINE SODIUM 125 MCG
200 TABLET ORAL DAILY
Refills: 0 | Status: DISCONTINUED | OUTPATIENT
Start: 2021-04-13 | End: 2021-04-13

## 2021-04-13 RX ORDER — INSULIN LISPRO 100/ML
VIAL (ML) SUBCUTANEOUS AT BEDTIME
Refills: 0 | Status: DISCONTINUED | OUTPATIENT
Start: 2021-04-13 | End: 2021-04-19

## 2021-04-13 RX ORDER — DEXTROSE 50 % IN WATER 50 %
25 SYRINGE (ML) INTRAVENOUS ONCE
Refills: 0 | Status: DISCONTINUED | OUTPATIENT
Start: 2021-04-13 | End: 2021-04-19

## 2021-04-13 RX ORDER — INSULIN LISPRO 100/ML
10 VIAL (ML) SUBCUTANEOUS
Refills: 0 | Status: DISCONTINUED | OUTPATIENT
Start: 2021-04-13 | End: 2021-04-13

## 2021-04-13 RX ORDER — ACETAMINOPHEN 500 MG
975 TABLET ORAL ONCE
Refills: 0 | Status: COMPLETED | OUTPATIENT
Start: 2021-04-13 | End: 2021-04-13

## 2021-04-13 RX ORDER — CIPROFLOXACIN LACTATE 400MG/40ML
400 VIAL (ML) INTRAVENOUS ONCE
Refills: 0 | Status: COMPLETED | OUTPATIENT
Start: 2021-04-13 | End: 2021-04-13

## 2021-04-13 RX ORDER — ACETAMINOPHEN 500 MG
650 TABLET ORAL EVERY 6 HOURS
Refills: 0 | Status: DISCONTINUED | OUTPATIENT
Start: 2021-04-13 | End: 2021-04-19

## 2021-04-13 RX ORDER — GLUCAGON INJECTION, SOLUTION 0.5 MG/.1ML
1 INJECTION, SOLUTION SUBCUTANEOUS ONCE
Refills: 0 | Status: DISCONTINUED | OUTPATIENT
Start: 2021-04-13 | End: 2021-04-19

## 2021-04-13 RX ORDER — DUPILUMAB 300 MG/2ML
300 INJECTION, SOLUTION SUBCUTANEOUS ONCE
Refills: 0 | Status: COMPLETED | OUTPATIENT
Start: 2021-04-13 | End: 2021-04-13

## 2021-04-13 RX ADMIN — GABAPENTIN 800 MILLIGRAM(S): 400 CAPSULE ORAL at 18:07

## 2021-04-13 RX ADMIN — SODIUM CHLORIDE 1750 MILLILITER(S): 9 INJECTION INTRAMUSCULAR; INTRAVENOUS; SUBCUTANEOUS at 03:23

## 2021-04-13 RX ADMIN — Medication 150 MILLIGRAM(S): at 05:07

## 2021-04-13 RX ADMIN — GABAPENTIN 800 MILLIGRAM(S): 400 CAPSULE ORAL at 12:21

## 2021-04-13 RX ADMIN — OXYCODONE HYDROCHLORIDE 5 MILLIGRAM(S): 5 TABLET ORAL at 11:15

## 2021-04-13 RX ADMIN — Medication 50 MILLIGRAM(S): at 12:21

## 2021-04-13 RX ADMIN — ATORVASTATIN CALCIUM 20 MILLIGRAM(S): 80 TABLET, FILM COATED ORAL at 22:07

## 2021-04-13 RX ADMIN — Medication 1 TABLET(S): at 12:20

## 2021-04-13 RX ADMIN — DUPILUMAB 300 MILLIGRAM(S): 300 INJECTION, SOLUTION SUBCUTANEOUS at 16:00

## 2021-04-13 RX ADMIN — Medication 2: at 08:38

## 2021-04-13 RX ADMIN — SENNA PLUS 2 TABLET(S): 8.6 TABLET ORAL at 22:08

## 2021-04-13 RX ADMIN — OXYCODONE HYDROCHLORIDE 5 MILLIGRAM(S): 5 TABLET ORAL at 10:30

## 2021-04-13 RX ADMIN — OXYCODONE HYDROCHLORIDE 5 MILLIGRAM(S): 5 TABLET ORAL at 18:05

## 2021-04-13 RX ADMIN — Medication 4: at 12:19

## 2021-04-13 RX ADMIN — Medication 2000 MILLIGRAM(S): at 04:45

## 2021-04-13 RX ADMIN — Medication 2: at 18:02

## 2021-04-13 RX ADMIN — Medication 150 MICROGRAM(S): at 06:52

## 2021-04-13 RX ADMIN — SODIUM CHLORIDE 1750 MILLILITER(S): 9 INJECTION INTRAMUSCULAR; INTRAVENOUS; SUBCUTANEOUS at 05:00

## 2021-04-13 RX ADMIN — Medication 50 MILLIGRAM(S): at 18:09

## 2021-04-13 RX ADMIN — Medication 975 MILLIGRAM(S): at 03:27

## 2021-04-13 RX ADMIN — Medication 975 MILLIGRAM(S): at 05:14

## 2021-04-13 RX ADMIN — HEPARIN SODIUM 5000 UNIT(S): 5000 INJECTION INTRAVENOUS; SUBCUTANEOUS at 14:30

## 2021-04-13 RX ADMIN — Medication 100 MILLIGRAM(S): at 03:27

## 2021-04-13 RX ADMIN — OXYCODONE HYDROCHLORIDE 5 MILLIGRAM(S): 5 TABLET ORAL at 18:52

## 2021-04-13 RX ADMIN — Medication 200 MILLIGRAM(S): at 05:09

## 2021-04-13 RX ADMIN — HEPARIN SODIUM 5000 UNIT(S): 5000 INJECTION INTRAVENOUS; SUBCUTANEOUS at 06:52

## 2021-04-13 RX ADMIN — Medication 81 MILLIGRAM(S): at 12:21

## 2021-04-13 RX ADMIN — HEPARIN SODIUM 5000 UNIT(S): 5000 INJECTION INTRAVENOUS; SUBCUTANEOUS at 22:08

## 2021-04-13 NOTE — H&P ADULT - PROBLEM SELECTOR PLAN 3
receiving IVIG q month COVID positive on admission, per pt has been positive since January and has no symptoms COVID positive on admission, per pt has been positive since January and has no symptoms  No signs of active infection  Monitor vitals

## 2021-04-13 NOTE — PROVIDER CONTACT NOTE (CRITICAL VALUE NOTIFICATION) - ACTION/TREATMENT ORDERED:
NICK Melissa made aware and will follow up once she looks at the chart. NICK Melissa made aware and per NP, Levaquin treatment sufficient to treat infection.

## 2021-04-13 NOTE — H&P ADULT - NSHPLABSRESULTS_GEN_ALL_CORE
LABS:  CBC Full  -  ( 13 Apr 2021 03:26 )  WBC Count : 14.19 K/uL  Hemoglobin : 12.5 g/dL  Hematocrit : 40.7 %  Platelet Count - Automated : 271 K/uL  Mean Cell Volume : 99.5 fl  Mean Cell Hemoglobin : 30.6 pg  Mean Cell Hemoglobin Concentration : 30.7 gm/dL  Auto Neutrophil # : 13.93 K/uL  Auto Lymphocyte # : 0.13 K/uL  Auto Monocyte # : 0.13 K/uL  Auto Eosinophil # : 0.00 K/uL  Auto Basophil # : 0.00 K/uL  Auto Neutrophil % : 98.2 %  Auto Lymphocyte % : 0.9 %  Auto Monocyte % : 0.9 %  Auto Eosinophil % : 0.0 %  Auto Basophil % : 0.0 %    136    |  98     |  28<H>  ----------------------------<  222<H>    13 Apr 2021 03:25  4.0     |  23     |  0.91         Ca 9.6           13 Apr 2021 03:25        TPro  7.1    /  Alb  3.9    /  TBili  0.5    /  DBili  x      /  AST  21     /  ALT  19     /  AlkPhos  92     13 Apr 2021 03:25    PT/INR - ( 13 Apr 2021 03:26 )   PT: 12.1 ;   INR: 1.01          PTT - ( 13 Apr 2021 03:26 )  PTT:29.1       < from: Xray Chest 1 View- PORTABLE-Urgent (Xray Chest 1 View- PORTABLE-Urgent .) (04.13.21 @ 03:45) >      INTERPRETATION:  Clear lungs.    < end of copied text > LABS:  CBC Full  -  ( 13 Apr 2021 03:26 )  WBC Count : 14.19 K/uL  Hemoglobin : 12.5 g/dL  Hematocrit : 40.7 %  Platelet Count - Automated : 271 K/uL  Mean Cell Volume : 99.5 fl  Mean Cell Hemoglobin : 30.6 pg  Mean Cell Hemoglobin Concentration : 30.7 gm/dL  Auto Neutrophil # : 13.93 K/uL  Auto Lymphocyte # : 0.13 K/uL  Auto Monocyte # : 0.13 K/uL  Auto Eosinophil # : 0.00 K/uL  Auto Basophil # : 0.00 K/uL  Auto Neutrophil % : 98.2 %  Auto Lymphocyte % : 0.9 %  Auto Monocyte % : 0.9 %  Auto Eosinophil % : 0.0 %  Auto Basophil % : 0.0 %    136    |  98     |  28<H>  ----------------------------<  222<H>    13 Apr 2021 03:25  4.0     |  23     |  0.91         Ca 9.6           13 Apr 2021 03:25        TPro  7.1    /  Alb  3.9    /  TBili  0.5    /  DBili  x      /  AST  21     /  ALT  19     /  AlkPhos  92     13 Apr 2021 03:25    PT/INR - ( 13 Apr 2021 03:26 )   PT: 12.1 ;   INR: 1.01          PTT - ( 13 Apr 2021 03:26 )  PTT:29.1       < from: Xray Chest 1 View- PORTABLE-Urgent (Xray Chest 1 View- PORTABLE-Urgent .) (04.13.21 @ 03:45) >      INTERPRETATION:  Clear lungs.    < end of copied text >    COVID PCR+ LABS:  CBC Full  -  ( 13 Apr 2021 03:26 )  WBC Count : 14.19 K/uL  Hemoglobin : 12.5 g/dL  Hematocrit : 40.7 %  Platelet Count - Automated : 271 K/uL  Mean Cell Volume : 99.5 fl  Mean Cell Hemoglobin : 30.6 pg  Mean Cell Hemoglobin Concentration : 30.7 gm/dL  Auto Neutrophil # : 13.93 K/uL  Auto Lymphocyte # : 0.13 K/uL  Auto Monocyte # : 0.13 K/uL  Auto Eosinophil # : 0.00 K/uL  Auto Basophil # : 0.00 K/uL  Auto Neutrophil % : 98.2 %  Auto Lymphocyte % : 0.9 %  Auto Monocyte % : 0.9 %  Auto Eosinophil % : 0.0 %  Auto Basophil % : 0.0 %    136    |  98     |  28<H>  ----------------------------<  222<H>    13 Apr 2021 03:25  4.0     |  23     |  0.91         Ca 9.6           13 Apr 2021 03:25        TPro  7.1    /  Alb  3.9    /  TBili  0.5    /  DBili  x      /  AST  21     /  ALT  19     /  AlkPhos  92     13 Apr 2021 03:25    PT/INR - ( 13 Apr 2021 03:26 )   PT: 12.1 ;   INR: 1.01          PTT - ( 13 Apr 2021 03:26 )  PTT:29.1       < from: Xray Chest 1 View- PORTABLE-Urgent (Xray Chest 1 View- PORTABLE-Urgent .) (04.13.21 @ 03:45) >      INTERPRETATION:  Clear lungs.    < end of copied text >    COVID PCR+    UTUrinalysis (04.13.21 @ 06:07)   pH Urine: 6.0   Glucose Qualitative, Urine: 500 mg/dL   Blood, Urine: Negative   Color: Yellow   Urine Appearance: Slightly Turbid   Bilirubin: Negative   Ketone - Urine: Small   Specific Gravity: 1.019   Protein, Urine: Trace   Urobilinogen: Negative   Nitrite: Positive   Leukocyte Esterase Concentration: Large

## 2021-04-13 NOTE — H&P ADULT - NSHPPHYSICALEXAM_GEN_ALL_CORE
GENERAL: chronically ill appearing  HEAD:  Atraumatic, Normocephalic  ENT: EOMI, PERRLA, conjunctiva and sclera clear, Neck supple, No JVD, moist mucosa, no pharynageal erythema, no tonsillar enlargement or exudate  CHEST/LUNG: Clear to auscultation bilaterally; No wheeze, equal breath sounds bilaterally   HEART: Regular rate and rhythm; No murmurs, rubs, or gallops  ABDOMEN: Soft, Nontender, Nondistended; Bowel sounds present, no organomegaly  EXTREMITIES: RLE wound. b/l forearms wrapped.  PSYCH: AAOx0  NEUROLOGY: non-focal, cranial nerves intact  SKIN: Multiple BP lesions b/l extremities and abdomen. GENERAL: chronically ill appearing obese female  HEAD:  Atraumatic, Normocephalic  ENT: EOMI, PERRLA, conjunctiva and sclera clear  CHEST/LUNG: Clear to auscultation bilaterally; No wheeze, equal breath sounds bilaterally   HEART: Regular rate and rhythm; No murmurs, rubs, or gallops  ABDOMEN: Soft, Nontender, Nondistended; Bowel sounds present, no organomegaly  EXTREMITIES: RLE erythematous, warm to touch, edematous, shiny appearing skin  PSYCH: AAOx3  NEUROLOGY: non-focal, cranial nerves intact  SKIN: Multiple BP lesions b/l extremities and abdomen, none appear bullous, all flat and pink GENERAL: chronically ill appearing obese female  HEAD:  Atraumatic, Normocephalic  ENT: EOMI, PERRLA, conjunctiva and sclera clear  CHEST/LUNG: Clear to auscultation bilaterally; No wheeze, equal breath sounds bilaterally   HEART: Regular rate and rhythm; No murmurs, rubs, or gallops  ABDOMEN: Obese. Soft Nontender, Nondistended; Bowel sounds present, no organomegaly  EXTREMITIES: RLE erythematous, warm to touch, edematous, shiny appearing skin  PSYCH: AAOx3  NEUROLOGY: non-focal, cranial nerves intact  SKIN: Multiple BP lesions b/l extremities and abdomen, none appear bullous, all flat and pink

## 2021-04-13 NOTE — H&P ADULT - ASSESSMENT
71 y/o F PMH stage 1 uterine CA s/p TLH/BSO + adj chemo in 2016, bullous pemphigoid on IVIG and daily prednisone 30 mg, DM, surgical hypothyroidism s/p thyroidectomy, chronic pain syndrome, chronic RLE wound hx cellulitis, endometrial CA s/p TRENT presents from home c/o fever [id number] as well as rigors, shaking chills, and increased confusion, found to be COVID + (not hypoxic, clear lungs on CXR), admit for sepsis 2/2 COVID vs.recurrence of cellulitis 69 y/o F PMH stage 1 uterine CA s/p TLH/BSO + adj chemo in 2016, bullous pemphigoid on IVIG and daily prednisone 30 mg, DM, surgical hypothyroidism s/p thyroidectomy, chronic pain syndrome, chronic RLE wound hx cellulitis, endometrial CA s/p TRENT presents from home c/o fever [id number] as well as rigors, shaking chills, and increased confusion, found to be COVID + (not hypoxic, clear lungs on CXR), admit for sepsis 2/2 .recurrence of cellulitis vs UTI.

## 2021-04-13 NOTE — PHYSICAL THERAPY INITIAL EVALUATION ADULT - ADDITIONAL COMMENTS
Pt recently D/C from [place] and reports able to amb 75ft, occasionally requires assistance for ADLs, family able to provide. Pt lives in private home with 4 DIANA and B rail. Pt owns bariatric RW, tub bench, rollator bariatric w/c, transport chair, power lift chair.

## 2021-04-13 NOTE — ED PROVIDER NOTE - PROGRESS NOTE DETAILS
Diaz DO: pt stable, no acute complaints, RLE warm, likely source of fever, presumed cellulitis- initial covid test read was negative, received call from lab that covid is POSITIVE. pt placed on isolation. Diaz DO: bps soft, mentating normally, well appearing, tachycardia improving, bp cuff on forearms 2/2 to IV placement, likely causing lower reading, stable for floor. pt reassessed, reports intermittent rigors, well appearing, no acute complaints, conversational, alert, watching tv.

## 2021-04-13 NOTE — ED PROVIDER NOTE - OBJECTIVE STATEMENT
69 y/o F PMH bullous pemphigoid for 6 years currently steroid dependent, DM, surgical hypothyroidism following total thyroidectomy for a large obstructing goiter (on a 150 mcg /6 days and one day of 300 mcg/week -patient/spouse not sure which day), Red Man Syndrome from IV vancomycin, chronic pain syndrome with patient apparently on escalating doses of Neurontin (per spouse now on 800 mg 4xDay) with PRN Tramadol and Vicodin due to patient severe pain from her skin lesions, with last admission to [facility] in 10/2018 for RIGHT LE cellulitis, endometrial CA diagnosed in 2015 with [facility] BSO and presumed to be in remission, with patient with home visiting RN with an Unna's boot on the RIGHT LE but both are wrapped since Monday, with self referral following rigors and shaking chills since last night.   Spouse noted increased confusion by patient at home last night. 69 y/o F PMH bullous pemphigoid, DM, surgical hypothyroidism s/p thyroidectomy, chronic pain syndrome, chronic RLE wound hx cellulitis, endometrial CA s/p TRENT presents from home c/o fever . Denies cough, congestion, sob, cp, abd pain, n/v/d, dysuria, hematuria. pt states she was discharged home from rehab facility last week after hospitalization 2/2 bullous pemphigoid. denies fevers since being home prior to today. 71 y/o F PMH bullous pemphigoid, DM, surgical hypothyroidism s/p thyroidectomy, chronic pain syndrome, chronic RLE wound hx cellulitis, endometrial CA s/p TRENT presents from home c/o fever . Denies cough, congestion, sob, cp, abd pain, n/v/d, dysuria, hematuria. pt states she was discharged home from rehab facility last week after hospitalization 2/2 bullous pemphigoid. denies fevers since being home prior to today. Pt takes prednisone 30mg daily and synthroid 200mg daily.

## 2021-04-13 NOTE — H&P ADULT - HISTORY OF PRESENT ILLNESS
69 y/o F PMH stage 1 uterine CA s/p TLH/BSO + adj chemo in 2016, bullous pemphigoid on IVIG and daily prednisone 30 mg, DM, surgical hypothyroidism s/p thyroidectomy, chronic pain syndrome, chronic RLE wound hx cellulitis, endometrial CA s/p TRENT presents from home c/o fever [id number] as well as rigors, shaking chills, and increased confusion.    She was recently d/c from rehab last week after a hospitalization in Jan 2021 for cellulitis 2/2 bullous pemphigoid lesions. She was seen by ID and derm and given IVIG MWF, plan for q monthly.  This hospitalization was c/b acute on chronic anemia- MDS vs GI bleed, both EGD and BMbx deferred for outpatient.     Denies cough, congestion, sob, cp, abd pain, n/v/d, dysuria, hematuria. denies fevers since being home prior to today. Pt takes prednisone 30mg daily and synthroid 200mg daily.    She has not received COVID vaccine.     In ED Vital Signs Last 24 Hrs  T(C): 39.2 (13 Apr 2021 03:17), Max: 39.4 (13 Apr 2021 02:52)  T(F): 102.5 (13 Apr 2021 03:17), Max: 103 (13 Apr 2021 02:52)  HR: 141 (13 Apr 2021 03:17) (130 - 141)  BP: 109/75 (13 Apr 2021 03:17) (108/47 - 109/75)  BP(mean): --  RR: 19 (13 Apr 2021 03:17) (18 - 19)  SpO2: 94% (13 Apr 2021 03:17) (94% - 97%)    She has been given cipro x1, aztreonam x1, stress dose solucortef 125, and NS 1750 cc bolus.   69 y/o F PMH stage 1 uterine CA s/p TLH/BSO + adj chemo in 2016, bullous pemphigoid on IVIG and daily prednisone 30 mg, DM, surgical hypothyroidism s/p thyroidectomy, chronic pain syndrome, chronic RLE wound hx cellulitis, endometrial CA s/p TRENT presents from home c/o fever [id number] as well as rigors, shaking chills, and increased confusion.    She was recently d/c from rehab last week after a hospitalization in Jan 2021 for cellulitis 2/2 bullous pemphigoid lesions. She was seen by ID and derm and given IVIG MWF, plan for q monthly.  This hospitalization was c/b acute on chronic anemia- MDS vs GI bleed, both EGD and BMbx deferred for outpatient.     Denies cough, congestion, sob, cp, abd pain, n/v/d, dysuria, hematuria. denies fevers since being home prior to today. Pt takes prednisone 30mg daily and synthroid 200mg daily.    She has not received COVID vaccine.     In ED Vital Signs Last 24 Hrs  T(C): 39.2 (13 Apr 2021 03:17), Max: 39.4 (13 Apr 2021 02:52)  T(F): 102.5 (13 Apr 2021 03:17), Max: 103 (13 Apr 2021 02:52)  HR: 141 (13 Apr 2021 03:17) (130 - 141)  BP: 109/75 (13 Apr 2021 03:17) (108/47 - 109/75)  BP(mean): --  RR: 19 (13 Apr 2021 03:17) (18 - 19)  SpO2: 94% (13 Apr 2021 03:17) (94% - 97%)    She has been given cipro x1, aztreonam x1, stress dose solucortef 125, and NS 1750 cc bolus.     She has not received COVID vaccine.   69 y/o F PMH stage 1 uterine CA s/p TLH/BSO + adj chemo in 2016, bullous pemphigoid on IVIG and daily prednisone 30 mg, DM, surgical hypothyroidism s/p thyroidectomy, chronic pain syndrome, chronic RLE wound hx cellulitis, endometrial CA s/p TRENT presents from home c/o fever [id number] as well as vomiting.    She was recently d/c from rehab last week after a hospitalization in Jan 2021 for cellulitis 2/2 bullous pemphigoid lesions. She was seen by ID and derm and given IVIG MWF, plan for q monthly.  This hospitalization was c/b acute on chronic anemia- MDS vs GI bleed, both EGD and BMbx deferred for outpatient. She contracted COVID during this rehab stay, and received MAB infusion. She says she tested negative "many times" and then "tested positive again" but is not able to give me time course. She was told to get vaccine 40 days after infusion. She was doing OK at home with regular RN visits for her BP lesions/cellulitis. The AM of 4/12 was noted by RN to have fever, no other symptoms. 911 called, she was evaluated by EMS and determined not to need hospital transfer. Later than evening she suddenly vomited and per family was acting more confused. Denies current nausea. No diarrhea. Ate greek food for dinner, no one else sick. Denies cough, congestion, sob, cp, abd pain, n/v/d, dysuria, hematuria. Also denies fevers since being home prior to today. Pt takes prednisone 30mg daily and synthroid 200mg daily.    She has not received COVID vaccine.     In ED Vital Signs Last 24 Hrs  T(C): 39.2 (13 Apr 2021 03:17), Max: 39.4 (13 Apr 2021 02:52)  T(F): 102.5 (13 Apr 2021 03:17), Max: 103 (13 Apr 2021 02:52)  HR: 141 (13 Apr 2021 03:17) (130 - 141)  BP: 109/75 (13 Apr 2021 03:17) (108/47 - 109/75)  BP(mean): --  RR: 19 (13 Apr 2021 03:17) (18 - 19)  SpO2: 94% (13 Apr 2021 03:17) (94% - 97%)    She has been given cipro x1, aztreonam x1, stress dose solucortef 125, and NS 1750 cc bolus.   71 y/o F PMH stage 1 uterine CA s/p TLH/BSO + adj chemo in 2016, bullous pemphigoid on IVIG and daily prednisone 30 mg, DM, surgical hypothyroidism s/p thyroidectomy, chronic pain syndrome, chronic RLE wound hx cellulitis, endometrial CA s/p TRENT presents from home c/o fever [id number] as well as vomiting.    She was recently d/c from rehab last week after a hospitalization in Jan 2021 for cellulitis 2/2 bullous pemphigoid lesions. She was seen by ID and derm and given IVIG MWF, plan for q monthly.  This hospitalization was c/b acute on chronic anemia- MDS vs GI bleed, both EGD and BMbx deferred for outpatient. She contracted COVID during this rehab stay, and received MAB infusion. She says she tested negative "many times" and then "tested positive again" but is not able to give me time course. She was told to get vaccine 40 days after infusion. She was doing OK at home with regular RN visits for her BP lesions/cellulitis. The AM of 4/12 was noted by RN to have fever, no other symptoms. 911 called, she was evaluated by EMS and determined not to need hospital transfer. Later than evening she suddenly vomited and per family was acting more confused. Denies current nausea. No diarrhea. Ate greek food for dinner, no one else sick. Denies cough, congestion, sob, cp, abd pain, n/v/d, dysuria, hematuria. Also denies fevers since being home prior to today. Pt takes prednisone 30mg daily and synthroid 200mg daily. Per  the lower extremity was cool and did not look infected.     She has not received COVID vaccine.     In ED Vital Signs Last 24 Hrs  T(C): 39.2 (13 Apr 2021 03:17), Max: 39.4 (13 Apr 2021 02:52)  T(F): 102.5 (13 Apr 2021 03:17), Max: 103 (13 Apr 2021 02:52)  HR: 141 (13 Apr 2021 03:17) (130 - 141)  BP: 109/75 (13 Apr 2021 03:17) (108/47 - 109/75)  BP(mean): --  RR: 19 (13 Apr 2021 03:17) (18 - 19)  SpO2: 94% (13 Apr 2021 03:17) (94% - 97%)    She has been given cipro x1, aztreonam x1, stress dose solucortef 125, and NS 1750 cc bolus.   69 y/o F PMH stage 1 uterine CA s/p TLH/BSO + adj chemo in 2016, bullous pemphigoid on IVIG and daily prednisone 30 mg, DM, surgical hypothyroidism s/p thyroidectomy, chronic pain syndrome, chronic RLE wound hx cellulitis, endometrial CA s/p TRENT presents from home c/o fever [id number] as well as vomiting.    She was recently d/c from rehab last week after a hospitalization in Jan 2021 for cellulitis 2/2 bullous pemphigoid lesions. She was seen by ID and derm and given IVIG MWF, plan for q monthly.  This hospitalization was c/b acute on chronic anemia- MDS vs GI bleed, both EGD and BMbx deferred for outpatient. She contracted COVID during this rehab stay, and received MAB infusion. She says she tested negative "many times" and then "tested positive again" but is not able to give me time course. She was told to get vaccine 40 days after infusion. She was doing OK at home with regular RN visits for her BP lesions/cellulitis. The AM of 4/12 was noted by RN to have fever, no other symptoms. 911 called, she was evaluated by EMS and determined not to need hospital transfer. Later than evening she suddenly vomited and per family was acting more confused. Denies current nausea. No diarrhea. Ate greek food for dinner, no one else sick. Denies cough, congestion, sob, cp, abd pain, n/v/d, dysuria, hematuria. Denies dysuria but did have episode incontinence in setting of vomiting Also denies fevers since being home prior to today. Pt takes prednisone 30mg daily and synthroid 200mg daily. Per  the lower extremity was cool and did not look infected.     She has not received COVID vaccine.     In ED Vital Signs Last 24 Hrs  T(C): 39.2 (13 Apr 2021 03:17), Max: 39.4 (13 Apr 2021 02:52)  T(F): 102.5 (13 Apr 2021 03:17), Max: 103 (13 Apr 2021 02:52)  HR: 141 (13 Apr 2021 03:17) (130 - 141)  BP: 109/75 (13 Apr 2021 03:17) (108/47 - 109/75)  BP(mean): --  RR: 19 (13 Apr 2021 03:17) (18 - 19)  SpO2: 94% (13 Apr 2021 03:17) (94% - 97%)    She has been given cipro x1, aztreonam x1, stress dose solucortef 125, and NS 1750 cc bolus.

## 2021-04-13 NOTE — H&P ADULT - PROBLEM SELECTOR PLAN 1
COVID positive on admission  Not hypoxic, does not meet criteria for remdesevir  Would continue stress dose steroids at this time given daily prednisone use  Monitor on continuous pulse ox RLE warm, erythematous, and edematous as compared to LLE. Patient not able to tell me if this is new over past few days, though she believes it was not warm.   Cover with aztreonam, cipro in ED. Given recurrence of cellulitis in this chronic BP patient, would consult ID in AM for assistance with regimen-- will c/w aztreonam for now  Check MRSA PCR  Recheck lactate s/p fluids  Would continue stress dose steroids at this time given daily prednisone use: Got 125 solucortef, dose 50 q 6 today. RLE warm, erythematous, and edematous as compared to LLE. Patient not able to tell me if this is new over past few days, though she believes it was not warm.  confirms it was not warm or red 2 days ago.  Cover with aztreonam, cipro in ED. Given recurrence of cellulitis in this chronic BP patient, would consult ID in AM for assistance with regimen-- will c/w aztreonam for now  Check MRSA PCR  Recheck lactate s/p fluids  Would continue stress dose steroids at this time given daily prednisone use: Got 125 solucortef, dose 50 q 6 today. RLE warm, erythematous, and edematous as compared to LLE. Patient not able to tell me if this is new over past few days, though she believes it was not warm.  confirms it was not warm or red 2 days ago.  Cover with aztreonam, cipro in ED. Given recurrence of cellulitis in this chronic BP patient, would consult ID in AM for assistance with regimen-- will c/w aztreonam for now  Check MRSA PCR  Recheck lactate s/p fluids  Would continue stress dose steroids at this time given daily prednisone use: Got 125 solucortef, dose 50 q 6 today.  f/u urine cx and blood cx

## 2021-04-13 NOTE — PHYSICAL THERAPY INITIAL EVALUATION ADULT - ASR EQUIP NEEDS DISCH PT EVAL
Pt owns bariatric RW, tub bench, rollator bariatric w/c, transport chair, power lift chair./raised toilet seat

## 2021-04-13 NOTE — ED PROVIDER NOTE - CLINICAL SUMMARY MEDICAL DECISION MAKING FREE TEXT BOX
71 y/o F PMH bullous pemphigoid, DM, surgical hypothyroidism s/p thyroidectomy, chronic pain syndrome, chronic RLE wound hx cellulitis, endometrial CA s/p TRENT presents from home c/o fever . pt arrives tachy  bp stable febrile 102 plan labs IVF abx ua / cxr likely admit

## 2021-04-13 NOTE — H&P ADULT - PROBLEM SELECTOR PLAN 5
Start lantus 15 u qhs, humalog 10 TID, SSI  titrate based on FS On oral medications at home  Pt was prescribed lantus in rehab and had hypoglycemic episode to 45.   Order SSI and check FS, titrate PRN no active tx

## 2021-04-13 NOTE — CHART NOTE - NSCHARTNOTEFT_GEN_A_CORE
Pt w/ h/o bullous pemphigoid managed by Dermatology. d/w medicine ACP who had called Derm already.  will defer to Derm, remain available as requested.

## 2021-04-13 NOTE — PHYSICAL THERAPY INITIAL EVALUATION ADULT - GAIT DEVIATIONS NOTED, PT EVAL
decreased abiola/decreased step length/decreased stride length/increased stride width/decreased weight-shifting ability

## 2021-04-13 NOTE — PHYSICAL THERAPY INITIAL EVALUATION ADULT - IMPAIRMENTS FOUND, PT EVAL
aerobic capacity/endurance/gait, locomotion, and balance/muscle strength
[FreeTextEntry1] : DIMA is a 10 year old male with Loeys Bre syndrome with chronic dizziness. \par - He was seen in [facility] ED due to asthmatic exacerbation 4/15\par - He had a seizure 4/9, saw Dr Jack and his Trileptal dose was increased. plans for sleep deprived EEG. \par - Overall, since his asthma improved, he is feeling much better. He is spending less time supine during the day. His chronic dizziness has improved. On 3/26/19, I observed him during a 5 minute standing test: Normal HR and BP response to standing. He was rocking on his feet and then arching his back to regain balance while standing, which was not caused by dizziness. I discussed this with Dr Mckinley. Since then, he describes feeling unsteady and off balance, but not dizzy. \par \par - His mother recently noticed that his eyes were crossing in; he is followed by ophtho. \par - He was evaluated by Dr Ashley Thao at the Mar[facility]/ aortopathy clinic at [facility]. I discussed Dima's management with Dr Thao. Irbesartan was started 75 mg qd in March, which is being tolerated \par - In the past, he expressed feeling constant dizziness, unrelated to his position. He was mainly laying in bed when home.  \par - Hx post nasal drip. \par - History of feeling a mildly fast HR, much improved.  \par - He has not been participating in gym; they are doing push-ups and calisthenics. He limits his activity due to hypotonia and club feet \par - Hx of chest pain/pressure, improved. Mother discussed it with Dr Kauffman, said it could be from pectus carinatum. \par - No abdominal discomfort. Good appetite. \par - Daily fluid: water >10 cups, ginger ale, zainab milk 0-1 c. Adding salt\par \par - I reviewed Dr Parker's letter from 10/11/18. 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 Previous MRI showed demyelination of the brain. Autism, hypotonia, possible CP. plans for MRI after nasal surgery\par - bilateral club feet s/p multiple surgeries\par - pectus carinatum- plans for a brace beginning at 13 yo. Followed by Dr Mp Kauffman, ped ortho\par - nasal obstruction with deviated septum and adenoid hypertrophy. s/p 2 nasal fractures. Nasal surgery and partial adenoidectomy were recommended\par - Asthma, bronchogenic cyst. followed by Dr Caldera. I read Dr Caldera's letter from 11/5/18. \par - followed by Dr Milian, constipation improved with Ducolax, behavioral encopresis   \par - public school, regular class, excellent grades, difficulty socially\par - He is being followed by Dr Fish. Cervical spine XR showed some minimal changes noted C2 on C3, and C3 on C4 with flexion and extension. Only restriction noted was to avoid weight bearing on the neck such as forward rolls. \par \par - MGF- cardiac issues started in his 60's, not involved\par - father - club foot, treated surgically\par - There is no known family history of LDS, premature sudden death, aortic disease, cardiomyopathy, arrhythmia or congenital heart disease.

## 2021-04-13 NOTE — ED PROVIDER NOTE - ATTENDING CONTRIBUTION TO CARE
Diaz DO: 71 y/o F PMH bullous pemphigoid, DM, surgical hypothyroidism s/p thyroidectomy, chronic pain syndrome, chronic RLE wound hx cellulitis, endometrial CA s/p TRENT presents from home c/o fever . Denies cough, congestion, sob, cp, abd pain, n/v/d, dysuria, hematuria. pt states she was discharged home from rehab facility last week after hospitalization 2/2 bullous pemphigoid. denies fevers since being home prior to today. Pt takes prednisone 30mg daily and synthroid 200mg daily. Diaz DO: 69 y/o F PMH bullous pemphigoid, DM, surgical hypothyroidism s/p thyroidectomy, chronic pain syndrome, chronic RLE wound hx cellulitis, endometrial CA s/p TRENT presents from home c/o fever . Denies cough, congestion, sob, cp, abd pain, n/v/d, dysuria, hematuria. pt states she was discharged home from rehab facility last week after hospitalization 2/2 bullous pemphigoid. denies fevers since being home prior to today. Pt takes prednisone 30mg daily and synthroid 200mg daily. On eval, obese, elderly, well appearing, aox3, nad, conversant. heart s1s2 tachycardic, no murmurs, lungs cta b/l, abd soft ntnd, no peripheral edema, noted erythematous maculopapular circular rash to posterior right thigh/ legs, superficial wounds RLE over tibia without purulence or active drainage but area of erythematous/ warmth without crepitus. no decubitus ulcers appreciated, no back pain/ spinal ttp. no bullae. neuro intact. no nuchal rigidity. plan: pt w/ fever and vitals concerning for sepsis, check labs, ua, empiric abx to cover for likely cellulitis vs pyelonephritis. pt w/ prior covid, will still send covid swab. admit.

## 2021-04-13 NOTE — ED ADULT NURSE NOTE - OBJECTIVE STATEMENT
70y F arrived to the ED via EMS c/o AMS, fever. As per EMS "Pt coming from home c/o elevated temp, AMS and lethargy as per family. Pt family reports pt began rambling tonight." Pt PMH DM, Bullous pemphigoid. Reddened erythematous patches noted to b/l legs, abd. Leg wound noted to R calf. Pt b/l forearm wrapped in guaze at present, unable to assess site. Unable to assess pt back at present. Pt tachycardiac, febrile at present. Pt placed on cardiac monitor. Pt A&Ox4 at present. Pt denies chest pain, SOB, HA, N/V/D, abdominal pain. Pt placed in position of comfort. Pt educated on call bell system and provided call bell. Bed in lowest position, wheels locked, appropriate side rails raised. Pt denies needs at this time.

## 2021-04-13 NOTE — PHYSICAL THERAPY INITIAL EVALUATION ADULT - PERTINENT HX OF CURRENT PROBLEM, REHAB EVAL
70 y F admit for sepsis 2/2 recurrence of cellulitis vs UTI. Presents from home c/o fever and rigors, shaking chills, and increased confusion, found to be COVID + (not hypoxic, clear lungs on CXR). PMH stage 1 uterine CA 2016, bullous pemphigoid, DM, surgical hypothyroidism s/p thyroidectomy, chronic pain syndrome, chronic RLE wound hx cellulitis, endometrial CA.

## 2021-04-13 NOTE — CONSULT NOTE ADULT - ATTENDING COMMENTS
BP appears to be well controlled today. No intact bullae or pruritus today. Can continue dupilumab. Hydrocortisone IV at current dose is equivalent to ~ 50 mg of prednisone, so no additional systemic corticosteroid is indicated for the purposes of BP at this time. Patient would benefit from administration of IVIG as this has been difficult to obtain at rehab, and administration may decrease the risk of her disease flaring as she has been recalcitrant. This should be carefully considered in the context of recent COVID-19 as IVIG may also increase the risk of coagulopathy. Prior to discharge, administration of IVIG would be of benefit if there are no other contraindications that develop by then.
not examined

## 2021-04-13 NOTE — ED PROVIDER NOTE - CARE PLAN
Principal Discharge DX:	Cellulitis   Principal Discharge DX:	Cellulitis  Secondary Diagnosis:	Sepsis  Secondary Diagnosis:	Bullous pemphigoid  Secondary Diagnosis:	Steroid dependence   Principal Discharge DX:	Cellulitis  Secondary Diagnosis:	Sepsis  Secondary Diagnosis:	Bullous pemphigoid  Secondary Diagnosis:	Steroid dependence  Secondary Diagnosis:	COVID-19

## 2021-04-13 NOTE — ED ADULT NURSE NOTE - PMH
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  On Immunosupressants  Cellcept  Endometrial cancer  dx: 8/2015:  High grade Endometroid Adenocarcinoma  History of osteoarthritis    Hyperlipidemia    Hypertension    Hypothyroidism    Morbid obesity  BMI:  53.6  Type 2 diabetes mellitus    Vitamin D deficiency

## 2021-04-13 NOTE — CONSULT NOTE ADULT - SUBJECTIVE AND OBJECTIVE BOX
[facility], Division of Infectious Diseases  MAYDA Braxton, ILANA Williamson  [phone]    JAKE ZIMMERMAN  70y, Female  [id number]    HPI--  HPI:   71 y/o F PMH stage 1 uterine CA s/p TLH/BSO + adj chemo in 2016, bullous pemphigoid on IVIG and daily prednisone 30 mg, DM, surgical hypothyroidism s/p thyroidectomy, chronic pain syndrome, chronic RLE wound hx cellulitis, endometrial CA s/p TRENT presents from home c/o fever [id number] as well as vomiting.    She was recently d/c from rehab last week after a hospitalization in 2021 for cellulitis 2/2 bullous pemphigoid lesions. She was seen by ID and derm and given IVIG MWF, plan for q monthly.  This hospitalization was c/b acute on chronic anemia- MDS vs GI bleed, both EGD and BMbx deferred for outpatient. She contracted COVID during this rehab stay, and received MAB infusion. She says she tested negative "many times" and then "tested positive again" but is not able to give me time course. She was told to get vaccine 40 days after infusion. She was doing OK at home with regular RN visits for her BP lesions/cellulitis. The AM of 12 was noted by RN to have fever, no other symptoms. 911 called, she was evaluated by EMS and determined not to need hospital transfer. Later than evening she suddenly vomited and per family was acting more confused. Denies current nausea. No diarrhea. Ate greek food for dinner, no one else sick. Denies cough, congestion, sob, cp, abd pain, n/v/d, dysuria, hematuria. Denies dysuria but did have episode incontinence in setting of vomiting Also denies fevers since being home prior to today. Pt takes prednisone 30mg daily and synthroid 200mg daily. Per  the lower extremity was cool and did not look infected.     She has not received COVID vaccine.     In ED Vital Signs Last 24 Hrs  T(C): 39.2 (2021 03:17), Max: 39.4 (2021 02:52)  T(F): 102.5 (2021 03:17), Max: 103 (2021 02:52)  HR: 141 (2021 03:17) (130 - 141)  BP: 109/75 (2021 03:17) (108/47 - 109/75)  BP(mean): --  RR: 19 (2021 03:17) (18 - 19)  SpO2: 94% (2021 03:17) (94% - 97%)    She has been given cipro x1, aztreonam x1, stress dose solucortef 125, and NS 1750 cc bolus.  (2021 04:44)        Active Medications--  acetaminophen   Tablet .. 650 milliGRAM(s) Oral every 6 hours PRN  aspirin enteric coated 81 milliGRAM(s) Oral daily  atorvastatin 20 milliGRAM(s) Oral at bedtime  aztreonam  IVPB 2000 milliGRAM(s) IV Intermittent every 12 hours  aztreonam  IVPB      dextrose 40% Gel 15 Gram(s) Oral once  dextrose 5%. 1000 milliLiter(s) IV Continuous <Continuous>  dextrose 50% Injectable 25 Gram(s) IV Push once  gabapentin 800 milliGRAM(s) Oral four times a day  glucagon  Injectable 1 milliGRAM(s) IntraMuscular once  heparin   Injectable 5000 Unit(s) SubCutaneous every 8 hours  hydrocortisone sodium succinate Injectable 50 milliGRAM(s) IV Push every 6 hours  insulin lispro (ADMELOG) corrective regimen sliding scale   SubCutaneous three times a day before meals  levothyroxine 150 MICROGram(s) Oral daily  multivitamin 1 Tablet(s) Oral daily  oxyCODONE    IR 5 milliGRAM(s) Oral every 4 hours PRN  senna 2 Tablet(s) Oral at bedtime    Antimicrobials:   aztreonam  IVPB 2000 milliGRAM(s) IV Intermittent every 12 hours  aztreonam  IVPB        Immunologic:     ROS:  CONSTITUTIONAL: No fevers or chills. No weakness or headache. No weight changes.  EYES/ENT: No visual or hearing changes. No sore throat or throat pain .  NECK: No pain or stiffness  RESPIRATORY: No cough, wheezing, or hemoptysis. No shortness of breath  CARDIOVASCULAR: No chest pain or palpitations  GASTROINTESTINAL: No abdominal pain. No nausea or vomiting. No diarrhea or constipation.  GENITOURINARY: No dysuria, frequency or hematuria  NEUROLOGICAL: No numbness or weakness  SKIN: No itching or rashes  PSYCHIATRIC: Pleasant. Appropriate affect    Allergies: Ancef (Rash)  daptomycin (Vomiting)  Keflex (Unknown)  penicillin (Pruritus; Rash; Hives)    PMH -- Bullous pemphigoid    Hypertension    Hypercholesterolemia    Type 2 diabetes mellitus    History of osteoarthritis    Anemia    Hypothyroidism    Vitamin D deficiency    Morbid obesity    Multiparity    Endometrial cancer    Hyperlipidemia    Adrenal mass      PSH -- History of  section    Status post total thyroidectomy    Endometrial cancer    S/P hysterectomy    S/P BSO (bilateral salpingo-oophorectomy)      FH -- Family history of lymphoma (Father)      Social History --  EtOH: denies   Tobacco: denies   Drug Use: denies     Travel/Environmental/Occupational History:    Physical Exam--  Vital Signs Last 24 Hrs  T(F): 99.7 (2021 06:47), Max: 103 (2021 02:52)  HR: 101 (2021 06:47) (101 - 141)  BP: 107/70 (2021 06:47) (94/80 - 109/75)  RR: 18 (2021 06:47) (17 - 19)  SpO2: 94% (2021 06:47) (94% - 97%)  General: nontoxic-appearing, no acute distress  HEENT: NC/AT, EOMI, anicteric, conjunctiva pink and moist, oropharynx clear, dentition fair  Neck: Not rigid. No sense of mass. No LAD  Lungs: Clear bilaterally without rales, wheezing or rhonchi  Heart: Regular rate and rhythm. No murmur, rub or gallop.  Abdomen: Soft. Nondistended. Nontender. Bowel sounds present. No organomegaly.  Back: No spinal tenderness. No costovertebral angle tenderness.  Extremities: No cyanosis or clubbing. No edema.   Skin: Warm. Dry. Good turgor. No rash. No vasculitic stigmata.  Psychiatric: Appropriate affect and mood for situation.   Lines:    Laboratory & Imaging Data--  CBC:                       10.9   21.05 )-----------( 233      ( 2021 06:08 )             34.7     CMP:     139  |  102  |  28<H>  ----------------------------<  181<H>  3.9   |  24  |  0.90    Ca    8.7      2021 06:08  Phos  2.4     -  Mg     1.5     -    TPro  6.1  /  Alb  3.5  /  TBili  0.3  /  DBili  x   /  AST  19  /  ALT  17  /  AlkPhos  72  -    LIVER FUNCTIONS - ( 2021 06:08 )  Alb: 3.5 g/dL / Pro: 6.1 g/dL / ALK PHOS: 72 U/L / ALT: 17 U/L / AST: 19 U/L / GGT: x           Urinalysis Basic - ( 2021 06:07 )    Color: Yellow / Appearance: Slightly Turbid / S.019 / pH: x  Gluc: x / Ketone: Small  / Bili: Negative / Urobili: Negative   Blood: x / Protein: Trace / Nitrite: Positive   Leuk Esterase: Large / RBC: 1 /hpf / WBC 27 /HPF   Sq Epi: x / Non Sq Epi: 0 /hpf / Bacteria: Many        Microbiology: reviewed      Radiology: reviewed    < from: Xray Chest 1 View- PORTABLE-Urgent (Xray Chest 1 View- PORTABLE-Urgent .) (21 @ 03:45) >    EXAM:  XR CHEST PORTABLE URGENT 1V                            PROCEDURE DATE:  2021            INTERPRETATION:  CLINICAL INFORMATION: Fever. Evaluation for pneumonia.    TECHNIQUE: AP view of the chest.    COMPARISON: Chest radiograph 2020.    FINDINGS:    The lungs are clear.    The heart size is normal.    Degenerative changes of the spine.    IMPRESSION:    Clear lungs.              MERLIN JIMENEZ MD; Resident Radiology  This document has been electronically signed.  ERIK ARNOLD MD; Attending Radiologist  This document has been electronically signed. 2021  7:24AM    < end of copied text >   [facility], Division of Infectious Diseases  MAYDA Braxton, ILANA Williamson  [phone]    JAKE ZIMMERMAN  70y, Female  [id number]    HPI--  HPI:   71 y/o F PMH stage 1 uterine CA s/p TLH/BSO + adj chemo in 2016, bullous pemphigoid on IVIG and daily prednisone 30 mg, DM, surgical hypothyroidism s/p thyroidectomy, chronic pain syndrome, chronic RLE wound hx cellulitis, endometrial CA s/p TRENT presents from home c/o fever [id number] as well as vomiting.    She was recently d/c from rehab last week after a hospitalization in 2021 for cellulitis 2/2 bullous pemphigoid lesions. She was seen by ID and derm and given IVIG MWF, plan for q monthly.  This hospitalization was c/b acute on chronic anemia- MDS vs GI bleed, both EGD and BMbx deferred for outpatient. She contracted COVID during this rehab stay, and received MAB infusion. She says she tested negative "many times" and then "tested positive again" but is not able to give me time course. She was told to get vaccine 40 days after infusion. She was doing OK at home with regular RN visits for her BP lesions/cellulitis. The AM of 12 was noted by RN to have fever, no other symptoms. 911 called, she was evaluated by EMS and determined not to need hospital transfer. Later than evening she suddenly vomited and per family was acting more confused. Denies current nausea. No diarrhea. Ate greek food for dinner, no one else sick. Denies cough, congestion, sob, cp, abd pain, n/v/d, dysuria, hematuria. Denies dysuria but did have episode incontinence in setting of vomiting Also denies fevers since being home prior to today. Pt takes prednisone 30mg daily and synthroid 200mg daily. Per  the lower extremity was cool and did not look infected.     She has not received COVID vaccine.     In ED Vital Signs Last 24 Hrs  T(C): 39.2 (2021 03:17), Max: 39.4 (2021 02:52)  T(F): 102.5 (2021 03:17), Max: 103 (2021 02:52)  HR: 141 (2021 03:17) (130 - 141)  BP: 109/75 (2021 03:17) (108/47 - 109/75)  BP(mean): --  RR: 19 (2021 03:17) (18 - 19)  SpO2: 94% (2021 03:17) (94% - 97%)    She has been given cipro x1, aztreonam x1, stress dose solucortef 125, and NS 1750 cc bolus.  (2021 04:44)    ID c/s for further evaluation.  Pt seen and examined at bedside. Pt reporting back pain.   Reports that she has no RLE pain, just wounds from blisters.       Active Medications--  acetaminophen   Tablet .. 650 milliGRAM(s) Oral every 6 hours PRN  aspirin enteric coated 81 milliGRAM(s) Oral daily  atorvastatin 20 milliGRAM(s) Oral at bedtime  aztreonam  IVPB 2000 milliGRAM(s) IV Intermittent every 12 hours  aztreonam  IVPB      dextrose 40% Gel 15 Gram(s) Oral once  dextrose 5%. 1000 milliLiter(s) IV Continuous <Continuous>  dextrose 50% Injectable 25 Gram(s) IV Push once  gabapentin 800 milliGRAM(s) Oral four times a day  glucagon  Injectable 1 milliGRAM(s) IntraMuscular once  heparin   Injectable 5000 Unit(s) SubCutaneous every 8 hours  hydrocortisone sodium succinate Injectable 50 milliGRAM(s) IV Push every 6 hours  insulin lispro (ADMELOG) corrective regimen sliding scale   SubCutaneous three times a day before meals  levothyroxine 150 MICROGram(s) Oral daily  multivitamin 1 Tablet(s) Oral daily  oxyCODONE    IR 5 milliGRAM(s) Oral every 4 hours PRN  senna 2 Tablet(s) Oral at bedtime    Antimicrobials:   aztreonam  IVPB 2000 milliGRAM(s) IV Intermittent every 12 hours  aztreonam  IVPB        Immunologic:     ROS:  CONSTITUTIONAL: No fevers or chills. No weakness or headache. No weight changes.  EYES/ENT: No visual or hearing changes. No sore throat or throat pain .  NECK: No pain or stiffness  RESPIRATORY: No cough, wheezing, or hemoptysis. No shortness of breath  CARDIOVASCULAR: No chest pain or palpitations  GASTROINTESTINAL: No abdominal pain. No nausea or vomiting. No diarrhea or constipation.  GENITOURINARY: No dysuria, frequency or hematuria  NEUROLOGICAL: No numbness or weakness  SKIN: No itching or rashes  PSYCHIATRIC: Pleasant. Appropriate affect    Allergies: Ancef (Rash)  daptomycin (Vomiting)  Keflex (Unknown)  penicillin (Pruritus; Rash; Hives)    PMH -- Bullous pemphigoid    Hypertension    Hypercholesterolemia    Type 2 diabetes mellitus    History of osteoarthritis    Anemia    Hypothyroidism    Vitamin D deficiency    Morbid obesity    Multiparity    Endometrial cancer    Hyperlipidemia    Adrenal mass      PSH -- History of  section    Status post total thyroidectomy    Endometrial cancer    S/P hysterectomy    S/P BSO (bilateral salpingo-oophorectomy)      FH -- Family history of lymphoma (Father)      Social History --  EtOH: denies   Tobacco: denies   Drug Use: denies     Travel/Environmental/Occupational History:    Physical Exam--  Vital Signs Last 24 Hrs  T(F): 99.7 (2021 06:47), Max: 103 (2021 02:52)  HR: 101 (2021 06:47) (101 - 141)  BP: 107/70 (2021 06:47) (94/80 - 109/75)  RR: 18 (2021 06:47) (17 - 19)  SpO2: 94% (2021 06:47) (94% - 97%)  General: nontoxic-appearing, no acute distress  HEENT: NC/AT, EOMI, anicteric, conjunctiva pink and moist, oropharynx clear, dentition fair  Neck: Not rigid. No sense of mass. No LAD  Lungs: Clear bilaterally without rales, wheezing or rhonchi  Heart: Regular rate and rhythm. No murmur, rub or gallop.  Abdomen: Soft. Nondistended. Nontender. Bowel sounds present. No organomegaly.  Back: No spinal tenderness. No costovertebral angle tenderness.  Extremities: RLE erythema, cool to touch  no TTP.   Skin: Warm. Dry. Good turgor. No rash. No vasculitic stigmata.    Laboratory & Imaging Data--  CBC:                       10.9   21.05 )-----------( 233      ( 2021 06:08 )             34.7     CMP: 13    139  |  102  |  28<H>  ----------------------------<  181<H>  3.9   |  24  |  0.90    Ca    8.7      2021 06:08  Phos  2.4     -  Mg     1.5     -    TPro  6.1  /  Alb  3.5  /  TBili  0.3  /  DBili  x   /  AST  19  /  ALT  17  /  AlkPhos  72  -    LIVER FUNCTIONS - ( 2021 06:08 )  Alb: 3.5 g/dL / Pro: 6.1 g/dL / ALK PHOS: 72 U/L / ALT: 17 U/L / AST: 19 U/L / GGT: x           Urinalysis Basic - ( 2021 06:07 )    Color: Yellow / Appearance: Slightly Turbid / S.019 / pH: x  Gluc: x / Ketone: Small  / Bili: Negative / Urobili: Negative   Blood: x / Protein: Trace / Nitrite: Positive   Leuk Esterase: Large / RBC: 1 /hpf / WBC 27 /HPF   Sq Epi: x / Non Sq Epi: 0 /hpf / Bacteria: Many        Microbiology: reviewed      Radiology: reviewed    < from: Xray Chest 1 View- PORTABLE-Urgent (Xray Chest 1 View- PORTABLE-Urgent .) (21 @ 03:45) >    EXAM:  XR CHEST PORTABLE URGENT 1V                            PROCEDURE DATE:  2021            INTERPRETATION:  CLINICAL INFORMATION: Fever. Evaluation for pneumonia.    TECHNIQUE: AP view of the chest.    COMPARISON: Chest radiograph 2020.    FINDINGS:    The lungs are clear.    The heart size is normal.    Degenerative changes of the spine.    IMPRESSION:    Clear lungs.              MERLIN JIMENEZ MD; Resident Radiology  This document has been electronically signed.  ERIK ARNOLD MD; Attending Radiologist  This document has been electronically signed. 2021  7:24AM    < end of copied text >

## 2021-04-13 NOTE — PHYSICAL THERAPY INITIAL EVALUATION ADULT - PLANNED THERAPY INTERVENTIONS, PT EVAL
Stair training... GOAL: In 2 weeks pt will negotiate 4 steps [id number] least restrictive device./balance training/bed mobility training/gait training/strengthening/transfer training

## 2021-04-13 NOTE — ED PROVIDER NOTE - PHYSICAL EXAMINATION
Gen: obese female NAD  HEENT: NCAT, EOMI, no nasal discharge, mucous membranes moist  CV: RRR, +S1/S2, no M/R/G  Resp: CTAB, no W/R/R  GI: Abdomen soft non-distended, NTTP, no masses  MSK: +RLE wound no active drainage +warm no bleeding or streaking   skin: +purple-timoteo coalescing circular changes to skin, not raised, no bullae, NTTP, no drainage wound or bleeding to arms, abdomen, back, thighs b/l   Neuro: A&Ox4, following commands, moving all four extremities spontaneously  Psych: appropriate mood Gen: obese female NAD  HEENT: NCAT, EOMI, no nasal discharge, mucous membranes moist  CV: RRR, +S1/S2, no M/R/G  Resp: CTAB, no W/R/R  GI: Abdomen soft non-distended, NTTP, no masses  MSK: +RLE wound no active drainage +warm mild TTP no bleeding or streaking   skin: +purple-timoteo circular changes to skin, not raised, no bullae, NTTP, no drainage wound or bleeding to arms, abdomen, back, thighs b/l.   Neuro: A&Ox4, following commands, moving all four extremities spontaneously  Psych: appropriate mood

## 2021-04-13 NOTE — CONSULT NOTE ADULT - SUBJECTIVE AND OBJECTIVE BOX
HPI:    69 y/o F PMH stage 1 uterine CA s/p TLH/BSO + adj chemo in 2016, bullous pemphigoid on IVIG and daily prednisone 30 mg, DM, surgical hypothyroidism s/p thyroidectomy, chronic pain syndrome, chronic RLE wound hx cellulitis, endometrial CA s/p TRENT presents from home c/o fever [id number] as well as vomiting.    She was recently d/c from rehab last week after a hospitalization in 2021 for cellulitis 2/2 bullous pemphigoid lesions. She was seen by ID and derm and given IVIG MWF, plan for q monthly.  This hospitalization was c/b acute on chronic anemia- MDS vs GI bleed, both EGD and BMbx deferred for outpatient. She contracted COVID during this rehab stay, and received MAB infusion. She says she tested negative "many times" and then "tested positive again" but is not able to give me time course. She was told to get vaccine 40 days after infusion. She was doing OK at home with regular RN visits for her BP lesions/cellulitis. The AM of  was noted by RN to have fever, no other symptoms. 911 called, she was evaluated by EMS and determined not to need hospital transfer. Later than evening she suddenly vomited and per family was acting more confused. Denies current nausea. No diarrhea. Ate greek food for dinner, no one else sick. Denies cough, congestion, sob, cp, abd pain, n/v/d, dysuria, hematuria. Denies dysuria but did have episode incontinence in setting of vomiting Also denies fevers since being home prior to today. Pt takes prednisone 30mg daily and synthroid 200mg daily. Per  the lower extremity was cool and did not look infected.    Dermatology consulted for help with management in the setting of active infection (cellulitis). Per chart review, patient last got IVIG in 2021. On Dupixent and pred 30mg (went down from 40mg last month). On levofloxacin for UTI.     Last seen in clinic by Silvana 20.  Pt continues to have worsening disease despite multiple treatment courses of prednisone, clobetasol ointment, doxycycline, nicotinamide, IVIG q 2-4 weeks since 2018. Previously on Cellcept however pt with low blood counts and immunosuppression contraindicated 2/2 uterine carcinosarcoma hx. Started on dupixent that visit.    As of last visit :  2mg/kg IVIG q4w  -- c/w prednisone to 40 mg x 2 weeks, then 20 mg x 4 weeks  - continue clobetasol BID to non-open areas prn  - continue mupirocin/gentamicin to open areas  -  Team spoke to patient 3/2 last, on prednisone 40mg and plan to taper to 30mg next month because tapering to 20mg was too fast of a jump. Patient had not been able to get IVIG in rehab so last dose January. Was relatively well controlled on dupixent.         PAST MEDICAL & SURGICAL HISTORY:  Bullous pemphigoid  dx: 2014.  On Immunosupressants  Cellcept    Hypertension    Type 2 diabetes mellitus    History of osteoarthritis    Anemia    Hypothyroidism    Vitamin D deficiency    Morbid obesity  BMI:  53.6    Endometrial cancer  dx: 2015:  High grade Endometroid Adenocarcinoma    Hyperlipidemia    Adrenal mass   incidental findings   Ct SCAN 2015 unchannged  24 hour urine for VMA done by Dr DR Canas [phone] Neg asper patient    History of  section  &#x27; 82-&#x27;95:  4 X    Status post total thyroidectomy  &#x27;     Endometrial cancer  2015:  Endometrial Biopsy: dx: High Grade Endometroid Adenocarcinoma    S/P hysterectomy    S/P BSO (bilateral salpingo-oophorectomy)        REVIEW OF SYSTEMS    General: no fevers/chills, no lethargy	    Skin/Breast: see HPI  	  Ophthalmologic: no eye pain or change in vision  	  ENMT: no dysphagia or change in hearing    Respiratory and Thorax: no SOB or cough  	  Cardiovascular: no palpitations or chest pain    Gastrointestinal: no abdominal pain or blood in stool     Genitourinary: no dysuria or frequency    Musculoskeletal: no joint pains    Neurological: no weakness, numbness , or tingling    MEDICATIONS  (STANDING):  aspirin enteric coated 81 milliGRAM(s) Oral daily  atorvastatin 20 milliGRAM(s) Oral at bedtime  dextrose 40% Gel 15 Gram(s) Oral once  dextrose 5%. 1000 milliLiter(s) (50 mL/Hr) IV Continuous <Continuous>  dextrose 50% Injectable 25 Gram(s) IV Push once  gabapentin 800 milliGRAM(s) Oral four times a day  glucagon  Injectable 1 milliGRAM(s) IntraMuscular once  heparin   Injectable 5000 Unit(s) SubCutaneous every 8 hours  hydrocortisone sodium succinate Injectable 50 milliGRAM(s) IV Push every 6 hours  insulin lispro (ADMELOG) corrective regimen sliding scale   SubCutaneous three times a day before meals  levoFLOXacin IVPB 750 milliGRAM(s) IV Intermittent every 24 hours  levothyroxine 150 MICROGram(s) Oral daily  multivitamin 1 Tablet(s) Oral daily  senna 2 Tablet(s) Oral at bedtime    MEDICATIONS  (PRN):  acetaminophen   Tablet .. 650 milliGRAM(s) Oral every 6 hours PRN Temp greater or equal to 38C (100.4F)  oxyCODONE    IR 5 milliGRAM(s) Oral every 4 hours PRN Severe Pain (7 - 10)      Allergies    Ancef (Rash)  daptomycin (Vomiting)  Keflex (Unknown)  penicillin (Pruritus; Rash; Hives)    Intolerances    vancomycin (Other)      SOCIAL HISTORY:    FAMILY HISTORY:  Family history of lymphoma (Father)  father        Vital Signs Last 24 Hrs  T(C): 36.9 (2021 13:24), Max: 39.4 (2021 02:52)  T(F): 98.4 (2021 13:24), Max: 103 (2021 02:52)  HR: 87 (2021 13:24) (87 - 141)  BP: 104/66 (2021 13:24) (94/80 - 109/75)  BP(mean): 88 (2021 06:00) (60 - 88)  RR: 18 (2021 13:24) (17 - 19)  SpO2: 97% (2021 13:24) (94% - 97%)    PHYSICAL EXAM:     The patient was alert and oriented X 3, well nourished, and in no  apparent distress.  OP showed no ulcerations  There was no visible lymphadenopathy.  Conjunctiva were non injected  There was no clubbing or edema of extremities.  The scalp, hair, face, eyebrows, lips, OP, neck, chest, back,   extremities X 4, nails were examined.  There was no hyperhidrosis or bromhidrosis.    Of note on skin exam:       LABS:                        10.9   21.05 )-----------( 233      ( 2021 06:08 )             34.7     04-13    139  |  102  |  28<H>  ----------------------------<  181<H>  3.9   |  24  |  0.90    Ca    8.7      2021 06:08  Phos  2.4     -  Mg     1.5     -    TPro  6.1  /  Alb  3.5  /  TBili  0.3  /  DBili  x   /  AST  19  /  ALT  17  /  AlkPhos  72  04-13    PT/INR - ( 2021 03:26 )   PT: 12.1 sec;   INR: 1.01 ratio         PTT - ( 2021 03:26 )  PTT:29.1 sec  Urinalysis Basic - ( 2021 06:07 )    Color: Yellow / Appearance: Slightly Turbid / S.019 / pH: x  Gluc: x / Ketone: Small  / Bili: Negative / Urobili: Negative   Blood: x / Protein: Trace / Nitrite: Positive   Leuk Esterase: Large / RBC: 1 /hpf / WBC 27 /HPF   Sq Epi: x / Non Sq Epi: 0 /hpf / Bacteria: Many        RADIOLOGY & ADDITIONAL STUDIES:     HPI:    69 y/o F PMH stage 1 uterine CA s/p TLH/BSO + adj chemo in 2016, bullous pemphigoid on IVIG and daily prednisone 30 mg, DM, surgical hypothyroidism s/p thyroidectomy, chronic pain syndrome, chronic RLE wound hx cellulitis, endometrial CA s/p TRENT presents from home c/o fever [id number] as well as vomiting.    She was recently d/c from rehab last week after a hospitalization in 2021 for cellulitis 2/2 bullous pemphigoid lesions. She was seen by ID and derm and given IVIG MWF, plan for q monthly.  This hospitalization was c/b acute on chronic anemia- MDS vs GI bleed, both EGD and BMbx deferred for outpatient. She contracted COVID during this rehab stay, and received MAB infusion. She says she tested negative "many times" and then "tested positive again" but is not able to give me time course. She was told to get vaccine 40 days after infusion. She was doing OK at home with regular RN visits for her BP lesions/cellulitis. The AM of  was noted by RN to have fever, no other symptoms. 911 called, she was evaluated by EMS and determined not to need hospital transfer. Later than evening she suddenly vomited and per family was acting more confused. Denies current nausea. No diarrhea. Ate greek food for dinner, no one else sick. Denies cough, congestion, sob, cp, abd pain, n/v/d, dysuria, hematuria. Denies dysuria but did have episode incontinence in setting of vomiting Also denies fevers since being home prior to today. Pt takes prednisone 30mg daily and synthroid 200mg daily. Per  the lower extremity was cool and did not look infected.    Dermatology consulted for help with management in the setting of active infection (cellulitis). Per chart review, patient last got IVIG in 2021. On Dupixent and pred 30mg (went down from 40mg last month). On levofloxacin for UTI.     Last seen in clinic by Silvana 20.  Pt continues to have worsening disease despite multiple treatment courses of prednisone, clobetasol ointment, doxycycline, nicotinamide, IVIG q 2-4 weeks since 2018. Previously on Cellcept however pt with low blood counts and immunosuppression contraindicated 2/2 uterine carcinosarcoma hx. Started on dupixent that visit.    As of last visit :  2mg/kg IVIG q4w  -- c/w prednisone to 40 mg x 2 weeks, then 20 mg x 4 weeks  - continue clobetasol BID to non-open areas prn  - continue mupirocin/gentamicin to open areas  -  Team spoke to patient 3/2 last, on prednisone 40mg and plan to taper to 30mg next month because tapering to 20mg was too fast of a jump. Patient had not been able to get IVIG in rehab so last dose January. Was relatively well controlled on dupixent.         PAST MEDICAL & SURGICAL HISTORY:  Bullous pemphigoid  dx: 2014.  On Immunosupressants  Cellcept    Hypertension    Type 2 diabetes mellitus    History of osteoarthritis    Anemia    Hypothyroidism    Vitamin D deficiency    Morbid obesity  BMI:  53.6    Endometrial cancer  dx: 2015:  High grade Endometroid Adenocarcinoma    Hyperlipidemia    Adrenal mass   incidental findings   Ct SCAN 2015 unchannged  24 hour urine for VMA done by Dr DR Canas [phone] Neg asper patient    History of  section  &#x27; 82-&#x27;95:  4 X    Status post total thyroidectomy  &#x27;     Endometrial cancer  2015:  Endometrial Biopsy: dx: High Grade Endometroid Adenocarcinoma    S/P hysterectomy    S/P BSO (bilateral salpingo-oophorectomy)        REVIEW OF SYSTEMS    General: no fevers/chills, no lethargy	    Skin/Breast: see HPI  	  Ophthalmologic: no eye pain or change in vision  	  ENMT: no dysphagia or change in hearing    Respiratory and Thorax: no SOB or cough  	  Cardiovascular: no palpitations or chest pain    Gastrointestinal: no abdominal pain or blood in stool     Genitourinary: no dysuria or frequency    Musculoskeletal: no joint pains    Neurological: no weakness, numbness , or tingling    MEDICATIONS  (STANDING):  aspirin enteric coated 81 milliGRAM(s) Oral daily  atorvastatin 20 milliGRAM(s) Oral at bedtime  dextrose 40% Gel 15 Gram(s) Oral once  dextrose 5%. 1000 milliLiter(s) (50 mL/Hr) IV Continuous <Continuous>  dextrose 50% Injectable 25 Gram(s) IV Push once  gabapentin 800 milliGRAM(s) Oral four times a day  glucagon  Injectable 1 milliGRAM(s) IntraMuscular once  heparin   Injectable 5000 Unit(s) SubCutaneous every 8 hours  hydrocortisone sodium succinate Injectable 50 milliGRAM(s) IV Push every 6 hours  insulin lispro (ADMELOG) corrective regimen sliding scale   SubCutaneous three times a day before meals  levoFLOXacin IVPB 750 milliGRAM(s) IV Intermittent every 24 hours  levothyroxine 150 MICROGram(s) Oral daily  multivitamin 1 Tablet(s) Oral daily  senna 2 Tablet(s) Oral at bedtime    MEDICATIONS  (PRN):  acetaminophen   Tablet .. 650 milliGRAM(s) Oral every 6 hours PRN Temp greater or equal to 38C (100.4F)  oxyCODONE    IR 5 milliGRAM(s) Oral every 4 hours PRN Severe Pain (7 - 10)      Allergies    Ancef (Rash)  daptomycin (Vomiting)  Keflex (Unknown)  penicillin (Pruritus; Rash; Hives)    Intolerances    vancomycin (Other)      SOCIAL HISTORY:    FAMILY HISTORY:  Family history of lymphoma (Father)  father        Vital Signs Last 24 Hrs  T(C): 36.9 (2021 13:24), Max: 39.4 (2021 02:52)  T(F): 98.4 (2021 13:24), Max: 103 (2021 02:52)  HR: 87 (2021 13:24) (87 - 141)  BP: 104/66 (2021 13:24) (94/80 - 109/75)  BP(mean): 88 (2021 06:00) (60 - 88)  RR: 18 (2021 13:24) (17 - 19)  SpO2: 97% (2021 13:24) (94% - 97%)    PHYSICAL EXAM:     The patient was alert and oriented X 3, well nourished, and in no  apparent distress.  OP showed no ulcerations  There was no visible lymphadenopathy.  Conjunctiva were non injected  There was no clubbing or edema of extremities.  The scalp, hair, face, eyebrows, lips, OP, neck, chest, back,   extremities X 4, nails were examined.  There was no hyperhidrosis or bromhidrosis.    Of note on skin exam:     Several ulcerations/ erosions on extremities, pink resolving patches on torso which are likely resolving BP lesions, no obvious new bullae, multiple new ecchymosis on torso and purpuric patches on arms         LABS:                        10.9   21.05 )-----------( 233      ( 2021 06:08 )             34.7     04-13    139  |  102  |  28<H>  ----------------------------<  181<H>  3.9   |  24  |  0.90    Ca    8.7      2021 06:08  Phos  2.4     04-13  Mg     1.5     -13    TPro  6.1  /  Alb  3.5  /  TBili  0.3  /  DBili  x   /  AST  19  /  ALT  17  /  AlkPhos  72  04-13    PT/INR - ( 2021 03:26 )   PT: 12.1 sec;   INR: 1.01 ratio         PTT - ( 2021 03:26 )  PTT:29.1 sec  Urinalysis Basic - ( 2021 06:07 )    Color: Yellow / Appearance: Slightly Turbid / S.019 / pH: x  Gluc: x / Ketone: Small  / Bili: Negative / Urobili: Negative   Blood: x / Protein: Trace / Nitrite: Positive   Leuk Esterase: Large / RBC: 1 /hpf / WBC 27 /HPF   Sq Epi: x / Non Sq Epi: 0 /hpf / Bacteria: Many        RADIOLOGY & ADDITIONAL STUDIES:     HPI:    71 y/o F PMH stage 1 uterine CA s/p TLH/BSO + adj chemo in 2016, bullous pemphigoid on IVIG and daily prednisone 30 mg, DM, surgical hypothyroidism s/p thyroidectomy, chronic pain syndrome, chronic RLE wound hx cellulitis, endometrial CA s/p TRENT presents from home c/o fever [id number] as well as vomiting.    She was recently d/c from rehab last week after a hospitalization in 2021 for cellulitis 2/2 bullous pemphigoid lesions. She was seen by ID and derm and given IVIG MWF, plan for q monthly.  This hospitalization was c/b acute on chronic anemia- MDS vs GI bleed, both EGD and BMbx deferred for outpatient. She contracted COVID during this rehab stay, and received MAB infusion. She says she tested negative "many times" and then "tested positive again" but is not able to give me time course. She was told to get vaccine 40 days after infusion. She was doing OK at home with regular RN visits for her BP lesions/cellulitis. The AM of  was noted by RN to have fever, no other symptoms. 911 called, she was evaluated by EMS and determined not to need hospital transfer. Later than evening she suddenly vomited and per family was acting more confused. Denies current nausea. No diarrhea. Ate greek food for dinner, no one else sick. Denies cough, congestion, sob, cp, abd pain, n/v/d, dysuria, hematuria. Denies dysuria but did have episode incontinence in setting of vomiting Also denies fevers since being home prior to today. Pt takes prednisone 30mg daily and synthroid 200mg daily. Per  the lower extremity was cool and did not look infected.    Dermatology consulted for help with management in the setting of active infection (cellulitis). Per chart review, patient last got IVIG in 2021. On Dupixent and pred 30mg (went down from 40mg last month). On levofloxacin for UTI.     Last seen in clinic by Dr. Pina 20.  Pt continues to have worsening disease despite multiple treatment courses of prednisone, clobetasol ointment, doxycycline, nicotinamide, IVIG q 2-4 weeks since 2018. Previously on Cellcept however pt with low blood counts and immunosuppression contraindicated 2/2 uterine carcinosarcoma hx. Started on dupixent that visit.    As of last visit :  2mg/kg IVIG q4w  -- c/w prednisone to 40 mg x 2 weeks, then 20 mg x 4 weeks  - continue clobetasol BID to non-open areas prn  - continue mupirocin/gentamicin to open areas    Team spoke to patient 3/ last, on prednisone 40mg and plan to taper to 30mg next month because tapering to 20mg was too fast of a jump. Patient had not been able to get IVIG in rehab so last dose January. Was relatively well controlled on dupixent.         PAST MEDICAL & SURGICAL HISTORY:  Bullous pemphigoid  dx: 2014.  On Immunosupressants  Cellcept    Hypertension    Type 2 diabetes mellitus    History of osteoarthritis    Anemia    Hypothyroidism    Vitamin D deficiency    Morbid obesity  BMI:  53.6    Endometrial cancer  dx: 2015:  High grade Endometroid Adenocarcinoma    Hyperlipidemia    Adrenal mass   incidental findings   Ct SCAN 2015 unchannged  24 hour urine for VMA done by Dr DR Canas [phone] Neg asper patient    History of  section  &#x27; 82-&#x27;95:  4 X    Status post total thyroidectomy  &#x27;     Endometrial cancer  2015:  Endometrial Biopsy: dx: High Grade Endometroid Adenocarcinoma    S/P hysterectomy    S/P BSO (bilateral salpingo-oophorectomy)        REVIEW OF SYSTEMS    General: no fevers/chills, no lethargy	    Skin/Breast: see HPI  	  Ophthalmologic: no eye pain or change in vision  	  ENMT: no dysphagia or change in hearing    Respiratory and Thorax: no SOB or cough  	  Cardiovascular: no palpitations or chest pain    Gastrointestinal: no abdominal pain or blood in stool     Genitourinary: no dysuria or frequency    Musculoskeletal: no joint pains    Neurological: no weakness, numbness , or tingling    MEDICATIONS  (STANDING):  aspirin enteric coated 81 milliGRAM(s) Oral daily  atorvastatin 20 milliGRAM(s) Oral at bedtime  dextrose 40% Gel 15 Gram(s) Oral once  dextrose 5%. 1000 milliLiter(s) (50 mL/Hr) IV Continuous <Continuous>  dextrose 50% Injectable 25 Gram(s) IV Push once  gabapentin 800 milliGRAM(s) Oral four times a day  glucagon  Injectable 1 milliGRAM(s) IntraMuscular once  heparin   Injectable 5000 Unit(s) SubCutaneous every 8 hours  hydrocortisone sodium succinate Injectable 50 milliGRAM(s) IV Push every 6 hours  insulin lispro (ADMELOG) corrective regimen sliding scale   SubCutaneous three times a day before meals  levoFLOXacin IVPB 750 milliGRAM(s) IV Intermittent every 24 hours  levothyroxine 150 MICROGram(s) Oral daily  multivitamin 1 Tablet(s) Oral daily  senna 2 Tablet(s) Oral at bedtime    MEDICATIONS  (PRN):  acetaminophen   Tablet .. 650 milliGRAM(s) Oral every 6 hours PRN Temp greater or equal to 38C (100.4F)  oxyCODONE    IR 5 milliGRAM(s) Oral every 4 hours PRN Severe Pain (7 - 10)      Allergies    Ancef (Rash)  daptomycin (Vomiting)  Keflex (Unknown)  penicillin (Pruritus; Rash; Hives)    Intolerances    vancomycin (Other)      SOCIAL HISTORY:    FAMILY HISTORY:  Family history of lymphoma (Father)  father        Vital Signs Last 24 Hrs  T(C): 36.9 (2021 13:24), Max: 39.4 (2021 02:52)  T(F): 98.4 (2021 13:24), Max: 103 (2021 02:52)  HR: 87 (2021 13:24) (87 - 141)  BP: 104/66 (2021 13:24) (94/80 - 109/75)  BP(mean): 88 (2021 06:00) (60 - 88)  RR: 18 (2021 13:24) (17 - 19)  SpO2: 97% (2021 13:24) (94% - 97%)    PHYSICAL EXAM:     The patient was alert and oriented X 3, well nourished, and in no  apparent distress.  OP showed no ulcerations  There was no visible lymphadenopathy.  Conjunctiva were non injected  There was no clubbing or edema of extremities.  The scalp, hair, face, eyebrows, lips, OP, neck, chest, back,   extremities X 4, nails were examined.  There was no hyperhidrosis or bromhidrosis.    Of note on skin exam:     Several ulcerations/ erosions on extremities, pink resolving patches on torso which are likely resolving BP lesions, no obvious new bullae, multiple new ecchymosis on torso and purpuric patches on arms         LABS:                        10.9   21.05 )-----------( 233      ( 2021 06:08 )             34.7     04-13    139  |  102  |  28<H>  ----------------------------<  181<H>  3.9   |  24  |  0.90    Ca    8.7      2021 06:08  Phos  2.4     04-13  Mg     1.5     04-13    TPro  6.1  /  Alb  3.5  /  TBili  0.3  /  DBili  x   /  AST  19  /  ALT  17  /  AlkPhos  72  04-13    PT/INR - ( 2021 03:26 )   PT: 12.1 sec;   INR: 1.01 ratio         PTT - ( 2021 03:26 )  PTT:29.1 sec  Urinalysis Basic - ( 2021 06:07 )    Color: Yellow / Appearance: Slightly Turbid / S.019 / pH: x  Gluc: x / Ketone: Small  / Bili: Negative / Urobili: Negative   Blood: x / Protein: Trace / Nitrite: Positive   Leuk Esterase: Large / RBC: 1 /hpf / WBC 27 /HPF   Sq Epi: x / Non Sq Epi: 0 /hpf / Bacteria: Many        RADIOLOGY & ADDITIONAL STUDIES:  no additional studies

## 2021-04-13 NOTE — H&P ADULT - PROBLEM SELECTOR PLAN 2
Cover with aztreonam, cipro (see drug allergies)  Given recurrence of cellulitis in this chronic BP patient, would consult ID in AM for assistance with regimen receiving IVIG q month as well as dupixent q week  I have asked  to bring this in  Please consult dermatology in AM to review if she should receive these medications inpatient in setting of potential active infection (cellulitis)

## 2021-04-13 NOTE — CONSULT NOTE ADULT - ASSESSMENT
Pt is a 70W w/ PMHx of stage 1 uterine CA s/p TLH/BSO + adj chemo in 2016, bullous pemphigoid on IVIG and daily prednisone 30 mg, DM, surgical hypothyroidism s/p thyroidectomy, chronic pain syndrome, chronic RLE wound hx cellulitis, endometrial CA s/p TRENT presents from home c/o fever [id number] as well as vomiting.  ID c/s for further evaluation.    Acute Cystitis  -UA+  -UCx pending  -BCx pending  -S/p aztreonam/cipro--d/c  -started on levofloxacin 750mg q24h pending above cx.    RLE Cellulitis/RLE Wounds  Bullous pemphigoid  Wound does not appear like cellulitis to me  Appreciate wound care/derm recs regarding management of bullous pemphigoid.  Pt s/p aztreonam/cipro in the ED.   Abx as above    Abx Allergies  -reviewed w/ pt. Ok to use FQ    COVID  Pt on RA w/o SOB  Likely continued viral shedding  No indication for remdesivir/steroids at this time  Maintain isolation per infection control protocols    Infectious Diseases will continue to follow. Please call with any questions.   Ruchi Gonzales M.D.  [facility], Division of Infectious Diseases [phone]  For over the weekend and after hours, please call [phone]

## 2021-04-13 NOTE — PHYSICAL THERAPY INITIAL EVALUATION ADULT - LEVEL OF INDEPENDENCE: STAND/SIT, REHAB EVAL
Supervision for raised seat, CGA from low chair/contact guard/minimum assist (75% patients effort) Supervision for raised seat, CGA from low chair/supervision/minimum assist (75% patients effort)

## 2021-04-13 NOTE — CONSULT NOTE ADULT - ASSESSMENT
Bullous Pemphigoid, severe, recalcitrant to multiple therapies, treatment options more limited in setting  hx of low blood counts and immunosuppression contraindicated 2/2 uterine carcinosarcoma hx  - Can continue Dupixent every other week per patient schedule   - Can use Clobetasol Ointment BID to new developing lesions  - Apply Mupirocin Ointment BID to open erosions and ulcers  - Continue Stress dose of steroids while septic and then can resume 40mg daily. Patient will require very slow taper as has been on steroids for a long time now.   - Regarding IVIG, if primary team thinks this is a true case of covid, IVIG in combination with covid might significantly increase risk of forming a thrombus and can be delayed. If primary team believes this is residual covid detected by PCR, can likely safely resume IVIG 2mg/KG monthly, spread out over 3 days.     Patient can follow-up at our outpatient office:  [address], [place] [address], phone number for appointment: [phone]    If any questions, please page [phone] (please leave 10 digit call back number because we cover several facilities)  Bullous Pemphigoid, severe, recalcitrant to multiple therapies, treatment options more limited in setting  hx of low blood counts and immunosuppression contraindicated 2/2 uterine carcinosarcoma hx  - Can continue Dupixent every other week per patient schedule   - Can use Clobetasol Ointment BID to new developing lesions  - Apply Mupirocin Ointment BID to open erosions and ulcers  - Continue Stress dose of steroids while septic and then can resume 30mg daily home dose. Patient will require very slow taper as has been on steroids for a long time now.   - Regarding IVIG, if primary team thinks this is a true case of covid, IVIG in combination with covid might significantly increase risk of forming a thrombus and can be delayed. If primary team believes this is residual covid detected by PCR, can likely safely resume IVIG 2mg/KG monthly, spread out over 3 days.     Patient can follow-up at our outpatient office:  [address], [place] [address], phone number for appointment: [phone]    If any questions, please page [phone] (please leave 10 digit call back number because we cover several facilities)  Bullous Pemphigoid, severe, recalcitrant to multiple therapies, treatment options more limited in setting  hx of low blood counts and immunosuppression contraindicated 2/2 uterine carcinosarcoma hx  - Can continue Dupixent every other week per patient schedule   - Can use Clobetasol Ointment BID to new developing lesions  - Apply Mupirocin Ointment BID to open erosions and ulcers  - Continue Stress dose of steroids while septic and then can resume 30mg daily home dose. Patient will require very slow taper as has been on steroids for a long time now. Please supplement calcium, vitamin D and H2 blocker for GI PPX.   - Regarding IVIG, if primary team thinks this is a true case of covid, IVIG in combination with covid might significantly increase risk of forming a thrombus and can be delayed. If primary team believes this is residual covid detected by PCR, can likely safely resume IVIG 2mg/KG monthly, spread out over 3 days.     Patient can follow-up at our outpatient office:  [address], [place] [address], phone number for appointment: [phone]    If any questions, please page [phone] (please leave 10 digit call back number because we cover several facilities)  Bullous Pemphigoid, severe, recalcitrant to multiple therapies, treatment options more limited in setting  hx of low blood counts and immunosuppression contraindicated 2/2 uterine carcinosarcoma hx  - Can continue Dupixent every other week per patient schedule   - Can use Clobetasol Ointment BID to new developing lesions  - Apply Mupirocin Ointment BID to open erosions and ulcers  - Continue Stress dose of steroids while septic and then can resume 30mg daily home dose. Patient will require very slow taper as has been on steroids for a long time now. Please supplement calcium, vitamin D and H2 blocker for GI PPX.   - Consider administration of IVIG during this hospitalization to decrease the risk of flare upon discharge.     Patient can follow-up at our outpatient office:  [address], [place] [address], phone number for appointment: [phone]    If any questions, please page [phone] (please leave 10 digit call back number because we cover several facilities)

## 2021-04-13 NOTE — H&P ADULT - PROBLEM SELECTOR PLAN 8
dvt ppx- HSQ  diet- DASH/TLC  dispo- tbd.   PT eval  fall precautions  Med management- I spoke with  to confirm medications, he is not sure of doses and frequencies as he feels they may have changed in rehab. Please review with patient's pharmacy in AM.

## 2021-04-13 NOTE — ED PROVIDER NOTE - PMH
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  On Immunosupressants  Cellcept  Endometrial cancer  dx: 8/2015:  High grade Endometroid Adenocarcinoma  History of osteoarthritis    Hyperlipidemia    Hypertension    Hypothyroidism    Morbid obesity  BMI:  53.6  Type 2 diabetes mellitus    Vitamin D deficiency

## 2021-04-13 NOTE — H&P ADULT - NSHPSOCIALHISTORY_GEN_ALL_CORE
coming in from home coming in from home  remote smoking hx quit 40 years ago  occasional alcohol use  lives with  and son  4 adult children

## 2021-04-13 NOTE — H&P ADULT - PROBLEM SELECTOR PLAN 6
hold lisinopril, BP at goal On oral medications at home  Pt was prescribed lantus in rehab and had hypoglycemic episode to 45.   Order SSI and check FS, titrate PRN

## 2021-04-14 DIAGNOSIS — R78.81 BACTEREMIA: ICD-10-CM

## 2021-04-14 LAB
-  STAPHYLOCOCCUS EPIDERMIDIS, METHICILLIN RESISTANT: SIGNIFICANT CHANGE UP
ANION GAP SERPL CALC-SCNC: 13 MMOL/L — SIGNIFICANT CHANGE UP (ref 5–17)
BUN SERPL-MCNC: 24 MG/DL — HIGH (ref 7–23)
CALCIUM SERPL-MCNC: 8.7 MG/DL — SIGNIFICANT CHANGE UP (ref 8.4–10.5)
CHLORIDE SERPL-SCNC: 102 MMOL/L — SIGNIFICANT CHANGE UP (ref 96–108)
CO2 SERPL-SCNC: 21 MMOL/L — LOW (ref 22–31)
COVID-19 SPIKE DOMAIN AB INTERP: POSITIVE
COVID-19 SPIKE DOMAIN ANTIBODY RESULT: >250 U/ML — HIGH
CREAT SERPL-MCNC: 0.77 MG/DL — SIGNIFICANT CHANGE UP (ref 0.5–1.3)
GLUCOSE BLDC GLUCOMTR-MCNC: 266 MG/DL — HIGH (ref 70–99)
GLUCOSE BLDC GLUCOMTR-MCNC: 276 MG/DL — HIGH (ref 70–99)
GLUCOSE BLDC GLUCOMTR-MCNC: 287 MG/DL — HIGH (ref 70–99)
GLUCOSE BLDC GLUCOMTR-MCNC: 293 MG/DL — HIGH (ref 70–99)
GLUCOSE SERPL-MCNC: 285 MG/DL — HIGH (ref 70–99)
GRAM STN FLD: SIGNIFICANT CHANGE UP
HCT VFR BLD CALC: 35.8 % — SIGNIFICANT CHANGE UP (ref 34.5–45)
HGB BLD-MCNC: 10.7 G/DL — LOW (ref 11.5–15.5)
MCHC RBC-ENTMCNC: 29.9 GM/DL — LOW (ref 32–36)
MCHC RBC-ENTMCNC: 30.8 PG — SIGNIFICANT CHANGE UP (ref 27–34)
MCV RBC AUTO: 103.2 FL — HIGH (ref 80–100)
METHOD TYPE: SIGNIFICANT CHANGE UP
NRBC # BLD: 0 /100 WBCS — SIGNIFICANT CHANGE UP (ref 0–0)
PLATELET # BLD AUTO: 236 K/UL — SIGNIFICANT CHANGE UP (ref 150–400)
POTASSIUM SERPL-MCNC: 4.4 MMOL/L — SIGNIFICANT CHANGE UP (ref 3.5–5.3)
POTASSIUM SERPL-SCNC: 4.4 MMOL/L — SIGNIFICANT CHANGE UP (ref 3.5–5.3)
RBC # BLD: 3.47 M/UL — LOW (ref 3.8–5.2)
RBC # FLD: 14.2 % — SIGNIFICANT CHANGE UP (ref 10.3–14.5)
SARS-COV-2 IGG+IGM SERPL QL IA: >250 U/ML — HIGH
SARS-COV-2 IGG+IGM SERPL QL IA: POSITIVE
SODIUM SERPL-SCNC: 136 MMOL/L — SIGNIFICANT CHANGE UP (ref 135–145)
WBC # BLD: 12.71 K/UL — HIGH (ref 3.8–10.5)
WBC # FLD AUTO: 12.71 K/UL — HIGH (ref 3.8–10.5)

## 2021-04-14 RX ORDER — ERTAPENEM SODIUM 1 G/1
1000 INJECTION, POWDER, LYOPHILIZED, FOR SOLUTION INTRAMUSCULAR; INTRAVENOUS EVERY 24 HOURS
Refills: 0 | Status: DISCONTINUED | OUTPATIENT
Start: 2021-04-15 | End: 2021-04-19

## 2021-04-14 RX ORDER — ERTAPENEM SODIUM 1 G/1
INJECTION, POWDER, LYOPHILIZED, FOR SOLUTION INTRAMUSCULAR; INTRAVENOUS
Refills: 0 | Status: DISCONTINUED | OUTPATIENT
Start: 2021-04-14 | End: 2021-04-19

## 2021-04-14 RX ORDER — MUPIROCIN 20 MG/G
1 OINTMENT TOPICAL
Refills: 0 | Status: DISCONTINUED | OUTPATIENT
Start: 2021-04-14 | End: 2021-04-19

## 2021-04-14 RX ORDER — ERTAPENEM SODIUM 1 G/1
1000 INJECTION, POWDER, LYOPHILIZED, FOR SOLUTION INTRAMUSCULAR; INTRAVENOUS ONCE
Refills: 0 | Status: COMPLETED | OUTPATIENT
Start: 2021-04-14 | End: 2021-04-14

## 2021-04-14 RX ORDER — VANCOMYCIN HCL 1 G
1000 VIAL (EA) INTRAVENOUS ONCE
Refills: 0 | Status: COMPLETED | OUTPATIENT
Start: 2021-04-14 | End: 2021-04-14

## 2021-04-14 RX ADMIN — OXYCODONE HYDROCHLORIDE 5 MILLIGRAM(S): 5 TABLET ORAL at 22:45

## 2021-04-14 RX ADMIN — OXYCODONE HYDROCHLORIDE 5 MILLIGRAM(S): 5 TABLET ORAL at 22:13

## 2021-04-14 RX ADMIN — GABAPENTIN 800 MILLIGRAM(S): 400 CAPSULE ORAL at 00:09

## 2021-04-14 RX ADMIN — GABAPENTIN 800 MILLIGRAM(S): 400 CAPSULE ORAL at 17:31

## 2021-04-14 RX ADMIN — Medication 125 MILLIGRAM(S): at 08:19

## 2021-04-14 RX ADMIN — HEPARIN SODIUM 5000 UNIT(S): 5000 INJECTION INTRAVENOUS; SUBCUTANEOUS at 05:13

## 2021-04-14 RX ADMIN — ERTAPENEM SODIUM 120 MILLIGRAM(S): 1 INJECTION, POWDER, LYOPHILIZED, FOR SOLUTION INTRAMUSCULAR; INTRAVENOUS at 11:40

## 2021-04-14 RX ADMIN — Medication 1 TABLET(S): at 11:42

## 2021-04-14 RX ADMIN — GABAPENTIN 800 MILLIGRAM(S): 400 CAPSULE ORAL at 23:07

## 2021-04-14 RX ADMIN — MUPIROCIN 1 APPLICATION(S): 20 OINTMENT TOPICAL at 17:30

## 2021-04-14 RX ADMIN — HEPARIN SODIUM 5000 UNIT(S): 5000 INJECTION INTRAVENOUS; SUBCUTANEOUS at 14:12

## 2021-04-14 RX ADMIN — Medication 50 MILLIGRAM(S): at 23:06

## 2021-04-14 RX ADMIN — Medication 50 MILLIGRAM(S): at 00:08

## 2021-04-14 RX ADMIN — Medication 150 MICROGRAM(S): at 05:14

## 2021-04-14 RX ADMIN — Medication 6: at 08:19

## 2021-04-14 RX ADMIN — Medication 1: at 22:00

## 2021-04-14 RX ADMIN — OXYCODONE HYDROCHLORIDE 5 MILLIGRAM(S): 5 TABLET ORAL at 17:40

## 2021-04-14 RX ADMIN — GABAPENTIN 800 MILLIGRAM(S): 400 CAPSULE ORAL at 05:14

## 2021-04-14 RX ADMIN — Medication 81 MILLIGRAM(S): at 11:43

## 2021-04-14 RX ADMIN — Medication 50 MILLIGRAM(S): at 17:31

## 2021-04-14 RX ADMIN — OXYCODONE HYDROCHLORIDE 5 MILLIGRAM(S): 5 TABLET ORAL at 18:15

## 2021-04-14 RX ADMIN — OXYCODONE HYDROCHLORIDE 5 MILLIGRAM(S): 5 TABLET ORAL at 11:36

## 2021-04-14 RX ADMIN — GABAPENTIN 800 MILLIGRAM(S): 400 CAPSULE ORAL at 11:42

## 2021-04-14 RX ADMIN — Medication 50 MILLIGRAM(S): at 05:13

## 2021-04-14 RX ADMIN — ATORVASTATIN CALCIUM 20 MILLIGRAM(S): 80 TABLET, FILM COATED ORAL at 22:12

## 2021-04-14 RX ADMIN — Medication 50 MILLIGRAM(S): at 11:43

## 2021-04-14 RX ADMIN — OXYCODONE HYDROCHLORIDE 5 MILLIGRAM(S): 5 TABLET ORAL at 13:00

## 2021-04-14 RX ADMIN — Medication 6: at 17:29

## 2021-04-14 RX ADMIN — HEPARIN SODIUM 5000 UNIT(S): 5000 INJECTION INTRAVENOUS; SUBCUTANEOUS at 22:12

## 2021-04-14 RX ADMIN — Medication 6: at 11:43

## 2021-04-14 NOTE — OCCUPATIONAL THERAPY INITIAL EVALUATION ADULT - ANTICIPATED DISCHARGE DISPOSITION, OT EVAL
Home OT recommended for home safety evaluation, DME training, fall prevention, cognition, ADLs, balance, strength, ROM and endurance.  Pt owns shower chair, tub tx bench, RTS, RW and cane. Pt provided with hip kit. Supervision/Assist for ADLs and functional mobility as needed./home w/ OT

## 2021-04-14 NOTE — CHART NOTE - NSCHARTNOTEFT_GEN_A_CORE
Dermatology Brief Chart Note    Because Bullous Pemphigoid stable, no rush to give IVIG give risk of hypercoagulability from covid. Towards end of discharge can give IVIG 2g/kg  over three days. Patient 109kg, so about 200g, divided over three days ~ 67g per day for 3 days. Dermatology Brief Chart Note    Because Bullous Pemphigoid stable, no rush to give IVIG given risk of hypercoagulability from covid. Towards end of discharge can give IVIG 2g/kg  over three days. Patient 109kg, so about 200g, divided over three days ~ 67g per day for 3 days.    Discussed with attending Dr. Js Culver MD   Dermatology Resident PGY-3  [phone]

## 2021-04-14 NOTE — OCCUPATIONAL THERAPY INITIAL EVALUATION ADULT - BALANCE TRAINING, PT EVAL
GOAL: Pt will demonstrate improved static/dynamic balance to GOOD with appropriate AD in order to increase safety and independence in ADLs within 4 weeks

## 2021-04-14 NOTE — OCCUPATIONAL THERAPY INITIAL EVALUATION ADULT - LIVES WITH, PROFILE
Pt lives in  w/ 3STE. Pt reports first floor set up for her. Prior to extended rehab stay, pt was Independent w/ all ADLs and functional mobility. Pt was using RW/Cane to ambulate. Pt was using shower chair (now has transfer bench) and RTS. Pt also reports having shoe horn and reacher./children/spouse

## 2021-04-14 NOTE — PROGRESS NOTE ADULT - ASSESSMENT
Pt is a 70W w/ PMHx of stage 1 uterine CA s/p TLH/BSO + adj chemo in 2016, bullous pemphigoid on IVIG and daily prednisone 30 mg, DM, surgical hypothyroidism s/p thyroidectomy, chronic pain syndrome, chronic RLE wound hx cellulitis, endometrial CA s/p TRENT presents from home c/o fever [id number] as well as vomiting.  ID c/s for further evaluation.    Acute Pyelonephritis 2/2 ESBL E.coli  -UA+, UCx GNR  -BCx ESBL E.coli, repeat pending  -d/c levo  -started ertapenem 1gm q24h  Pt will need x10 day course from 1st negative BCx (likely until 4/23)    GPC Bacteremia  BCx also +MRSE  s/p 1 dose vancomycin  This is likely a contaminant, no need to c/w vancomycin  Repeat cx to monitor clearance    RLE Cellulitis/RLE Wounds  Bullous pemphigoid  Wound does not appear like cellulitis to me  Appreciate wound care/derm recs regarding management of bullous pemphigoid.  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 Please call with any questions.   Ruchi Gonzales M.D.  [facility], Division of Infectious Diseases [phone]  For over the weekend and after hours, please call [phone]

## 2021-04-14 NOTE — OCCUPATIONAL THERAPY INITIAL EVALUATION ADULT - TRANSFER TRAINING, PT EVAL
GOAL: Pt will perform tub tx w/ tub tx bench Independently in 4 weeks; GOAL: Pt will perform toilet tx Independently in 4 weeks

## 2021-04-14 NOTE — PROVIDER CONTACT NOTE (CRITICAL VALUE NOTIFICATION) - BACKGROUND
Pt admitted on 4/13 with increased confusion, shakiness, fevers, and worsening wounds.

## 2021-04-14 NOTE — CHART NOTE - NSCHARTNOTEFT_GEN_A_CORE
Medicine NP note    CC: Critical Bcx results  patient afebrile, non toxic appearance      Culture - Blood (collected 13 Apr 2021 07:02)  Source: .Blood Blood-Peripheral  Gram Stain (13 Apr 2021 20:59):    Growth in aerobic bottle: Gram Negative Rods    Growth in anaerobic bottle: Gram Negative Rods      Vital Signs Last 24 Hrs  T(C): 36.8 (13 Apr 2021 22:07), Max: 39.4 (13 Apr 2021 02:52)  T(F): 98.2 (13 Apr 2021 22:07), Max: 103 (13 Apr 2021 02:52)  HR: 73 (13 Apr 2021 22:07) (73 - 141)  BP: 116/72 (13 Apr 2021 22:07) (94/80 - 116/72)  BP(mean): 88 (13 Apr 2021 06:00) (60 - 88)  RR: 18 (13 Apr 2021 22:07) (17 - 19)  SpO2: 98% (13 Apr 2021 22:07) (94% - 98%)    A/P  Pt is a 70W w/ PMHx of stage 1 uterine CA s/p TLH/BSO + adj chemo in 2016, bullous pemphigoid on IVIG and daily prednisone 30 mg, DM, surgical hypothyroidism s/p thyroidectomy, chronic pain syndrome, chronic RLE wound hx cellulitis, endometrial CA s/p TRENT admitted for covid 19, cystitis, RLE wounds.  Patient on Levaquin, now with Bcx positive for gram negative rods    Covid PNA/UTI/GNR Bateremia  VSS, leukocytosis +, no fever  C/W Levaquin, Bcx sensitivity pending  repeat Bcx x2 sent, result awaited  Discussed with ID Dr. Madrid  Will continue to monitor  Will sign out to day team    Belén Uribe P BC  [id number]

## 2021-04-14 NOTE — OCCUPATIONAL THERAPY INITIAL EVALUATION ADULT - PERTINENT HX OF CURRENT PROBLEM, REHAB EVAL
69 y/o F PMH stage 1 uterine CA s/p TLH/BSO + adj chemo in 2016, bullous pemphigoid on IVIG and daily prednisone 30 mg, DM, surgical hypothyroidism s/p thyroidectomy, chronic pain syndrome, chronic RLE wound hx cellulitis, endometrial CA s/p TRENT presents from home c/o fever.

## 2021-04-14 NOTE — OCCUPATIONAL THERAPY INITIAL EVALUATION ADULT - ADL RETRAINING, OT EVAL
GOAL: Pt will be able to perform UB/LB Dressing w/ sock aide Independently in 4 weeks; GOAL: Pt will be able to perform grooming at the sink Independently in 4 weeks

## 2021-04-14 NOTE — PROVIDER CONTACT NOTE (CRITICAL VALUE NOTIFICATION) - ACTION/TREATMENT ORDERED:
NP Belén made aware. Per NP, pt will potentially need vancomycin, will review the chart and place order. NICK Melissa made aware. Per NP, pt will need a stronger antibiotic but has an intolerance to vancomycin. Will review the chart and follow up with the day team.

## 2021-04-14 NOTE — OCCUPATIONAL THERAPY INITIAL EVALUATION ADULT - DIAGNOSIS, OT EVAL
Pt COVID+, presents with decreased strength, balance, flexibility, endurance and ROM impairing functional mobility and Rowan with ADLs

## 2021-04-14 NOTE — PROGRESS NOTE ADULT - ASSESSMENT
69 y/o F PMH stage 1 uterine CA s/p TLH/BSO + adj chemo in 2016, bullous pemphigoid on IVIG and daily prednisone 30 mg, DM, surgical hypothyroidism s/p thyroidectomy, chronic pain syndrome, chronic RLE wound hx cellulitis, endometrial CA s/p TRENT presents from home c/o fever [id number] as well as rigors, shaking chills, and increased confusion, found to be COVID + (not hypoxic, clear lungs on CXR), admit for sepsis 2/2 .recurrence of cellulitis vs UTI.

## 2021-04-14 NOTE — OCCUPATIONAL THERAPY INITIAL EVALUATION ADULT - PRECAUTIONS/LIMITATIONS, REHAB EVAL
[id number] as well as vomiting. She was recently d/c from rehab last week after a hospitalization in Jan 2021 for cellulitis 2/2 bullous pemphigoid lesions. She was seen by ID and derm and given IVIG MWF, plan for q monthly.  This hospitalization was c/b acute on chronic anemia- MDS vs GI bleed, both EGD and BMbx deferred for outpatient. She contracted COVID during this rehab stay, and received MAB infusion. She was doing OK at home with regular RN visits for her BP lesions/cellulitis. The AM of 4/12 was noted by RN to have fever, no other symptoms. 911 called, she was evaluated by EMS and determined not to need hospital transfer. Later than evening she suddenly vomited and per family was acting more confused. Denies current nausea. No diarrhea. Chest XRAY (4/13) Clear Lungs/isolation precautions

## 2021-04-14 NOTE — OCCUPATIONAL THERAPY INITIAL EVALUATION ADULT - RANGE OF MOTION EXAMINATION, UPPER EXTREMITY
AROM inconsistent, pt able to self assist to full range/bilateral UE Active ROM was WFL  (within functional limits)

## 2021-04-14 NOTE — CHART NOTE - NSCHARTNOTEFT_GEN_A_CORE
Internal Medicine PA Note      Culture - Blood (04.13.21 @ 07:02)    -  Staphylococcus epidermidis, Methicillin resistant: Detec    Gram Stain:   Growth in aerobic bottle: Gram Positive Cocci in Clusters    Specimen Source: .Blood Blood-Peripheral    Organism: Blood Culture PCR    Culture Results:   Growth in aerobic bottle: Gram Positive Cocci in Clusters      #Bacteremia  - D/w Dr. Gonzales (ID) about gram positive bacteremia. Dr. Gonzales recommends to give Vanco 1gm x 1 dose and to administer slowly, given concern for red man syndrome.  - Will administer vancomycin SLOWLY, to avoid red man syndrome; will follow pharmacy's instructions.   - If patient refusing vancomycin, can give Linezolid x 1 dose.   - Will follow closely.   - ID following; c/w levaquin.   - F/u repeat BCx obtained on 4/13.   - Plan d/w RN.   - Plan endorse to AM team.    - Attending to follow in AM.       Asuncion CHISHOLM  Dept of Medicine  [id number].

## 2021-04-15 DIAGNOSIS — A41.9 SEPSIS, UNSPECIFIED ORGANISM: ICD-10-CM

## 2021-04-15 DIAGNOSIS — F19.20 OTHER PSYCHOACTIVE SUBSTANCE DEPENDENCE, UNCOMPLICATED: ICD-10-CM

## 2021-04-15 LAB
-  AMIKACIN: SIGNIFICANT CHANGE UP
-  AMIKACIN: SIGNIFICANT CHANGE UP
-  AMOXICILLIN/CLAVULANIC ACID: SIGNIFICANT CHANGE UP
-  AMPICILLIN/SULBACTAM: SIGNIFICANT CHANGE UP
-  AMPICILLIN/SULBACTAM: SIGNIFICANT CHANGE UP
-  AMPICILLIN: SIGNIFICANT CHANGE UP
-  AMPICILLIN: SIGNIFICANT CHANGE UP
-  AZTREONAM: SIGNIFICANT CHANGE UP
-  AZTREONAM: SIGNIFICANT CHANGE UP
-  CEFAZOLIN: SIGNIFICANT CHANGE UP
-  CEFAZOLIN: SIGNIFICANT CHANGE UP
-  CEFEPIME: SIGNIFICANT CHANGE UP
-  CEFEPIME: SIGNIFICANT CHANGE UP
-  CEFOXITIN: SIGNIFICANT CHANGE UP
-  CEFOXITIN: SIGNIFICANT CHANGE UP
-  CEFTRIAXONE: SIGNIFICANT CHANGE UP
-  CEFTRIAXONE: SIGNIFICANT CHANGE UP
-  CIPROFLOXACIN: SIGNIFICANT CHANGE UP
-  CIPROFLOXACIN: SIGNIFICANT CHANGE UP
-  ERTAPENEM: SIGNIFICANT CHANGE UP
-  ERTAPENEM: SIGNIFICANT CHANGE UP
-  GENTAMICIN: SIGNIFICANT CHANGE UP
-  GENTAMICIN: SIGNIFICANT CHANGE UP
-  IMIPENEM: SIGNIFICANT CHANGE UP
-  IMIPENEM: SIGNIFICANT CHANGE UP
-  LEVOFLOXACIN: SIGNIFICANT CHANGE UP
-  LEVOFLOXACIN: SIGNIFICANT CHANGE UP
-  MEROPENEM: SIGNIFICANT CHANGE UP
-  MEROPENEM: SIGNIFICANT CHANGE UP
-  NITROFURANTOIN: SIGNIFICANT CHANGE UP
-  PIPERACILLIN/TAZOBACTAM: SIGNIFICANT CHANGE UP
-  PIPERACILLIN/TAZOBACTAM: SIGNIFICANT CHANGE UP
-  TIGECYCLINE: SIGNIFICANT CHANGE UP
-  TOBRAMYCIN: SIGNIFICANT CHANGE UP
-  TOBRAMYCIN: SIGNIFICANT CHANGE UP
-  TRIMETHOPRIM/SULFAMETHOXAZOLE: SIGNIFICANT CHANGE UP
-  TRIMETHOPRIM/SULFAMETHOXAZOLE: SIGNIFICANT CHANGE UP
ANION GAP SERPL CALC-SCNC: 12 MMOL/L — SIGNIFICANT CHANGE UP (ref 5–17)
BUN SERPL-MCNC: 25 MG/DL — HIGH (ref 7–23)
CALCIUM SERPL-MCNC: 8.6 MG/DL — SIGNIFICANT CHANGE UP (ref 8.4–10.5)
CHLORIDE SERPL-SCNC: 106 MMOL/L — SIGNIFICANT CHANGE UP (ref 96–108)
CO2 SERPL-SCNC: 25 MMOL/L — SIGNIFICANT CHANGE UP (ref 22–31)
CREAT SERPL-MCNC: 0.82 MG/DL — SIGNIFICANT CHANGE UP (ref 0.5–1.3)
CULTURE RESULTS: SIGNIFICANT CHANGE UP
GLUCOSE BLDC GLUCOMTR-MCNC: 261 MG/DL — HIGH (ref 70–99)
GLUCOSE BLDC GLUCOMTR-MCNC: 271 MG/DL — HIGH (ref 70–99)
GLUCOSE BLDC GLUCOMTR-MCNC: 295 MG/DL — HIGH (ref 70–99)
GLUCOSE BLDC GLUCOMTR-MCNC: 300 MG/DL — HIGH (ref 70–99)
GLUCOSE SERPL-MCNC: 281 MG/DL — HIGH (ref 70–99)
HCT VFR BLD CALC: 33.2 % — LOW (ref 34.5–45)
HGB BLD-MCNC: 10.4 G/DL — LOW (ref 11.5–15.5)
MCHC RBC-ENTMCNC: 30.4 PG — SIGNIFICANT CHANGE UP (ref 27–34)
MCHC RBC-ENTMCNC: 31.3 GM/DL — LOW (ref 32–36)
MCV RBC AUTO: 97.1 FL — SIGNIFICANT CHANGE UP (ref 80–100)
METHOD TYPE: SIGNIFICANT CHANGE UP
METHOD TYPE: SIGNIFICANT CHANGE UP
NRBC # BLD: 0 /100 WBCS — SIGNIFICANT CHANGE UP (ref 0–0)
ORGANISM # SPEC MICROSCOPIC CNT: SIGNIFICANT CHANGE UP
PLATELET # BLD AUTO: 231 K/UL — SIGNIFICANT CHANGE UP (ref 150–400)
POTASSIUM SERPL-MCNC: 3.6 MMOL/L — SIGNIFICANT CHANGE UP (ref 3.5–5.3)
POTASSIUM SERPL-SCNC: 3.6 MMOL/L — SIGNIFICANT CHANGE UP (ref 3.5–5.3)
RBC # BLD: 3.42 M/UL — LOW (ref 3.8–5.2)
RBC # FLD: 13.9 % — SIGNIFICANT CHANGE UP (ref 10.3–14.5)
SODIUM SERPL-SCNC: 143 MMOL/L — SIGNIFICANT CHANGE UP (ref 135–145)
SPECIMEN SOURCE: SIGNIFICANT CHANGE UP
WBC # BLD: 8.27 K/UL — SIGNIFICANT CHANGE UP (ref 3.8–10.5)
WBC # FLD AUTO: 8.27 K/UL — SIGNIFICANT CHANGE UP (ref 3.8–10.5)

## 2021-04-15 RX ORDER — OXYCODONE HYDROCHLORIDE 5 MG/1
5 TABLET ORAL ONCE
Refills: 0 | Status: DISCONTINUED | OUTPATIENT
Start: 2021-04-15 | End: 2021-04-15

## 2021-04-15 RX ORDER — HYDROCORTISONE 20 MG
25 TABLET ORAL EVERY 8 HOURS
Refills: 0 | Status: DISCONTINUED | OUTPATIENT
Start: 2021-04-15 | End: 2021-04-16

## 2021-04-15 RX ORDER — DIPHENHYDRAMINE HCL 50 MG
25 CAPSULE ORAL EVERY 4 HOURS
Refills: 0 | Status: DISCONTINUED | OUTPATIENT
Start: 2021-04-15 | End: 2021-04-19

## 2021-04-15 RX ORDER — IMMUNE GLOBULIN (HUMAN) 10 G/100ML
40 INJECTION INTRAVENOUS; SUBCUTANEOUS DAILY
Refills: 0 | Status: COMPLETED | OUTPATIENT
Start: 2021-04-15 | End: 2021-04-17

## 2021-04-15 RX ORDER — ACETAMINOPHEN 500 MG
1000 TABLET ORAL ONCE
Refills: 0 | Status: COMPLETED | OUTPATIENT
Start: 2021-04-15 | End: 2021-04-15

## 2021-04-15 RX ORDER — ACETAMINOPHEN 500 MG
650 TABLET ORAL ONCE
Refills: 0 | Status: COMPLETED | OUTPATIENT
Start: 2021-04-15 | End: 2021-04-15

## 2021-04-15 RX ADMIN — Medication 6: at 17:10

## 2021-04-15 RX ADMIN — Medication 25 MILLIGRAM(S): at 16:19

## 2021-04-15 RX ADMIN — OXYCODONE HYDROCHLORIDE 5 MILLIGRAM(S): 5 TABLET ORAL at 11:49

## 2021-04-15 RX ADMIN — IMMUNE GLOBULIN (HUMAN) 66.67 GRAM(S): 10 INJECTION INTRAVENOUS; SUBCUTANEOUS at 16:35

## 2021-04-15 RX ADMIN — OXYCODONE HYDROCHLORIDE 5 MILLIGRAM(S): 5 TABLET ORAL at 16:12

## 2021-04-15 RX ADMIN — HEPARIN SODIUM 5000 UNIT(S): 5000 INJECTION INTRAVENOUS; SUBCUTANEOUS at 06:16

## 2021-04-15 RX ADMIN — Medication 81 MILLIGRAM(S): at 11:20

## 2021-04-15 RX ADMIN — Medication 1: at 21:40

## 2021-04-15 RX ADMIN — GABAPENTIN 800 MILLIGRAM(S): 400 CAPSULE ORAL at 17:10

## 2021-04-15 RX ADMIN — HEPARIN SODIUM 5000 UNIT(S): 5000 INJECTION INTRAVENOUS; SUBCUTANEOUS at 13:31

## 2021-04-15 RX ADMIN — OXYCODONE HYDROCHLORIDE 5 MILLIGRAM(S): 5 TABLET ORAL at 03:37

## 2021-04-15 RX ADMIN — OXYCODONE HYDROCHLORIDE 5 MILLIGRAM(S): 5 TABLET ORAL at 23:22

## 2021-04-15 RX ADMIN — Medication 6: at 07:44

## 2021-04-15 RX ADMIN — ERTAPENEM SODIUM 120 MILLIGRAM(S): 1 INJECTION, POWDER, LYOPHILIZED, FOR SOLUTION INTRAMUSCULAR; INTRAVENOUS at 11:21

## 2021-04-15 RX ADMIN — ATORVASTATIN CALCIUM 20 MILLIGRAM(S): 80 TABLET, FILM COATED ORAL at 21:39

## 2021-04-15 RX ADMIN — OXYCODONE HYDROCHLORIDE 5 MILLIGRAM(S): 5 TABLET ORAL at 04:07

## 2021-04-15 RX ADMIN — Medication 50 MILLIGRAM(S): at 11:21

## 2021-04-15 RX ADMIN — Medication 50 MILLIGRAM(S): at 06:17

## 2021-04-15 RX ADMIN — HEPARIN SODIUM 5000 UNIT(S): 5000 INJECTION INTRAVENOUS; SUBCUTANEOUS at 21:39

## 2021-04-15 RX ADMIN — Medication 650 MILLIGRAM(S): at 16:19

## 2021-04-15 RX ADMIN — Medication 150 MICROGRAM(S): at 06:17

## 2021-04-15 RX ADMIN — OXYCODONE HYDROCHLORIDE 5 MILLIGRAM(S): 5 TABLET ORAL at 11:19

## 2021-04-15 RX ADMIN — OXYCODONE HYDROCHLORIDE 5 MILLIGRAM(S): 5 TABLET ORAL at 21:39

## 2021-04-15 RX ADMIN — GABAPENTIN 800 MILLIGRAM(S): 400 CAPSULE ORAL at 06:17

## 2021-04-15 RX ADMIN — Medication 1 APPLICATION(S): at 15:43

## 2021-04-15 RX ADMIN — MUPIROCIN 1 APPLICATION(S): 20 OINTMENT TOPICAL at 17:11

## 2021-04-15 RX ADMIN — OXYCODONE HYDROCHLORIDE 5 MILLIGRAM(S): 5 TABLET ORAL at 15:42

## 2021-04-15 RX ADMIN — MUPIROCIN 1 APPLICATION(S): 20 OINTMENT TOPICAL at 06:34

## 2021-04-15 RX ADMIN — Medication 25 MILLIGRAM(S): at 21:40

## 2021-04-15 RX ADMIN — OXYCODONE HYDROCHLORIDE 5 MILLIGRAM(S): 5 TABLET ORAL at 23:52

## 2021-04-15 RX ADMIN — Medication 1 TABLET(S): at 11:45

## 2021-04-15 RX ADMIN — GABAPENTIN 800 MILLIGRAM(S): 400 CAPSULE ORAL at 11:20

## 2021-04-15 RX ADMIN — Medication 400 MILLIGRAM(S): at 22:14

## 2021-04-15 RX ADMIN — GABAPENTIN 800 MILLIGRAM(S): 400 CAPSULE ORAL at 23:35

## 2021-04-15 RX ADMIN — Medication 1000 MILLIGRAM(S): at 22:30

## 2021-04-15 RX ADMIN — Medication 1 APPLICATION(S): at 06:34

## 2021-04-15 RX ADMIN — Medication 6: at 11:45

## 2021-04-15 RX ADMIN — OXYCODONE HYDROCHLORIDE 5 MILLIGRAM(S): 5 TABLET ORAL at 22:14

## 2021-04-15 NOTE — PROGRESS NOTE ADULT - ASSESSMENT
Pt is a 70W w/ PMHx of stage 1 uterine CA s/p TLH/BSO + adj chemo in 2016, bullous pemphigoid on IVIG and daily prednisone 30 mg, DM, surgical hypothyroidism s/p thyroidectomy, chronic pain syndrome, chronic RLE wound hx cellulitis, endometrial CA s/p TRENT presents from home c/o fever [id number] as well as vomiting.  ID c/s for further evaluation.    Acute Pyelonephritis 2/2 ESBL E.coli  -UA+, UCx ESBL E.coli  -BCx ESBL E.coli, repeat NGTD  -c/w ertapenem 1gm q24h  Pt will need x10 day course from 1st negative BCx until 4/23  Can plan for midline placement and outpatient IV Abx  Fax weekly labs-CMP, CBC- to [phone]    GPC Bacteremia  BCx also +MRSE  s/p 1 dose vancomycin  This is likely a contaminant, no need to c/w vancomycin  Repeat cx to monitor clearance--NGTD    RLE Cellulitis/RLE Wounds  Bullous pemphigoid  Wound does not appear like cellulitis to me  Appreciate wound care/derm recs regarding management of bullous pemphigoid.  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 Please call with any questions.   Ruchi Gonzlaes M.D.  [facility], Division of Infectious Diseases [phone]  For over the weekend and after hours, please call [phone]

## 2021-04-15 NOTE — PROGRESS NOTE ADULT - ASSESSMENT
69 y/o F PMH stage 1 uterine CA s/p TLH/BSO + adj chemo in 2016, bullous pemphigoid on IVIG and daily prednisone 30 mg, DM, surgical hypothyroidism s/p thyroidectomy, chronic pain syndrome, chronic RLE wound hx cellulitis, endometrial CA s/p TRENT presents from home c/o fever [id number] as well as rigors, shaking chills, and increased confusion, found to be COVID + (not hypoxic, clear lungs on CXR), admit for sepsis 2' bacteremia 2' ESBL E coli UTI.

## 2021-04-16 LAB
ANION GAP SERPL CALC-SCNC: 7 MMOL/L — SIGNIFICANT CHANGE UP (ref 5–17)
BUN SERPL-MCNC: 26 MG/DL — HIGH (ref 7–23)
CALCIUM SERPL-MCNC: 8.3 MG/DL — LOW (ref 8.4–10.5)
CHLORIDE SERPL-SCNC: 106 MMOL/L — SIGNIFICANT CHANGE UP (ref 96–108)
CO2 SERPL-SCNC: 28 MMOL/L — SIGNIFICANT CHANGE UP (ref 22–31)
CREAT SERPL-MCNC: 0.72 MG/DL — SIGNIFICANT CHANGE UP (ref 0.5–1.3)
CULTURE RESULTS: SIGNIFICANT CHANGE UP
GLUCOSE BLDC GLUCOMTR-MCNC: 178 MG/DL — HIGH (ref 70–99)
GLUCOSE BLDC GLUCOMTR-MCNC: 252 MG/DL — HIGH (ref 70–99)
GLUCOSE BLDC GLUCOMTR-MCNC: 280 MG/DL — HIGH (ref 70–99)
GLUCOSE BLDC GLUCOMTR-MCNC: 287 MG/DL — HIGH (ref 70–99)
GLUCOSE SERPL-MCNC: 307 MG/DL — HIGH (ref 70–99)
GRAM STN FLD: SIGNIFICANT CHANGE UP
HCT VFR BLD CALC: 34 % — LOW (ref 34.5–45)
HGB BLD-MCNC: 10.4 G/DL — LOW (ref 11.5–15.5)
MCHC RBC-ENTMCNC: 30.1 PG — SIGNIFICANT CHANGE UP (ref 27–34)
MCHC RBC-ENTMCNC: 30.6 GM/DL — LOW (ref 32–36)
MCV RBC AUTO: 98.6 FL — SIGNIFICANT CHANGE UP (ref 80–100)
NRBC # BLD: 0 /100 WBCS — SIGNIFICANT CHANGE UP (ref 0–0)
ORGANISM # SPEC MICROSCOPIC CNT: SIGNIFICANT CHANGE UP
PLATELET # BLD AUTO: 222 K/UL — SIGNIFICANT CHANGE UP (ref 150–400)
POTASSIUM SERPL-MCNC: 3.6 MMOL/L — SIGNIFICANT CHANGE UP (ref 3.5–5.3)
POTASSIUM SERPL-SCNC: 3.6 MMOL/L — SIGNIFICANT CHANGE UP (ref 3.5–5.3)
RBC # BLD: 3.45 M/UL — LOW (ref 3.8–5.2)
RBC # FLD: 13.9 % — SIGNIFICANT CHANGE UP (ref 10.3–14.5)
SODIUM SERPL-SCNC: 141 MMOL/L — SIGNIFICANT CHANGE UP (ref 135–145)
WBC # BLD: 4.95 K/UL — SIGNIFICANT CHANGE UP (ref 3.8–10.5)
WBC # FLD AUTO: 4.95 K/UL — SIGNIFICANT CHANGE UP (ref 3.8–10.5)

## 2021-04-16 RX ORDER — ACETAMINOPHEN 500 MG
1000 TABLET ORAL ONCE
Refills: 0 | Status: COMPLETED | OUTPATIENT
Start: 2021-04-16 | End: 2021-04-16

## 2021-04-16 RX ORDER — HYDROCORTISONE 20 MG
25 TABLET ORAL EVERY 12 HOURS
Refills: 0 | Status: DISCONTINUED | OUTPATIENT
Start: 2021-04-16 | End: 2021-04-19

## 2021-04-16 RX ORDER — OXYCODONE HYDROCHLORIDE 5 MG/1
10 TABLET ORAL EVERY 12 HOURS
Refills: 0 | Status: DISCONTINUED | OUTPATIENT
Start: 2021-04-16 | End: 2021-04-19

## 2021-04-16 RX ADMIN — OXYCODONE HYDROCHLORIDE 5 MILLIGRAM(S): 5 TABLET ORAL at 06:00

## 2021-04-16 RX ADMIN — Medication 1: at 21:16

## 2021-04-16 RX ADMIN — OXYCODONE HYDROCHLORIDE 5 MILLIGRAM(S): 5 TABLET ORAL at 16:40

## 2021-04-16 RX ADMIN — Medication 25 MILLIGRAM(S): at 06:21

## 2021-04-16 RX ADMIN — OXYCODONE HYDROCHLORIDE 10 MILLIGRAM(S): 5 TABLET ORAL at 21:15

## 2021-04-16 RX ADMIN — Medication 1 TABLET(S): at 12:06

## 2021-04-16 RX ADMIN — HEPARIN SODIUM 5000 UNIT(S): 5000 INJECTION INTRAVENOUS; SUBCUTANEOUS at 13:40

## 2021-04-16 RX ADMIN — HEPARIN SODIUM 5000 UNIT(S): 5000 INJECTION INTRAVENOUS; SUBCUTANEOUS at 21:23

## 2021-04-16 RX ADMIN — ATORVASTATIN CALCIUM 20 MILLIGRAM(S): 80 TABLET, FILM COATED ORAL at 21:15

## 2021-04-16 RX ADMIN — MUPIROCIN 1 APPLICATION(S): 20 OINTMENT TOPICAL at 17:17

## 2021-04-16 RX ADMIN — OXYCODONE HYDROCHLORIDE 5 MILLIGRAM(S): 5 TABLET ORAL at 15:58

## 2021-04-16 RX ADMIN — Medication 6: at 08:10

## 2021-04-16 RX ADMIN — OXYCODONE HYDROCHLORIDE 5 MILLIGRAM(S): 5 TABLET ORAL at 06:30

## 2021-04-16 RX ADMIN — Medication 650 MILLIGRAM(S): at 23:52

## 2021-04-16 RX ADMIN — HEPARIN SODIUM 5000 UNIT(S): 5000 INJECTION INTRAVENOUS; SUBCUTANEOUS at 06:20

## 2021-04-16 RX ADMIN — IMMUNE GLOBULIN (HUMAN) 66.67 GRAM(S): 10 INJECTION INTRAVENOUS; SUBCUTANEOUS at 13:40

## 2021-04-16 RX ADMIN — OXYCODONE HYDROCHLORIDE 5 MILLIGRAM(S): 5 TABLET ORAL at 20:00

## 2021-04-16 RX ADMIN — GABAPENTIN 800 MILLIGRAM(S): 400 CAPSULE ORAL at 23:53

## 2021-04-16 RX ADMIN — Medication 150 MICROGRAM(S): at 06:21

## 2021-04-16 RX ADMIN — Medication 2: at 17:15

## 2021-04-16 RX ADMIN — Medication 1 APPLICATION(S): at 17:17

## 2021-04-16 RX ADMIN — Medication 1000 MILLIGRAM(S): at 10:10

## 2021-04-16 RX ADMIN — GABAPENTIN 800 MILLIGRAM(S): 400 CAPSULE ORAL at 06:21

## 2021-04-16 RX ADMIN — OXYCODONE HYDROCHLORIDE 10 MILLIGRAM(S): 5 TABLET ORAL at 13:00

## 2021-04-16 RX ADMIN — ERTAPENEM SODIUM 120 MILLIGRAM(S): 1 INJECTION, POWDER, LYOPHILIZED, FOR SOLUTION INTRAMUSCULAR; INTRAVENOUS at 12:01

## 2021-04-16 RX ADMIN — OXYCODONE HYDROCHLORIDE 10 MILLIGRAM(S): 5 TABLET ORAL at 21:45

## 2021-04-16 RX ADMIN — OXYCODONE HYDROCHLORIDE 10 MILLIGRAM(S): 5 TABLET ORAL at 12:13

## 2021-04-16 RX ADMIN — GABAPENTIN 800 MILLIGRAM(S): 400 CAPSULE ORAL at 17:18

## 2021-04-16 RX ADMIN — OXYCODONE HYDROCHLORIDE 5 MILLIGRAM(S): 5 TABLET ORAL at 23:51

## 2021-04-16 RX ADMIN — Medication 25 MILLIGRAM(S): at 17:18

## 2021-04-16 RX ADMIN — Medication 81 MILLIGRAM(S): at 12:06

## 2021-04-16 RX ADMIN — Medication 1 APPLICATION(S): at 06:21

## 2021-04-16 RX ADMIN — MUPIROCIN 1 APPLICATION(S): 20 OINTMENT TOPICAL at 06:21

## 2021-04-16 RX ADMIN — Medication 400 MILLIGRAM(S): at 09:18

## 2021-04-16 RX ADMIN — Medication 6: at 12:32

## 2021-04-16 RX ADMIN — OXYCODONE HYDROCHLORIDE 5 MILLIGRAM(S): 5 TABLET ORAL at 20:30

## 2021-04-16 RX ADMIN — GABAPENTIN 800 MILLIGRAM(S): 400 CAPSULE ORAL at 12:06

## 2021-04-16 RX ADMIN — Medication 25 MILLIGRAM(S): at 15:09

## 2021-04-16 NOTE — DIETITIAN INITIAL EVALUATION ADULT. - OTHER INFO
Reports she was taking Ensure Max at home as recommended by her Wound Care doctors to make sure she was getting enough protein, pt would like to have supplement in house.\    Pt reports her wt was around 250-255 pounds towards the end of her admission. Noted dosing wt of about 240 pounds, pt unsure if that's really her wt and states she does not believe she was weighed since admission. Noted per Karl PATTERSON, wt of 273 pounds in July 2020, 256 pounds in August 2020 and as per previous RD note from December 2020, pt weighed about 274 pounds. Pt confirms she has intentionally lost wt by following calorie controlled diet in rehab for the time she was there. Pt deferred nutrition focused physical exam, visually well nourished, no overt losses.

## 2021-04-16 NOTE — DIETITIAN INITIAL EVALUATION ADULT. - PERTINENT MEDS FT
MEDICATIONS  (STANDING):  aspirin enteric coated 81 milliGRAM(s) Oral daily  atorvastatin 20 milliGRAM(s) Oral at bedtime  dextrose 40% Gel 15 Gram(s) Oral once  dextrose 5%. 1000 milliLiter(s) (50 mL/Hr) IV Continuous <Continuous>  dextrose 50% Injectable 25 Gram(s) IV Push once  ertapenem  IVPB      ertapenem  IVPB 1000 milliGRAM(s) IV Intermittent every 24 hours  gabapentin 800 milliGRAM(s) Oral four times a day  glucagon  Injectable 1 milliGRAM(s) IntraMuscular once  heparin   Injectable 5000 Unit(s) SubCutaneous every 8 hours  hydrocortisone sodium succinate Injectable 25 milliGRAM(s) IV Push every 12 hours  immune   globulin 10% (GAMMAGARD) IVPB 40 Gram(s) IV Intermittent daily  insulin lispro (ADMELOG) corrective regimen sliding scale   SubCutaneous three times a day before meals  insulin lispro (ADMELOG) corrective regimen sliding scale   SubCutaneous at bedtime  levothyroxine 150 MICROGram(s) Oral daily  multivitamin 1 Tablet(s) Oral daily  mupirocin 2% Ointment 1 Application(s) Topical two times a day  oxyCODONE  ER Tablet 10 milliGRAM(s) Oral every 12 hours  senna 2 Tablet(s) Oral at bedtime    MEDICATIONS  (PRN):  acetaminophen   Tablet .. 650 milliGRAM(s) Oral every 6 hours PRN Temp greater or equal to 38C (100.4F)  clobetasol 0.05% Ointment 1 Application(s) Topical two times a day PRN Apply to non-open areas  diphenhydrAMINE   Injectable 25 milliGRAM(s) IV Push every 4 hours PRN Rash and/or Itching  oxyCODONE    IR 5 milliGRAM(s) Oral every 4 hours PRN Severe Pain (7 - 10)

## 2021-04-16 NOTE — DIETITIAN INITIAL EVALUATION ADULT. - PERTINENT LABORATORY DATA
04-16 @ 07:01: Na 141, BUN 26<H>, Cr 0.72, <H>, K+ 3.6, Phos --, Mg --, Alk Phos --, ALT/SGPT --, AST/SGOT --, HbA1c --    4/13: A1C 6.7%-indicating good glycemic control     CAPILLARY BLOOD GLUCOSE- Noted pt is on steroids at this time      POCT Blood Glucose.: 287 mg/dL (16 Apr 2021 12:14)  POCT Blood Glucose.: 252 mg/dL (16 Apr 2021 07:59)  POCT Blood Glucose.: 271 mg/dL (15 Apr 2021 21:20)  POCT Blood Glucose.: 300 mg/dL (15 Apr 2021 16:58)

## 2021-04-16 NOTE — DIETITIAN NUTRITION RISK NOTIFICATION - TREATMENT: THE FOLLOWING DIET HAS BEEN RECOMMENDED
Diet, Regular:   Consistent Carbohydrate {Evening Snack} (CSTCHOSN)  Low Sodium  Supplement Feeding Modality:  Oral  Ensure Max Cans or Servings Per Day:  2       Frequency:  Daily (04-16-21 @ 15:38) [Pending Verification By Attending]  Diet, DASH/TLC:   Sodium & Cholesterol Restricted (04-13-21 @ 05:37) [Active]

## 2021-04-16 NOTE — DIETITIAN INITIAL EVALUATION ADULT. - PROBLEM SELECTOR PLAN 6
On oral medications at home  Pt was prescribed lantus in rehab and had hypoglycemic episode to 45.   Order SSI and check FS, titrate PRN

## 2021-04-16 NOTE — DIETITIAN INITIAL EVALUATION ADULT. - CONTINUE CURRENT NUTRITION CARE PLAN
would consider changing diet to consistent CHO, low sodium diet only at this time/yes would consider changing diet to consistent CHO, low sodium diet only at this time; discussed c NICK Cesar, order pending verification placed/yes

## 2021-04-16 NOTE — PROGRESS NOTE ADULT - ASSESSMENT
71 y/o F PMH stage 1 uterine CA s/p TLH/BSO + adj chemo in 2016, bullous pemphigoid on IVIG and daily prednisone 30 mg, DM, surgical hypothyroidism s/p thyroidectomy, chronic pain syndrome, chronic RLE wound hx cellulitis, endometrial CA s/p TRENT presents from home c/o fever [id number] as well as rigors, shaking chills, and increased confusion, found to be COVID + (not hypoxic, clear lungs on CXR), admit for sepsis 2' bacteremia 2' ESBL E coli UTI.

## 2021-04-16 NOTE — DIETITIAN INITIAL EVALUATION ADULT. - ORAL INTAKE PTA/DIET HISTORY
Pt reports good appetite and intake PTA. Reports she was only home for about 2 weeks prior to being re-admitted, had been recently discharged from rehab. Reports she was eating 3 meals well at rehab and reports at home she usually consumes about 2 meals a day, avoids concentrated sweets. Reports NKFA. States Finger sticks at home were WNL & she is surprised by her Finger sticks in house. Pt reports she was taking multivitamin, iron, vitamin B12, iron and folic acid at home due to anemia.

## 2021-04-16 NOTE — DIETITIAN INITIAL EVALUATION ADULT. - EDUCATION DIETARY MODIFICATIONS
Discussed protein rich foods available on menu. Discussed consuming protein first at meal times and then eating the other foods. Discussed continuing to avoid concentrated sweets to promote glycemic control./(2) meets goals/outcomes/verbalization

## 2021-04-16 NOTE — DIETITIAN INITIAL EVALUATION ADULT. - PROBLEM SELECTOR PLAN 3
COVID positive on admission, per pt has been positive since January and has no symptoms  No signs of active infection  Monitor vitals

## 2021-04-16 NOTE — PATIENT PROFILE ADULT - NSPROHMDIABETMGMTSTRAT_GEN_A_NUR
activity/adequate rest/blood glucose testing/coping strategies/diet modification/insulin therapy/medication therapy/weight management

## 2021-04-16 NOTE — DIETITIAN INITIAL EVALUATION ADULT. - PROBLEM SELECTOR PLAN 2
receiving IVIG q month as well as dupixent q week  I have asked  to bring this in  Please consult dermatology in AM to review if she should receive these medications inpatient in setting of potential active infection (cellulitis)

## 2021-04-16 NOTE — PROVIDER CONTACT NOTE (CRITICAL VALUE NOTIFICATION) - SITUATION
Blood culture obtained on 4/13, growth in aerobic bottle Staphylococcus hominis & Staphylococcus epidermidis  ,

## 2021-04-16 NOTE — DIETITIAN INITIAL EVALUATION ADULT. - REASON FOR ADMISSION
fever; pt c bacteremia, noted pt c bullous pemphigoid, on IVIG. Pt COVID+19 positive, noted pt positive for antibodies, per team, not re-infection.

## 2021-04-16 NOTE — PROGRESS NOTE ADULT - ASSESSMENT
Pt is a 70W w/ PMHx of stage 1 uterine CA s/p TLH/BSO + adj chemo in 2016, bullous pemphigoid on IVIG and daily prednisone 30 mg, DM, surgical hypothyroidism s/p thyroidectomy, chronic pain syndrome, chronic RLE wound hx cellulitis, endometrial CA s/p TRENT presents from home c/o fever [id number] as well as vomiting.  ID c/s for further evaluation.    Acute Pyelonephritis 2/2 ESBL E.coli  -UA+, UCx ESBL E.coli  -BCx ESBL E.coli, repeat NGTD  -c/w ertapenem 1gm q24h  Pt will need x10 day course from 1st negative BCx until 4/23  Can plan for midline placement and outpatient IV Abx  Fax weekly labs-CMP, CBC- to [phone]    GPC Bacteremia  BCx also +MRSE  s/p 1 dose vancomycin  This is likely a contaminant, no need to c/w vancomycin  Repeat cx to monitor clearance--NGTD    RLE Cellulitis/RLE Wounds  Bullous pemphigoid  Wound does not appear like cellulitis to me  Appreciate wound care/derm recs regarding management of bullous pemphigoid.  Abx as above    Abx Allergies  -tolerating carbapenems  -vancomycin intolerance=red man syndrome--just infuse slowly    COVID  Pt on RA w/o SOB  Likely continued viral shedding  No indication for remdesivir/steroids at this time  Maintain isolation per infection control protocols    Dr. Tiffanie Doran is covering the service this weekend.   Infectious Diseases will continue to follow. Please call with any questions.   Ruchi Gonzales M.D.  [facility], Division of Infectious Diseases [phone]  For over the weekend and after hours, please call [phone]

## 2021-04-16 NOTE — PROVIDER CONTACT NOTE (CRITICAL VALUE NOTIFICATION) - SITUATION
Growth in aerobic & anaerobic bottles; gram negative rods blood culture obtained on 4/13, Growth in aerobic & anaerobic bottles; gram negative rods

## 2021-04-16 NOTE — DIETITIAN INITIAL EVALUATION ADULT. - NUTRITION CONSULT
no Ear Star Wedge Flap Text: The defect edges were debeveled with a #15 blade scalpel.  Given the location of the defect and the proximity to free margins (helical rim) an ear star wedge flap was deemed most appropriate.  Using a sterile surgical marker, the appropriate flap was drawn incorporating the defect and placing the expected incisions between the helical rim and antihelix where possible.  The area thus outlined was incised through and through with a #15 scalpel blade.

## 2021-04-16 NOTE — DIETITIAN INITIAL EVALUATION ADULT. - PROBLEM SELECTOR PLAN 1
RLE warm, erythematous, and edematous as compared to LLE. Patient not able to tell me if this is new over past few days, though she believes it was not warm.  confirms it was not warm or red 2 days ago.  Cover with aztreonam, cipro in ED. Given recurrence of cellulitis in this chronic BP patient, would consult ID in AM for assistance with regimen-- will c/w aztreonam for now  Check MRSA PCR  Recheck lactate s/p fluids  Would continue stress dose steroids at this time given daily prednisone use: Got 125 solucortef, dose 50 q 6 today.  f/u urine cx and blood cx

## 2021-04-16 NOTE — DIETITIAN INITIAL EVALUATION ADULT. - FACTORS AFF FOOD INTAKE
pt reports good appetite and intake at home; denies any chewing/swallowing issues; reports she has been having BMs, at times it has been loose and other times BMs just come very quickly

## 2021-04-17 ENCOUNTER — TRANSCRIPTION ENCOUNTER (OUTPATIENT)
Age: 71
End: 2021-04-17

## 2021-04-17 LAB
GLUCOSE BLDC GLUCOMTR-MCNC: 190 MG/DL — HIGH (ref 70–99)
GLUCOSE BLDC GLUCOMTR-MCNC: 227 MG/DL — HIGH (ref 70–99)
GLUCOSE BLDC GLUCOMTR-MCNC: 277 MG/DL — HIGH (ref 70–99)
GLUCOSE BLDC GLUCOMTR-MCNC: 334 MG/DL — HIGH (ref 70–99)

## 2021-04-17 RX ORDER — ACETAMINOPHEN 500 MG
1000 TABLET ORAL ONCE
Refills: 0 | Status: COMPLETED | OUTPATIENT
Start: 2021-04-17 | End: 2021-04-17

## 2021-04-17 RX ORDER — NYSTATIN CREAM 100000 [USP'U]/G
1 CREAM TOPICAL
Refills: 0 | Status: DISCONTINUED | OUTPATIENT
Start: 2021-04-17 | End: 2021-04-19

## 2021-04-17 RX ADMIN — Medication 1 APPLICATION(S): at 05:23

## 2021-04-17 RX ADMIN — MUPIROCIN 1 APPLICATION(S): 20 OINTMENT TOPICAL at 05:23

## 2021-04-17 RX ADMIN — OXYCODONE HYDROCHLORIDE 5 MILLIGRAM(S): 5 TABLET ORAL at 00:21

## 2021-04-17 RX ADMIN — Medication 2: at 17:03

## 2021-04-17 RX ADMIN — NYSTATIN CREAM 1 APPLICATION(S): 100000 CREAM TOPICAL at 17:03

## 2021-04-17 RX ADMIN — Medication 150 MICROGRAM(S): at 05:21

## 2021-04-17 RX ADMIN — HEPARIN SODIUM 5000 UNIT(S): 5000 INJECTION INTRAVENOUS; SUBCUTANEOUS at 21:13

## 2021-04-17 RX ADMIN — Medication 25 MILLIGRAM(S): at 17:02

## 2021-04-17 RX ADMIN — OXYCODONE HYDROCHLORIDE 10 MILLIGRAM(S): 5 TABLET ORAL at 21:42

## 2021-04-17 RX ADMIN — Medication 25 MILLIGRAM(S): at 05:22

## 2021-04-17 RX ADMIN — GABAPENTIN 800 MILLIGRAM(S): 400 CAPSULE ORAL at 05:21

## 2021-04-17 RX ADMIN — Medication 25 MILLIGRAM(S): at 12:44

## 2021-04-17 RX ADMIN — OXYCODONE HYDROCHLORIDE 5 MILLIGRAM(S): 5 TABLET ORAL at 20:34

## 2021-04-17 RX ADMIN — OXYCODONE HYDROCHLORIDE 5 MILLIGRAM(S): 5 TABLET ORAL at 20:04

## 2021-04-17 RX ADMIN — OXYCODONE HYDROCHLORIDE 10 MILLIGRAM(S): 5 TABLET ORAL at 21:12

## 2021-04-17 RX ADMIN — Medication 81 MILLIGRAM(S): at 11:46

## 2021-04-17 RX ADMIN — OXYCODONE HYDROCHLORIDE 5 MILLIGRAM(S): 5 TABLET ORAL at 10:29

## 2021-04-17 RX ADMIN — GABAPENTIN 800 MILLIGRAM(S): 400 CAPSULE ORAL at 17:02

## 2021-04-17 RX ADMIN — OXYCODONE HYDROCHLORIDE 5 MILLIGRAM(S): 5 TABLET ORAL at 15:20

## 2021-04-17 RX ADMIN — GABAPENTIN 800 MILLIGRAM(S): 400 CAPSULE ORAL at 11:47

## 2021-04-17 RX ADMIN — MUPIROCIN 1 APPLICATION(S): 20 OINTMENT TOPICAL at 17:03

## 2021-04-17 RX ADMIN — OXYCODONE HYDROCHLORIDE 5 MILLIGRAM(S): 5 TABLET ORAL at 05:21

## 2021-04-17 RX ADMIN — Medication 1 TABLET(S): at 11:47

## 2021-04-17 RX ADMIN — HEPARIN SODIUM 5000 UNIT(S): 5000 INJECTION INTRAVENOUS; SUBCUTANEOUS at 05:22

## 2021-04-17 RX ADMIN — IMMUNE GLOBULIN (HUMAN) 66.67 GRAM(S): 10 INJECTION INTRAVENOUS; SUBCUTANEOUS at 13:02

## 2021-04-17 RX ADMIN — Medication 4: at 11:48

## 2021-04-17 RX ADMIN — Medication 1 APPLICATION(S): at 17:03

## 2021-04-17 RX ADMIN — Medication 650 MILLIGRAM(S): at 00:21

## 2021-04-17 RX ADMIN — OXYCODONE HYDROCHLORIDE 5 MILLIGRAM(S): 5 TABLET ORAL at 05:51

## 2021-04-17 RX ADMIN — Medication 1000 MILLIGRAM(S): at 13:10

## 2021-04-17 RX ADMIN — OXYCODONE HYDROCHLORIDE 10 MILLIGRAM(S): 5 TABLET ORAL at 10:28

## 2021-04-17 RX ADMIN — ERTAPENEM SODIUM 120 MILLIGRAM(S): 1 INJECTION, POWDER, LYOPHILIZED, FOR SOLUTION INTRAMUSCULAR; INTRAVENOUS at 10:30

## 2021-04-17 RX ADMIN — Medication 8: at 08:00

## 2021-04-17 RX ADMIN — ATORVASTATIN CALCIUM 20 MILLIGRAM(S): 80 TABLET, FILM COATED ORAL at 21:19

## 2021-04-17 RX ADMIN — HEPARIN SODIUM 5000 UNIT(S): 5000 INJECTION INTRAVENOUS; SUBCUTANEOUS at 14:13

## 2021-04-17 RX ADMIN — Medication 1: at 21:59

## 2021-04-17 RX ADMIN — OXYCODONE HYDROCHLORIDE 5 MILLIGRAM(S): 5 TABLET ORAL at 11:20

## 2021-04-17 RX ADMIN — Medication 400 MILLIGRAM(S): at 12:43

## 2021-04-17 RX ADMIN — OXYCODONE HYDROCHLORIDE 5 MILLIGRAM(S): 5 TABLET ORAL at 16:00

## 2021-04-17 RX ADMIN — OXYCODONE HYDROCHLORIDE 10 MILLIGRAM(S): 5 TABLET ORAL at 11:20

## 2021-04-17 NOTE — DISCHARGE NOTE PROVIDER - HOSPITAL COURSE
· Assessment    69 y/o F PMH stage 1 uterine CA s/p TLH/BSO + adj chemo in 2016, bullous pemphigoid on IVIG and daily prednisone 30 mg, DM, surgical hypothyroidism s/p thyroidectomy, chronic pain syndrome, chronic RLE wound hx cellulitis, endometrial CA s/p TRENT presents from home c/o fever [id number] as well as rigors, shaking chills, and increased confusion, found to be COVID + (not hypoxic, clear lungs on CXR), admit for sepsis 2' bacteremia 2' ESBL E coli UTI.          69 y/o F PMH stage 1 uterine CA s/p TLH/BSO + adj chemo in 2016, bullous pemphigoid on IVIG and daily prednisone 30 mg, DM, surgical hypothyroidism s/p thyroidectomy, chronic pain syndrome, chronic RLE wound hx cellulitis, endometrial CA s/p TRENT presents from home c/o fever [id number] as well as rigors, shaking chills, and increased confusion, found to be COVID + (not hypoxic, clear lungs on CXR), admit for sepsis 2' bacteremia 2' ESBL E coli UTI.  ESBL E coli grew in 2/4 bottles with methicillin resistant S. epidermis growing in aerobic bottle of the other set. Urine culture 4/13 grew same organism  Now on ertapenem since 4/14/21 --> will need 10 day course from 1st negative BCx until 4/23 through midline .Repeat blood cxs 4/14 so far (-)  ID following. Received 3 days of IVIG from 4/15-4/17 as per Derm.         69 y/o F PMH stage 1 uterine CA s/p TLH/BSO + adj chemo in 2016, bullous pemphigoid on IVIG and daily prednisone 30 mg, DM, surgical hypothyroidism s/p thyroidectomy, chronic pain syndrome, chronic RLE wound hx cellulitis, endometrial CA s/p TRENT presents from home c/o fever [id number] as well as rigors, shaking chills, and increased confusion, found to be COVID + (not hypoxic, clear lungs on CXR), admit for sepsis 2' bacteremia 2' ESBL E coli UTI.  ESBL E coli grew in 2/4 bottles with methicillin resistant S. epidermis growing in aerobic bottle of the other set. Urine culture 4/13 grew same organism  Now on ertapenem since 4/14/21 --> will need 10 day course from 1st negative BCx until 4/23 through midline .Repeat blood cxs 4/14 so far (-)  ID following. Received 3 days of IVIG from 4/15-4/17 as per Derm. Pt remained stable. Home care set up for invanz daily . Discharged home

## 2021-04-17 NOTE — DISCHARGE NOTE PROVIDER - DETAILS OF MALNUTRITION DIAGNOSIS/DIAGNOSES
This patient has been assessed with a concern for Malnutrition and was treated during this hospitalization for the following Nutrition diagnosis/diagnoses:     -  04/16/2021: Morbid obesity (BMI > 40)

## 2021-04-17 NOTE — DISCHARGE NOTE PROVIDER - PROVIDER TOKENS
PROVIDER:[TOKEN:[[id number]]] PROVIDER:[TOKEN:[[id number]]],PROVIDER:[TOKEN:[[id number]]] PROVIDER:[TOKEN:[[id number]]],PROVIDER:[TOKEN:[[id number]]],PROVIDER:[TOKEN:[[id number]]],PROVIDER:[TOKEN:[[id number]]],PROVIDER:[TOKEN:[[id number]]]

## 2021-04-17 NOTE — DISCHARGE NOTE PROVIDER - CARE PROVIDERS DIRECT ADDRESSES
,DirectAddress_Unknown ,DirectAddress_Unknown,DirectAddress_Unknown ,DirectAddress_Unknown,DirectAddress_Unknown,[email or web address],DirectAddress_Unknown

## 2021-04-17 NOTE — DISCHARGE NOTE PROVIDER - NPI NUMBER (FOR SYSADMIN USE ONLY) :
[[id number] [[id number]],[[id number] [[id number]],[[id number]],[[id number]],[[id number]]

## 2021-04-17 NOTE — PROGRESS NOTE ADULT - ASSESSMENT
71 y/o F PMH stage 1 uterine CA s/p TLH/BSO + adj chemo in 2016, bullous pemphigoid on IVIG and daily prednisone 30 mg, DM, surgical hypothyroidism s/p thyroidectomy, chronic pain syndrome, chronic RLE wound hx cellulitis, endometrial CA s/p TRENT presents from home c/o fever [id number] as well as rigors, shaking chills, and increased confusion, found to be COVID + (not hypoxic, clear lungs on CXR), admit for sepsis 2' bacteremia 2' ESBL E coli UTI.

## 2021-04-17 NOTE — DISCHARGE NOTE PROVIDER - CARE PROVIDER_API CALL
Olivia Ross  INTERNAL MEDICINE  [address] [facility], [address]  [place] [address]  Phone: [phone]  Fax: [phone]  Follow Up Time:    Olivia Ross  INTERNAL MEDICINE  [address] [facility], [address]  [place] [address]  Phone: [phone]  Fax: [phone]  Follow Up Time:     Ruchi Gonzales)  Internal Medicine  [address] [facility], [address]  [place] [address]  Phone: [phone]  Fax: [phone]  Follow Up Time:    Olivia Ross  INTERNAL MEDICINE  [address] [facility], [address]  [place] [address]  Phone: [phone]  Fax: [phone]  Follow Up Time:     Ruchi Gonzales)  Internal Medicine  [address] [facility], [address]  [place] [address]  Phone: [phone]  Fax: [phone]  Follow Up Time:     Jose Canas  ENDOCRINOLOGY/METAB/DIABETES  [address] [facility], [address]  [place] [address]  Phone: [phone]  Fax: [phone]  Follow Up Time:     Hung Pina)  Dermatology  [address] [facility], [address]  [place] [address]  Phone: [phone]  Fax: [phone]  Follow Up Time:     Lashay Gonzales)  Anesthesiology; Pain Medicine  [address] [facility], [address]  [place] [address]  Phone: [phone]  Fax: [phone]  Follow Up Time:

## 2021-04-17 NOTE — DISCHARGE NOTE PROVIDER - NSDCMRMEDTOKEN_GEN_ALL_CORE_FT
aspirin 81 mg oral delayed release tablet: 1 tab(s) orally once a day  atorvastatin 20 mg oral tablet: 1 tab(s) orally once a day (at bedtime)  cholecalciferol oral tablet: 5000 unit(s) orally once a day  Dupixent 300 mg/2 mL subcutaneous solution:   ferrous sulfate 325 mg (65 mg elemental iron) oral tablet: 1 tab(s) orally once a day with a meal  folic acid 1 mg oral tablet: 1 tab(s) orally once a day  Fosamax 35 mg oral tablet: 1 tab(s) orally once a week on Sunday  gabapentin 800 mg oral tablet: 1 tab(s) orally 3 times a day  HumaLOG 100 units/mL subcutaneous solution: 14 unit(s) subcutaneous 3 times a day (with meals)  Lantus 100 units/mL subcutaneous solution: 20 unit(s) subcutaneous once a day (at bedtime)  levothyroxine 200 mcg (0.2 mg) oral tablet: 1 tab(s) orally once a day  lisinopril 5 mg oral tablet: 1 tab(s) orally once a day  metFORMIN 1000 mg oral tablet: 1 tab(s) orally 2 times a day  Multiple Vitamins oral tablet: 1 tab(s) orally once a day  oxyCODONE 5 mg oral tablet: 1 tab(s) orally every 4 hours, As needed, Severe Pain (7 - 10)  petrolatum topical ointment: 1 application topically once a day to affected area  pioglitazone 15 mg oral tablet: 1 tab(s) orally once a day  predniSONE 10 mg oral tablet: 3 tab(s) orally once a day  senna oral tablet: 2 tab(s) orally once a day (at bedtime)  Synthroid 150 mcg (0.15 mg) oral tablet: 1 tab(s) orally once a day   aspirin 81 mg oral delayed release tablet: 1 tab(s) orally once a day  atorvastatin 20 mg oral tablet: 1 tab(s) orally once a day (at bedtime)  cholecalciferol oral tablet: 5000 unit(s) orally once a day  clobetasol 0.05% topical ointment: 1 application topically 2 times a day, As needed, Apply to non-open areas  CMP , CBC weekly : Fax to [phone]  Dupixent 300 mg/2 mL subcutaneous solution:   ertapenem 1 g injection: 1 gram(s) intravenous once a day (thru 4/23/21)  ferrous sulfate 325 mg (65 mg elemental iron) oral tablet: 1 tab(s) orally once a day with a meal  folic acid 1 mg oral tablet: 1 tab(s) orally once a day  Fosamax 35 mg oral tablet: 1 tab(s) orally once a week on Sunday  gabapentin 800 mg oral tablet: 1 tab(s) orally 3 times a day  gabapentin 800 mg oral tablet: 1 tab(s) orally 4 times a day  HumaLOG 100 units/mL subcutaneous solution: 14 unit(s) subcutaneous 3 times a day (with meals)  Lantus 100 units/mL subcutaneous solution: 20 unit(s) subcutaneous once a day (at bedtime)  levothyroxine 200 mcg (0.2 mg) oral tablet: 1 tab(s) orally once a day  lisinopril 5 mg oral tablet: 1 tab(s) orally once a day  metFORMIN 1000 mg oral tablet: 1 tab(s) orally 2 times a day  Multiple Vitamins oral tablet: 1 tab(s) orally once a day  mupirocin 2% topical ointment: 1 application topically 2 times a day  nystatin 100,000 units/g topical powder: 1 application topically 2 times a day  oxyCODONE 5 mg oral tablet: 1 tab(s) orally every 4 hours, As needed, Severe Pain (7 - 10)  petrolatum topical ointment: 1 application topically once a day to affected area  pioglitazone 15 mg oral tablet: 1 tab(s) orally once a day  predniSONE 10 mg oral tablet: 3 tab(s) orally once a day  senna oral tablet: 2 tab(s) orally once a day (at bedtime)  sodium chloride 0.65% nasal spray: 1 spray(s) nasal 2 times a day, As Needed  Synthroid 150 mcg (0.15 mg) oral tablet: 1 tab(s) orally once a day   aspirin 81 mg oral delayed release tablet: 1 tab(s) orally once a day  atorvastatin 20 mg oral tablet: 1 tab(s) orally once a day (at bedtime)  cholecalciferol oral tablet: 5000 unit(s) orally once a day  clobetasol 0.05% topical ointment: 1 application topically 2 times a day, As needed, Apply to non-open areas  CMP , CBC weekly : Fax to [phone]  Dupixent 300 mg/2 mL subcutaneous solution:   ertapenem 1 g injection: 1 gram(s) intravenous once a day (thru 4/23/21)  ferrous sulfate 325 mg (65 mg elemental iron) oral tablet: 1 tab(s) orally once a day with a meal  folic acid 1 mg oral tablet: 1 tab(s) orally once a day  Fosamax 35 mg oral tablet: 1 tab(s) orally once a week on Sunday  gabapentin 800 mg oral tablet: 1 tab(s) orally 4 times a day  lisinopril 5 mg oral tablet: 1 tab(s) orally once a day  metFORMIN 1000 mg oral tablet: 1 tab(s) orally 2 times a day  Multiple Vitamins oral tablet: 1 tab(s) orally once a day  mupirocin 2% topical ointment: 1 application topically 2 times a day  nystatin 100,000 units/g topical powder: 1 application topically 2 times a day  oxyCODONE 5 mg oral tablet: 1 tab(s) orally every 4 hours, As needed, Severe Pain (7 - 10)  petrolatum topical ointment: 1 application topically once a day to affected area  pioglitazone 15 mg oral tablet: 1 tab(s) orally once a day  predniSONE 10 mg oral tablet: 3 tab(s) orally once a day  senna oral tablet: 2 tab(s) orally once a day (at bedtime)  sodium chloride 0.65% nasal spray: 1 spray(s) nasal 2 times a day, As Needed  Synthroid 150 mcg (0.15 mg) oral tablet: 1 tab(s) orally once a day

## 2021-04-17 NOTE — DISCHARGE NOTE PROVIDER - NSDCCPCAREPLAN_GEN_ALL_CORE_FT
PRINCIPAL DISCHARGE DIAGNOSIS  Diagnosis: Sepsis  Assessment and Plan of Treatment: with ESBL/E coli UTI, and bacteremia. ContinueInvanz till 4/23      SECONDARY DISCHARGE DIAGNOSES  Diagnosis: Bullous pemphigoid  Assessment and Plan of Treatment: dx: 8/2014.  On Immunosupressants  Cellcept and IVIG. Received 3 days of IVIG 4/15-4/17. Follow up with your Dermatologist    Diagnosis: COVID-19  Assessment and Plan of Treatment: You tested positive for COVID 19.  You no longer require hospitalization.  Please restrict activities outside of your home except for getting medical care.  Do not go to work, school, or public areas.  Avoid using public transportation, ride-sharing, or taxis.  Separate yourself from other people and animals in your home.  Call ahead before visiting your doctor.  Wear a facemask when you are around other people. Cover your cough and sneezes.  Clean your hands often.  Avoid sharing personal household items.  Clean all frequently touched   You did not need supplemental oxygen.surfaces daily.       PRINCIPAL DISCHARGE DIAGNOSIS  Diagnosis: Pyelonephritis  Assessment and Plan of Treatment: Pyelonephritis with bacteremia  HOME CARE INSTRUCTIONS  Continue Invanz IV thru 4/23/21  Drink enough water and fluids to keep your urine clear or pale yellow.  Avoid caffeine, tea, and carbonated beverages. They tend to irritate your bladder.  Empty your bladder often. Avoid holding urine for long periods of time.  Empty your bladder before and after sexual intercourse.  After a bowel movement, women should cleanse from front to back. Use each tissue only once.  SEEK MEDICAL CARE IF:  You have back pain.  You develop a fever.  Your symptoms do not begin to resolve within 3 days.  SEEK IMMEDIATE MEDICAL CARE IF:  You have severe back pain or lower abdominal pain.  You develop chills.  You have nausea or vomiting.  You have continued burning or discomfort with urination.        SECONDARY DISCHARGE DIAGNOSES  Diagnosis: Bullous pemphigoid  Assessment and Plan of Treatment: dx: 8/2014.  On Immunosupressants  Cellcept and IVIG. Received 3 days of IVIG 4/15-4/17. Follow up with your Dermatologist    Diagnosis: COVID-19  Assessment and Plan of Treatment: You tested positive for COVID 19.  You no longer require hospitalization.  Please restrict activities outside of your home except for getting medical care.  Do not go to work, school, or public areas.  Avoid using public transportation, ride-sharing, or taxis.  Separate yourself from other people and animals in your home.  Call ahead before visiting your doctor.  Wear a facemask when you are around other people. Cover your cough and sneezes.  Clean your hands often.  Avoid sharing personal household items.  Clean all frequently touched   You did not need supplemental oxygen.surfaces daily.       PRINCIPAL DISCHARGE DIAGNOSIS  Diagnosis: Pyelonephritis  Assessment and Plan of Treatment: Pyelonephritis with bacteremia  HOME CARE INSTRUCTIONS  Continue Invanz IV thru 4/23/21  Drink enough water and fluids to keep your urine clear or pale yellow.  Avoid caffeine, tea, and carbonated beverages. They tend to irritate your bladder.  Empty your bladder often. Avoid holding urine for long periods of time.  Empty your bladder before and after sexual intercourse.  After a bowel movement, women should cleanse from front to back. Use each tissue only once.  SEEK MEDICAL CARE IF:  You have back pain.  You develop a fever.  Your symptoms do not begin to resolve within 3 days.  SEEK IMMEDIATE MEDICAL CARE IF:  You have severe back pain or lower abdominal pain.  You develop chills.  You have nausea or vomiting.  You have continued burning or discomfort with urination.        SECONDARY DISCHARGE DIAGNOSES  Diagnosis: Bullous pemphigoid  Assessment and Plan of Treatment: dx: 8/2014.  On Immunosupressants  Cellcept and IVIG. Received 3 days of IVIG 4/15-4/17. Follow up with your Dermatologist  Continue home dose of steroids    Diagnosis: COVID-19  Assessment and Plan of Treatment: You tested positive for COVID 19.  You no longer require hospitalization.  Please restrict activities outside of your home except for getting medical care.  Do not go to work, school, or public areas.  Avoid using public transportation, ride-sharing, or taxis.  Separate yourself from other people and animals in your home.  Call ahead before visiting your doctor.  Wear a facemask when you are around other people. Cover your cough and sneezes.  Clean your hands often.  Avoid sharing personal household items.  Clean all frequently touched   You did not need supplemental oxygen.surfaces daily.       PRINCIPAL DISCHARGE DIAGNOSIS  Diagnosis: Pyelonephritis  Assessment and Plan of Treatment: Pyelonephritis with bacteremia  HOME CARE INSTRUCTIONS  Continue Invanz Daily  IV thru 4/23/21  Drink enough water and fluids to keep your urine clear or pale yellow.  Avoid caffeine, tea, and carbonated beverages. They tend to irritate your bladder.  Empty your bladder often. Avoid holding urine for long periods of time.  Empty your bladder before and after sexual intercourse.  After a bowel movement, women should cleanse from front to back. Use each tissue only once.  SEEK MEDICAL CARE IF:  You have back pain.  You develop a fever.  Your symptoms do not begin to resolve within 3 days.  SEEK IMMEDIATE MEDICAL CARE IF:  You have severe back pain or lower abdominal pain.  You develop chills.  You have nausea or vomiting.  You have continued burning or discomfort with urination.  Follow up with PMD and ID         SECONDARY DISCHARGE DIAGNOSES  Diagnosis: Bullous pemphigoid  Assessment and Plan of Treatment: dx: 8/2014.  On Immunosupressants  Cellcept and IVIG. Received 3 days of IVIG 4/15-4/17. Follow up with your Dermatologist  Continue home dose of steroids    Diagnosis: COVID-19  Assessment and Plan of Treatment: You tested positive for COVID 19.  You no longer require hospitalization.  Please restrict activities outside of your home except for getting medical care.  Do not go to work, school, or public areas.  Avoid using public transportation, ride-sharing, or taxis.  Separate yourself from other people and animals in your home.  Call ahead before visiting your doctor.  Wear a facemask when you are around other people. Cover your cough and sneezes.  Clean your hands often.  Avoid sharing personal household items.  Clean all frequently touched   You did not need supplemental oxygen.surfaces daily.      Diagnosis: Diabetes mellitus  Assessment and Plan of Treatment: HgA1C this admission was  6.7  Make sure you get your HgA1c checked every three months.  If you take oral diabetes medications, check your blood glucose two times a day.  If you take insulin, check your blood glucose before meals and at bedtime.  It's important not to skip any meals.  Keep a log of your blood glucose results and always take it with you to your doctor appointments.  Keep a list of your current medications including injectables and over the counter medications and bring this medication list with you to all your doctor appointments.  If you have not seen your ophthalmologist this year call for appointment.  Check your feet daily for redness, sores, or openings. Do not self treat. If no improvement in two days call your primary care physician for an appointment.  Low blood sugar (hypoglycemia) is a blood sugar below 70mg/dl. Check your blood sugar if you feel signs/symptoms of hypoglycemia. If your blood sugar is below 70 take 15 grams of carbohydrates (ex 4 oz of apple juice, 3-4 glucose tablets, or 4-6 oz of regular soda) wait 15 minutes and repeat blood sugar to make sure it comes up above 70.  If your blood sugar is above 70 and you are due for a meal, have a meal.  If you are not due for a meal have a snack.  This snack helps keeps your blood sugar at a safe range.      Diagnosis: Hypothyroid  Assessment and Plan of Treatment: Follow up with PMD for routine thyroid tests in 1 month

## 2021-04-18 LAB
CULTURE RESULTS: SIGNIFICANT CHANGE UP
GLUCOSE BLDC GLUCOMTR-MCNC: 174 MG/DL — HIGH (ref 70–99)
GLUCOSE BLDC GLUCOMTR-MCNC: 204 MG/DL — HIGH (ref 70–99)
GLUCOSE BLDC GLUCOMTR-MCNC: 212 MG/DL — HIGH (ref 70–99)
GLUCOSE BLDC GLUCOMTR-MCNC: 222 MG/DL — HIGH (ref 70–99)

## 2021-04-18 RX ORDER — ERTAPENEM SODIUM 1 G/1
1 INJECTION, POWDER, LYOPHILIZED, FOR SOLUTION INTRAMUSCULAR; INTRAVENOUS
Qty: 5 | Refills: 0
Start: 2021-04-18 | End: 2021-04-22

## 2021-04-18 RX ADMIN — OXYCODONE HYDROCHLORIDE 5 MILLIGRAM(S): 5 TABLET ORAL at 19:32

## 2021-04-18 RX ADMIN — HEPARIN SODIUM 5000 UNIT(S): 5000 INJECTION INTRAVENOUS; SUBCUTANEOUS at 21:10

## 2021-04-18 RX ADMIN — ATORVASTATIN CALCIUM 20 MILLIGRAM(S): 80 TABLET, FILM COATED ORAL at 21:10

## 2021-04-18 RX ADMIN — GABAPENTIN 800 MILLIGRAM(S): 400 CAPSULE ORAL at 11:27

## 2021-04-18 RX ADMIN — GABAPENTIN 800 MILLIGRAM(S): 400 CAPSULE ORAL at 05:36

## 2021-04-18 RX ADMIN — Medication 25 MILLIGRAM(S): at 05:36

## 2021-04-18 RX ADMIN — Medication 1 APPLICATION(S): at 17:27

## 2021-04-18 RX ADMIN — Medication 2: at 17:24

## 2021-04-18 RX ADMIN — GABAPENTIN 800 MILLIGRAM(S): 400 CAPSULE ORAL at 17:25

## 2021-04-18 RX ADMIN — OXYCODONE HYDROCHLORIDE 5 MILLIGRAM(S): 5 TABLET ORAL at 15:44

## 2021-04-18 RX ADMIN — Medication 1 TABLET(S): at 11:27

## 2021-04-18 RX ADMIN — OXYCODONE HYDROCHLORIDE 5 MILLIGRAM(S): 5 TABLET ORAL at 04:46

## 2021-04-18 RX ADMIN — OXYCODONE HYDROCHLORIDE 5 MILLIGRAM(S): 5 TABLET ORAL at 00:33

## 2021-04-18 RX ADMIN — OXYCODONE HYDROCHLORIDE 5 MILLIGRAM(S): 5 TABLET ORAL at 04:16

## 2021-04-18 RX ADMIN — OXYCODONE HYDROCHLORIDE 5 MILLIGRAM(S): 5 TABLET ORAL at 15:14

## 2021-04-18 RX ADMIN — HEPARIN SODIUM 5000 UNIT(S): 5000 INJECTION INTRAVENOUS; SUBCUTANEOUS at 05:40

## 2021-04-18 RX ADMIN — MUPIROCIN 1 APPLICATION(S): 20 OINTMENT TOPICAL at 17:26

## 2021-04-18 RX ADMIN — Medication 150 MICROGRAM(S): at 05:36

## 2021-04-18 RX ADMIN — OXYCODONE HYDROCHLORIDE 5 MILLIGRAM(S): 5 TABLET ORAL at 11:18

## 2021-04-18 RX ADMIN — HEPARIN SODIUM 5000 UNIT(S): 5000 INJECTION INTRAVENOUS; SUBCUTANEOUS at 13:01

## 2021-04-18 RX ADMIN — OXYCODONE HYDROCHLORIDE 5 MILLIGRAM(S): 5 TABLET ORAL at 20:02

## 2021-04-18 RX ADMIN — OXYCODONE HYDROCHLORIDE 5 MILLIGRAM(S): 5 TABLET ORAL at 00:03

## 2021-04-18 RX ADMIN — Medication 81 MILLIGRAM(S): at 11:27

## 2021-04-18 RX ADMIN — OXYCODONE HYDROCHLORIDE 10 MILLIGRAM(S): 5 TABLET ORAL at 11:18

## 2021-04-18 RX ADMIN — ERTAPENEM SODIUM 120 MILLIGRAM(S): 1 INJECTION, POWDER, LYOPHILIZED, FOR SOLUTION INTRAMUSCULAR; INTRAVENOUS at 10:44

## 2021-04-18 RX ADMIN — Medication 4: at 08:15

## 2021-04-18 RX ADMIN — OXYCODONE HYDROCHLORIDE 10 MILLIGRAM(S): 5 TABLET ORAL at 21:10

## 2021-04-18 RX ADMIN — Medication 4: at 12:49

## 2021-04-18 RX ADMIN — OXYCODONE HYDROCHLORIDE 5 MILLIGRAM(S): 5 TABLET ORAL at 10:48

## 2021-04-18 RX ADMIN — NYSTATIN CREAM 1 APPLICATION(S): 100000 CREAM TOPICAL at 17:25

## 2021-04-18 RX ADMIN — Medication 1 APPLICATION(S): at 05:36

## 2021-04-18 RX ADMIN — MUPIROCIN 1 APPLICATION(S): 20 OINTMENT TOPICAL at 05:37

## 2021-04-18 RX ADMIN — Medication 25 MILLIGRAM(S): at 17:25

## 2021-04-18 RX ADMIN — OXYCODONE HYDROCHLORIDE 10 MILLIGRAM(S): 5 TABLET ORAL at 21:40

## 2021-04-18 RX ADMIN — NYSTATIN CREAM 1 APPLICATION(S): 100000 CREAM TOPICAL at 05:37

## 2021-04-18 RX ADMIN — GABAPENTIN 800 MILLIGRAM(S): 400 CAPSULE ORAL at 00:32

## 2021-04-18 RX ADMIN — OXYCODONE HYDROCHLORIDE 10 MILLIGRAM(S): 5 TABLET ORAL at 10:44

## 2021-04-18 NOTE — PROGRESS NOTE ADULT - ASSESSMENT
71 y/o F PMH stage 1 uterine CA s/p TLH/BSO + adj chemo in 2016, bullous pemphigoid on IVIG and daily prednisone 30 mg, DM, surgical hypothyroidism s/p thyroidectomy, chronic pain syndrome, chronic RLE wound hx cellulitis, endometrial CA s/p TRENT presents from home c/o fever [id number] as well as rigors, shaking chills, and increased confusion, found to be COVID + (not hypoxic, clear lungs on CXR), admit for sepsis 2' bacteremia 2' ESBL E coli UTI.

## 2021-04-19 ENCOUNTER — TRANSCRIPTION ENCOUNTER (OUTPATIENT)
Age: 71
End: 2021-04-19

## 2021-04-19 VITALS
TEMPERATURE: 98 F | RESPIRATION RATE: 18 BRPM | HEART RATE: 99 BPM | OXYGEN SATURATION: 97 % | DIASTOLIC BLOOD PRESSURE: 75 MMHG | SYSTOLIC BLOOD PRESSURE: 122 MMHG

## 2021-04-19 LAB
ALBUMIN SERPL ELPH-MCNC: 3 G/DL — LOW (ref 3.3–5)
ALP SERPL-CCNC: 117 U/L — SIGNIFICANT CHANGE UP (ref 40–120)
ALT FLD-CCNC: 20 U/L — SIGNIFICANT CHANGE UP (ref 10–45)
ANION GAP SERPL CALC-SCNC: 7 MMOL/L — SIGNIFICANT CHANGE UP (ref 5–17)
AST SERPL-CCNC: 27 U/L — SIGNIFICANT CHANGE UP (ref 10–40)
BILIRUB SERPL-MCNC: 0.2 MG/DL — SIGNIFICANT CHANGE UP (ref 0.2–1.2)
BUN SERPL-MCNC: 25 MG/DL — HIGH (ref 7–23)
CALCIUM SERPL-MCNC: 8.6 MG/DL — SIGNIFICANT CHANGE UP (ref 8.4–10.5)
CHLORIDE SERPL-SCNC: 102 MMOL/L — SIGNIFICANT CHANGE UP (ref 96–108)
CO2 SERPL-SCNC: 30 MMOL/L — SIGNIFICANT CHANGE UP (ref 22–31)
CREAT SERPL-MCNC: 0.7 MG/DL — SIGNIFICANT CHANGE UP (ref 0.5–1.3)
CULTURE RESULTS: SIGNIFICANT CHANGE UP
CULTURE RESULTS: SIGNIFICANT CHANGE UP
GLUCOSE BLDC GLUCOMTR-MCNC: 181 MG/DL — HIGH (ref 70–99)
GLUCOSE BLDC GLUCOMTR-MCNC: 259 MG/DL — HIGH (ref 70–99)
GLUCOSE BLDC GLUCOMTR-MCNC: 325 MG/DL — HIGH (ref 70–99)
GLUCOSE SERPL-MCNC: 124 MG/DL — HIGH (ref 70–99)
HCT VFR BLD CALC: 34.5 % — SIGNIFICANT CHANGE UP (ref 34.5–45)
HGB BLD-MCNC: 10.7 G/DL — LOW (ref 11.5–15.5)
MCHC RBC-ENTMCNC: 30.8 PG — SIGNIFICANT CHANGE UP (ref 27–34)
MCHC RBC-ENTMCNC: 31 GM/DL — LOW (ref 32–36)
MCV RBC AUTO: 99.4 FL — SIGNIFICANT CHANGE UP (ref 80–100)
NRBC # BLD: 1 /100 WBCS — HIGH (ref 0–0)
PLATELET # BLD AUTO: 236 K/UL — SIGNIFICANT CHANGE UP (ref 150–400)
POTASSIUM SERPL-MCNC: 4.8 MMOL/L — SIGNIFICANT CHANGE UP (ref 3.5–5.3)
POTASSIUM SERPL-SCNC: 4.8 MMOL/L — SIGNIFICANT CHANGE UP (ref 3.5–5.3)
PROT SERPL-MCNC: 7 G/DL — SIGNIFICANT CHANGE UP (ref 6–8.3)
RBC # BLD: 3.47 M/UL — LOW (ref 3.8–5.2)
RBC # FLD: 14.4 % — SIGNIFICANT CHANGE UP (ref 10.3–14.5)
SODIUM SERPL-SCNC: 139 MMOL/L — SIGNIFICANT CHANGE UP (ref 135–145)
SPECIMEN SOURCE: SIGNIFICANT CHANGE UP
SPECIMEN SOURCE: SIGNIFICANT CHANGE UP
WBC # BLD: 6.3 K/UL — SIGNIFICANT CHANGE UP (ref 3.8–10.5)
WBC # FLD AUTO: 6.3 K/UL — SIGNIFICANT CHANGE UP (ref 3.8–10.5)

## 2021-04-19 PROCEDURE — 82533 TOTAL CORTISOL: CPT

## 2021-04-19 PROCEDURE — C1751: CPT

## 2021-04-19 PROCEDURE — 81001 URINALYSIS AUTO W/SCOPE: CPT

## 2021-04-19 PROCEDURE — 97530 THERAPEUTIC ACTIVITIES: CPT

## 2021-04-19 PROCEDURE — 85730 THROMBOPLASTIN TIME PARTIAL: CPT

## 2021-04-19 PROCEDURE — 85018 HEMOGLOBIN: CPT

## 2021-04-19 PROCEDURE — 71045 X-RAY EXAM CHEST 1 VIEW: CPT

## 2021-04-19 PROCEDURE — 85027 COMPLETE CBC AUTOMATED: CPT

## 2021-04-19 PROCEDURE — 87086 URINE CULTURE/COLONY COUNT: CPT

## 2021-04-19 PROCEDURE — 87077 CULTURE AEROBIC IDENTIFY: CPT

## 2021-04-19 PROCEDURE — 87150 DNA/RNA AMPLIFIED PROBE: CPT

## 2021-04-19 PROCEDURE — 82803 BLOOD GASES ANY COMBINATION: CPT

## 2021-04-19 PROCEDURE — U0003: CPT

## 2021-04-19 PROCEDURE — 83735 ASSAY OF MAGNESIUM: CPT

## 2021-04-19 PROCEDURE — 36569 INSJ PICC 5 YR+ W/O IMAGING: CPT

## 2021-04-19 PROCEDURE — 85025 COMPLETE CBC W/AUTO DIFF WBC: CPT

## 2021-04-19 PROCEDURE — 83036 HEMOGLOBIN GLYCOSYLATED A1C: CPT

## 2021-04-19 PROCEDURE — 96365 THER/PROPH/DIAG IV INF INIT: CPT

## 2021-04-19 PROCEDURE — 80053 COMPREHEN METABOLIC PANEL: CPT

## 2021-04-19 PROCEDURE — 97116 GAIT TRAINING THERAPY: CPT

## 2021-04-19 PROCEDURE — 83605 ASSAY OF LACTIC ACID: CPT

## 2021-04-19 PROCEDURE — 99285 EMERGENCY DEPT VISIT HI MDM: CPT

## 2021-04-19 PROCEDURE — 97162 PT EVAL MOD COMPLEX 30 MIN: CPT

## 2021-04-19 PROCEDURE — 82330 ASSAY OF CALCIUM: CPT

## 2021-04-19 PROCEDURE — 96375 TX/PRO/DX INJ NEW DRUG ADDON: CPT

## 2021-04-19 PROCEDURE — 82947 ASSAY GLUCOSE BLOOD QUANT: CPT

## 2021-04-19 PROCEDURE — 87186 SC STD MICRODIL/AGAR DIL: CPT

## 2021-04-19 PROCEDURE — 87040 BLOOD CULTURE FOR BACTERIA: CPT

## 2021-04-19 PROCEDURE — 84132 ASSAY OF SERUM POTASSIUM: CPT

## 2021-04-19 PROCEDURE — 97165 OT EVAL LOW COMPLEX 30 MIN: CPT

## 2021-04-19 PROCEDURE — 82435 ASSAY OF BLOOD CHLORIDE: CPT

## 2021-04-19 PROCEDURE — 86769 SARS-COV-2 COVID-19 ANTIBODY: CPT

## 2021-04-19 PROCEDURE — 84295 ASSAY OF SERUM SODIUM: CPT

## 2021-04-19 PROCEDURE — 85014 HEMATOCRIT: CPT

## 2021-04-19 PROCEDURE — 80048 BASIC METABOLIC PNL TOTAL CA: CPT

## 2021-04-19 PROCEDURE — 82962 GLUCOSE BLOOD TEST: CPT

## 2021-04-19 PROCEDURE — 84100 ASSAY OF PHOSPHORUS: CPT

## 2021-04-19 PROCEDURE — 97110 THERAPEUTIC EXERCISES: CPT

## 2021-04-19 PROCEDURE — 85610 PROTHROMBIN TIME: CPT

## 2021-04-19 PROCEDURE — U0005: CPT

## 2021-04-19 RX ORDER — INSULIN GLARGINE 100 [IU]/ML
20 INJECTION, SOLUTION SUBCUTANEOUS
Qty: 0 | Refills: 0 | DISCHARGE

## 2021-04-19 RX ORDER — MUPIROCIN 20 MG/G
1 OINTMENT TOPICAL
Qty: 1 | Refills: 0
Start: 2021-04-19 | End: 2021-05-02

## 2021-04-19 RX ORDER — SODIUM CHLORIDE 0.65 %
1 AEROSOL, SPRAY (ML) NASAL
Refills: 0 | Status: DISCONTINUED | OUTPATIENT
Start: 2021-04-19 | End: 2021-04-19

## 2021-04-19 RX ORDER — NYSTATIN CREAM 100000 [USP'U]/G
1 CREAM TOPICAL
Qty: 1 | Refills: 0
Start: 2021-04-19 | End: 2021-05-02

## 2021-04-19 RX ORDER — INSULIN LISPRO 100/ML
14 VIAL (ML) SUBCUTANEOUS
Qty: 0 | Refills: 0 | DISCHARGE

## 2021-04-19 RX ORDER — SODIUM CHLORIDE 0.65 %
1 AEROSOL, SPRAY (ML) NASAL
Qty: 0 | Refills: 0 | DISCHARGE
Start: 2021-04-19

## 2021-04-19 RX ORDER — GABAPENTIN 400 MG/1
1 CAPSULE ORAL
Qty: 0 | Refills: 0 | DISCHARGE

## 2021-04-19 RX ORDER — GABAPENTIN 400 MG/1
1 CAPSULE ORAL
Qty: 120 | Refills: 0
Start: 2021-04-19 | End: 2021-05-18

## 2021-04-19 RX ADMIN — OXYCODONE HYDROCHLORIDE 5 MILLIGRAM(S): 5 TABLET ORAL at 16:11

## 2021-04-19 RX ADMIN — Medication 25 MILLIGRAM(S): at 05:19

## 2021-04-19 RX ADMIN — GABAPENTIN 800 MILLIGRAM(S): 400 CAPSULE ORAL at 00:04

## 2021-04-19 RX ADMIN — Medication 150 MICROGRAM(S): at 05:20

## 2021-04-19 RX ADMIN — NYSTATIN CREAM 1 APPLICATION(S): 100000 CREAM TOPICAL at 17:50

## 2021-04-19 RX ADMIN — OXYCODONE HYDROCHLORIDE 5 MILLIGRAM(S): 5 TABLET ORAL at 17:00

## 2021-04-19 RX ADMIN — Medication 1 SPRAY(S): at 05:19

## 2021-04-19 RX ADMIN — OXYCODONE HYDROCHLORIDE 5 MILLIGRAM(S): 5 TABLET ORAL at 09:10

## 2021-04-19 RX ADMIN — OXYCODONE HYDROCHLORIDE 5 MILLIGRAM(S): 5 TABLET ORAL at 08:21

## 2021-04-19 RX ADMIN — Medication 2: at 17:13

## 2021-04-19 RX ADMIN — GABAPENTIN 800 MILLIGRAM(S): 400 CAPSULE ORAL at 11:51

## 2021-04-19 RX ADMIN — ERTAPENEM SODIUM 120 MILLIGRAM(S): 1 INJECTION, POWDER, LYOPHILIZED, FOR SOLUTION INTRAMUSCULAR; INTRAVENOUS at 11:45

## 2021-04-19 RX ADMIN — Medication 81 MILLIGRAM(S): at 11:51

## 2021-04-19 RX ADMIN — OXYCODONE HYDROCHLORIDE 5 MILLIGRAM(S): 5 TABLET ORAL at 13:01

## 2021-04-19 RX ADMIN — Medication 6: at 08:02

## 2021-04-19 RX ADMIN — Medication 8: at 11:53

## 2021-04-19 RX ADMIN — Medication 1 TABLET(S): at 11:51

## 2021-04-19 RX ADMIN — OXYCODONE HYDROCHLORIDE 5 MILLIGRAM(S): 5 TABLET ORAL at 04:50

## 2021-04-19 RX ADMIN — GABAPENTIN 800 MILLIGRAM(S): 400 CAPSULE ORAL at 05:20

## 2021-04-19 RX ADMIN — Medication 1 APPLICATION(S): at 05:20

## 2021-04-19 RX ADMIN — OXYCODONE HYDROCHLORIDE 10 MILLIGRAM(S): 5 TABLET ORAL at 09:52

## 2021-04-19 RX ADMIN — OXYCODONE HYDROCHLORIDE 5 MILLIGRAM(S): 5 TABLET ORAL at 00:04

## 2021-04-19 RX ADMIN — MUPIROCIN 1 APPLICATION(S): 20 OINTMENT TOPICAL at 05:21

## 2021-04-19 RX ADMIN — OXYCODONE HYDROCHLORIDE 5 MILLIGRAM(S): 5 TABLET ORAL at 00:34

## 2021-04-19 RX ADMIN — NYSTATIN CREAM 1 APPLICATION(S): 100000 CREAM TOPICAL at 07:01

## 2021-04-19 RX ADMIN — OXYCODONE HYDROCHLORIDE 5 MILLIGRAM(S): 5 TABLET ORAL at 04:20

## 2021-04-19 RX ADMIN — HEPARIN SODIUM 5000 UNIT(S): 5000 INJECTION INTRAVENOUS; SUBCUTANEOUS at 05:19

## 2021-04-19 RX ADMIN — MUPIROCIN 1 APPLICATION(S): 20 OINTMENT TOPICAL at 18:10

## 2021-04-19 RX ADMIN — GABAPENTIN 800 MILLIGRAM(S): 400 CAPSULE ORAL at 17:13

## 2021-04-19 RX ADMIN — OXYCODONE HYDROCHLORIDE 10 MILLIGRAM(S): 5 TABLET ORAL at 10:30

## 2021-04-19 RX ADMIN — OXYCODONE HYDROCHLORIDE 5 MILLIGRAM(S): 5 TABLET ORAL at 13:58

## 2021-04-19 RX ADMIN — HEPARIN SODIUM 5000 UNIT(S): 5000 INJECTION INTRAVENOUS; SUBCUTANEOUS at 13:04

## 2021-04-19 NOTE — PROGRESS NOTE ADULT - PROBLEM SELECTOR PLAN 4
COVID positive on admission, per pt has been positive since January and has no symptoms  No signs of active infection  This is not a reinfection  Maintain precautions
COVID positive on admission, per pt has been positive since January and has no symptoms  No signs of active infection  This is not a reinfection  Maintain precautions
COVID positive on admission, per pt has been positive since January and has no symptoms  No signs of active infection  This is not a reinfection
COVID positive on admission, per pt has been positive since January and has no symptoms  No signs of active infection  This is not a reinfection
COVID positive on admission, per pt has been positive since January and has no symptoms  No signs of active infection  This is not a reinfection  Maintain precautions
COVID positive on admission, per pt has been positive since January and has no symptoms  No signs of active infection  This is not a reinfection

## 2021-04-19 NOTE — PROGRESS NOTE ADULT - PROBLEM SELECTOR PLAN 8
On oral medications at home  Pt was prescribed Lantus in rehab and had hypoglycemic episode to 45.   Cont SSI and fingersticks qachs
On oral medications at home  Pt was prescribed lantus in rehab and had hypoglycemic episode to 45.   Cont SSI and fingersticks qachs
Hold lisinopril, BP at goal
On oral medications at home  Pt was prescribed Lantus in rehab and had hypoglycemic episode to 45.   Cont SSI and fingersticks qachs

## 2021-04-19 NOTE — PROGRESS NOTE ADULT - PROBLEM SELECTOR PLAN 10
DVT ppx- HSQ  PT eval  OT eval

## 2021-04-19 NOTE — PROGRESS NOTE ADULT - PROBLEM SELECTOR PLAN 1
ESBL E coli grew in 2/4 bottles with methicillin resistant S. epidermis growing in aerobic bottle of the other set  Urine culture 4/13 grew same organism  Now on ertapenem since 4/14/21 --> will need 10 day course from 1st negative BCx until 4/23 through midline  Repeat blood cxs 4/14 so far (-)  Appreciate ID
ESBL E coli grew in 2/4 bottles with methicillin resistant S. epidermis growing in aerobic bottle of the other set  Urine culture 4/13 grew same organism  Now on ertapenem since 4/14/21 --> will need 10 day course from 1st negative BCx until 4/23 through midline  Repeat blood cxs 4/14 so far (-)  ID following
ESBL E coli started growing in 2/4 bottles with methicillin resistant S. epidermis growing in aerobic bottle of the other set  Urine culture still testing  Now on ertapenem since 4/14/21  Lily PALM
ESBL E coli grew in 2/4 bottles with methicillin resistant S. epidermis growing in aerobic bottle of the other set  Urine culture 4/13 grew same organism  Now on ertapenem since 4/14/21 --> will need 10 day course from 1st negative BCx until 4/23 through midline  Repeat blood cxs 4/14 so far (-)  ID following
ESBL E coli started growing in 2/4 bottles with methicillin resistant S. epidermis growing in aerobic bottle of the other set  Urine culture 4/13 grew same organism  Now on ertapenem since 4/14/21 --> will need x10 day course from 1st negative BCx until 4/23 through midline  Repeat blood cxs 4/14 so far (-)  Appreciate ID
ESBL E coli grew in 2/4 bottles with methicillin resistant S. epidermis growing in aerobic bottle of the other set  Urine culture 4/13 grew same organism  Now on ertapenem since 4/14/21 --> will need 10 day course from 1st negative BCx until 4/23 through midline  Repeat blood cxs 4/14 so far (-)  ID following

## 2021-04-19 NOTE — PROGRESS NOTE ADULT - PROBLEM SELECTOR PROBLEM 5
Hypothyroidism
Steroid dependence

## 2021-04-19 NOTE — PROGRESS NOTE ADULT - PROBLEM SELECTOR PLAN 7
No active tx
No active tx
On oral medications at home  Pt was prescribed lantus in rehab and had hypoglycemic episode to 45.   Cont SSI and fingersticks qachs
No active tx

## 2021-04-19 NOTE — PROGRESS NOTE ADULT - PROBLEM SELECTOR PLAN 6
No active tx
Cont synthroid 150 mcg daily
Cont synthroid 150 mcg daily
Cont Synthroid

## 2021-04-19 NOTE — PROGRESS NOTE ADULT - PROBLEM SELECTOR PLAN 5
Tapering IV hydrocortisone since sepsis has resolved
Cont synthroid 150 mcg daily
Already tapered off IV hydrocortisone back onto prednisone

## 2021-04-19 NOTE — PROGRESS NOTE ADULT - NSICDXPILOT_GEN_ALL_CORE
[place]

## 2021-04-19 NOTE — PROGRESS NOTE ADULT - ASSESSMENT
69 y/o F PMH stage 1 uterine CA s/p TLH/BSO + adj chemo in 2016, bullous pemphigoid on IVIG and daily prednisone 30 mg, DM, surgical hypothyroidism s/p thyroidectomy, chronic pain syndrome, chronic RLE wound hx cellulitis, endometrial CA s/p TRENT presents from home c/o fever [id number] as well as rigors, shaking chills, and increased confusion, found to be COVID + (not hypoxic, clear lungs on CXR), admit for sepsis 2' bacteremia 2' ESBL E coli UTI.  71 y/o F PMH stage 1 uterine CA s/p TLH/BSO + adj chemo in 2016, bullous pemphigoid on IVIG and daily prednisone 30 mg, DM, surgical hypothyroidism s/p thyroidectomy, chronic pain syndrome, chronic RLE wound hx cellulitis, endometrial CA s/p TRENT presents from home c/o fever [id number] as well as rigors, shaking chills, and increased confusion, found to be COVID + (not hypoxic, clear lungs on CXR), admit for sepsis 2' bacteremia 2' ESBL E coli UTI.      Problem/Plan - :  ·  Problem: Bacteremia.  Plan: ESBL E coli grew in 2/4 bottles with methicillin resistant S. epidermis growing in aerobic bottle of the other set  Urine culture 4/13 grew same organism  Now on ertapenem since 4/14/21 --> will need 10 day course from 1st negative BCx until 4/23 through midline  Repeat blood cxs 4/14 so far (-)  ID following.      Problem/Plan - :  ·  Problem: Sepsis.  Plan: 2' bacteremia 2' ESBL E coli UTI  Resolved.      Problem/Plan - :  ·  Problem: Altered mental status.  Plan: 2' metabolic encephalopathy  Resolved.     Problem/Plan - :  ·  Problem: Bullous pemphigoid.  Plan: Receiving IVIG q month as well as dupixent q week  Received IVIG 4/15-4/17  Tapered off stress-dose steroids (back on prednisone 30 mg daily)  Appreciate derm input.      Problem/Plan - :  ·  Problem: COVID-19.  Plan: COVID positive on admission, per pt has been positive since January and has no symptoms  No signs of active infection  This is not a reinfection  Maintain precautions.      Problem/Plan - :  ·  Problem: Steroid dependence.  Plan: Already tapered off IV hydrocortisone back onto prednisone.      Problem/Plan - :  ·  Problem: Hypothyroidism. Plan: Cont Synthroid.     Problem/Plan - :  ·  Problem: Endometrial cancer.  Plan: No active tx.      Problem/Plan - :  ·  Problem: Type 2 diabetes mellitus.  Plan: On oral medications at home  Pt was prescribed Lantus in rehab and had hypoglycemic episode to 45.   Cont SSI and fingersticks qachs.      Problem/Plan - :  ·  Problem: Hypertension.  Plan: Hold lisinopril for now.      Problem/Plan - :  ·  Problem: Need for prophylactic measure. Plan; DVT ppx- HSQ  PT eval  OT eval.

## 2021-04-19 NOTE — PROGRESS NOTE ADULT - NUTRITIONAL ASSESSMENT
This patient has been assessed with a concern for Malnutrition and has been determined to have a diagnosis/diagnoses of Morbid obesity (BMI > 40).    This patient is being managed with:   Diet DASH/TLC-  Sodium & Cholesterol Restricted  Entered: Apr 13 2021  5:36AM    The following pending diet order is being considered for treatment of Morbid obesity (BMI > 40):  Diet Regular-  Consistent Carbohydrate {Evening Snack} (CSTCHOSN)  Low Sodium  Supplement Feeding Modality:  Oral  Ensure Max Cans or Servings Per Day:  2       Frequency:  Daily  Entered: Apr 16 2021  3:38PM  

## 2021-04-19 NOTE — PROGRESS NOTE ADULT - PROBLEM SELECTOR PLAN 3
Receiving IVIG q month as well as dupixent q week  May proceed with IVIG 4/15-4/17  Appreciate derm input
Receiving IVIG q month as well as dupixent q week  Receiving IVIG 4/15-4/17  Tapering down stress-dose steroids (on prednisone 30 mg daily)  Appreciate derm input
Receiving IVIG q month as well as dupixent q week  May proceed with IVIG  Appreciate derm input
Receiving IVIG q month as well as dupixent q week  Receiving IVIG 4/15-4/17  Tapering down stress-dose steroids (on prednisone 30 mg daily)  Appreciate derm input
Receiving IVIG q month as well as dupixent q week  Received IVIG 4/15-4/17  Tapered off stress-dose steroids (back on prednisone 30 mg daily)  Appreciate derm input
Receiving IVIG q month as well as dupixent q week  Receiving IVIG 4/15-4/17  Tapering down stress-dose steroids (on prednisone 30 mg daily)  Appreciate derm input

## 2021-04-19 NOTE — CHART NOTE - NSCHARTNOTESELECT_GEN_ALL_CORE
GNR Bacteremia/Event Note
discharge/Event Note
Dermatology/Event Note
Event Note
wound team/Event Note

## 2021-04-19 NOTE — PROGRESS NOTE ADULT - PROBLEM SELECTOR PROBLEM 8
Type 2 diabetes mellitus
Hypertension

## 2021-04-19 NOTE — DISCHARGE NOTE NURSING/CASE MANAGEMENT/SOCIAL WORK - PATIENT PORTAL LINK FT
You can access the FollowMyHealth Patient Portal offered by [facility] by registering at the following website: [email or web address]. By joining [employer]’s FollowMyHealth portal, you will also be able to view your health information using other applications (apps) compatible with our system.

## 2021-04-19 NOTE — PROGRESS NOTE ADULT - SUBJECTIVE AND OBJECTIVE BOX
[facility], Division of Infectious Diseases  MAYDA Braxton Y. Patel, S. Shah  [phone]    Name: JAKE ZIMMERMAN  Age: 70y  Gender: Female  MRN: [id number]  Note Date: 04-15-21    Interval History:  Patient seen and examined at bedside this morning  No acute overnight events. Afebrile  No complaints  Notes reviewed    Antibiotics:  ertapenem  IVPB      ertapenem  IVPB 1000 milliGRAM(s) IV Intermittent every 24 hours      Medications:  acetaminophen   Tablet .. 650 milliGRAM(s) Oral every 6 hours PRN  aspirin enteric coated 81 milliGRAM(s) Oral daily  atorvastatin 20 milliGRAM(s) Oral at bedtime  clobetasol 0.05% Ointment 1 Application(s) Topical two times a day PRN  dextrose 40% Gel 15 Gram(s) Oral once  dextrose 5%. 1000 milliLiter(s) IV Continuous <Continuous>  dextrose 50% Injectable 25 Gram(s) IV Push once  ertapenem  IVPB      ertapenem  IVPB 1000 milliGRAM(s) IV Intermittent every 24 hours  gabapentin 800 milliGRAM(s) Oral four times a day  glucagon  Injectable 1 milliGRAM(s) IntraMuscular once  heparin   Injectable 5000 Unit(s) SubCutaneous every 8 hours  hydrocortisone sodium succinate Injectable 50 milliGRAM(s) IV Push every 6 hours  insulin lispro (ADMELOG) corrective regimen sliding scale   SubCutaneous three times a day before meals  insulin lispro (ADMELOG) corrective regimen sliding scale   SubCutaneous at bedtime  levothyroxine 150 MICROGram(s) Oral daily  multivitamin 1 Tablet(s) Oral daily  mupirocin 2% Ointment 1 Application(s) Topical two times a day  oxyCODONE    IR 5 milliGRAM(s) Oral every 4 hours PRN  senna 2 Tablet(s) Oral at bedtime      Review of Systems:  A 10-point review of systems was obtained.     Pertinent positives and negatives--  Constitutional: No fevers. No Chills. No Rigors.   Cardiovascular: No chest pain. No palpitations.  Respiratory: No shortness of breath. No cough.  Gastrointestinal: No nausea or vomiting. No diarrhea or constipation.   Psychiatric: Pleasant. Appropriate affect.    Review of systems otherwise negative except as previously noted.    Allergies: Ancef (Rash)  daptomycin (Vomiting)  Keflex (Unknown)  penicillin (Pruritus; Rash; Hives)    For details regarding the patient's past medical history, social history, family history, and other miscellaneous elements, please refer the initial infectious diseases consultation and/or the admitting history and physical examination for this admission.    Objective:  Vitals:   T(C): 36.8 (04-15-21 @ 05:55), Max: 36.9 (04-14-21 @ 21:44)  HR: 76 (04-15-21 @ 05:55) (59 - 76)  BP: 126/75 (04-15-21 @ 05:55) (118/60 - 126/75)  RR: 18 (04-15-21 @ 05:55) (18 - 20)  SpO2: 96% (04-15-21 @ 05:55) (94% - 97%)    Physical Examination:  General: no acute distress  HEENT: NC/AT, EOMI, anicteric, no oral lesions  Neck: supple, no palpable LAD  Cardio: S1, S2 heard, RRR, no murmurs  Resp: breath sounds heard bilaterally, no rales, wheezes or rhonchi  Abd: soft, NT, ND, + bowel sounds  Neuro: AAOx3, no obvious focal deficits  Ext: RLE erythema, cool to tough  Skin: warm, dry, no visible rash      Laboratory Studies:  CBC:                       10.4   8.27  )-----------( 231      ( 15 Apr 2021 06:39 )             33.2     CMP: 04-15    143  |  106  |  25<H>  ----------------------------<  281<H>  3.6   |  25  |  0.82    Ca    8.6      15 Apr 2021 06:39          Microbiology: reviewed    Culture - Blood (collected 04-14-21 @ 04:31)  Source: .Blood Blood  Preliminary Report (04-15-21 @ 05:01):    No growth to date.    Culture - Blood (collected 04-14-21 @ 04:31)  Source: .Blood Blood  Preliminary Report (04-15-21 @ 05:01):    No growth to date.    Culture - Urine (collected 04-13-21 @ 08:20)  Source: .Urine Clean Catch (Midstream)  Final Report (04-15-21 @ 11:22):    >100,000 CFU/ml Escherichia coli ESBL  Organism: Escherichia coli ESBL (04-15-21 @ 11:22)  Organism: Escherichia coli ESBL (04-15-21 @ 11:22)      -  Amikacin: S <=16      -  Amoxicillin/Clavulanic Acid: S <=8/4      -  Ampicillin: R >16 These ampicillin results predict results for amoxicillin      -  Ampicillin/Sulbactam: S 8/4 Enterobacter, Citrobacter, and Serratia may develop resistance during prolonged therapy (3-4 days)      -  Aztreonam: R 16      -  Cefazolin: R >16 (MIC_CL_COM_[email or web address]) For uncomplicated UTI with K. pneumoniae, E. coli, or P. mirablis: ARIANE <=16 is sensitive and ARIANE >=32 is resistant. This also predicts results for oral agents cefaclor, cefdinir, cefpodoxime, cefprozil, cefuroxime axetil, cephalexin and locarbef for uncomplicated UTI. Note that some isolates may be susceptible to these agents while testing resistant to cefazolin.      -  Cefepime: R 16      -  Cefoxitin: S <=8      -  Ceftriaxone: R >32 Enterobacter, Citrobacter, and Serratia may develop resistance during prolonged therapy      -  Ciprofloxacin: S <=0.25      -  Ertapenem: S <=0.5      -  Gentamicin: S <=2      -  Imipenem: S <=1      -  Levofloxacin: S <=0.5      -  Meropenem: S <=1      -  Nitrofurantoin: S <=32 Should not be used to treat pyelonephritis      -  Piperacillin/Tazobactam: S <=8      -  Tigecycline: S <=2      -  Tobramycin: S <=2      -  Trimethoprim/Sulfamethoxazole: R >2/38      Method Type: ARIANE    Culture - Blood (collected 04-13-21 @ 07:02)  Source: .Blood Blood-Peripheral  Gram Stain (04-14-21 @ 05:17):    Growth in aerobic bottle: Gram Positive Cocci in Clusters  Final Report (04-15-21 @ 10:25):    Growth in aerobic bottle: Staphylococcus hominis    Growth in aerobic bottle: Staphylococcus epidermidis    Coag Negative Staphylococcus    Single set isolate, possible contaminant. Contact    Microbiology if susceptibility testing clinically    indicated.    ***Blood Panel PCR results on this specimen are available    approximately 3 hours after the Gram stain result.***    Gram stain, PCR, and/or culture results may not always    correspond due to difference in methodologies.    ************************************************************    This PCR assay was performed by multiplex PCR. This    Assay tests for 66 bacterial and resistance gene targets.    Please refer to the [employer] test directory    at [email or web address] for details.  Organism: Blood Culture PCR (04-15-21 @ 10:25)  Organism: Blood Culture PCR (04-15-21 @ 10:25)      -  Staphylococcus epidermidis, Methicillin resistant: Detec      Method Type: PCR    Culture - Blood (collected 04-13-21 @ 07:02)  Source: .Blood Blood-Peripheral  Gram Stain (04-13-21 @ 20:59):    Growth in aerobic bottle: Gram Negative Rods    Growth in anaerobic bottle: Gram Negative Rods  Preliminary Report (04-14-21 @ 17:51):    Growth in aerobic and anaerobic bottles: Escherichia coli    MDRO detected in BCID PCR, resistance marker = CTX-M (ESBL)    ***Blood Panel PCR results on this specimen are available    approximately 3 hours after the Gram stain result.***    Gram stain, PCR, and/or culture results may not always    correspond due to difference in methodologies.    ************************************************************    This PCR assay was performed by multiplex PCR. This    Assay tests for 66 bacterial and resistance gene targets.    Please refer to the [employer] test directory    at [email or web address] for details.  Organism: Blood Culture PCR (04-13-21 @ 20:56)  Organism: Blood Culture PCR (04-13-21 @ 20:56)      -  ESBL: Detec      -  Escherichia coli: Detec      Method Type: PCR        Radiology: reviewed      
No fevers last night  She feels quite well    Vital Signs Last 24 Hrs  T(C): 36.3 (15 Apr 2021 14:19), Max: 36.9 (14 Apr 2021 21:44)  T(F): 97.3 (15 Apr 2021 14:19), Max: 98.5 (14 Apr 2021 21:44)  HR: 91 (15 Apr 2021 14:19) (68 - 91)  BP: 90/62 (15 Apr 2021 14:19) (90/62 - 126/75)  BP(mean): --  RR: 18 (15 Apr 2021 14:19) (18 - 18)  SpO2: 98% (15 Apr 2021 14:19) (94% - 98%)    I&O's Summary    04-14-21 @ 07:01  -  04-15-21 @ 07:00  --------------------------------------------------------  IN: 1660 mL / OUT: 1000 mL / NET: 660 mL    04-15-21 @ 07:01  -  04-15-21 @ 14:31  --------------------------------------------------------  IN: 360 mL / OUT: 700 mL / NET: -340 mL        GENERAL: NAD  HEAD:  Atraumatic, Normocephalic  ENT: EOMI, PERRLA, conjunctiva and sclera clear  CHEST/LUNG: Clear to auscultation bilaterally; No wheeze, equal breath sounds bilaterally   HEART: Regular rate and rhythm; No murmurs, rubs, or gallops  ABDOMEN: Obese. Soft Nontender, Nondistended; Bowel sounds present, no organomegaly  EXTREMITIES: RLE erythematous, warm to touch, edematous, shiny appearing skin  PSYCH: nl affect  NEUROLOGY: A+O x 3, non-focal, cranial nerves intact  SKIN: Multiple BP lesions b/l extremities and abdomen, none appear bullous, all flat and pink    LABS:                        10.4   8.27  )-----------( 231      ( 15 Apr 2021 06:39 )             33.2     04-15    143  |  106  |  25<H>  ----------------------------<  281<H>  3.6   |  25  |  0.82    Ca    8.6      15 Apr 2021 06:39        CAPILLARY BLOOD GLUCOSE      POCT Blood Glucose.: 295 mg/dL (15 Apr 2021 11:44)  POCT Blood Glucose.: 261 mg/dL (15 Apr 2021 07:42)  POCT Blood Glucose.: 293 mg/dL (14 Apr 2021 21:24)  POCT Blood Glucose.: 266 mg/dL (14 Apr 2021 17:25)            RADIOLOGY & ADDITIONAL TESTS:    Imaging Personally Reviewed:  [x] YES  [ ] NO    Consultant(s) Notes Reviewed:  [x] YES  [ ] NO        
[facility], Division of Infectious Diseases  MAYDA Braxton Y. Patel, S. Shah  [phone]    Name: JAKE ZIMMERMAN  Age: 70y  Gender: Female  MRN: [id number]  Note Date: 04-16-21    Interval History:  Patient seen and examined at bedside this morning  No acute overnight events. Afebrile  Had IVIG yesterday  No complaints  Notes reviewed    Antibiotics:  ertapenem  IVPB      ertapenem  IVPB 1000 milliGRAM(s) IV Intermittent every 24 hours      Medications:  acetaminophen   Tablet .. 650 milliGRAM(s) Oral every 6 hours PRN  aspirin enteric coated 81 milliGRAM(s) Oral daily  atorvastatin 20 milliGRAM(s) Oral at bedtime  clobetasol 0.05% Ointment 1 Application(s) Topical two times a day PRN  dextrose 40% Gel 15 Gram(s) Oral once  dextrose 5%. 1000 milliLiter(s) IV Continuous <Continuous>  dextrose 50% Injectable 25 Gram(s) IV Push once  diphenhydrAMINE   Injectable 25 milliGRAM(s) IV Push every 4 hours PRN  ertapenem  IVPB      ertapenem  IVPB 1000 milliGRAM(s) IV Intermittent every 24 hours  gabapentin 800 milliGRAM(s) Oral four times a day  glucagon  Injectable 1 milliGRAM(s) IntraMuscular once  heparin   Injectable 5000 Unit(s) SubCutaneous every 8 hours  hydrocortisone sodium succinate Injectable 25 milliGRAM(s) IV Push every 12 hours  immune   globulin 10% (GAMMAGARD) IVPB 40 Gram(s) IV Intermittent daily  insulin lispro (ADMELOG) corrective regimen sliding scale   SubCutaneous at bedtime  insulin lispro (ADMELOG) corrective regimen sliding scale   SubCutaneous three times a day before meals  levothyroxine 150 MICROGram(s) Oral daily  multivitamin 1 Tablet(s) Oral daily  mupirocin 2% Ointment 1 Application(s) Topical two times a day  oxyCODONE    IR 5 milliGRAM(s) Oral every 4 hours PRN  oxyCODONE  ER Tablet 10 milliGRAM(s) Oral every 12 hours  senna 2 Tablet(s) Oral at bedtime      Review of Systems:  A 10-point review of systems was obtained.     Pertinent positives and negatives--  Constitutional: No fevers. No Chills. No Rigors.   Cardiovascular: No chest pain. No palpitations.  Respiratory: No shortness of breath. No cough.  Gastrointestinal: No nausea or vomiting. No diarrhea or constipation.   Psychiatric: Pleasant. Appropriate affect.    Review of systems otherwise negative except as previously noted.    Allergies: Ancef (Rash)  daptomycin (Vomiting)  Keflex (Unknown)  penicillin (Pruritus; Rash; Hives)    For details regarding the patient's past medical history, social history, family history, and other miscellaneous elements, please refer the initial infectious diseases consultation and/or the admitting history and physical examination for this admission.    Objective:  Vitals:   T(C): 36.9 (04-16-21 @ 06:02), Max: 37.1 (04-15-21 @ 17:30)  HR: 74 (04-16-21 @ 06:02) (71 - 91)  BP: 136/74 (04-16-21 @ 06:02) (90/62 - 136/74)  RR: 18 (04-16-21 @ 06:02) (17 - 18)  SpO2: 97% (04-16-21 @ 06:02) (94% - 98%)    Physical Examination:  General: no acute distress  HEENT: NC/AT, EOMI, anicteric, no oral lesions  Neck: supple, no palpable LAD  Cardio: S1, S2 heard, RRR, no murmurs  Resp: breath sounds heard bilaterally, no rales, wheezes or rhonchi  Abd: soft, NT, ND, + bowel sounds  Neuro: AAOx3, no obvious focal deficits  Ext: no edema or cyanosis, RLE erythema, wounds, c/d/i.   Skin: warm, dry, no visible rash      Laboratory Studies:  CBC:                       10.4   4.95  )-----------( 222      ( 16 Apr 2021 07:03 )             34.0     CMP: 04-16    141  |  106  |  26<H>  ----------------------------<  307<H>  3.6   |  28  |  0.72    Ca    8.3<L>      16 Apr 2021 07:01          Microbiology: reviewed    Culture - Blood (collected 04-14-21 @ 04:31)  Source: .Blood Blood  Preliminary Report (04-15-21 @ 05:01):    No growth to date.    Culture - Blood (collected 04-14-21 @ 04:31)  Source: .Blood Blood  Preliminary Report (04-15-21 @ 05:01):    No growth to date.    Culture - Urine (collected 04-13-21 @ 08:20)  Source: .Urine Clean Catch (Midstream)  Final Report (04-15-21 @ 11:22):    >100,000 CFU/ml Escherichia coli ESBL  Organism: Escherichia coli ESBL (04-15-21 @ 11:22)  Organism: Escherichia coli ESBL (04-15-21 @ 11:22)      -  Amikacin: S <=16      -  Amoxicillin/Clavulanic Acid: S <=8/4      -  Ampicillin: R >16 These ampicillin results predict results for amoxicillin      -  Ampicillin/Sulbactam: S 8/4 Enterobacter, Citrobacter, and Serratia may develop resistance during prolonged therapy (3-4 days)      -  Aztreonam: R 16      -  Cefazolin: R >16 (MIC_CL_COM_ENTERIC_CEFAZU) For uncomplicated UTI with K. pneumoniae, E. coli, or P. mirablis: ARIANE <=16 is sensitive and ARIANE >=32 is resistant. This also predicts results for oral agents cefaclor, cefdinir, cefpodoxime, cefprozil, cefuroxime axetil, cephalexin and locarbef for uncomplicated UTI. Note that some isolates may be susceptible to these agents while testing resistant to cefazolin.      -  Cefepime: R 16      -  Cefoxitin: S <=8      -  Ceftriaxone: R >32 Enterobacter, Citrobacter, and Serratia may develop resistance during prolonged therapy      -  Ciprofloxacin: S <=0.25      -  Ertapenem: S <=0.5      -  Gentamicin: S <=2      -  Imipenem: S <=1      -  Levofloxacin: S <=0.5      -  Meropenem: S <=1      -  Nitrofurantoin: S <=32 Should not be used to treat pyelonephritis      -  Piperacillin/Tazobactam: S <=8      -  Tigecycline: S <=2      -  Tobramycin: S <=2      -  Trimethoprim/Sulfamethoxazole: R >2/38      Method Type: ARIANE    Culture - Blood (collected 04-13-21 @ 07:02)  Source: .Blood Blood-Peripheral  Gram Stain (04-16-21 @ 06:58):    Growth in aerobic bottle: Gram Positive Cocci in Clusters    Growth in anaerobic bottle: Gram Negative Rods  Final Report (04-16-21 @ 07:00):    Growth in aerobic bottle: Staphylococcus hominis    Growth in aerobic bottle: Staphylococcus epidermidis    Coag Negative Staphylococcus    Single set isolate, possible contaminant. Contact    Microbiology if susceptibility testing clinically    indicated.    Growth in anaerobic bottle: Gram Negative Rods    ***Blood Panel PCR results on this specimen are available    approximately 3 hours after the Gram stain result.***    Gram stain, PCR, and/or culture results may not always    correspond due to difference in methodologies.    ************************************************************    This PCR assay was performed by multiplex PCR. This    Assay tests for 66 bacterial and resistance gene targets.    Please refer to the [employer] test directory    at [email or web address] for details.  Organism: Blood Culture PCR (04-16-21 @ 07:00)      -  Staphylococcus epidermidis, Methicillin resistant: Detec      Method Type: PCR  Organism: Blood Culture PCR (04-15-21 @ 10:25)    Culture - Blood (collected 04-13-21 @ 07:02)  Source: .Blood Blood-Peripheral  Gram Stain (04-13-21 @ 20:59):    Growth in aerobic bottle: Gram Negative Rods    Growth in anaerobic bottle: Gram Negative Rods  Final Report (04-15-21 @ 16:24):    Growth in aerobic and anaerobic bottles: Escherichia coli ESBL    MDRO detected in BCID PCR, resistance marker = CTX-M (ESBL)    ***Blood Panel PCR results on this specimen are available    approximately 3 hours after the Gram stain result.***    Gram stain, PCR, and/or culture results may not always    correspond due to difference in methodologies.    ************************************************************    This PCR assay was performed by multiplex PCR. This    Assay tests for 66 bacterial and resistance gene targets.    Please refer to the [employer] test directory    at [email or web address] for details.  Organism: Blood Culture PCR  Escherichia coli ESBL (04-15-21 @ 16:24)  Organism: Escherichia coli ESBL (04-15-21 @ 16:24)      -  Amikacin: S <=16      -  Ampicillin: R >16 These ampicillin results predict results for amoxicillin      -  Ampicillin/Sulbactam: R 8/4 Enterobacter, Citrobacter, and Serratia may develop resistance during prolonged therapy (3-4 days)      -  Aztreonam: R 16      -  Cefazolin: R >16 Enterobacter, Citrobacter, and Serratia may develop resistance during prolonged therapy (3-4 days)      -  Cefepime: R >16      -  Cefoxitin: S <=8      -  Ceftriaxone: R >32 Enterobacter, Citrobacter, and Serratia may develop resistance during prolonged therapy      -  Ciprofloxacin: S <=0.25      -  Ertapenem: S <=0.5      -  Gentamicin: S <=2      -  Imipenem: S <=1      -  Levofloxacin: S <=0.5      -  Meropenem: S <=1      -  Piperacillin/Tazobactam: R <=8      -  Tobramycin: S <=2      -  Trimethoprim/Sulfamethoxazole: R >2/38      Method Type: ARIANE  Organism: Blood Culture PCR (04-15-21 @ 16:24)      -  ESBL: Detec      -  Escherichia coli: Detec      Method Type: PCR        Radiology: reviewed      
[facility], Division of Infectious Diseases  MAYDA Braxton Y. Patel, S. Shah  [phone]    Name: JAKE ZIMMERMAN  Age: 70y  Gender: Female  MRN: [id number]  Note Date: 21    Interval History:  Patient seen and examined at bedside this morning  No acute overnight events. Afebrile  Notes reviewed    Was notified overnight of GNR bacteremia and this AM of GPC bacteremia    Antibiotics:  ertapenem  IVPB      ertapenem  IVPB 1000 milliGRAM(s) IV Intermittent once      Medications:  acetaminophen   Tablet .. 650 milliGRAM(s) Oral every 6 hours PRN  aspirin enteric coated 81 milliGRAM(s) Oral daily  atorvastatin 20 milliGRAM(s) Oral at bedtime  clobetasol 0.05% Ointment 1 Application(s) Topical two times a day PRN  dextrose 40% Gel 15 Gram(s) Oral once  dextrose 5%. 1000 milliLiter(s) IV Continuous <Continuous>  dextrose 50% Injectable 25 Gram(s) IV Push once  ertapenem  IVPB      ertapenem  IVPB 1000 milliGRAM(s) IV Intermittent once  gabapentin 800 milliGRAM(s) Oral four times a day  glucagon  Injectable 1 milliGRAM(s) IntraMuscular once  heparin   Injectable 5000 Unit(s) SubCutaneous every 8 hours  hydrocortisone sodium succinate Injectable 50 milliGRAM(s) IV Push every 6 hours  insulin lispro (ADMELOG) corrective regimen sliding scale   SubCutaneous three times a day before meals  insulin lispro (ADMELOG) corrective regimen sliding scale   SubCutaneous at bedtime  levothyroxine 150 MICROGram(s) Oral daily  multivitamin 1 Tablet(s) Oral daily  mupirocin 2% Ointment 1 Application(s) Topical two times a day  oxyCODONE    IR 5 milliGRAM(s) Oral every 4 hours PRN  senna 2 Tablet(s) Oral at bedtime      Review of Systems:  A 10-point review of systems was obtained.     Pertinent positives and negatives--  Constitutional: No fevers. No Chills. No Rigors.   Cardiovascular: No chest pain. No palpitations.  Respiratory: No shortness of breath. No cough.  Gastrointestinal: No nausea or vomiting. No diarrhea or constipation.   Psychiatric: Pleasant. Appropriate affect.    Review of systems otherwise negative except as previously noted.    Allergies: Ancef (Rash)  daptomycin (Vomiting)  Keflex (Unknown)  penicillin (Pruritus; Rash; Hives)    For details regarding the patient's past medical history, social history, family history, and other miscellaneous elements, please refer the initial infectious diseases consultation and/or the admitting history and physical examination for this admission.    Objective:  Vitals:   T(C): 36.3 (21 @ 05:11), Max: 36.9 (21 @ 13:24)  HR: 67 (21 @ 05:11) (67 - 87)  BP: 111/71 (21 @ 05:11) (104/66 - 116/72)  RR: 16 (21 @ 05:11) (16 - 18)  SpO2: 100% (21 @ 05:11) (97% - 100%)    Physical Examination:  General: no acute distress  HEENT: NC/AT, EOMI, anicteric, no oral lesions  Neck: supple, no palpable LAD  Cardio: S1, S2 heard, RRR, no murmurs  Resp: breath sounds heard bilaterally, no rales, wheezes or rhonchi  Abd: soft, NT, ND, + bowel sounds  Neuro: AAOx3, no obvious focal deficits  Ext: RLE cool to touch    Laboratory Studies:  CBC:                       10.7   12.71 )-----------( 236      ( 2021 06:02 )             35.8     CMP:     136  |  102  |  24<H>  ----------------------------<  285<H>  4.4   |  21<L>  |  0.77    Ca    8.7      2021 06:02  Phos  2.4     04-13  Mg     1.5     -13    TPro  6.1  /  Alb  3.5  /  TBili  0.3  /  DBili  x   /  AST  19  /  ALT  17  /  AlkPhos  72  04-13    LIVER FUNCTIONS - ( 2021 06:08 )  Alb: 3.5 g/dL / Pro: 6.1 g/dL / ALK PHOS: 72 U/L / ALT: 17 U/L / AST: 19 U/L / GGT: x           Urinalysis Basic - ( 2021 06:07 )    Color: Yellow / Appearance: Slightly Turbid / S.019 / pH: x  Gluc: x / Ketone: Small  / Bili: Negative / Urobili: Negative   Blood: x / Protein: Trace / Nitrite: Positive   Leuk Esterase: Large / RBC: 1 /hpf / WBC 27 /HPF   Sq Epi: x / Non Sq Epi: 0 /hpf / Bacteria: Many      Microbiology: reviewed    Culture - Urine (collected 21 @ 08:20)  Source: .Urine Clean Catch (Midstream)  Preliminary Report (21 @ 10:20):    >100,000 CFU/ml Gram Negative Rods    Culture - Blood (collected 21 @ 07:02)  Source: .Blood Blood-Peripheral  Gram Stain (21 @ 05:17):    Growth in aerobic bottle: Gram Positive Cocci in Clusters  Preliminary Report (21 @ 05:17):    Growth in aerobic bottle: Gram Positive Cocci in Clusters    ***Blood Panel PCR results on this specimen are available    approximately 3 hours after the Gram stain result.***    Gram stain, PCR, and/or culture results may not always    correspond due to difference in methodologies.    ************************************************************    This PCR assay was performed by multiplex PCR. This    Assay tests for 66 bacterial and resistance gene targets.    Please refer to the [facility] [employer] test directory    at [email or web address] for details.  Organism: Blood Culture PCR (21 @ 07:12)  Organism: Blood Culture PCR (21 @ 07:12)      -  Staphylococcus epidermidis, Methicillin resistant: Detec      Method Type: PCR    Culture - Blood (collected 21 @ 07:02)  Source: .Blood Blood-Peripheral  Gram Stain (21 @ 20:59):    Growth in aerobic bottle: Gram Negative Rods    Growth in anaerobic bottle: Gram Negative Rods  Preliminary Report (21 @ 20:59):    Growth in aerobic bottle: Gram Negative Rods    MDRO detected in BCID PCR, resistance marker = CTX-M (ESBL)    Growth in anaerobic bottle: Gram Negative Rods    ***Blood Panel PCR results on this specimen are available    approximately 3 hours after the Gram stain result.***    Gram stain, PCR, and/or culture results may not always    correspond due to difference in methodologies.    ************************************************************    This PCR assay was performed by multiplex PCR. This    Assay testsfor 66 bacterial and resistance gene targets.    Please refer to the [facility] [employer] test directory    at [email or web address] for details.  Organism: Blood Culture PCR (21 @ 20:56)  Organism: Blood Culture PCR (21 @ 20:56)      -  ESBL: Detec      -  Escherichia coli: Detec      Method Type: PCR        Radiology: reviewed      
[facility], Division of Infectious Diseases  MAYDA Braxton Y. Patel, S. Shah  [phone]    Name: JAKE ZIMMERMAN  Age: 70y  Gender: Female  MRN: [id number]  Note Date: 04-19-21    Interval History:  Patient seen and examined at bedside this morning  No acute overnight events. Afebrile  Trying to sleep, didnt sleep well overnight  Notes reviewed    Antibiotics:  ertapenem  IVPB      ertapenem  IVPB 1000 milliGRAM(s) IV Intermittent every 24 hours      Medications:  acetaminophen   Tablet .. 650 milliGRAM(s) Oral every 6 hours PRN  aspirin enteric coated 81 milliGRAM(s) Oral daily  atorvastatin 20 milliGRAM(s) Oral at bedtime  clobetasol 0.05% Ointment 1 Application(s) Topical two times a day PRN  dextrose 40% Gel 15 Gram(s) Oral once  dextrose 5%. 1000 milliLiter(s) IV Continuous <Continuous>  dextrose 50% Injectable 25 Gram(s) IV Push once  diphenhydrAMINE   Injectable 25 milliGRAM(s) IV Push every 4 hours PRN  ertapenem  IVPB      ertapenem  IVPB 1000 milliGRAM(s) IV Intermittent every 24 hours  gabapentin 800 milliGRAM(s) Oral four times a day  glucagon  Injectable 1 milliGRAM(s) IntraMuscular once  heparin   Injectable 5000 Unit(s) SubCutaneous every 8 hours  insulin lispro (ADMELOG) corrective regimen sliding scale   SubCutaneous three times a day before meals  insulin lispro (ADMELOG) corrective regimen sliding scale   SubCutaneous at bedtime  levothyroxine 150 MICROGram(s) Oral daily  multivitamin 1 Tablet(s) Oral daily  mupirocin 2% Ointment 1 Application(s) Topical two times a day  nystatin Powder 1 Application(s) Topical two times a day  oxyCODONE    IR 5 milliGRAM(s) Oral every 4 hours PRN  oxyCODONE  ER Tablet 10 milliGRAM(s) Oral every 12 hours  senna 2 Tablet(s) Oral at bedtime  sodium chloride 0.65% Nasal 1 Spray(s) Both Nostrils two times a day PRN      ROS as above    Allergies: Ancef (Rash)  daptomycin (Vomiting)  Keflex (Unknown)  penicillin (Pruritus; Rash; Hives)    For details regarding the patient's past medical history, social history, family history, and other miscellaneous elements, please refer the initial infectious diseases consultation and/or the admitting history and physical examination for this admission.    Objective:  Vitals:   T(C): 36.9 (04-19-21 @ 05:10), Max: 36.9 (04-18-21 @ 13:20)  HR: 73 (04-19-21 @ 05:10) (73 - 93)  BP: 113/65 (04-19-21 @ 05:10) (112/69 - 123/87)  RR: 18 (04-19-21 @ 05:10) (18 - 18)  SpO2: 94% (04-19-21 @ 05:10) (94% - 97%)    Physical Examination:  General: no acute distress  HEENT: NC/AT, EOMI, anicteric, no oral lesions  Neck: supple, no palpable LAD  Cardio: S1, S2 heard, RRR, no murmurs  Resp: breath sounds heard bilaterally, no rales, wheezes or rhonchi  Abd: soft, NT, ND, + bowel sounds  Ext: no edema or cyanosis  Skin: warm, dry, no visible rash      Laboratory Studies:  CBC:                       10.7   6.30  )-----------( 236      ( 19 Apr 2021 07:12 )             34.5     CMP: 04-19    139  |  102  |  25<H>  ----------------------------<  124<H>  4.8   |  30  |  0.70    Ca    8.6      19 Apr 2021 07:12    TPro  7.0  /  Alb  3.0<L>  /  TBili  0.2  /  DBili  x   /  AST  27  /  ALT  20  /  AlkPhos  117  04-19    LIVER FUNCTIONS - ( 19 Apr 2021 07:12 )  Alb: 3.0 g/dL / Pro: 7.0 g/dL / ALK PHOS: 117 U/L / ALT: 20 U/L / AST: 27 U/L / GGT: x             Microbiology: reviewed    Culture - Blood (collected 04-14-21 @ 04:31)  Source: .Blood Blood  Final Report (04-19-21 @ 05:00):    No Growth Final    Culture - Blood (collected 04-14-21 @ 04:31)  Source: .Blood Blood  Final Report (04-19-21 @ 05:00):    No Growth Final    Culture - Urine (collected 04-13-21 @ 08:20)  Source: .Urine Clean Catch (Midstream)  Final Report (04-15-21 @ 11:22):    >100,000 CFU/ml Escherichia coli ESBL  Organism: Escherichia coli ESBL (04-15-21 @ 11:22)  Organism: Escherichia coli ESBL (04-15-21 @ 11:22)      -  Amikacin: S <=16      -  Amoxicillin/Clavulanic Acid: S <=8/4      -  Ampicillin: R >16 These ampicillin results predict results for amoxicillin      -  Ampicillin/Sulbactam: S 8/4 Enterobacter, Citrobacter, and Serratia may develop resistance during prolonged therapy (3-4 days)      -  Aztreonam: R 16      -  Cefazolin: R >16 ([id number]_[id number]) For uncomplicated UTI with K. pneumoniae, E. coli, or P. mirablis: ARIANE <=16 is sensitive and ARIANE >=32 is resistant. This also predicts results for oral agents cefaclor, cefdinir, cefpodoxime, cefprozil, cefuroxime axetil, cephalexin and locarbef for uncomplicated UTI. Note that some isolates may be susceptible to these agents while testing resistant to cefazolin.      -  Cefepime: R 16      -  Cefoxitin: S <=8      -  Ceftriaxone: R >32 Enterobacter, Citrobacter, and Serratia may develop resistance during prolonged therapy      -  Ciprofloxacin: S <=0.25      -  Ertapenem: S <=0.5      -  Gentamicin: S <=2      -  Imipenem: S <=1      -  Levofloxacin: S <=0.5      -  Meropenem: S <=1      -  Nitrofurantoin: S <=32 Should not be used to treat pyelonephritis      -  Piperacillin/Tazobactam: S <=8      -  Tigecycline: S <=2      -  Tobramycin: S <=2      -  Trimethoprim/Sulfamethoxazole: R >2/38      Method Type: ARIANE    Culture - Blood (collected 04-13-21 @ 07:02)  Source: .Blood Blood-Peripheral  Gram Stain (04-16-21 @ 06:58):    Growth in aerobic bottle: Gram Positive Cocci in Clusters    Growth in anaerobic bottle: Gram Negative Rods  Final Report (04-18-21 @ 08:43):    Growth in aerobic bottle: Staphylococcus hominis    Growth in aerobic bottle: Staphylococcus epidermidis    Coag Negative Staphylococcus    Single set isolate, possible contaminant. Contact    Microbiology if susceptibility testing clinically    indicated.    Growth in anaerobic bottle: Escherichia coli ESBL    See previous culture [id number]    ***Blood Panel PCR results on this specimen are available    approximately 3 hours after the Gram stain result.***    Gram stain, PCR, and/or culture results may not always    correspond due to difference in methodologies.    ************************************************************    This PCR assay was performed by multiplex PCR. This    Assay tests for 66 bacterial and resistance gene targets.    Please refer to the [facility] test directory    at [email or web address] for details.  Organism: Blood Culture PCR (04-16-21 @ 07:00)      -  Staphylococcus epidermidis, Methicillin resistant: Detec      Method Type: PCR  Organism: Blood Culture PCR (04-15-21 @ 10:25)    Culture - Blood (collected 04-13-21 @ 07:02)  Source: .Blood Blood-Peripheral  Gram Stain (04-13-21 @ 20:59):    Growth in aerobic bottle: Gram Negative Rods    Growth in anaerobic bottle: Gram Negative Rods  Final Report (04-15-21 @ 16:24):    Growth in aerobic and anaerobic bottles: Escherichia coli ESBL    MDRO detected in BCID PCR, resistance marker = CTX-M (ESBL)    ***Blood Panel PCR results on this specimen are available    approximately 3 hours after the Gram stain result.***    Gram stain, PCR, and/or culture results may not always    correspond due to difference in methodologies.    ************************************************************    This PCR assay was performed by multiplex PCR. This    Assay tests for 66 bacterial and resistance gene targets.    Please refer to the [facility] test directory    at [email or web address] for details.  Organism: Blood Culture PCR  Escherichia coli ESBL (04-15-21 @ 16:24)  Organism: Escherichia coli ESBL (04-15-21 @ 16:24)      -  Amikacin: S <=16      -  Ampicillin: R >16 These ampicillin results predict results for amoxicillin      -  Ampicillin/Sulbactam: R 8/4 Enterobacter, Citrobacter, and Serratia may develop resistance during prolonged therapy (3-4 days)      -  Aztreonam: R 16      -  Cefazolin: R >16 Enterobacter, Citrobacter, and Serratia may develop resistance during prolonged therapy (3-4 days)      -  Cefepime: R >16      -  Cefoxitin: S <=8      -  Ceftriaxone: R >32 Enterobacter, Citrobacter, and Serratia may develop resistance during prolonged therapy      -  Ciprofloxacin: S <=0.25      -  Ertapenem: S <=0.5      -  Gentamicin: S <=2      -  Imipenem: S <=1      -  Levofloxacin: S <=0.5      -  Meropenem: S <=1      -  Piperacillin/Tazobactam: R <=8      -  Tobramycin: S <=2      -  Trimethoprim/Sulfamethoxazole: R >2/38      Method Type: ARIANE  Organism: Blood Culture PCR (04-15-21 @ 16:24)      -  ESBL: Detec      -  Escherichia coli: Detec      Method Type: PCR        Radiology: reviewed      
Tolerated the IvIg this weekend  Didn't sleep well 2' ambient noise  She has 3 front steps on her house; she thinks she may need an ambulette to get her into the house    Vital Signs Last 24 Hrs  T(C): 36.9 (19 Apr 2021 05:10), Max: 36.9 (18 Apr 2021 13:20)  T(F): 98.4 (19 Apr 2021 05:10), Max: 98.5 (18 Apr 2021 13:20)  HR: 73 (19 Apr 2021 05:10) (73 - 93)  BP: 113/65 (19 Apr 2021 05:10) (112/69 - 123/87)  BP(mean): --  RR: 18 (19 Apr 2021 05:10) (18 - 18)  SpO2: 94% (19 Apr 2021 05:10) (94% - 97%)    I&O's Summary    04-18-21 @ 07:01  -  04-19-21 @ 07:00  --------------------------------------------------------  IN: 1410 mL / OUT: 950 mL / NET: 460 mL    04-19-21 @ 07:01  -  04-19-21 @ 10:49  --------------------------------------------------------  IN: 240 mL / OUT: 0 mL / NET: 240 mL        Physical exam  GENERAL: NAD  HEAD:  Atraumatic, Normocephalic  ENT: EOMI, PERRLA, conjunctiva and sclera clear  CHEST/LUNG: Clear to auscultation bilaterally; No wheeze, equal breath sounds bilaterally   HEART: Regular rate and rhythm; No murmurs, rubs, or gallops  ABDOMEN: Obese. Soft Nontender, Nondistended; Bowel sounds present, no organomegaly  EXTREMITIES: RLE erythematous, warm to touch, edematous, shiny appearing skin  PSYCH: nl affect  NEUROLOGY: A+O x 3, non-focal, cranial nerves intact  SKIN: Multiple BP lesions b/l extremities and abdomen, none appear bullous, all flat and pink    LABS:                        10.7   6.30  )-----------( 236      ( 19 Apr 2021 07:12 )             34.5     04-19    139  |  102  |  25<H>  ----------------------------<  124<H>  4.8   |  30  |  0.70    Ca    8.6      19 Apr 2021 07:12    TPro  7.0  /  Alb  3.0<L>  /  TBili  0.2  /  DBili  x   /  AST  27  /  ALT  20  /  AlkPhos  117  04-19      CAPILLARY BLOOD GLUCOSE      POCT Blood Glucose.: 259 mg/dL (19 Apr 2021 07:59)  POCT Blood Glucose.: 222 mg/dL (18 Apr 2021 21:09)  POCT Blood Glucose.: 174 mg/dL (18 Apr 2021 16:59)  POCT Blood Glucose.: 212 mg/dL (18 Apr 2021 12:28)            RADIOLOGY & ADDITIONAL TESTS:    Imaging Personally Reviewed:  [x] YES  [ ] NO    Consultant(s) Notes Reviewed:  [x] YES  [ ] NO  
SUBJECTIVE / OVERNIGHT EVENTS:    Seen and examined at bedside. Denies any SOB or chest pain. No acute events overnight    --------------------------------------------------------------------------------------------  LABS:            CAPILLARY BLOOD GLUCOSE      POCT Blood Glucose.: 204 mg/dL (18 Apr 2021 07:50)  POCT Blood Glucose.: 277 mg/dL (17 Apr 2021 21:57)  POCT Blood Glucose.: 190 mg/dL (17 Apr 2021 16:58)  POCT Blood Glucose.: 227 mg/dL (17 Apr 2021 11:45)            RADIOLOGY & ADDITIONAL TESTS:    Imaging Personally Reviewed:  [x] YES  [ ] NO    Consultant(s) Notes Reviewed:  [x] YES  [ ] NO    MEDICATIONS  (STANDING):  aspirin enteric coated 81 milliGRAM(s) Oral daily  atorvastatin 20 milliGRAM(s) Oral at bedtime  dextrose 40% Gel 15 Gram(s) Oral once  dextrose 5%. 1000 milliLiter(s) (50 mL/Hr) IV Continuous <Continuous>  dextrose 50% Injectable 25 Gram(s) IV Push once  ertapenem  IVPB      ertapenem  IVPB 1000 milliGRAM(s) IV Intermittent every 24 hours  gabapentin 800 milliGRAM(s) Oral four times a day  glucagon  Injectable 1 milliGRAM(s) IntraMuscular once  heparin   Injectable 5000 Unit(s) SubCutaneous every 8 hours  hydrocortisone sodium succinate Injectable 25 milliGRAM(s) IV Push every 12 hours  insulin lispro (ADMELOG) corrective regimen sliding scale   SubCutaneous at bedtime  insulin lispro (ADMELOG) corrective regimen sliding scale   SubCutaneous three times a day before meals  levothyroxine 150 MICROGram(s) Oral daily  multivitamin 1 Tablet(s) Oral daily  mupirocin 2% Ointment 1 Application(s) Topical two times a day  nystatin Powder 1 Application(s) Topical two times a day  oxyCODONE  ER Tablet 10 milliGRAM(s) Oral every 12 hours  senna 2 Tablet(s) Oral at bedtime    MEDICATIONS  (PRN):  acetaminophen   Tablet .. 650 milliGRAM(s) Oral every 6 hours PRN Temp greater or equal to 38C (100.4F)  clobetasol 0.05% Ointment 1 Application(s) Topical two times a day PRN Apply to non-open areas  diphenhydrAMINE   Injectable 25 milliGRAM(s) IV Push every 4 hours PRN Rash and/or Itching  oxyCODONE    IR 5 milliGRAM(s) Oral every 4 hours PRN Severe Pain (7 - 10)      Care Discussed with Consultants/Other Providers [x] YES  [ ] NO    Vital Signs Last 24 Hrs  T(C): 36.6 (18 Apr 2021 05:53), Max: 37.2 (17 Apr 2021 15:00)  T(F): 97.8 (18 Apr 2021 05:53), Max: 98.9 (17 Apr 2021 15:00)  HR: 67 (18 Apr 2021 05:53) (67 - 91)  BP: 127/67 (18 Apr 2021 05:53) (104/64 - 139/82)  BP(mean): --  RR: 18 (18 Apr 2021 05:53) (18 - 18)  SpO2: 98% (18 Apr 2021 05:53) (96% - 98%)  I&O's Summary    17 Apr 2021 07:01  -  18 Apr 2021 07:00  --------------------------------------------------------  IN: 1690 mL / OUT: 1000 mL / NET: 690 mL      Physical exam  GENERAL: NAD  HEAD:  Atraumatic, Normocephalic  ENT: EOMI, PERRLA, conjunctiva and sclera clear  CHEST/LUNG: Clear to auscultation bilaterally; No wheeze, equal breath sounds bilaterally   HEART: Regular rate and rhythm; No murmurs, rubs, or gallops  ABDOMEN: Obese. Soft Nontender, Nondistended; Bowel sounds present, no organomegaly  EXTREMITIES: RLE erythematous, warm to touch, edematous, shiny appearing skin  PSYCH: nl affect  NEUROLOGY: A+O x 3, non-focal, cranial nerves intact  SKIN: Multiple BP lesions b/l extremities and abdomen, none appear bullous, all flat and pink    
SUBJECTIVE / OVERNIGHT EVENTS:    Seen and examined at bedside. Denies any SOB or chest pain. No acute events overnight      --------------------------------------------------------------------------------------------  LABS:                        10.4   4.95  )-----------( 222      ( 16 Apr 2021 07:03 )             34.0     04-16    141  |  106  |  26<H>  ----------------------------<  307<H>  3.6   |  28  |  0.72    Ca    8.3<L>      16 Apr 2021 07:01        CAPILLARY BLOOD GLUCOSE      POCT Blood Glucose.: 334 mg/dL (17 Apr 2021 07:49)  POCT Blood Glucose.: 280 mg/dL (16 Apr 2021 21:13)  POCT Blood Glucose.: 178 mg/dL (16 Apr 2021 16:49)  POCT Blood Glucose.: 287 mg/dL (16 Apr 2021 12:14)            RADIOLOGY & ADDITIONAL TESTS:    Imaging Personally Reviewed:  [x] YES  [ ] NO    Consultant(s) Notes Reviewed:  [x] YES  [ ] NO    MEDICATIONS  (STANDING):  aspirin enteric coated 81 milliGRAM(s) Oral daily  atorvastatin 20 milliGRAM(s) Oral at bedtime  dextrose 40% Gel 15 Gram(s) Oral once  dextrose 5%. 1000 milliLiter(s) (50 mL/Hr) IV Continuous <Continuous>  dextrose 50% Injectable 25 Gram(s) IV Push once  ertapenem  IVPB      ertapenem  IVPB 1000 milliGRAM(s) IV Intermittent every 24 hours  gabapentin 800 milliGRAM(s) Oral four times a day  glucagon  Injectable 1 milliGRAM(s) IntraMuscular once  heparin   Injectable 5000 Unit(s) SubCutaneous every 8 hours  hydrocortisone sodium succinate Injectable 25 milliGRAM(s) IV Push every 12 hours  immune   globulin 10% (GAMMAGARD) IVPB 40 Gram(s) IV Intermittent daily  insulin lispro (ADMELOG) corrective regimen sliding scale   SubCutaneous three times a day before meals  insulin lispro (ADMELOG) corrective regimen sliding scale   SubCutaneous at bedtime  levothyroxine 150 MICROGram(s) Oral daily  multivitamin 1 Tablet(s) Oral daily  mupirocin 2% Ointment 1 Application(s) Topical two times a day  oxyCODONE  ER Tablet 10 milliGRAM(s) Oral every 12 hours  senna 2 Tablet(s) Oral at bedtime    MEDICATIONS  (PRN):  acetaminophen   Tablet .. 650 milliGRAM(s) Oral every 6 hours PRN Temp greater or equal to 38C (100.4F)  clobetasol 0.05% Ointment 1 Application(s) Topical two times a day PRN Apply to non-open areas  diphenhydrAMINE   Injectable 25 milliGRAM(s) IV Push every 4 hours PRN Rash and/or Itching  oxyCODONE    IR 5 milliGRAM(s) Oral every 4 hours PRN Severe Pain (7 - 10)      Care Discussed with Consultants/Other Providers [x] YES  [ ] NO    Vital Signs Last 24 Hrs  T(C): 36.5 (17 Apr 2021 05:19), Max: 37.2 (16 Apr 2021 14:57)  T(F): 97.7 (17 Apr 2021 05:19), Max: 99 (16 Apr 2021 14:57)  HR: 67 (17 Apr 2021 05:19) (67 - 95)  BP: 120/77 (17 Apr 2021 05:19) (90/60 - 120/77)  BP(mean): --  RR: 18 (17 Apr 2021 05:19) (18 - 18)  SpO2: 96% (17 Apr 2021 05:19) (95% - 98%)  I&O's Summary    16 Apr 2021 07:01  -  17 Apr 2021 07:00  --------------------------------------------------------  IN: 1520 mL / OUT: 1500 mL / NET: 20 mL      Physical exam  GENERAL: NAD  HEAD:  Atraumatic, Normocephalic  ENT: EOMI, PERRLA, conjunctiva and sclera clear  CHEST/LUNG: Clear to auscultation bilaterally; No wheeze, equal breath sounds bilaterally   HEART: Regular rate and rhythm; No murmurs, rubs, or gallops  ABDOMEN: Obese. Soft Nontender, Nondistended; Bowel sounds present, no organomegaly  EXTREMITIES: RLE erythematous, warm to touch, edematous, shiny appearing skin  PSYCH: nl affect  NEUROLOGY: A+O x 3, non-focal, cranial nerves intact  SKIN: Multiple BP lesions b/l extremities and abdomen, none appear bullous, all flat and pink  
she tolerated the IVIG infusion without any side effects last night  (+)lower back pain, requesting oxycontin ER 10 mg bid which she is on at home    Vital Signs Last 24 Hrs  T(C): 36.9 (16 Apr 2021 06:02), Max: 37.1 (15 Apr 2021 17:30)  T(F): 98.5 (16 Apr 2021 06:02), Max: 98.8 (15 Apr 2021 17:30)  HR: 74 (16 Apr 2021 06:02) (71 - 91)  BP: 136/74 (16 Apr 2021 06:02) (90/62 - 136/74)  BP(mean): --  RR: 18 (16 Apr 2021 06:02) (17 - 18)  SpO2: 97% (16 Apr 2021 06:02) (94% - 98%)    I&O's Summary    04-15-21 @ 07:01  -  04-16-21 @ 07:00  --------------------------------------------------------  IN: 1200 mL / OUT: 2150 mL / NET: -950 mL    04-16-21 @ 07:01  - 04-16-21 @ 09:51  --------------------------------------------------------  IN: 140 mL / OUT: 0 mL / NET: 140 mL        GENERAL: NAD  HEAD:  Atraumatic, Normocephalic  ENT: EOMI, PERRLA, conjunctiva and sclera clear  CHEST/LUNG: Clear to auscultation bilaterally; No wheeze, equal breath sounds bilaterally   HEART: Regular rate and rhythm; No murmurs, rubs, or gallops  ABDOMEN: Obese. Soft Nontender, Nondistended; Bowel sounds present, no organomegaly  EXTREMITIES: RLE erythematous, warm to touch, edematous, shiny appearing skin  PSYCH: nl affect  NEUROLOGY: A+O x 3, non-focal, cranial nerves intact  SKIN: Multiple BP lesions b/l extremities and abdomen, none appear bullous, all flat and pink    LABS:                        10.4   4.95  )-----------( 222      ( 16 Apr 2021 07:03 )             34.0     04-16    141  |  106  |  26<H>  ----------------------------<  307<H>  3.6   |  28  |  0.72    Ca    8.3<L>      16 Apr 2021 07:01        CAPILLARY BLOOD GLUCOSE      POCT Blood Glucose.: 252 mg/dL (16 Apr 2021 07:59)  POCT Blood Glucose.: 271 mg/dL (15 Apr 2021 21:20)  POCT Blood Glucose.: 300 mg/dL (15 Apr 2021 16:58)  POCT Blood Glucose.: 295 mg/dL (15 Apr 2021 11:44)            RADIOLOGY & ADDITIONAL TESTS:    Imaging Personally Reviewed:  [x] YES  [ ] NO    Consultant(s) Notes Reviewed:  [x] YES  [ ] NO                    
99.7 temp last night  ESBL E coli started growing in 2/4 bottles with methicillin resistant S. epidermis growing in aerobic bottle of the other set  Now on ertapenem    Vital Signs Last 24 Hrs  T(C): 36.3 (2021 05:11), Max: 36.9 (2021 13:24)  T(F): 97.4 (2021 05:11), Max: 98.4 (2021 13:24)  HR: 67 (:11) (67 - 99)  BP: 111/71 (2021 05:11) (104/66 - 116/72)  BP(mean): --  RR: 16 (:11) (16 - 18)  SpO2: 100% (:) (97% - 100%)    I&O's Summary    21 @ 07:  -  21 @ 07:00  --------------------------------------------------------  IN: 1180 mL / OUT: 1600 mL / NET: -420 mL    21 @ 07:  -  21 @ 10:10  --------------------------------------------------------  IN: 300 mL / OUT: 0 mL / NET: 300 mL        GENERAL: chronically ill appearing obese female  HEAD:  Atraumatic, Normocephalic  ENT: EOMI, PERRLA, conjunctiva and sclera clear  CHEST/LUNG: Clear to auscultation bilaterally; No wheeze, equal breath sounds bilaterally   HEART: Regular rate and rhythm; No murmurs, rubs, or gallops  ABDOMEN: Obese. Soft Nontender, Nondistended; Bowel sounds present, no organomegaly  EXTREMITIES: RLE erythematous, warm to touch, edematous, shiny appearing skin  PSYCH: nl affect  NEUROLOGY: A+O x 3, non-focal, cranial nerves intact  SKIN: Multiple BP lesions b/l extremities and abdomen, none appear bullous, all flat and pink    LABS:                        10.7   12.71 )-----------( 236      ( 2021 06:02 )             35.8     04-14    136  |  102  |  24<H>  ----------------------------<  285<H>  4.4   |  21<L>  |  0.77    Ca    8.7      2021 06:02  Phos  2.4     04-13  Mg     1.5     04-13    TPro  6.1  /  Alb  3.5  /  TBili  0.3  /  DBili  x   /  AST  19  /  ALT  17  /  AlkPhos  72  04-13    PT/INR - ( 2021 03:26 )   PT: 12.1 sec;   INR: 1.01 ratio         PTT - ( 2021 03:26 )  PTT:29.1 sec  CAPILLARY BLOOD GLUCOSE      POCT Blood Glucose.: 287 mg/dL (2021 08:03)  POCT Blood Glucose.: 205 mg/dL (2021 21:34)  POCT Blood Glucose.: 181 mg/dL (2021 16:57)  POCT Blood Glucose.: 230 mg/dL (2021 12:01)        Urinalysis Basic - ( 2021 06:07 )    Color: Yellow / Appearance: Slightly Turbid / S.019 / pH: x  Gluc: x / Ketone: Small  / Bili: Negative / Urobili: Negative   Blood: x / Protein: Trace / Nitrite: Positive   Leuk Esterase: Large / RBC: 1 /hpf / WBC 27 /HPF   Sq Epi: x / Non Sq Epi: 0 /hpf / Bacteria: Many        RADIOLOGY & ADDITIONAL TESTS:    Imaging Personally Reviewed:  [x] YES  [ ] NO    Consultant(s) Notes Reviewed:  [x] YES  [ ] NO

## 2021-04-19 NOTE — PROGRESS NOTE ADULT - PROBLEM SELECTOR PLAN 9
Hold lisinopril for now
DVT ppx- HSQ  PT eval  OT eval

## 2021-04-19 NOTE — PROGRESS NOTE ADULT - ASSESSMENT
Pt is a 70W w/ PMHx of stage 1 uterine CA s/p TLH/BSO + adj chemo in 2016, bullous pemphigoid on IVIG and daily prednisone 30 mg, DM, surgical hypothyroidism s/p thyroidectomy, chronic pain syndrome, chronic RLE wound hx cellulitis, endometrial CA s/p TRENT presents from home c/o fever [id number] as well as vomiting.  ID c/s for further evaluation.    Acute Pyelonephritis 2/2 ESBL E.coli  -UA+, UCx ESBL E.coli  -BCx ESBL E.coli, repeat NGTD  -c/w ertapenem 1gm q24h  Pt will need x10 day course from 1st negative BCx until 4/23  Can plan for midline placement and outpatient IV Abx  Fax weekly labs-CMP, CBC- to [phone]    GPC Bacteremia  BCx also +MRSE  s/p 1 dose vancomycin  This is likely a contaminant, no need to c/w vancomycin  Repeat cx to monitor clearance--NGTD    RLE Cellulitis/RLE Wounds  Bullous pemphigoid  Wound does not appear like cellulitis to me  Appreciate wound care/derm recs regarding management of bullous pemphigoid.  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  Ruchi Gonzales M.D.  [facility], Division of Infectious Diseases [phone]  For over the weekend and after hours, please call [phone]

## 2021-04-19 NOTE — PROGRESS NOTE ADULT - PROBLEM SELECTOR PLAN 2
2' bacteremia 2' ESBL E coli UTI  Resolved
2' bacteremia 2' ESBL E coli UTI  Resolved  Tapering down stress-dose steroids (on prednisone 30 mg daily)
This was ruled out upon admission
2' bacteremia 2' ESBL E coli UTI  Resolved  Tapering down stress-dose steroids

## 2021-04-19 NOTE — CHART NOTE - NSCHARTNOTEFT_GEN_A_CORE
Pt medically cleared for discharge home by Dr Olvera. R cephalic Midline in place since 4/16/21. Invanz approved as per case manger for home infusion. Prednisone 30mg daily . Home diabetic regimen, follow up with ID, PMD, pulm and derm.

## 2021-04-19 NOTE — PROGRESS NOTE ADULT - PROBLEM SELECTOR PROBLEM 3
Bullous pemphigoid

## 2021-04-28 ENCOUNTER — APPOINTMENT (OUTPATIENT)
Dept: DERMATOLOGY | Facility: CLINIC | Age: 71
End: 2021-04-28
Payer: MEDICARE

## 2021-04-28 PROCEDURE — 99214 OFFICE O/P EST MOD 30 MIN: CPT

## 2021-04-30 ENCOUNTER — NON-APPOINTMENT (OUTPATIENT)
Age: 71
End: 2021-04-30

## 2021-06-07 ENCOUNTER — APPOINTMENT (OUTPATIENT)
Dept: RHEUMATOLOGY | Facility: CLINIC | Age: 71
End: 2021-06-07
Payer: MEDICARE

## 2021-06-07 PROCEDURE — 96365 THER/PROPH/DIAG IV INF INIT: CPT

## 2021-06-07 PROCEDURE — 96366 THER/PROPH/DIAG IV INF ADDON: CPT

## 2021-06-09 ENCOUNTER — APPOINTMENT (OUTPATIENT)
Dept: RHEUMATOLOGY | Facility: CLINIC | Age: 71
End: 2021-06-09
Payer: MEDICARE

## 2021-06-09 ENCOUNTER — APPOINTMENT (OUTPATIENT)
Dept: DERMATOLOGY | Facility: CLINIC | Age: 71
End: 2021-06-09

## 2021-06-09 PROCEDURE — 96366 THER/PROPH/DIAG IV INF ADDON: CPT

## 2021-06-09 PROCEDURE — 96365 THER/PROPH/DIAG IV INF INIT: CPT

## 2021-06-11 ENCOUNTER — APPOINTMENT (OUTPATIENT)
Dept: RHEUMATOLOGY | Facility: CLINIC | Age: 71
End: 2021-06-11
Payer: MEDICARE

## 2021-06-11 PROCEDURE — 96365 THER/PROPH/DIAG IV INF INIT: CPT

## 2021-06-11 PROCEDURE — 96366 THER/PROPH/DIAG IV INF ADDON: CPT

## 2021-06-30 ENCOUNTER — RX RENEWAL (OUTPATIENT)
Age: 71
End: 2021-06-30

## 2021-07-10 ENCOUNTER — APPOINTMENT (OUTPATIENT)
Dept: RHEUMATOLOGY | Facility: CLINIC | Age: 71
End: 2021-07-10
Payer: MEDICARE

## 2021-07-10 PROCEDURE — 96366 THER/PROPH/DIAG IV INF ADDON: CPT

## 2021-07-10 PROCEDURE — 96365 THER/PROPH/DIAG IV INF INIT: CPT

## 2021-07-13 ENCOUNTER — APPOINTMENT (OUTPATIENT)
Dept: RHEUMATOLOGY | Facility: CLINIC | Age: 71
End: 2021-07-13
Payer: MEDICARE

## 2021-07-13 PROCEDURE — 96413 CHEMO IV INFUSION 1 HR: CPT

## 2021-07-13 PROCEDURE — 96415 CHEMO IV INFUSION ADDL HR: CPT

## 2021-07-14 ENCOUNTER — APPOINTMENT (OUTPATIENT)
Dept: DERMATOLOGY | Facility: CLINIC | Age: 71
End: 2021-07-14
Payer: MEDICARE

## 2021-07-14 ENCOUNTER — LABORATORY RESULT (OUTPATIENT)
Age: 71
End: 2021-07-14

## 2021-07-14 DIAGNOSIS — D48.5 NEOPLASM OF UNCERTAIN BEHAVIOR OF SKIN: ICD-10-CM

## 2021-07-14 PROCEDURE — 99214 OFFICE O/P EST MOD 30 MIN: CPT | Mod: 25

## 2021-07-14 PROCEDURE — 11104 PUNCH BX SKIN SINGLE LESION: CPT

## 2021-07-15 ENCOUNTER — APPOINTMENT (OUTPATIENT)
Dept: RHEUMATOLOGY | Facility: CLINIC | Age: 71
End: 2021-07-15
Payer: MEDICARE

## 2021-07-15 PROCEDURE — 96365 THER/PROPH/DIAG IV INF INIT: CPT

## 2021-07-15 PROCEDURE — 96366 THER/PROPH/DIAG IV INF ADDON: CPT

## 2021-07-15 NOTE — PATIENT PROFILE ADULT. - FUNCTIONAL LEVEL PRIOR: EATING
Notified pt results via pt portal.    ----- Message from Debbie Sanchez MD sent at 7/15/2021  8:50 AM CDT -----  Call pt with results  Thyroid levels are fluctuating quite a bit and she has now hypothyroid increase Levoxyl  to 125 mcg 2 months and recheck in 2 months  Check thyroid antibodies due to the fluctuating levels       (1) assistive equipment

## 2021-07-16 ENCOUNTER — NON-APPOINTMENT (OUTPATIENT)
Age: 71
End: 2021-07-16

## 2021-07-20 ENCOUNTER — APPOINTMENT (OUTPATIENT)
Dept: DERMATOLOGY | Facility: CLINIC | Age: 71
End: 2021-07-20
Payer: MEDICARE

## 2021-07-20 PROCEDURE — 99214 OFFICE O/P EST MOD 30 MIN: CPT

## 2021-08-09 ENCOUNTER — APPOINTMENT (OUTPATIENT)
Dept: RHEUMATOLOGY | Facility: CLINIC | Age: 71
End: 2021-08-09
Payer: MEDICARE

## 2021-08-09 PROCEDURE — 96365 THER/PROPH/DIAG IV INF INIT: CPT

## 2021-08-09 PROCEDURE — 96366 THER/PROPH/DIAG IV INF ADDON: CPT

## 2021-08-11 ENCOUNTER — APPOINTMENT (OUTPATIENT)
Dept: RHEUMATOLOGY | Facility: CLINIC | Age: 71
End: 2021-08-11
Payer: MEDICARE

## 2021-08-11 ENCOUNTER — FORM ENCOUNTER (OUTPATIENT)
Age: 71
End: 2021-08-11

## 2021-08-11 PROCEDURE — 96366 THER/PROPH/DIAG IV INF ADDON: CPT

## 2021-08-11 PROCEDURE — 96365 THER/PROPH/DIAG IV INF INIT: CPT

## 2021-08-13 ENCOUNTER — APPOINTMENT (OUTPATIENT)
Dept: RHEUMATOLOGY | Facility: CLINIC | Age: 71
End: 2021-08-13
Payer: MEDICARE

## 2021-08-13 PROCEDURE — 96365 THER/PROPH/DIAG IV INF INIT: CPT

## 2021-08-13 PROCEDURE — 96366 THER/PROPH/DIAG IV INF ADDON: CPT

## 2021-08-19 ENCOUNTER — RX RENEWAL (OUTPATIENT)
Age: 71
End: 2021-08-19

## 2021-08-20 ENCOUNTER — INPATIENT (INPATIENT)
Facility: HOSPITAL | Age: 71
LOS: 5 days | Discharge: HOME HEALTH | End: 2021-08-26
Attending: HOSPITALIST | Admitting: HOSPITALIST
Payer: MEDICARE

## 2021-08-20 VITALS
DIASTOLIC BLOOD PRESSURE: 56 MMHG | HEART RATE: 132 BPM | TEMPERATURE: 103 F | RESPIRATION RATE: 20 BRPM | HEIGHT: 65 IN | OXYGEN SATURATION: 97 % | SYSTOLIC BLOOD PRESSURE: 113 MMHG

## 2021-08-20 DIAGNOSIS — A41.9 SEPSIS, UNSPECIFIED ORGANISM: ICD-10-CM

## 2021-08-20 DIAGNOSIS — E03.9 HYPOTHYROIDISM, UNSPECIFIED: ICD-10-CM

## 2021-08-20 DIAGNOSIS — Z29.9 ENCOUNTER FOR PROPHYLACTIC MEASURES, UNSPECIFIED: ICD-10-CM

## 2021-08-20 DIAGNOSIS — Z90.710 ACQUIRED ABSENCE OF BOTH CERVIX AND UTERUS: Chronic | ICD-10-CM

## 2021-08-20 DIAGNOSIS — Z98.89 OTHER SPECIFIED POSTPROCEDURAL STATES: Chronic | ICD-10-CM

## 2021-08-20 DIAGNOSIS — E11.9 TYPE 2 DIABETES MELLITUS WITHOUT COMPLICATIONS: ICD-10-CM

## 2021-08-20 DIAGNOSIS — C54.1 MALIGNANT NEOPLASM OF ENDOMETRIUM: Chronic | ICD-10-CM

## 2021-08-20 DIAGNOSIS — D64.9 ANEMIA, UNSPECIFIED: ICD-10-CM

## 2021-08-20 DIAGNOSIS — L12.0 BULLOUS PEMPHIGOID: ICD-10-CM

## 2021-08-20 DIAGNOSIS — G89.4 CHRONIC PAIN SYNDROME: ICD-10-CM

## 2021-08-20 DIAGNOSIS — Z90.722 ACQUIRED ABSENCE OF OVARIES, BILATERAL: Chronic | ICD-10-CM

## 2021-08-20 LAB
ALBUMIN SERPL ELPH-MCNC: 3.1 G/DL — LOW (ref 3.3–5)
ALP SERPL-CCNC: 71 U/L — SIGNIFICANT CHANGE UP (ref 40–120)
ALT FLD-CCNC: 18 U/L — SIGNIFICANT CHANGE UP (ref 4–33)
ANION GAP SERPL CALC-SCNC: 15 MMOL/L — HIGH (ref 7–14)
APPEARANCE UR: ABNORMAL
APTT BLD: 23.3 SEC — LOW (ref 27–36.3)
AST SERPL-CCNC: 31 U/L — SIGNIFICANT CHANGE UP (ref 4–32)
B PERT DNA SPEC QL NAA+PROBE: SIGNIFICANT CHANGE UP
B PERT+PARAPERT DNA PNL SPEC NAA+PROBE: SIGNIFICANT CHANGE UP
BASOPHILS # BLD AUTO: 0 K/UL — SIGNIFICANT CHANGE UP (ref 0–0.2)
BASOPHILS NFR BLD AUTO: 0 % — SIGNIFICANT CHANGE UP (ref 0–2)
BILIRUB SERPL-MCNC: <0.2 MG/DL — SIGNIFICANT CHANGE UP (ref 0.2–1.2)
BILIRUB UR-MCNC: NEGATIVE — SIGNIFICANT CHANGE UP
BLD GP AB SCN SERPL QL: NEGATIVE — SIGNIFICANT CHANGE UP
BLOOD GAS VENOUS COMPREHENSIVE RESULT: SIGNIFICANT CHANGE UP
BLOOD GAS VENOUS COMPREHENSIVE RESULT: SIGNIFICANT CHANGE UP
BORDETELLA PARAPERTUSSIS (RAPRVP): SIGNIFICANT CHANGE UP
BUN SERPL-MCNC: 32 MG/DL — HIGH (ref 7–23)
C PNEUM DNA SPEC QL NAA+PROBE: SIGNIFICANT CHANGE UP
CALCIUM SERPL-MCNC: 8.8 MG/DL — SIGNIFICANT CHANGE UP (ref 8.4–10.5)
CHLORIDE SERPL-SCNC: 103 MMOL/L — SIGNIFICANT CHANGE UP (ref 98–107)
CO2 SERPL-SCNC: 21 MMOL/L — LOW (ref 22–31)
COLOR SPEC: SIGNIFICANT CHANGE UP
CREAT SERPL-MCNC: 1.23 MG/DL — SIGNIFICANT CHANGE UP (ref 0.5–1.3)
DIFF PNL FLD: NEGATIVE — SIGNIFICANT CHANGE UP
EOSINOPHIL # BLD AUTO: 0 K/UL — SIGNIFICANT CHANGE UP (ref 0–0.5)
EOSINOPHIL NFR BLD AUTO: 0 % — SIGNIFICANT CHANGE UP (ref 0–6)
FLUAV SUBTYP SPEC NAA+PROBE: SIGNIFICANT CHANGE UP
FLUBV RNA SPEC QL NAA+PROBE: SIGNIFICANT CHANGE UP
GLUCOSE SERPL-MCNC: 160 MG/DL — HIGH (ref 70–99)
GLUCOSE UR QL: NEGATIVE — SIGNIFICANT CHANGE UP
HADV DNA SPEC QL NAA+PROBE: SIGNIFICANT CHANGE UP
HCOV 229E RNA SPEC QL NAA+PROBE: SIGNIFICANT CHANGE UP
HCOV HKU1 RNA SPEC QL NAA+PROBE: SIGNIFICANT CHANGE UP
HCOV NL63 RNA SPEC QL NAA+PROBE: SIGNIFICANT CHANGE UP
HCOV OC43 RNA SPEC QL NAA+PROBE: SIGNIFICANT CHANGE UP
HCT VFR BLD CALC: 21.8 % — LOW (ref 34.5–45)
HCT VFR BLD CALC: 25.8 % — LOW (ref 34.5–45)
HGB BLD-MCNC: 6.2 G/DL — CRITICAL LOW (ref 11.5–15.5)
HGB BLD-MCNC: 8.1 G/DL — LOW (ref 11.5–15.5)
HMPV RNA SPEC QL NAA+PROBE: SIGNIFICANT CHANGE UP
HPIV1 RNA SPEC QL NAA+PROBE: SIGNIFICANT CHANGE UP
HPIV2 RNA SPEC QL NAA+PROBE: SIGNIFICANT CHANGE UP
HPIV3 RNA SPEC QL NAA+PROBE: SIGNIFICANT CHANGE UP
HPIV4 RNA SPEC QL NAA+PROBE: SIGNIFICANT CHANGE UP
IANC: 15.8 K/UL — HIGH (ref 1.5–8.5)
INR BLD: 1.13 RATIO — SIGNIFICANT CHANGE UP (ref 0.88–1.16)
KETONES UR-MCNC: NEGATIVE — SIGNIFICANT CHANGE UP
LEUKOCYTE ESTERASE UR-ACNC: ABNORMAL
LYMPHOCYTES # BLD AUTO: 0 % — LOW (ref 13–44)
LYMPHOCYTES # BLD AUTO: 0 K/UL — LOW (ref 1–3.3)
M PNEUMO DNA SPEC QL NAA+PROBE: SIGNIFICANT CHANGE UP
MCHC RBC-ENTMCNC: 28.4 GM/DL — LOW (ref 32–36)
MCHC RBC-ENTMCNC: 30.5 PG — SIGNIFICANT CHANGE UP (ref 27–34)
MCHC RBC-ENTMCNC: 31.4 GM/DL — LOW (ref 32–36)
MCHC RBC-ENTMCNC: 32.3 PG — SIGNIFICANT CHANGE UP (ref 27–34)
MCV RBC AUTO: 102.8 FL — HIGH (ref 80–100)
MCV RBC AUTO: 107.4 FL — HIGH (ref 80–100)
MONOCYTES # BLD AUTO: 0.15 K/UL — SIGNIFICANT CHANGE UP (ref 0–0.9)
MONOCYTES NFR BLD AUTO: 0.9 % — LOW (ref 2–14)
NEUTROPHILS # BLD AUTO: 16.02 K/UL — HIGH (ref 1.8–7.4)
NEUTROPHILS NFR BLD AUTO: 93.8 % — HIGH (ref 43–77)
NITRITE UR-MCNC: NEGATIVE — SIGNIFICANT CHANGE UP
NRBC # BLD: 0 /100 WBCS — SIGNIFICANT CHANGE UP
NRBC # FLD: 0 K/UL — SIGNIFICANT CHANGE UP
PH UR: 6.5 — SIGNIFICANT CHANGE UP (ref 5–8)
PLATELET # BLD AUTO: 228 K/UL — SIGNIFICANT CHANGE UP (ref 150–400)
PLATELET # BLD AUTO: 286 K/UL — SIGNIFICANT CHANGE UP (ref 150–400)
POTASSIUM SERPL-MCNC: 3.4 MMOL/L — LOW (ref 3.5–5.3)
POTASSIUM SERPL-SCNC: 3.4 MMOL/L — LOW (ref 3.5–5.3)
PROT SERPL-MCNC: 7.2 G/DL — SIGNIFICANT CHANGE UP (ref 6–8.3)
PROT UR-MCNC: ABNORMAL
PROTHROM AB SERPL-ACNC: 12.8 SEC — SIGNIFICANT CHANGE UP (ref 10.6–13.6)
RAPID RVP RESULT: SIGNIFICANT CHANGE UP
RBC # BLD: 2.03 M/UL — LOW (ref 3.8–5.2)
RBC # BLD: 2.51 M/UL — LOW (ref 3.8–5.2)
RBC # FLD: 14.2 % — SIGNIFICANT CHANGE UP (ref 10.3–14.5)
RBC # FLD: 17.9 % — HIGH (ref 10.3–14.5)
RH IG SCN BLD-IMP: POSITIVE — SIGNIFICANT CHANGE UP
RH IG SCN BLD-IMP: POSITIVE — SIGNIFICANT CHANGE UP
RSV RNA SPEC QL NAA+PROBE: SIGNIFICANT CHANGE UP
RV+EV RNA SPEC QL NAA+PROBE: SIGNIFICANT CHANGE UP
SARS-COV-2 RNA SPEC QL NAA+PROBE: SIGNIFICANT CHANGE UP
SODIUM SERPL-SCNC: 139 MMOL/L — SIGNIFICANT CHANGE UP (ref 135–145)
SP GR SPEC: 1.02 — SIGNIFICANT CHANGE UP (ref 1.01–1.02)
UROBILINOGEN FLD QL: SIGNIFICANT CHANGE UP
WBC # BLD: 16.17 K/UL — HIGH (ref 3.8–10.5)
WBC # BLD: 28.77 K/UL — HIGH (ref 3.8–10.5)
WBC # FLD AUTO: 16.17 K/UL — HIGH (ref 3.8–10.5)
WBC # FLD AUTO: 28.77 K/UL — HIGH (ref 3.8–10.5)

## 2021-08-20 PROCEDURE — 99223 1ST HOSP IP/OBS HIGH 75: CPT

## 2021-08-20 PROCEDURE — 99223 1ST HOSP IP/OBS HIGH 75: CPT | Mod: GC

## 2021-08-20 PROCEDURE — 71045 X-RAY EXAM CHEST 1 VIEW: CPT | Mod: 26

## 2021-08-20 PROCEDURE — 99291 CRITICAL CARE FIRST HOUR: CPT

## 2021-08-20 RX ORDER — HYDROCORTISONE 20 MG
100 TABLET ORAL EVERY 8 HOURS
Refills: 0 | Status: DISCONTINUED | OUTPATIENT
Start: 2021-08-20 | End: 2021-08-22

## 2021-08-20 RX ORDER — FERROUS SULFATE 325(65) MG
325 TABLET ORAL
Refills: 0 | Status: DISCONTINUED | OUTPATIENT
Start: 2021-08-20 | End: 2021-08-26

## 2021-08-20 RX ORDER — PIOGLITAZONE HYDROCHLORIDE 15 MG/1
1 TABLET ORAL
Qty: 0 | Refills: 0 | DISCHARGE

## 2021-08-20 RX ORDER — FERROUS SULFATE 325(65) MG
1 TABLET ORAL
Qty: 0 | Refills: 0 | DISCHARGE

## 2021-08-20 RX ORDER — DUPILUMAB 300 MG/2ML
0 INJECTION, SOLUTION SUBCUTANEOUS
Qty: 0 | Refills: 0 | DISCHARGE

## 2021-08-20 RX ORDER — SODIUM CHLORIDE 9 MG/ML
1000 INJECTION, SOLUTION INTRAVENOUS
Refills: 0 | Status: DISCONTINUED | OUTPATIENT
Start: 2021-08-20 | End: 2021-08-26

## 2021-08-20 RX ORDER — GABAPENTIN 400 MG/1
800 CAPSULE ORAL
Refills: 0 | Status: DISCONTINUED | OUTPATIENT
Start: 2021-08-20 | End: 2021-08-26

## 2021-08-20 RX ORDER — SODIUM CHLORIDE 9 MG/ML
1000 INJECTION, SOLUTION INTRAVENOUS ONCE
Refills: 0 | Status: COMPLETED | OUTPATIENT
Start: 2021-08-20 | End: 2021-08-20

## 2021-08-20 RX ORDER — ERTAPENEM SODIUM 1 G/1
1000 INJECTION, POWDER, LYOPHILIZED, FOR SOLUTION INTRAMUSCULAR; INTRAVENOUS ONCE
Refills: 0 | Status: COMPLETED | OUTPATIENT
Start: 2021-08-20 | End: 2021-08-20

## 2021-08-20 RX ORDER — ACETAMINOPHEN 500 MG
1000 TABLET ORAL ONCE
Refills: 0 | Status: DISCONTINUED | OUTPATIENT
Start: 2021-08-20 | End: 2021-08-20

## 2021-08-20 RX ORDER — DEXTROSE 50 % IN WATER 50 %
12.5 SYRINGE (ML) INTRAVENOUS ONCE
Refills: 0 | Status: DISCONTINUED | OUTPATIENT
Start: 2021-08-20 | End: 2021-08-26

## 2021-08-20 RX ORDER — LEVOTHYROXINE SODIUM 125 MCG
300 TABLET ORAL
Refills: 0 | Status: DISCONTINUED | OUTPATIENT
Start: 2021-08-20 | End: 2021-08-26

## 2021-08-20 RX ORDER — DEXTROSE 50 % IN WATER 50 %
15 SYRINGE (ML) INTRAVENOUS ONCE
Refills: 0 | Status: DISCONTINUED | OUTPATIENT
Start: 2021-08-20 | End: 2021-08-26

## 2021-08-20 RX ORDER — ATORVASTATIN CALCIUM 80 MG/1
20 TABLET, FILM COATED ORAL AT BEDTIME
Refills: 0 | Status: DISCONTINUED | OUTPATIENT
Start: 2021-08-20 | End: 2021-08-26

## 2021-08-20 RX ORDER — LEVOTHYROXINE SODIUM 125 MCG
150 TABLET ORAL
Refills: 0 | Status: DISCONTINUED | OUTPATIENT
Start: 2021-08-20 | End: 2021-08-26

## 2021-08-20 RX ORDER — LISINOPRIL 2.5 MG/1
1 TABLET ORAL
Qty: 0 | Refills: 0 | DISCHARGE

## 2021-08-20 RX ORDER — TRAMADOL HYDROCHLORIDE 50 MG/1
1 TABLET ORAL
Qty: 0 | Refills: 0 | DISCHARGE

## 2021-08-20 RX ORDER — LEVOTHYROXINE SODIUM 125 MCG
1 TABLET ORAL
Qty: 0 | Refills: 0 | DISCHARGE

## 2021-08-20 RX ORDER — ACETAMINOPHEN 500 MG
975 TABLET ORAL ONCE
Refills: 0 | Status: COMPLETED | OUTPATIENT
Start: 2021-08-20 | End: 2021-08-20

## 2021-08-20 RX ORDER — MORPHINE SULFATE 50 MG/1
4 CAPSULE, EXTENDED RELEASE ORAL ONCE
Refills: 0 | Status: DISCONTINUED | OUTPATIENT
Start: 2021-08-20 | End: 2021-08-20

## 2021-08-20 RX ORDER — METFORMIN HYDROCHLORIDE 850 MG/1
1 TABLET ORAL
Qty: 0 | Refills: 0 | DISCHARGE

## 2021-08-20 RX ORDER — INSULIN LISPRO 100/ML
VIAL (ML) SUBCUTANEOUS
Refills: 0 | Status: DISCONTINUED | OUTPATIENT
Start: 2021-08-20 | End: 2021-08-22

## 2021-08-20 RX ORDER — NOREPINEPHRINE BITARTRATE/D5W 8 MG/250ML
0.05 PLASTIC BAG, INJECTION (ML) INTRAVENOUS
Qty: 8 | Refills: 0 | Status: DISCONTINUED | OUTPATIENT
Start: 2021-08-20 | End: 2021-08-20

## 2021-08-20 RX ORDER — ALENDRONATE SODIUM 70 MG/1
1 TABLET ORAL
Qty: 0 | Refills: 0 | DISCHARGE

## 2021-08-20 RX ORDER — DEXTROSE 50 % IN WATER 50 %
25 SYRINGE (ML) INTRAVENOUS ONCE
Refills: 0 | Status: DISCONTINUED | OUTPATIENT
Start: 2021-08-20 | End: 2021-08-26

## 2021-08-20 RX ORDER — ACETAMINOPHEN 500 MG
1000 TABLET ORAL ONCE
Refills: 0 | Status: COMPLETED | OUTPATIENT
Start: 2021-08-20 | End: 2021-08-20

## 2021-08-20 RX ORDER — POTASSIUM CHLORIDE 20 MEQ
40 PACKET (EA) ORAL ONCE
Refills: 0 | Status: COMPLETED | OUTPATIENT
Start: 2021-08-20 | End: 2021-08-20

## 2021-08-20 RX ORDER — ACETAMINOPHEN 500 MG
650 TABLET ORAL ONCE
Refills: 0 | Status: COMPLETED | OUTPATIENT
Start: 2021-08-20 | End: 2021-08-20

## 2021-08-20 RX ORDER — SENNA PLUS 8.6 MG/1
2 TABLET ORAL AT BEDTIME
Refills: 0 | Status: DISCONTINUED | OUTPATIENT
Start: 2021-08-20 | End: 2021-08-26

## 2021-08-20 RX ORDER — OXYCODONE HYDROCHLORIDE 5 MG/1
5 TABLET ORAL EVERY 6 HOURS
Refills: 0 | Status: DISCONTINUED | OUTPATIENT
Start: 2021-08-20 | End: 2021-08-26

## 2021-08-20 RX ORDER — GLUCAGON INJECTION, SOLUTION 0.5 MG/.1ML
1 INJECTION, SOLUTION SUBCUTANEOUS ONCE
Refills: 0 | Status: DISCONTINUED | OUTPATIENT
Start: 2021-08-20 | End: 2021-08-26

## 2021-08-20 RX ORDER — SODIUM CHLORIDE 9 MG/ML
1750 INJECTION INTRAMUSCULAR; INTRAVENOUS; SUBCUTANEOUS ONCE
Refills: 0 | Status: COMPLETED | OUTPATIENT
Start: 2021-08-20 | End: 2021-08-20

## 2021-08-20 RX ORDER — ERTAPENEM SODIUM 1 G/1
1000 INJECTION, POWDER, LYOPHILIZED, FOR SOLUTION INTRAMUSCULAR; INTRAVENOUS EVERY 24 HOURS
Refills: 0 | Status: COMPLETED | OUTPATIENT
Start: 2021-08-21 | End: 2021-08-23

## 2021-08-20 RX ORDER — FOLIC ACID 0.8 MG
1 TABLET ORAL DAILY
Refills: 0 | Status: DISCONTINUED | OUTPATIENT
Start: 2021-08-20 | End: 2021-08-26

## 2021-08-20 RX ADMIN — Medication 1000 MILLIGRAM(S): at 11:24

## 2021-08-20 RX ADMIN — Medication 650 MILLIGRAM(S): at 22:50

## 2021-08-20 RX ADMIN — MORPHINE SULFATE 4 MILLIGRAM(S): 50 CAPSULE, EXTENDED RELEASE ORAL at 08:46

## 2021-08-20 RX ADMIN — ERTAPENEM SODIUM 120 MILLIGRAM(S): 1 INJECTION, POWDER, LYOPHILIZED, FOR SOLUTION INTRAMUSCULAR; INTRAVENOUS at 03:34

## 2021-08-20 RX ADMIN — Medication 40 MILLIEQUIVALENT(S): at 07:03

## 2021-08-20 RX ADMIN — GABAPENTIN 800 MILLIGRAM(S): 400 CAPSULE ORAL at 17:58

## 2021-08-20 RX ADMIN — Medication 100 MILLIGRAM(S): at 21:22

## 2021-08-20 RX ADMIN — Medication 325 MILLIGRAM(S): at 17:58

## 2021-08-20 RX ADMIN — Medication 1 MILLIGRAM(S): at 11:27

## 2021-08-20 RX ADMIN — MORPHINE SULFATE 4 MILLIGRAM(S): 50 CAPSULE, EXTENDED RELEASE ORAL at 04:48

## 2021-08-20 RX ADMIN — Medication 2: at 21:54

## 2021-08-20 RX ADMIN — GABAPENTIN 800 MILLIGRAM(S): 400 CAPSULE ORAL at 23:00

## 2021-08-20 RX ADMIN — Medication 1: at 18:08

## 2021-08-20 RX ADMIN — Medication 1: at 11:49

## 2021-08-20 RX ADMIN — Medication 650 MILLIGRAM(S): at 21:54

## 2021-08-20 RX ADMIN — Medication 975 MILLIGRAM(S): at 03:34

## 2021-08-20 RX ADMIN — OXYCODONE HYDROCHLORIDE 5 MILLIGRAM(S): 5 TABLET ORAL at 21:22

## 2021-08-20 RX ADMIN — SODIUM CHLORIDE 1000 MILLILITER(S): 9 INJECTION, SOLUTION INTRAVENOUS at 08:46

## 2021-08-20 RX ADMIN — Medication 100 MILLIGRAM(S): at 14:17

## 2021-08-20 RX ADMIN — Medication 100 MILLIGRAM(S): at 09:33

## 2021-08-20 RX ADMIN — Medication 400 MILLIGRAM(S): at 10:38

## 2021-08-20 RX ADMIN — OXYCODONE HYDROCHLORIDE 5 MILLIGRAM(S): 5 TABLET ORAL at 22:22

## 2021-08-20 RX ADMIN — Medication 1 TABLET(S): at 11:21

## 2021-08-20 RX ADMIN — ATORVASTATIN CALCIUM 20 MILLIGRAM(S): 80 TABLET, FILM COATED ORAL at 21:22

## 2021-08-20 RX ADMIN — GABAPENTIN 800 MILLIGRAM(S): 400 CAPSULE ORAL at 13:11

## 2021-08-20 RX ADMIN — SODIUM CHLORIDE 1750 MILLILITER(S): 9 INJECTION INTRAMUSCULAR; INTRAVENOUS; SUBCUTANEOUS at 03:35

## 2021-08-20 NOTE — ED PROVIDER NOTE - NS ED ROS FT
Constitutional: fevers; no chills  HEENT: no visual changes, no sore throat, no rhinorrhea  CV: no cp; no palpitations  Resp: no sob; no cough  GI: no abd pain, no nausea, vomiting, no diarrhea, no constipation  : no dysuria, no hematuria  MSK: no myalgais; no arthralgias  skin: no rashes  neuro: no HA, no numbness; no weakness, no tingling  ROS statement: all other ROS negative except as per HPI

## 2021-08-20 NOTE — H&P ADULT - HISTORY OF PRESENT ILLNESS
70 w/hx of stage 1 uterine and endometrial CA s/p TLH/BSO (chemo 5 years ago, in remission), bullous pemphigoid on IVIG and daily prednisone, DM, hypothyroidism, chronic pain syndrome p/w vomiting x1 and fever. Reports generalized weakness and states last time, she required blood transfusions. She denies fevers at home, abd pain, vaginal bleeding, melena, brbpr, cp, sob. She is having a chronic pain that is not worse than normal    In ER, Pt received IV Ertapenem 1g x1, PO Tylenol, IV Morphine 4mg x1, KCl ER 40meQ, 1L LR bolus, 1.75L NS bolus, 1U pRBC, although was ordered for 2.  70 w/hx of stage 1 uterine and endometrial CA s/p TLH/BSO (chemo 5 years ago, in remission), bullous pemphigoid on IVIG and daily prednisone, DM, hypothyroidism, chronic pain syndrome p/w vomiting x1 and fever that started yesterday. Otherwise pt had IVIG about 2 weeks ago at outpt rheumatology. She was feeling muscle aches and pains for last several days, taking more pain medication, usually takes Tramadol ER 300mg qD and   Pt seen in ER w/MAR, ICU resident and RN at bedside, concern for septic shock as pts BP fluctuating 60-70/40s via automatic BP cuff on R forearm. Pt alert and oriented x4, mentating well, holding a full conversation. Pt states her BP is usually 120/70s, only BP med is Lisinopril 2.5mg qD which she has not taken since yesterday morning. Reports feeling slightly better since being admitted, only complaint at this time is pain under her buttocks from the bed.   BP cuff moved to RLE calf, BP 90/50s.     In ER, Pt received IV Ertapenem 1g x1, PO Tylenol, IV Morphine 4mg x1, KCl ER 40meQ, 1L LR bolus, 1.75L NS bolus, 1U pRBC, although was ordered for 2.

## 2021-08-20 NOTE — H&P ADULT - NSHPLABSRESULTS_GEN_ALL_CORE
.  LABS:                         6.2    16.17 )-----------( 286      ( 20 Aug 2021 04:08 )             21.8     08-20    139  |  103  |  32<H>  ----------------------------<  160<H>  3.4<L>   |  21<L>  |  1.23    Ca    8.8      20 Aug 2021 04:08    TPro  7.2  /  Alb  3.1<L>  /  TBili  <0.2  /  DBili  x   /  AST  31  /  ALT  18  /  AlkPhos  71  08-20    PT/INR - ( 20 Aug 2021 04:08 )   PT: 12.8 sec;   INR: 1.13 ratio         PTT - ( 20 Aug 2021 04:08 )  PTT:23.3 sec  Urinalysis Basic - ( 20 Aug 2021 06:00 )    Color: Light Yellow / Appearance: Slightly Turbid / S.019 / pH: x  Gluc: x / Ketone: Negative  / Bili: Negative / Urobili: <2 mg/dL   Blood: x / Protein: 30 mg/dL / Nitrite: Negative   Leuk Esterase: Moderate / RBC: 2 /HPF / WBC 46 /HPF   Sq Epi: x / Non Sq Epi: 0 /HPF / Bacteria: Many                RADIOLOGY, EKG & ADDITIONAL TESTS: Reviewed.   < from: Xray Chest 1 View- PORTABLE-Urgent (21 @ 04:51) >  FINDINGS/  IMPRESSION:  No focal consolidation, pleural effusions or pneumothorax. Cardiac silhouette is mildly enlarged.  --- End of Report ---    MILE MIRANDA MD; Resident Radiology  This document has been electronically signed.  RUPALI GARCIA MD; Attending Interventional Radiologist  This document has been electronically signed. Aug 20 2021  8:42AM

## 2021-08-20 NOTE — ED ADULT NURSE REASSESSMENT NOTE - NS ED NURSE REASSESS COMMENT FT1
Pt continues to mentate well, blood transfusion remains in progress, pt in NAD, will continue to monitor.

## 2021-08-20 NOTE — ED ADULT TRIAGE NOTE - CHIEF COMPLAINT QUOTE
Patient developed a fever last night, took Tylenol without relief.  En route to hospital, she had 1 episode of vomiting.

## 2021-08-20 NOTE — ED ADULT NURSE REASSESSMENT NOTE - NS ED NURSE REASSESS COMMENT FT1
Pt mentating well, blood pressure increasing, resident Jemima and MAR attending aware, will continue to monitor.

## 2021-08-20 NOTE — ED ADULT NURSE REASSESSMENT NOTE - NS ED NURSE REASSESS COMMENT FT1
ACP called in regards to discontinuing CM order and continuous pulse oximetry order. As per ACP, pt "allowed to eat, will put diet orders in and will discontinue CM and pulse oximetry order".

## 2021-08-20 NOTE — ED PROVIDER NOTE - ATTENDING CONTRIBUTION TO CARE
Upon my evaluation, this patient had a high probability of imminent or life-threatening deterioration due to SEPSIS and SEVERE ANEMIA which required my direct attention, intervention, and personal management.  The patient has a  medical condition that impairs one or more vital organ systems.  Frequent personal assessment and adjustment of medical interventions was performed.      I have personally provided 35 minutes of critical care time exclusive of time spent on separately billable procedures. Time includes review of laboratory data, radiology results, discussion with consultants, patient and family; monitoring for potential decompensation, as well as time spent retrieving data and reviewing the chart and documenting the visit. Interventions were performed as documented above.     HPI: 69 yo F Past Medical History of morbid obesity, stage 1 uterine and endometrial CA s/p TLH/BSO (chemo 5 years ago, in remission), bullous pemphigoid on IVIG and daily prednisone 15mg qd, DM, hypothyroidism, chronic pain syndrome, presents to the ED for vomiting today. Had one episode. At the urge of her , she presented to the ED. She is also endorsing generalized weakness and states last time, she required blood transfusions. She denies fevers at home, abd pain, vaginal bleeding, melena, brbpr, cp, sob. She is having a chronic pain that is not worse than normal  EXAM: Obese, Speaking full sentences, heart tachy, lungs ctab, abd soft, skin with multiple site of ecchymosis mostly on extremities, no signs of gross infection, no cellulitis, dry skin BLE shins. Abd soft nontender, no hernias.   MDM: pt with multiple medical problems that was recently admitted to hospital for UTI sepsis and required tranfusion for anemia without overt signs of bleeding that returns for vomiting. Triage VS are abnormal with oral fever as well as tachycardia. Meets criteria for SIRS and likely source is urine again as previously had ESBL multidrug resistance. Pt also has multiple drug allergies. Was treated with Ertapenem last hospitalization and Sensitivity with urine culture shows sensitive to Ertapenem. Will start in ED, work up with SEPSIS labs and likely admit.

## 2021-08-20 NOTE — ED ADULT NURSE REASSESSMENT NOTE - NS ED NURSE REASSESS COMMENT FT1
Pt mentating, A&OX4. Pt endorses dizziness. Noted to be hypotensive. MD Briscoe made aware. Fluids infusing as per order. Blood transfusion initiated at 0655. Pt educated on s/s of blood transfusion reactions and showed understanding with teachback method. Consent in paper chart. Resp. even and unlabored. Denies CP, SOB, and HA. 2nd IV access gained- 18g to the L AC. VS as noted. NSR on the CM.

## 2021-08-20 NOTE — ED ADULT NURSE REASSESSMENT NOTE - NS ED NURSE REASSESS COMMENT FT1
Pt straight catheterized as per order. Sterile technique maintained. Pt tolerated procedure well. MD Briscoe present as chaperone. Initial output 450mL. Urine appears yellow, cloudy, and foul smelling at this time. UA and UC sent.

## 2021-08-20 NOTE — ED ADULT NURSE NOTE - CHIEF COMPLAINT
The patient is a 70y Female complaining of  Cryotherapy Text: The wound bed was treated with cryotherapy after the biopsy was performed.

## 2021-08-20 NOTE — ED ADULT NURSE REASSESSMENT NOTE - NS ED NURSE REASSESS COMMENT FT1
Attending MAR, resident MD Onofre and ICU resident at bedside assessing pt. Blood transfusion in progress, will continue to monitor.

## 2021-08-20 NOTE — H&P ADULT - PROBLEM SELECTOR PLAN 2
Hb 6.2 on admission, pt reports hx of anemia requiring transfusions. unclear cause, has had extensive Dec 2020 and outpt workup.  Colonoscopy 2015 w Dr Ko - diverticulosis and small benign HP polyp resected.  -2U pRBC transfusion 8/20  -Monitor H/H  -Seen by GI and Heme/onc previously. Hb 6.2 on admission, pt reports hx of anemia requiring transfusions. unclear cause, has had extensive Dec 2020 and outpt workup.  Colonoscopy 2015 w Dr Ko - diverticulosis and small benign HP polyp resected.  -2U pRBC transfusion 8/20  -Repeat CBC in PM   -Monitor H/H  -Seen by GI and Heme/onc previously.

## 2021-08-20 NOTE — ED PROVIDER NOTE - PROGRESS NOTE DETAILS
Michael Stanford MD. pt reassessed and reevaluated. pt HR improved after fluids. pt consented for 2 units pRBCs (6.2). pt found to have UTI. pt admitted to [facility].
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
## 2021-08-20 NOTE — PROVIDER CONTACT NOTE (OTHER) - ASSESSMENT
BP 91/56, HR 94. Pt states, "I feel very tired." BP 91/56, HR 94. Pt states, "I feel very tired." Pt is alert and non-drowsy.

## 2021-08-20 NOTE — H&P ADULT - PROBLEM SELECTOR PLAN 1
P/w high grade fever, tachycardia, lactic acidosis and hypotension 2/2 presumed UTI, UA +LE, WBCs, but nitrate negative  CXR neg, COVID neg  -s/p ertapenem 1g in ER for presumed UTI, will continue for now. [facility] ID consulted, f/u recs   -f/u blood and Ucx  -s/p 3.275L crystalloids and 1U pRBC, 2nd unit running now, BP still 90/50s in ER.   -Started stress dose steroids as pt on chronic Pred 15mg for bullous pemphigoid   -ICU consulted, f/u recs P/w high grade fever, tachycardia, lactic acidosis and hypotension 2/2 presumed UTI, UA +LE, WBCs, but nitrate negative  Hx of ESBL E coli UTI April 2021   CXR neg, COVID neg  -s/p ertapenem 1g in ER for presumed UTI, will continue for now. [facility] ID consulted, f/u recs   -f/u blood and Ucx  -s/p 3.275L crystalloids and 1U pRBC, 2nd unit running now, BP still 90/50s in ER.   -Started stress dose steroids as pt on chronic Pred 15mg for bullous pemphigoid   -ICU consulted, f/u recs

## 2021-08-20 NOTE — CONSULT NOTE ADULT - ATTENDING COMMENTS
70 F with uterine and endometrial cancer s/p TRENT-BSO (in remission) and bullous pemphigoid on IVIG and chronic steroids presents with lethargy and vomiting. Found to have symptomatic anemia and severe sepsis associated with hypotension. Blood pressure improved with IVF, Hgb transfusion, and stress dose steroids. She is mentating well. Agree with empiric antibiotics. Continue steroids. No need for ICU at this time. Please reconsult for change in clinical / hemodynamic status. 70 F with uterine and endometrial cancer s/p TRENT-BSO (in remission) and bullous pemphigoid on IVIG and chronic steroids presents with lethargy, fever, and vomiting. Found to have symptomatic anemia, lactic acidosis, and sepsis associated with hypotension. Blood pressure improved with IVF, Hgb transfusion, and stress dose steroids. She is mentating well. Agree with empiric antibiotics. Continue steroids. No need for ICU at this time. Please reconsult for change in clinical / hemodynamic status.

## 2021-08-20 NOTE — CONSULT NOTE ADULT - ASSESSMENT
Ms. Hannon is a 69 y/o F with hx of uterine and endometrial cancer, bullous pemphigoid on IVIG and daily prednisone, DM, hypothyroidism, chronic pain syndrome and fever who presented to hospital in the setting of new onset fevers, chills, nausea, and vomiting with concern for severe sepsis and MICU consulted for persistent hypotension with concern that patient may be in shock state requiring pressors.       #Hypotension:  -Mentating appropriately. Capillary refill in upper and lower extremities <2 seconds. Bedside POCUS with indeterminate IVC, R and L lung with a lines, no more than 2-3 b lines in each lung field, no evidence of pleural effusion, patient on room air. Recommend continuing IVFs and transfusion of pRBCs.   -Abx per primary team  -Recommend starting stress dose steroids i/s/o chronic prednisone use for bullous pemphigoid.  -Continue to monitor patient's vitals and mental status.   -Rest of care per primary team.    Plan d/w Dr. Nichols and recs relayed to the MAR.     Gary Gonzales M.D.  Internal Medicine PGY-2  [phone]  Ms. Hannon is a 71 y/o F with hx of uterine and endometrial cancer, bullous pemphigoid on IVIG and daily prednisone, DM, hypothyroidism, chronic pain syndrome and fever who presented to hospital in the setting of new onset fevers, chills, nausea, and vomiting with concern for severe sepsis and MICU consulted for persistent hypotension with concern that patient may be in shock state requiring pressors.       #Hypotension:  -Mentating appropriately. Capillary refill in upper and lower extremities <2 seconds. Bedside POCUS with indeterminate IVC, R and L lung with a lines, no more than 2-3 b lines in each lung field, no evidence of pleural effusion, patient on room air. Recommend continuing IVFs and transfusion of pRBCs.   -Abx per primary team  -Recommend starting stress dose steroids i/s/o chronic prednisone use for bullous pemphigoid.  -Continue to monitor patient's vitals and mental status.   -Currently not a candidate for ICU admission, please call or reconsult PRN.   -Rest of care per primary team.      Plan d/w Dr. Nichols and recs relayed to the MAR.     Gary Gonzales M.D.  Internal Medicine PGY-2  [phone]

## 2021-08-20 NOTE — H&P ADULT - PROBLEM SELECTOR PLAN 5
Home meds: Metformin and Actos  -ISS/FS  -CCD Sees pain management, home meds: Tramadol ER 300mg qD, Norco 10-325mg PRN (usually takes 1/day), Gabapentin 800mg QID.   -C/w home gabapentin 800mg QID  -IV Tylenol now for pain   -Start Oxy 5mg IR q6 for mod-severe pain

## 2021-08-20 NOTE — H&P ADULT - NSHPPHYSICALEXAM_GEN_ALL_CORE
.  VITAL SIGNS:  T(C): 36.9 (08-20-21 @ 09:10), Max: 39.3 (08-20-21 @ 01:16)  T(F): 98.4 (08-20-21 @ 09:10), Max: 102.8 (08-20-21 @ 01:16)  HR: 87 (08-20-21 @ 10:13) (86 - 132)  BP: 92/58 (08-20-21 @ 10:13) (71/50 - 113/56)  BP(mean): 69 (08-20-21 @ 10:13) (60 - 70)  RR: 17 (08-20-21 @ 10:13) (16 - 20)  SpO2: 99% (08-20-21 @ 10:13) (97% - 100%)  Wt(kg): --    PHYSICAL EXAM:    Constitutional: obese, sitting in stretcher, comfortable   Head: NC/AT  Eyes: PERRL, EOMI, clear conjunctiva  ENT: dry MM  Neck: supple  Respiratory: decreased breath sounds, no resp distress, no wheeze   Cardiac: +S1/S2; RRR; +murmur   Gastrointestinal: obese, soft, NT/ND; no rebound or guarding; +BSx4  Genitourinary: normal external genitalia  Back: spine midline, no bony tenderness or step-offs  Extremities: WWP, no clubbing or cyanosis; no LE edema   Musculoskeletal: NROM x4; no joint swelling, tenderness or erythema  Vascular: +DP   Dermatologic: skin warm, dry and intact; no wounds noted, scattered ecchymosis b/l UE  Neurologic: AAOx3; CNII-XII grossly intact; no focal deficits  Psychiatric: affect and characteristics of appearance, verbalizations, behaviors are appropriate

## 2021-08-20 NOTE — ED PROVIDER NOTE - OBJECTIVE STATEMENT
69 yo F PMHx stage 1 uterine and endometrial CA s/p TLH/BSO (chemo 5 years ago, in remission), bullous pemphigoid on IVIG and daily prednisone 15mg qd, DM, hypothyroidism, chronic pain syndrome, presents to the ED for vomiting today. Had one episode. At the urge of her , she presented to the ED. She is also endorsing generalized weakness and states last time, she required blood transfusions. She denies fevers at home, abd pain, vaginal bleeding, melena, brbpr, cp, sob. She is having a chronic pain that is not worse than normal 69 yo F PMHx stage 1 uterine and endometrial CA s/p TLH/BSO (chemo 5 years ago, in remission), bullous pemphigoid on IVIG and daily prednisone 15mg qd, DM, hypothyroidism, chronic pain syndrome, presents to the ED for vomiting today. Had one episode. At the urge of her , she presented to the ED. She is also endorsing generalized weakness and states last time, she required blood transfusions. She denies fevers at home, abd pain, vaginal bleeding, melena, brbpr, cp, sob. She is having a chronic pain that is not worse than normal  Dr. Olivia Ross ([facility])

## 2021-08-20 NOTE — H&P ADULT - PROBLEM SELECTOR PLAN 3
Receiving IVIG q month (last 2 weeks ago) as well as dupixent q 2 weeks, last dose 8/17   -Home med: Prednisone 15mg qD   -Currently not in flare, c/w IV Hydrocortisone for stress dose steroids for now

## 2021-08-20 NOTE — ED ADULT NURSE REASSESSMENT NOTE - NS ED NURSE REASSESS COMMENT FT1
LUANA Onofre at pt bedside, aware of pt's blood pressure, next unit of blood ordered from blood bank. Pt mentating well, as per LUANA Onofre "will contact ICU". Will continue to monitor.

## 2021-08-20 NOTE — ED PROVIDER NOTE - CLINICAL SUMMARY MEDICAL DECISION MAKING FREE TEXT BOX
Michael Stanford MD. pt presenting with vomtiing today. noted to be febrile here. pt also feels generally weak and states last time she was in hospital, required blood transfusions. pt denies abd pain, diarrhea, melena, brbpr, cp, sob. pt is febrile and tachycardic here. no clear source of infection as pt denies dysuria. concern for occult infection, may be 2/2 uti vs pna vs viral. will obtain sepsis w/u, cxr, ua, ivf, abx, will reassess

## 2021-08-20 NOTE — CHART NOTE - NSCHARTNOTEFT_GEN_A_CORE
Reference #: [id number]    Patient Name: Kalyn ModihBlauren Date: 1950  Address: [address] [place] [id number]: Female  Rx Written	Rx Dispensed	Drug	Quantity	Days Supply	Prescriber Name	Prescriber Adali #	Payment Method	Dispenser  08/18/2021	08/19/2021	tramadol hcl er 300 mg tablet	30	30	Pepe, Janeth	[id number]	Insurance	[facility]  08/18/2021	08/19/2021	hydrocodone-acetaminophen  mg tablet	120	30	Pepe, Janeth	[id number]	Insurance	[facility]  07/08/2021	07/09/2021	hydrocodone-acetaminophen  mg tablet	90	30	Pepe, Janeth	[id number]	Insurance	[facility]  07/08/2021	07/09/2021	tramadol hcl er 300 mg tablet	30	30	Pepe, Janeth	[id number]	Insurance	[facility]  03/31/2021	04/06/2021	oxycodone hcl 10 mg tablet	28	7	Randy Benedict	[id number]	Insurance	[facility]  12/17/2020	12/19/2020	tramadol hcl er 300 mg tablet	30	30	Lashay Gonzales)	[id number]	Insurance	[facility]  12/17/2020	12/19/2020	hydrocodone-acetaminophen  mg tablet	90	30	Lashay Gonzales)	[id number]	Insurance	[facility]  11/19/2020	11/20/2020	tramadol hcl er 300 mg tablet	30	30	Irma Gan A	[id number]	Insurance	[facility]  11/19/2020	11/20/2020	hydrocodone-acetaminophen  mg tablet	90	30	Irma Gan A	[id number]	Insurance	[facility]  11/17/2020	11/17/2020	hydrocodone-acetaminophen  mg tablet	6	2	Pepe, Janeth	[id number]	Insurance	[facility]  10/20/2020	10/21/2020	hydrocodone-acetaminophen  mg tablet	90	90	Irma Gan A	[id number]	Insurance	[facility]  10/20/2020	10/21/2020	tramadol hcl er 300 mg tablet	30	30	Irma Gan A	[id number]	Insurance	[facility]  09/22/2020	09/23/2020	tramadol hcl er 300 mg tablet	30	30	Irma Gan A	[id number]	Insurance	[facility]  09/22/2020	09/23/2020	hydrocodone-acetaminophen  mg tablet	90	30	Irma Gan A	[id number]	Insurance	[facility]  08/21/2020	08/22/2020	hydrocodone-acetaminophen  mg tablet	90	30	Lashay Gonzales (MD)	[id number]	Insurance	[facility]  08/21/2020	08/22/2020	tramadol hcl er 300 mg tablet	30	30	Lashay Gonzales)	[id number]	Insurance	[facility]

## 2021-08-20 NOTE — ED PROVIDER NOTE - PHYSICAL EXAMINATION
PHYSICAL EXAM:  GENERAL: non-toxic appearing; in no respiratory distress  HEAD Atraumatic, Normocephalic  NECK: No JVD; trachea midline  EYES: PERRL, EOMs intact b/l w/out deficits; normal conjunctiva  CHEST/LUNG: CTAB no wheezes/rhonchi/rales  HEART: tachycardic; no murmur/gallops/rubs  ABDOMEN: +BS, soft, NT, ND  EXTREMITIES: No LE edema, +2 radial pulses b/l, +2 DP/PT pulses b/l  MUSCULOSKELETAL: FROM of all 4 extremities;  NERVOUS SYSTEM:  A&Ox3, No motor deficits or sensory deficits; CNII-XII intact; no focal neurologic deficits  SKIN:  Warm and dry as visualized

## 2021-08-20 NOTE — ED PROVIDER NOTE - NSICDXPASTMEDICALHX_GEN_ALL_CORE_FT
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  On Immunosupressants  Cellcept    Endometrial cancer dx: 8/2015:  High grade Endometroid Adenocarcinoma    History of osteoarthritis     Hyperlipidemia     Hypertension     Hypothyroidism     Morbid obesity BMI:  53.6    Type 2 diabetes mellitus     Vitamin D deficiency

## 2021-08-20 NOTE — H&P ADULT - ASSESSMENT
70 w/hx of stage 1 uterine and endometrial CA s/p TLH/BSO (chemo 5 years ago, in remission), bullous pemphigoid on IVIG and daily prednisone, DM, hypothyroidism, chronic pain syndrome p/w vomiting x1 and fever admitted for septic shock 2/2 presumed UTI.  70 w/hx of stage 1 uterine and endometrial CA s/p TLH/BSO (chemo 5 years ago, in remission), bullous pemphigoid on IVIG and daily prednisone, DM, hypothyroidism, chronic pain syndrome p/w vomiting x1 and fever admitted for severe sepsis 2/2 presumed UTI.

## 2021-08-20 NOTE — CONSULT NOTE ADULT - SUBJECTIVE AND OBJECTIVE BOX
CHIEF COMPLAINT:    HPI: 70 w/hx of stage 1 uterine and endometrial CA s/p TLH/BSO (chemo 5 years ago, in remission), bullous pemphigoid on IVIG and daily prednisone, DM, hypothyroidism, chronic pain syndrome p/w vomiting x1 and fever that started yesterday. Otherwise pt had IVIG about 2 weeks ago at outpt rheumatology. She was feeling muscle aches and pains for last several days, taking more pain medication, usually takes Tramadol ER 300mg qD and   Pt seen in ER w/MAR, ICU resident and RN at bedside, concern for septic shock as pts BP fluctuating 60-70/40s via automatic BP cuff on R forearm. Pt alert and oriented x4, mentating well, holding a full conversation. Pt states her BP is usually 120/70s, only BP med is Lisinopril 2.5mg qD which she has not taken since yesterday morning. Reports feeling slightly better since being admitted, only complaint at this time is pain under her buttocks from the bed.   BP cuff moved to RLE calf, BP 90/50s.     In ER, Pt received IV Ertapenem 1g x1, PO Tylenol, IV Morphine 4mg x1, KCl ER 40meQ, 1L LR bolus, 1.75L NS bolus, 1U pRBC, although was ordered for 2.     MICU consulted for persistent hypotension despite IV crystalloid infusion and pRBC infusion. Patient at time of interview answering questions appropriately. Denies any pain, endorses vomiting and fever for the past day. Takes lisinopril at home for hx of HTN, did not take her medication this morning.     PAST MEDICAL & SURGICAL HISTORY:  Bullous pemphigoid  dx: 2014.  On Immunosupressants  Cellcept    Hypertension    Type 2 diabetes mellitus    History of osteoarthritis    Anemia    Hypothyroidism    Vitamin D deficiency    Morbid obesity  BMI:  53.6    Endometrial cancer  dx: 2015:  High grade Endometroid Adenocarcinoma    Hyperlipidemia    Adrenal mass   incidental findings   Ct SCAN 2015 unchannged  24 hour urine for VMA done by Dr DR Canas [phone] Neg asper patient    History of  section  &#x27; 82-&#x27;95:  4 X    Status post total thyroidectomy  &#x27;     Endometrial cancer  2015:  Endometrial Biopsy: dx: High Grade Endometroid Adenocarcinoma    S/P hysterectomy    S/P BSO (bilateral salpingo-oophorectomy)        FAMILY HISTORY:  Family history of lymphoma (Father)  father        SOCIAL HISTORY:  Smoking: denies  EtOH Use: social  Marital Status:   No illicit drug use.     Allergies    Ancef (Rash)  daptomycin (Vomiting)  Keflex (Unknown)  penicillin (Pruritus; Rash; Hives)    Intolerances    vancomycin (Other)      HOME MEDICATIONS:    REVIEW OF SYSTEMS:  +Nausea, vomiting  + fevers  + b/l joint pains and arthritis, myalgias.       OBJECTIVE:  ICU Vital Signs Last 24 Hrs  T(C): 36.9 (20 Aug 2021 13:11), Max: 39.3 (20 Aug 2021 01:16)  T(F): 98.5 (20 Aug 2021 13:11), Max: 102.8 (20 Aug 2021 01:16)  HR: 85 (20 Aug 2021 13:11) (83 - 132)  BP: 99/69 (20 Aug 2021 13:11) (71/50 - 113/56)  BP(mean): 68 (20 Aug 2021 10:29) (60 - 70)  ABP: --  ABP(mean): --  RR: 20 (20 Aug 2021 13:11) (16 - 20)  SpO2: 99% (20 Aug 2021 13:11) (97% - 100%)        CAPILLARY BLOOD GLUCOSE      POCT Blood Glucose.: 169 mg/dL (20 Aug 2021 11:47)      PHYSICAL EXAM:    General: Alert and oriented to name, place, and date.   HEENT: EOMI, PERRLA  Neck: No JVP.   Lungs: CTAB.   Heart: RRR  Abdomen: +obese, soft, non-tender, non-distended.    Skin: +purpuric rashes present in the b/l arms.   Neuro: CN grossly intact. Coordination intact. Gait not tested.     HOSPITAL MEDICATIONS:  MEDICATIONS  (STANDING):  atorvastatin 20 milliGRAM(s) Oral at bedtime  dextrose 40% Gel 15 Gram(s) Oral once  dextrose 5%. 1000 milliLiter(s) (50 mL/Hr) IV Continuous <Continuous>  dextrose 5%. 1000 milliLiter(s) (100 mL/Hr) IV Continuous <Continuous>  dextrose 50% Injectable 25 Gram(s) IV Push once  dextrose 50% Injectable 12.5 Gram(s) IV Push once  dextrose 50% Injectable 25 Gram(s) IV Push once  ertapenem  IVPB 1000 milliGRAM(s) IV Intermittent every 24 hours  ferrous    sulfate 325 milliGRAM(s) Oral two times a day  folic acid 1 milliGRAM(s) Oral daily  gabapentin 800 milliGRAM(s) Oral four times a day  glucagon  Injectable 1 milliGRAM(s) IntraMuscular once  hydrocortisone sodium succinate Injectable 100 milliGRAM(s) IV Push every 8 hours  insulin lispro (ADMELOG) corrective regimen sliding scale   SubCutaneous Before meals and at bedtime  levothyroxine 150 MICROGram(s) Oral <User Schedule>  levothyroxine 300 MICROGram(s) Oral <User Schedule>  multivitamin 1 Tablet(s) Oral daily  senna 2 Tablet(s) Oral at bedtime    MEDICATIONS  (PRN):  oxyCODONE    IR 5 milliGRAM(s) Oral every 6 hours PRN mod to severe pain      LABS:                        6.2    16.17 )-----------( 286      ( 20 Aug 2021 04:08 )             21.8     08    139  |  103  |  32<H>  ----------------------------<  160<H>  3.4<L>   |  21<L>  |  1.23    Ca    8.8      20 Aug 2021 04:08    TPro  7.2  /  Alb  3.1<L>  /  TBili  <0.2  /  DBili  x   /  AST  31  /  ALT  18  /  AlkPhos  71  08-20    PT/INR - ( 20 Aug 2021 04:08 )   PT: 12.8 sec;   INR: 1.13 ratio         PTT - ( 20 Aug 2021 04:08 )  PTT:23.3 sec  Urinalysis Basic - ( 20 Aug 2021 06:00 )    Color: Light Yellow / Appearance: Slightly Turbid / S.019 / pH: x  Gluc: x / Ketone: Negative  / Bili: Negative / Urobili: <2 mg/dL   Blood: x / Protein: 30 mg/dL / Nitrite: Negative   Leuk Esterase: Moderate / RBC: 2 /HPF / WBC 46 /HPF   Sq Epi: x / Non Sq Epi: 0 /HPF / Bacteria: Many        Venous Blood Gas:   @ 07:05  7.33/47/31/25/49.2  VBG Lactate: 3.1  Venous Blood Gas:   @ 04:08  7.41/38/39/24/66.6  VBG Lactate: 3.8      MICROBIOLOGY:     RADIOLOGY:  [ ] Reviewed and interpreted by me    EKG: CHIEF COMPLAINT:    HPI: 70 w/hx of stage 1 uterine and endometrial CA s/p TLH/BSO (chemo 5 years ago, in remission), bullous pemphigoid on IVIG and daily prednisone, DM, hypothyroidism, chronic pain syndrome p/w vomiting x1 and fever that started yesterday. Otherwise pt had IVIG about 2 weeks ago at outpt rheumatology. She was feeling muscle aches and pains for last several days, taking more pain medication, usually takes Tramadol ER 300mg qD and   Pt seen in ER w/MAR, ICU resident and RN at bedside, concern for septic shock as pts BP fluctuating 60-70/40s via automatic BP cuff on R forearm. Pt alert and oriented x4, mentating well, holding a full conversation. Pt states her BP is usually 120/70s, only BP med is Lisinopril 2.5mg qD which she has not taken since yesterday morning. Reports feeling slightly better since being admitted, only complaint at this time is pain under her buttocks from the bed.   BP cuff moved to RLE calf, BP 90/50s.     In ER, Pt received IV Ertapenem 1g x1, PO Tylenol, IV Morphine 4mg x1, KCl ER 40meQ, 1L LR bolus, 1.75L NS bolus, 1U pRBC, although was ordered for 2.     MICU consulted for persistent hypotension despite IV crystalloid infusion and pRBC infusion. Patient at time of interview answering questions appropriately. Denies any pain, endorses vomiting and fever for the past day. Takes lisinopril at home for hx of HTN, did not take her medication this morning.     PAST MEDICAL & SURGICAL HISTORY:  Bullous pemphigoid  dx: 2014.  On Immunosupressants  Cellcept    Hypertension    Type 2 diabetes mellitus    History of osteoarthritis    Anemia    Hypothyroidism    Vitamin D deficiency    Morbid obesity  BMI:  53.6    Endometrial cancer  dx: 2015:  High grade Endometroid Adenocarcinoma    Hyperlipidemia    Adrenal mass   incidental findings   Ct SCAN 2015 unchannged  24 hour urine for VMA done by Dr DR Canas [phone] Neg asper patient    History of  section  &#x27; 82-&#x27;95:  4 X    Status post total thyroidectomy  &#x27;     Endometrial cancer  2015:  Endometrial Biopsy: dx: High Grade Endometroid Adenocarcinoma    S/P hysterectomy    S/P BSO (bilateral salpingo-oophorectomy)        FAMILY HISTORY:  Family history of lymphoma (Father)  father        SOCIAL HISTORY:  Smoking: denies  EtOH Use: social  Marital Status:   No illicit drug use.     Allergies    Ancef (Rash)  daptomycin (Vomiting)  Keflex (Unknown)  penicillin (Pruritus; Rash; Hives)    Intolerances    vancomycin (Other)      HOME MEDICATIONS:    REVIEW OF SYSTEMS:  +Nausea, vomiting  + fevers  + b/l joint pains and arthritis, myalgias.       OBJECTIVE:  ICU Vital Signs Last 24 Hrs  T(C): 36.9 (20 Aug 2021 13:11), Max: 39.3 (20 Aug 2021 01:16)  T(F): 98.5 (20 Aug 2021 13:11), Max: 102.8 (20 Aug 2021 01:16)  HR: 85 (20 Aug 2021 13:11) (83 - 132)  BP: 99/69 (20 Aug 2021 13:11) (71/50 - 113/56)  BP(mean): 68 (20 Aug 2021 10:29) (60 - 70)  ABP: --  ABP(mean): --  RR: 20 (20 Aug 2021 13:11) (16 - 20)  SpO2: 99% (20 Aug 2021 13:11) (97% - 100%)        CAPILLARY BLOOD GLUCOSE      POCT Blood Glucose.: 169 mg/dL (20 Aug 2021 11:47)      PHYSICAL EXAM:    General: Alert and oriented to name, place, and date.   HEENT: EOMI, PERRLA  Neck: No JVP.   Lungs: CTAB.   Heart: RRR  Abdomen: +obese, soft, non-tender, non-distended.    Skin: +purpuric rashes present in the b/l arms.   Neuro: CN grossly intact. Coordination intact. Gait not tested.     HOSPITAL MEDICATIONS:  MEDICATIONS  (STANDING):  atorvastatin 20 milliGRAM(s) Oral at bedtime  dextrose 40% Gel 15 Gram(s) Oral once  dextrose 5%. 1000 milliLiter(s) (50 mL/Hr) IV Continuous <Continuous>  dextrose 5%. 1000 milliLiter(s) (100 mL/Hr) IV Continuous <Continuous>  dextrose 50% Injectable 25 Gram(s) IV Push once  dextrose 50% Injectable 12.5 Gram(s) IV Push once  dextrose 50% Injectable 25 Gram(s) IV Push once  ertapenem  IVPB 1000 milliGRAM(s) IV Intermittent every 24 hours  ferrous    sulfate 325 milliGRAM(s) Oral two times a day  folic acid 1 milliGRAM(s) Oral daily  gabapentin 800 milliGRAM(s) Oral four times a day  glucagon  Injectable 1 milliGRAM(s) IntraMuscular once  hydrocortisone sodium succinate Injectable 100 milliGRAM(s) IV Push every 8 hours  insulin lispro (ADMELOG) corrective regimen sliding scale   SubCutaneous Before meals and at bedtime  levothyroxine 150 MICROGram(s) Oral <User Schedule>  levothyroxine 300 MICROGram(s) Oral <User Schedule>  multivitamin 1 Tablet(s) Oral daily  senna 2 Tablet(s) Oral at bedtime    MEDICATIONS  (PRN):  oxyCODONE    IR 5 milliGRAM(s) Oral every 6 hours PRN mod to severe pain      LABS:                        6.2    16.17 )-----------( 286      ( 20 Aug 2021 04:08 )             21.8     08    139  |  103  |  32<H>  ----------------------------<  160<H>  3.4<L>   |  21<L>  |  1.23    Ca    8.8      20 Aug 2021 04:08    TPro  7.2  /  Alb  3.1<L>  /  TBili  <0.2  /  DBili  x   /  AST  31  /  ALT  18  /  AlkPhos  71  08-20    PT/INR - ( 20 Aug 2021 04:08 )   PT: 12.8 sec;   INR: 1.13 ratio         PTT - ( 20 Aug 2021 04:08 )  PTT:23.3 sec  Urinalysis Basic - ( 20 Aug 2021 06:00 )    Color: Light Yellow / Appearance: Slightly Turbid / S.019 / pH: x  Gluc: x / Ketone: Negative  / Bili: Negative / Urobili: <2 mg/dL   Blood: x / Protein: 30 mg/dL / Nitrite: Negative   Leuk Esterase: Moderate / RBC: 2 /HPF / WBC 46 /HPF   Sq Epi: x / Non Sq Epi: 0 /HPF / Bacteria: Many        Venous Blood Gas:   @ 07:05  7.33/47/31/25/49.2  VBG Lactate: 3.1  Venous Blood Gas:   @ 04:08  7.41/38/39/24/66.6  VBG Lactate: 3.8      MICROBIOLOGY:     RADIOLOGY:  [x] Reviewed and interpreted by me  CXR 21: no consolidation, effussion      EKG:

## 2021-08-20 NOTE — CONSULT NOTE ADULT - SUBJECTIVE AND OBJECTIVE BOX
[facility], Division of Infectious Diseases  MAYDA Braxton, ILANA Williamson  [phone]    ERASMO, AME  70y, Female  [id number]    HPI--  HPI:  70 w/hx of stage 1 uterine and endometrial CA s/p TLH/BSO (chemo 5 years ago, in remission), bullous pemphigoid on IVIG and daily prednisone, DM, hypothyroidism, chronic pain syndrome p/w vomiting x1 and fever that started yesterday. Otherwise pt had IVIG about 2 weeks ago at outpt rheumatology. She was feeling muscle aches and pains for last several days, taking more pain medication, usually takes Tramadol ER 300mg qD and   Pt seen in ER w/MAR, ICU resident and RN at bedside, concern for septic shock as pts BP fluctuating 60-70/40s via automatic BP cuff on R forearm. Pt alert and oriented x4, mentating well, holding a full conversation. Pt states her BP is usually 120/70s, only BP med is Lisinopril 2.5mg qD which she has not taken since yesterday morning. Reports feeling slightly better since being admitted, only complaint at this time is pain under her buttocks from the bed.   BP cuff moved to RLE calf, BP 90/50s.     In ER, Pt received IV Ertapenem 1g x1, PO Tylenol, IV Morphine 4mg x1, KCl ER 40meQ, 1L LR bolus, 1.75L NS bolus, 1U pRBC, although was ordered for 2.  (20 Aug 2021 08:48)        Active Medications--  atorvastatin 20 milliGRAM(s) Oral at bedtime  dextrose 40% Gel 15 Gram(s) Oral once  dextrose 5%. 1000 milliLiter(s) IV Continuous <Continuous>  dextrose 5%. 1000 milliLiter(s) IV Continuous <Continuous>  dextrose 50% Injectable 25 Gram(s) IV Push once  dextrose 50% Injectable 12.5 Gram(s) IV Push once  dextrose 50% Injectable 25 Gram(s) IV Push once  ertapenem  IVPB 1000 milliGRAM(s) IV Intermittent every 24 hours  ferrous    sulfate 325 milliGRAM(s) Oral two times a day  folic acid 1 milliGRAM(s) Oral daily  gabapentin 800 milliGRAM(s) Oral four times a day  glucagon  Injectable 1 milliGRAM(s) IntraMuscular once  hydrocortisone sodium succinate Injectable 100 milliGRAM(s) IV Push every 8 hours  insulin lispro (ADMELOG) corrective regimen sliding scale   SubCutaneous Before meals and at bedtime  levothyroxine 150 MICROGram(s) Oral <User Schedule>  levothyroxine 300 MICROGram(s) Oral <User Schedule>  multivitamin 1 Tablet(s) Oral daily  oxyCODONE    IR 5 milliGRAM(s) Oral every 6 hours PRN  senna 2 Tablet(s) Oral at bedtime    Antimicrobials:   ertapenem  IVPB 1000 milliGRAM(s) IV Intermittent every 24 hours    Immunologic:     ROS:  CONSTITUTIONAL: No fevers or chills. No weakness or headache. No weight changes.  EYES/ENT: No visual or hearing changes. No sore throat or throat pain .  NECK: No pain or stiffness  RESPIRATORY: No cough, wheezing, or hemoptysis. No shortness of breath  CARDIOVASCULAR: No chest pain or palpitations  GASTROINTESTINAL: No abdominal pain. No nausea or vomiting. No diarrhea or constipation.  GENITOURINARY: No dysuria, frequency or hematuria  NEUROLOGICAL: No numbness or weakness  SKIN: No itching or rashes  PSYCHIATRIC: Pleasant. Appropriate affect    Allergies: Ancef (Rash)  daptomycin (Vomiting)  Keflex (Unknown)  penicillin (Pruritus; Rash; Hives)    PMH -- Bullous pemphigoid    Hypertension    Hypercholesterolemia    Type 2 diabetes mellitus    History of osteoarthritis    Anemia    Hypothyroidism    Vitamin D deficiency    Morbid obesity    Multiparity    Endometrial cancer    Hyperlipidemia    Adrenal mass      PSH -- History of  section    Status post total thyroidectomy    Endometrial cancer    S/P hysterectomy    S/P BSO (bilateral salpingo-oophorectomy)      FH -- Family history of lymphoma (Father)      Social History --  EtOH: denies   Tobacco: denies   Drug Use: denies     Travel/Environmental/Occupational History:    Physical Exam--  Vital Signs Last 24 Hrs  T(F): 98.4 (20 Aug 2021 09:10), Max: 102.8 (20 Aug 2021 01:16)  HR: 83 (20 Aug 2021 11:35) (83 - 132)  BP: 91/64 (20 Aug 2021 11:35) (71/50 - 113/56)  RR: 17 (20 Aug 2021 11:35) (16 - 20)  SpO2: 99% (20 Aug 2021 11:35) (97% - 100%)  General: nontoxic-appearing, no acute distress  HEENT: NC/AT, EOMI, anicteric, conjunctiva pink and moist, oropharynx clear, dentition fair  Neck: Not rigid. No sense of mass. No LAD  Lungs: Clear bilaterally without rales, wheezing or rhonchi  Heart: Regular rate and rhythm. No murmur, rub or gallop.  Abdomen: Soft. Nondistended. Nontender. Bowel sounds present. No organomegaly.  Back: No spinal tenderness. No costovertebral angle tenderness.  Extremities: No cyanosis or clubbing. No edema.   Skin: Warm. Dry. Good turgor. No rash. No vasculitic stigmata.  Psychiatric: Appropriate affect and mood for situation.   Lines:    Laboratory & Imaging Data--  CBC:                       6.2    16.17 )-----------( 286      ( 20 Aug 2021 04:08 )             21.8     CMP:     139  |  103  |  32<H>  ----------------------------<  160<H>  3.4<L>   |  21<L>  |  1.23    Ca    8.8      20 Aug 2021 04:08    TPro  7.2  /  Alb  3.1<L>  /  TBili  <0.2  /  DBili  x   /  AST  31  /  ALT  18  /  AlkPhos  71      LIVER FUNCTIONS - ( 20 Aug 2021 04:08 )  Alb: 3.1 g/dL / Pro: 7.2 g/dL / ALK PHOS: 71 U/L / ALT: 18 U/L / AST: 31 U/L / GGT: x           Urinalysis Basic - ( 20 Aug 2021 06:00 )    Color: Light Yellow / Appearance: Slightly Turbid / S.019 / pH: x  Gluc: x / Ketone: Negative  / Bili: Negative / Urobili: <2 mg/dL   Blood: x / Protein: 30 mg/dL / Nitrite: Negative   Leuk Esterase: Moderate / RBC: 2 /HPF / WBC 46 /HPF   Sq Epi: x / Non Sq Epi: 0 /HPF / Bacteria: Many        Microbiology: reviewed      Radiology: reviewed  < from: Xray Chest 1 View- PORTABLE-Urgent (21 @ 04:51) >    EXAM:  XR CHEST PORTABLE URGENT 1V        PROCEDURE DATE:  Aug 20 2021         INTERPRETATION:  CLINICAL INFORMATION: Sepsis.    TECHNIQUE: Portable AP radiograph of the chest.    COMPARISON: Chest x-ray from 2021..    FINDINGS/  IMPRESSION:    No focal consolidation, pleural effusions or pneumothorax. Cardiac silhouette is mildly enlarged.    --- End of Report ---            MILE MIRANDA MD; Resident Radiology  This document has been electronically signed.  RUPALI GARCIA MD; Attending Interventional Radiologist  This document has been electronically signed. Aug 20 2021  8:42AM    < end of copied text >     [facility], Division of Infectious Diseases  MAYDA Braxton, ILANA Williamson  [phone]    AME ZIMMERMAN  70y, Female  [id number]    HPI--  HPI:  70 w/hx of stage 1 uterine and endometrial CA s/p TLH/BSO (chemo 5 years ago, in remission), bullous pemphigoid on IVIG and daily prednisone, DM, hypothyroidism, chronic pain syndrome p/w vomiting x1 and fever that started yesterday. Otherwise pt had IVIG about 2 weeks ago at outpt rheumatology. She was feeling muscle aches and pains for last several days, taking more pain medication, usually takes Tramadol ER 300mg qD and   Pt seen in ER w/MAR, ICU resident and RN at bedside, concern for septic shock as pts BP fluctuating 60-70/40s via automatic BP cuff on R forearm. Pt alert and oriented x4, mentating well, holding a full conversation. Pt states her BP is usually 120/70s, only BP med is Lisinopril 2.5mg qD which she has not taken since yesterday morning. Reports feeling slightly better since being admitted, only complaint at this time is pain under her buttocks from the bed.   BP cuff moved to RLE calf, BP 90/50s.     In ER, Pt received IV Ertapenem 1g x1, PO Tylenol, IV Morphine 4mg x1, KCl ER 40meQ, 1L LR bolus, 1.75L NS bolus, 1U pRBC, although was ordered for 2.  (20 Aug 2021 08:48)    Pt seen and examined at bedside in the ED  Reporting feeling a bit better  Noted to still have low BPs w/ SBP in 90s  Reporting no new cellulitis or LE pain  Denies diarrhea, abdominal pain or n/v  Unclear urinary sx, pt is reporting no dysuria but think she has had frequency    Active Medications--  atorvastatin 20 milliGRAM(s) Oral at bedtime  dextrose 40% Gel 15 Gram(s) Oral once  dextrose 5%. 1000 milliLiter(s) IV Continuous <Continuous>  dextrose 5%. 1000 milliLiter(s) IV Continuous <Continuous>  dextrose 50% Injectable 25 Gram(s) IV Push once  dextrose 50% Injectable 12.5 Gram(s) IV Push once  dextrose 50% Injectable 25 Gram(s) IV Push once  ertapenem  IVPB 1000 milliGRAM(s) IV Intermittent every 24 hours  ferrous    sulfate 325 milliGRAM(s) Oral two times a day  folic acid 1 milliGRAM(s) Oral daily  gabapentin 800 milliGRAM(s) Oral four times a day  glucagon  Injectable 1 milliGRAM(s) IntraMuscular once  hydrocortisone sodium succinate Injectable 100 milliGRAM(s) IV Push every 8 hours  insulin lispro (ADMELOG) corrective regimen sliding scale   SubCutaneous Before meals and at bedtime  levothyroxine 150 MICROGram(s) Oral <User Schedule>  levothyroxine 300 MICROGram(s) Oral <User Schedule>  multivitamin 1 Tablet(s) Oral daily  oxyCODONE    IR 5 milliGRAM(s) Oral every 6 hours PRN  senna 2 Tablet(s) Oral at bedtime    Antimicrobials:   ertapenem  IVPB 1000 milliGRAM(s) IV Intermittent every 24 hours    Immunologic:     ROS:  CONSTITUTIONAL: No fevers or chills. No weakness or headache. No weight changes.  EYES/ENT: No visual or hearing changes. No sore throat or throat pain .  NECK: No pain or stiffness  RESPIRATORY: No cough, wheezing, or hemoptysis. No shortness of breath  CARDIOVASCULAR: No chest pain or palpitations  GASTROINTESTINAL: No abdominal pain. No nausea or vomiting. No diarrhea or constipation.  GENITOURINARY: No dysuria, frequency or hematuria  NEUROLOGICAL: No numbness or weakness  SKIN: No itching or rashes  PSYCHIATRIC: Pleasant. Appropriate affect    Allergies: Ancef (Rash)  daptomycin (Vomiting)  Keflex (Unknown)  penicillin (Pruritus; Rash; Hives)    PMH -- Bullous pemphigoid    Hypertension    Hypercholesterolemia    Type 2 diabetes mellitus    History of osteoarthritis    Anemia    Hypothyroidism    Vitamin D deficiency    Morbid obesity    Multiparity    Endometrial cancer    Hyperlipidemia    Adrenal mass      PSH -- History of  section    Status post total thyroidectomy    Endometrial cancer    S/P hysterectomy    S/P BSO (bilateral salpingo-oophorectomy)      FH -- Family history of lymphoma (Father)      Social History --  EtOH: denies   Tobacco: denies   Drug Use: denies     Travel/Environmental/Occupational History:    Physical Exam--  Vital Signs Last 24 Hrs  T(F): 98.4 (20 Aug 2021 09:10), Max: 102.8 (20 Aug 2021 01:16)  HR: 83 (20 Aug 2021 11:35) (83 - 132)  BP: 91/64 (20 Aug 2021 11:35) (71/50 - 113/56)  RR: 17 (20 Aug 2021 11:35) (16 - 20)  SpO2: 99% (20 Aug 2021 11:35) (97% - 100%)  General: nontoxic-appearing, no acute distress  HEENT: NC/AT, EOMI, anicteric, conjunctiva pink and moist, oropharynx clear, dentition fair  Neck: Not rigid. No sense of mass. No LAD  Lungs: Clear bilaterally without rales, wheezing or rhonchi  Heart: Regular rate and rhythm. No murmur, rub or gallop.  Abdomen: Soft. Nondistended. Nontender. Bowel sounds present. No organomegaly.  Back: No spinal tenderness. No costovertebral angle tenderness.  Extremities: No cyanosis or clubbing. No edema.   Skin: multiple bruising across UE and LE, reports its from prednisone use?  No evidence of cellulitis    Laboratory & Imaging Data--  CBC:                       6.2    16.17 )-----------( 286      ( 20 Aug 2021 04:08 )             21.8     CMP: -    139  |  103  |  32<H>  ----------------------------<  160<H>  3.4<L>   |  21<L>  |  1.23    Ca    8.8      20 Aug 2021 04:08    TPro  7.2  /  Alb  3.1<L>  /  TBili  <0.2  /  DBili  x   /  AST  31  /  ALT  18  /  AlkPhos  71  08-20    LIVER FUNCTIONS - ( 20 Aug 2021 04:08 )  Alb: 3.1 g/dL / Pro: 7.2 g/dL / ALK PHOS: 71 U/L / ALT: 18 U/L / AST: 31 U/L / GGT: x           Urinalysis Basic - ( 20 Aug 2021 06:00 )    Color: Light Yellow / Appearance: Slightly Turbid / S.019 / pH: x  Gluc: x / Ketone: Negative  / Bili: Negative / Urobili: <2 mg/dL   Blood: x / Protein: 30 mg/dL / Nitrite: Negative   Leuk Esterase: Moderate / RBC: 2 /HPF / WBC 46 /HPF   Sq Epi: x / Non Sq Epi: 0 /HPF / Bacteria: Many        Microbiology: reviewed      Radiology: reviewed  < from: Xray Chest 1 View- PORTABLE-Urgent (21 @ 04:51) >    EXAM:  XR CHEST PORTABLE URGENT 1V        PROCEDURE DATE:  Aug 20 2021         INTERPRETATION:  CLINICAL INFORMATION: Sepsis.    TECHNIQUE: Portable AP radiograph of the chest.    COMPARISON: Chest x-ray from 2021..    FINDINGS/  IMPRESSION:    No focal consolidation, pleural effusions or pneumothorax. Cardiac silhouette is mildly enlarged.    --- End of Report ---            MILE MIRANDA MD; Resident Radiology  This document has been electronically signed.  RUPALI GARCIA MD; Attending Interventional Radiologist  This document has been electronically signed. Aug 20 2021  8:42AM    < end of copied text >

## 2021-08-20 NOTE — ED ADULT NURSE REASSESSMENT NOTE - NS ED NURSE REASSESS COMMENT FT1
Blood transfusion completed at 0755. No noted transfusion reactions at this time. Resp. even and unlabored. Denies any complaints. VS as noted. NSR on the CM. NAD.

## 2021-08-20 NOTE — H&P ADULT - NSICDXPASTMEDICALHX_GEN_ALL_CORE_FT
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  On Immunosupressants  Cellcept    Endometrial cancer dx: 8/2015:  High grade Endometroid Adenocarcinoma    History of osteoarthritis     Hyperlipidemia     Hypertension     Hypothyroidism     Morbid obesity BMI:  53.6    Type 2 diabetes mellitus     Vitamin D deficiency

## 2021-08-20 NOTE — CONSULT NOTE ADULT - ASSESSMENT
Full consult note to follow pending evaluation    Infectious Diseases will continue to follow. Please call with any questions.   Ruchi Gonzales M.D.  [facility], Division of Infectious Diseases [phone]  For over the weekend and after hours, please call [phone]   70 w/hx of stage 1 uterine and endometrial CA s/p TLH/BSO (chemo 5 years ago, in remission), bullous pemphigoid on IVIG and daily prednisone, DM, hypothyroidism, chronic pain syndrome p/w vomiting x1 and fever admitted for severe sepsis 2/2 presumed UTI.     Severe sepsis 2/2 presumed  source  Fevers  Tachycardia  Hypotension  Leukocytosis  Lactic Acidosis  -infectious w/u reviewed  --UA +LE, WBCs, UCx pending  Previous UCx reviewed--ESBL E. coli UTI April 2021   --COVID/RVP negative  -imaging reviewed  --CXR negative  --obtain renal sono to evaluate for upper  tract disease  -appreciate ICU c/s  -supportive care/IVFs as needed  -trend temps/WBC  agree w/ ertapenem 1gm q24h    Anemia 2/2 chronic disease vs GIB?  -monitor H/H  -tranfusion prn protocol  Bullous pemphigoid  -physical exam much improved since last admission  Receiving IVIG q month (last 2 weeks ago) as well as dupixent q 2 weeks, last dose 8/17     Infectious Diseases will continue to follow. Please call with any questions.   Ruchi Gonzales M.D.  [facility], Division of Infectious Diseases [phone]  For over the weekend and after hours, please call [phone]

## 2021-08-20 NOTE — ED ADULT NURSE REASSESSMENT NOTE - NS ED NURSE REASSESS COMMENT FT1
No noted blood transfusion reactions at this time. Pt resting comfortably, resp. even and unlabored. Denies CP, SOB, and HA. VS as noted. NSR on the CM.

## 2021-08-21 LAB
-  COAGULASE NEGATIVE STAPHYLOCOCCUS, METHICILLIN RESISTANT: SIGNIFICANT CHANGE UP
A1C WITH ESTIMATED AVERAGE GLUCOSE RESULT: 4.6 % — SIGNIFICANT CHANGE UP (ref 4–5.6)
ANION GAP SERPL CALC-SCNC: 13 MMOL/L — SIGNIFICANT CHANGE UP (ref 7–14)
BASOPHILS # BLD AUTO: 0.03 K/UL — SIGNIFICANT CHANGE UP (ref 0–0.2)
BASOPHILS NFR BLD AUTO: 0.1 % — SIGNIFICANT CHANGE UP (ref 0–2)
BUN SERPL-MCNC: 35 MG/DL — HIGH (ref 7–23)
CALCIUM SERPL-MCNC: 8.1 MG/DL — LOW (ref 8.4–10.5)
CHLORIDE SERPL-SCNC: 106 MMOL/L — SIGNIFICANT CHANGE UP (ref 98–107)
CO2 SERPL-SCNC: 21 MMOL/L — LOW (ref 22–31)
COVID-19 SPIKE DOMAIN AB INTERP: POSITIVE
COVID-19 SPIKE DOMAIN ANTIBODY RESULT: >250 U/ML — HIGH
CREAT SERPL-MCNC: 1.05 MG/DL — SIGNIFICANT CHANGE UP (ref 0.5–1.3)
EOSINOPHIL # BLD AUTO: 0 K/UL — SIGNIFICANT CHANGE UP (ref 0–0.5)
EOSINOPHIL NFR BLD AUTO: 0 % — SIGNIFICANT CHANGE UP (ref 0–6)
ESTIMATED AVERAGE GLUCOSE: 85 — SIGNIFICANT CHANGE UP
GLUCOSE SERPL-MCNC: 178 MG/DL — HIGH (ref 70–99)
GRAM STN FLD: SIGNIFICANT CHANGE UP
HCT VFR BLD CALC: 25.5 % — LOW (ref 34.5–45)
HGB BLD-MCNC: 7.8 G/DL — LOW (ref 11.5–15.5)
IANC: 24.66 K/UL — HIGH (ref 1.5–8.5)
IMM GRANULOCYTES NFR BLD AUTO: 1.5 % — SIGNIFICANT CHANGE UP (ref 0–1.5)
LYMPHOCYTES # BLD AUTO: 0.54 K/UL — LOW (ref 1–3.3)
LYMPHOCYTES # BLD AUTO: 2 % — LOW (ref 13–44)
MAGNESIUM SERPL-MCNC: 1.9 MG/DL — SIGNIFICANT CHANGE UP (ref 1.6–2.6)
MCHC RBC-ENTMCNC: 30.6 GM/DL — LOW (ref 32–36)
MCHC RBC-ENTMCNC: 31 PG — SIGNIFICANT CHANGE UP (ref 27–34)
MCV RBC AUTO: 101.2 FL — HIGH (ref 80–100)
METHOD TYPE: SIGNIFICANT CHANGE UP
MONOCYTES # BLD AUTO: 0.75 K/UL — SIGNIFICANT CHANGE UP (ref 0–0.9)
MONOCYTES NFR BLD AUTO: 2.8 % — SIGNIFICANT CHANGE UP (ref 2–14)
NEUTROPHILS # BLD AUTO: 24.66 K/UL — HIGH (ref 1.8–7.4)
NEUTROPHILS NFR BLD AUTO: 93.6 % — HIGH (ref 43–77)
NRBC # BLD: 0 /100 WBCS — SIGNIFICANT CHANGE UP
NRBC # FLD: 0 K/UL — SIGNIFICANT CHANGE UP
PHOSPHATE SERPL-MCNC: 3 MG/DL — SIGNIFICANT CHANGE UP (ref 2.5–4.5)
PLATELET # BLD AUTO: 231 K/UL — SIGNIFICANT CHANGE UP (ref 150–400)
POTASSIUM SERPL-MCNC: 3.9 MMOL/L — SIGNIFICANT CHANGE UP (ref 3.5–5.3)
POTASSIUM SERPL-SCNC: 3.9 MMOL/L — SIGNIFICANT CHANGE UP (ref 3.5–5.3)
RBC # BLD: 2.52 M/UL — LOW (ref 3.8–5.2)
RBC # FLD: 17.5 % — HIGH (ref 10.3–14.5)
SARS-COV-2 IGG+IGM SERPL QL IA: >250 U/ML — HIGH
SARS-COV-2 IGG+IGM SERPL QL IA: POSITIVE
SODIUM SERPL-SCNC: 140 MMOL/L — SIGNIFICANT CHANGE UP (ref 135–145)
T4 FREE SERPL-MCNC: 0.9 NG/DL — SIGNIFICANT CHANGE UP (ref 0.9–1.8)
WBC # BLD: 26.37 K/UL — HIGH (ref 3.8–10.5)
WBC # FLD AUTO: 26.37 K/UL — HIGH (ref 3.8–10.5)

## 2021-08-21 PROCEDURE — 76770 US EXAM ABDO BACK WALL COMP: CPT | Mod: 26

## 2021-08-21 RX ADMIN — Medication 325 MILLIGRAM(S): at 05:56

## 2021-08-21 RX ADMIN — Medication 100 MILLIGRAM(S): at 14:03

## 2021-08-21 RX ADMIN — OXYCODONE HYDROCHLORIDE 5 MILLIGRAM(S): 5 TABLET ORAL at 18:03

## 2021-08-21 RX ADMIN — Medication 1 TABLET(S): at 12:04

## 2021-08-21 RX ADMIN — OXYCODONE HYDROCHLORIDE 5 MILLIGRAM(S): 5 TABLET ORAL at 12:04

## 2021-08-21 RX ADMIN — Medication 100 MILLIGRAM(S): at 22:24

## 2021-08-21 RX ADMIN — Medication 4: at 22:25

## 2021-08-21 RX ADMIN — Medication 100 MILLIGRAM(S): at 05:56

## 2021-08-21 RX ADMIN — GABAPENTIN 800 MILLIGRAM(S): 400 CAPSULE ORAL at 17:54

## 2021-08-21 RX ADMIN — OXYCODONE HYDROCHLORIDE 5 MILLIGRAM(S): 5 TABLET ORAL at 13:20

## 2021-08-21 RX ADMIN — Medication 150 MICROGRAM(S): at 05:56

## 2021-08-21 RX ADMIN — ERTAPENEM SODIUM 120 MILLIGRAM(S): 1 INJECTION, POWDER, LYOPHILIZED, FOR SOLUTION INTRAMUSCULAR; INTRAVENOUS at 05:55

## 2021-08-21 RX ADMIN — Medication 1: at 17:52

## 2021-08-21 RX ADMIN — GABAPENTIN 800 MILLIGRAM(S): 400 CAPSULE ORAL at 05:56

## 2021-08-21 RX ADMIN — Medication 3: at 12:31

## 2021-08-21 RX ADMIN — Medication 325 MILLIGRAM(S): at 17:54

## 2021-08-21 RX ADMIN — Medication 1: at 08:55

## 2021-08-21 RX ADMIN — ATORVASTATIN CALCIUM 20 MILLIGRAM(S): 80 TABLET, FILM COATED ORAL at 22:25

## 2021-08-21 RX ADMIN — OXYCODONE HYDROCHLORIDE 5 MILLIGRAM(S): 5 TABLET ORAL at 18:51

## 2021-08-21 RX ADMIN — GABAPENTIN 800 MILLIGRAM(S): 400 CAPSULE ORAL at 12:05

## 2021-08-21 RX ADMIN — Medication 1 MILLIGRAM(S): at 12:03

## 2021-08-22 LAB
ANION GAP SERPL CALC-SCNC: 13 MMOL/L — SIGNIFICANT CHANGE UP (ref 7–14)
BASOPHILS # BLD AUTO: 0.04 K/UL — SIGNIFICANT CHANGE UP (ref 0–0.2)
BASOPHILS NFR BLD AUTO: 0.2 % — SIGNIFICANT CHANGE UP (ref 0–2)
BUN SERPL-MCNC: 30 MG/DL — HIGH (ref 7–23)
CALCIUM SERPL-MCNC: 7.9 MG/DL — LOW (ref 8.4–10.5)
CHLORIDE SERPL-SCNC: 106 MMOL/L — SIGNIFICANT CHANGE UP (ref 98–107)
CO2 SERPL-SCNC: 21 MMOL/L — LOW (ref 22–31)
CREAT SERPL-MCNC: 0.81 MG/DL — SIGNIFICANT CHANGE UP (ref 0.5–1.3)
CULTURE RESULTS: SIGNIFICANT CHANGE UP
EOSINOPHIL # BLD AUTO: 0 K/UL — SIGNIFICANT CHANGE UP (ref 0–0.5)
EOSINOPHIL NFR BLD AUTO: 0 % — SIGNIFICANT CHANGE UP (ref 0–6)
GLUCOSE SERPL-MCNC: 228 MG/DL — HIGH (ref 70–99)
HCT VFR BLD CALC: 25.8 % — LOW (ref 34.5–45)
HGB BLD-MCNC: 7.8 G/DL — LOW (ref 11.5–15.5)
IANC: 22.09 K/UL — HIGH (ref 1.5–8.5)
IMM GRANULOCYTES NFR BLD AUTO: 1.1 % — SIGNIFICANT CHANGE UP (ref 0–1.5)
LYMPHOCYTES # BLD AUTO: 0.44 K/UL — LOW (ref 1–3.3)
LYMPHOCYTES # BLD AUTO: 1.9 % — LOW (ref 13–44)
MAGNESIUM SERPL-MCNC: 1.9 MG/DL — SIGNIFICANT CHANGE UP (ref 1.6–2.6)
MCHC RBC-ENTMCNC: 30.2 GM/DL — LOW (ref 32–36)
MCHC RBC-ENTMCNC: 30.7 PG — SIGNIFICANT CHANGE UP (ref 27–34)
MCV RBC AUTO: 101.6 FL — HIGH (ref 80–100)
MONOCYTES # BLD AUTO: 0.6 K/UL — SIGNIFICANT CHANGE UP (ref 0–0.9)
MONOCYTES NFR BLD AUTO: 2.6 % — SIGNIFICANT CHANGE UP (ref 2–14)
NEUTROPHILS # BLD AUTO: 22.09 K/UL — HIGH (ref 1.8–7.4)
NEUTROPHILS NFR BLD AUTO: 94.2 % — HIGH (ref 43–77)
NRBC # BLD: 0 /100 WBCS — SIGNIFICANT CHANGE UP
NRBC # FLD: 0.02 K/UL — HIGH
ORGANISM # SPEC MICROSCOPIC CNT: SIGNIFICANT CHANGE UP
ORGANISM # SPEC MICROSCOPIC CNT: SIGNIFICANT CHANGE UP
PHOSPHATE SERPL-MCNC: 2.1 MG/DL — LOW (ref 2.5–4.5)
PLATELET # BLD AUTO: 268 K/UL — SIGNIFICANT CHANGE UP (ref 150–400)
POTASSIUM SERPL-MCNC: 3.7 MMOL/L — SIGNIFICANT CHANGE UP (ref 3.5–5.3)
POTASSIUM SERPL-SCNC: 3.7 MMOL/L — SIGNIFICANT CHANGE UP (ref 3.5–5.3)
RBC # BLD: 2.54 M/UL — LOW (ref 3.8–5.2)
RBC # FLD: 16.7 % — HIGH (ref 10.3–14.5)
SODIUM SERPL-SCNC: 140 MMOL/L — SIGNIFICANT CHANGE UP (ref 135–145)
SPECIMEN SOURCE: SIGNIFICANT CHANGE UP
WBC # BLD: 23.42 K/UL — HIGH (ref 3.8–10.5)
WBC # FLD AUTO: 23.42 K/UL — HIGH (ref 3.8–10.5)

## 2021-08-22 PROCEDURE — 74018 RADEX ABDOMEN 1 VIEW: CPT | Mod: 26

## 2021-08-22 RX ORDER — SODIUM,POTASSIUM PHOSPHATES 278-250MG
1 POWDER IN PACKET (EA) ORAL
Refills: 0 | Status: COMPLETED | OUTPATIENT
Start: 2021-08-22 | End: 2021-08-24

## 2021-08-22 RX ORDER — LACTOBACILLUS ACIDOPHILUS 100MM CELL
1 CAPSULE ORAL
Refills: 0 | Status: DISCONTINUED | OUTPATIENT
Start: 2021-08-22 | End: 2021-08-26

## 2021-08-22 RX ORDER — HYDROCORTISONE 20 MG
50 TABLET ORAL EVERY 8 HOURS
Refills: 0 | Status: DISCONTINUED | OUTPATIENT
Start: 2021-08-22 | End: 2021-08-24

## 2021-08-22 RX ORDER — INSULIN GLARGINE 100 [IU]/ML
10 INJECTION, SOLUTION SUBCUTANEOUS AT BEDTIME
Refills: 0 | Status: DISCONTINUED | OUTPATIENT
Start: 2021-08-22 | End: 2021-08-23

## 2021-08-22 RX ORDER — SIMETHICONE 80 MG/1
80 TABLET, CHEWABLE ORAL ONCE
Refills: 0 | Status: COMPLETED | OUTPATIENT
Start: 2021-08-22 | End: 2021-08-22

## 2021-08-22 RX ORDER — PANTOPRAZOLE SODIUM 20 MG/1
40 TABLET, DELAYED RELEASE ORAL ONCE
Refills: 0 | Status: COMPLETED | OUTPATIENT
Start: 2021-08-22 | End: 2021-08-22

## 2021-08-22 RX ORDER — NYSTATIN CREAM 100000 [USP'U]/G
1 CREAM TOPICAL
Refills: 0 | Status: DISCONTINUED | OUTPATIENT
Start: 2021-08-22 | End: 2021-08-26

## 2021-08-22 RX ORDER — INSULIN LISPRO 100/ML
VIAL (ML) SUBCUTANEOUS
Refills: 0 | Status: DISCONTINUED | OUTPATIENT
Start: 2021-08-22 | End: 2021-08-26

## 2021-08-22 RX ADMIN — Medication 2: at 08:56

## 2021-08-22 RX ADMIN — Medication 4: at 17:50

## 2021-08-22 RX ADMIN — OXYCODONE HYDROCHLORIDE 5 MILLIGRAM(S): 5 TABLET ORAL at 19:00

## 2021-08-22 RX ADMIN — ERTAPENEM SODIUM 120 MILLIGRAM(S): 1 INJECTION, POWDER, LYOPHILIZED, FOR SOLUTION INTRAMUSCULAR; INTRAVENOUS at 05:00

## 2021-08-22 RX ADMIN — Medication 1 TABLET(S): at 17:50

## 2021-08-22 RX ADMIN — ATORVASTATIN CALCIUM 20 MILLIGRAM(S): 80 TABLET, FILM COATED ORAL at 22:32

## 2021-08-22 RX ADMIN — PANTOPRAZOLE SODIUM 40 MILLIGRAM(S): 20 TABLET, DELAYED RELEASE ORAL at 04:50

## 2021-08-22 RX ADMIN — Medication 100 MILLIGRAM(S): at 22:35

## 2021-08-22 RX ADMIN — Medication 100 MILLIGRAM(S): at 13:40

## 2021-08-22 RX ADMIN — Medication 1 TABLET(S): at 12:19

## 2021-08-22 RX ADMIN — Medication 3: at 12:22

## 2021-08-22 RX ADMIN — Medication 30 MILLILITER(S): at 04:50

## 2021-08-22 RX ADMIN — Medication 1 MILLIGRAM(S): at 12:19

## 2021-08-22 RX ADMIN — Medication 150 MICROGRAM(S): at 05:30

## 2021-08-22 RX ADMIN — SIMETHICONE 80 MILLIGRAM(S): 80 TABLET, CHEWABLE ORAL at 06:47

## 2021-08-22 RX ADMIN — Medication 325 MILLIGRAM(S): at 17:50

## 2021-08-22 RX ADMIN — Medication 100 MILLIGRAM(S): at 05:00

## 2021-08-22 RX ADMIN — Medication 1 TABLET(S): at 08:58

## 2021-08-22 RX ADMIN — Medication 325 MILLIGRAM(S): at 05:01

## 2021-08-22 RX ADMIN — GABAPENTIN 800 MILLIGRAM(S): 400 CAPSULE ORAL at 00:00

## 2021-08-22 RX ADMIN — GABAPENTIN 800 MILLIGRAM(S): 400 CAPSULE ORAL at 17:50

## 2021-08-22 RX ADMIN — Medication 1 TABLET(S): at 22:45

## 2021-08-22 RX ADMIN — NYSTATIN CREAM 1 APPLICATION(S): 100000 CREAM TOPICAL at 13:40

## 2021-08-22 RX ADMIN — GABAPENTIN 800 MILLIGRAM(S): 400 CAPSULE ORAL at 12:19

## 2021-08-22 RX ADMIN — GABAPENTIN 800 MILLIGRAM(S): 400 CAPSULE ORAL at 05:01

## 2021-08-22 RX ADMIN — INSULIN GLARGINE 10 UNIT(S): 100 INJECTION, SOLUTION SUBCUTANEOUS at 22:35

## 2021-08-22 RX ADMIN — Medication 6: at 22:32

## 2021-08-22 RX ADMIN — OXYCODONE HYDROCHLORIDE 5 MILLIGRAM(S): 5 TABLET ORAL at 18:13

## 2021-08-22 RX ADMIN — GABAPENTIN 800 MILLIGRAM(S): 400 CAPSULE ORAL at 23:11

## 2021-08-22 RX ADMIN — SENNA PLUS 2 TABLET(S): 8.6 TABLET ORAL at 22:35

## 2021-08-23 LAB
-  AMIKACIN: SIGNIFICANT CHANGE UP
-  AMOXICILLIN/CLAVULANIC ACID: SIGNIFICANT CHANGE UP
-  AMPICILLIN/SULBACTAM: SIGNIFICANT CHANGE UP
-  AMPICILLIN: SIGNIFICANT CHANGE UP
-  AZTREONAM: SIGNIFICANT CHANGE UP
-  CEFAZOLIN: SIGNIFICANT CHANGE UP
-  CEFEPIME: SIGNIFICANT CHANGE UP
-  CEFOXITIN: SIGNIFICANT CHANGE UP
-  CEFTRIAXONE: SIGNIFICANT CHANGE UP
-  CIPROFLOXACIN: SIGNIFICANT CHANGE UP
-  ERTAPENEM: SIGNIFICANT CHANGE UP
-  GENTAMICIN: SIGNIFICANT CHANGE UP
-  IMIPENEM: SIGNIFICANT CHANGE UP
-  LEVOFLOXACIN: SIGNIFICANT CHANGE UP
-  MEROPENEM: SIGNIFICANT CHANGE UP
-  NITROFURANTOIN: SIGNIFICANT CHANGE UP
-  PIPERACILLIN/TAZOBACTAM: SIGNIFICANT CHANGE UP
-  TIGECYCLINE: SIGNIFICANT CHANGE UP
-  TOBRAMYCIN: SIGNIFICANT CHANGE UP
-  TRIMETHOPRIM/SULFAMETHOXAZOLE: SIGNIFICANT CHANGE UP
ANION GAP SERPL CALC-SCNC: 11 MMOL/L — SIGNIFICANT CHANGE UP (ref 7–14)
BASOPHILS # BLD AUTO: 0.03 K/UL — SIGNIFICANT CHANGE UP (ref 0–0.2)
BASOPHILS NFR BLD AUTO: 0.2 % — SIGNIFICANT CHANGE UP (ref 0–2)
BLD GP AB SCN SERPL QL: NEGATIVE — SIGNIFICANT CHANGE UP
BUN SERPL-MCNC: 27 MG/DL — HIGH (ref 7–23)
CALCIUM SERPL-MCNC: 8.2 MG/DL — LOW (ref 8.4–10.5)
CHLORIDE SERPL-SCNC: 104 MMOL/L — SIGNIFICANT CHANGE UP (ref 98–107)
CO2 SERPL-SCNC: 22 MMOL/L — SIGNIFICANT CHANGE UP (ref 22–31)
CREAT SERPL-MCNC: 0.75 MG/DL — SIGNIFICANT CHANGE UP (ref 0.5–1.3)
CULTURE RESULTS: SIGNIFICANT CHANGE UP
EOSINOPHIL # BLD AUTO: 0 K/UL — SIGNIFICANT CHANGE UP (ref 0–0.5)
EOSINOPHIL NFR BLD AUTO: 0 % — SIGNIFICANT CHANGE UP (ref 0–6)
GLUCOSE SERPL-MCNC: 263 MG/DL — HIGH (ref 70–99)
HCT VFR BLD CALC: 25.7 % — LOW (ref 34.5–45)
HGB BLD-MCNC: 7.7 G/DL — LOW (ref 11.5–15.5)
IANC: 14.96 K/UL — HIGH (ref 1.5–8.5)
IMM GRANULOCYTES NFR BLD AUTO: 1.8 % — HIGH (ref 0–1.5)
LYMPHOCYTES # BLD AUTO: 0.55 K/UL — LOW (ref 1–3.3)
LYMPHOCYTES # BLD AUTO: 3.4 % — LOW (ref 13–44)
MAGNESIUM SERPL-MCNC: 2 MG/DL — SIGNIFICANT CHANGE UP (ref 1.6–2.6)
MCHC RBC-ENTMCNC: 30 GM/DL — LOW (ref 32–36)
MCHC RBC-ENTMCNC: 30 PG — SIGNIFICANT CHANGE UP (ref 27–34)
MCV RBC AUTO: 100 FL — SIGNIFICANT CHANGE UP (ref 80–100)
METHOD TYPE: SIGNIFICANT CHANGE UP
MONOCYTES # BLD AUTO: 0.42 K/UL — SIGNIFICANT CHANGE UP (ref 0–0.9)
MONOCYTES NFR BLD AUTO: 2.6 % — SIGNIFICANT CHANGE UP (ref 2–14)
NEUTROPHILS # BLD AUTO: 14.96 K/UL — HIGH (ref 1.8–7.4)
NEUTROPHILS NFR BLD AUTO: 92 % — HIGH (ref 43–77)
NRBC # BLD: 0 /100 WBCS — SIGNIFICANT CHANGE UP
NRBC # FLD: 0.04 K/UL — HIGH
ORGANISM # SPEC MICROSCOPIC CNT: SIGNIFICANT CHANGE UP
ORGANISM # SPEC MICROSCOPIC CNT: SIGNIFICANT CHANGE UP
PHOSPHATE SERPL-MCNC: 2.5 MG/DL — SIGNIFICANT CHANGE UP (ref 2.5–4.5)
PLATELET # BLD AUTO: 287 K/UL — SIGNIFICANT CHANGE UP (ref 150–400)
POTASSIUM SERPL-MCNC: 3.6 MMOL/L — SIGNIFICANT CHANGE UP (ref 3.5–5.3)
POTASSIUM SERPL-SCNC: 3.6 MMOL/L — SIGNIFICANT CHANGE UP (ref 3.5–5.3)
RBC # BLD: 2.57 M/UL — LOW (ref 3.8–5.2)
RBC # FLD: 16.1 % — HIGH (ref 10.3–14.5)
RH IG SCN BLD-IMP: POSITIVE — SIGNIFICANT CHANGE UP
SODIUM SERPL-SCNC: 137 MMOL/L — SIGNIFICANT CHANGE UP (ref 135–145)
SPECIMEN SOURCE: SIGNIFICANT CHANGE UP
WBC # BLD: 16.25 K/UL — HIGH (ref 3.8–10.5)
WBC # FLD AUTO: 16.25 K/UL — HIGH (ref 3.8–10.5)

## 2021-08-23 RX ORDER — ERTAPENEM SODIUM 1 G/1
1000 INJECTION, POWDER, LYOPHILIZED, FOR SOLUTION INTRAMUSCULAR; INTRAVENOUS EVERY 24 HOURS
Refills: 0 | Status: DISCONTINUED | OUTPATIENT
Start: 2021-08-24 | End: 2021-08-26

## 2021-08-23 RX ORDER — INSULIN GLARGINE 100 [IU]/ML
12 INJECTION, SOLUTION SUBCUTANEOUS AT BEDTIME
Refills: 0 | Status: DISCONTINUED | OUTPATIENT
Start: 2021-08-23 | End: 2021-08-24

## 2021-08-23 RX ORDER — INSULIN LISPRO 100/ML
VIAL (ML) SUBCUTANEOUS AT BEDTIME
Refills: 0 | Status: DISCONTINUED | OUTPATIENT
Start: 2021-08-23 | End: 2021-08-26

## 2021-08-23 RX ADMIN — Medication 6: at 08:51

## 2021-08-23 RX ADMIN — Medication 1 TABLET(S): at 08:51

## 2021-08-23 RX ADMIN — NYSTATIN CREAM 1 APPLICATION(S): 100000 CREAM TOPICAL at 05:21

## 2021-08-23 RX ADMIN — Medication 50 MILLIGRAM(S): at 13:01

## 2021-08-23 RX ADMIN — Medication 6: at 12:35

## 2021-08-23 RX ADMIN — OXYCODONE HYDROCHLORIDE 5 MILLIGRAM(S): 5 TABLET ORAL at 18:40

## 2021-08-23 RX ADMIN — Medication 3: at 22:22

## 2021-08-23 RX ADMIN — SENNA PLUS 2 TABLET(S): 8.6 TABLET ORAL at 22:22

## 2021-08-23 RX ADMIN — OXYCODONE HYDROCHLORIDE 5 MILLIGRAM(S): 5 TABLET ORAL at 23:49

## 2021-08-23 RX ADMIN — Medication 1 TABLET(S): at 12:00

## 2021-08-23 RX ADMIN — NYSTATIN CREAM 1 APPLICATION(S): 100000 CREAM TOPICAL at 17:44

## 2021-08-23 RX ADMIN — ATORVASTATIN CALCIUM 20 MILLIGRAM(S): 80 TABLET, FILM COATED ORAL at 22:23

## 2021-08-23 RX ADMIN — Medication 325 MILLIGRAM(S): at 17:45

## 2021-08-23 RX ADMIN — ERTAPENEM SODIUM 120 MILLIGRAM(S): 1 INJECTION, POWDER, LYOPHILIZED, FOR SOLUTION INTRAMUSCULAR; INTRAVENOUS at 05:19

## 2021-08-23 RX ADMIN — Medication 50 MILLIGRAM(S): at 22:23

## 2021-08-23 RX ADMIN — GABAPENTIN 800 MILLIGRAM(S): 400 CAPSULE ORAL at 12:39

## 2021-08-23 RX ADMIN — Medication 1 TABLET(S): at 17:45

## 2021-08-23 RX ADMIN — Medication 1 MILLIGRAM(S): at 12:00

## 2021-08-23 RX ADMIN — Medication 300 MICROGRAM(S): at 05:48

## 2021-08-23 RX ADMIN — INSULIN GLARGINE 12 UNIT(S): 100 INJECTION, SOLUTION SUBCUTANEOUS at 22:23

## 2021-08-23 RX ADMIN — OXYCODONE HYDROCHLORIDE 5 MILLIGRAM(S): 5 TABLET ORAL at 17:44

## 2021-08-23 RX ADMIN — GABAPENTIN 800 MILLIGRAM(S): 400 CAPSULE ORAL at 17:45

## 2021-08-23 RX ADMIN — Medication 1 TABLET(S): at 05:20

## 2021-08-23 RX ADMIN — Medication 1 TABLET(S): at 22:22

## 2021-08-23 RX ADMIN — Medication 1 TABLET(S): at 17:44

## 2021-08-23 RX ADMIN — Medication 50 MILLIGRAM(S): at 05:20

## 2021-08-23 RX ADMIN — GABAPENTIN 800 MILLIGRAM(S): 400 CAPSULE ORAL at 05:20

## 2021-08-23 RX ADMIN — Medication 325 MILLIGRAM(S): at 05:21

## 2021-08-23 RX ADMIN — GABAPENTIN 800 MILLIGRAM(S): 400 CAPSULE ORAL at 23:49

## 2021-08-23 RX ADMIN — Medication 4: at 17:46

## 2021-08-23 NOTE — PHYSICAL THERAPY INITIAL EVALUATION ADULT - DISCHARGE DISPOSITION, PT EVAL
at this point, but patient prefers Home Physical Therapy for Home Safety Evaluation & Functional Mobility Training./rehabilitation facility

## 2021-08-24 LAB
ANION GAP SERPL CALC-SCNC: 10 MMOL/L — SIGNIFICANT CHANGE UP (ref 7–14)
BASOPHILS # BLD AUTO: 0.02 K/UL — SIGNIFICANT CHANGE UP (ref 0–0.2)
BASOPHILS NFR BLD AUTO: 0.2 % — SIGNIFICANT CHANGE UP (ref 0–2)
BUN SERPL-MCNC: 31 MG/DL — HIGH (ref 7–23)
CALCIUM SERPL-MCNC: 7.8 MG/DL — LOW (ref 8.4–10.5)
CHLORIDE SERPL-SCNC: 106 MMOL/L — SIGNIFICANT CHANGE UP (ref 98–107)
CO2 SERPL-SCNC: 23 MMOL/L — SIGNIFICANT CHANGE UP (ref 22–31)
CREAT SERPL-MCNC: 0.74 MG/DL — SIGNIFICANT CHANGE UP (ref 0.5–1.3)
EOSINOPHIL # BLD AUTO: 0 K/UL — SIGNIFICANT CHANGE UP (ref 0–0.5)
EOSINOPHIL NFR BLD AUTO: 0 % — SIGNIFICANT CHANGE UP (ref 0–6)
GLUCOSE SERPL-MCNC: 377 MG/DL — HIGH (ref 70–99)
HCT VFR BLD CALC: 28.4 % — LOW (ref 34.5–45)
HGB BLD-MCNC: 8.4 G/DL — LOW (ref 11.5–15.5)
IANC: 10.13 K/UL — HIGH (ref 1.5–8.5)
IMM GRANULOCYTES NFR BLD AUTO: 4.1 % — HIGH (ref 0–1.5)
LYMPHOCYTES # BLD AUTO: 0.54 K/UL — LOW (ref 1–3.3)
LYMPHOCYTES # BLD AUTO: 4.7 % — LOW (ref 13–44)
MAGNESIUM SERPL-MCNC: 2 MG/DL — SIGNIFICANT CHANGE UP (ref 1.6–2.6)
MCHC RBC-ENTMCNC: 29.6 GM/DL — LOW (ref 32–36)
MCHC RBC-ENTMCNC: 29.9 PG — SIGNIFICANT CHANGE UP (ref 27–34)
MCV RBC AUTO: 101.1 FL — HIGH (ref 80–100)
MONOCYTES # BLD AUTO: 0.31 K/UL — SIGNIFICANT CHANGE UP (ref 0–0.9)
MONOCYTES NFR BLD AUTO: 2.7 % — SIGNIFICANT CHANGE UP (ref 2–14)
NEUTROPHILS # BLD AUTO: 10.13 K/UL — HIGH (ref 1.8–7.4)
NEUTROPHILS NFR BLD AUTO: 88.3 % — HIGH (ref 43–77)
NRBC # BLD: 0 /100 WBCS — SIGNIFICANT CHANGE UP
NRBC # FLD: 0.09 K/UL — HIGH
PHOSPHATE SERPL-MCNC: 3.2 MG/DL — SIGNIFICANT CHANGE UP (ref 2.5–4.5)
PLATELET # BLD AUTO: 305 K/UL — SIGNIFICANT CHANGE UP (ref 150–400)
POTASSIUM SERPL-MCNC: 4 MMOL/L — SIGNIFICANT CHANGE UP (ref 3.5–5.3)
POTASSIUM SERPL-SCNC: 4 MMOL/L — SIGNIFICANT CHANGE UP (ref 3.5–5.3)
RBC # BLD: 2.81 M/UL — LOW (ref 3.8–5.2)
RBC # FLD: 16 % — HIGH (ref 10.3–14.5)
SODIUM SERPL-SCNC: 139 MMOL/L — SIGNIFICANT CHANGE UP (ref 135–145)
WBC # BLD: 11.47 K/UL — HIGH (ref 3.8–10.5)
WBC # FLD AUTO: 11.47 K/UL — HIGH (ref 3.8–10.5)

## 2021-08-24 RX ORDER — INSULIN LISPRO 100/ML
4 VIAL (ML) SUBCUTANEOUS
Refills: 0 | Status: DISCONTINUED | OUTPATIENT
Start: 2021-08-24 | End: 2021-08-26

## 2021-08-24 RX ORDER — ACETAMINOPHEN 500 MG
975 TABLET ORAL ONCE
Refills: 0 | Status: COMPLETED | OUTPATIENT
Start: 2021-08-24 | End: 2021-08-24

## 2021-08-24 RX ORDER — HEPARIN SODIUM 5000 [USP'U]/ML
5000 INJECTION INTRAVENOUS; SUBCUTANEOUS EVERY 12 HOURS
Refills: 0 | Status: DISCONTINUED | OUTPATIENT
Start: 2021-08-24 | End: 2021-08-26

## 2021-08-24 RX ORDER — INSULIN GLARGINE 100 [IU]/ML
15 INJECTION, SOLUTION SUBCUTANEOUS AT BEDTIME
Refills: 0 | Status: DISCONTINUED | OUTPATIENT
Start: 2021-08-24 | End: 2021-08-25

## 2021-08-24 RX ORDER — HYDROCORTISONE 20 MG
25 TABLET ORAL EVERY 8 HOURS
Refills: 0 | Status: DISCONTINUED | OUTPATIENT
Start: 2021-08-24 | End: 2021-08-25

## 2021-08-24 RX ADMIN — Medication 1 TABLET(S): at 12:13

## 2021-08-24 RX ADMIN — Medication 1 TABLET(S): at 07:46

## 2021-08-24 RX ADMIN — Medication 4 UNIT(S): at 12:16

## 2021-08-24 RX ADMIN — INSULIN GLARGINE 15 UNIT(S): 100 INJECTION, SOLUTION SUBCUTANEOUS at 22:58

## 2021-08-24 RX ADMIN — Medication 975 MILLIGRAM(S): at 04:48

## 2021-08-24 RX ADMIN — Medication 975 MILLIGRAM(S): at 03:48

## 2021-08-24 RX ADMIN — HEPARIN SODIUM 5000 UNIT(S): 5000 INJECTION INTRAVENOUS; SUBCUTANEOUS at 18:05

## 2021-08-24 RX ADMIN — GABAPENTIN 800 MILLIGRAM(S): 400 CAPSULE ORAL at 23:03

## 2021-08-24 RX ADMIN — Medication 25 MILLIGRAM(S): at 14:00

## 2021-08-24 RX ADMIN — Medication 4 UNIT(S): at 18:09

## 2021-08-24 RX ADMIN — OXYCODONE HYDROCHLORIDE 5 MILLIGRAM(S): 5 TABLET ORAL at 00:49

## 2021-08-24 RX ADMIN — Medication 50 MILLIGRAM(S): at 05:58

## 2021-08-24 RX ADMIN — GABAPENTIN 800 MILLIGRAM(S): 400 CAPSULE ORAL at 18:05

## 2021-08-24 RX ADMIN — Medication 6: at 18:08

## 2021-08-24 RX ADMIN — SENNA PLUS 2 TABLET(S): 8.6 TABLET ORAL at 22:57

## 2021-08-24 RX ADMIN — NYSTATIN CREAM 1 APPLICATION(S): 100000 CREAM TOPICAL at 18:14

## 2021-08-24 RX ADMIN — Medication 1 MILLIGRAM(S): at 12:13

## 2021-08-24 RX ADMIN — Medication 25 MILLIGRAM(S): at 22:58

## 2021-08-24 RX ADMIN — OXYCODONE HYDROCHLORIDE 5 MILLIGRAM(S): 5 TABLET ORAL at 19:17

## 2021-08-24 RX ADMIN — OXYCODONE HYDROCHLORIDE 5 MILLIGRAM(S): 5 TABLET ORAL at 13:02

## 2021-08-24 RX ADMIN — OXYCODONE HYDROCHLORIDE 5 MILLIGRAM(S): 5 TABLET ORAL at 18:30

## 2021-08-24 RX ADMIN — NYSTATIN CREAM 1 APPLICATION(S): 100000 CREAM TOPICAL at 05:57

## 2021-08-24 RX ADMIN — GABAPENTIN 800 MILLIGRAM(S): 400 CAPSULE ORAL at 12:13

## 2021-08-24 RX ADMIN — Medication 150 MICROGRAM(S): at 05:58

## 2021-08-24 RX ADMIN — Medication 325 MILLIGRAM(S): at 18:05

## 2021-08-24 RX ADMIN — OXYCODONE HYDROCHLORIDE 5 MILLIGRAM(S): 5 TABLET ORAL at 12:13

## 2021-08-24 RX ADMIN — Medication 325 MILLIGRAM(S): at 05:58

## 2021-08-24 RX ADMIN — ERTAPENEM SODIUM 120 MILLIGRAM(S): 1 INJECTION, POWDER, LYOPHILIZED, FOR SOLUTION INTRAMUSCULAR; INTRAVENOUS at 05:56

## 2021-08-24 RX ADMIN — Medication 1: at 22:58

## 2021-08-24 RX ADMIN — Medication 6: at 12:15

## 2021-08-24 RX ADMIN — GABAPENTIN 800 MILLIGRAM(S): 400 CAPSULE ORAL at 05:57

## 2021-08-24 RX ADMIN — Medication 10: at 08:53

## 2021-08-24 RX ADMIN — ATORVASTATIN CALCIUM 20 MILLIGRAM(S): 80 TABLET, FILM COATED ORAL at 22:57

## 2021-08-24 RX ADMIN — Medication 1 TABLET(S): at 18:14

## 2021-08-24 NOTE — PROGRESS NOTE ADULT - NSPROGADDITIONALINFOA_GEN_ALL_CORE
d/w pt.     - Dr. SOLOMON Horne ([facility])  - [phone]
d/w pt.     - Dr. SOLOMON Horne ([facility])  - [phone]
d/w pt and NP.     Remi Merida will be covering for me starting 8/25/21. He can be reached at  if needed.     - Dr. SOLOMON Horne ([facility])  - [phone]
d/w pt and NP.     - Dr. SOLOMON Horne ([facility])  - [phone]

## 2021-08-25 ENCOUNTER — TRANSCRIPTION ENCOUNTER (OUTPATIENT)
Age: 71
End: 2021-08-25

## 2021-08-25 LAB
ANION GAP SERPL CALC-SCNC: 10 MMOL/L — SIGNIFICANT CHANGE UP (ref 7–14)
BUN SERPL-MCNC: 28 MG/DL — HIGH (ref 7–23)
CALCIUM SERPL-MCNC: 7.9 MG/DL — LOW (ref 8.4–10.5)
CHLORIDE SERPL-SCNC: 105 MMOL/L — SIGNIFICANT CHANGE UP (ref 98–107)
CO2 SERPL-SCNC: 24 MMOL/L — SIGNIFICANT CHANGE UP (ref 22–31)
CREAT SERPL-MCNC: 0.77 MG/DL — SIGNIFICANT CHANGE UP (ref 0.5–1.3)
CULTURE RESULTS: SIGNIFICANT CHANGE UP
GLUCOSE SERPL-MCNC: 287 MG/DL — HIGH (ref 70–99)
HCT VFR BLD CALC: 28.8 % — LOW (ref 34.5–45)
HGB BLD-MCNC: 8.6 G/DL — LOW (ref 11.5–15.5)
MCHC RBC-ENTMCNC: 29.7 PG — SIGNIFICANT CHANGE UP (ref 27–34)
MCHC RBC-ENTMCNC: 29.9 GM/DL — LOW (ref 32–36)
MCV RBC AUTO: 99.3 FL — SIGNIFICANT CHANGE UP (ref 80–100)
NRBC # BLD: 0 /100 WBCS — SIGNIFICANT CHANGE UP
NRBC # FLD: 0.07 K/UL — HIGH
PLATELET # BLD AUTO: 323 K/UL — SIGNIFICANT CHANGE UP (ref 150–400)
POTASSIUM SERPL-MCNC: 4 MMOL/L — SIGNIFICANT CHANGE UP (ref 3.5–5.3)
POTASSIUM SERPL-SCNC: 4 MMOL/L — SIGNIFICANT CHANGE UP (ref 3.5–5.3)
RBC # BLD: 2.9 M/UL — LOW (ref 3.8–5.2)
RBC # FLD: 16 % — HIGH (ref 10.3–14.5)
SODIUM SERPL-SCNC: 139 MMOL/L — SIGNIFICANT CHANGE UP (ref 135–145)
SPECIMEN SOURCE: SIGNIFICANT CHANGE UP
WBC # BLD: 10.55 K/UL — HIGH (ref 3.8–10.5)
WBC # FLD AUTO: 10.55 K/UL — HIGH (ref 3.8–10.5)

## 2021-08-25 RX ORDER — HYDROCORTISONE 20 MG
25 TABLET ORAL ONCE
Refills: 0 | Status: COMPLETED | OUTPATIENT
Start: 2021-08-25 | End: 2021-08-25

## 2021-08-25 RX ORDER — INSULIN GLARGINE 100 [IU]/ML
10 INJECTION, SOLUTION SUBCUTANEOUS AT BEDTIME
Refills: 0 | Status: DISCONTINUED | OUTPATIENT
Start: 2021-08-25 | End: 2021-08-26

## 2021-08-25 RX ADMIN — Medication 1: at 22:45

## 2021-08-25 RX ADMIN — Medication 4: at 17:52

## 2021-08-25 RX ADMIN — ATORVASTATIN CALCIUM 20 MILLIGRAM(S): 80 TABLET, FILM COATED ORAL at 22:48

## 2021-08-25 RX ADMIN — Medication 25 MILLIGRAM(S): at 05:56

## 2021-08-25 RX ADMIN — Medication 4 UNIT(S): at 12:42

## 2021-08-25 RX ADMIN — Medication 4 UNIT(S): at 17:52

## 2021-08-25 RX ADMIN — Medication 1 TABLET(S): at 08:47

## 2021-08-25 RX ADMIN — Medication 1 TABLET(S): at 18:20

## 2021-08-25 RX ADMIN — Medication 6: at 08:46

## 2021-08-25 RX ADMIN — INSULIN GLARGINE 10 UNIT(S): 100 INJECTION, SOLUTION SUBCUTANEOUS at 22:47

## 2021-08-25 RX ADMIN — Medication 25 MILLIGRAM(S): at 22:47

## 2021-08-25 RX ADMIN — Medication 325 MILLIGRAM(S): at 05:56

## 2021-08-25 RX ADMIN — Medication 325 MILLIGRAM(S): at 18:20

## 2021-08-25 RX ADMIN — OXYCODONE HYDROCHLORIDE 5 MILLIGRAM(S): 5 TABLET ORAL at 07:29

## 2021-08-25 RX ADMIN — Medication 4 UNIT(S): at 08:46

## 2021-08-25 RX ADMIN — Medication 6: at 12:41

## 2021-08-25 RX ADMIN — ERTAPENEM SODIUM 120 MILLIGRAM(S): 1 INJECTION, POWDER, LYOPHILIZED, FOR SOLUTION INTRAMUSCULAR; INTRAVENOUS at 05:52

## 2021-08-25 RX ADMIN — OXYCODONE HYDROCHLORIDE 5 MILLIGRAM(S): 5 TABLET ORAL at 01:16

## 2021-08-25 RX ADMIN — OXYCODONE HYDROCHLORIDE 5 MILLIGRAM(S): 5 TABLET ORAL at 00:32

## 2021-08-25 RX ADMIN — OXYCODONE HYDROCHLORIDE 5 MILLIGRAM(S): 5 TABLET ORAL at 20:31

## 2021-08-25 RX ADMIN — GABAPENTIN 800 MILLIGRAM(S): 400 CAPSULE ORAL at 05:54

## 2021-08-25 RX ADMIN — Medication 1 TABLET(S): at 11:50

## 2021-08-25 RX ADMIN — GABAPENTIN 800 MILLIGRAM(S): 400 CAPSULE ORAL at 18:20

## 2021-08-25 RX ADMIN — NYSTATIN CREAM 1 APPLICATION(S): 100000 CREAM TOPICAL at 05:56

## 2021-08-25 RX ADMIN — HEPARIN SODIUM 5000 UNIT(S): 5000 INJECTION INTRAVENOUS; SUBCUTANEOUS at 05:54

## 2021-08-25 RX ADMIN — HEPARIN SODIUM 5000 UNIT(S): 5000 INJECTION INTRAVENOUS; SUBCUTANEOUS at 18:21

## 2021-08-25 RX ADMIN — NYSTATIN CREAM 1 APPLICATION(S): 100000 CREAM TOPICAL at 18:19

## 2021-08-25 RX ADMIN — OXYCODONE HYDROCHLORIDE 5 MILLIGRAM(S): 5 TABLET ORAL at 19:32

## 2021-08-25 RX ADMIN — OXYCODONE HYDROCHLORIDE 5 MILLIGRAM(S): 5 TABLET ORAL at 08:25

## 2021-08-25 RX ADMIN — Medication 300 MICROGRAM(S): at 05:56

## 2021-08-25 RX ADMIN — GABAPENTIN 800 MILLIGRAM(S): 400 CAPSULE ORAL at 11:50

## 2021-08-25 RX ADMIN — Medication 25 MILLIGRAM(S): at 13:49

## 2021-08-25 RX ADMIN — Medication 1 MILLIGRAM(S): at 11:50

## 2021-08-25 NOTE — DISCHARGE NOTE PROVIDER - NSDCCPCAREPLAN_GEN_ALL_CORE_FT
PRINCIPAL DISCHARGE DIAGNOSIS  Diagnosis: Severe sepsis  Assessment and Plan of Treatment: Resolved  Follow up with your Primary Care Doctor within 1 week of discharge. Call to schedule an appointment        SECONDARY DISCHARGE DIAGNOSES  Diagnosis: Bullous pemphigoid  Assessment and Plan of Treatment: continue steriods as prescribed   Follow up with your Primary Care Doctor within 1 week of discharge. Call to schedule an appointment      Diagnosis: Hypothyroidism  Assessment and Plan of Treatment: continue Synthroid   Follow up with your Primary Care Doctor within 1 week of discharge. Call to schedule an appointment      Diagnosis: Anemia  Assessment and Plan of Treatment: you received 2 units of packed cell blood transfusion. your hemoglobin is stable.  Follow up with your Primary Care Doctor within 1 week of discharge. Call to schedule an appointment      Diagnosis: Type 2 diabetes mellitus  Assessment and Plan of Treatment: Continue your medication regimen and a consistent carbohydrate diet (Meaning eating the same amount of carbohydrates at the same time each day). Monitor blood glucose levels throughout the day before meals and at bedtime. Record blood sugars and bring to outpatient providers appointment in order to be reviewed by your doctor for management modifications. If your sugars are more than 400 or less than 70 you should contact your PCP immediately. Monitor for signs/symptoms of low blood glucose, such as, dizziness, altered mental status, or cool/clammy skin. In addition, monitor for signs/symptoms of high blood glucose, such as, feeling hot, dry, fatigued, or with increased thirst/urination. Make regular podiatry appointments in order to have feet checked for wounds and uncontrolled toe nail growth to prevent infections, as well as, appointments with an ophthalmologist to monitor your vision.  Follow up with your Endocrinologist.        PRINCIPAL DISCHARGE DIAGNOSIS  Diagnosis: Severe sepsis  Assessment and Plan of Treatment: Resolved. You were treated with antibiotics.  Follow up with your Primary Care Doctor within 1 week of discharge. Call to schedule an appointment      SECONDARY DISCHARGE DIAGNOSES  Diagnosis: Anemia  Assessment and Plan of Treatment: You received 2 units of packed cell blood transfusion. your hemoglobin is stable.  Follow up with your Primary Care Doctor within 1 week of discharge. Call to schedule an appointment    Diagnosis: Bullous pemphigoid  Assessment and Plan of Treatment: continue steriods as prescribed   Follow up with your Primary Care Doctor within 1 week of discharge. Call to schedule an appointment      Diagnosis: Hypothyroidism  Assessment and Plan of Treatment: continue Synthroid   Follow up with your Primary Care Doctor within 1 week of discharge. Call to schedule an appointment      Diagnosis: Type 2 diabetes mellitus  Assessment and Plan of Treatment: Continue your medication regimen and a consistent carbohydrate diet (Meaning eating the same amount of carbohydrates at the same time each day). Monitor blood glucose levels throughout the day before meals and at bedtime. Record blood sugars and bring to outpatient providers appointment in order to be reviewed by your doctor for management modifications. If your sugars are more than 400 or less than 70 you should contact your PCP immediately. Monitor for signs/symptoms of low blood glucose, such as, dizziness, altered mental status, or cool/clammy skin. In addition, monitor for signs/symptoms of high blood glucose, such as, feeling hot, dry, fatigued, or with increased thirst/urination. Make regular podiatry appointments in order to have feet checked for wounds and uncontrolled toe nail growth to prevent infections, as well as, appointments with an ophthalmologist to monitor your vision.  Follow up with your Endocrinologist.

## 2021-08-25 NOTE — DISCHARGE NOTE PROVIDER - CARE PROVIDER_API CALL
Olivia Ross  INTERNAL MEDICINE  [address] [facility] [address] [facility], [address]  [place] [address]  Phone: [phone]  Fax: [phone]  Follow Up Time:     Jose Canas  ENDOCRINOLOGY/METAB/DIABETES  [address] [facility], [address]  [place] [address]  Phone: [phone]  Fax: [phone]  Follow Up Time:

## 2021-08-25 NOTE — PROGRESS NOTE ADULT - PROBLEM SELECTOR PLAN 4
C/w home regimen of Synthroid per outpt endo
C/w home regimen of Synthroid per outpt endo
C/w home regimen of Synthroid  f/u outpt endo
C/w home regimen of Synthroid  f/u outpt endo
C/w home regimen of Synthroid per outpt endo

## 2021-08-25 NOTE — PROGRESS NOTE ADULT - PROBLEM SELECTOR PLAN 2
Hb 6.2 on admission, pt reports hx of anemia requiring transfusions. unclear cause, has had extensive Dec 2020 and outpt workup.  Colonoscopy 2015 w Dr Ko - diverticulosis and small benign HP polyp resected.  - S/p 2 units pRBC transfusion 8/20. hgb remain stable.   - Monitor H/H  - Seen by GI and Heme/onc previously.  - Takes Iron BID at home. can resume.   - Had bone marrow bx in the past per pt, she doesn't remember onc name
Hb 6.2 on admission, pt reports hx of anemia requiring transfusions. unclear cause, has had extensive Dec 2020 and outpt workup.  Colonoscopy 2015 w Dr Ko - diverticulosis and small benign HP polyp resected.  -2U pRBC transfusion 8/20  -Monitor H/H  -Seen by GI and Heme/onc previously.
Hb 6.2 on admission, pt reports hx of anemia requiring transfusions. unclear cause, has had extensive Dec 2020 and outpt workup.  Colonoscopy 2015 w Dr Ko - diverticulosis and small benign HP polyp resected.  -2U pRBC transfusion 8/20  -Monitor H/H  -Seen by GI and Heme/onc previously.  - takes Iron BID at home.  - Had bone marrow bx in the past per pt, she doesn't remember onc name
Hb 6.2 on admission, pt reports hx of anemia requiring transfusions. unclear cause, has had extensive Dec 2020 and outpt workup.  Colonoscopy 2015 w Dr Ko - diverticulosis and small benign HP polyp resected.  -2U pRBC transfusion 8/20  -Repeat CBC in PM   -Monitor H/H  -Seen by GI and Heme/onc previously.
Hb 6.2 on admission, pt reports hx of anemia requiring transfusions. unclear cause, has had extensive Dec 2020 and outpt workup.  Colonoscopy 2015 w Dr Ko - diverticulosis and small benign HP polyp resected.  -2U pRBC transfusion 8/20. hgb remain stable.   -Monitor H/H  -Seen by GI and Heme/onc previously.  - takes Iron BID at home. can resume.   - Had bone marrow bx in the past per pt, she doesn't remember onc name

## 2021-08-25 NOTE — DISCHARGE NOTE PROVIDER - HOSPITAL COURSE
70 w/hx of stage 1 uterine and endometrial CA s/p TLH/BSO (chemo 5 years ago, in remission), bullous pemphigoid on IVIG and daily prednisone, DM, hypothyroidism, chronic pain syndrome p/w vomiting x1 and fever admitted for severe sepsis 2/2 UTI, found to have ESBL UTI.    Severe sepsis.    -P/w high grade fever, tachycardia, lactic acidosis and hypotension 2/2 presumed UTI, UA +LE, WBCs, but nitrate negative  CXR neg, COVID neg  -s/p ertapenem 1g in ER for presumed UTI,   -[facility] ID consulted  -Bcx-neg  -s/p 3.275L crystalloids and 1U pRBC, 2nd unit running now, BP still 90/50s in ER.   -Started stress dose steroids (IV hydrocortisone) as pt on chronic Pred 15mg for bullous pemphigoid   -ICU consulted, pt not a candidate for ICU admission   -US Renal: No hydronephrosis. Bilateral simple appearing renal cysts. Cholelithiasis.  -Ucx grew ESBL, as per ID recommend 7 days course of IV ertapenem thru 8/26.    Anemia.    -Hb 6.2 on admission, pt reports hx of anemia requiring transfusions. unclear cause, has had extensive Dec 2020 and outpt workup.  Colonoscopy 2015 w Dr Ko - diverticulosis and small benign HP polyp resected.  -2U pRBC transfusion 8/20  -Monitor H/H  -Seen by GI and Heme/onc previously.     Bullous pemphigoid.    -Receiving IVIG q month (last 2 weeks ago) as well as dupixent q 2 weeks, last dose 8/17   -Home med: Prednisone 15mg qD   -Currently not in flare, was changed to IV Hydrocortisone for stress dose steroids d/t sepsis    Hypothyroidism.   C/w home regimen of Synthroid per outpt endo.     Type 2 diabetes mellitus.  -A1C: 4.6   -Home meds: Metformin and Actos  -ISS/FS  -Consistent carbohydrate diet    Steroid induced hyper glycemia   -d/t to higher stress dose IV steriods, Pt was started on Lantus   -Admelog pre-meals, SS       70 w/hx of stage 1 uterine and endometrial CA s/p TLH/BSO (chemo 5 years ago, in remission), bullous pemphigoid on IVIG and daily prednisone, DM, hypothyroidism, chronic pain syndrome p/w vomiting x1 and fever admitted for severe sepsis 2/2 UTI, found to have ESBL UTI.    Severe sepsis.    -P/w high grade fever, tachycardia, lactic acidosis and hypotension 2/2 presumed UTI, UA +LE, WBCs, but nitrate negative  -CXR neg, COVID neg  -Bcx-neg  -S/p ertapenem 1g in ER for presumed UTI  -S/p stress dose steroids (IV hydrocortisone) as pt on chronic Pred 15mg for bullous pemphigoid   -ICU consulted, pt not a candidate for ICU admission   -US Renal: No hydronephrosis. Bilateral simple appearing renal cysts. Cholelithiasis.  -Ucx grew ESBL  -as per ID recommend 7 days course of IV ertapenem thru 8/26    Anemia.    -Hb 6.2 on admission, pt reports hx of anemia requiring transfusions. unclear cause, has had extensive Dec 2020 and outpt workup.  -Colonoscopy 2015 w Dr Ko - diverticulosis and small benign HP polyp resected.  -s/p 2U pRBC transfusion 8/20  -Monitor H/H  -Seen by GI and Heme/onc previously  -Hgb stable prior to discharge    Bullous pemphigoid.    -Receiving IVIG q month (last 2 weeks ago) as well as dupixent q 2 weeks, last dose 8/17   -S/p stress dose steroids with transition back to home dose   -Home med: Prednisone 15mg qD     Hypothyroidism.   -C/w home regimen of Synthroid per outpt endo.     Type 2 diabetes mellitus.  -A1C: 4.6   -Home meds: Metformin and Actos  -Required Pre-meal and lantus while on stress dose steroids  -Discussed with Dr. Olvera on 8/26: pt to be discharged on home regimen as FS generally controlled when on maintenance   -Consistent carbohydrate diet    Steroid induced hyper glycemia   -d/t to higher stress dose IV steriods, Pt was started on Lantus and pre meal  -Per discussion with Dr. Olvera: pt to resume home dose PO dm regimen on DC as being discharged on maintenance steroids      70 w/hx of stage 1 uterine and endometrial CA s/p TLH/BSO (chemo 5 years ago, in remission), bullous pemphigoid on IVIG and daily prednisone, DM, hypothyroidism, chronic pain syndrome p/w vomiting x1 and fever admitted for severe sepsis 2/2 UTI, found to have ESBL UTI.    Severe sepsis.    -P/w high grade fever, tachycardia, lactic acidosis and hypotension 2/2 presumed UTI, UA +LE, WBCs, but nitrate negative  -CXR neg, COVID neg  -Bcx-neg  -S/p ertapenem 1g in ER for presumed UTI  -S/p stress dose steroids (IV hydrocortisone) as pt on chronic Pred 15mg for bullous pemphigoid   -ICU consulted, pt not a candidate for ICU admission   -US Renal: No hydronephrosis. Bilateral simple appearing renal cysts. Cholelithiasis.  -Ucx grew ESBL  -as per ID recommend 7 days course of IV ertapenem thru 8/26    Anemia.    -Hb 6.2 on admission, pt reports hx of anemia requiring transfusions. unclear cause, has had extensive Dec 2020 and outpt workup.  -Colonoscopy 2015 w Dr Ko - diverticulosis and small benign HP polyp resected.  -s/p 2U pRBC transfusion 8/20  -Monitor H/H  -Seen by GI and Heme/onc previously  -Hgb stable prior to discharge    Bullous pemphigoid.    -Receiving IVIG q month (last 2 weeks ago) as well as dupixent q 2 weeks, last dose 8/17   -S/p stress dose steroids with transition back to home dose   -Home med: Prednisone 15mg qD     Hypothyroidism.   -C/w home regimen of Synthroid per outpt endo.     Type 2 diabetes mellitus.  -A1C: 4.6   -Home meds: Metformin and Actos  -Required Pre-meal and lantus while on stress dose steroids  -Discussed with Dr. Olvera on 8/26: pt to be discharged on home regimen as FS generally controlled when on maintenance   -Consistent carbohydrate diet    Steroid induced hyper glycemia   -d/t to higher stress dose IV steriods, Pt was started on Lantus and pre meal  -Per discussion with Dr. Olvera: pt to resume home dose PO dm regimen on DC as being discharged on maintenance steroids     Pt medically stable for discharge on 8/26/21 as per discussion with Wade Rodríguez.   Dispo: home with home PT 70 w/hx of stage 1 uterine and endometrial CA s/p TLH/BSO (chemo 5 years ago, in remission), bullous pemphigoid on IVIG and daily prednisone, DM, hypothyroidism, chronic pain syndrome p/w vomiting x1 and fever admitted for severe sepsis 2/2 UTI, found to have ESBL UTI.    Severe sepsis.    -P/w high grade fever, tachycardia, lactic acidosis and hypotension 2/2 presumed UTI, UA +LE, WBCs, but nitrate negative  -CXR neg, COVID neg  -Bcx-neg  -S/p ertapenem 1g in ER for presumed UTI  -S/p stress dose steroids (IV hydrocortisone) as pt on chronic Pred 15mg for bullous pemphigoid   -ICU consulted, pt not a candidate for ICU admission   -US Renal: No hydronephrosis. Bilateral simple appearing renal cysts. Cholelithiasis.  -Ucx grew ESBL  -as per ID recommend 7 days course of IV ertapenem thru 8/26    Anemia.    -Hb 6.2 on admission, pt reports hx of anemia requiring transfusions. unclear cause, has had extensive Dec 2020 and outpt workup.  -Colonoscopy 2015 w Dr Ko - diverticulosis and small benign HP polyp resected.  -s/p 2U pRBC transfusion 8/20  -Monitor H/H  -Seen by GI and Heme/onc previously  -Hgb stable prior to discharge    Bullous pemphigoid.    -Receiving IVIG q month (last 2 weeks ago) as well as dupixent q 2 weeks, last dose 8/17   -S/p stress dose steroids with transition back to home dose   -Home med: Prednisone 15mg qD     Hypothyroidism.   -C/w home regimen of Synthroid per outpt endo.     Type 2 diabetes mellitus.  -A1C: 4.6   -Home meds: Metformin and Actos  -Required Pre-meal and lantus while on stress dose steroids  -Discussed with Dr. Olvera on 8/26: pt to be discharged on home regimen as FS generally controlled when on maintenance   -Consistent carbohydrate diet    Steroid induced hyper glycemia   -d/t to higher stress dose IV steriods, Pt was started on Lantus and pre meal  -Per discussion with Dr. Olvera: pt to resume home dose PO dm regimen on DC as being discharged on maintenance steroids     Orthostatics checked on day of discharge: negative     Pt medically stable for discharge on 8/26/21 as per discussion with Wade Rodríguez.   Dispo: home with home PT 70 w/hx of stage 1 uterine and endometrial CA s/p TLH/BSO (chemo 5 years ago, in remission), bullous pemphigoid on IVIG and daily prednisone, DM, hypothyroidism, chronic pain syndrome p/w vomiting x1 and fever admitted for severe sepsis 2/2 UTI, found to have ESBL UTI.    Severe sepsis.    -P/w high grade fever, tachycardia, lactic acidosis and hypotension 2/2 presumed UTI, UA +LE, WBCs, but nitrate negative  -CXR neg, COVID neg  -Bcx-neg  -S/p ertapenem 1g in ER for presumed UTI  -S/p stress dose steroids (IV hydrocortisone) as pt on chronic Pred 15mg for bullous pemphigoid   -ICU consulted, pt not a candidate for ICU admission   -US Renal: No hydronephrosis. Bilateral simple appearing renal cysts. Cholelithiasis.  -Ucx grew ESBL  -as per ID recommend 7 days course of IV ertapenem thru 8/26    Anemia.    -Hb 6.2 on admission, pt reports hx of anemia requiring transfusions. unclear cause, has had extensive Dec 2020 and outpt workup.  -Colonoscopy 2015 w Dr Ko - diverticulosis and small benign HP polyp resected.  -s/p 2U pRBC transfusion 8/20  -Monitor H/H  -Seen by GI and Heme/onc previously  -Hgb stable prior to discharge    Bullous pemphigoid.    -Receiving IVIG q month (last 2 weeks ago) as well as dupixent q 2 weeks, last dose 8/17   -S/p stress dose steroids with transition back to home dose   -Home med: Prednisone 15mg qD     Hypothyroidism.   -C/w home regimen of Synthroid per outpt endo.     Type 2 diabetes mellitus.  -A1C: 4.6   -Home meds: Metformin and Actos  -Required Pre-meal and lantus while on stress dose steroids  -Discussed with Dr. Olvera on 8/26: pt to be discharged on home regimen as FS generally controlled when on maintenance   -Consistent carbohydrate diet    Steroid induced hyper glycemia   -d/t to higher stress dose IV steriods, Pt was started on Lantus and pre meal  -Per discussion with Dr. Olvera: pt to resume home dose PO dm regimen on DC as being discharged on maintenance steroids     Orthostatics checked on day of discharge: negative, continue with discharge as per discussion with Dr. Olvera on 8/26   Per discussion with Dr. Olvera: resume ASA and Lisinopril on discharge     Pt medically stable for discharge on 8/26/21 as per discussion with Wade Rodríguez.   Dispo: home with home PT

## 2021-08-25 NOTE — PROGRESS NOTE ADULT - PROBLEM SELECTOR PLAN 1
P/w high grade fever, tachycardia, lactic acidosis and hypotension 2/2 presumed UTI, UA +LE, WBCs, but nitrate negative  Hx of ESBL E coli UTI April 2021   CXR neg, COVID neg  -s/p ertapenem 1g in ER for presumed UTI, will continue for now.  - [facility] ID eval appreciated.   -f/u blood and Ucx  -s/p 3.275L crystalloids and 1U pRBC, 2nd unit running now, BP still 90/50s in ER.   -Started stress dose steroids as pt on chronic Pred 15mg for bullous pemphigoid   -ICU consult appreciated
pt with high grade fever, tachycardia, lactic acidosis and hypotension 2/2 presumed UTI, UA +LE, WBCs, but nitrate negative  Hx of ESBL E coli UTI April 2021.   - CXR neg, COVID neg  -s/p ertapenem 1g in ER for presumed UTI, will continue for now.  - [facility] ID eval appreciated. ICU consult appreciated.   -f/u blood +1/4 bottles. repeat cultures. Ucx E.coli  -s/p 3.275L crystalloids and 1U pRBC, 2nd unit running now, BP still 90/50s in ER.   -Started stress dose steroids as pt on chronic Pred 15mg for bullous pemphigoid   (BP better. will taper hydrocortisone 100mg --> 50 mg TID)  - f/u blood cultures
pt with high grade fever, tachycardia, lactic acidosis and hypotension 2/2 presumed UTI, UA +LE, WBCs, but nitrate negative  Hx of ESBL E coli UTI April 2021.   - CXR neg, COVID neg  -s/p ertapenem 1g in ER for presumed UTI, will continue for now.  - [facility] ID eval appreciated. ICU consult appreciated.   - blood Cx +1/4 bottles. Ucx E.coli ESBL  - s/p 3L crystalloids and 2 units pRBC, BP much mproved.  -Started stress dose steroids as pt on chronic Pred 15mg for bullous pemphigoid   (BP better. taper hydrocortisone 100mg --> 50 mg -->25mg TID)  - Plan to complete abx until 8/26/21 per ID.  repeat cultures neg.
pt with high grade fever, tachycardia, lactic acidosis and hypotension 2/2 presumed UTI, UA +LE, WBCs, but nitrate negative  Hx of ESBL E coli UTI April 2021.   - CXR neg, COVID neg  -s/p ertapenem 1g in ER for presumed UTI, will continue for now.  - [facility] ID eval appreciated. ICU consult appreciated.   -f/u blood +1/4 bottles. repeat cultures. Ucx E.coli  -s/p 3.275L crystalloids and 2 units pRBC PRBC  -Started stress dose steroids as pt on chronic Pred 15mg for bullous pemphigoid   (BP better. taper hydrocortisone 100mg --> 50 mg TID)  - f/u repeat blood cultures
pt with high grade fever, tachycardia, lactic acidosis and hypotension 2/2 presumed UTI, UA +LE, WBCs, but nitrate negative  Hx of ESBL E coli UTI April 2021.   - CXR neg, COVID neg  - s/p ertapenem 1g in ER for presumed UTI, will continue for now.  - [facility] ID eval appreciated. ICU consult appreciated.   - blood Cx +1/4 bottles. Ucx E.coli ESBL  - s/p 3L crystalloids and 2 units pRBC, BP much mproved.  -Started stress dose steroids as pt on chronic Pred 15mg for bullous pemphigoid   (BP better. taper hydrocortisone 100mg --> 50 mg -->25mg TID)  - Plan to complete abx until 8/26/21 per ID.  repeat cultures neg.

## 2021-08-25 NOTE — PROGRESS NOTE ADULT - PROBLEM SELECTOR PLAN 6
Home meds: Metformin and Actos  -ISS/FS  -CCD
Home meds: Metformin and Actos  -ISS/FS  -CCD
Home meds: Metformin 500 mg in am, 1000mg in pm and Actos   - hgbA1c 4.6%, will recheck.   - started Lantus QHS, will uptitrate given hyperglycemia  - Premeal insuln TID.    (monitor sugars while steroids being tapered down)  - c/w insulin sliding scale
Home meds: Metformin 500 mg in am, 1000mg in pm and Actos   - hgbA1c 4.6%, will recheck.   - started Lantus QHS, will uptitrate given hyperglycemia  - Premeal insuln TID.    (monitor sugars while steroids being tapered down)  - c/w insulin sliding scale
Home meds: Metformin and Actos  - hgbA1c 4.6%  - start Lantus QHS. (monitor sugars while steroids being tapered down)  - insulin sliding scale

## 2021-08-25 NOTE — PROGRESS NOTE ADULT - SUBJECTIVE AND OBJECTIVE BOX
[facility], Division of Infectious Diseases  MAYDA Braxton Y. Patel, S. Shah  [phone]    Name: AME ZIMMERMAN  Age: 70y  Gender: Female  MRN: [id number]    Interval History:  Patient seen and examined at bedside this morning  No acute overnight events. Afebrile  Resting comfortably  Notes reviewed    Antibiotics:  ertapenem  IVPB 1000 milliGRAM(s) IV Intermittent every 24 hours      Medications:  aluminum hydroxide/magnesium hydroxide/simethicone Suspension 30 milliLiter(s) Oral every 6 hours PRN  atorvastatin 20 milliGRAM(s) Oral at bedtime  dextrose 40% Gel 15 Gram(s) Oral once  dextrose 5%. 1000 milliLiter(s) IV Continuous <Continuous>  dextrose 5%. 1000 milliLiter(s) IV Continuous <Continuous>  dextrose 50% Injectable 25 Gram(s) IV Push once  dextrose 50% Injectable 25 Gram(s) IV Push once  dextrose 50% Injectable 12.5 Gram(s) IV Push once  ertapenem  IVPB 1000 milliGRAM(s) IV Intermittent every 24 hours  ferrous    sulfate 325 milliGRAM(s) Oral two times a day  folic acid 1 milliGRAM(s) Oral daily  gabapentin 800 milliGRAM(s) Oral four times a day  glucagon  Injectable 1 milliGRAM(s) IntraMuscular once  hydrocortisone sodium succinate Injectable 50 milliGRAM(s) IV Push every 8 hours  insulin glargine Injectable (LANTUS) 12 Unit(s) SubCutaneous at bedtime  insulin lispro (ADMELOG) corrective regimen sliding scale   SubCutaneous three times a day before meals  insulin lispro (ADMELOG) corrective regimen sliding scale   SubCutaneous at bedtime  lactobacillus acidophilus 1 Tablet(s) Oral two times a day with meals  levothyroxine 150 MICROGram(s) Oral <User Schedule>  levothyroxine 300 MICROGram(s) Oral <User Schedule>  multivitamin 1 Tablet(s) Oral daily  nystatin Powder 1 Application(s) Topical two times a day  oxyCODONE    IR 5 milliGRAM(s) Oral every 6 hours PRN  senna 2 Tablet(s) Oral at bedtime      Review of Systems:  A 10-point review of systems was obtained.   Review of systems otherwise negative except as previously noted.    Allergies: Ancef (Rash)  daptomycin (Vomiting)  Keflex (Unknown)  penicillin (Pruritus; Rash; Hives)    For details regarding the patient's past medical history, social history, family history, and other miscellaneous elements, please refer the initial infectious diseases consultation and/or the admitting history and physical examination for this admission.    Objective:  Vitals:   T(C): 36.7 (08-24-21 @ 06:05), Max: 36.9 (08-23-21 @ 09:51)  HR: 69 (08-24-21 @ 06:05) (69 - 106)  BP: 129/82 (08-24-21 @ 06:05) (116/60 - 129/82)  RR: 18 (08-24-21 @ 06:05) (18 - 18)  SpO2: 99% (08-24-21 @ 06:05) (97% - 100%)    Physical Examination:  General: no acute distress  HEENT: NC/AT  Neck: supple, no palpable LAD  Cardio: S1, S2 heard, RRR, no murmurs  Resp: decreased b/l b reath sounds  Abd: soft, NT, ND, + bowel sounds  Neuro: no obvious focal deficits  Ext: no edema or cyanosis  Skin: UE bruising      Laboratory Studies:  CBC:                       8.4    11.47 )-----------( 305      ( 24 Aug 2021 06:55 )             28.4     CMP: 08-24    139  |  106  |  31<H>  ----------------------------<  377<H>  4.0   |  23  |  0.74    Ca    7.8<L>      24 Aug 2021 06:55  Phos  3.2     08-24  Mg     2.00     08-24            Microbiology: reviewed    Culture - Blood (collected 08-22-21 @ 11:09)  Source: .Blood Blood  Preliminary Report (08-23-21 @ 12:02):    No growth to date.    Culture - Blood (collected 08-20-21 @ 06:31)  Source: .Blood Blood-Peripheral  Preliminary Report (08-21-21 @ 07:01):    No growth to date.    Culture - Urine (collected 08-20-21 @ 05:45)  Source: Clean Catch Clean Catch (Midstream)  Final Report (08-23-21 @ 13:33):    >100,000 CFU/ml Escherichia coli ESBL  Organism: Escherichia coli ESBL (08-23-21 @ 13:33)  Organism: Escherichia coli ESBL (08-23-21 @ 13:33)      -  Amikacin: S <=16      -  Amoxicillin/Clavulanic Acid: S <=8/4      -  Ampicillin: R >16 These ampicillin results predict results for amoxicillin      -  Ampicillin/Sulbactam: S 8/4 Enterobacter, Citrobacter, and Serratia may develop resistance during prolonged therapy (3-4 days)      -  Aztreonam: R 16      -  Cefazolin: R >16 (MIC_CL_COM_[email or web address]) For uncomplicated UTI with K. pneumoniae, E. coli, or P. mirablis: ARIANE <=16 is sensitive and ARIANE >=32 is resistant. This also predicts results for oral agents cefaclor, cefdinir, cefpodoxime, cefprozil, cefuroxime axetil, cephalexin and locarbef for uncomplicated UTI. Note that some isolates may be susceptible to these agents while testing resistant to cefazolin.      -  Cefepime: R 8      -  Cefoxitin: S <=8      -  Ceftriaxone: R >32 Enterobacter, Citrobacter, and Serratia may develop resistance during prolonged therapy      -  Ciprofloxacin: S <=0.25      -  Ertapenem: S <=0.5      -  Gentamicin: S <=2      -  Imipenem: S <=1      -  Levofloxacin: S <=0.5      -  Meropenem: S <=1      -  Nitrofurantoin: S <=32 Should not be used to treat pyelonephritis      -  Piperacillin/Tazobactam: S <=8      -  Tigecycline: S <=2      -  Tobramycin: S <=2      -  Trimethoprim/Sulfamethoxazole: S <=0.5/9.5      Method Type: ARIANE    Culture - Blood (collected 08-20-21 @ 03:10)  Source: .Blood Blood-Peripheral  Gram Stain (08-21-21 @ 21:31):    Growth in aerobic bottle: Gram Positive Cocci in Clusters  Final Report (08-22-21 @ 20:09):    Growth in aerobic bottle: Staphylococcus hominis    Coag Negative Staphylococcus    Single set isolate, possible contaminant. Contact    Microbiology if susceptibility testing clinically    indicated.    ***Blood Panel PCR results on this specimen are available    approximately 3 hours after the Gram stain result.***    Gram stain, PCR, and/or culture results may not always    correspond due to difference in methodologies.    ************************************************************    This PCR assay was performed by multiplex PCR. This    Assay tests for 66 bacterial and resistance gene targets.    Please refer to the [facility] test directory    at [email or web address] for details.  Organism: Blood Culture PCR (08-22-21 @ 20:09)  Organism: Blood Culture PCR (08-22-21 @ 20:09)      -  Coagulase negative Staphylococcus, Methicillin resistant: Detec      Method Type: PCR        Radiology: reviewed      
SUBJECTIVE / OVERNIGHT EVENTS:  feeling better  blood cultures reviewed  now on Ertapenem  no cp, no sob, no n/v/d. no abdominal pain.  no headache, no dizziness.         --------------------------------------------------------------------------------------------  LABS:                        7.8    23.42 )-----------( 268      ( 22 Aug 2021 06:54 )             25.8     08-22    140  |  106  |  30<H>  ----------------------------<  228<H>  3.7   |  21<L>  |  0.81    Ca    7.9<L>      22 Aug 2021 06:54  Phos  2.1     08-22  Mg     1.90     08-22        CAPILLARY BLOOD GLUCOSE      POCT Blood Glucose.: 269 mg/dL (22 Aug 2021 22:23)  POCT Blood Glucose.: 306 mg/dL (22 Aug 2021 17:27)  POCT Blood Glucose.: 275 mg/dL (22 Aug 2021 12:20)  POCT Blood Glucose.: 232 mg/dL (22 Aug 2021 08:49)            RADIOLOGY & ADDITIONAL TESTS:    Imaging Personally Reviewed:  [x] YES  [ ] NO    Consultant(s) Notes Reviewed:  [x] YES  [ ] NO    MEDICATIONS  (STANDING):  atorvastatin 20 milliGRAM(s) Oral at bedtime  dextrose 40% Gel 15 Gram(s) Oral once  dextrose 5%. 1000 milliLiter(s) (50 mL/Hr) IV Continuous <Continuous>  dextrose 5%. 1000 milliLiter(s) (100 mL/Hr) IV Continuous <Continuous>  dextrose 50% Injectable 25 Gram(s) IV Push once  dextrose 50% Injectable 12.5 Gram(s) IV Push once  dextrose 50% Injectable 25 Gram(s) IV Push once  ertapenem  IVPB 1000 milliGRAM(s) IV Intermittent every 24 hours  ferrous    sulfate 325 milliGRAM(s) Oral two times a day  folic acid 1 milliGRAM(s) Oral daily  gabapentin 800 milliGRAM(s) Oral four times a day  glucagon  Injectable 1 milliGRAM(s) IntraMuscular once  hydrocortisone sodium succinate Injectable 100 milliGRAM(s) IV Push every 8 hours  insulin glargine Injectable (LANTUS) 10 Unit(s) SubCutaneous at bedtime  insulin lispro (ADMELOG) corrective regimen sliding scale   SubCutaneous three times a day before meals  lactobacillus acidophilus 1 Tablet(s) Oral two times a day with meals  levothyroxine 150 MICROGram(s) Oral <User Schedule>  levothyroxine 300 MICROGram(s) Oral <User Schedule>  multivitamin 1 Tablet(s) Oral daily  nystatin Powder 1 Application(s) Topical two times a day  potassium phosphate / sodium phosphate Tablet (K-PHOS No. 2) 1 Tablet(s) Oral four times a day before meals  senna 2 Tablet(s) Oral at bedtime    MEDICATIONS  (PRN):  aluminum hydroxide/magnesium hydroxide/simethicone Suspension 30 milliLiter(s) Oral every 6 hours PRN Dyspepsia  oxyCODONE    IR 5 milliGRAM(s) Oral every 6 hours PRN mod to severe pain      Care Discussed with Consultants/Other Providers [x] YES  [ ] NO    Vital Signs Last 24 Hrs  T(C): 37.1 (22 Aug 2021 21:25), Max: 37.4 (22 Aug 2021 05:28)  T(F): 98.7 (22 Aug 2021 21:25), Max: 99.4 (22 Aug 2021 05:28)  HR: 80 (22 Aug 2021 17:20) (79 - 99)  BP: 134/81 (22 Aug 2021 21:25) (104/70 - 134/81)  BP(mean): --  RR: 18 (22 Aug 2021 21:25) (16 - 18)  SpO2: 97% (22 Aug 2021 21:25) (96% - 100%)  I&O's Summary    22 Aug 2021 07:01  -  22 Aug 2021 23:34  --------------------------------------------------------  IN: 570 mL / OUT: 380 mL / NET: 190 mL        PHYSICAL EXAM:  GENERAL: NAD, well-developed, comfortable  HEAD:  Atraumatic, Normocephalic  EYES: EOMI, PERRLA, conjunctiva and sclera clear  NECK: Supple, No JVD  CHEST/LUNG: mild decrease breath sounds bilaterally; No wheeze   HEART: Regular rate and rhythm; No murmurs, rubs, or gallops  ABDOMEN: Soft, Nontender, Nondistended; Bowel sounds present  Neuro: AAOx3, no focal weakness, 5/5 b/l extremity strength  EXTREMITIES:  2+ Peripheral Pulses, No clubbing, cyanosis, or edema  SKIN: No rashes or lesions   
SUBJECTIVE / OVERNIGHT EVENTS:  feels well  out of bed in chair  no cp, no sob, no n/v/d. no abdominal pain.  no headache, no dizziness.   no cough.  sugars high, insulin uptitrated.   BP stable, Hydrocortisone tapered to 25 mg         --------------------------------------------------------------------------------------------  LABS:                        8.4    11.47 )-----------( 305      ( 24 Aug 2021 06:55 )             28.4     08-24    139  |  106  |  31<H>  ----------------------------<  377<H>  4.0   |  23  |  0.74    Ca    7.8<L>      24 Aug 2021 06:55  Phos  3.2     08-24  Mg     2.00     08-24        CAPILLARY BLOOD GLUCOSE      POCT Blood Glucose.: 357 mg/dL (24 Aug 2021 08:19)  POCT Blood Glucose.: 381 mg/dL (23 Aug 2021 22:00)  POCT Blood Glucose.: 229 mg/dL (23 Aug 2021 17:36)  POCT Blood Glucose.: 270 mg/dL (23 Aug 2021 12:31)            RADIOLOGY & ADDITIONAL TESTS:    Imaging Personally Reviewed:  [x] YES  [ ] NO    Consultant(s) Notes Reviewed:  [x] YES  [ ] NO    MEDICATIONS  (STANDING):  atorvastatin 20 milliGRAM(s) Oral at bedtime  dextrose 40% Gel 15 Gram(s) Oral once  dextrose 5%. 1000 milliLiter(s) (50 mL/Hr) IV Continuous <Continuous>  dextrose 5%. 1000 milliLiter(s) (100 mL/Hr) IV Continuous <Continuous>  dextrose 50% Injectable 25 Gram(s) IV Push once  dextrose 50% Injectable 25 Gram(s) IV Push once  dextrose 50% Injectable 12.5 Gram(s) IV Push once  ertapenem  IVPB 1000 milliGRAM(s) IV Intermittent every 24 hours  ferrous    sulfate 325 milliGRAM(s) Oral two times a day  folic acid 1 milliGRAM(s) Oral daily  gabapentin 800 milliGRAM(s) Oral four times a day  glucagon  Injectable 1 milliGRAM(s) IntraMuscular once  hydrocortisone sodium succinate Injectable 25 milliGRAM(s) IV Push every 8 hours  insulin glargine Injectable (LANTUS) 15 Unit(s) SubCutaneous at bedtime  insulin lispro (ADMELOG) corrective regimen sliding scale   SubCutaneous three times a day before meals  insulin lispro (ADMELOG) corrective regimen sliding scale   SubCutaneous at bedtime  insulin lispro Injectable (ADMELOG) 4 Unit(s) SubCutaneous three times a day before meals  lactobacillus acidophilus 1 Tablet(s) Oral two times a day with meals  levothyroxine 150 MICROGram(s) Oral <User Schedule>  levothyroxine 300 MICROGram(s) Oral <User Schedule>  multivitamin 1 Tablet(s) Oral daily  nystatin Powder 1 Application(s) Topical two times a day  senna 2 Tablet(s) Oral at bedtime    MEDICATIONS  (PRN):  aluminum hydroxide/magnesium hydroxide/simethicone Suspension 30 milliLiter(s) Oral every 6 hours PRN Dyspepsia  oxyCODONE    IR 5 milliGRAM(s) Oral every 6 hours PRN mod to severe pain      Care Discussed with Consultants/Other Providers [x] YES  [ ] NO    Vital Signs Last 24 Hrs  T(C): 36.5 (24 Aug 2021 10:45), Max: 36.9 (24 Aug 2021 02:16)  T(F): 97.7 (24 Aug 2021 10:45), Max: 98.5 (24 Aug 2021 02:16)  HR: 74 (24 Aug 2021 10:45) (69 - 106)  BP: 113/62 (24 Aug 2021 10:45) (113/62 - 129/82)  BP(mean): --  RR: 18 (24 Aug 2021 10:45) (18 - 18)  SpO2: 99% (24 Aug 2021 10:45) (97% - 100%)  I&O's Summary    23 Aug 2021 07:01  -  24 Aug 2021 07:00  --------------------------------------------------------  IN: 660 mL / OUT: 1140 mL / NET: -480 mL      PHYSICAL EXAM:  GENERAL: NAD, well-developed, comfortable  HEAD:  Atraumatic, Normocephalic  EYES: EOMI, PERRLA, conjunctiva and sclera clear  NECK: Supple, No JVD  CHEST/LUNG: mild decrease breath sounds bilaterally; No wheeze   HEART: Regular rate and rhythm; No murmurs, rubs, or gallops  ABDOMEN: Soft, Nontender, Nondistended; Bowel sounds present  Neuro: AAOx3, no focal weakness, 5/5 b/l extremity strength  EXTREMITIES:  2+ Peripheral Pulses, No clubbing, cyanosis, trace edema  SKIN: No rashes or lesions   
[facility], Division of Infectious Diseases  MAYDA Braxton Y. Patel, S. Shah  [phone]    Name: AME ZIMMERMAN  Age: 70y  Gender: Female  MRN: [id number]    Interval History:  Patient seen and examined at bedside this morning  No acute overnight events. Afebrile  No complaints  Continues to feel fatigued  Notes reviewed    Antibiotics:  ertapenem  IVPB 1000 milliGRAM(s) IV Intermittent every 24 hours      Medications:  aluminum hydroxide/magnesium hydroxide/simethicone Suspension 30 milliLiter(s) Oral every 6 hours PRN  atorvastatin 20 milliGRAM(s) Oral at bedtime  dextrose 40% Gel 15 Gram(s) Oral once  dextrose 5%. 1000 milliLiter(s) IV Continuous <Continuous>  dextrose 5%. 1000 milliLiter(s) IV Continuous <Continuous>  dextrose 50% Injectable 25 Gram(s) IV Push once  dextrose 50% Injectable 12.5 Gram(s) IV Push once  dextrose 50% Injectable 25 Gram(s) IV Push once  ertapenem  IVPB 1000 milliGRAM(s) IV Intermittent every 24 hours  ferrous    sulfate 325 milliGRAM(s) Oral two times a day  folic acid 1 milliGRAM(s) Oral daily  gabapentin 800 milliGRAM(s) Oral four times a day  glucagon  Injectable 1 milliGRAM(s) IntraMuscular once  heparin   Injectable 5000 Unit(s) SubCutaneous every 12 hours  hydrocortisone sodium succinate Injectable 25 milliGRAM(s) IV Push every 8 hours  insulin glargine Injectable (LANTUS) 15 Unit(s) SubCutaneous at bedtime  insulin lispro (ADMELOG) corrective regimen sliding scale   SubCutaneous three times a day before meals  insulin lispro (ADMELOG) corrective regimen sliding scale   SubCutaneous at bedtime  insulin lispro Injectable (ADMELOG) 4 Unit(s) SubCutaneous three times a day before meals  lactobacillus acidophilus 1 Tablet(s) Oral two times a day with meals  levothyroxine 150 MICROGram(s) Oral <User Schedule>  levothyroxine 300 MICROGram(s) Oral <User Schedule>  multivitamin 1 Tablet(s) Oral daily  nystatin Powder 1 Application(s) Topical two times a day  oxyCODONE    IR 5 milliGRAM(s) Oral every 6 hours PRN  senna 2 Tablet(s) Oral at bedtime      Review of Systems:  A 10-point review of systems was obtained.   Review of systems otherwise negative except as previously noted.    Allergies: Ancef (Rash)  daptomycin (Vomiting)  Keflex (Unknown)  penicillin (Pruritus; Rash; Hives)    For details regarding the patient's past medical history, social history, family history, and other miscellaneous elements, please refer the initial infectious diseases consultation and/or the admitting history and physical examination for this admission.    Objective:  Vitals:   T(C): 36.4 (08-25-21 @ 06:07), Max: 36.7 (08-24-21 @ 22:44)  HR: 70 (08-25-21 @ 06:07) (65 - 74)  BP: 142/80 (08-25-21 @ 06:07) (113/62 - 142/80)  RR: 18 (08-25-21 @ 06:07) (18 - 20)  SpO2: 100% (08-25-21 @ 06:07) (97% - 100%)    Physical Examination:  General: no acute distress  HEENT: NC/AT  Neck: supple, no palpable LAD  Cardio: S1, S2 heard, RRR, no murmurs  Resp: decreased b/l b reath sounds  Abd: soft, NT, ND, + bowel sounds  Neuro: no obvious focal deficits  Ext: no edema or cyanosis  Skin: UE bruising      Laboratory Studies:  CBC:                       8.6    10.55 )-----------( 323      ( 25 Aug 2021 07:18 )             28.8     CMP: 08-25    139  |  105  |  28<H>  ----------------------------<  287<H>  4.0   |  24  |  0.77    Ca    7.9<L>      25 Aug 2021 07:18  Phos  3.2     08-24  Mg     2.00     08-24            Microbiology: reviewed    Culture - Blood (collected 08-22-21 @ 11:09)  Source: .Blood Blood  Preliminary Report (08-23-21 @ 12:02):    No growth to date.    Culture - Urine (collected 08-20-21 @ 10:29)  Source: Clean Catch Clean Catch (Midstream)  Final Report (08-23-21 @ 13:33):    >100,000 CFU/ml Escherichia coli ESBL  Organism: Escherichia coli ESBL (08-23-21 @ 13:33)  Organism: Escherichia coli ESBL (08-23-21 @ 13:33)      -  Amikacin: S <=16      -  Amoxicillin/Clavulanic Acid: S <=8/4      -  Ampicillin: R >16 These ampicillin results predict results for amoxicillin      -  Ampicillin/Sulbactam: S 8/4 Enterobacter, Citrobacter, and Serratia may develop resistance during prolonged therapy (3-4 days)      -  Aztreonam: R 16      -  Cefazolin: R >16 (MIC_CL_COM_ENTERIC_CEFAZU) For uncomplicated UTI with K. pneumoniae, E. coli, or P. mirablis: ARIANE <=16 is sensitive and ARIANE >=32 is resistant. This also predicts results for oral agents cefaclor, cefdinir, cefpodoxime, cefprozil, cefuroxime axetil, cephalexin and locarbef for uncomplicated UTI. Note that some isolates may be susceptible to these agents while testing resistant to cefazolin.      -  Cefepime: R 8      -  Cefoxitin: S <=8      -  Ceftriaxone: R >32 Enterobacter, Citrobacter, and Serratia may develop resistance during prolonged therapy      -  Ciprofloxacin: S <=0.25      -  Ertapenem: S <=0.5      -  Gentamicin: S <=2      -  Imipenem: S <=1      -  Levofloxacin: S <=0.5      -  Meropenem: S <=1      -  Nitrofurantoin: S <=32 Should not be used to treat pyelonephritis      -  Piperacillin/Tazobactam: S <=8      -  Tigecycline: S <=2      -  Tobramycin: S <=2      -  Trimethoprim/Sulfamethoxazole: S <=0.5/9.5      Method Type: ARIANE    Culture - Blood (collected 08-20-21 @ 03:20)  Source: .Blood Blood-Peripheral  Final Report (08-25-21 @ 07:00):    No Growth Final    Culture - Blood (collected 08-20-21 @ 03:10)  Source: .Blood Blood-Peripheral  Gram Stain (08-21-21 @ 21:31):    Growth in aerobic bottle: Gram Positive Cocci in Clusters  Final Report (08-22-21 @ 20:09):    Growth in aerobic bottle: Staphylococcus hominis    Coag Negative Staphylococcus    Single set isolate, possible contaminant. Contact    Microbiology if susceptibility testing clinically    indicated.    ***Blood Panel PCR results on this specimen are available    approximately 3 hours after the Gram stain result.***    Gram stain, PCR, and/or culture results may not always    correspond due to difference in methodologies.    ************************************************************    This PCR assay was performed by multiplex PCR. This    Assay tests for 66 bacterial and resistance gene targets.    Please refer to the [facility] test directory    at [email or web address] for details.  Organism: Blood Culture PCR (08-22-21 @ 20:09)  Organism: Blood Culture PCR (08-22-21 @ 20:09)      -  Coagulase negative Staphylococcus, Methicillin resistant: Detec      Method Type: PCR        Radiology: reviewed      
[facility], Division of Infectious Diseases  MAYDA Braxton Y. Patel, S. Shah  [phone]    Name: AME ZIMMERMAN  Age: 70y  Gender: Female  MRN: [id number]    Interval History:  Patient seen and examined at bedside this afternoon  Just finished working w/ PT, SOB  Afebrile  Notes reviewed    Antibiotics:      Medications:  aluminum hydroxide/magnesium hydroxide/simethicone Suspension 30 milliLiter(s) Oral every 6 hours PRN  atorvastatin 20 milliGRAM(s) Oral at bedtime  dextrose 40% Gel 15 Gram(s) Oral once  dextrose 5%. 1000 milliLiter(s) IV Continuous <Continuous>  dextrose 5%. 1000 milliLiter(s) IV Continuous <Continuous>  dextrose 50% Injectable 25 Gram(s) IV Push once  dextrose 50% Injectable 12.5 Gram(s) IV Push once  dextrose 50% Injectable 25 Gram(s) IV Push once  ferrous    sulfate 325 milliGRAM(s) Oral two times a day  folic acid 1 milliGRAM(s) Oral daily  gabapentin 800 milliGRAM(s) Oral four times a day  glucagon  Injectable 1 milliGRAM(s) IntraMuscular once  hydrocortisone sodium succinate Injectable 50 milliGRAM(s) IV Push every 8 hours  insulin glargine Injectable (LANTUS) 10 Unit(s) SubCutaneous at bedtime  insulin lispro (ADMELOG) corrective regimen sliding scale   SubCutaneous three times a day before meals  lactobacillus acidophilus 1 Tablet(s) Oral two times a day with meals  levothyroxine 150 MICROGram(s) Oral <User Schedule>  levothyroxine 300 MICROGram(s) Oral <User Schedule>  multivitamin 1 Tablet(s) Oral daily  nystatin Powder 1 Application(s) Topical two times a day  oxyCODONE    IR 5 milliGRAM(s) Oral every 6 hours PRN  potassium phosphate / sodium phosphate Tablet (K-PHOS No. 2) 1 Tablet(s) Oral four times a day before meals  senna 2 Tablet(s) Oral at bedtime      Review of Systems:  A 10-point review of systems was obtained.   Review of systems otherwise negative except as previously noted.    Allergies: Ancef (Rash)  daptomycin (Vomiting)  Keflex (Unknown)  penicillin (Pruritus; Rash; Hives)    For details regarding the patient's past medical history, social history, family history, and other miscellaneous elements, please refer the initial infectious diseases consultation and/or the admitting history and physical examination for this admission.    Objective:  Vitals:   T(C): 36.8 (08-23-21 @ 13:37), Max: 37.2 (08-22-21 @ 17:20)  HR: 72 (08-23-21 @ 13:37) (72 - 80)  BP: 118/70 (08-23-21 @ 13:37) (104/70 - 134/81)  RR: 18 (08-23-21 @ 13:37) (18 - 18)  SpO2: 100% (08-23-21 @ 13:37) (97% - 100%)    Physical Examination:  General: no acute distress  HEENT: NC/AT  Neck: supple, no palpable LAD  Cardio: S1, S2 heard, RRR, no murmurs  Resp: decreased b/l b reath sounds  Abd: soft, NT, ND, + bowel sounds  Neuro: no obvious focal deficits  Ext: no edema or cyanosis  Skin: UE bruising      Laboratory Studies:  CBC:                       7.7    16.25 )-----------( 287      ( 23 Aug 2021 06:33 )             25.7     CMP: 08-23    137  |  104  |  27<H>  ----------------------------<  263<H>  3.6   |  22  |  0.75    Ca    8.2<L>      23 Aug 2021 06:33  Phos  2.5     08-23  Mg     2.00     08-23            Microbiology: reviewed    Culture - Blood (collected 08-22-21 @ 11:09)  Source: .Blood Blood  Preliminary Report (08-23-21 @ 12:02):    No growth to date.    Culture - Blood (collected 08-20-21 @ 06:31)  Source: .Blood Blood-Peripheral  Preliminary Report (08-21-21 @ 07:01):    No growth to date.    Culture - Urine (collected 08-20-21 @ 05:45)  Source: Clean Catch Clean Catch (Midstream)  Final Report (08-23-21 @ 13:33):    >100,000 CFU/ml Escherichia coli ESBL  Organism: Escherichia coli ESBL (08-23-21 @ 13:33)  Organism: Escherichia coli ESBL (08-23-21 @ 13:33)      -  Amikacin: S <=16      -  Amoxicillin/Clavulanic Acid: S <=8/4      -  Ampicillin: R >16 These ampicillin results predict results for amoxicillin      -  Ampicillin/Sulbactam: S 8/4 Enterobacter, Citrobacter, and Serratia may develop resistance during prolonged therapy (3-4 days)      -  Aztreonam: R 16      -  Cefazolin: R >16 (MIC_CL_COM_ENTERIC_[id number]) For uncomplicated UTI with K. pneumoniae, E. coli, or P. mirablis: ARIANE <=16 is sensitive and ARIANE >=32 is resistant. This also predicts results for oral agents cefaclor, cefdinir, cefpodoxime, cefprozil, cefuroxime axetil, cephalexin and locarbef for uncomplicated UTI. Note that some isolates may be susceptible to these agents while testing resistant to cefazolin.      -  Cefepime: R 8      -  Cefoxitin: S <=8      -  Ceftriaxone: R >32 Enterobacter, Citrobacter, and Serratia may develop resistance during prolonged therapy      -  Ciprofloxacin: S <=0.25      -  Ertapenem: S <=0.5      -  Gentamicin: S <=2      -  Imipenem: S <=1      -  Levofloxacin: S <=0.5      -  Meropenem: S <=1      -  Nitrofurantoin: S <=32 Should not be used to treat pyelonephritis      -  Piperacillin/Tazobactam: S <=8      -  Tigecycline: S <=2      -  Tobramycin: S <=2      -  Trimethoprim/Sulfamethoxazole: S <=0.5/9.5      Method Type: ARIANE    Culture - Blood (collected 08-20-21 @ 03:10)  Source: .Blood Blood-Peripheral  Gram Stain (08-21-21 @ 21:31):    Growth in aerobic bottle: Gram Positive Cocci in Clusters  Final Report (08-22-21 @ 20:09):    Growth in aerobic bottle: Staphylococcus hominis    Coag Negative Staphylococcus    Single set isolate, possible contaminant. Contact    Microbiology if susceptibility testing clinically    indicated.    ***Blood Panel PCR results on this specimen are available    approximately 3 hours after the Gram stain result.***    Gram stain, PCR, and/or culture results may not always    correspond due to difference in methodologies.    ************************************************************    This PCR assay was performed by multiplex PCR. This    Assay tests for 66 bacterial and resistance gene targets.    Please refer to the [facility] test directory    at [email or web address] for details.  Organism: Blood Culture PCR (08-22-21 @ 20:09)  Organism: Blood Culture PCR (08-22-21 @ 20:09)      -  Coagulase negative Staphylococcus, Methicillin resistant: Detec      Method Type: PCR        Radiology: reviewed      
[facility], Division of Infectious Diseases  MAYDA Braxton Y. Patel, S. Shah  [phone]    Name: AME ZIMMERMAN  Age: 70y  Gender: Female  MRN: [id number]    Notes reviewed, chart reviewed    Antibiotics:  ertapenem  IVPB 1000 milliGRAM(s) IV Intermittent every 24 hours      Medications:  aluminum hydroxide/magnesium hydroxide/simethicone Suspension 30 milliLiter(s) Oral every 6 hours PRN  atorvastatin 20 milliGRAM(s) Oral at bedtime  dextrose 40% Gel 15 Gram(s) Oral once  dextrose 5%. 1000 milliLiter(s) IV Continuous <Continuous>  dextrose 5%. 1000 milliLiter(s) IV Continuous <Continuous>  dextrose 50% Injectable 25 Gram(s) IV Push once  dextrose 50% Injectable 12.5 Gram(s) IV Push once  dextrose 50% Injectable 25 Gram(s) IV Push once  ertapenem  IVPB 1000 milliGRAM(s) IV Intermittent every 24 hours  ferrous    sulfate 325 milliGRAM(s) Oral two times a day  folic acid 1 milliGRAM(s) Oral daily  gabapentin 800 milliGRAM(s) Oral four times a day  glucagon  Injectable 1 milliGRAM(s) IntraMuscular once  hydrocortisone sodium succinate Injectable 100 milliGRAM(s) IV Push every 8 hours  insulin lispro (ADMELOG) corrective regimen sliding scale   SubCutaneous Before meals and at bedtime  lactobacillus acidophilus 1 Tablet(s) Oral two times a day with meals  levothyroxine 150 MICROGram(s) Oral <User Schedule>  levothyroxine 300 MICROGram(s) Oral <User Schedule>  multivitamin 1 Tablet(s) Oral daily  oxyCODONE    IR 5 milliGRAM(s) Oral every 6 hours PRN  senna 2 Tablet(s) Oral at bedtime      VS reviewed.    Laboratory Studies:  CBC:                       7.8    23.42 )-----------( 268      ( 22 Aug 2021 06:54 )             25.8     CMP: 08-22    140  |  106  |  30<H>  ----------------------------<  228<H>  3.7   |  21<L>  |  0.81    Ca    7.9<L>      22 Aug 2021 06:54  Phos  3.0     08-21  Mg     1.90     08-22            Microbiology: reviewed    Culture - Blood (collected 08-20-21 @ 06:31)  Source: .Blood Blood-Peripheral  Preliminary Report (08-21-21 @ 07:01):    No growth to date.    Culture - Blood (collected 08-20-21 @ 06:29)  Source: .Blood Blood-Peripheral  Gram Stain (08-21-21 @ 21:31):    Growth in aerobic bottle: Gram Positive Cocci in Clusters  Preliminary Report (08-21-21 @ 21:32):    Growth in aerobic bottle: Gram Positive Cocci in Clusters    ***Blood Panel PCR results on this specimen are available    approximately 3 hours after the Gram stain result.***    Gram stain, PCR, and/or culture results may not always    correspond due to difference in methodologies.    ************************************************************    This PCR assay was performed by multiplex PCR. This    Assay tests for 66 bacterial and resistance gene targets.    Please refer to the [facility] test directory    at [email or web address] for details.  Organism: Blood Culture PCR (08-21-21 @ 23:01)  Organism: Blood Culture PCR (08-21-21 @ 23:01)      -  Coagulase negative Staphylococcus, Methicillin resistant: Detec      Method Type: PCR        Radiology: reviewed      
[facility], Division of Infectious Diseases  MAYDA Braxton Y. Patel, S. Shah  [phone]    Name: AME ZIMMERMAN  Age: 70y  Gender: Female  MRN: [id number]    Interval History:  Patient seen and examined at bedside this morning  Afebrile. Continues to have low BPs  No acute overnight events.   Notes reviewed    Antibiotics:  ertapenem  IVPB 1000 milliGRAM(s) IV Intermittent every 24 hours      Medications:  atorvastatin 20 milliGRAM(s) Oral at bedtime  dextrose 40% Gel 15 Gram(s) Oral once  dextrose 5%. 1000 milliLiter(s) IV Continuous <Continuous>  dextrose 5%. 1000 milliLiter(s) IV Continuous <Continuous>  dextrose 50% Injectable 25 Gram(s) IV Push once  dextrose 50% Injectable 12.5 Gram(s) IV Push once  dextrose 50% Injectable 25 Gram(s) IV Push once  ertapenem  IVPB 1000 milliGRAM(s) IV Intermittent every 24 hours  ferrous    sulfate 325 milliGRAM(s) Oral two times a day  folic acid 1 milliGRAM(s) Oral daily  gabapentin 800 milliGRAM(s) Oral four times a day  glucagon  Injectable 1 milliGRAM(s) IntraMuscular once  hydrocortisone sodium succinate Injectable 100 milliGRAM(s) IV Push every 8 hours  insulin lispro (ADMELOG) corrective regimen sliding scale   SubCutaneous Before meals and at bedtime  levothyroxine 150 MICROGram(s) Oral <User Schedule>  levothyroxine 300 MICROGram(s) Oral <User Schedule>  multivitamin 1 Tablet(s) Oral daily  oxyCODONE    IR 5 milliGRAM(s) Oral every 6 hours PRN  senna 2 Tablet(s) Oral at bedtime      Review of Systems:  A 10-point review of systems was obtained.   Review of systems otherwise negative except as previously noted.    Allergies: Ancef (Rash)  daptomycin (Vomiting)  Keflex (Unknown)  penicillin (Pruritus; Rash; Hives)    For details regarding the patient's past medical history, social history, family history, and other miscellaneous elements, please refer the initial infectious diseases consultation and/or the admitting history and physical examination for this admission.    Objective:  Vitals:   T(C): 36.4 (21 @ 05:45), Max: 38.4 (21 @ 21:20)  HR: 80 (21 @ 05:45) (80 - 94)  BP: 103/49 (21 @ 05:45) (71/50 - 103/49)  RR: 18 (21 @ 05:45) (16 - 20)  SpO2: 100% (21 @ 05:45) (95% - 100%)    Physical Examination:  General: no acute distress, appears tired, weak  HEENT: NC/AT  Neck: supple, no palpable LAD  Cardio: S1, S2 heard, RRR, no murmurs  Resp: decreased b/l breath sounds  Abd: soft, NT, ND, + bowel sounds  Ext: no edema or cyanosis  Skin: warm, dry, no visible rash      Laboratory Studies:  CBC:                       7.8    26.37 )-----------( 231      ( 21 Aug 2021 06:41 )             25.5     CMP:     139  |  103  |  32<H>  ----------------------------<  160<H>  3.4<L>   |  21<L>  |  1.23    Ca    8.8      20 Aug 2021 04:08    TPro  7.2  /  Alb  3.1<L>  /  TBili  <0.2  /  DBili  x   /  AST  31  /  ALT  18  /  AlkPhos  71  08-20    LIVER FUNCTIONS - ( 20 Aug 2021 04:08 )  Alb: 3.1 g/dL / Pro: 7.2 g/dL / ALK PHOS: 71 U/L / ALT: 18 U/L / AST: 31 U/L / GGT: x           Urinalysis Basic - ( 20 Aug 2021 06:00 )    Color: Light Yellow / Appearance: Slightly Turbid / S.019 / pH: x  Gluc: x / Ketone: Negative  / Bili: Negative / Urobili: <2 mg/dL   Blood: x / Protein: 30 mg/dL / Nitrite: Negative   Leuk Esterase: Moderate / RBC: 2 /HPF / WBC 46 /HPF   Sq Epi: x / Non Sq Epi: 0 /HPF / Bacteria: Many        Microbiology: reviewed    Culture - Blood (collected 21 @ 06:31)  Source: .Blood Blood-Peripheral  Preliminary Report (21 @ 07:01):    No growth to date.    Culture - Blood (collected 21 @ 06:29)  Source: .Blood Blood-Peripheral  Preliminary Report (21 @ 07:01):    No growth to date.        Radiology: reviewed      
Sitting in chair  No new symptoms    Vital Signs Last 24 Hrs  T(C): 36.4 (25 Aug 2021 11:15), Max: 36.7 (24 Aug 2021 22:44)  T(F): 97.6 (25 Aug 2021 11:15), Max: 98.1 (24 Aug 2021 22:44)  HR: 80 (25 Aug 2021 11:15) (65 - 80)  BP: 137/64 (25 Aug 2021 11:15) (124/71 - 142/80)  BP(mean): --  RR: 18 (25 Aug 2021 11:15) (18 - 20)  SpO2: 99% (25 Aug 2021 11:15) (97% - 100%)    I&O's Summary      PHYSICAL EXAM:  GENERAL: NAD, well-developed, comfortable  HEAD:  Atraumatic, Normocephalic  EYES: EOMI, PERRLA, conjunctiva and sclera clear  NECK: Supple, No JVD  CHEST/LUNG: mild decrease breath sounds bilaterally; No wheeze   HEART: Regular rate and rhythm; No murmurs, rubs, or gallops  ABDOMEN: Soft, Nontender, Nondistended; Bowel sounds present  Neuro: AAOx3, no focal weakness, 5/5 b/l extremity strength  EXTREMITIES:  2+ Peripheral Pulses, No clubbing, cyanosis, trace edema  SKIN: No rashes or lesions     LABS:                        8.6    10.55 )-----------( 323      ( 25 Aug 2021 07:18 )             28.8     08-25    139  |  105  |  28<H>  ----------------------------<  287<H>  4.0   |  24  |  0.77    Ca    7.9<L>      25 Aug 2021 07:18  Phos  3.2     08-24  Mg     2.00     08-24        CAPILLARY BLOOD GLUCOSE      POCT Blood Glucose.: 259 mg/dL (25 Aug 2021 12:05)  POCT Blood Glucose.: 279 mg/dL (25 Aug 2021 08:32)  POCT Blood Glucose.: 281 mg/dL (24 Aug 2021 22:41)  POCT Blood Glucose.: 267 mg/dL (24 Aug 2021 17:33)            RADIOLOGY & ADDITIONAL TESTS:    Imaging Personally Reviewed:  [x] YES  [ ] NO    Will obtain old records:  [ ] YES  [x] NO
SUBJECTIVE / OVERNIGHT EVENTS:  Pt seen and examined at bedside.   No overnight event.  Feeling better.  no cp, no sob, no n/v/d.   BP improving  pt sitting up eating dinner.         --------------------------------------------------------------------------------------------  LABS:                        7.8    26.37 )-----------( 231      ( 21 Aug 2021 06:41 )             25.5     08    140  |  106  |  35<H>  ----------------------------<  178<H>  3.9   |  21<L>  |  1.05    Ca    8.1<L>      21 Aug 2021 06:41  Phos  3.0       Mg     1.90     -    TPro  7.2  /  Alb  3.1<L>  /  TBili  <0.2  /  DBili  x   /  AST  31  /  ALT  18  /  AlkPhos  71  08-20    PT/INR - ( 20 Aug 2021 04:08 )   PT: 12.8 sec;   INR: 1.13 ratio         PTT - ( 20 Aug 2021 04:08 )  PTT:23.3 sec  CAPILLARY BLOOD GLUCOSE      POCT Blood Glucose.: 191 mg/dL (21 Aug 2021 17:43)  POCT Blood Glucose.: 255 mg/dL (21 Aug 2021 12:30)  POCT Blood Glucose.: 171 mg/dL (21 Aug 2021 08:43)  POCT Blood Glucose.: 203 mg/dL (20 Aug 2021 21:42)        Urinalysis Basic - ( 20 Aug 2021 06:00 )    Color: Light Yellow / Appearance: Slightly Turbid / S.019 / pH: x  Gluc: x / Ketone: Negative  / Bili: Negative / Urobili: <2 mg/dL   Blood: x / Protein: 30 mg/dL / Nitrite: Negative   Leuk Esterase: Moderate / RBC: 2 /HPF / WBC 46 /HPF   Sq Epi: x / Non Sq Epi: 0 /HPF / Bacteria: Many        RADIOLOGY & ADDITIONAL TESTS:    Imaging Personally Reviewed:  [x] YES  [ ] NO    Consultant(s) Notes Reviewed:  [x] YES  [ ] NO    MEDICATIONS  (STANDING):  atorvastatin 20 milliGRAM(s) Oral at bedtime  dextrose 40% Gel 15 Gram(s) Oral once  dextrose 5%. 1000 milliLiter(s) (50 mL/Hr) IV Continuous <Continuous>  dextrose 5%. 1000 milliLiter(s) (100 mL/Hr) IV Continuous <Continuous>  dextrose 50% Injectable 25 Gram(s) IV Push once  dextrose 50% Injectable 12.5 Gram(s) IV Push once  dextrose 50% Injectable 25 Gram(s) IV Push once  ertapenem  IVPB 1000 milliGRAM(s) IV Intermittent every 24 hours  ferrous    sulfate 325 milliGRAM(s) Oral two times a day  folic acid 1 milliGRAM(s) Oral daily  gabapentin 800 milliGRAM(s) Oral four times a day  glucagon  Injectable 1 milliGRAM(s) IntraMuscular once  hydrocortisone sodium succinate Injectable 100 milliGRAM(s) IV Push every 8 hours  insulin lispro (ADMELOG) corrective regimen sliding scale   SubCutaneous Before meals and at bedtime  levothyroxine 150 MICROGram(s) Oral <User Schedule>  levothyroxine 300 MICROGram(s) Oral <User Schedule>  multivitamin 1 Tablet(s) Oral daily  senna 2 Tablet(s) Oral at bedtime    MEDICATIONS  (PRN):  oxyCODONE    IR 5 milliGRAM(s) Oral every 6 hours PRN mod to severe pain      Care Discussed with Consultants/Other Providers [x] YES  [ ] NO    Vital Signs Last 24 Hrs  T(C): 37.3 (21 Aug 2021 15:32), Max: 38.4 (20 Aug 2021 21:20)  T(F): 99.1 (21 Aug 2021 15:32), Max: 101.1 (20 Aug 2021 21:20)  HR: 92 (21 Aug 2021 15:32) (80 - 94)  BP: 92/61 (21 Aug 2021 15:32) (86/52 - 103/49)  BP(mean): --  RR: 20 (21 Aug 2021 15:32) (18 - 20)  SpO2: 97% (21 Aug 2021 15:32) (95% - 100%)  I&O's Summary    20 Aug 2021 07:01  -  21 Aug 2021 07:00  --------------------------------------------------------  IN: 900 mL / OUT: 1000 mL / NET: -100 mL      PHYSICAL EXAM:  GENERAL: NAD, well-developed, comfortable  HEAD:  Atraumatic, Normocephalic  EYES: EOMI, PERRLA, conjunctiva and sclera clear  NECK: Supple, No JVD  CHEST/LUNG: mild decrease breath sounds bilaterally; No wheeze   HEART: Regular rate and rhythm; No murmurs, rubs, or gallops  ABDOMEN: Soft, Nontender, Nondistended; Bowel sounds present  Neuro: AAOx3, no focal weakness, 5/5 b/l extremity strength  EXTREMITIES:  2+ Peripheral Pulses, No clubbing, cyanosis, or edema  SKIN: No rashes or lesions   
SUBJECTIVE / OVERNIGHT EVENTS:  Pt seen and examined at bedside.   No overnight event.  Feeling better.  no cp, no sob, no n/v/d.   out of bed in chair.  no melena, no hematochezia. no BRBPR.   takes Iron BID at home.  Had bone marrow bx in the past per pt, she doesn't remember onc name    --------------------------------------------------------------------------------------------  LABS:                        7.7    16.25 )-----------( 287      ( 23 Aug 2021 06:33 )             25.7     08-23    137  |  104  |  27<H>  ----------------------------<  263<H>  3.6   |  22  |  0.75    Ca    8.2<L>      23 Aug 2021 06:33  Phos  2.5     08-23  Mg     2.00     08-23        CAPILLARY BLOOD GLUCOSE      POCT Blood Glucose.: 270 mg/dL (23 Aug 2021 08:30)  POCT Blood Glucose.: 280 mg/dL (23 Aug 2021 05:52)  POCT Blood Glucose.: 269 mg/dL (22 Aug 2021 22:23)  POCT Blood Glucose.: 306 mg/dL (22 Aug 2021 17:27)  POCT Blood Glucose.: 275 mg/dL (22 Aug 2021 12:20)            RADIOLOGY & ADDITIONAL TESTS:    Imaging Personally Reviewed:  [x] YES  [ ] NO    Consultant(s) Notes Reviewed:  [x] YES  [ ] NO    MEDICATIONS  (STANDING):  atorvastatin 20 milliGRAM(s) Oral at bedtime  dextrose 40% Gel 15 Gram(s) Oral once  dextrose 5%. 1000 milliLiter(s) (50 mL/Hr) IV Continuous <Continuous>  dextrose 5%. 1000 milliLiter(s) (100 mL/Hr) IV Continuous <Continuous>  dextrose 50% Injectable 25 Gram(s) IV Push once  dextrose 50% Injectable 12.5 Gram(s) IV Push once  dextrose 50% Injectable 25 Gram(s) IV Push once  ferrous    sulfate 325 milliGRAM(s) Oral two times a day  folic acid 1 milliGRAM(s) Oral daily  gabapentin 800 milliGRAM(s) Oral four times a day  glucagon  Injectable 1 milliGRAM(s) IntraMuscular once  hydrocortisone sodium succinate Injectable 50 milliGRAM(s) IV Push every 8 hours  insulin glargine Injectable (LANTUS) 10 Unit(s) SubCutaneous at bedtime  insulin lispro (ADMELOG) corrective regimen sliding scale   SubCutaneous three times a day before meals  lactobacillus acidophilus 1 Tablet(s) Oral two times a day with meals  levothyroxine 150 MICROGram(s) Oral <User Schedule>  levothyroxine 300 MICROGram(s) Oral <User Schedule>  multivitamin 1 Tablet(s) Oral daily  nystatin Powder 1 Application(s) Topical two times a day  potassium phosphate / sodium phosphate Tablet (K-PHOS No. 2) 1 Tablet(s) Oral four times a day before meals  senna 2 Tablet(s) Oral at bedtime    MEDICATIONS  (PRN):  aluminum hydroxide/magnesium hydroxide/simethicone Suspension 30 milliLiter(s) Oral every 6 hours PRN Dyspepsia  oxyCODONE    IR 5 milliGRAM(s) Oral every 6 hours PRN mod to severe pain      Care Discussed with Consultants/Other Providers [x] YES  [ ] NO    Vital Signs Last 24 Hrs  T(C): 36.9 (23 Aug 2021 09:51), Max: 37.2 (22 Aug 2021 17:20)  T(F): 98.5 (23 Aug 2021 09:51), Max: 98.9 (22 Aug 2021 17:20)  HR: 80 (23 Aug 2021 09:51) (73 - 80)  BP: 126/72 (23 Aug 2021 09:51) (104/70 - 134/81)  BP(mean): --  RR: 18 (23 Aug 2021 09:51) (18 - 18)  SpO2: 100% (23 Aug 2021 09:51) (97% - 100%)  I&O's Summary    22 Aug 2021 07:01  -  23 Aug 2021 07:00  --------------------------------------------------------  IN: 570 mL / OUT: 380 mL / NET: 190 mL    23 Aug 2021 07:01  -  23 Aug 2021 10:52  --------------------------------------------------------  IN: 280 mL / OUT: 500 mL / NET: -220 mL        PHYSICAL EXAM:  GENERAL: NAD, well-developed, comfortable  HEAD:  Atraumatic, Normocephalic  EYES: EOMI, PERRLA, conjunctiva and sclera clear  NECK: Supple, No JVD  CHEST/LUNG: mild decrease breath sounds bilaterally; No wheeze   HEART: Regular rate and rhythm; No murmurs, rubs, or gallops  ABDOMEN: Soft, Nontender, Nondistended; Bowel sounds present  Neuro: AAOx3, no focal weakness, 5/5 b/l extremity strength  EXTREMITIES:  2+ Peripheral Pulses, No clubbing, cyanosis, trace edema  SKIN: No rashes or lesions

## 2021-08-25 NOTE — PROGRESS NOTE ADULT - PROBLEM SELECTOR PLAN 7
SCDs, can start chemical ppx if Hb stable
c/w SCDs  holding given anemia.  will consider starting Hep SQ if hgb stable.
c/w SCDs  initially holding given anemia.  hgb relatively stable. start Hep SQ BID  Physical therapy. Out of bed to chair with assistance.
c/w SCDs  holding given anemia.  will consider starting Hep SQ if hgb stable.
c/w SCDs  initially holding given anemia.  hgb relatively stable. start Hep SQ BID  Physical therapy. Out of bed to chair with assistance.

## 2021-08-25 NOTE — PROGRESS NOTE ADULT - ASSESSMENT
70 w/hx of stage 1 uterine and endometrial CA s/p TLH/BSO (chemo 5 years ago, in remission), bullous pemphigoid on IVIG and daily prednisone, DM, hypothyroidism, chronic pain syndrome p/w vomiting x1 and fever admitted for severe sepsis 2/2 presumed UTI.     Severe sepsis 2/2 presumed  source  Fevers-resolved  Hypotension-resolving  Leukocytosis-downtrending  -infectious w/u reviewed  --UA +LE, WBCs, UCx pending  --BCx w/ MRSE (CoNS Bacteremia) in 1/4 bottles--repeat ordered  Previous UCx reviewed--ESBL E. coli UTI April 2021   --COVID/RVP negative  -imaging reviewed  --CXR negative  --obtain renal sono to evaluate for upper  tract disease  -appreciate ICU c/s  -supportive care/IVFs as needed  -trend temps/WBC  agree w/ ertapenem 1gm q24h for now will use above cx to guide therapy and duration    CoNS Bacteremia  BCx w/ MRSE (CoNS Bacteremia) in 1/4 bottles--repeat ordered  Likely contamination  No indication for additional Abx at this time    Anemia 2/2 chronic disease vs GIB?  -monitor H/H  -tranfusion prn protocol    Bullous pemphigoid  -physical exam much improved since last admission  Receiving IVIG q month (last 2 weeks ago) as well as dupixent q 2 weeks, last dose 8/17   Management per primary team    Infectious Diseases will continue to follow. Please call with any questions.   Ruchi Gonzales M.D.  [facility], Division of Infectious Diseases [phone]  For over the weekend and after hours, please call [phone]  
70 w/hx of stage 1 uterine and endometrial CA s/p TLH/BSO (chemo 5 years ago, in remission), bullous pemphigoid on IVIG and daily prednisone, DM, hypothyroidism, chronic pain syndrome p/w vomiting x1 and fever admitted for severe sepsis 2/2 presumed UTI.     Severe sepsis 2/2 presumed  source  Fevers-resolved  Tachycardia  Hypotension  Leukocytosis  Lactic Acidosis  -infectious w/u reviewed  --UA +LE, WBCs, UCx pending  --BCx NGTD  Previous UCx reviewed--ESBL E. coli UTI April 2021   --COVID/RVP negative  -imaging reviewed  --CXR negative  --obtain renal sono to evaluate for upper  tract disease  -appreciate ICU c/s  -supportive care/IVFs as needed  -trend temps/WBC  agree w/ ertapenem 1gm q24h for now will use above cx to guide therapy and duration    Anemia 2/2 chronic disease vs GIB?  -monitor H/H  -tranfusion prn protocol    Bullous pemphigoid  -physical exam much improved since last admission  Receiving IVIG q month (last 2 weeks ago) as well as dupixent q 2 weeks, last dose 8/17   Management per primary team    Infectious Diseases will continue to follow. Please call with any questions.   Ruchi Gonzales M.D.  [facility], Division of Infectious Diseases [phone]  For over the weekend and after hours, please call [phone]  
70 w/hx of stage 1 uterine and endometrial CA s/p TLH/BSO (chemo 5 years ago, in remission), bullous pemphigoid on IVIG and daily prednisone, DM, hypothyroidism, chronic pain syndrome p/w vomiting x1 and fever admitted for severe sepsis 2/2 presumed UTI.     Acute cystitis 2/2 ESBL E.coli  Sepsis resolved  Fevers-resolved  Hypotension-resolved  Leukocytosis-resolving  -infectious w/u reviewed  --UA +LE, WBCs, UCx ESBL E.coli  --BCx reviewed below, negative for GNR  --COVID/RVP negative  -imaging reviewed  -supportive care/IVFs as needed  -trend temps/WBC  C/w ertapenem 1gm q24h, will plan for x7 day until 8/26    CoNS Bacteremia  BCx w/ MRSE (CoNS Bacteremia) in 1/4 bottles--repeat NGTD  Likely contamination  No indication for additional Abx at this time    Anemia 2/2 chronic disease vs GIB?  -monitor H/H  -tranfusion prn protocol    Bullous pemphigoid  -physical exam much improved since last admission  Receiving IVIG q month (last 2 weeks ago) as well as dupixent q 2 weeks, last dose 8/17   Management per primary team    Infectious Diseases will continue to follow. Please call with any questions.   Ruchi Gonzales M.D.  [facility], Division of Infectious Diseases [phone]  For over the weekend and after hours, please call [phone]  
70 w/hx of stage 1 uterine and endometrial CA s/p TLH/BSO (chemo 5 years ago, in remission), bullous pemphigoid on IVIG and daily prednisone, DM, hypothyroidism, chronic pain syndrome p/w vomiting x1 and fever admitted for severe sepsis 2/2 presumed UTI.     Acute cystitis 2/2 ESBL E.coli  Sepsis resolved  Fevers-resolved  Hypotension-resolved  Leukocytosis-resolving  -infectious w/u reviewed  --UA +LE, WBCs, UCx ESBL E.coli  --BCx reviewed below, negative for GNR  --COVID/RVP negative  -imaging reviewed  -supportive care/IVFs as needed  -trend temps/WBC  C/w ertapenem 1gm q24h, will plan for x7 day until 8/26; last day Abx tomorrow    CoNS Bacteremia  BCx w/ MRSE (CoNS Bacteremia) in 1/4 bottles--repeat NGTD  Likely contamination  No indication for additional Abx at this time    Anemia 2/2 chronic disease vs GIB?  -monitor H/H  -tranfusion prn protocol    Bullous pemphigoid  -physical exam much improved since last admission  Receiving IVIG q month (last 2 weeks ago) as well as dupixent q 2 weeks, last dose 8/17   Management per primary team    Infectious Diseases will sign off at this time. Please call with any additional questions.   Ruchi Gonzales M.D.  [facility], Division of Infectious Diseases [phone]  For over the weekend and after hours, please call [phone]  
70 w/hx of stage 1 uterine and endometrial CA s/p TLH/BSO (chemo 5 years ago, in remission), bullous pemphigoid on IVIG and daily prednisone, DM, hypothyroidism, chronic pain syndrome p/w vomiting x1 and fever admitted for severe sepsis 2/2 presumed UTI.     Severe sepsis 2/2 presumed  source  Fevers-resolved  Hypotension-resolving  Leukocytosis-downtrending  -infectious w/u reviewed  --UA +LE, WBCs, UCx ESBL E.coli  --BCx below, negative for GNR  --COVID/RVP negative  -imaging reviewed  --CXR negative  -appreciate ICU c/s  -supportive care/IVFs as needed  -trend temps/WBC  C/w ertapenem 1gm q24h, will plan for x7 day course pending clinical improvement    CoNS Bacteremia  BCx w/ MRSE (CoNS Bacteremia) in 1/4 bottles--repeat NGTD  Likely contamination  No indication for additional Abx at this time    Anemia 2/2 chronic disease vs GIB?  -monitor H/H  -tranfusion prn protocol    Bullous pemphigoid  -physical exam much improved since last admission  Receiving IVIG q month (last 2 weeks ago) as well as dupixent q 2 weeks, last dose 8/17   Management per primary team    Infectious Diseases will continue to follow. Please call with any questions.   Ruchi Gonzales M.D.  [facility], Division of Infectious Diseases [phone]  For over the weekend and after hours, please call [phone]  
70 w/hx of stage 1 uterine and endometrial CA s/p TLH/BSO (chemo 5 years ago, in remission), bullous pemphigoid on IVIG and daily prednisone, DM, hypothyroidism, chronic pain syndrome p/w vomiting x1 and fever admitted for severe sepsis 2/2 presumed UTI. 

## 2021-08-25 NOTE — DISCHARGE NOTE PROVIDER - NSDCMRMEDTOKEN_GEN_ALL_CORE_FT
aspirin 81 mg oral delayed release tablet: 1 tab(s) orally once a day  atorvastatin 20 mg oral tablet: 1 tab(s) orally once a day (at bedtime)  cholecalciferol oral tablet: 5000 unit(s) orally once a day  Dupixent 300 mg/2 mL subcutaneous solution: subcutaneous every 2 weeks, last dose 8/17, off label for pemphigoid   ferrous sulfate 325 mg (65 mg elemental iron) oral tablet: 1 tab(s) orally 2 times a day  folic acid 1 mg oral tablet: 1 tab(s) orally once a day  Fosamax 35 mg oral tablet: 1 tab(s) orally once a week on Sunday  gabapentin 800 mg oral tablet: 1 tab(s) orally 4 times a day  hydrocodone-acetaminophen 10 mg-325 mg oral tablet: 1 tab(s) orally once a day, As Needed  ISTOP: [id number]  lisinopril 2.5 mg oral tablet: 1 tab(s) orally once a day  metFORMIN 1000 mg oral tablet: 1 tab(s) orally 2 times a day  Multiple Vitamins oral tablet: 1 tab(s) orally once a day  pioglitazone 15 mg oral tablet: 1 tab(s) orally once a day  predniSONE 5 mg oral tablet: 3 tab(s) orally once a day  senna oral tablet: 2 tab(s) orally once a day (at bedtime)  Synthroid 150 mcg (0.15 mg) oral tablet: 1 tab(s) orally once a day, Sun, Tues, Thurs, Sat  Synthroid 300 mcg (0.3 mg) oral tablet: 1 tab(s) orally 3 times a week, MWF  traMADol 300 mg/24 hours oral tablet, extended release: 1 tab(s) orally once a day  ISTOP: [id number]   aspirin 81 mg oral delayed release tablet: 1 tab(s) orally once a day  atorvastatin 20 mg oral tablet: 1 tab(s) orally once a day (at bedtime)  cholecalciferol oral tablet: 5000 unit(s) orally once a day  Dupixent 300 mg/2 mL subcutaneous solution: subcutaneous every 2 weeks, last dose 8/17, off label for pemphigoid   ferrous sulfate 325 mg (65 mg elemental iron) oral tablet: 1 tab(s) orally 2 times a day  folic acid 1 mg oral tablet: 1 tab(s) orally once a day  Fosamax 35 mg oral tablet: 1 tab(s) orally once a week on Sunday  gabapentin 800 mg oral tablet: 1 tab(s) orally 4 times a day  hydrocodone-acetaminophen 10 mg-325 mg oral tablet: 1 tab(s) orally once a day, As Needed  ISTOP: [id number]  lisinopril 2.5 mg oral tablet: 1 tab(s) orally once a day  metFORMIN 1000 mg oral tablet: 1 tab(s) orally 2 times a day  Multiple Vitamins oral tablet: 1 tab(s) orally once a day  nystatin 100,000 units/g topical powder: 1 application topically 2 times a day  pioglitazone 15 mg oral tablet: 1 tab(s) orally once a day  predniSONE 5 mg oral tablet: 3 tab(s) orally once a day  senna oral tablet: 2 tab(s) orally once a day (at bedtime)  Synthroid 150 mcg (0.15 mg) oral tablet: 1 tab(s) orally once a day, Sun, Tues, Thurs, Sat  Synthroid 300 mcg (0.3 mg) oral tablet: 1 tab(s) orally 3 times a week, MWF  traMADol 300 mg/24 hours oral tablet, extended release: 1 tab(s) orally once a day  ISTOP: [id number]

## 2021-08-25 NOTE — DISCHARGE NOTE PROVIDER - NSDCFUSCHEDAPPT_GEN_ALL_CORE_FT
AME ZIMMERMAN ; 09/08/2021 ; [facility] [address]  AME ZIMMERMAN ; 11/01/2021 ; [facility] [address] [facility]  AME ZIMMERMAN ; 11/03/2021 ; [facility] [address] [facility]  AME ZIMMERMAN ; 11/05/2021 ; [facility] [address] [facility] AME ZIMMERMAN ; 11/01/2021 ; [facility] Rhe[facility] [address] [place] [address]  AME ZIMMERMAN ; 11/03/2021 ; [facility] Rhe[facility] [address] [place] [address]  AME ZIMMERMAN ; 11/05/2021 ; [facility] [address] [place] [address]

## 2021-08-25 NOTE — PROGRESS NOTE ADULT - PROVIDER SPECIALTY LIST ADULT
Infectious Disease
Internal Medicine
with patient
Internal Medicine

## 2021-08-25 NOTE — PROGRESS NOTE ADULT - PROBLEM SELECTOR PLAN 3
- Receiving IVIG q month (last 2 weeks ago prior to admission) as well as dupixent q 2 weeks, last dose 8/17   - Home med: Prednisone 15mg qD   - Currently not in flare, c/w IV Hydrocortisone for stress dose steroids for now  - will taper back to home Prednisone 15 mg qam after finishing hydrocortisone taper
- Receiving IVIG q month (last 2 weeks ago prior to admission) as well as dupixent q 2 weeks, last dose 8/17   - Home med: Prednisone 15mg qD   - Currently not in flare, c/w IV Hydrocortisone for stress dose steroids for now  - Will taper back to home Prednisone 15 mg qam after finishing hydrocortisone taper
- Receiving IVIG q month (last 2 weeks ago) as well as dupixent q 2 weeks, last dose 8/17   - Home med: Prednisone 15mg qD   - Currently not in flare, c/w IV Hydrocortisone for stress dose steroids for now  - will later taper back to home regimen
- Receiving IVIG q month (last 2 weeks ago) as well as dupixent q 2 weeks, last dose 8/17   - Home med: Prednisone 15mg qD   - Currently not in flare, c/w IV Hydrocortisone for stress dose steroids for now  - will later taper back to home regimen after hydrocortisone taper
- Receiving IVIG q month (last 2 weeks ago) as well as dupixent q 2 weeks, last dose 8/17   - Home med: Prednisone 15mg qD   - Currently not in flare, c/w IV Hydrocortisone for stress dose steroids for now  - will later taper back to home regimen after hydrocortisone taper

## 2021-08-25 NOTE — PROGRESS NOTE ADULT - PROBLEM SELECTOR PLAN 5
Sees pain management, home meds: Tramadol ER 300mg qD, Norco 10-325mg PRN (usually takes 1/day), Gabapentin 800mg QID.   -C/w home gabapentin 800mg QID  -IV Tylenol now for pain   -Start Oxy 5mg IR q6 for mod-severe pain

## 2021-08-26 ENCOUNTER — TRANSCRIPTION ENCOUNTER (OUTPATIENT)
Age: 71
End: 2021-08-26

## 2021-08-26 VITALS — TEMPERATURE: 98 F | HEART RATE: 70 BPM | RESPIRATION RATE: 18 BRPM | OXYGEN SATURATION: 100 %

## 2021-08-26 LAB
ANION GAP SERPL CALC-SCNC: 11 MMOL/L — SIGNIFICANT CHANGE UP (ref 7–14)
BUN SERPL-MCNC: 31 MG/DL — HIGH (ref 7–23)
CALCIUM SERPL-MCNC: 8.3 MG/DL — LOW (ref 8.4–10.5)
CHLORIDE SERPL-SCNC: 104 MMOL/L — SIGNIFICANT CHANGE UP (ref 98–107)
CO2 SERPL-SCNC: 22 MMOL/L — SIGNIFICANT CHANGE UP (ref 22–31)
CREAT SERPL-MCNC: 0.75 MG/DL — SIGNIFICANT CHANGE UP (ref 0.5–1.3)
GLUCOSE SERPL-MCNC: 256 MG/DL — HIGH (ref 70–99)
HCT VFR BLD CALC: 30.2 % — LOW (ref 34.5–45)
HGB BLD-MCNC: 8.7 G/DL — LOW (ref 11.5–15.5)
MCHC RBC-ENTMCNC: 28.8 GM/DL — LOW (ref 32–36)
MCHC RBC-ENTMCNC: 28.9 PG — SIGNIFICANT CHANGE UP (ref 27–34)
MCV RBC AUTO: 100.3 FL — HIGH (ref 80–100)
NRBC # BLD: 0 /100 WBCS — SIGNIFICANT CHANGE UP
NRBC # FLD: 0.06 K/UL — HIGH
PLATELET # BLD AUTO: 266 K/UL — SIGNIFICANT CHANGE UP (ref 150–400)
POTASSIUM SERPL-MCNC: 4.7 MMOL/L — SIGNIFICANT CHANGE UP (ref 3.5–5.3)
POTASSIUM SERPL-SCNC: 4.7 MMOL/L — SIGNIFICANT CHANGE UP (ref 3.5–5.3)
RBC # BLD: 3.01 M/UL — LOW (ref 3.8–5.2)
RBC # FLD: 16.2 % — HIGH (ref 10.3–14.5)
SODIUM SERPL-SCNC: 137 MMOL/L — SIGNIFICANT CHANGE UP (ref 135–145)
WBC # BLD: 6.9 K/UL — SIGNIFICANT CHANGE UP (ref 3.8–10.5)
WBC # FLD AUTO: 6.9 K/UL — SIGNIFICANT CHANGE UP (ref 3.8–10.5)

## 2021-08-26 RX ORDER — NYSTATIN CREAM 100000 [USP'U]/G
1 CREAM TOPICAL
Qty: 1 | Refills: 0
Start: 2021-08-26 | End: 2021-09-03

## 2021-08-26 RX ADMIN — HEPARIN SODIUM 5000 UNIT(S): 5000 INJECTION INTRAVENOUS; SUBCUTANEOUS at 05:27

## 2021-08-26 RX ADMIN — GABAPENTIN 800 MILLIGRAM(S): 400 CAPSULE ORAL at 05:26

## 2021-08-26 RX ADMIN — Medication 6: at 09:10

## 2021-08-26 RX ADMIN — Medication 1 TABLET(S): at 17:55

## 2021-08-26 RX ADMIN — Medication 1 TABLET(S): at 09:06

## 2021-08-26 RX ADMIN — OXYCODONE HYDROCHLORIDE 5 MILLIGRAM(S): 5 TABLET ORAL at 15:57

## 2021-08-26 RX ADMIN — OXYCODONE HYDROCHLORIDE 5 MILLIGRAM(S): 5 TABLET ORAL at 06:26

## 2021-08-26 RX ADMIN — Medication 325 MILLIGRAM(S): at 17:55

## 2021-08-26 RX ADMIN — Medication 150 MICROGRAM(S): at 07:50

## 2021-08-26 RX ADMIN — HEPARIN SODIUM 5000 UNIT(S): 5000 INJECTION INTRAVENOUS; SUBCUTANEOUS at 17:56

## 2021-08-26 RX ADMIN — ERTAPENEM SODIUM 120 MILLIGRAM(S): 1 INJECTION, POWDER, LYOPHILIZED, FOR SOLUTION INTRAMUSCULAR; INTRAVENOUS at 05:26

## 2021-08-26 RX ADMIN — Medication 1 TABLET(S): at 11:58

## 2021-08-26 RX ADMIN — Medication 6: at 17:56

## 2021-08-26 RX ADMIN — Medication 6: at 13:05

## 2021-08-26 RX ADMIN — Medication 4 UNIT(S): at 17:56

## 2021-08-26 RX ADMIN — Medication 4 UNIT(S): at 09:05

## 2021-08-26 RX ADMIN — GABAPENTIN 800 MILLIGRAM(S): 400 CAPSULE ORAL at 17:55

## 2021-08-26 RX ADMIN — Medication 325 MILLIGRAM(S): at 05:27

## 2021-08-26 RX ADMIN — OXYCODONE HYDROCHLORIDE 5 MILLIGRAM(S): 5 TABLET ORAL at 16:32

## 2021-08-26 RX ADMIN — Medication 1 MILLIGRAM(S): at 11:55

## 2021-08-26 RX ADMIN — GABAPENTIN 800 MILLIGRAM(S): 400 CAPSULE ORAL at 11:55

## 2021-08-26 RX ADMIN — OXYCODONE HYDROCHLORIDE 5 MILLIGRAM(S): 5 TABLET ORAL at 05:26

## 2021-08-26 RX ADMIN — GABAPENTIN 800 MILLIGRAM(S): 400 CAPSULE ORAL at 00:01

## 2021-08-26 RX ADMIN — Medication 4 UNIT(S): at 13:06

## 2021-08-26 RX ADMIN — NYSTATIN CREAM 1 APPLICATION(S): 100000 CREAM TOPICAL at 18:43

## 2021-08-26 RX ADMIN — Medication 15 MILLIGRAM(S): at 05:26

## 2021-08-26 RX ADMIN — NYSTATIN CREAM 1 APPLICATION(S): 100000 CREAM TOPICAL at 05:27

## 2021-08-26 NOTE — DISCHARGE NOTE NURSING/CASE MANAGEMENT/SOCIAL WORK - NSDCVIVACCINE_GEN_ALL_CORE_FT
influenza, injectable, quadrivalent, preservative free; 12-Sep-2017 12:01; Erika Velázquez (RN); [employer]; [id number]; IntraMuscular; Deltoid Left.; 0.5 milliLiter(s); VIS (VIS Published: 07-Aug-2015, VIS Presented: 12-Sep-2017);

## 2021-08-26 NOTE — DISCHARGE NOTE NURSING/CASE MANAGEMENT/SOCIAL WORK - PATIENT PORTAL LINK FT
You can access the FollowMyHealth Patient Portal offered by [facility] by registering at the following website: [email or web address]. By joining [employer]’s FollowMyHealth portal, you will also be able to view your health information using other applications (apps) compatible with our system.

## 2021-08-26 NOTE — DISCHARGE NOTE NURSING/CASE MANAGEMENT/SOCIAL WORK - NSDCPEFALRISK_GEN_ALL_CORE
For information on Fall & injury Prevention, visit [email or web address]

## 2021-08-27 LAB
CULTURE RESULTS: SIGNIFICANT CHANGE UP
SPECIMEN SOURCE: SIGNIFICANT CHANGE UP

## 2021-08-30 NOTE — DISCHARGE NOTE ADULT - NSCORESITESY/N_GEN_A_CORE_RD
IMP:  - status epilepticus  - intubated for airway protection  - DKA     est REE ~ 2140 kcal & protein needs per CCM ~ 135 gm/d  agree with use of Glucerna 1.2 as Rx  cont Glucerna 1.2 to 70 ml/h and add 3 packets Prosource TF per day --> 132 gm pro & 2115 k/d  check phos with am labs, joseph post large dose insulin treatments  insulin dosing and continuous drip feeding regimen d/w neuro crit team; goal glc ~ 150-180, stability discussed  HTN noted and treated No

## 2021-09-07 ENCOUNTER — APPOINTMENT (OUTPATIENT)
Dept: RHEUMATOLOGY | Facility: CLINIC | Age: 71
End: 2021-09-07
Payer: MEDICARE

## 2021-09-07 PROCEDURE — 96365 THER/PROPH/DIAG IV INF INIT: CPT

## 2021-09-07 PROCEDURE — 96366 THER/PROPH/DIAG IV INF ADDON: CPT

## 2021-09-08 ENCOUNTER — APPOINTMENT (OUTPATIENT)
Dept: DERMATOLOGY | Facility: CLINIC | Age: 71
End: 2021-09-08
Payer: MEDICARE

## 2021-09-08 PROCEDURE — 99214 OFFICE O/P EST MOD 30 MIN: CPT

## 2021-09-09 ENCOUNTER — APPOINTMENT (OUTPATIENT)
Dept: RHEUMATOLOGY | Facility: CLINIC | Age: 71
End: 2021-09-09
Payer: MEDICARE

## 2021-09-09 PROCEDURE — 96365 THER/PROPH/DIAG IV INF INIT: CPT

## 2021-09-09 PROCEDURE — 96366 THER/PROPH/DIAG IV INF ADDON: CPT

## 2021-09-11 ENCOUNTER — APPOINTMENT (OUTPATIENT)
Dept: RHEUMATOLOGY | Facility: CLINIC | Age: 71
End: 2021-09-11
Payer: MEDICARE

## 2021-09-11 PROCEDURE — 96365 THER/PROPH/DIAG IV INF INIT: CPT

## 2021-09-11 PROCEDURE — 96366 THER/PROPH/DIAG IV INF ADDON: CPT

## 2021-10-04 ENCOUNTER — APPOINTMENT (OUTPATIENT)
Dept: RHEUMATOLOGY | Facility: CLINIC | Age: 71
End: 2021-10-04
Payer: MEDICARE

## 2021-10-04 PROCEDURE — 96365 THER/PROPH/DIAG IV INF INIT: CPT

## 2021-10-04 PROCEDURE — 96366 THER/PROPH/DIAG IV INF ADDON: CPT

## 2021-10-06 ENCOUNTER — APPOINTMENT (OUTPATIENT)
Dept: RHEUMATOLOGY | Facility: CLINIC | Age: 71
End: 2021-10-06
Payer: MEDICARE

## 2021-10-06 PROCEDURE — 96366 THER/PROPH/DIAG IV INF ADDON: CPT

## 2021-10-06 PROCEDURE — 96365 THER/PROPH/DIAG IV INF INIT: CPT

## 2021-10-08 ENCOUNTER — APPOINTMENT (OUTPATIENT)
Dept: RHEUMATOLOGY | Facility: CLINIC | Age: 71
End: 2021-10-08
Payer: MEDICARE

## 2021-10-08 PROCEDURE — 96366 THER/PROPH/DIAG IV INF ADDON: CPT

## 2021-10-08 PROCEDURE — 96365 THER/PROPH/DIAG IV INF INIT: CPT

## 2021-11-01 ENCOUNTER — APPOINTMENT (OUTPATIENT)
Dept: RHEUMATOLOGY | Facility: CLINIC | Age: 71
End: 2021-11-01
Payer: MEDICARE

## 2021-11-01 PROCEDURE — 96365 THER/PROPH/DIAG IV INF INIT: CPT

## 2021-11-01 PROCEDURE — 96366 THER/PROPH/DIAG IV INF ADDON: CPT

## 2021-11-03 ENCOUNTER — APPOINTMENT (OUTPATIENT)
Dept: RHEUMATOLOGY | Facility: CLINIC | Age: 71
End: 2021-11-03
Payer: MEDICARE

## 2021-11-03 PROCEDURE — 96365 THER/PROPH/DIAG IV INF INIT: CPT

## 2021-11-03 PROCEDURE — 96366 THER/PROPH/DIAG IV INF ADDON: CPT

## 2021-11-05 ENCOUNTER — APPOINTMENT (OUTPATIENT)
Dept: RHEUMATOLOGY | Facility: CLINIC | Age: 71
End: 2021-11-05
Payer: MEDICARE

## 2021-11-05 PROCEDURE — 96366 THER/PROPH/DIAG IV INF ADDON: CPT

## 2021-11-05 PROCEDURE — 96365 THER/PROPH/DIAG IV INF INIT: CPT

## 2021-11-10 ENCOUNTER — OUTPATIENT (OUTPATIENT)
Dept: OUTPATIENT SERVICES | Facility: HOSPITAL | Age: 71
LOS: 1 days | Discharge: HOME | End: 2021-11-10

## 2021-11-10 ENCOUNTER — APPOINTMENT (OUTPATIENT)
Dept: DERMATOLOGY | Facility: CLINIC | Age: 71
End: 2021-11-10
Payer: MEDICARE

## 2021-11-10 DIAGNOSIS — Z98.89 OTHER SPECIFIED POSTPROCEDURAL STATES: Chronic | ICD-10-CM

## 2021-11-10 DIAGNOSIS — Z90.710 ACQUIRED ABSENCE OF BOTH CERVIX AND UTERUS: Chronic | ICD-10-CM

## 2021-11-10 DIAGNOSIS — C54.1 MALIGNANT NEOPLASM OF ENDOMETRIUM: Chronic | ICD-10-CM

## 2021-11-10 DIAGNOSIS — C54.1 MALIGNANT NEOPLASM OF ENDOMETRIUM: ICD-10-CM

## 2021-11-10 DIAGNOSIS — Z90.722 ACQUIRED ABSENCE OF OVARIES, BILATERAL: Chronic | ICD-10-CM

## 2021-11-10 PROCEDURE — 99214 OFFICE O/P EST MOD 30 MIN: CPT | Mod: GC

## 2021-11-10 RX ORDER — PREDNISONE 5 MG/1
5 TABLET ORAL DAILY
Qty: 90 | Refills: 1 | Status: COMPLETED | COMMUNITY
Start: 2021-07-15 | End: 2021-11-10

## 2021-11-10 RX ORDER — PREDNISONE 2.5 MG/1
2.5 TABLET ORAL
Qty: 30 | Refills: 0 | Status: COMPLETED | COMMUNITY
Start: 2019-07-30 | End: 2021-11-10

## 2021-11-10 RX ORDER — PREDNISONE 20 MG/1
20 TABLET ORAL
Qty: 30 | Refills: 0 | Status: COMPLETED | COMMUNITY
Start: 2021-04-28 | End: 2021-11-10

## 2021-11-10 RX ORDER — DUPILUMAB 300 MG/2ML
300 INJECTION, SOLUTION SUBCUTANEOUS
Qty: 1 | Refills: 0 | Status: COMPLETED | COMMUNITY
Start: 2020-12-08 | End: 2021-11-10

## 2021-11-10 RX ORDER — PREDNISONE 20 MG/1
20 TABLET ORAL
Qty: 60 | Refills: 1 | Status: COMPLETED | COMMUNITY
Start: 2020-11-16 | End: 2021-11-10

## 2021-11-10 RX ORDER — PREDNISONE 20 MG/1
20 TABLET ORAL
Qty: 28 | Refills: 0 | Status: COMPLETED | COMMUNITY
Start: 2020-10-27 | End: 2021-11-10

## 2021-11-10 RX ORDER — MUPIROCIN 20 MG/G
2 OINTMENT TOPICAL
Qty: 2 | Refills: 3 | Status: COMPLETED | COMMUNITY
Start: 2018-04-06 | End: 2021-11-10

## 2021-11-10 RX ORDER — PREDNISONE 5 MG/1
5 TABLET ORAL
Qty: 1 | Refills: 1 | Status: COMPLETED | COMMUNITY
Start: 2019-02-04 | End: 2021-11-10

## 2021-11-10 RX ORDER — PREDNISONE 10 MG/1
10 TABLET ORAL
Qty: 56 | Refills: 0 | Status: COMPLETED | COMMUNITY
Start: 2020-08-11 | End: 2021-11-10

## 2021-11-10 RX ORDER — NIACINAMIDE 500 MG
500 TABLET ORAL
Qty: 90 | Refills: 3 | Status: COMPLETED | COMMUNITY
Start: 2017-11-15 | End: 2021-11-10

## 2021-11-11 NOTE — ED ADULT NURSE NOTE - ED CARDIAC CAPILLARY REFILL
2 seconds or less Rituxan Pregnancy And Lactation Text: This medication is Pregnancy Category C and it isn't know if it is safe during pregnancy. It is unknown if this medication is excreted in breast milk but similar antibodies are known to be excreted.

## 2021-11-29 ENCOUNTER — APPOINTMENT (OUTPATIENT)
Dept: RHEUMATOLOGY | Facility: CLINIC | Age: 71
End: 2021-11-29
Payer: MEDICARE

## 2021-11-29 PROCEDURE — 96366 THER/PROPH/DIAG IV INF ADDON: CPT

## 2021-11-29 PROCEDURE — 96365 THER/PROPH/DIAG IV INF INIT: CPT

## 2021-12-01 ENCOUNTER — APPOINTMENT (OUTPATIENT)
Dept: RHEUMATOLOGY | Facility: CLINIC | Age: 71
End: 2021-12-01
Payer: MEDICARE

## 2021-12-01 PROCEDURE — 96365 THER/PROPH/DIAG IV INF INIT: CPT

## 2021-12-01 PROCEDURE — 96366 THER/PROPH/DIAG IV INF ADDON: CPT

## 2021-12-03 ENCOUNTER — APPOINTMENT (OUTPATIENT)
Dept: RHEUMATOLOGY | Facility: CLINIC | Age: 71
End: 2021-12-03
Payer: MEDICARE

## 2021-12-03 PROCEDURE — 96366 THER/PROPH/DIAG IV INF ADDON: CPT

## 2021-12-03 PROCEDURE — 96365 THER/PROPH/DIAG IV INF INIT: CPT

## 2021-12-27 ENCOUNTER — APPOINTMENT (OUTPATIENT)
Dept: RHEUMATOLOGY | Facility: CLINIC | Age: 71
End: 2021-12-27
Payer: MEDICARE

## 2021-12-27 PROCEDURE — 96365 THER/PROPH/DIAG IV INF INIT: CPT

## 2021-12-27 PROCEDURE — 96366 THER/PROPH/DIAG IV INF ADDON: CPT

## 2021-12-29 ENCOUNTER — APPOINTMENT (OUTPATIENT)
Dept: RHEUMATOLOGY | Facility: CLINIC | Age: 71
End: 2021-12-29
Payer: MEDICARE

## 2021-12-29 PROCEDURE — 96365 THER/PROPH/DIAG IV INF INIT: CPT

## 2021-12-29 PROCEDURE — 96366 THER/PROPH/DIAG IV INF ADDON: CPT

## 2021-12-30 ENCOUNTER — APPOINTMENT (OUTPATIENT)
Dept: RHEUMATOLOGY | Facility: CLINIC | Age: 71
End: 2021-12-30
Payer: MEDICARE

## 2021-12-30 PROCEDURE — 96365 THER/PROPH/DIAG IV INF INIT: CPT

## 2021-12-30 PROCEDURE — 96366 THER/PROPH/DIAG IV INF ADDON: CPT

## 2022-01-21 ENCOUNTER — TRANSCRIPTION ENCOUNTER (OUTPATIENT)
Age: 72
End: 2022-01-21

## 2022-01-24 ENCOUNTER — APPOINTMENT (OUTPATIENT)
Dept: RHEUMATOLOGY | Facility: CLINIC | Age: 72
End: 2022-01-24
Payer: MEDICARE

## 2022-01-24 PROCEDURE — 96365 THER/PROPH/DIAG IV INF INIT: CPT

## 2022-01-24 PROCEDURE — 96366 THER/PROPH/DIAG IV INF ADDON: CPT

## 2022-01-26 ENCOUNTER — APPOINTMENT (OUTPATIENT)
Dept: RHEUMATOLOGY | Facility: CLINIC | Age: 72
End: 2022-01-26
Payer: MEDICARE

## 2022-01-26 PROCEDURE — 96366 THER/PROPH/DIAG IV INF ADDON: CPT

## 2022-01-26 PROCEDURE — 96365 THER/PROPH/DIAG IV INF INIT: CPT

## 2022-01-28 ENCOUNTER — APPOINTMENT (OUTPATIENT)
Dept: RHEUMATOLOGY | Facility: CLINIC | Age: 72
End: 2022-01-28
Payer: MEDICARE

## 2022-01-28 PROCEDURE — 96366 THER/PROPH/DIAG IV INF ADDON: CPT

## 2022-01-28 PROCEDURE — 96365 THER/PROPH/DIAG IV INF INIT: CPT

## 2022-02-22 ENCOUNTER — APPOINTMENT (OUTPATIENT)
Dept: RHEUMATOLOGY | Facility: CLINIC | Age: 72
End: 2022-02-22
Payer: MEDICARE

## 2022-02-22 PROCEDURE — 96365 THER/PROPH/DIAG IV INF INIT: CPT

## 2022-02-22 PROCEDURE — 96366 THER/PROPH/DIAG IV INF ADDON: CPT

## 2022-02-23 ENCOUNTER — APPOINTMENT (OUTPATIENT)
Dept: RHEUMATOLOGY | Facility: CLINIC | Age: 72
End: 2022-02-23
Payer: MEDICARE

## 2022-02-23 PROCEDURE — 96366 THER/PROPH/DIAG IV INF ADDON: CPT

## 2022-02-23 PROCEDURE — 96365 THER/PROPH/DIAG IV INF INIT: CPT

## 2022-02-25 ENCOUNTER — APPOINTMENT (OUTPATIENT)
Dept: RHEUMATOLOGY | Facility: CLINIC | Age: 72
End: 2022-02-25
Payer: MEDICARE

## 2022-02-25 PROCEDURE — 96366 THER/PROPH/DIAG IV INF ADDON: CPT

## 2022-02-25 PROCEDURE — 96365 THER/PROPH/DIAG IV INF INIT: CPT

## 2022-03-09 ENCOUNTER — APPOINTMENT (OUTPATIENT)
Dept: DERMATOLOGY | Facility: CLINIC | Age: 72
End: 2022-03-09
Payer: MEDICARE

## 2022-03-09 DIAGNOSIS — L30.9 DERMATITIS, UNSPECIFIED: ICD-10-CM

## 2022-03-09 PROCEDURE — 99214 OFFICE O/P EST MOD 30 MIN: CPT

## 2022-03-21 ENCOUNTER — APPOINTMENT (OUTPATIENT)
Dept: RHEUMATOLOGY | Facility: CLINIC | Age: 72
End: 2022-03-21
Payer: MEDICARE

## 2022-03-21 PROCEDURE — 96366 THER/PROPH/DIAG IV INF ADDON: CPT

## 2022-03-21 PROCEDURE — 96365 THER/PROPH/DIAG IV INF INIT: CPT

## 2022-03-23 ENCOUNTER — APPOINTMENT (OUTPATIENT)
Dept: RHEUMATOLOGY | Facility: CLINIC | Age: 72
End: 2022-03-23
Payer: MEDICARE

## 2022-03-23 PROCEDURE — 96366 THER/PROPH/DIAG IV INF ADDON: CPT

## 2022-03-23 PROCEDURE — 96365 THER/PROPH/DIAG IV INF INIT: CPT

## 2022-03-25 ENCOUNTER — APPOINTMENT (OUTPATIENT)
Dept: RHEUMATOLOGY | Facility: CLINIC | Age: 72
End: 2022-03-25
Payer: MEDICARE

## 2022-03-25 PROCEDURE — 96366 THER/PROPH/DIAG IV INF ADDON: CPT

## 2022-03-25 PROCEDURE — 96365 THER/PROPH/DIAG IV INF INIT: CPT

## 2022-04-18 ENCOUNTER — APPOINTMENT (OUTPATIENT)
Dept: RHEUMATOLOGY | Facility: CLINIC | Age: 72
End: 2022-04-18
Payer: MEDICARE

## 2022-04-18 PROCEDURE — 96365 THER/PROPH/DIAG IV INF INIT: CPT

## 2022-04-18 PROCEDURE — 96366 THER/PROPH/DIAG IV INF ADDON: CPT

## 2022-04-20 ENCOUNTER — APPOINTMENT (OUTPATIENT)
Dept: RHEUMATOLOGY | Facility: CLINIC | Age: 72
End: 2022-04-20

## 2022-04-20 ENCOUNTER — APPOINTMENT (OUTPATIENT)
Dept: RHEUMATOLOGY | Facility: CLINIC | Age: 72
End: 2022-04-20
Payer: MEDICARE

## 2022-04-20 PROCEDURE — 96365 THER/PROPH/DIAG IV INF INIT: CPT

## 2022-04-20 PROCEDURE — 96366 THER/PROPH/DIAG IV INF ADDON: CPT

## 2022-04-22 ENCOUNTER — APPOINTMENT (OUTPATIENT)
Dept: RHEUMATOLOGY | Facility: CLINIC | Age: 72
End: 2022-04-22
Payer: MEDICARE

## 2022-04-22 ENCOUNTER — APPOINTMENT (OUTPATIENT)
Dept: RHEUMATOLOGY | Facility: CLINIC | Age: 72
End: 2022-04-22

## 2022-04-22 PROCEDURE — 96366 THER/PROPH/DIAG IV INF ADDON: CPT

## 2022-04-22 PROCEDURE — 96365 THER/PROPH/DIAG IV INF INIT: CPT

## 2022-05-16 ENCOUNTER — APPOINTMENT (OUTPATIENT)
Dept: RHEUMATOLOGY | Facility: CLINIC | Age: 72
End: 2022-05-16
Payer: MEDICARE

## 2022-05-16 ENCOUNTER — APPOINTMENT (OUTPATIENT)
Dept: RHEUMATOLOGY | Facility: CLINIC | Age: 72
End: 2022-05-16

## 2022-05-16 PROCEDURE — 96366 THER/PROPH/DIAG IV INF ADDON: CPT

## 2022-05-16 PROCEDURE — 96365 THER/PROPH/DIAG IV INF INIT: CPT

## 2022-05-18 ENCOUNTER — APPOINTMENT (OUTPATIENT)
Dept: RHEUMATOLOGY | Facility: CLINIC | Age: 72
End: 2022-05-18
Payer: MEDICARE

## 2022-05-18 ENCOUNTER — APPOINTMENT (OUTPATIENT)
Dept: RHEUMATOLOGY | Facility: CLINIC | Age: 72
End: 2022-05-18

## 2022-05-18 PROCEDURE — 96366 THER/PROPH/DIAG IV INF ADDON: CPT

## 2022-05-18 PROCEDURE — 96365 THER/PROPH/DIAG IV INF INIT: CPT

## 2022-05-19 RX ADMIN — Medication 0 MG: at 00:00

## 2022-05-20 ENCOUNTER — APPOINTMENT (OUTPATIENT)
Dept: RHEUMATOLOGY | Facility: CLINIC | Age: 72
End: 2022-05-20

## 2022-05-20 ENCOUNTER — APPOINTMENT (OUTPATIENT)
Dept: RHEUMATOLOGY | Facility: CLINIC | Age: 72
End: 2022-05-20
Payer: MEDICARE

## 2022-05-20 PROCEDURE — 96366 THER/PROPH/DIAG IV INF ADDON: CPT

## 2022-05-20 PROCEDURE — 96365 THER/PROPH/DIAG IV INF INIT: CPT

## 2022-06-07 RX ADMIN — IMMUNE GLOBULIN INFUSION (HUMAN) 0 GM/200ML: 100 INJECTION, SOLUTION INTRAVENOUS; SUBCUTANEOUS at 00:00

## 2022-06-07 RX ADMIN — Medication 0 MG: at 00:00

## 2022-06-07 RX ADMIN — ACETAMINOPHEN 0 MG: 500 TABLET, FILM COATED ORAL at 00:00

## 2022-06-09 RX ADMIN — IMMUNE GLOBULIN INFUSION (HUMAN) 0 GM/300ML: 100 INJECTION, SOLUTION INTRAVENOUS; SUBCUTANEOUS at 00:00

## 2022-06-11 RX ADMIN — Medication 0 MG: at 00:00

## 2022-06-11 RX ADMIN — DIPHENHYDRAMINE HCL 0 MG: 25 CAPSULE ORAL at 00:00

## 2022-06-11 RX ADMIN — IMMUNE GLOBULIN INFUSION (HUMAN) 0 GM/200ML: 100 INJECTION, SOLUTION INTRAVENOUS; SUBCUTANEOUS at 00:00

## 2022-06-13 ENCOUNTER — APPOINTMENT (OUTPATIENT)
Dept: RHEUMATOLOGY | Facility: CLINIC | Age: 72
End: 2022-06-13
Payer: MEDICARE

## 2022-06-13 ENCOUNTER — APPOINTMENT (OUTPATIENT)
Dept: RHEUMATOLOGY | Facility: CLINIC | Age: 72
End: 2022-06-13

## 2022-06-13 VITALS — DIASTOLIC BLOOD PRESSURE: 76 MMHG | OXYGEN SATURATION: 96 % | SYSTOLIC BLOOD PRESSURE: 129 MMHG | HEART RATE: 70 BPM

## 2022-06-13 VITALS
HEART RATE: 85 BPM | DIASTOLIC BLOOD PRESSURE: 56 MMHG | RESPIRATION RATE: 16 BRPM | TEMPERATURE: 98 F | OXYGEN SATURATION: 95 % | SYSTOLIC BLOOD PRESSURE: 110 MMHG

## 2022-06-13 PROCEDURE — 96366 THER/PROPH/DIAG IV INF ADDON: CPT

## 2022-06-13 PROCEDURE — 96365 THER/PROPH/DIAG IV INF INIT: CPT

## 2022-06-13 RX ORDER — IMMUNE GLOBULIN INFUSION (HUMAN) 100 MG/ML
20 INJECTION, SOLUTION INTRAVENOUS; SUBCUTANEOUS
Qty: 0 | Refills: 0 | Status: COMPLETED | OUTPATIENT
Start: 2022-06-07

## 2022-06-13 RX ORDER — IMMUNE GLOBULIN INFUSION (HUMAN) 100 MG/ML
30 INJECTION, SOLUTION INTRAVENOUS; SUBCUTANEOUS
Qty: 0 | Refills: 0 | Status: COMPLETED | OUTPATIENT
Start: 2022-06-09

## 2022-06-13 RX ORDER — DIPHENHYDRAMINE HCL 25 MG/1
25 CAPSULE ORAL
Qty: 0 | Refills: 0 | Status: COMPLETED | OUTPATIENT
Start: 2022-06-07

## 2022-06-15 ENCOUNTER — APPOINTMENT (OUTPATIENT)
Dept: RHEUMATOLOGY | Facility: CLINIC | Age: 72
End: 2022-06-15
Payer: MEDICARE

## 2022-06-15 ENCOUNTER — APPOINTMENT (OUTPATIENT)
Dept: RHEUMATOLOGY | Facility: CLINIC | Age: 72
End: 2022-06-15

## 2022-06-15 VITALS
SYSTOLIC BLOOD PRESSURE: 117 MMHG | DIASTOLIC BLOOD PRESSURE: 67 MMHG | OXYGEN SATURATION: 96 % | HEART RATE: 92 BPM | TEMPERATURE: 98.2 F | RESPIRATION RATE: 16 BRPM

## 2022-06-15 VITALS — HEART RATE: 69 BPM | DIASTOLIC BLOOD PRESSURE: 68 MMHG | OXYGEN SATURATION: 95 % | SYSTOLIC BLOOD PRESSURE: 116 MMHG

## 2022-06-15 PROCEDURE — 96366 THER/PROPH/DIAG IV INF ADDON: CPT

## 2022-06-15 PROCEDURE — 96365 THER/PROPH/DIAG IV INF INIT: CPT

## 2022-06-15 RX ORDER — IMMUNE GLOBULIN INFUSION (HUMAN) 100 MG/ML
20 INJECTION, SOLUTION INTRAVENOUS; SUBCUTANEOUS
Qty: 0 | Refills: 0 | Status: COMPLETED | OUTPATIENT
Start: 2022-06-07

## 2022-06-15 RX ORDER — ACETAMINOPHEN 325 MG/1
325 TABLET, FILM COATED ORAL
Qty: 0 | Refills: 0 | Status: COMPLETED | OUTPATIENT
Start: 2022-06-07

## 2022-06-15 RX ORDER — DIPHENHYDRAMINE HCL 25 MG/1
25 CAPSULE ORAL
Qty: 0 | Refills: 0 | Status: COMPLETED | OUTPATIENT
Start: 2022-06-07

## 2022-06-17 ENCOUNTER — APPOINTMENT (OUTPATIENT)
Dept: RHEUMATOLOGY | Facility: CLINIC | Age: 72
End: 2022-06-17
Payer: MEDICARE

## 2022-06-17 ENCOUNTER — APPOINTMENT (OUTPATIENT)
Dept: RHEUMATOLOGY | Facility: CLINIC | Age: 72
End: 2022-06-17

## 2022-06-17 VITALS
OXYGEN SATURATION: 95 % | RESPIRATION RATE: 16 BRPM | SYSTOLIC BLOOD PRESSURE: 121 MMHG | HEART RATE: 98 BPM | TEMPERATURE: 97.7 F | DIASTOLIC BLOOD PRESSURE: 72 MMHG

## 2022-06-17 VITALS
HEART RATE: 70 BPM | DIASTOLIC BLOOD PRESSURE: 64 MMHG | OXYGEN SATURATION: 98 % | SYSTOLIC BLOOD PRESSURE: 123 MMHG | RESPIRATION RATE: 16 BRPM

## 2022-06-17 PROCEDURE — 96366 THER/PROPH/DIAG IV INF ADDON: CPT

## 2022-06-17 PROCEDURE — 96365 THER/PROPH/DIAG IV INF INIT: CPT

## 2022-06-17 RX ORDER — IMMUNE GLOBULIN INFUSION (HUMAN) 100 MG/ML
20 INJECTION, SOLUTION INTRAVENOUS; SUBCUTANEOUS
Qty: 0 | Refills: 0 | Status: COMPLETED | OUTPATIENT
Start: 2022-06-11

## 2022-06-17 RX ORDER — DIPHENHYDRAMINE HCL 25 MG/1
25 CAPSULE ORAL
Qty: 0 | Refills: 0 | Status: COMPLETED | OUTPATIENT
Start: 2022-06-11

## 2022-06-27 ENCOUNTER — NON-APPOINTMENT (OUTPATIENT)
Age: 72
End: 2022-06-27

## 2022-07-05 RX ADMIN — IMMUNE GLOBULIN INFUSION (HUMAN) 0 GM/200ML: 100 INJECTION, SOLUTION INTRAVENOUS; SUBCUTANEOUS at 00:00

## 2022-07-05 RX ADMIN — ACETAMINOPHEN 0 MG: 325 TABLET, FILM COATED ORAL at 00:00

## 2022-07-05 RX ADMIN — Medication 0 MG: at 00:00

## 2022-07-05 RX ADMIN — DIPHENHYDRAMINE HCL 0 MG: 25 CAPSULE ORAL at 00:00

## 2022-07-05 RX ADMIN — IMMUNE GLOBULIN INFUSION (HUMAN) 0 GM/300ML: 100 INJECTION, SOLUTION INTRAVENOUS; SUBCUTANEOUS at 00:00

## 2022-07-07 ENCOUNTER — NON-APPOINTMENT (OUTPATIENT)
Age: 72
End: 2022-07-07

## 2022-07-07 RX ADMIN — DIPHENHYDRAMINE HCL 0 MG: 25 CAPSULE ORAL at 00:00

## 2022-07-07 RX ADMIN — Medication 0 MG: at 00:00

## 2022-07-07 RX ADMIN — IMMUNE GLOBULIN INFUSION (HUMAN) 0 GM/200ML: 100 INJECTION, SOLUTION INTRAVENOUS; SUBCUTANEOUS at 00:00

## 2022-07-07 RX ADMIN — IMMUNE GLOBULIN INFUSION (HUMAN) 0 GM/300ML: 100 INJECTION, SOLUTION INTRAVENOUS; SUBCUTANEOUS at 00:00

## 2022-07-09 RX ADMIN — Medication 0 MG: at 00:00

## 2022-07-09 RX ADMIN — IMMUNE GLOBULIN INFUSION (HUMAN) 0 GM/300ML: 100 INJECTION, SOLUTION INTRAVENOUS; SUBCUTANEOUS at 00:00

## 2022-07-09 RX ADMIN — IMMUNE GLOBULIN INFUSION (HUMAN) 0 GM/200ML: 100 INJECTION, SOLUTION INTRAVENOUS; SUBCUTANEOUS at 00:00

## 2022-07-09 RX ADMIN — DIPHENHYDRAMINE HCL 0 MG: 25 CAPSULE ORAL at 00:00

## 2022-07-11 ENCOUNTER — APPOINTMENT (OUTPATIENT)
Dept: RHEUMATOLOGY | Facility: CLINIC | Age: 72
End: 2022-07-11

## 2022-07-11 VITALS
HEART RATE: 78 BPM | OXYGEN SATURATION: 96 % | RESPIRATION RATE: 16 BRPM | DIASTOLIC BLOOD PRESSURE: 56 MMHG | SYSTOLIC BLOOD PRESSURE: 95 MMHG

## 2022-07-11 VITALS
DIASTOLIC BLOOD PRESSURE: 62 MMHG | TEMPERATURE: 98 F | HEART RATE: 94 BPM | RESPIRATION RATE: 16 BRPM | OXYGEN SATURATION: 95 % | SYSTOLIC BLOOD PRESSURE: 111 MMHG

## 2022-07-11 PROCEDURE — 96365 THER/PROPH/DIAG IV INF INIT: CPT

## 2022-07-11 PROCEDURE — 96366 THER/PROPH/DIAG IV INF ADDON: CPT

## 2022-07-11 RX ORDER — IMMUNE GLOBULIN INFUSION (HUMAN) 100 MG/ML
20 INJECTION, SOLUTION INTRAVENOUS; SUBCUTANEOUS
Qty: 0 | Refills: 0 | Status: COMPLETED | OUTPATIENT
Start: 2022-07-05

## 2022-07-11 RX ORDER — DIPHENHYDRAMINE HCL 25 MG/1
25 CAPSULE ORAL
Qty: 0 | Refills: 0 | Status: COMPLETED | OUTPATIENT
Start: 2022-07-05

## 2022-07-13 ENCOUNTER — APPOINTMENT (OUTPATIENT)
Dept: RHEUMATOLOGY | Facility: CLINIC | Age: 72
End: 2022-07-13

## 2022-07-13 VITALS
SYSTOLIC BLOOD PRESSURE: 118 MMHG | DIASTOLIC BLOOD PRESSURE: 70 MMHG | OXYGEN SATURATION: 99 % | TEMPERATURE: 97.6 F | HEART RATE: 92 BPM

## 2022-07-13 VITALS — SYSTOLIC BLOOD PRESSURE: 102 MMHG | HEART RATE: 75 BPM | DIASTOLIC BLOOD PRESSURE: 65 MMHG

## 2022-07-13 PROCEDURE — 96365 THER/PROPH/DIAG IV INF INIT: CPT

## 2022-07-13 PROCEDURE — 96366 THER/PROPH/DIAG IV INF ADDON: CPT

## 2022-07-13 RX ORDER — ACETAMINOPHEN 325 MG/1
325 TABLET, FILM COATED ORAL
Qty: 0 | Refills: 0 | Status: COMPLETED | OUTPATIENT
Start: 2022-07-05

## 2022-07-13 RX ORDER — DIPHENHYDRAMINE HCL 25 MG/1
25 CAPSULE ORAL
Qty: 0 | Refills: 0 | Status: COMPLETED | OUTPATIENT
Start: 2022-07-05

## 2022-07-13 RX ORDER — IMMUNE GLOBULIN INFUSION (HUMAN) 100 MG/ML
30 INJECTION, SOLUTION INTRAVENOUS; SUBCUTANEOUS
Qty: 0 | Refills: 0 | Status: COMPLETED | OUTPATIENT
Start: 2022-07-05

## 2022-07-14 RX ORDER — IMMUNE GLOBULIN INFUSION (HUMAN) 100 MG/ML
20 INJECTION, SOLUTION INTRAVENOUS; SUBCUTANEOUS
Qty: 0 | Refills: 0 | Status: COMPLETED | OUTPATIENT
Start: 2022-07-07

## 2022-07-15 ENCOUNTER — APPOINTMENT (OUTPATIENT)
Dept: RHEUMATOLOGY | Facility: CLINIC | Age: 72
End: 2022-07-15

## 2022-07-15 VITALS
SYSTOLIC BLOOD PRESSURE: 107 MMHG | DIASTOLIC BLOOD PRESSURE: 64 MMHG | OXYGEN SATURATION: 96 % | RESPIRATION RATE: 16 BRPM | HEART RATE: 76 BPM

## 2022-07-15 VITALS
SYSTOLIC BLOOD PRESSURE: 114 MMHG | DIASTOLIC BLOOD PRESSURE: 67 MMHG | HEART RATE: 90 BPM | OXYGEN SATURATION: 96 % | RESPIRATION RATE: 16 BRPM | TEMPERATURE: 98 F

## 2022-07-15 PROCEDURE — 96366 THER/PROPH/DIAG IV INF ADDON: CPT

## 2022-07-15 PROCEDURE — 96365 THER/PROPH/DIAG IV INF INIT: CPT

## 2022-07-15 RX ORDER — DIPHENHYDRAMINE HCL 25 MG/1
25 CAPSULE ORAL
Qty: 0 | Refills: 0 | Status: COMPLETED | OUTPATIENT
Start: 2022-07-07

## 2022-07-15 RX ORDER — IMMUNE GLOBULIN INFUSION (HUMAN) 100 MG/ML
30 INJECTION, SOLUTION INTRAVENOUS; SUBCUTANEOUS
Qty: 0 | Refills: 0 | Status: COMPLETED | OUTPATIENT
Start: 2022-07-07

## 2022-07-15 RX ORDER — IMMUNE GLOBULIN INFUSION (HUMAN) 100 MG/ML
20 INJECTION, SOLUTION INTRAVENOUS; SUBCUTANEOUS
Qty: 0 | Refills: 0 | Status: COMPLETED | OUTPATIENT
Start: 2022-07-07

## 2022-07-22 ENCOUNTER — APPOINTMENT (OUTPATIENT)
Dept: WOUND CARE | Facility: CLINIC | Age: 72
End: 2022-07-22

## 2022-07-22 VITALS — SYSTOLIC BLOOD PRESSURE: 101 MMHG | DIASTOLIC BLOOD PRESSURE: 64 MMHG | RESPIRATION RATE: 18 BRPM | HEART RATE: 96 BPM

## 2022-07-22 DIAGNOSIS — S80.11XA CONTUSION OF RIGHT LOWER LEG, INITIAL ENCOUNTER: ICD-10-CM

## 2022-07-22 PROCEDURE — 10140 I&D HMTMA SEROMA/FLUID COLLJ: CPT

## 2022-07-22 RX ORDER — ASPIRIN 81 MG/1
81 TABLET ORAL
Refills: 0 | Status: ACTIVE | COMMUNITY

## 2022-07-25 PROBLEM — S80.11XA LEG HEMATOMA, RIGHT, INITIAL ENCOUNTER: Status: ACTIVE | Noted: 2022-07-25

## 2022-07-25 NOTE — PLAN
[FreeTextEntry1] :  \par Plan -  gentamicin   area, foam, spandage to keep in place,- \par will order vna\par  supplies ordered- foam/ xeroform non stick\par prontosan-\par \par 7-22-22:\par Plan:\par wash with soap and water\par iodoform packing/super absorber/sonya/ace QD\par  to do dressing\par If active bleeding seen pt instructed to go to ER.\par supplies ordered and given\par F/u 1 week \par \par

## 2022-07-25 NOTE — HISTORY OF PRESENT ILLNESS
[FreeTextEntry1] : 68 yo f with wounds for 3 months, treating with clobetasol, muprocin, had been treated in 2018\par right leg terible, opened in 2 other places\par no fever, seeing derm next week\par would like vna- left leg also opened, but settled down with gent\par \par using telfa ,1 new blister, old ones derided with dressing\par still bleeding thru telfa on back of leg, but \par Patient developed leg wounds since 12/2017. Takes igg treatment and prednisone\par had been using betadine, no blisters - previous one broke, now has open ulcer at that site\par \par right posterior leg very sore\par \par   h/o Bullous pemphigoid. Currently still taking doxy 100mg BID, nicotinamide 500mg BID, \par prednisone 10mg daily, and IVIG [id number] (s/p 2 rounds since January, most recently at end of March). \par She feels that her BP is under best control in the 2 weeks immediately after her IVIG infusion.\par \par  She notes that her flares have decreased in intensity and duration since starting IVIG. \par Still developing bullae on b/l arms and legs. Denies oral or genital lesions. \par Tolerating her medications well but does note easy bruising while on prednisone--currently on 10mg QD. Also notes skin fragility.\par 6/1/18- Patient and  report that right leg wounds are significantly improved\par  \par 2) Lipodermatosclerosis. Has an erosion on R medial calf present since December that started as a blister. It was originally draining profusely per pt; she denies fevers, chills, foul smell.  Wound nurse is applying clob oint to periphery and Alginate dressing over the wound. \par \par BACKGROUND:PMH Stage IA carcinosarcoma of the uterus (s/p Carbo/Taxol x 6 cycles from 12/15 - 4/16), currently in remission, \par Biopsy and DIF from 8/2014 consistent with BP (scanned into file). \par Previously well controlled with Cellcept, prednisone, and clobetasol with flare after stopping Cellcept due to decreasing blood counts.  \par +H/o vaginal erosions\par Her primary dermatologist in the past was Dr Silva.  \par Pt previously stated that whenever predisone has been decreased below 10mg daily she flares.\par 9/5- leg wounds related to body habitus and pemphigus \par \par 7/22/2022\par Ms. AME ZIMMERMAN   presents to the office with a wound since 6/25/2022 after hitting her leg against her walker. The patient reported the leg bruising then forming a large blister. The wound is located on  the right medial leg. The patient has complaints of pain.  The patient has been dressing the wound with mupirocin and gauze.  The patient denies fevers or chills. The patient has localized pain to the wound upon dressing changes. The patient has no other complaints or associated symptoms. HbA1c is 4.5 as of May 2022 per Patient.\par

## 2022-07-25 NOTE — REASON FOR VISIT
[Follow-Up: _____] : a [unfilled] follow-up visit [Family Member] : family member [FreeTextEntry1] : leg wound- telehealth urgent visit

## 2022-07-25 NOTE — PHYSICAL EXAM
[Normal Breath Sounds] : Normal breath sounds [Ankle Swelling (On Exam)] : present [Ankle Swelling Bilaterally] : bilaterally  [Ankle Swelling On The Left] : moderate [] : of the right leg [Ankle Swelling On The Right] : mild [Skin Ulcer] : ulcer [Alert] : alert [Oriented to Person] : oriented to person [Oriented to Place] : oriented to place [Oriented to Time] : oriented to time [Calm] : calm [1+] : left 1+ [JVD] : no jugular venous distention  [de-identified] : NAD, arrives in WC, MO [de-identified] : very ambulation [FreeTextEntry1] : medial leg [FreeTextEntry2] : 3 [FreeTextEntry3] : 4 [FreeTextEntry4] : .1 [de-identified] : eschar [de-identified] : some in previous blister site [de-identified] : telemedicine vist- see photos [de-identified] : \par previous 1.3x2.2x.1\par  see photos\par new blister, already flat\par now open- bulla burst\par \par previous blister anterior to eschar 3.5 x 1. 2\par \par ankle 24 right\par ankle 23 left [FreeTextEntry7] : posterior calf [FreeTextEntry8] : 2 [FreeTextEntry9] : 3.2 [de-identified] : 0.2 [de-identified] : bleeding and slough [de-identified] : gent/non stick dressing [de-identified] : painful- larger\par  [de-identified] : left leg [de-identified] : 4 [de-identified] : 1 [de-identified] : .1 [de-identified] : other [TWNoteComboBox1] : Right [TWNoteComboBox6] : Other [de-identified] : None [de-identified] : 50% [TWNoteComboBox7] : Mechanical [TWNoteComboBox9] : Right [de-identified] : Other [de-identified] : None [de-identified] : Yes [de-identified] : >75% [TWNoteComboBox8] : Mechanical [de-identified] : Venous

## 2022-07-25 NOTE — ASSESSMENT
[FreeTextEntry1] : 69 year old DM womanw/ open wound to her right lower leg  pemphigoid, on prednisone, leukopenic, anemic on ivig h/o tahbso uterine ca stage 1a- no fever \par kaldestat- too painful, restart gent on that side, taking doxycycline\par seeing derm next week\par has pain and willing to try something - gentamicin topical \par order for vna for bilateral wounds legs\par needs more prontosan, foam/ would like compression with unna/dynaflex\par toes pink- not ischemic\par \par one on the bottom left leg , responded to betadine- closed- reopened\par right blister site is the painful site -looks like [place]- transverse wound\par has used adaptic/telfa- needs  sonya, 4x4- has xeroform - telfa too sticky\par  Patient and family instructed on how to place leg wraps\par \par chris low on left leg/ high pressure on right, palpable pulses/ no Vinsuff 2018- may need reeval\par  datacomplexity lab, xr, old rec, test resultsreviewed, visualize image \par risk-low-no surgery at this time\par \par  history of bullous phemphigoid, \par  technical difficuty:mod- called a few times was able to get through\par appoint for F-T-F-no\par virtual appointment 2 weeks\par referral to PCP[facility]\par \par 7-22-22:\par RLE Swelling extending from medial leg to posterior leg- firmer and more indurated towards posterior\par Small clot aspirated with no other drainage using an 18 gauge needle. hematoma suspected\par  \par Patient reports pain with movement and rest\par Bruising and scaly skin around periwound\par \par Incision and drainage done, injection of lidocaine with epi given. 2cc\par using an 11 blade and eliptical incision was made at the inferior portion of wound where it was softer.\par Clot visualized.  Cotton tip applicator used to break up clot and then express it.  Jelly like clot evacuated.\par No active bleeding seen.  Wound packed with 1/2in iodoform packing to keep opening patent.  Superabsorber placed then sonya and ace.

## 2022-07-29 ENCOUNTER — APPOINTMENT (OUTPATIENT)
Dept: DERMATOLOGY | Facility: CLINIC | Age: 72
End: 2022-07-29

## 2022-07-29 ENCOUNTER — APPOINTMENT (OUTPATIENT)
Dept: WOUND CARE | Facility: CLINIC | Age: 72
End: 2022-07-29

## 2022-07-29 DIAGNOSIS — S80.11XD CONTUSION OF RIGHT LOWER LEG, SUBSEQUENT ENCOUNTER: ICD-10-CM

## 2022-07-29 DIAGNOSIS — T14.8XXA OTHER INJURY OF UNSPECIFIED BODY REGION, INITIAL ENCOUNTER: ICD-10-CM

## 2022-07-29 PROCEDURE — 99214 OFFICE O/P EST MOD 30 MIN: CPT

## 2022-07-29 PROCEDURE — 11042 DBRDMT SUBQ TIS 1ST 20SQCM/<: CPT | Mod: 58

## 2022-07-29 RX ORDER — MUPIROCIN 20 MG/G
2 OINTMENT TOPICAL TWICE DAILY
Qty: 1 | Refills: 4 | Status: ACTIVE | COMMUNITY
Start: 2022-07-29 | End: 1900-01-01

## 2022-07-29 NOTE — HISTORY OF PRESENT ILLNESS
[FreeTextEntry1] : 70 yo f with wounds for 3 months, treating with clobetasol, muprocin, had been treated in 2018\par right leg terible, opened in 2 other places\par no fever, seeing derm next week\par would like vna- left leg also opened, but settled down with gent\par \par using telfa ,1 new blister, old ones derided with dressing\par still bleeding thru telfa on back of leg, but \par Patient developed leg wounds since 12/2017. Takes igg treatment and prednisone\par had been using betadine, no blisters - previous one broke, now has open ulcer at that site\par \par right posterior leg very sore\par \par   h/o Bullous pemphigoid. Currently still taking doxy 100mg BID, nicotinamide 500mg BID, \par prednisone 10mg daily, and IVIG [id number] (s/p 2 rounds since January, most recently at end of March). \par She feels that her BP is under best control in the 2 weeks immediately after her IVIG infusion.\par \par  She notes that her flares have decreased in intensity and duration since starting IVIG. \par Still developing bullae on b/l arms and legs. Denies oral or genital lesions. \par Tolerating her medications well but does note easy bruising while on prednisone--currently on 10mg QD. Also notes skin fragility.\par 6/1/18- Patient and  report that right leg wounds are significantly improved\par  \par 2) Lipodermatosclerosis. Has an erosion on R medial calf present since December that started as a blister. It was originally draining profusely per pt; she denies fevers, chills, foul smell.  Wound nurse is applying clob oint to periphery and Alginate dressing over the wound. \par \par BACKGROUND:PMH Stage IA carcinosarcoma of the uterus (s/p Carbo/Taxol x 6 cycles from 12/15 - 4/16), currently in remission, \par Biopsy and DIF from 8/2014 consistent with BP (scanned into file). \par Previously well controlled with Cellcept, prednisone, and clobetasol with flare after stopping Cellcept due to decreasing blood counts.  \par +H/o vaginal erosions\par Her primary dermatologist in the past was Dr Silva.  \par Pt previously stated that whenever predisone has been decreased below 10mg daily she flares.\par 9/5- leg wounds related to body habitus and pemphigus \par \par 7/22/2022\par Ms. AME ZIMMERMAN   presents to the office with a wound since 6/25/2022 after hitting her leg against her walker. The patient reported the leg bruising then forming a large blister. The wound is located on  the right medial leg. The patient has complaints of pain.  The patient has been dressing the wound with mupirocin and gauze.  The patient denies fevers or chills. The patient has localized pain to the wound upon dressing changes. The patient has no other complaints or associated symptoms. HbA1c is 4.5 as of May 2022 per Patient.\par

## 2022-07-29 NOTE — PHYSICAL EXAM
[JVD] : no jugular venous distention  [Normal Breath Sounds] : Normal breath sounds [1+] : right 1+ [Ankle Swelling (On Exam)] : present [Ankle Swelling Bilaterally] : bilaterally  [Ankle Swelling On The Left] : moderate [] : of the right leg [Ankle Swelling On The Right] : mild [Skin Ulcer] : ulcer [Alert] : alert [Oriented to Person] : oriented to person [Oriented to Place] : oriented to place [Oriented to Time] : oriented to time [Calm] : calm [de-identified] : NAD, arrives in WC, MO [de-identified] : very ambulation [FreeTextEntry1] : medial leg [FreeTextEntry2] : 3 [FreeTextEntry3] : 4 [FreeTextEntry4] : .1 [de-identified] : eschar [de-identified] : some in previous blister site [de-identified] : telemedicine vist- see photos [de-identified] : \par previous 1.3x2.2x.1\par  see photos\par new blister, already flat\par now open- bulla burst\par \par previous blister anterior to eschar 3.5 x 1. 2\par \par ankle 24 right\par ankle 23 left [FreeTextEntry7] : posterior calf [FreeTextEntry8] : 2 [FreeTextEntry9] : 3.2 [de-identified] : 0.2 [de-identified] : gent/non stick dressing [de-identified] : bleeding and slough [de-identified] : painful- larger\par  [de-identified] : left leg [de-identified] : 4 [de-identified] : 1 [de-identified] : .1 [de-identified] : other [TWNoteComboBox1] : Right [TWNoteComboBox6] : Other [de-identified] : None [de-identified] : 50% [TWNoteComboBox7] : Mechanical [de-identified] : Other [TWNoteComboBox9] : Right [de-identified] : None [de-identified] : >75% [de-identified] : Yes [TWNoteComboBox8] : Mechanical [de-identified] : Venous

## 2022-07-29 NOTE — ASSESSMENT
[FreeTextEntry1] : 69 year old DM womanw/ open wound to her right lower leg  pemphigoid, on prednisone, leukopenic, anemic on ivig h/o tahbso uterine ca stage 1a- no fever \par kaldestat- too painful, restart gent on that side, taking doxycycline\par seeing derm next week\par has pain and willing to try something - gentamicin topical \par order for vna for bilateral wounds legs\par needs more prontosan, foam/ would like compression with unna/dynaflex\par toes pink- not ischemic\par \par one on the bottom left leg , responded to betadine- closed- reopened\par right blister site is the painful site -looks like [place]- transverse wound\par has used adaptic/telfa- needs  sonya, 4x4- has xeroform - telfa too sticky\par  Patient and family instructed on how to place leg wraps\par \par chris low on left leg/ high pressure on right, palpable pulses/ no Vinsuff 2018- may need reeval\par  datacomplexity lab, xr, old rec, test resultsreviewed, visualize image \par risk-low-no surgery at this time\par \par  history of bullous phemphigoid, \par  technical difficuty:mod- called a few times was able to get through\par appoint for F-T-F-no\par virtual appointment 2 weeks\par referral to [facility]\par \par 7-22-22:\par RLE Swelling extending from medial leg to posterior leg- firmer and more indurated towards posterior\par Small clot aspirated with no other drainage using an 18 gauge needle. hematoma suspected\par  \par Patient reports pain with movement and rest\par Bruising and scaly skin around periwound\par \par Incision and drainage done, injection of lidocaine with epi given. 2cc\par using an 11 blade and eliptical incision was made at the inferior portion of wound where it was softer.\par Clot visualized.  Cotton tip applicator used to break up clot and then express it.  Jelly like clot evacuated.\par No active bleeding seen.  Wound packed with 1/2in iodoform packing to keep opening patent.  Supera\par bsorber placed then sonya and ace.\par \par 7-29-22:\par Pt here for f/u\par Pt accompanied by \par Pt says wound feel better but does drain serosanguinous fluid\par On exam:\par dry flaking skin small opneing from I&D-- scant slough.  No s/s of infection\par Induration extending posteriorly improving\par s/p excisional debridement

## 2022-07-29 NOTE — H&P ADULT - NEGATIVE CARDIOVASCULAR SYMPTOMS
Subjective:      Yojana Guevara is a 8 y.o. female here for well child check.     Vitals:    07/29/22 1519   BP: 105/69   Pulse: 91   Resp: 19   Temp: 98.6 °F (37 °C)       Body mass index is 15.84 kg/m².  81 %ile (Z= 0.87) based on [facility] (Girls, 2-20 Years) weight-for-age data using vitals from 7/29/2022.  90 %ile (Z= 1.31) based on CDC (Girls, 2-20 Years) Stature-for-age data based on Stature recorded on 7/29/2022.    HPI: Well child here for WCC. Eating well varied diet, voiding and stooling appropriately for age. Sleeping well, developing appropriately. Pt is starting 1st grade, doing well and advancing appropriately. MOP reports she has had intermittent cough x1 week. It usually wakes her up coughing around 3-3:30 am. No significant coughing during the day, no difficulty with exercise, no fever, no wheezing, no nasal congestion. Parents deny any other concerns at this time.     PMHx:  Past Medical History:   Diagnosis Date    Heart murmur        PSHx:  History reviewed. No pertinent surgical history.    All:  Patient has no known allergies.    Med:  has a current medication list which includes the following prescription(s): pediatric multivit-iron-min and ranitidine.    Imms:  UTD    SocHx:   reports that she has never smoked. She has never used smokeless tobacco.    Review of Systems:  Constitutional: Negative for activity change, appetite change, fatigue and fever.   HENT: Negative for congestion and rhinorrhea.    Eyes: Negative for discharge.   Respiratory: Negative for shortness of breath and wheezing.    Gastrointestinal: Negative for constipation, diarrhea and vomiting.   Skin: Negative for rash.     Patient answers are not available for this visit.        Objective:     Gen: Pt is well appearing, well nourished. Alert and appropriately responsive to exam.  HEENT: Normocephalic, atraumatic. PERRL, EOMI, conjunctiva wnl. Nose wnl, no rhinorrhea. MMM.  Resp: Lungs CTAB with normal respiratory effort, no  wheezes or rhonchi.  CV: HRRR, no m/r/g. Pulses strong and equal b/l.  Abd: Soft, NABS.  : deferred per pt request  Neuro/MS: Moves all extremities appropriately, strength normal.  Skin: no rash or jaundice    Assessment:        1. Encounter for well child check without abnormal findings    2. Visual testing    3. BMI (body mass index), pediatric, 5% to less than 85% for age    4. Cough         Plan:       Healthy 9 y/o child with normal PE and growth.    -BMI , discussed importance of healthy diet and exercise.  -BP <90% for age.  -Development reviewed and appropriate for age.  -Recommend trialing Zyrtec 5mg PO daily for possible postnasal drip as cause of cough; if no improvement in 3 weeks f/u at that time or sooner prn.   -Vision screen reassuring, continue to monitor annually  -Imms: UTD  -Anticipatory guidance discussed, all questions answered.  -F/U at annually for next WCC or sooner prn.        no palpitations/no chest pain/no dyspnea on exertion/no orthopnea

## 2022-07-29 NOTE — PLAN
[FreeTextEntry1] :  \par Plan -  gentamicin   area, foam, spandage to keep in place,- \par will order vna\par  supplies ordered- foam/ xeroform non stick\par prontosan-\par \par 7-22-22:\par Plan:\par wash with soap and water\par iodoform packing/super absorber/sonya/ace QD\par  to do dressing\par If active bleeding seen pt instructed to go to ER.\par supplies ordered and given\par F/u 1 week \par \par 7-29-22:\par Plan:\par wash with soap and water\par iodoform packing/super absorber/sonya/ace QD\par  to do dressing\par f/u 1 week\par

## 2022-08-01 PROBLEM — S80.11XD LEG HEMATOMA, RIGHT, SUBSEQUENT ENCOUNTER: Status: ACTIVE | Noted: 2022-08-01

## 2022-08-02 RX ADMIN — IMMUNE GLOBULIN INFUSION (HUMAN) 0 GM/200ML: 100 INJECTION, SOLUTION INTRAVENOUS; SUBCUTANEOUS at 00:00

## 2022-08-02 RX ADMIN — Medication 0 MG: at 00:00

## 2022-08-02 RX ADMIN — ACETAMINOPHEN 0 MG: 325 TABLET, FILM COATED ORAL at 00:00

## 2022-08-02 RX ADMIN — IMMUNE GLOBULIN INFUSION (HUMAN) 0 GM/300ML: 100 INJECTION, SOLUTION INTRAVENOUS; SUBCUTANEOUS at 00:00

## 2022-08-02 RX ADMIN — DIPHENHYDRAMINE HCL 0 MG: 25 CAPSULE ORAL at 00:00

## 2022-08-04 RX ADMIN — Medication 0 MG: at 00:00

## 2022-08-04 RX ADMIN — DIPHENHYDRAMINE HCL 0 MG: 25 CAPSULE ORAL at 00:00

## 2022-08-04 RX ADMIN — IMMUNE GLOBULIN INFUSION (HUMAN) 0 GM/300ML: 100 INJECTION, SOLUTION INTRAVENOUS; SUBCUTANEOUS at 00:00

## 2022-08-04 RX ADMIN — IMMUNE GLOBULIN INFUSION (HUMAN) 0 GM/200ML: 100 INJECTION, SOLUTION INTRAVENOUS; SUBCUTANEOUS at 00:00

## 2022-08-05 ENCOUNTER — APPOINTMENT (OUTPATIENT)
Dept: WOUND CARE | Facility: CLINIC | Age: 72
End: 2022-08-05

## 2022-08-05 VITALS — TEMPERATURE: 96.3 F

## 2022-08-05 DIAGNOSIS — S80.12XA CONTUSION OF LEFT LOWER LEG, INITIAL ENCOUNTER: ICD-10-CM

## 2022-08-05 PROCEDURE — 99213 OFFICE O/P EST LOW 20 MIN: CPT

## 2022-08-05 NOTE — ASSESSMENT
[FreeTextEntry1] : 71 year old DM womanw/ open wound to her right lower leg  pemphigoid, on prednisone, leukopenic, anemic on ivig h/o tahbso uterine ca stage 1a- no fever \par s/p hematoma right leg, had necrosis, was debrided\par using iodoform packing\par \par kaldestat- too painful, s/p doxycycline\par seeing derm next week\par \par  \par    right wounds legs\par   foam/ would like compression  \par toes pink- not ischemic\par \par  , responded to betadine- closed- reopened\par right blister site is the painful site -looks like [place]- transverse wound\par has used adaptic/telfa- needs  sonya, 4x4- \par  Patient and family instructed on how to place leg wraps\par \par chris low on left leg/ high pressure on right, palpable pulses/ no Vinsuff 2018- may need reeval\par  datacomplexity lab, xr, old rec, test resultsreviewed, visualize image \par risk-low-no surgery at this time\par \par  history of bullous phemphigoid, \par  technical difficuty:mod- called a few times was able to get through\par appoint for F-T-F-no\par virtual appointment 2 weeks\par referral to [facility]\par \par 7-22-22:\par RLE Swelling extending from medial leg to posterior leg- firmer and more indurated towards posterior\par Small clot aspirated with no other drainage using an 18 gauge needle. hematoma suspected\par  \par Patient reports pain with movement and rest\par Bruising and scaly skin around periwound\par \par Incision and drainage done, injection of lidocaine with epi given. 2cc\par using an 11 blade and eliptical incision was made at the inferior portion of wound where it was softer.\par Clot visualized.  Cotton tip applicator used to break up clot and then express it.  Jelly like clot evacuated.\par No active bleeding seen.  Wound packed with 1/2in iodoform packing to keep opening patent.  Supera\par bsorber placed then sonya and ace.\par \par 7-29-22:\par Pt here for f/u\par Pt accompanied by \par Pt says wound feel better but does drain serosanguinous fluid\par On exam:\par dry flaking skin small opneing from I&D-- scant slough.  No s/s of infection\par Induration extending posteriorly improving\par s/p excisional debridement

## 2022-08-05 NOTE — PHYSICAL EXAM
[JVD] : no jugular venous distention  [Normal Breath Sounds] : Normal breath sounds [1+] : right 1+ [Ankle Swelling (On Exam)] : present [Ankle Swelling Bilaterally] : bilaterally  [Ankle Swelling On The Left] : moderate [] : of the right leg [Ankle Swelling On The Right] : mild [Skin Ulcer] : ulcer [Alert] : alert [Oriented to Person] : oriented to person [Oriented to Place] : oriented to place [Oriented to Time] : oriented to time [Calm] : calm [de-identified] : NAD, arrives in WC, MO [de-identified] : very ambulation [FreeTextEntry1] : medial leg [FreeTextEntry2] : 3 [FreeTextEntry3] : 4 [FreeTextEntry4] : .1 [de-identified] : eschar [de-identified] : telemedicine vist- see photos [de-identified] : some in previous blister site [de-identified] : \par previous 1.3x2.2x.1\par  see photos\par new blister, already flat\par now open- bulla burst\par \par previous blister anterior to eschar 3.5 x 1. 2\par \par ankle 24 right\par ankle 23 left [FreeTextEntry7] : posterior calf [FreeTextEntry8] : 2 [FreeTextEntry9] : 3.2 [de-identified] : 0.2 [de-identified] : bleeding and slough [de-identified] : gent/non stick dressing [de-identified] : painful- larger\par  [de-identified] : left leg [de-identified] : 4 [de-identified] : 1 [de-identified] : .1 [de-identified] : other [TWNoteComboBox1] : Right [TWNoteComboBox6] : Other [de-identified] : None [de-identified] : 50% [TWNoteComboBox7] : Mechanical [TWNoteComboBox9] : Right [de-identified] : Other [de-identified] : None [de-identified] : >75% [de-identified] : Yes [TWNoteComboBox8] : Mechanical [de-identified] : Venous

## 2022-08-05 NOTE — HISTORY OF PRESENT ILLNESS
[FreeTextEntry1] : 70 yo f with wounds for 3 months, treating with clobetasol, muprocin, had been treated in 2018\par right leg terible, opened in 2 other places\par no fever, seeing derm next week\par would like vna- left leg also opened, but settled down with gent\par \par using telfa ,1 new blister, old ones derided with dressing\par still bleeding thru telfa on back of leg, but \par Patient developed leg wounds since 12/2017. Takes igg treatment and prednisone\par had been using betadine, no blisters - previous one broke, now has open ulcer at that site\par \par right posterior leg very sore\par \par   h/o Bullous pemphigoid. Currently still taking doxy 100mg BID, nicotinamide 500mg BID, \par prednisone 10mg daily, and IVIG [id number] (s/p 2 rounds since January, most recently at end of March). \par She feels that her BP is under best control in the 2 weeks immediately after her IVIG infusion.\par \par  She notes that her flares have decreased in intensity and duration since starting IVIG. \par Still developing bullae on b/l arms and legs. Denies oral or genital lesions. \par Tolerating her medications well but does note easy bruising while on prednisone--currently on 10mg QD. Also notes skin fragility.\par 6/1/18- Patient and  report that right leg wounds are significantly improved\par  \par 2) Lipodermatosclerosis. Has an erosion on R medial calf present since December that started as a blister. It was originally draining profusely per pt; she denies fevers, chills, foul smell.  Wound nurse is applying clob oint to periphery and Alginate dressing over the wound. \par \par BACKGROUND:PMH Stage IA carcinosarcoma of the uterus (s/p Carbo/Taxol x 6 cycles from 12/15 - 4/16), currently in remission, \par Biopsy and DIF from 8/2014 consistent with BP (scanned into file). \par Previously well controlled with Cellcept, prednisone, and clobetasol with flare after stopping Cellcept due to decreasing blood counts.  \par +H/o vaginal erosions\par Her primary dermatologist in the past was Dr Silva.  \par Pt previously stated that whenever predisone has been decreased below 10mg daily she flares.\par 9/5- leg wounds related to body habitus and pemphigus \par \par 7/22/2022\par Ms. AME ZIMMERMAN   presents to the office with a wound since 6/25/2022 after hitting her leg against her walker. The patient reported the leg bruising then forming a large blister. The wound is located on  the right medial leg. The patient has complaints of pain.  The patient has been dressing the wound with mupirocin and gauze.  The patient denies fevers or chills. The patient has localized pain to the wound upon dressing changes. The patient has no other complaints or associated symptoms. HbA1c is 4.5 as of May 2022 per Patient.\par

## 2022-08-06 RX ADMIN — DIPHENHYDRAMINE HCL 0 MG: 25 CAPSULE ORAL at 00:00

## 2022-08-06 RX ADMIN — IMMUNE GLOBULIN INFUSION (HUMAN) 0 GM/200ML: 100 INJECTION, SOLUTION INTRAVENOUS; SUBCUTANEOUS at 00:00

## 2022-08-06 RX ADMIN — Medication 0 MG: at 00:00

## 2022-08-06 RX ADMIN — IMMUNE GLOBULIN INFUSION (HUMAN) 0 GM/300ML: 100 INJECTION, SOLUTION INTRAVENOUS; SUBCUTANEOUS at 00:00

## 2022-08-08 ENCOUNTER — APPOINTMENT (OUTPATIENT)
Dept: RHEUMATOLOGY | Facility: CLINIC | Age: 72
End: 2022-08-08

## 2022-08-08 VITALS
DIASTOLIC BLOOD PRESSURE: 52 MMHG | SYSTOLIC BLOOD PRESSURE: 99 MMHG | OXYGEN SATURATION: 96 % | HEART RATE: 76 BPM | RESPIRATION RATE: 16 BRPM

## 2022-08-08 VITALS
RESPIRATION RATE: 16 BRPM | DIASTOLIC BLOOD PRESSURE: 55 MMHG | HEART RATE: 95 BPM | TEMPERATURE: 97.3 F | SYSTOLIC BLOOD PRESSURE: 95 MMHG | OXYGEN SATURATION: 95 %

## 2022-08-08 PROCEDURE — 96366 THER/PROPH/DIAG IV INF ADDON: CPT

## 2022-08-08 PROCEDURE — 96365 THER/PROPH/DIAG IV INF INIT: CPT

## 2022-08-08 RX ORDER — DIPHENHYDRAMINE HCL 25 MG/1
25 CAPSULE ORAL
Qty: 0 | Refills: 0 | Status: COMPLETED | OUTPATIENT
Start: 2022-08-02

## 2022-08-08 RX ORDER — IMMUNE GLOBULIN INFUSION (HUMAN) 100 MG/ML
20 INJECTION, SOLUTION INTRAVENOUS; SUBCUTANEOUS
Qty: 0 | Refills: 0 | Status: COMPLETED | OUTPATIENT
Start: 2022-08-04

## 2022-08-08 RX ORDER — ACETAMINOPHEN 325 MG/1
325 TABLET, FILM COATED ORAL
Qty: 0 | Refills: 0 | Status: COMPLETED | OUTPATIENT
Start: 2022-08-02

## 2022-08-09 RX ORDER — IMMUNE GLOBULIN INFUSION (HUMAN) 100 MG/ML
30 INJECTION, SOLUTION INTRAVENOUS; SUBCUTANEOUS
Qty: 0 | Refills: 0 | Status: COMPLETED | OUTPATIENT
Start: 2022-05-19 | End: 1900-01-01

## 2022-08-09 RX ORDER — DIPHENHYDRAMINE HCL 25 MG/1
25 CAPSULE ORAL
Qty: 0 | Refills: 0 | Status: COMPLETED | OUTPATIENT
Start: 2022-05-19 | End: 1900-01-01

## 2022-08-09 RX ORDER — IMMUNE GLOBULIN INFUSION (HUMAN) 100 MG/ML
20 INJECTION, SOLUTION INTRAVENOUS; SUBCUTANEOUS
Qty: 0 | Refills: 0 | Status: COMPLETED | OUTPATIENT
Start: 2022-05-19 | End: 1900-01-01

## 2022-08-10 ENCOUNTER — APPOINTMENT (OUTPATIENT)
Dept: RHEUMATOLOGY | Facility: CLINIC | Age: 72
End: 2022-08-10

## 2022-08-10 VITALS — DIASTOLIC BLOOD PRESSURE: 64 MMHG | OXYGEN SATURATION: 100 % | SYSTOLIC BLOOD PRESSURE: 102 MMHG | HEART RATE: 67 BPM

## 2022-08-10 VITALS
TEMPERATURE: 98.7 F | HEART RATE: 93 BPM | SYSTOLIC BLOOD PRESSURE: 103 MMHG | OXYGEN SATURATION: 96 % | DIASTOLIC BLOOD PRESSURE: 88 MMHG

## 2022-08-10 PROCEDURE — 96365 THER/PROPH/DIAG IV INF INIT: CPT

## 2022-08-10 PROCEDURE — 96366 THER/PROPH/DIAG IV INF ADDON: CPT

## 2022-08-10 RX ORDER — IMMUNE GLOBULIN INFUSION (HUMAN) 100 MG/ML
30 INJECTION, SOLUTION INTRAVENOUS; SUBCUTANEOUS
Qty: 0 | Refills: 0 | Status: COMPLETED | OUTPATIENT
Start: 2022-08-09

## 2022-08-10 RX ORDER — IMMUNE GLOBULIN INFUSION (HUMAN) 100 MG/ML
20 INJECTION, SOLUTION INTRAVENOUS; SUBCUTANEOUS
Qty: 0 | Refills: 0 | Status: COMPLETED | OUTPATIENT
Start: 2022-08-09

## 2022-08-10 RX ORDER — DIPHENHYDRAMINE HCL 25 MG/1
25 CAPSULE ORAL
Qty: 0 | Refills: 0 | Status: COMPLETED | OUTPATIENT
Start: 2022-08-09

## 2022-08-12 ENCOUNTER — APPOINTMENT (OUTPATIENT)
Dept: RHEUMATOLOGY | Facility: CLINIC | Age: 72
End: 2022-08-12

## 2022-08-12 VITALS
RESPIRATION RATE: 16 BRPM | HEART RATE: 95 BPM | DIASTOLIC BLOOD PRESSURE: 60 MMHG | OXYGEN SATURATION: 95 % | TEMPERATURE: 97.8 F | SYSTOLIC BLOOD PRESSURE: 116 MMHG

## 2022-08-12 VITALS
RESPIRATION RATE: 16 BRPM | DIASTOLIC BLOOD PRESSURE: 62 MMHG | OXYGEN SATURATION: 96 % | HEART RATE: 76 BPM | SYSTOLIC BLOOD PRESSURE: 114 MMHG

## 2022-08-12 PROCEDURE — 96365 THER/PROPH/DIAG IV INF INIT: CPT

## 2022-08-12 PROCEDURE — 96366 THER/PROPH/DIAG IV INF ADDON: CPT

## 2022-08-12 RX ORDER — IMMUNE GLOBULIN INFUSION (HUMAN) 100 MG/ML
30 INJECTION, SOLUTION INTRAVENOUS; SUBCUTANEOUS
Qty: 0 | Refills: 0 | Status: COMPLETED | OUTPATIENT
Start: 2022-08-09

## 2022-08-12 RX ORDER — DIPHENHYDRAMINE HCL 25 MG/1
25 CAPSULE ORAL
Qty: 0 | Refills: 0 | Status: COMPLETED | OUTPATIENT
Start: 2022-08-09

## 2022-08-12 RX ORDER — IMMUNE GLOBULIN INFUSION (HUMAN) 100 MG/ML
20 INJECTION, SOLUTION INTRAVENOUS; SUBCUTANEOUS
Qty: 0 | Refills: 0 | Status: COMPLETED | OUTPATIENT
Start: 2022-08-09

## 2022-08-19 ENCOUNTER — APPOINTMENT (OUTPATIENT)
Dept: WOUND CARE | Facility: CLINIC | Age: 72
End: 2022-08-19

## 2022-08-19 VITALS
TEMPERATURE: 97.8 F | SYSTOLIC BLOOD PRESSURE: 118 MMHG | BODY MASS INDEX: 42.65 KG/M2 | HEART RATE: 94 BPM | WEIGHT: 256 LBS | DIASTOLIC BLOOD PRESSURE: 77 MMHG | HEIGHT: 65 IN

## 2022-08-19 PROCEDURE — 11042 DBRDMT SUBQ TIS 1ST 20SQCM/<: CPT

## 2022-08-19 NOTE — HISTORY OF PRESENT ILLNESS
[FreeTextEntry1] : 68 yo f with wounds for 3 months, treating with clobetasol, muprocin, had been treated in 2018\par right leg terible, opened in 2 other places\par no fever, seeing derm next week\par would like vna- left leg also opened, but settled down with gent\par \par using telfa ,1 new blister, old ones derided with dressing\par still bleeding thru telfa on back of leg, but \par Patient developed leg wounds since 12/2017. Takes igg treatment and prednisone\par had been using betadine, no blisters - previous one broke, now has open ulcer at that site\par \par right posterior leg very sore\par \par   h/o Bullous pemphigoid. Currently still taking doxy 100mg BID, nicotinamide 500mg BID, \par prednisone 10mg daily, and IVIG [id number] (s/p 2 rounds since January, most recently at end of March). \par She feels that her BP is under best control in the 2 weeks immediately after her IVIG infusion.\par \par  She notes that her flares have decreased in intensity and duration since starting IVIG. \par Still developing bullae on b/l arms and legs. Denies oral or genital lesions. \par Tolerating her medications well but does note easy bruising while on prednisone--currently on 10mg QD. Also notes skin fragility.\par 6/1/18- Patient and  report that right leg wounds are significantly improved\par  \par 2) Lipodermatosclerosis. Has an erosion on R medial calf present since December that started as a blister. It was originally draining profusely per pt; she denies fevers, chills, foul smell.  Wound nurse is applying clob oint to periphery and Alginate dressing over the wound. \par \par BACKGROUND:PMH Stage IA carcinosarcoma of the uterus (s/p Carbo/Taxol x 6 cycles from 12/15 - 4/16), currently in remission, \par Biopsy and DIF from 8/2014 consistent with BP (scanned into file). \par Previously well controlled with Cellcept, prednisone, and clobetasol with flare after stopping Cellcept due to decreasing blood counts.  \par +H/o vaginal erosions\par Her primary dermatologist in the past was Dr Silva.  \par Pt previously stated that whenever predisone has been decreased below 10mg daily she flares.\par 9/5- leg wounds related to body habitus and pemphigus \par \par 7/22/2022\par Ms. AME ZIMMERMAN   presents to the office with a wound since 6/25/2022 after hitting her leg against her walker. The patient reported the leg bruising then forming a large blister. The wound is located on  the right medial leg. The patient has complaints of pain.  The patient has been dressing the wound with mupirocin and gauze.  The patient denies fevers or chills. The patient has localized pain to the wound upon dressing changes. The patient has no other complaints or associated symptoms. HbA1c is 4.5 as of May 2022 per Patient.\par

## 2022-08-19 NOTE — PHYSICAL EXAM
[Normal Breath Sounds] : Normal breath sounds [1+] : right 1+ [Ankle Swelling (On Exam)] : present [Ankle Swelling Bilaterally] : bilaterally  [Ankle Swelling On The Left] : moderate [] : of the right leg [Ankle Swelling On The Right] : mild [Skin Ulcer] : ulcer [Alert] : alert [Oriented to Person] : oriented to person [Oriented to Place] : oriented to place [Oriented to Time] : oriented to time [Calm] : calm [JVD] : no jugular venous distention  [de-identified] : NAD, arrives in WC, [place] [de-identified] : very ambulation [Please See PDF for [employer]] : Please See PDF for [employer]. [FreeTextEntry1] : medial leg [FreeTextEntry2] : 3 [FreeTextEntry3] : 4 [FreeTextEntry4] : .1 [de-identified] : eschar [de-identified] : some in previous blister site [de-identified] : telemedicine vist- see photos [de-identified] : \par previous 1.3x2.2x.1\par  see photos\par new blister, already flat\par now open- bulla burst\par \par previous blister anterior to eschar 3.5 x 1. 2\par \par ankle 24 right\par ankle 23 left [FreeTextEntry7] : posterior calf [FreeTextEntry8] : 2 [FreeTextEntry9] : 3.2 [de-identified] : 0.2 [de-identified] : bleeding and slough [de-identified] : gent/non stick dressing [de-identified] : painful- larger\par  [de-identified] : left leg [de-identified] : 4 [de-identified] : 1 [de-identified] : .1 [de-identified] : other [TWNoteComboBox1] : Right [TWNoteComboBox6] : Other [de-identified] : None [de-identified] : 50% [TWNoteComboBox7] : Mechanical [TWNoteComboBox9] : Right [de-identified] : Other [de-identified] : None [de-identified] : >75% [de-identified] : Yes [TWNoteComboBox8] : Mechanical [de-identified] : Venous

## 2022-08-19 NOTE — ASSESSMENT
[FreeTextEntry1] : 71 year old DM womanw/ open wound to her right lower leg  pemphigoid, on prednisone, leukopenic, anemic on ivig h/o tahbso uterine ca stage 1a- no fever \par s/p hematoma right leg, had necrosis, was debrided\par using iodoform packing\par \par kaldestat- too painful, s/p doxycycline\par seeing derm next week\par \par  \par    right wounds legs\par   foam/ would like compression  \par toes pink- not ischemic\par \par  , responded to betadine- closed- reopened\par right blister site is the painful site -looks like [place]- transverse wound\par has used adaptic/telfa- needs  sonya, 4x4- \par  Patient and family instructed on how to place leg wraps\par \par chris low on left leg/ high pressure on right, palpable pulses/ no Vinsuff 2018- may need reeval\par  datacomplexity lab, xr, old rec, test resultsreviewed, visualize image \par risk-low-no surgery at this time\par \par  history of bullous phemphigoid, \par  technical difficuty:mod- called a few times was able to get through\par appoint for F-T-F-no\par virtual appointment 2 weeks\par referral to [facility]\par \par 7-22-22:\par RLE Swelling extending from medial leg to posterior leg- firmer and more indurated towards posterior\par Small clot aspirated with no other drainage using an 18 gauge needle. hematoma suspected\par  \par Patient reports pain with movement and rest\par Bruising and scaly skin around periwound\par \par Incision and drainage done, injection of lidocaine with epi given. 2cc\par using an 11 blade and eliptical incision was made at the inferior portion of wound where it was softer.\par Clot visualized.  Cotton tip applicator used to break up clot and then express it.  Jelly like clot evacuated.\par No active bleeding seen.  Wound packed with 1/2in iodoform packing to keep opening patent.  Supera\par bsorber placed then sonya and ace.\par \par 7-29-22:\par Pt here for f/u\par Pt accompanied by \par Pt says wound feel better but does drain serosanguinous fluid\par On exam:\par dry flaking skin small opneing from I&D-- scant slough.  No s/s of infection\par Induration extending posteriorly improving\par s/p excisional debridement\par \par 8-19-22:\par Pt here for f/u, accompanied by \par  does wound care\par No new complaints\par On exam: \par Mild blanchable erythema to mid lower leg without warmth tenderness induration fluctuance or crepitus\par Wound 0.3 cm deep.  No undermining.  Scant slough to wound bed.  Small skin bridge\par s/p excisional debridement and excision of  skin bridge to aid in dressing of wound\par

## 2022-08-19 NOTE — PLAN
[FreeTextEntry1] :  \par Plan -  gentamicin   area, foam, spandage to keep in place,- \par will order vna\par  supplies ordered- foam/ xeroform non stick\par prontosan-\par \par 7-22-22:\par Plan:\par wash with soap and water\par iodoform packing/super absorber/sonya/ace QD\par  to do dressing\par If active bleeding seen pt instructed to go to ER.\par supplies ordered and given\par F/u 1 week \par \par 7-29-22:\par Plan:\par wash with soap and water\par iodoform packing/super absorber/sonya/ace QD\par  to do dressing\par f/u 1 week\par \par \par 8-17-22:\par Plan:\par honey/foam/sonya/ace qd  (pt to use superabsorber at home since has them)\par Pt to buy honey on their own\par F/u 2-3 weeks\par F/u 2-3 weeks

## 2022-09-06 ENCOUNTER — APPOINTMENT (OUTPATIENT)
Dept: RHEUMATOLOGY | Facility: CLINIC | Age: 72
End: 2022-09-06

## 2022-09-06 VITALS
TEMPERATURE: 97.6 F | RESPIRATION RATE: 16 BRPM | OXYGEN SATURATION: 95 % | HEART RATE: 95 BPM | SYSTOLIC BLOOD PRESSURE: 124 MMHG | DIASTOLIC BLOOD PRESSURE: 67 MMHG

## 2022-09-06 VITALS
HEART RATE: 68 BPM | RESPIRATION RATE: 16 BRPM | DIASTOLIC BLOOD PRESSURE: 60 MMHG | OXYGEN SATURATION: 98 % | SYSTOLIC BLOOD PRESSURE: 107 MMHG

## 2022-09-06 PROCEDURE — 96365 THER/PROPH/DIAG IV INF INIT: CPT

## 2022-09-06 PROCEDURE — 96366 THER/PROPH/DIAG IV INF ADDON: CPT

## 2022-09-06 RX ORDER — DIPHENHYDRAMINE HCL 25 MG/1
25 CAPSULE ORAL
Qty: 0 | Refills: 0 | Status: COMPLETED | OUTPATIENT
Start: 2022-08-30

## 2022-09-06 RX ORDER — IMMUNE GLOBULIN INFUSION (HUMAN) 100 MG/ML
20 INJECTION, SOLUTION INTRAVENOUS; SUBCUTANEOUS
Qty: 0 | Refills: 0 | Status: COMPLETED | OUTPATIENT
Start: 2022-08-30

## 2022-09-07 ENCOUNTER — APPOINTMENT (OUTPATIENT)
Dept: RHEUMATOLOGY | Facility: CLINIC | Age: 72
End: 2022-09-07

## 2022-09-07 PROCEDURE — 96365 THER/PROPH/DIAG IV INF INIT: CPT

## 2022-09-07 PROCEDURE — 96366 THER/PROPH/DIAG IV INF ADDON: CPT

## 2022-09-07 RX ORDER — IMMUNE GLOBULIN INFUSION (HUMAN) 100 MG/ML
20 INJECTION, SOLUTION INTRAVENOUS; SUBCUTANEOUS
Qty: 0 | Refills: 0 | Status: COMPLETED | OUTPATIENT
Start: 2022-09-01

## 2022-09-07 RX ORDER — DIPHENHYDRAMINE HCL 25 MG/1
25 CAPSULE ORAL
Qty: 0 | Refills: 0 | Status: COMPLETED | OUTPATIENT
Start: 2022-09-01

## 2022-09-09 ENCOUNTER — APPOINTMENT (OUTPATIENT)
Dept: RHEUMATOLOGY | Facility: CLINIC | Age: 72
End: 2022-09-09

## 2022-09-09 VITALS
DIASTOLIC BLOOD PRESSURE: 78 MMHG | HEART RATE: 82 BPM | RESPIRATION RATE: 16 BRPM | OXYGEN SATURATION: 98 % | SYSTOLIC BLOOD PRESSURE: 138 MMHG

## 2022-09-09 VITALS
RESPIRATION RATE: 16 BRPM | DIASTOLIC BLOOD PRESSURE: 77 MMHG | SYSTOLIC BLOOD PRESSURE: 137 MMHG | OXYGEN SATURATION: 98 % | HEART RATE: 88 BPM | TEMPERATURE: 97.8 F

## 2022-09-09 PROCEDURE — 96366 THER/PROPH/DIAG IV INF ADDON: CPT

## 2022-09-09 PROCEDURE — 96365 THER/PROPH/DIAG IV INF INIT: CPT

## 2022-09-09 RX ORDER — DIPHENHYDRAMINE HCL 25 MG/1
25 CAPSULE ORAL
Qty: 0 | Refills: 0 | Status: COMPLETED | OUTPATIENT
Start: 2022-09-03

## 2022-09-09 RX ORDER — IMMUNE GLOBULIN INFUSION (HUMAN) 100 MG/ML
20 INJECTION, SOLUTION INTRAVENOUS; SUBCUTANEOUS
Qty: 0 | Refills: 0 | Status: COMPLETED | OUTPATIENT
Start: 2022-09-03

## 2022-09-09 RX ORDER — IMMUNE GLOBULIN INFUSION (HUMAN) 100 MG/ML
30 INJECTION, SOLUTION INTRAVENOUS; SUBCUTANEOUS
Qty: 0 | Refills: 0 | Status: COMPLETED | OUTPATIENT
Start: 2022-09-03

## 2022-09-14 ENCOUNTER — APPOINTMENT (OUTPATIENT)
Dept: DERMATOLOGY | Facility: CLINIC | Age: 72
End: 2022-09-14

## 2022-09-14 DIAGNOSIS — T14.8XXA OTHER INJURY OF UNSPECIFIED BODY REGION, INITIAL ENCOUNTER: ICD-10-CM

## 2022-09-14 PROCEDURE — 99214 OFFICE O/P EST MOD 30 MIN: CPT

## 2022-09-16 ENCOUNTER — APPOINTMENT (OUTPATIENT)
Dept: WOUND CARE | Facility: CLINIC | Age: 72
End: 2022-09-16

## 2022-09-16 DIAGNOSIS — Z87.828 PERSONAL HISTORY OF OTHER (HEALED) PHYSICAL INJURY AND TRAUMA: ICD-10-CM

## 2022-09-16 DIAGNOSIS — R58 HEMORRHAGE, NOT ELSEWHERE CLASSIFIED: ICD-10-CM

## 2022-09-16 DIAGNOSIS — S81.801D UNSPECIFIED OPEN WOUND, RIGHT LOWER LEG, SUBSEQUENT ENCOUNTER: ICD-10-CM

## 2022-09-16 PROCEDURE — 99213 OFFICE O/P EST LOW 20 MIN: CPT

## 2022-09-16 NOTE — PHYSICAL EXAM
[Normal Breath Sounds] : Normal breath sounds [1+] : right 1+ [Ankle Swelling (On Exam)] : present [Ankle Swelling Bilaterally] : bilaterally  [Ankle Swelling On The Left] : moderate [] : of the right leg [Ankle Swelling On The Right] : mild [Skin Ulcer] : ulcer [Alert] : alert [Oriented to Person] : oriented to person [Oriented to Place] : oriented to place [Oriented to Time] : oriented to time [Calm] : calm [Please See PDF for [employer]] : Please See PDF for [employer]. [JVD] : no jugular venous distention  [de-identified] : NAD, arrives in WC, MO [de-identified] : very ambulation [FreeTextEntry1] : medial leg [FreeTextEntry2] : 3 [FreeTextEntry3] : 4 [FreeTextEntry4] : .1 [de-identified] : eschar [de-identified] : some in previous blister site [de-identified] : telemedicine vist- see photos [de-identified] : \par previous 1.3x2.2x.1\par  see photos\par new blister, already flat\par now open- bulla burst\par \par previous blister anterior to eschar 3.5 x 1. 2\par \par ankle 24 right\par ankle 23 left [FreeTextEntry7] : posterior calf [FreeTextEntry8] : 2 [FreeTextEntry9] : 3.2 [de-identified] : 0.2 [de-identified] : bleeding and slough [de-identified] : gent/non stick dressing [de-identified] : painful- larger\par  [de-identified] : left leg [de-identified] : 4 [de-identified] : 1 [de-identified] : .1 [de-identified] : other [TWNoteComboBox1] : Right [TWNoteComboBox6] : Other [de-identified] : None [de-identified] : 50% [TWNoteComboBox7] : Mechanical [TWNoteComboBox9] : Right [de-identified] : Other [de-identified] : None [de-identified] : >75% [de-identified] : Yes [TWNoteComboBox8] : Mechanical [de-identified] : Venous

## 2022-09-16 NOTE — PLAN
[FreeTextEntry1] :  \par Plan -  gentamicin   area, foam, spandage to keep in place,- \par will order vna\par  supplies ordered- foam/ xeroform non stick\par prontosan-\par \par 7-22-22:\par Plan:\par wash with soap and water\par iodoform packing/super absorber/sonya/ace QD\par  to do dressing\par If active bleeding seen pt instructed to go to ER.\par supplies ordered and given\par F/u 1 week \par \par 7-29-22:\par Plan:\par wash with soap and water\par iodoform packing/super absorber/sonya/ace QD\par  to do dressing\par f/u 1 week\par \par \par 8-17-22:\par Plan:\par honey/foam/sonya/ace qd  (pt to use superabsorber at home since has them)\par Pt to buy honey on their own\par F/u 2-3 weeks\par F/u 2-3 weeks\par \par 9-16-22:\par Plan:\par RLE wound healed.  \par LUE ecchymosis - no treatment needed\par recommend otc compression socks (8-15mmHg) as pt says she cant wear the circaids since they have given her blisters when they were prescribed. \par f/u prn

## 2022-09-16 NOTE — ASSESSMENT
[FreeTextEntry1] : 71 year old DM womanw/ open wound to her right lower leg  pemphigoid, on prednisone, leukopenic, anemic on ivig h/o tahbso uterine ca stage 1a- no fever \par s/p hematoma right leg, had necrosis, was debrided\par using iodoform packing\par \par kaldestat- too painful, s/p doxycycline\par seeing derm next week\par \par  \par    right wounds legs\par   foam/ would like compression  \par toes pink- not ischemic\par \par  , responded to betadine- closed- reopened\par right blister site is the painful site -looks like [place]- transverse wound\par has used adaptic/telfa- needs  sonya, 4x4- \par  Patient and family instructed on how to place leg wraps\par \par chris low on left leg/ high pressure on right, palpable pulses/ no Vinsuff 2018- may need reeval\par  datacomplexity lab, xr, old rec, test resultsreviewed, visualize image \par risk-low-no surgery at this time\par \par  history of bullous phemphigoid, \par  technical difficuty:mod- called a few times was able to get through\par appoint for F-T-F-no\par virtual appointment 2 weeks\par referral to [facility]\par \par 7-22-22:\par RLE Swelling extending from medial leg to posterior leg- firmer and more indurated towards posterior\par Small clot aspirated with no other drainage using an 18 gauge needle. hematoma suspected\par  \par Patient reports pain with movement and rest\par Bruising and scaly skin around periwound\par \par Incision and drainage done, injection of lidocaine with epi given. 2cc\par using an 11 blade and eliptical incision was made at the inferior portion of wound where it was softer.\par Clot visualized.  Cotton tip applicator used to break up clot and then express it.  Jelly like clot evacuated.\par No active bleeding seen.  Wound packed with 1/2in iodoform packing to keep opening patent.  Supera\par bsorber placed then sonya and ace.\par \par 7-29-22:\par Pt here for f/u\par Pt accompanied by \par Pt says wound feel better but does drain serosanguinous fluid\par On exam:\par dry flaking skin small opneing from I&D-- scant slough.  No s/s of infection\par Induration extending posteriorly improving\par s/p excisional debridement\par \par 8-19-22:\par Pt here for f/u, accompanied by \par  does wound care\par No new complaints\par On exam: \par Mild blanchable erythema to mid lower leg without warmth tenderness induration fluctuance or crepitus\par Wound 0.3 cm deep.  No undermining.  Scant slough to wound bed.  Small skin bridge\par s/p excisional debridement and excision of  skin bridge to aid in dressing of wound\par \par \par 9-16-22:\par Pt here for f/u \par accompanied by \par On exam:\par RLE wound healed \par LUE ecchymosis after ? banging.  No open wound.  Pt on prednisone and baby aspirin

## 2022-09-16 NOTE — HISTORY OF PRESENT ILLNESS
[FreeTextEntry1] : 68 yo f with wounds for 3 months, treating with clobetasol, muprocin, had been treated in 2018\par right leg terible, opened in 2 other places\par no fever, seeing derm next week\par would like vna- left leg also opened, but settled down with gent\par \par using telfa ,1 new blister, old ones derided with dressing\par still bleeding thru telfa on back of leg, but \par Patient developed leg wounds since 12/2017. Takes igg treatment and prednisone\par had been using betadine, no blisters - previous one broke, now has open ulcer at that site\par \par right posterior leg very sore\par \par   h/o Bullous pemphigoid. Currently still taking doxy 100mg BID, nicotinamide 500mg BID, \par prednisone 10mg daily, and IVIG [id number] (s/p 2 rounds since January, most recently at end of March). \par She feels that her BP is under best control in the 2 weeks immediately after her IVIG infusion.\par \par  She notes that her flares have decreased in intensity and duration since starting IVIG. \par Still developing bullae on b/l arms and legs. Denies oral or genital lesions. \par Tolerating her medications well but does note easy bruising while on prednisone--currently on 10mg QD. Also notes skin fragility.\par 6/1/18- Patient and  report that right leg wounds are significantly improved\par  \par 2) Lipodermatosclerosis. Has an erosion on R medial calf present since December that started as a blister. It was originally draining profusely per pt; she denies fevers, chills, foul smell.  Wound nurse is applying clob oint to periphery and Alginate dressing over the wound. \par \par BACKGROUND:PMH Stage IA carcinosarcoma of the uterus (s/p Carbo/Taxol x 6 cycles from 12/15 - 4/16), currently in remission, \par Biopsy and DIF from 8/2014 consistent with BP (scanned into file). \par Previously well controlled with Cellcept, prednisone, and clobetasol with flare after stopping Cellcept due to decreasing blood counts.  \par +H/o vaginal erosions\par Her primary dermatologist in the past was Dr Silva.  \par Pt previously stated that whenever predisone has been decreased below 10mg daily she flares.\par 9/5- leg wounds related to body habitus and pemphigus \par \par 7/22/2022\par Ms. AME ZIMMERMAN   presents to the office with a wound since 6/25/2022 after hitting her leg against her walker. The patient reported the leg bruising then forming a large blister. The wound is located on  the right medial leg. The patient has complaints of pain.  The patient has been dressing the wound with mupirocin and gauze.  The patient denies fevers or chills. The patient has localized pain to the wound upon dressing changes. The patient has no other complaints or associated symptoms. HbA1c is 4.5 as of May 2022 per Patient.\par

## 2022-10-03 ENCOUNTER — APPOINTMENT (OUTPATIENT)
Dept: RHEUMATOLOGY | Facility: CLINIC | Age: 72
End: 2022-10-03

## 2022-10-03 VITALS
DIASTOLIC BLOOD PRESSURE: 69 MMHG | HEART RATE: 77 BPM | RESPIRATION RATE: 16 BRPM | OXYGEN SATURATION: 97 % | SYSTOLIC BLOOD PRESSURE: 108 MMHG

## 2022-10-03 VITALS
OXYGEN SATURATION: 95 % | RESPIRATION RATE: 16 BRPM | TEMPERATURE: 97.8 F | SYSTOLIC BLOOD PRESSURE: 93 MMHG | HEART RATE: 98 BPM | DIASTOLIC BLOOD PRESSURE: 61 MMHG

## 2022-10-03 PROCEDURE — 96365 THER/PROPH/DIAG IV INF INIT: CPT

## 2022-10-03 PROCEDURE — 96366 THER/PROPH/DIAG IV INF ADDON: CPT

## 2022-10-03 PROCEDURE — G0008: CPT

## 2022-10-03 PROCEDURE — 90662 IIV NO PRSV INCREASED AG IM: CPT

## 2022-10-03 RX ORDER — IMMUNE GLOBULIN INFUSION (HUMAN) 100 MG/ML
20 INJECTION, SOLUTION INTRAVENOUS; SUBCUTANEOUS
Qty: 0 | Refills: 0 | Status: COMPLETED | OUTPATIENT
Start: 2022-09-27

## 2022-10-03 RX ORDER — DIPHENHYDRAMINE HCL 25 MG/1
25 CAPSULE ORAL
Qty: 0 | Refills: 0 | Status: COMPLETED | OUTPATIENT
Start: 2022-09-27

## 2022-10-03 RX ORDER — IMMUNE GLOBULIN INFUSION (HUMAN) 100 MG/ML
30 INJECTION, SOLUTION INTRAVENOUS; SUBCUTANEOUS
Qty: 0 | Refills: 0 | Status: COMPLETED | OUTPATIENT
Start: 2022-09-27

## 2022-10-05 ENCOUNTER — APPOINTMENT (OUTPATIENT)
Dept: RHEUMATOLOGY | Facility: CLINIC | Age: 72
End: 2022-10-05

## 2022-10-05 VITALS
DIASTOLIC BLOOD PRESSURE: 67 MMHG | RESPIRATION RATE: 16 BRPM | HEART RATE: 89 BPM | OXYGEN SATURATION: 95 % | SYSTOLIC BLOOD PRESSURE: 96 MMHG | TEMPERATURE: 97.5 F

## 2022-10-05 VITALS
SYSTOLIC BLOOD PRESSURE: 106 MMHG | HEART RATE: 73 BPM | OXYGEN SATURATION: 98 % | RESPIRATION RATE: 16 BRPM | DIASTOLIC BLOOD PRESSURE: 65 MMHG

## 2022-10-05 PROCEDURE — 96365 THER/PROPH/DIAG IV INF INIT: CPT

## 2022-10-05 PROCEDURE — 96366 THER/PROPH/DIAG IV INF ADDON: CPT

## 2022-10-05 RX ORDER — IMMUNE GLOBULIN INFUSION (HUMAN) 100 MG/ML
30 INJECTION, SOLUTION INTRAVENOUS; SUBCUTANEOUS
Qty: 0 | Refills: 0 | Status: COMPLETED | OUTPATIENT
Start: 2022-09-29

## 2022-10-05 RX ORDER — DIPHENHYDRAMINE HCL 25 MG/1
25 CAPSULE ORAL
Qty: 0 | Refills: 0 | Status: COMPLETED | OUTPATIENT
Start: 2022-09-29

## 2022-10-05 RX ORDER — IMMUNE GLOBULIN INFUSION (HUMAN) 100 MG/ML
20 INJECTION, SOLUTION INTRAVENOUS; SUBCUTANEOUS
Qty: 0 | Refills: 0 | Status: COMPLETED | OUTPATIENT
Start: 2022-09-29

## 2022-10-07 ENCOUNTER — APPOINTMENT (OUTPATIENT)
Dept: RHEUMATOLOGY | Facility: CLINIC | Age: 72
End: 2022-10-07

## 2022-10-07 VITALS
TEMPERATURE: 97.9 F | OXYGEN SATURATION: 95 % | DIASTOLIC BLOOD PRESSURE: 73 MMHG | SYSTOLIC BLOOD PRESSURE: 110 MMHG | HEART RATE: 86 BPM | RESPIRATION RATE: 16 BRPM

## 2022-10-07 VITALS
HEART RATE: 73 BPM | RESPIRATION RATE: 16 BRPM | DIASTOLIC BLOOD PRESSURE: 66 MMHG | SYSTOLIC BLOOD PRESSURE: 111 MMHG | OXYGEN SATURATION: 95 %

## 2022-10-07 PROCEDURE — 96366 THER/PROPH/DIAG IV INF ADDON: CPT

## 2022-10-07 PROCEDURE — 96365 THER/PROPH/DIAG IV INF INIT: CPT

## 2022-10-07 RX ORDER — IMMUNE GLOBULIN INFUSION (HUMAN) 100 MG/ML
30 INJECTION, SOLUTION INTRAVENOUS; SUBCUTANEOUS
Qty: 0 | Refills: 0 | Status: COMPLETED | OUTPATIENT
Start: 2022-10-01

## 2022-10-07 RX ORDER — DIPHENHYDRAMINE HCL 25 MG/1
25 CAPSULE ORAL
Qty: 0 | Refills: 0 | Status: COMPLETED | OUTPATIENT
Start: 2022-10-01

## 2022-10-07 RX ORDER — IMMUNE GLOBULIN INFUSION (HUMAN) 100 MG/ML
20 INJECTION, SOLUTION INTRAVENOUS; SUBCUTANEOUS
Qty: 0 | Refills: 0 | Status: COMPLETED | OUTPATIENT
Start: 2022-10-01

## 2022-10-14 ENCOUNTER — APPOINTMENT (OUTPATIENT)
Dept: WOUND CARE | Facility: CLINIC | Age: 72
End: 2022-10-14

## 2022-10-31 ENCOUNTER — APPOINTMENT (OUTPATIENT)
Dept: RHEUMATOLOGY | Facility: CLINIC | Age: 72
End: 2022-10-31

## 2022-10-31 VITALS
OXYGEN SATURATION: 95 % | TEMPERATURE: 97.8 F | DIASTOLIC BLOOD PRESSURE: 70 MMHG | HEART RATE: 88 BPM | RESPIRATION RATE: 16 BRPM | SYSTOLIC BLOOD PRESSURE: 113 MMHG

## 2022-10-31 VITALS
OXYGEN SATURATION: 96 % | SYSTOLIC BLOOD PRESSURE: 109 MMHG | HEART RATE: 71 BPM | DIASTOLIC BLOOD PRESSURE: 68 MMHG | RESPIRATION RATE: 16 BRPM

## 2022-10-31 PROCEDURE — 96366 THER/PROPH/DIAG IV INF ADDON: CPT

## 2022-10-31 PROCEDURE — 96365 THER/PROPH/DIAG IV INF INIT: CPT

## 2022-10-31 RX ORDER — IMMUNE GLOBULIN INFUSION (HUMAN) 100 MG/ML
30 INJECTION, SOLUTION INTRAVENOUS; SUBCUTANEOUS
Qty: 0 | Refills: 0 | Status: COMPLETED | OUTPATIENT
Start: 2022-10-25

## 2022-10-31 RX ORDER — DIPHENHYDRAMINE HCL 25 MG/1
25 CAPSULE ORAL
Qty: 0 | Refills: 0 | Status: COMPLETED | OUTPATIENT
Start: 2022-10-25

## 2022-10-31 RX ORDER — IMMUNE GLOBULIN INFUSION (HUMAN) 100 MG/ML
20 INJECTION, SOLUTION INTRAVENOUS; SUBCUTANEOUS
Qty: 0 | Refills: 0 | Status: COMPLETED | OUTPATIENT
Start: 2022-10-25

## 2022-11-02 ENCOUNTER — APPOINTMENT (OUTPATIENT)
Dept: RHEUMATOLOGY | Facility: CLINIC | Age: 72
End: 2022-11-02

## 2022-11-02 VITALS
TEMPERATURE: 98 F | SYSTOLIC BLOOD PRESSURE: 119 MMHG | OXYGEN SATURATION: 95 % | HEART RATE: 101 BPM | RESPIRATION RATE: 16 BRPM | DIASTOLIC BLOOD PRESSURE: 57 MMHG

## 2022-11-02 VITALS — SYSTOLIC BLOOD PRESSURE: 106 MMHG | DIASTOLIC BLOOD PRESSURE: 68 MMHG | OXYGEN SATURATION: 98 % | HEART RATE: 80 BPM

## 2022-11-02 PROCEDURE — 96365 THER/PROPH/DIAG IV INF INIT: CPT

## 2022-11-02 PROCEDURE — 96366 THER/PROPH/DIAG IV INF ADDON: CPT

## 2022-11-02 RX ORDER — IMMUNE GLOBULIN INFUSION (HUMAN) 100 MG/ML
30 INJECTION, SOLUTION INTRAVENOUS; SUBCUTANEOUS
Qty: 0 | Refills: 0 | Status: COMPLETED | OUTPATIENT
Start: 2022-10-29

## 2022-11-02 RX ORDER — IMMUNE GLOBULIN INFUSION (HUMAN) 100 MG/ML
20 INJECTION, SOLUTION INTRAVENOUS; SUBCUTANEOUS
Qty: 0 | Refills: 0 | Status: COMPLETED | OUTPATIENT
Start: 2022-10-29

## 2022-11-02 RX ORDER — DIPHENHYDRAMINE HCL 25 MG/1
25 CAPSULE ORAL
Qty: 0 | Refills: 0 | Status: COMPLETED | OUTPATIENT
Start: 2022-10-29

## 2022-11-04 ENCOUNTER — APPOINTMENT (OUTPATIENT)
Dept: RHEUMATOLOGY | Facility: CLINIC | Age: 72
End: 2022-11-04

## 2022-11-04 VITALS
OXYGEN SATURATION: 95 % | RESPIRATION RATE: 16 BRPM | SYSTOLIC BLOOD PRESSURE: 100 MMHG | DIASTOLIC BLOOD PRESSURE: 64 MMHG | HEART RATE: 80 BPM

## 2022-11-04 VITALS
TEMPERATURE: 97.6 F | DIASTOLIC BLOOD PRESSURE: 64 MMHG | RESPIRATION RATE: 16 BRPM | HEART RATE: 87 BPM | OXYGEN SATURATION: 95 % | SYSTOLIC BLOOD PRESSURE: 120 MMHG

## 2022-11-04 PROCEDURE — 96366 THER/PROPH/DIAG IV INF ADDON: CPT

## 2022-11-04 PROCEDURE — 96365 THER/PROPH/DIAG IV INF INIT: CPT

## 2022-11-04 RX ORDER — DIPHENHYDRAMINE HCL 25 MG/1
25 CAPSULE ORAL
Qty: 0 | Refills: 0 | Status: COMPLETED | OUTPATIENT
Start: 2022-10-27

## 2022-11-04 RX ORDER — IMMUNE GLOBULIN INFUSION (HUMAN) 100 MG/ML
20 INJECTION, SOLUTION INTRAVENOUS; SUBCUTANEOUS
Qty: 0 | Refills: 0 | Status: COMPLETED | OUTPATIENT
Start: 2022-10-27

## 2022-11-04 RX ORDER — IMMUNE GLOBULIN INFUSION (HUMAN) 100 MG/ML
30 INJECTION, SOLUTION INTRAVENOUS; SUBCUTANEOUS
Qty: 0 | Refills: 0 | Status: COMPLETED | OUTPATIENT
Start: 2022-10-27

## 2022-11-26 ENCOUNTER — OUTPATIENT (OUTPATIENT)
Dept: OUTPATIENT SERVICES | Facility: HOSPITAL | Age: 72
LOS: 1 days | End: 2022-11-26
Payer: MEDICARE

## 2022-11-26 ENCOUNTER — APPOINTMENT (OUTPATIENT)
Dept: RADIOLOGY | Facility: IMAGING CENTER | Age: 72
End: 2022-11-26

## 2022-11-26 DIAGNOSIS — C54.1 MALIGNANT NEOPLASM OF ENDOMETRIUM: Chronic | ICD-10-CM

## 2022-11-26 DIAGNOSIS — Z90.722 ACQUIRED ABSENCE OF OVARIES, BILATERAL: Chronic | ICD-10-CM

## 2022-11-26 DIAGNOSIS — Z90.710 ACQUIRED ABSENCE OF BOTH CERVIX AND UTERUS: Chronic | ICD-10-CM

## 2022-11-26 DIAGNOSIS — Z98.89 OTHER SPECIFIED POSTPROCEDURAL STATES: Chronic | ICD-10-CM

## 2022-11-26 DIAGNOSIS — Z00.8 ENCOUNTER FOR OTHER GENERAL EXAMINATION: ICD-10-CM

## 2022-11-26 PROCEDURE — 73564 X-RAY EXAM KNEE 4 OR MORE: CPT | Mod: 26,50

## 2022-11-26 PROCEDURE — 73564 X-RAY EXAM KNEE 4 OR MORE: CPT

## 2022-11-28 ENCOUNTER — APPOINTMENT (OUTPATIENT)
Dept: RHEUMATOLOGY | Facility: CLINIC | Age: 72
End: 2022-11-28

## 2022-11-28 VITALS
DIASTOLIC BLOOD PRESSURE: 56 MMHG | OXYGEN SATURATION: 98 % | SYSTOLIC BLOOD PRESSURE: 107 MMHG | TEMPERATURE: 97.8 F | HEART RATE: 76 BPM | RESPIRATION RATE: 16 BRPM

## 2022-11-28 PROCEDURE — 96366 THER/PROPH/DIAG IV INF ADDON: CPT

## 2022-11-28 PROCEDURE — 96365 THER/PROPH/DIAG IV INF INIT: CPT

## 2022-11-28 RX ORDER — DIPHENHYDRAMINE HCL 25 MG/1
25 CAPSULE ORAL
Qty: 0 | Refills: 0 | Status: COMPLETED | OUTPATIENT
Start: 2022-11-22

## 2022-11-28 RX ORDER — IMMUNE GLOBULIN INFUSION (HUMAN) 100 MG/ML
20 INJECTION, SOLUTION INTRAVENOUS; SUBCUTANEOUS
Qty: 0 | Refills: 0 | Status: COMPLETED | OUTPATIENT
Start: 2022-11-22

## 2022-11-28 RX ORDER — IMMUNE GLOBULIN INFUSION (HUMAN) 100 MG/ML
30 INJECTION, SOLUTION INTRAVENOUS; SUBCUTANEOUS
Qty: 0 | Refills: 0 | Status: COMPLETED | OUTPATIENT
Start: 2022-11-22

## 2022-11-28 NOTE — HISTORY OF PRESENT ILLNESS
[8] : 8 [N/A] : N/A [Denies] : Denies [Yes] : Yes [Left upper extremity] : Left upper extremity [24g] : 24g [Start Time: ___] : Medication Start Time: [unfilled] [End Time: ___] : Medication End Time: [unfilled] [IV discontinued. Intact. No signs or symptoms of IV complications noted. Time: ___] : IV discontinued. Intact. No signs or symptoms of IV complications noted. Time: [unfilled] [Patient  instructed to seek medical attention with signs and symptoms of adverse effects] : Patient  instructed to seek medical attention with signs and symptoms of adverse effects [Patient left unit in no acute distress] : Patient left unit in no acute distress [Medications administered as ordered and tolerated well.] : Medications administered as ordered and tolerated well. [de-identified] : Knee and Sciatica  [de-identified] : forearm [de-identified] : Patient presents for Gammagard infusion in [facility]. Patient continues to report sciatica and knee pain. Patient denies any recent falls. Patient denies any changes or complaints since last infusion.

## 2022-11-29 ENCOUNTER — APPOINTMENT (OUTPATIENT)
Dept: RHEUMATOLOGY | Facility: CLINIC | Age: 72
End: 2022-11-29

## 2022-11-29 VITALS
RESPIRATION RATE: 16 BRPM | OXYGEN SATURATION: 97 % | DIASTOLIC BLOOD PRESSURE: 65 MMHG | HEART RATE: 84 BPM | SYSTOLIC BLOOD PRESSURE: 104 MMHG

## 2022-11-29 VITALS
RESPIRATION RATE: 16 BRPM | SYSTOLIC BLOOD PRESSURE: 117 MMHG | OXYGEN SATURATION: 97 % | HEART RATE: 89 BPM | DIASTOLIC BLOOD PRESSURE: 64 MMHG

## 2022-11-29 PROCEDURE — 96366 THER/PROPH/DIAG IV INF ADDON: CPT

## 2022-11-29 PROCEDURE — 96365 THER/PROPH/DIAG IV INF INIT: CPT

## 2022-11-29 RX ORDER — IMMUNE GLOBULIN INFUSION (HUMAN) 100 MG/ML
20 INJECTION, SOLUTION INTRAVENOUS; SUBCUTANEOUS
Qty: 0 | Refills: 0 | Status: COMPLETED | OUTPATIENT
Start: 2022-11-24

## 2022-11-29 RX ORDER — DIPHENHYDRAMINE HCL 25 MG/1
25 CAPSULE ORAL
Qty: 0 | Refills: 0 | Status: COMPLETED | OUTPATIENT
Start: 2022-11-24

## 2022-11-29 RX ORDER — IMMUNE GLOBULIN INFUSION (HUMAN) 100 MG/ML
30 INJECTION, SOLUTION INTRAVENOUS; SUBCUTANEOUS
Qty: 0 | Refills: 0 | Status: COMPLETED | OUTPATIENT
Start: 2022-11-24

## 2022-11-29 NOTE — HISTORY OF PRESENT ILLNESS
[N/A] : N/A [Denies] : Denies [Yes] : Yes [Declined] : Declined [24g] : 24g [Medication Name: ___] : Medication Name: [unfilled] [IV discontinued. Intact. No signs or symptoms of IV complications noted. Time: ___] : IV discontinued. Intact. No signs or symptoms of IV complications noted. Time: [unfilled] [Patient  instructed to seek medical attention with signs and symptoms of adverse effects] : Patient  instructed to seek medical attention with signs and symptoms of adverse effects [Patient left unit in no acute distress] : Patient left unit in no acute distress [Medications administered as ordered and tolerated well.] : Medications administered as ordered and tolerated well. [Left upper extremity] : Left upper extremity [Start Time: ___] : Medication Start Time: [unfilled] [End Time: ___] : Medication End Time: [unfilled] [de-identified] : wheelchair [de-identified] : left hand dorsal [de-identified] : Patient denies any new symptoms or complaints since last infusion. Patient reports of being easily bruised and fatigue persisting. Denies any recent falls. Fall precaution addressed.

## 2022-11-30 ENCOUNTER — APPOINTMENT (OUTPATIENT)
Dept: RHEUMATOLOGY | Facility: CLINIC | Age: 72
End: 2022-11-30

## 2022-11-30 VITALS
DIASTOLIC BLOOD PRESSURE: 76 MMHG | HEART RATE: 70 BPM | SYSTOLIC BLOOD PRESSURE: 119 MMHG | RESPIRATION RATE: 16 BRPM | OXYGEN SATURATION: 97 %

## 2022-11-30 VITALS
HEART RATE: 82 BPM | TEMPERATURE: 97.6 F | RESPIRATION RATE: 16 BRPM | OXYGEN SATURATION: 96 % | DIASTOLIC BLOOD PRESSURE: 75 MMHG | SYSTOLIC BLOOD PRESSURE: 148 MMHG

## 2022-11-30 PROCEDURE — 96366 THER/PROPH/DIAG IV INF ADDON: CPT

## 2022-11-30 PROCEDURE — 96365 THER/PROPH/DIAG IV INF INIT: CPT

## 2022-11-30 RX ORDER — IMMUNE GLOBULIN INFUSION (HUMAN) 100 MG/ML
20 INJECTION, SOLUTION INTRAVENOUS; SUBCUTANEOUS
Qty: 0 | Refills: 0 | Status: COMPLETED | OUTPATIENT
Start: 2022-11-26

## 2022-11-30 RX ORDER — IMMUNE GLOBULIN INFUSION (HUMAN) 100 MG/ML
30 INJECTION, SOLUTION INTRAVENOUS; SUBCUTANEOUS
Qty: 0 | Refills: 0 | Status: COMPLETED | OUTPATIENT
Start: 2022-11-26

## 2022-11-30 RX ORDER — DIPHENHYDRAMINE HCL 25 MG/1
25 CAPSULE ORAL
Qty: 0 | Refills: 0 | Status: COMPLETED | OUTPATIENT
Start: 2022-11-26

## 2022-12-06 ENCOUNTER — OUTPATIENT (OUTPATIENT)
Dept: OUTPATIENT SERVICES | Facility: HOSPITAL | Age: 72
LOS: 1 days | Discharge: HOME | End: 2022-12-06

## 2022-12-06 DIAGNOSIS — C54.1 MALIGNANT NEOPLASM OF ENDOMETRIUM: ICD-10-CM

## 2022-12-09 ENCOUNTER — APPOINTMENT (OUTPATIENT)
Dept: HEMATOLOGY ONCOLOGY | Facility: CLINIC | Age: 72
End: 2022-12-09

## 2022-12-09 VITALS
RESPIRATION RATE: 20 BRPM | DIASTOLIC BLOOD PRESSURE: 61 MMHG | SYSTOLIC BLOOD PRESSURE: 101 MMHG | HEART RATE: 90 BPM | OXYGEN SATURATION: 97 %

## 2022-12-09 DIAGNOSIS — C54.1 MALIGNANT NEOPLASM OF ENDOMETRIUM: ICD-10-CM

## 2022-12-09 PROCEDURE — 99214 OFFICE O/P EST MOD 30 MIN: CPT

## 2022-12-09 NOTE — PHYSICAL EXAM
[Ambulatory and capable of all self care but unable to carry out any work activities] : Status 2- Ambulatory and capable of all self care but unable to carry out any work activities. Up and about more than 50% of waking hours [Obese] : obese [Normal] : affect appropriate [de-identified] : BLE hyperpigmentation over areas of past plaque/ skin lesion extremities  [de-identified] : seen in wheelchair

## 2022-12-09 NOTE — ASSESSMENT
[FreeTextEntry1] : She is a 73 y/o F obese  F with Stage I uterine carcinosarcoma and s/p TLH/ BSO Carbo/ taxol 6 cycles in 4/2016. She continues with surveillance. She remains asymptomatic. We reviewed her past CT from 2021 which did not show any evidence of disease. We reviewed currently at 7 years: unlikely for carcinosarcoma recurrence. Reviewed signs and symptoms including new cough, bloating, abdominal pain or vaginal spotting which she will call. She will continue to follow with PCP and derm/ rheumatology. She will have interval breast imaging. She can see us as needed if she develops any new symptoms. Questions answered to her satisfaction. She is agreeable with plan.\par

## 2022-12-09 NOTE — REVIEW OF SYSTEMS
[Joint Pain] : joint pain [Joint Stiffness] : no joint stiffness [Muscle Pain] : no muscle pain [Muscle Weakness] : no muscle weakness [Skin Rash] : skin rash [Skin Wound] : no skin wound [Negative] : Allergic/Immunologic [FreeTextEntry9] : knee pain  [de-identified] : improved pemphigoid

## 2022-12-27 ENCOUNTER — APPOINTMENT (OUTPATIENT)
Dept: RHEUMATOLOGY | Facility: CLINIC | Age: 72
End: 2022-12-27
Payer: MEDICARE

## 2022-12-27 VITALS
RESPIRATION RATE: 16 BRPM | TEMPERATURE: 97.7 F | DIASTOLIC BLOOD PRESSURE: 70 MMHG | HEART RATE: 104 BPM | SYSTOLIC BLOOD PRESSURE: 113 MMHG | OXYGEN SATURATION: 95 %

## 2022-12-27 VITALS — HEART RATE: 83 BPM | SYSTOLIC BLOOD PRESSURE: 128 MMHG | DIASTOLIC BLOOD PRESSURE: 71 MMHG | OXYGEN SATURATION: 97 %

## 2022-12-27 PROCEDURE — 96365 THER/PROPH/DIAG IV INF INIT: CPT

## 2022-12-27 PROCEDURE — 96366 THER/PROPH/DIAG IV INF ADDON: CPT

## 2022-12-27 RX ORDER — IMMUNE GLOBULIN INFUSION (HUMAN) 100 MG/ML
30 INJECTION, SOLUTION INTRAVENOUS; SUBCUTANEOUS
Qty: 0 | Refills: 0 | Status: COMPLETED | OUTPATIENT
Start: 2022-12-20

## 2022-12-27 RX ORDER — DIPHENHYDRAMINE HCL 25 MG/1
25 CAPSULE ORAL
Qty: 0 | Refills: 0 | Status: COMPLETED | OUTPATIENT
Start: 2022-12-20

## 2022-12-27 RX ORDER — IMMUNE GLOBULIN INFUSION (HUMAN) 100 MG/ML
20 INJECTION, SOLUTION INTRAVENOUS; SUBCUTANEOUS
Qty: 0 | Refills: 0 | Status: COMPLETED | OUTPATIENT
Start: 2022-12-20

## 2022-12-27 NOTE — HISTORY OF PRESENT ILLNESS
[7] : 7 [N/A] : N/A [Denies] : Denies [Yes] : Yes [de-identified] : lower extremity [de-identified] : wheelchair [24g] : 24g [Medication Name: ___] : Medication Name: [unfilled] [Start Time: ___] : Medication Start Time: [unfilled] [End Time: ___] : Medication End Time: [unfilled] [IV discontinued. Intact. No signs or symptoms of IV complications noted. Time: ___] : IV discontinued. Intact. No signs or symptoms of IV complications noted. Time: [unfilled] [Patient  instructed to seek medical attention with signs and symptoms of adverse effects] : Patient  instructed to seek medical attention with signs and symptoms of adverse effects [Patient left unit in no acute distress] : Patient left unit in no acute distress [Medications administered as ordered and tolerated well.] : Medications administered as ordered and tolerated well. [de-identified] : left forearm [de-identified] : no labs [de-identified] : Patient presents for scheduled IVIG monthly infusion day 1:3, via w/c accompanied by . Today, patient reports pain as rated above. Otherwise  denies any new symptoms or complaints since last infusion. Patient reports of being easily bruised and fatigue persisting. Denies any recent falls. Fall precaution addressed. Patient pre-medicated, infusion titrated as per protocol and tolerated well.

## 2022-12-28 ENCOUNTER — APPOINTMENT (OUTPATIENT)
Dept: RHEUMATOLOGY | Facility: CLINIC | Age: 72
End: 2022-12-28
Payer: MEDICARE

## 2022-12-28 VITALS
SYSTOLIC BLOOD PRESSURE: 137 MMHG | OXYGEN SATURATION: 96 % | DIASTOLIC BLOOD PRESSURE: 68 MMHG | RESPIRATION RATE: 16 BRPM | HEART RATE: 94 BPM | TEMPERATURE: 97.2 F

## 2022-12-28 VITALS
RESPIRATION RATE: 16 BRPM | DIASTOLIC BLOOD PRESSURE: 59 MMHG | OXYGEN SATURATION: 97 % | SYSTOLIC BLOOD PRESSURE: 95 MMHG | HEART RATE: 78 BPM

## 2022-12-28 PROCEDURE — 96366 THER/PROPH/DIAG IV INF ADDON: CPT

## 2022-12-28 PROCEDURE — 96365 THER/PROPH/DIAG IV INF INIT: CPT

## 2022-12-28 RX ORDER — IMMUNE GLOBULIN INFUSION (HUMAN) 100 MG/ML
20 INJECTION, SOLUTION INTRAVENOUS; SUBCUTANEOUS
Qty: 0 | Refills: 0 | Status: COMPLETED | OUTPATIENT
Start: 2022-12-22

## 2022-12-28 RX ORDER — IMMUNE GLOBULIN INFUSION (HUMAN) 100 MG/ML
30 INJECTION, SOLUTION INTRAVENOUS; SUBCUTANEOUS
Qty: 0 | Refills: 0 | Status: COMPLETED | OUTPATIENT
Start: 2022-12-22

## 2022-12-28 RX ORDER — DIPHENHYDRAMINE HCL 25 MG/1
25 CAPSULE ORAL
Qty: 0 | Refills: 0 | Status: COMPLETED | OUTPATIENT
Start: 2022-12-22

## 2022-12-28 NOTE — HISTORY OF PRESENT ILLNESS
[N/A] : N/A [Denies] : Denies [Yes] : Yes [Declined] : Declined [Left upper extremity] : Left upper extremity [24g] : 24g [Medication Name: ___] : Medication Name: [unfilled] [Start Time: ___] : Medication Start Time: [unfilled] [End Time: ___] : Medication End Time: [unfilled] [IV discontinued. Intact. No signs or symptoms of IV complications noted. Time: ___] : IV discontinued. Intact. No signs or symptoms of IV complications noted. Time: [unfilled] [Patient  instructed to seek medical attention with signs and symptoms of adverse effects] : Patient  instructed to seek medical attention with signs and symptoms of adverse effects [Patient left unit in no acute distress] : Patient left unit in no acute distress [Medications administered as ordered and tolerated well.] : Medications administered as ordered and tolerated well. [de-identified] : wheelchair [de-identified] : left hand dorsal [de-identified] : Patient presents for 2:3 IVIG infusion. Patient denies any new symptoms or complaints since last infusion. Patient reports of being easily bruised and fatigue persisting. Denies any recent falls. Fall precaution addressed. Patient pre-medicated as per order. Infusion titrated and tolerated well.

## 2022-12-30 ENCOUNTER — APPOINTMENT (OUTPATIENT)
Dept: RHEUMATOLOGY | Facility: CLINIC | Age: 72
End: 2022-12-30
Payer: MEDICARE

## 2022-12-30 VITALS
SYSTOLIC BLOOD PRESSURE: 133 MMHG | HEART RATE: 86 BPM | OXYGEN SATURATION: 98 % | DIASTOLIC BLOOD PRESSURE: 67 MMHG | TEMPERATURE: 97.6 F | RESPIRATION RATE: 16 BRPM

## 2022-12-30 VITALS
OXYGEN SATURATION: 98 % | SYSTOLIC BLOOD PRESSURE: 137 MMHG | RESPIRATION RATE: 16 BRPM | DIASTOLIC BLOOD PRESSURE: 70 MMHG | HEART RATE: 66 BPM

## 2022-12-30 PROCEDURE — 96365 THER/PROPH/DIAG IV INF INIT: CPT

## 2022-12-30 PROCEDURE — 96366 THER/PROPH/DIAG IV INF ADDON: CPT

## 2022-12-30 RX ORDER — IMMUNE GLOBULIN INFUSION (HUMAN) 100 MG/ML
20 INJECTION, SOLUTION INTRAVENOUS; SUBCUTANEOUS
Qty: 0 | Refills: 0 | Status: COMPLETED | OUTPATIENT
Start: 2022-12-24

## 2022-12-30 RX ORDER — DIPHENHYDRAMINE HCL 25 MG/1
25 CAPSULE ORAL
Qty: 0 | Refills: 0 | Status: COMPLETED | OUTPATIENT
Start: 2022-12-24

## 2022-12-30 RX ORDER — IMMUNE GLOBULIN INFUSION (HUMAN) 100 MG/ML
30 INJECTION, SOLUTION INTRAVENOUS; SUBCUTANEOUS
Qty: 0 | Refills: 0 | Status: COMPLETED | OUTPATIENT
Start: 2022-12-24

## 2022-12-30 NOTE — HISTORY OF PRESENT ILLNESS
[N/A] : N/A [Denies] : Denies [Yes] : Yes [Declined] : Declined [Right upper extremity] : Right upper extremity [24g] : 24g [Medication Name: ___] : Medication Name: [unfilled] [Start Time: ___] : Medication Start Time: [unfilled] [End Time: ___] : Medication End Time: [unfilled] [IV discontinued. Intact. No signs or symptoms of IV complications noted. Time: ___] : IV discontinued. Intact. No signs or symptoms of IV complications noted. Time: [unfilled] [Patient  instructed to seek medical attention with signs and symptoms of adverse effects] : Patient  instructed to seek medical attention with signs and symptoms of adverse effects [Patient left unit in no acute distress] : Patient left unit in no acute distress [Medications administered as ordered and tolerated well.] : Medications administered as ordered and tolerated well. [de-identified] : right thigh, left arm [de-identified] : wheelchair [de-identified] : Dorsal hand vein [de-identified] : Patient presents for Gammagard infusion, doing well overall. Patient denies any recent infection or antibiotic use. Patient reports lesions to back of right thigh and left arm, getting better. Patient denies any recent falls. Patient pre-medicated, infusion titrated as per protocol and tolerated well.

## 2023-01-04 NOTE — PHYSICAL THERAPY INITIAL EVALUATION ADULT - BED MOBILITY LIMITATIONS, REHAB EVAL
decreased ability to use arms for pushing/pulling/decreased ability to use legs for bridging/pushing/impaired ability to control trunk for mobility Solaraze Counseling:  I discussed with the patient the risks of Solaraze including but not limited to erythema, scaling, itching, weeping, crusting, and pain.

## 2023-01-23 ENCOUNTER — APPOINTMENT (OUTPATIENT)
Dept: RHEUMATOLOGY | Facility: CLINIC | Age: 73
End: 2023-01-23
Payer: MEDICARE

## 2023-01-23 VITALS
TEMPERATURE: 97.2 F | RESPIRATION RATE: 16 BRPM | OXYGEN SATURATION: 96 % | HEART RATE: 102 BPM | SYSTOLIC BLOOD PRESSURE: 115 MMHG | DIASTOLIC BLOOD PRESSURE: 71 MMHG

## 2023-01-23 VITALS
RESPIRATION RATE: 16 BRPM | SYSTOLIC BLOOD PRESSURE: 94 MMHG | DIASTOLIC BLOOD PRESSURE: 65 MMHG | HEART RATE: 81 BPM | OXYGEN SATURATION: 97 %

## 2023-01-23 PROCEDURE — 96366 THER/PROPH/DIAG IV INF ADDON: CPT

## 2023-01-23 PROCEDURE — 96365 THER/PROPH/DIAG IV INF INIT: CPT

## 2023-01-23 RX ORDER — DIPHENHYDRAMINE HCL 25 MG/1
25 CAPSULE ORAL
Qty: 0 | Refills: 0 | Status: COMPLETED | OUTPATIENT
Start: 2023-01-17

## 2023-01-23 RX ORDER — IMMUNE GLOBULIN INFUSION (HUMAN) 100 MG/ML
30 INJECTION, SOLUTION INTRAVENOUS; SUBCUTANEOUS
Qty: 0 | Refills: 0 | Status: COMPLETED | OUTPATIENT
Start: 2023-01-17

## 2023-01-23 RX ORDER — IMMUNE GLOBULIN INFUSION (HUMAN) 100 MG/ML
20 INJECTION, SOLUTION INTRAVENOUS; SUBCUTANEOUS
Qty: 0 | Refills: 0 | Status: COMPLETED | OUTPATIENT
Start: 2023-01-17

## 2023-01-23 NOTE — HISTORY OF PRESENT ILLNESS
[N/A] : N/A [Denies] : Denies [Yes] : Yes [Declined] : Declined [Left upper extremity] : Left upper extremity [24g] : 24g [Medication Name: ___] : Medication Name: [unfilled] [Start Time: ___] : Medication Start Time: [unfilled] [End Time: ___] : Medication End Time: [unfilled] [IV discontinued. Intact. No signs or symptoms of IV complications noted. Time: ___] : IV discontinued. Intact. No signs or symptoms of IV complications noted. Time: [unfilled] [Patient  instructed to seek medical attention with signs and symptoms of adverse effects] : Patient  instructed to seek medical attention with signs and symptoms of adverse effects [Patient left unit in no acute distress] : Patient left unit in no acute distress [Medications administered as ordered and tolerated well.] : Medications administered as ordered and tolerated well. [de-identified] : wheelchair [de-identified] : left hand dorsal [de-identified] : Patient presents for 1:3 IVIG infusion. Patient denies any new symptoms or complaints since last infusion. Patient reports of being easily bruised and fatigue persisting. Denies any recent falls. Fall precaution addressed. Patient pre-medicated as per order. Infusion titrated and tolerated well.

## 2023-01-25 ENCOUNTER — APPOINTMENT (OUTPATIENT)
Dept: RHEUMATOLOGY | Facility: CLINIC | Age: 73
End: 2023-01-25
Payer: MEDICARE

## 2023-01-25 VITALS
SYSTOLIC BLOOD PRESSURE: 126 MMHG | HEART RATE: 82 BPM | OXYGEN SATURATION: 96 % | RESPIRATION RATE: 16 BRPM | DIASTOLIC BLOOD PRESSURE: 74 MMHG

## 2023-01-25 VITALS
OXYGEN SATURATION: 95 % | HEART RATE: 104 BPM | RESPIRATION RATE: 16 BRPM | SYSTOLIC BLOOD PRESSURE: 159 MMHG | DIASTOLIC BLOOD PRESSURE: 63 MMHG | TEMPERATURE: 97.5 F

## 2023-01-25 PROCEDURE — 96366 THER/PROPH/DIAG IV INF ADDON: CPT

## 2023-01-25 PROCEDURE — 96365 THER/PROPH/DIAG IV INF INIT: CPT

## 2023-01-25 RX ORDER — IMMUNE GLOBULIN INFUSION (HUMAN) 100 MG/ML
20 INJECTION, SOLUTION INTRAVENOUS; SUBCUTANEOUS
Qty: 0 | Refills: 0 | Status: COMPLETED | OUTPATIENT
Start: 2023-01-19

## 2023-01-25 RX ORDER — IMMUNE GLOBULIN INFUSION (HUMAN) 100 MG/ML
30 INJECTION, SOLUTION INTRAVENOUS; SUBCUTANEOUS
Qty: 0 | Refills: 0 | Status: COMPLETED | OUTPATIENT
Start: 2023-01-19

## 2023-01-25 RX ORDER — DIPHENHYDRAMINE HCL 25 MG/1
25 CAPSULE ORAL
Qty: 0 | Refills: 0 | Status: COMPLETED | OUTPATIENT
Start: 2023-01-19

## 2023-01-25 NOTE — HISTORY OF PRESENT ILLNESS
[7] : 7 [N/A] : N/A [Denies] : Denies [Yes] : Yes [Declined] : Declined [24g] : 24g [Medication Name: ___] : Medication Name: [unfilled] [Start Time: ___] : Medication Start Time: [unfilled] [End Time: ___] : Medication End Time: [unfilled] [IV discontinued. Intact. No signs or symptoms of IV complications noted. Time: ___] : IV discontinued. Intact. No signs or symptoms of IV complications noted. Time: [unfilled] [Patient  instructed to seek medical attention with signs and symptoms of adverse effects] : Patient  instructed to seek medical attention with signs and symptoms of adverse effects [Patient left unit in no acute distress] : Patient left unit in no acute distress [Medications administered as ordered and tolerated well.] : Medications administered as ordered and tolerated well. [de-identified] : lower back and lower extrmity [de-identified] : patient w/wheelchair [de-identified] : right hand dorsal vein  [de-identified] : no labs [de-identified] : Patient presents for 2:3 monthly IVIG infusion. Today, patient reports sciatica nerve pain radiating to lower extremity. Otherwise, patient denies any new symptoms or complaints since last infusion. Patient reports of being easily bruised and fatigue persisting. Denies any recent falls. Fall precaution addressed. Patient pre-medicated as prescribed , infusion titrated as per protocol and  tolerated well.\par Patient left unit via w/c in NAD, accompanied by .

## 2023-01-27 ENCOUNTER — APPOINTMENT (OUTPATIENT)
Dept: RHEUMATOLOGY | Facility: CLINIC | Age: 73
End: 2023-01-27
Payer: MEDICARE

## 2023-01-27 VITALS
HEART RATE: 79 BPM | SYSTOLIC BLOOD PRESSURE: 132 MMHG | OXYGEN SATURATION: 96 % | DIASTOLIC BLOOD PRESSURE: 74 MMHG | RESPIRATION RATE: 16 BRPM

## 2023-01-27 VITALS
RESPIRATION RATE: 16 BRPM | HEART RATE: 85 BPM | SYSTOLIC BLOOD PRESSURE: 121 MMHG | DIASTOLIC BLOOD PRESSURE: 76 MMHG | OXYGEN SATURATION: 95 % | TEMPERATURE: 97.7 F

## 2023-01-27 PROCEDURE — 96366 THER/PROPH/DIAG IV INF ADDON: CPT

## 2023-01-27 PROCEDURE — 96365 THER/PROPH/DIAG IV INF INIT: CPT

## 2023-01-27 RX ORDER — DIPHENHYDRAMINE HCL 25 MG/1
25 CAPSULE ORAL
Qty: 0 | Refills: 0 | Status: COMPLETED | OUTPATIENT
Start: 2023-01-21

## 2023-01-27 RX ORDER — IMMUNE GLOBULIN INFUSION (HUMAN) 100 MG/ML
30 INJECTION, SOLUTION INTRAVENOUS; SUBCUTANEOUS
Qty: 0 | Refills: 0 | Status: COMPLETED | OUTPATIENT
Start: 2023-01-21

## 2023-01-27 RX ORDER — IMMUNE GLOBULIN INFUSION (HUMAN) 100 MG/ML
20 INJECTION, SOLUTION INTRAVENOUS; SUBCUTANEOUS
Qty: 0 | Refills: 0 | Status: COMPLETED | OUTPATIENT
Start: 2023-01-21

## 2023-01-27 NOTE — HISTORY OF PRESENT ILLNESS
[7] : 7 [N/A] : N/A [Denies] : Denies [Yes] : Yes [Declined] : Declined [Left upper extremity] : Left upper extremity [24g] : 24g [Medication Name: ___] : Medication Name: [unfilled] [Start Time: ___] : Medication Start Time: [unfilled] [End Time: ___] : Medication End Time: [unfilled] [IV discontinued. Intact. No signs or symptoms of IV complications noted. Time: ___] : IV discontinued. Intact. No signs or symptoms of IV complications noted. Time: [unfilled] [Patient  instructed to seek medical attention with signs and symptoms of adverse effects] : Patient  instructed to seek medical attention with signs and symptoms of adverse effects [Patient left unit in no acute distress] : Patient left unit in no acute distress [Medications administered as ordered and tolerated well.] : Medications administered as ordered and tolerated well. [de-identified] : lower back and lower extremity [de-identified] : patient w/wheelchair [de-identified] : Left hand dorsal vein  [de-identified] : no labs [de-identified] : Patient presents for 3:3 monthly IVIG infusion. Today, patient reports sciatica nerve pain radiating to lower extremity. Otherwise, patient denies any new symptoms or complaints since last infusion. Patient reports of being easily bruised and fatigue persisting. Denies any recent falls. Fall precaution addressed. Patient pre-medicated as prescribed , infusion titrated as per protocol and  tolerated well.\par Patient left unit via w/c in NAD, accompanied by .

## 2023-02-21 ENCOUNTER — APPOINTMENT (OUTPATIENT)
Dept: RHEUMATOLOGY | Facility: CLINIC | Age: 73
End: 2023-02-21
Payer: MEDICARE

## 2023-02-21 VITALS
HEART RATE: 97 BPM | SYSTOLIC BLOOD PRESSURE: 78 MMHG | DIASTOLIC BLOOD PRESSURE: 58 MMHG | OXYGEN SATURATION: 100 % | RESPIRATION RATE: 16 BRPM | TEMPERATURE: 97.3 F

## 2023-02-21 VITALS
SYSTOLIC BLOOD PRESSURE: 80 MMHG | HEART RATE: 89 BPM | RESPIRATION RATE: 16 BRPM | DIASTOLIC BLOOD PRESSURE: 62 MMHG | OXYGEN SATURATION: 99 %

## 2023-02-21 PROCEDURE — 96365 THER/PROPH/DIAG IV INF INIT: CPT

## 2023-02-21 PROCEDURE — 96366 THER/PROPH/DIAG IV INF ADDON: CPT

## 2023-02-22 ENCOUNTER — APPOINTMENT (OUTPATIENT)
Dept: RHEUMATOLOGY | Facility: CLINIC | Age: 73
End: 2023-02-22
Payer: MEDICARE

## 2023-02-22 VITALS
RESPIRATION RATE: 16 BRPM | SYSTOLIC BLOOD PRESSURE: 105 MMHG | DIASTOLIC BLOOD PRESSURE: 53 MMHG | HEART RATE: 118 BPM | TEMPERATURE: 97.6 F | OXYGEN SATURATION: 98 %

## 2023-02-22 VITALS — OXYGEN SATURATION: 97 % | DIASTOLIC BLOOD PRESSURE: 64 MMHG | HEART RATE: 91 BPM | SYSTOLIC BLOOD PRESSURE: 100 MMHG

## 2023-02-22 PROCEDURE — 96366 THER/PROPH/DIAG IV INF ADDON: CPT

## 2023-02-22 PROCEDURE — 96365 THER/PROPH/DIAG IV INF INIT: CPT

## 2023-02-22 RX ORDER — IMMUNE GLOBULIN INFUSION (HUMAN) 100 MG/ML
20 INJECTION, SOLUTION INTRAVENOUS; SUBCUTANEOUS
Qty: 0 | Refills: 0 | Status: COMPLETED | OUTPATIENT
Start: 2023-02-14

## 2023-02-22 RX ORDER — DIPHENHYDRAMINE HCL 25 MG/1
25 CAPSULE ORAL
Qty: 0 | Refills: 0 | Status: COMPLETED | OUTPATIENT
Start: 2023-02-14

## 2023-02-22 RX ORDER — IMMUNE GLOBULIN INFUSION (HUMAN) 100 MG/ML
30 INJECTION, SOLUTION INTRAVENOUS; SUBCUTANEOUS
Qty: 0 | Refills: 0 | Status: COMPLETED | OUTPATIENT
Start: 2023-02-14

## 2023-02-22 NOTE — HISTORY OF PRESENT ILLNESS
[7] : 7 [N/A] : N/A [Denies] : Denies [Yes] : Yes [Declined] : Declined [Left upper extremity] : Left upper extremity [24g] : 24g [Medication Name: ___] : Medication Name: [unfilled] [Start Time: ___] : Medication Start Time: [unfilled] [End Time: ___] : Medication End Time: [unfilled] [IV discontinued. Intact. No signs or symptoms of IV complications noted. Time: ___] : IV discontinued. Intact. No signs or symptoms of IV complications noted. Time: [unfilled] [Patient  instructed to seek medical attention with signs and symptoms of adverse effects] : Patient  instructed to seek medical attention with signs and symptoms of adverse effects [Patient left unit in no acute distress] : Patient left unit in no acute distress [Medications administered as ordered and tolerated well.] : Medications administered as ordered and tolerated well. [de-identified] : lower back and right knee [de-identified] : patient w/wheelchair [de-identified] : left forearm [de-identified] : no labs [de-identified] : Patient presents for 1:3 monthly IVIG infusion. Today, patient reports sciatica nerve pain radiating to lower extremity, unchanged as per patient. Otherwise, patient denies any new symptoms or complaints since last infusion. Patient reports of being easily bruised and fatigue persisting. Denies any recent falls. Fall precaution addressed. Patient with low BP throughout infusion, admits to occasional feeling of dizziness. Patient reports poor sleep the night prior and poor PO liquid intake. Patient states that episodes of low BP are common, unconcerned, denies any other s/s. Patient strongly encouraged to hydrate throughout infusion, and to schedule follow up with Cardiologist. Same discussed with NICK Elkins, patient ok to continue infusion. Patient agreeable for same. Patient pre-medicated as prescribed, infusion titrated as per protocol and  tolerated well.\par Patient left unit via w/c in NAD, accompanied by .

## 2023-02-23 RX ORDER — IMMUNE GLOBULIN INFUSION (HUMAN) 100 MG/ML
20 INJECTION, SOLUTION INTRAVENOUS; SUBCUTANEOUS
Qty: 0 | Refills: 0 | Status: COMPLETED | OUTPATIENT
Start: 2023-02-16

## 2023-02-23 RX ORDER — IMMUNE GLOBULIN INFUSION (HUMAN) 100 MG/ML
30 INJECTION, SOLUTION INTRAVENOUS; SUBCUTANEOUS
Qty: 0 | Refills: 0 | Status: COMPLETED | OUTPATIENT
Start: 2023-02-16

## 2023-02-23 RX ORDER — DIPHENHYDRAMINE HCL 25 MG/1
25 CAPSULE ORAL
Qty: 0 | Refills: 0 | Status: COMPLETED | OUTPATIENT
Start: 2023-02-16

## 2023-02-23 NOTE — HISTORY OF PRESENT ILLNESS
[7] : 7 [N/A] : N/A [Denies] : Denies [Yes] : Yes [Declined] : Declined [de-identified] : lower back and right knee [de-identified] : patient w/wheelchair [Right upper extremity] : Right upper extremity [24g] : 24g [Medication Name: ___] : Medication Name: [unfilled] [Start Time: ___] : Medication Start Time: [unfilled] [End Time: ___] : Medication End Time: [unfilled] [IV discontinued. Intact. No signs or symptoms of IV complications noted. Time: ___] : IV discontinued. Intact. No signs or symptoms of IV complications noted. Time: [unfilled] [Patient  instructed to seek medical attention with signs and symptoms of adverse effects] : Patient  instructed to seek medical attention with signs and symptoms of adverse effects [Patient left unit in no acute distress] : Patient left unit in no acute distress [Medications administered as ordered and tolerated well.] : Medications administered as ordered and tolerated well. [de-identified] : forearm [de-identified] : no labs [de-identified] : Patient presents for 2:3 monthly IVIG infusion. Today, patient reports sciatica nerve pain radiating to lower extremity, unchanged as per patient. Otherwise, patient denies any new symptoms or complaints since last infusion. Patient reports of being easily bruised and fatigue persisting. Denies any recent falls. Fall precaution addressed. Today patient admits to feeling better than yesterday, BP improved. Patient pre-medicated as prescribed, infusion titrated as per protocol and  tolerated well.\par Patient left unit via w/c in NAD, accompanied by .   patient

## 2023-02-24 ENCOUNTER — APPOINTMENT (OUTPATIENT)
Dept: RHEUMATOLOGY | Facility: CLINIC | Age: 73
End: 2023-02-24
Payer: MEDICARE

## 2023-02-24 VITALS
TEMPERATURE: 97.6 F | HEART RATE: 100 BPM | DIASTOLIC BLOOD PRESSURE: 65 MMHG | SYSTOLIC BLOOD PRESSURE: 129 MMHG | RESPIRATION RATE: 16 BRPM | OXYGEN SATURATION: 95 %

## 2023-02-24 VITALS
OXYGEN SATURATION: 97 % | SYSTOLIC BLOOD PRESSURE: 96 MMHG | DIASTOLIC BLOOD PRESSURE: 61 MMHG | HEART RATE: 80 BPM | RESPIRATION RATE: 16 BRPM

## 2023-02-24 PROCEDURE — 96365 THER/PROPH/DIAG IV INF INIT: CPT

## 2023-02-24 PROCEDURE — 96366 THER/PROPH/DIAG IV INF ADDON: CPT

## 2023-02-24 RX ORDER — IMMUNE GLOBULIN INFUSION (HUMAN) 100 MG/ML
30 INJECTION, SOLUTION INTRAVENOUS; SUBCUTANEOUS
Qty: 0 | Refills: 0 | Status: COMPLETED | OUTPATIENT
Start: 2023-02-18

## 2023-02-24 RX ORDER — IMMUNE GLOBULIN INFUSION (HUMAN) 100 MG/ML
20 INJECTION, SOLUTION INTRAVENOUS; SUBCUTANEOUS
Qty: 0 | Refills: 0 | Status: COMPLETED | OUTPATIENT
Start: 2023-02-18

## 2023-02-24 RX ORDER — DIPHENHYDRAMINE HCL 25 MG/1
25 CAPSULE ORAL
Qty: 0 | Refills: 0 | Status: COMPLETED | OUTPATIENT
Start: 2023-02-18

## 2023-02-24 NOTE — HISTORY OF PRESENT ILLNESS
[N/A] : N/A [Denies] : Denies [Yes] : Yes [Declined] : Declined [Left upper extremity] : Left upper extremity [24g] : 24g [Medication Name: ___] : Medication Name: [unfilled] [Start Time: ___] : Medication Start Time: [unfilled] [End Time: ___] : Medication End Time: [unfilled] [IV discontinued. Intact. No signs or symptoms of IV complications noted. Time: ___] : IV discontinued. Intact. No signs or symptoms of IV complications noted. Time: [unfilled] [Patient  instructed to seek medical attention with signs and symptoms of adverse effects] : Patient  instructed to seek medical attention with signs and symptoms of adverse effects [Patient left unit in no acute distress] : Patient left unit in no acute distress [Medications administered as ordered and tolerated well.] : Medications administered as ordered and tolerated well. [de-identified] : wheelchair [de-identified] : left hand dorsal [de-identified] : Patient presents for 3:3 IVIG infusion. Patient denies any new symptoms or complaints since last infusion. Patient reports of being easily bruised and fatigue persisting. Denies any recent falls. Fall precaution addressed. Patient pre-medicated as per order. Infusion titrated and tolerated well.

## 2023-03-08 ENCOUNTER — APPOINTMENT (OUTPATIENT)
Dept: DERMATOLOGY | Facility: CLINIC | Age: 73
End: 2023-03-08
Payer: MEDICARE

## 2023-03-08 DIAGNOSIS — T14.8XXA OTHER INJURY OF UNSPECIFIED BODY REGION, INITIAL ENCOUNTER: ICD-10-CM

## 2023-03-08 PROCEDURE — 99214 OFFICE O/P EST MOD 30 MIN: CPT

## 2023-03-08 RX ORDER — CLOBETASOL PROPIONATE 0.5 MG/G
0.05 OINTMENT TOPICAL TWICE DAILY
Qty: 1 | Refills: 2 | Status: ACTIVE | COMMUNITY
Start: 2018-03-15 | End: 1900-01-01

## 2023-03-08 RX ORDER — MUPIROCIN 20 MG/G
2 OINTMENT TOPICAL
Qty: 1 | Refills: 7 | Status: ACTIVE | COMMUNITY
Start: 2020-01-21 | End: 1900-01-01

## 2023-03-16 ENCOUNTER — RESULT REVIEW (OUTPATIENT)
Age: 73
End: 2023-03-16

## 2023-03-16 ENCOUNTER — OUTPATIENT (OUTPATIENT)
Dept: OUTPATIENT SERVICES | Facility: HOSPITAL | Age: 73
LOS: 1 days | End: 2023-03-16
Payer: MEDICARE

## 2023-03-16 ENCOUNTER — APPOINTMENT (OUTPATIENT)
Dept: MAMMOGRAPHY | Facility: IMAGING CENTER | Age: 73
End: 2023-03-16
Payer: MEDICARE

## 2023-03-16 ENCOUNTER — APPOINTMENT (OUTPATIENT)
Dept: ULTRASOUND IMAGING | Facility: IMAGING CENTER | Age: 73
End: 2023-03-16
Payer: MEDICARE

## 2023-03-16 DIAGNOSIS — Z00.00 ENCOUNTER FOR GENERAL ADULT MEDICAL EXAMINATION WITHOUT ABNORMAL FINDINGS: ICD-10-CM

## 2023-03-16 DIAGNOSIS — C54.1 MALIGNANT NEOPLASM OF ENDOMETRIUM: Chronic | ICD-10-CM

## 2023-03-16 DIAGNOSIS — Z98.89 OTHER SPECIFIED POSTPROCEDURAL STATES: Chronic | ICD-10-CM

## 2023-03-16 DIAGNOSIS — C54.1 MALIGNANT NEOPLASM OF ENDOMETRIUM: ICD-10-CM

## 2023-03-16 DIAGNOSIS — Z90.710 ACQUIRED ABSENCE OF BOTH CERVIX AND UTERUS: Chronic | ICD-10-CM

## 2023-03-16 DIAGNOSIS — Z90.722 ACQUIRED ABSENCE OF OVARIES, BILATERAL: Chronic | ICD-10-CM

## 2023-03-16 PROCEDURE — 77067 SCR MAMMO BI INCL CAD: CPT | Mod: 26

## 2023-03-16 PROCEDURE — 77063 BREAST TOMOSYNTHESIS BI: CPT | Mod: 26

## 2023-03-16 PROCEDURE — 76641 ULTRASOUND BREAST COMPLETE: CPT

## 2023-03-16 PROCEDURE — 76641 ULTRASOUND BREAST COMPLETE: CPT | Mod: 26,50,GA

## 2023-03-16 PROCEDURE — 77067 SCR MAMMO BI INCL CAD: CPT

## 2023-03-16 PROCEDURE — 77063 BREAST TOMOSYNTHESIS BI: CPT

## 2023-03-20 ENCOUNTER — APPOINTMENT (OUTPATIENT)
Dept: RHEUMATOLOGY | Facility: CLINIC | Age: 73
End: 2023-03-20
Payer: MEDICARE

## 2023-03-20 VITALS
SYSTOLIC BLOOD PRESSURE: 100 MMHG | RESPIRATION RATE: 16 BRPM | HEART RATE: 75 BPM | DIASTOLIC BLOOD PRESSURE: 63 MMHG | OXYGEN SATURATION: 95 %

## 2023-03-20 VITALS
WEIGHT: 250 LBS | RESPIRATION RATE: 16 BRPM | HEART RATE: 82 BPM | OXYGEN SATURATION: 97 % | BODY MASS INDEX: 41.6 KG/M2 | TEMPERATURE: 97.5 F | SYSTOLIC BLOOD PRESSURE: 102 MMHG | DIASTOLIC BLOOD PRESSURE: 61 MMHG

## 2023-03-20 PROCEDURE — 96366 THER/PROPH/DIAG IV INF ADDON: CPT

## 2023-03-20 PROCEDURE — 96365 THER/PROPH/DIAG IV INF INIT: CPT

## 2023-03-20 RX ORDER — DIPHENHYDRAMINE HCL 25 MG/1
25 CAPSULE ORAL
Qty: 0 | Refills: 0 | Status: COMPLETED | OUTPATIENT
Start: 2023-03-14

## 2023-03-20 RX ORDER — IMMUNE GLOBULIN INFUSION (HUMAN) 100 MG/ML
20 INJECTION, SOLUTION INTRAVENOUS; SUBCUTANEOUS
Qty: 0 | Refills: 0 | Status: COMPLETED | OUTPATIENT
Start: 2023-03-14

## 2023-03-20 RX ORDER — IMMUNE GLOBULIN INFUSION (HUMAN) 100 MG/ML
30 INJECTION, SOLUTION INTRAVENOUS; SUBCUTANEOUS
Qty: 0 | Refills: 0 | Status: COMPLETED | OUTPATIENT
Start: 2023-03-14

## 2023-03-20 NOTE — HISTORY OF PRESENT ILLNESS
[9] : 9 [Denies] : Denies [Yes] : Yes [Declined] : Declined [Right upper extremity] : Right upper extremity [24g] : 24g [Medication Name: ___] : Medication Name: [unfilled] [Start Time: ___] : Medication Start Time: [unfilled] [End Time: ___] : Medication End Time: [unfilled] [IV discontinued. Intact. No signs or symptoms of IV complications noted. Time: ___] : IV discontinued. Intact. No signs or symptoms of IV complications noted. Time: [unfilled] [Patient  instructed to seek medical attention with signs and symptoms of adverse effects] : Patient  instructed to seek medical attention with signs and symptoms of adverse effects [Patient left unit in no acute distress] : Patient left unit in no acute distress [Medications administered as ordered and tolerated well.] : Medications administered as ordered and tolerated well. [de-identified] : B/L knee [de-identified] : Wheelchair [de-identified] : Dorsal hand vein [de-identified] : Patient presents for 1:3 [place] infusion, doing well overall. Patient denies any recent infection or antibiotic use. Patient reports a lot more bruising since Saturday, bruise to the right wrist bleeding this morning. Patient reports some bruises have scabs over them. Patient reports pain to b/l knees rated a 9 today on the pain scale above. Patient denies any recent falls. Patient pre-medicated, infusion titrated as per protocol and tolerated well.

## 2023-03-22 ENCOUNTER — APPOINTMENT (OUTPATIENT)
Dept: RHEUMATOLOGY | Facility: CLINIC | Age: 73
End: 2023-03-22
Payer: MEDICARE

## 2023-03-22 VITALS
HEART RATE: 74 BPM | SYSTOLIC BLOOD PRESSURE: 119 MMHG | RESPIRATION RATE: 16 BRPM | OXYGEN SATURATION: 97 % | DIASTOLIC BLOOD PRESSURE: 57 MMHG

## 2023-03-22 VITALS
SYSTOLIC BLOOD PRESSURE: 128 MMHG | TEMPERATURE: 97.8 F | HEART RATE: 62 BPM | DIASTOLIC BLOOD PRESSURE: 79 MMHG | OXYGEN SATURATION: 97 % | RESPIRATION RATE: 16 BRPM

## 2023-03-22 PROCEDURE — 96366 THER/PROPH/DIAG IV INF ADDON: CPT

## 2023-03-22 PROCEDURE — 96365 THER/PROPH/DIAG IV INF INIT: CPT

## 2023-03-22 RX ORDER — IMMUNE GLOBULIN INFUSION (HUMAN) 100 MG/ML
20 INJECTION, SOLUTION INTRAVENOUS; SUBCUTANEOUS
Qty: 0 | Refills: 0 | Status: COMPLETED | OUTPATIENT
Start: 2023-03-16

## 2023-03-22 RX ORDER — DIPHENHYDRAMINE HCL 25 MG/1
25 CAPSULE ORAL
Qty: 0 | Refills: 0 | Status: COMPLETED | OUTPATIENT
Start: 2023-03-16

## 2023-03-22 RX ORDER — IMMUNE GLOBULIN INFUSION (HUMAN) 100 MG/ML
30 INJECTION, SOLUTION INTRAVENOUS; SUBCUTANEOUS
Qty: 0 | Refills: 0 | Status: COMPLETED | OUTPATIENT
Start: 2023-03-16

## 2023-03-22 NOTE — HISTORY OF PRESENT ILLNESS
[N/A] : N/A [Denies] : Denies [Yes] : Yes [Declined] : Declined [Left upper extremity] : Left upper extremity [24g] : 24g [Medication Name: ___] : Medication Name: [unfilled] [Start Time: ___] : Medication Start Time: [unfilled] [End Time: ___] : Medication End Time: [unfilled] [IV discontinued. Intact. No signs or symptoms of IV complications noted. Time: ___] : IV discontinued. Intact. No signs or symptoms of IV complications noted. Time: [unfilled] [Patient  instructed to seek medical attention with signs and symptoms of adverse effects] : Patient  instructed to seek medical attention with signs and symptoms of adverse effects [Patient left unit in no acute distress] : Patient left unit in no acute distress [Medications administered as ordered and tolerated well.] : Medications administered as ordered and tolerated well. [de-identified] : wheelchair [de-identified] : left forearm [de-identified] : Patient presents for 2:3 IVIG infusion. Patient denies any new symptoms or complaints since last infusion. Patient reports of being easily bruised and fatigue persisting. Denies any recent falls. Fall precaution addressed. Patient pre-medicated as per order. Infusion titrated and tolerated well.

## 2023-03-24 ENCOUNTER — APPOINTMENT (OUTPATIENT)
Dept: RHEUMATOLOGY | Facility: CLINIC | Age: 73
End: 2023-03-24
Payer: MEDICARE

## 2023-03-24 VITALS
HEART RATE: 86 BPM | RESPIRATION RATE: 16 BRPM | SYSTOLIC BLOOD PRESSURE: 111 MMHG | DIASTOLIC BLOOD PRESSURE: 63 MMHG | OXYGEN SATURATION: 96 %

## 2023-03-24 VITALS
RESPIRATION RATE: 16 BRPM | TEMPERATURE: 97.7 F | DIASTOLIC BLOOD PRESSURE: 69 MMHG | OXYGEN SATURATION: 96 % | HEART RATE: 90 BPM | SYSTOLIC BLOOD PRESSURE: 108 MMHG

## 2023-03-24 PROCEDURE — 96365 THER/PROPH/DIAG IV INF INIT: CPT

## 2023-03-24 PROCEDURE — 96366 THER/PROPH/DIAG IV INF ADDON: CPT

## 2023-03-24 RX ORDER — IMMUNE GLOBULIN INFUSION (HUMAN) 100 MG/ML
30 INJECTION, SOLUTION INTRAVENOUS; SUBCUTANEOUS
Qty: 0 | Refills: 0 | Status: COMPLETED | OUTPATIENT
Start: 2023-03-18

## 2023-03-24 RX ORDER — DIPHENHYDRAMINE HCL 25 MG/1
25 CAPSULE ORAL
Qty: 0 | Refills: 0 | Status: COMPLETED | OUTPATIENT
Start: 2023-03-18

## 2023-03-24 RX ORDER — IMMUNE GLOBULIN INFUSION (HUMAN) 100 MG/ML
20 INJECTION, SOLUTION INTRAVENOUS; SUBCUTANEOUS
Qty: 0 | Refills: 0 | Status: COMPLETED | OUTPATIENT
Start: 2023-03-18

## 2023-03-24 NOTE — HISTORY OF PRESENT ILLNESS
[N/A] : N/A [Denies] : Denies [Yes] : Yes [Declined] : Declined [Left upper extremity] : Left upper extremity [24g] : 24g [Medication Name: ___] : Medication Name: [unfilled] [Start Time: ___] : Medication Start Time: [unfilled] [End Time: ___] : Medication End Time: [unfilled] [IV discontinued. Intact. No signs or symptoms of IV complications noted. Time: ___] : IV discontinued. Intact. No signs or symptoms of IV complications noted. Time: [unfilled] [Patient  instructed to seek medical attention with signs and symptoms of adverse effects] : Patient  instructed to seek medical attention with signs and symptoms of adverse effects [Patient left unit in no acute distress] : Patient left unit in no acute distress [Medications administered as ordered and tolerated well.] : Medications administered as ordered and tolerated well. [de-identified] : wheelchair [de-identified] : left median cubital vein  [de-identified] : Patient presents for 3:3 IVIG infusion. Patient denies any new symptoms or complaints since last infusion. Patient reports of being easily bruised and fatigue persisting. Denies any recent falls. Fall precaution addressed. Patient pre-medicated as per order. Infusion titrated and tolerated well.

## 2023-03-29 ENCOUNTER — RESULT REVIEW (OUTPATIENT)
Age: 73
End: 2023-03-29

## 2023-04-17 ENCOUNTER — APPOINTMENT (OUTPATIENT)
Dept: RHEUMATOLOGY | Facility: CLINIC | Age: 73
End: 2023-04-17
Payer: MEDICARE

## 2023-04-17 VITALS
OXYGEN SATURATION: 95 % | DIASTOLIC BLOOD PRESSURE: 61 MMHG | RESPIRATION RATE: 16 BRPM | HEART RATE: 80 BPM | SYSTOLIC BLOOD PRESSURE: 125 MMHG | TEMPERATURE: 97.2 F

## 2023-04-17 VITALS
SYSTOLIC BLOOD PRESSURE: 109 MMHG | DIASTOLIC BLOOD PRESSURE: 55 MMHG | HEART RATE: 76 BPM | OXYGEN SATURATION: 96 % | RESPIRATION RATE: 16 BRPM

## 2023-04-17 PROCEDURE — 96366 THER/PROPH/DIAG IV INF ADDON: CPT

## 2023-04-17 PROCEDURE — 96365 THER/PROPH/DIAG IV INF INIT: CPT

## 2023-04-17 RX ORDER — IMMUNE GLOBULIN INFUSION (HUMAN) 100 MG/ML
20 INJECTION, SOLUTION INTRAVENOUS; SUBCUTANEOUS
Qty: 0 | Refills: 0 | Status: COMPLETED | OUTPATIENT
Start: 2023-04-11

## 2023-04-17 RX ORDER — DIPHENHYDRAMINE HCL 25 MG/1
25 CAPSULE ORAL
Qty: 0 | Refills: 0 | Status: COMPLETED | OUTPATIENT
Start: 2023-04-11

## 2023-04-17 RX ORDER — IMMUNE GLOBULIN INFUSION (HUMAN) 100 MG/ML
30 INJECTION, SOLUTION INTRAVENOUS; SUBCUTANEOUS
Qty: 0 | Refills: 0 | Status: COMPLETED | OUTPATIENT
Start: 2023-04-11

## 2023-04-17 NOTE — HISTORY OF PRESENT ILLNESS
[6] : 6 [N/A] : N/A [Denies] : Denies [Yes] : Yes [24g] : 24g [Medication Name: ___] : Medication Name: [unfilled] [Start Time: ___] : Medication Start Time: [unfilled] [Patient  instructed to seek medical attention with signs and symptoms of adverse effects] : Patient  instructed to seek medical attention with signs and symptoms of adverse effects [Patient left unit in no acute distress] : Patient left unit in no acute distress [Medications administered as ordered and tolerated well.] : Medications administered as ordered and tolerated well. [de-identified] : sciatica nerve pain and bilateral knees [de-identified] : wheelchair [End Time: ___] : Medication End Time: [unfilled] [IV discontinued. Intact. No signs or symptoms of IV complications noted. Time: ___] : IV discontinued. Intact. No signs or symptoms of IV complications noted. Time: [unfilled] [de-identified] : right arm cephalic vein  [de-identified] : no labs [de-identified] : Patient presents for 1:3 monthly IVIG infusion. Today, patient reports pain as rated  above, stated that knees pain is much better since she received nerve block  injections and also started a new medication for sciatica nerve, Today, patient denies having any new lesions or blisters. Patient reports of being easily bruised and fatigue persisting. Denies any recent falls. Fall precaution addressed. Patient pre-medicated as prescribed, infusion titrated as per protocol and  tolerated well.

## 2023-04-19 ENCOUNTER — APPOINTMENT (OUTPATIENT)
Dept: RHEUMATOLOGY | Facility: CLINIC | Age: 73
End: 2023-04-19
Payer: MEDICARE

## 2023-04-19 VITALS
HEART RATE: 75 BPM | DIASTOLIC BLOOD PRESSURE: 67 MMHG | RESPIRATION RATE: 16 BRPM | SYSTOLIC BLOOD PRESSURE: 114 MMHG | OXYGEN SATURATION: 96 %

## 2023-04-19 VITALS
TEMPERATURE: 97.6 F | HEART RATE: 75 BPM | OXYGEN SATURATION: 95 % | RESPIRATION RATE: 16 BRPM | SYSTOLIC BLOOD PRESSURE: 125 MMHG | DIASTOLIC BLOOD PRESSURE: 71 MMHG

## 2023-04-19 PROCEDURE — 96366 THER/PROPH/DIAG IV INF ADDON: CPT

## 2023-04-19 PROCEDURE — 96365 THER/PROPH/DIAG IV INF INIT: CPT

## 2023-04-19 RX ORDER — DIPHENHYDRAMINE HCL 25 MG/1
25 CAPSULE ORAL
Qty: 0 | Refills: 0 | Status: COMPLETED | OUTPATIENT
Start: 2023-04-13

## 2023-04-19 RX ORDER — IMMUNE GLOBULIN INFUSION (HUMAN) 100 MG/ML
20 INJECTION, SOLUTION INTRAVENOUS; SUBCUTANEOUS
Qty: 0 | Refills: 0 | Status: COMPLETED | OUTPATIENT
Start: 2023-04-13

## 2023-04-19 RX ORDER — IMMUNE GLOBULIN INFUSION (HUMAN) 100 MG/ML
30 INJECTION, SOLUTION INTRAVENOUS; SUBCUTANEOUS
Qty: 0 | Refills: 0 | Status: COMPLETED | OUTPATIENT
Start: 2023-04-13

## 2023-04-19 NOTE — HISTORY OF PRESENT ILLNESS
[6] : 6 [N/A] : N/A [Denies] : Denies [Yes] : Yes [24g] : 24g [Medication Name: ___] : Medication Name: [unfilled] [Start Time: ___] : Medication Start Time: [unfilled] [End Time: ___] : Medication End Time: [unfilled] [IV discontinued. Intact. No signs or symptoms of IV complications noted. Time: ___] : IV discontinued. Intact. No signs or symptoms of IV complications noted. Time: [unfilled] [Patient  instructed to seek medical attention with signs and symptoms of adverse effects] : Patient  instructed to seek medical attention with signs and symptoms of adverse effects [Patient left unit in no acute distress] : Patient left unit in no acute distress [Medications administered as ordered and tolerated well.] : Medications administered as ordered and tolerated well. [de-identified] : sciatica nerve pain and bilateral knees [de-identified] : wheelchair [de-identified] : Left arm cephalic vein  [de-identified] : no labs [de-identified] : Patient presents for day 2:3 monthly IVIG infusion. Today, patient reports pain as rated  above, stated that knees pain is much better since she received nerve block  injections and also started a new medication for sciatica nerve, Today, patient denies having any new lesions or blisters. Patient reports of being easily bruised and fatigue persisting. Denies any recent falls. Fall precaution addressed. Patient pre-medicated as prescribed, infusion titrated as per protocol and  tolerated well.

## 2023-04-21 ENCOUNTER — APPOINTMENT (OUTPATIENT)
Dept: RHEUMATOLOGY | Facility: CLINIC | Age: 73
End: 2023-04-21
Payer: MEDICARE

## 2023-04-21 VITALS
SYSTOLIC BLOOD PRESSURE: 96 MMHG | HEART RATE: 77 BPM | OXYGEN SATURATION: 97 % | DIASTOLIC BLOOD PRESSURE: 60 MMHG | RESPIRATION RATE: 16 BRPM

## 2023-04-21 VITALS
SYSTOLIC BLOOD PRESSURE: 118 MMHG | RESPIRATION RATE: 16 BRPM | TEMPERATURE: 97.8 F | HEART RATE: 71 BPM | OXYGEN SATURATION: 95 % | DIASTOLIC BLOOD PRESSURE: 74 MMHG

## 2023-04-21 PROCEDURE — 96365 THER/PROPH/DIAG IV INF INIT: CPT

## 2023-04-21 PROCEDURE — 96366 THER/PROPH/DIAG IV INF ADDON: CPT

## 2023-04-21 RX ORDER — IMMUNE GLOBULIN INFUSION (HUMAN) 100 MG/ML
30 INJECTION, SOLUTION INTRAVENOUS; SUBCUTANEOUS
Qty: 0 | Refills: 0 | Status: COMPLETED | OUTPATIENT
Start: 2023-04-15

## 2023-04-21 RX ORDER — DIPHENHYDRAMINE HCL 25 MG/1
25 CAPSULE ORAL
Qty: 0 | Refills: 0 | Status: COMPLETED | OUTPATIENT
Start: 2023-04-15

## 2023-04-21 RX ORDER — IMMUNE GLOBULIN INFUSION (HUMAN) 100 MG/ML
20 INJECTION, SOLUTION INTRAVENOUS; SUBCUTANEOUS
Qty: 0 | Refills: 0 | Status: COMPLETED | OUTPATIENT
Start: 2023-04-15

## 2023-04-21 NOTE — HISTORY OF PRESENT ILLNESS
[6] : 6 [N/A] : N/A [Denies] : Denies [Yes] : Yes [24g] : 24g [Medication Name: ___] : Medication Name: [unfilled] [Start Time: ___] : Medication Start Time: [unfilled] [End Time: ___] : Medication End Time: [unfilled] [IV discontinued. Intact. No signs or symptoms of IV complications noted. Time: ___] : IV discontinued. Intact. No signs or symptoms of IV complications noted. Time: [unfilled] [Patient  instructed to seek medical attention with signs and symptoms of adverse effects] : Patient  instructed to seek medical attention with signs and symptoms of adverse effects [Patient left unit in no acute distress] : Patient left unit in no acute distress [Medications administered as ordered and tolerated well.] : Medications administered as ordered and tolerated well. [de-identified] : sciatica nerve pain and bilateral knees [de-identified] : wheelchair [de-identified] : Right arm  radial vein  [de-identified] : no labs [de-identified] : Patient presents for day 3:3 monthly IVIG infusion, via w/c accompanied by . Patient reports no changes in symptoms . Today, patient reports pain as rated  above, stated that knees pain is much better since she received nerve block  injections and also started a new medication for sciatica nerve, Today, patient denies having any new lesions or blisters. Patient reports of being easily bruised and fatigue persisting. Denies any recent falls. Fall precaution addressed. Patient pre-medicated as prescribed, infusion titrated as per protocol and  tolerated well.
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
## 2023-05-09 ENCOUNTER — APPOINTMENT (OUTPATIENT)
Dept: ULTRASOUND IMAGING | Facility: IMAGING CENTER | Age: 73
End: 2023-05-09
Payer: MEDICARE

## 2023-05-09 ENCOUNTER — OUTPATIENT (OUTPATIENT)
Dept: OUTPATIENT SERVICES | Facility: HOSPITAL | Age: 73
LOS: 1 days | End: 2023-05-09
Payer: MEDICARE

## 2023-05-09 ENCOUNTER — RESULT REVIEW (OUTPATIENT)
Age: 73
End: 2023-05-09

## 2023-05-09 DIAGNOSIS — Z90.710 ACQUIRED ABSENCE OF BOTH CERVIX AND UTERUS: Chronic | ICD-10-CM

## 2023-05-09 DIAGNOSIS — Z90.722 ACQUIRED ABSENCE OF OVARIES, BILATERAL: Chronic | ICD-10-CM

## 2023-05-09 DIAGNOSIS — Z98.89 OTHER SPECIFIED POSTPROCEDURAL STATES: Chronic | ICD-10-CM

## 2023-05-09 DIAGNOSIS — Z00.00 ENCOUNTER FOR GENERAL ADULT MEDICAL EXAMINATION WITHOUT ABNORMAL FINDINGS: ICD-10-CM

## 2023-05-09 DIAGNOSIS — C54.1 MALIGNANT NEOPLASM OF ENDOMETRIUM: ICD-10-CM

## 2023-05-09 DIAGNOSIS — C54.1 MALIGNANT NEOPLASM OF ENDOMETRIUM: Chronic | ICD-10-CM

## 2023-05-09 PROCEDURE — 76642 ULTRASOUND BREAST LIMITED: CPT

## 2023-05-09 PROCEDURE — 76642 ULTRASOUND BREAST LIMITED: CPT | Mod: 26,LT

## 2023-05-15 ENCOUNTER — APPOINTMENT (OUTPATIENT)
Dept: RHEUMATOLOGY | Facility: CLINIC | Age: 73
End: 2023-05-15
Payer: MEDICARE

## 2023-05-15 VITALS
SYSTOLIC BLOOD PRESSURE: 100 MMHG | RESPIRATION RATE: 16 BRPM | HEART RATE: 78 BPM | DIASTOLIC BLOOD PRESSURE: 56 MMHG | OXYGEN SATURATION: 97 %

## 2023-05-15 VITALS
RESPIRATION RATE: 16 BRPM | SYSTOLIC BLOOD PRESSURE: 101 MMHG | DIASTOLIC BLOOD PRESSURE: 65 MMHG | OXYGEN SATURATION: 97 % | TEMPERATURE: 98 F | HEART RATE: 77 BPM

## 2023-05-15 PROCEDURE — 96366 THER/PROPH/DIAG IV INF ADDON: CPT

## 2023-05-15 PROCEDURE — 96365 THER/PROPH/DIAG IV INF INIT: CPT

## 2023-05-15 RX ORDER — IMMUNE GLOBULIN INFUSION (HUMAN) 100 MG/ML
20 INJECTION, SOLUTION INTRAVENOUS; SUBCUTANEOUS
Qty: 0 | Refills: 0 | Status: COMPLETED | OUTPATIENT
Start: 2023-05-09

## 2023-05-15 RX ORDER — IMMUNE GLOBULIN INFUSION (HUMAN) 100 MG/ML
30 INJECTION, SOLUTION INTRAVENOUS; SUBCUTANEOUS
Qty: 0 | Refills: 0 | Status: COMPLETED | OUTPATIENT
Start: 2023-05-09

## 2023-05-15 RX ORDER — DIPHENHYDRAMINE HCL 25 MG/1
25 CAPSULE ORAL
Qty: 0 | Refills: 0 | Status: COMPLETED | OUTPATIENT
Start: 2023-05-09

## 2023-05-15 NOTE — HISTORY OF PRESENT ILLNESS
[6] : 6 [N/A] : N/A [Denies] : Denies [Yes] : Yes [Right upper extremity] : Right upper extremity [24g] : 24g [Medication Name: ___] : Medication Name: [unfilled] [Start Time: ___] : Medication Start Time: [unfilled] [End Time: ___] : Medication End Time: [unfilled] [IV discontinued. Intact. No signs or symptoms of IV complications noted. Time: ___] : IV discontinued. Intact. No signs or symptoms of IV complications noted. Time: [unfilled] [Patient  instructed to seek medical attention with signs and symptoms of adverse effects] : Patient  instructed to seek medical attention with signs and symptoms of adverse effects [Patient left unit in no acute distress] : Patient left unit in no acute distress [Medications administered as ordered and tolerated well.] : Medications administered as ordered and tolerated well. [de-identified] : sciatica nerve pain and bilateral knees [de-identified] : wheelchair [de-identified] : Dorsal hand vein [de-identified] : no labs [de-identified] : Patient presents for day 1:3 monthly IVIG infusion, via w/c accompanied by . Patient reports no changes in symptoms . Today, patient reports pain as rated  above, stated that knees pain is much better since she received nerve block  injections. Patient reports new medication for sciatica nerve has helped a lot. Patient reports scabbed lesions to B/L ankles that occurred days prior to infusion date. Denies any recent falls. Fall precaution addressed. Patient pre-medicated as prescribed, infusion titrated as per protocol and  tolerated well.

## 2023-05-17 ENCOUNTER — APPOINTMENT (OUTPATIENT)
Dept: RHEUMATOLOGY | Facility: CLINIC | Age: 73
End: 2023-05-17
Payer: MEDICARE

## 2023-05-17 VITALS
HEART RATE: 82 BPM | SYSTOLIC BLOOD PRESSURE: 109 MMHG | OXYGEN SATURATION: 95 % | TEMPERATURE: 98 F | RESPIRATION RATE: 16 BRPM | DIASTOLIC BLOOD PRESSURE: 65 MMHG

## 2023-05-17 VITALS
HEART RATE: 84 BPM | RESPIRATION RATE: 16 BRPM | DIASTOLIC BLOOD PRESSURE: 53 MMHG | OXYGEN SATURATION: 96 % | SYSTOLIC BLOOD PRESSURE: 117 MMHG

## 2023-05-17 PROCEDURE — 96366 THER/PROPH/DIAG IV INF ADDON: CPT

## 2023-05-17 PROCEDURE — 96365 THER/PROPH/DIAG IV INF INIT: CPT

## 2023-05-17 RX ORDER — DIPHENHYDRAMINE HCL 25 MG/1
25 CAPSULE ORAL
Qty: 0 | Refills: 0 | Status: COMPLETED | OUTPATIENT
Start: 2023-05-11

## 2023-05-17 RX ORDER — IMMUNE GLOBULIN INFUSION (HUMAN) 100 MG/ML
20 INJECTION, SOLUTION INTRAVENOUS; SUBCUTANEOUS
Qty: 0 | Refills: 0 | Status: COMPLETED | OUTPATIENT
Start: 2023-05-11

## 2023-05-17 RX ORDER — IMMUNE GLOBULIN INFUSION (HUMAN) 100 MG/ML
30 INJECTION, SOLUTION INTRAVENOUS; SUBCUTANEOUS
Qty: 0 | Refills: 0 | Status: COMPLETED | OUTPATIENT
Start: 2023-05-11

## 2023-05-17 NOTE — HISTORY OF PRESENT ILLNESS
ANNUAL EXAM note      History    Faina Avalos is a 56 year old female who presents for an annual exam.  Pt offers no acute complaints. She does report feeling much better overall compared to last year.      medical history    Past Medical History:   Diagnosis Date   • Adenomatous colon polyp     tubular   • Ascending aorta dilatation (CMS/HCC) 2018    MILD- 3.9 CM   • BCC (basal cell carcinoma of skin)    • Hypothyroidism    • Intestinal ulceration     terminal ileum   • Iron deficiency anemia    • Malabsorption of iron 2018   • Mitral valve regurgitation 2018    MILD   • Multinodular goiter    • Primary hyperparathyroidism (CMS/HCC)    • Urinary, incontinence, stress female    • Uterine fibroid        SURGICAL history    Past Surgical History:   Procedure Laterality Date   • Colposcopy,loop electrd cervix excis      negative as per pt   • Hb mohs surgery 1st stage     • Myomectomy  2015    at [facility] with Dr. Colin   • Tonsillectomy and adenoidectomy         social history    Social History     Tobacco Use   • Smoking status: Former Smoker     Years: 13.00     Types: Cigarettes     Last attempt to quit: 1992     Years since quittin.4   • Smokeless tobacco: Never Used   Substance Use Topics   • Alcohol use: Yes     Alcohol/week: 0.5 oz     Types: 1 Standard drinks or equivalent per week     Comment: 2 every other week   • Drug use: No       family history    Family History   Problem Relation Age of Onset   • Thyroid Mother    • High blood pressure Mother         takes meds   • Atrial Fibrilliation Mother    • High blood pressure Maternal Grandmother    • Myocardial Infarction Maternal Grandmother 68        smoker   • Diabetes Neg Hx        mEDICATIONS    Current Outpatient Medications   Medication Sig   • levothyroxine (SYNTHROID, LEVOTHROID) 112 MCG tablet Take 1 tablet by mouth daily (before breakfast).   • liothyronine (CYTOMEL) 5 MCG tablet  [6] : 6 Take 1 tablet by mouth 2 times daily.   • cholecalciferol (VITAMIN D3) 1000 UNITS tablet Take 2,000 Units by mouth daily.   • Multiple Vitamins-Minerals (MULTIVITAMIN ADULT PO)      No current facility-administered medications for this visit.        aLLERGIES    ALLERGIES:  No Known Allergies    REVIEW OF SYSTEMS:  Constitutional:  Denies fatigue and patient does indicate feeling a lot better from last year is stating that between being placed on the C Pap and receiving iron transfusions that this is a \"world of difference\". Patient reports actually having a lot more energy than she did last year this time. She denies,  abnormal weight loss.  Patient is noted to have intentional weight loss of approximately 7 kilograms since last year   Eyes:  Denies blurred vision, double . Patient does wear eyeglasses and does report having regular eye exams. Denies any concerns of macular degeneration or glaucoma.   HENT:  Denies , ear pain, hearing loss, nasal congestion, postnasal drip, nosebleeds, sinus pain,  sore throat. Denies any difficulty or pain swallowing liquids or solids.  Patient does report routine dental care   Respiratory:  Denies SOB (shortness of breath), cough,  Patient has been placed on cpap for DORETHA and patient states energy has greatly improved.  Cardiovascular:  Denies chest pains or palpitations, ankle or leg swelling, or dizziness. Denies decreased exercise tolerance over the last 6 months or one year. patient reports actually her exercise tolerance has improved   Gastrointestinal:  Denies abdominal pain, heartburn, nausea, vomiting, diarrhea, or blood in stool.  Patient reports at times still having issues with constipation but this is not new and has not worsened   Musculoskeletal:  Denies any new form of back pain, and denies back pain at present time. Patient does report seeing a chiropractor regularly for adjustments. Patient denies joint pain, joint swelling or tenderness.   Neurologic:  Denies  [N/A] : N/A [Denies] : Denies facial or extremity numbness, tingling, and her extremities. Denies headaches  Genitourinary:  Denies urinary frequency, urgency, incontinence, difficulty urinating, or blood in urine. Denies  vaginal bleeding or discharge. Patient has a indicated that she has not had any period ,since  last December Denies any abdominal bloating. Patient denies any breast lumps, nipple discharge, or skin changes in breasts.     Hematologic/Lymphatic:  Denies drenching night sweats   Endocrine:  Denies  awakening at night to urinate, or excessive thirst.     Integument:  Patient admits that she did not follow-up with the dermatologist as advised.. Patient reports bump under left eye for last 6 months that seems to be growing in size. Patient reports mole on right lower thigh.   Psychiatric:  Denies anxiety, feeling gloomy sad or crying easily or feeling that life is not with living for. She denies sleeping problems, and does use her C Pap machine on a regular basis.     On 5/29/2019, Leela JANSEN MA scribed the services personally performed by Sandie Houston MD    Physical Exam    Vital Signs:    Visit Vitals  /72 (BP Location: [facility], Patient Position: Sitting, Cuff Size: Large Adult)   Pulse 84   Resp 20   Ht 5' 9\" (1.753 m)   Wt 120.7 kg   LMP 12/15/2018 (Exact Date)   BMI 39.28 kg/m²     General:  Obese appearing white lady, in no apparent distress.    Eyes:  PERRL (pupils equal, round, and reactive to light), EOMI (extraocular movements are intact). Conjunctivae pink. Sclerae anicteric.    HENT:  Normocephalic, atraumatic. Bilateral external ears are normal. Otoscopic exam reveals normal-appearing tympanic membranes. Nasal speculum exam reveals moist mucosal membranes. Fairly edematous inferior nasal turbinates noted Oropharynx is clear.  Healthy appearing dentition   Neck:  Supple. Nontender. Normal range of motion. No masses. No lymphadenopathy. Palpable thyroid. Trachea midline.  Respiratory:  Normal respiratory  [Yes] : Yes effort. No chest wall tenderness. Lungs are clear to auscultation bilaterally. Symmetrical chest expansion.    Cardiovascular:  Regular rate and rhythm. Positive systolic murmur-not new, rubs, or gallops. Normal S1 and S2. No S3 or S4. No JVD (jugular venous distention). No carotid bruits.   Breasts:  Symmetric, no skin or nipple retraction, or nipple discharge noted bilaterally. No palpable masses noted bilaterally. Patient is noted to have mild increased nodularity in the superior aspect of the areolar region bilaterally. No palpable nodule or mass noted in either right or left breast. No supraclavicular lymphadenopathy noted bilaterally. There is noted to be resumed fatty tissue slight with diffuse fullness involving the axilla region bilaterally area lower region bilaterally but no discrete palpable lymph node or nodule ,noted..  Gastrointestinal:  Nondistended, normal bowel sounds, no abdominal bruits, soft. Nontender throughout on palpation. No abdominal pulsations, no  abdominal masses. No hepatosplenomegaly.    Genitourinary:  Deferred to OB/GYN.  Extremities:  No clubbing, no cyanosis, no pedal edema  Peripheral vascular: Radial, femoral, and dorsalis pedis pulses 4+ bilaterally.  Musculoskeletal:  Full range of motion in all 4 extremities proximal and distal. No joint swelling or tenderness.  Neurologic:  Alert and oriented x3. Grossly Normal sensory /motor function. In all 4 extremities. Cranial nerves III-XII intact.   Integumentary:  Warm, dry. Pink. No rashes . Noted slightly raised non-molar /flesh-color lesion involving the left face -in the infraorbital region-more medially.  Mid to lower left lateral back -noted with fairly well-circumscribed flat brown molar lesion /slightly raised brown molar lesion /fairly well-circumscribed involving the mid left lateral back region   Lymphatic:  No  inguinal/groin lymphadenopathy.    Psychiatric: Cooperative. Patient noted to be smiling throughout the office  [24g] : 24g visit. Appropriate mood and affect. Normal judgment.        Results    Pertinent labs and imaging studies reviewed.      Assessment AND Plan    1. History of skin cancer with noted new skin lesion involving the face -left infraorbital region /probable benign molar lesions involving the back  Of note patient did not follow-up with dermatologist last year is advised. The new lesion on her face patient has noticed for the last 6 months  Plan: I strongly advise the patient absolutely make a priority to follow-up with dermatologist as soon as possible and Dr. Trinidad's phone number has been given to the patient  2. previously reported fatigue with marked improvement essentially resolved since last year  Etiology appears to be multifactorial most likely related to iron deficiency as well as treatment for patients sleep apnea with C Pap machine  Plan: At this time it would appear reasonable to simply follow patient  3. Positive systolic murmur/history of mild to moderate mitral valve regurg  Plan: Patient is to keep her follow-up appointment with her cardiologist that is already been scheduled for June 4. Incidental finding of mildly dilated ascending aorta  Plan: Patient will be following up with Dr. Schmidt and will defer to him regarding recommendation for follow-up ultrasound versus CT  5. Previously reported low HDL state  Of note patient more recently has joined the gym  Plan: I would advise the patient actually proceed with a repeat FLP and have encouraged patient to establish a routine exercise regimen that can help increase her HDL since there is no medication available that will benefit her in this regard.  6. Definitive diagnosis of sleep apnea inpatient who currently now is using C Pap machine  Plan: Patient is to keep her follow-up appointment with Dr. Dwyer who is managing the setting  7. History of tubular adenoma on a previous colonoscopy-2015  Plan: As patient had directly reviewed this with   Amado he was apparently informed by his office if she should now follow-up in 5 years which will be 2020            As per patient's request will offer patient the name of a new gastroenterologist here at [place]  8. Previous history of heart burn/resolved  Plan: At this time it would appear reasonable to simply follow patient certainly if she should have any recurrent symptoms she should follow up with a gastroenterologist and patient has requested to establish care with the new GI doctor here  9. Previous history of iron deficiency anemia requiring iron transfusions  Of note patient apparently did have a total of 2 iron transfusions through Dr. Ferrer. Patient at this time denied any symptoms of fatigue  Plan: Patient's been advised to proceed with a CBC. I will defer further blood work including iron studies to Dr. Ferrer who has advised to follow-up evaluation in September of this year. If patient is noted to have low hemoglobin or hematocrit on CBC then I would consider proceeding with iron studies  Probable benign fat pad involving bilateral axillary region  Plan: We'll follow-up on patient's bilateral mammogram and breast ultrasounds I have however recommended the patient also have formal evaluation by Dr. Ferrer to ensure that no further evaluation regarding the presumed benign finding with the advised by her.  10. History of hyperparathyroidism  Plan: We'll be following up on a calcium level through the CMP that is been ordered however patient has been advised to follow-up with Dr. Orta who is managing her condition. At this point however patient has not been determined to be a surgical candidate  11. History of thyroid nodules/hypothyroidism  Plan: Patient is to continue follow-up with Dr. Orta who is managing this particular diagnosis including levothyroxine dose.  12. Morbid obesity with reported as 6 kilogram weight loss  Plan: I've encouraged patient to continue efforts for weight  [Medication Name: ___] : Medication Name: [unfilled] [Start Time: ___] : Medication Start Time: [unfilled] reduction. She has been complimented on her weight loss thus far.            Patient has been encouraged to in addition to following a low carbohydrate diet resume a routine exercise regimen and since she now has joined the gym hopefully she will be able to achieve a routine exercise. At next visit time permitting will be reviewing with patient further with regards to medication for possible weight loss, and it may be reasonable for patient to review with Dr. Schmidt if he would consider in this patient having patient consider use of phentermine.           Will f/u on FBS and LFT's through [facility]  [employer]  Patient has been encouraged to proceed with a bilateral mammogram as well as bilateral breast ultrasounds given the dense breasts were noted  Patient has been referred to Dr. Fletcher to establish care with a new OB/GYN for a GYN exam and evaluation. This patient to has a history of uterine fibroids  Patient has been advised to proceed with the new shingles vaccine that she can obtain through any [facility] or [facility]. Patient is up-to-date on her other immunizations  If patient is noted to be menopausal which would be after December of this year that would determine this, I would consider having patient proceed with a baseline bone density although patient does not appear to be at risk based on her weight but more given her history of hyperparathyroidism.  At next visit will be reviewed with patient more social history with regards to whether patient does have a carbon monoxide detector. She be noted the patient is  has 3 children and does work at [employer]  Patient has been advised to proceed with the UA  Follow-up with me in a few weeks after patient has undergone blood work.      The documentation recorded by the scribe accurately and completely reflects the service(s) I personally performed and the decisions made by me.                [End Time: ___] : Medication End Time: [unfilled] [IV discontinued. Intact. No signs or symptoms of IV complications noted. Time: ___] : IV discontinued. Intact. No signs or symptoms of IV complications noted. Time: [unfilled] [Patient  instructed to seek medical attention with signs and symptoms of adverse effects] : Patient  instructed to seek medical attention with signs and symptoms of adverse effects [Patient left unit in no acute distress] : Patient left unit in no acute distress [Medications administered as ordered and tolerated well.] : Medications administered as ordered and tolerated well. [de-identified] : sciatica nerve pain and bilateral knees [de-identified] : wheelchair [de-identified] : left arm cephalic vein  [de-identified] : no labs [de-identified] : Patient presents for day 2:3 monthly IVIG infusion, via w/c accompanied by . Patient reports no changes in symptoms . Today, patient reports pain as rated  above, stated that knees pain is much better since she received nerve block  injections and also started a new medication for sciatica nerve. Patient reports  having  new healing lesions to right leg/ no blisters. Patient reports of being easily bruised and fatigue persisting. Denies any recent falls. Fall precaution addressed. Patient pre-medicated as prescribed, infusion titrated as per protocol and  tolerated well.

## 2023-05-19 ENCOUNTER — APPOINTMENT (OUTPATIENT)
Dept: RHEUMATOLOGY | Facility: CLINIC | Age: 73
End: 2023-05-19
Payer: MEDICARE

## 2023-05-19 VITALS
HEART RATE: 94 BPM | TEMPERATURE: 97.9 F | OXYGEN SATURATION: 99 % | DIASTOLIC BLOOD PRESSURE: 55 MMHG | RESPIRATION RATE: 16 BRPM | SYSTOLIC BLOOD PRESSURE: 114 MMHG

## 2023-05-19 VITALS — SYSTOLIC BLOOD PRESSURE: 109 MMHG | DIASTOLIC BLOOD PRESSURE: 70 MMHG | OXYGEN SATURATION: 98 % | HEART RATE: 93 BPM

## 2023-05-19 PROCEDURE — 96366 THER/PROPH/DIAG IV INF ADDON: CPT

## 2023-05-19 PROCEDURE — 96365 THER/PROPH/DIAG IV INF INIT: CPT

## 2023-05-19 RX ORDER — DIPHENHYDRAMINE HCL 25 MG/1
25 CAPSULE ORAL
Qty: 0 | Refills: 0 | Status: COMPLETED | OUTPATIENT
Start: 2023-05-13

## 2023-05-19 RX ORDER — IMMUNE GLOBULIN INFUSION (HUMAN) 100 MG/ML
30 INJECTION, SOLUTION INTRAVENOUS; SUBCUTANEOUS
Qty: 0 | Refills: 0 | Status: COMPLETED | OUTPATIENT
Start: 2023-05-13

## 2023-05-19 RX ORDER — IMMUNE GLOBULIN INFUSION (HUMAN) 100 MG/ML
20 INJECTION, SOLUTION INTRAVENOUS; SUBCUTANEOUS
Qty: 0 | Refills: 0 | Status: COMPLETED | OUTPATIENT
Start: 2023-05-13

## 2023-05-19 NOTE — HISTORY OF PRESENT ILLNESS
[6] : 6 [N/A] : N/A [Denies] : Denies [Yes] : Yes [24g] : 24g [Medication Name: ___] : Medication Name: [unfilled] [Start Time: ___] : Medication Start Time: [unfilled] [End Time: ___] : Medication End Time: [unfilled] [IV discontinued. Intact. No signs or symptoms of IV complications noted. Time: ___] : IV discontinued. Intact. No signs or symptoms of IV complications noted. Time: [unfilled] [Patient  instructed to seek medical attention with signs and symptoms of adverse effects] : Patient  instructed to seek medical attention with signs and symptoms of adverse effects [Patient left unit in no acute distress] : Patient left unit in no acute distress [Medications administered as ordered and tolerated well.] : Medications administered as ordered and tolerated well. [de-identified] : sciatica nerve pain and bilateral knees [de-identified] : wheelchair [de-identified] : left hand dorsal vein  [de-identified] : Patient presents for day 3:3 monthly IVIG infusion, via w/c accompanied by . Patient reports no changes in symptoms. Today, patient reports pain as rated above, stated that knees pain is much better since she received nerve block injections and also started a new medication for sciatica nerve. Patient reports that lesions on right leg has gone. Patient continues to report of being bruised easily  and persistent fatigue. Denies any recent falls. Fall precaution addressed. Patient pre-medicated as prescribed, infusion titrated as per protocol and  tolerated well.

## 2023-05-31 RX ORDER — IMMUNE GLOBULIN INFUSION (HUMAN) 100 MG/ML
20 INJECTION, SOLUTION INTRAVENOUS; SUBCUTANEOUS
Qty: 0 | Refills: 0 | Status: COMPLETED | OUTPATIENT
Start: 2023-05-31 | End: 1900-01-01

## 2023-05-31 RX ORDER — ACETAMINOPHEN 325 MG/1
325 TABLET, FILM COATED ORAL
Qty: 0 | Refills: 0 | Status: COMPLETED | OUTPATIENT
Start: 2023-05-31 | End: 1900-01-01

## 2023-05-31 RX ORDER — CAMPHOR 0.45 %
25 GEL (GRAM) TOPICAL
Qty: 0 | Refills: 0 | Status: COMPLETED | OUTPATIENT
Start: 2023-05-31 | End: 1900-01-01

## 2023-06-01 RX ORDER — CAMPHOR 0.45 %
25 GEL (GRAM) TOPICAL
Qty: 30 | Refills: 0 | Status: DISCONTINUED | OUTPATIENT
Start: 2017-12-15 | End: 2023-06-01

## 2023-06-01 RX ORDER — IMMUNE GLOBULIN INFUSION (HUMAN) 100 MG/ML
20 INJECTION, SOLUTION INTRAVENOUS; SUBCUTANEOUS
Qty: 2400 | Refills: 0 | Status: DISCONTINUED | OUTPATIENT
Start: 2018-01-09 | End: 2023-06-01

## 2023-06-01 RX ORDER — ACETAMINOPHEN 325 MG/1
325 TABLET, FILM COATED ORAL
Qty: 60 | Refills: 0 | Status: DISCONTINUED | OUTPATIENT
Start: 2017-12-15 | End: 2023-06-01

## 2023-06-12 ENCOUNTER — APPOINTMENT (OUTPATIENT)
Dept: RHEUMATOLOGY | Facility: CLINIC | Age: 73
End: 2023-06-12
Payer: MEDICARE

## 2023-06-12 VITALS
HEART RATE: 84 BPM | DIASTOLIC BLOOD PRESSURE: 58 MMHG | SYSTOLIC BLOOD PRESSURE: 96 MMHG | OXYGEN SATURATION: 95 % | RESPIRATION RATE: 16 BRPM

## 2023-06-12 VITALS
TEMPERATURE: 98 F | RESPIRATION RATE: 16 BRPM | OXYGEN SATURATION: 95 % | DIASTOLIC BLOOD PRESSURE: 66 MMHG | SYSTOLIC BLOOD PRESSURE: 109 MMHG | HEART RATE: 87 BPM

## 2023-06-12 PROCEDURE — 96366 THER/PROPH/DIAG IV INF ADDON: CPT

## 2023-06-12 PROCEDURE — 96365 THER/PROPH/DIAG IV INF INIT: CPT

## 2023-06-12 RX ORDER — CAMPHOR 0.45 %
25 GEL (GRAM) TOPICAL
Qty: 0 | Refills: 0 | Status: COMPLETED
Start: 2023-05-31

## 2023-06-12 RX ORDER — ACETAMINOPHEN 325 MG/1
325 TABLET, FILM COATED ORAL
Qty: 0 | Refills: 0 | Status: COMPLETED
Start: 2023-05-31

## 2023-06-12 RX ORDER — IMMUNE GLOBULIN INFUSION (HUMAN) 100 MG/ML
20 INJECTION, SOLUTION INTRAVENOUS; SUBCUTANEOUS
Qty: 0 | Refills: 0 | Status: COMPLETED
Start: 2023-05-31

## 2023-06-12 NOTE — HISTORY OF PRESENT ILLNESS
[6] : 6 [N/A] : N/A [Denies] : Denies [Yes] : Yes [Declined] : Declined [24g] : 24g [Medication Name: ___] : Medication Name: [unfilled] [Start Time: ___] : Medication Start Time: [unfilled] [End Time: ___] : Medication End Time: [unfilled] [IV discontinued. Intact. No signs or symptoms of IV complications noted. Time: ___] : IV discontinued. Intact. No signs or symptoms of IV complications noted. Time: [unfilled] [Patient  instructed to seek medical attention with signs and symptoms of adverse effects] : Patient  instructed to seek medical attention with signs and symptoms of adverse effects [Patient left unit in no acute distress] : Patient left unit in no acute distress [Medications administered as ordered and tolerated well.] : Medications administered as ordered and tolerated well. [de-identified] : sciatica nerve pain and bilateral knees [de-identified] : 2 blisters on the right thigh  [de-identified] : wheelchair [de-identified] : left hand dorsal vein  [de-identified] : No labs [de-identified] : Patient presents for day 1:3 monthly IVIG infusion, via w/c accompanied by her . Patient reports no changes in symptoms. Today, the patient reports pain as rated above, and stated that knee pain is much better since she received nerve block injections in October. Patient is pending another injection or possibly nerve ablation on the right knee. Patient reports two blisters on the right thigh that started a week ago. Patient continues to report being bruised easily and persistent fatigue. Denies any recent falls. Fall precaution addressed. Patient pre-medicated as prescribed, infusion titrated as per protocol and tolerated well.

## 2023-06-13 NOTE — CONSULT NOTE ADULT - RESPIRATORY AND THORAX
L/M to schedule appt    Fd please get the patient scheduled (around 6/12/2023) for Hypertension, Hyperlipidemia, Mood disorder.
Refill Request     CONFIRM preferred pharmacy with the patient. If Mail Order Rx - Pend for 90 day refill. Last Seen: Last Seen Department: 12/12/2022  Last Seen by PCP: 12/12/2022    Last Written: 12/12/2022, #30, 5 refills    If no future appointment scheduled:  Review the last OV with PCP and review information for follow-up visit,  Route STAFF MESSAGE with patient name to the [facility] for scheduling with the following information:            -  Timing of next visit           -  Visit type ie Physical, OV, etc           -  Diagnoses/Reason ie. COPD, HTN - Do not use MEDICATION, Follow-up or CHECK UP - Give reason for visit      Next Appointment:   No future appointments. Message sent to [facility] to schedule appt with patient?   YES      Requested Prescriptions     Pending Prescriptions Disp Refills    hydroCHLOROthiazide (MICROZIDE) 12.5 MG capsule [Pharmacy Med Name: HYDROCHLOROTHIAZIDE 12.5 MG CP] 90 capsule 1     Sig: TAKE 1 CAPSULE BY MOUTH EVERY DAY
Scheduled 7-03-23
details…

## 2023-06-14 ENCOUNTER — APPOINTMENT (OUTPATIENT)
Dept: RHEUMATOLOGY | Facility: CLINIC | Age: 73
End: 2023-06-14
Payer: MEDICARE

## 2023-06-14 VITALS
SYSTOLIC BLOOD PRESSURE: 98 MMHG | RESPIRATION RATE: 16 BRPM | OXYGEN SATURATION: 96 % | HEART RATE: 93 BPM | DIASTOLIC BLOOD PRESSURE: 55 MMHG | TEMPERATURE: 98 F

## 2023-06-14 VITALS
DIASTOLIC BLOOD PRESSURE: 59 MMHG | SYSTOLIC BLOOD PRESSURE: 96 MMHG | OXYGEN SATURATION: 98 % | HEART RATE: 78 BPM | RESPIRATION RATE: 16 BRPM

## 2023-06-14 PROCEDURE — 96365 THER/PROPH/DIAG IV INF INIT: CPT

## 2023-06-14 PROCEDURE — 96366 THER/PROPH/DIAG IV INF ADDON: CPT

## 2023-06-14 RX ORDER — IMMUNE GLOBULIN INFUSION (HUMAN) 100 MG/ML
20 INJECTION, SOLUTION INTRAVENOUS; SUBCUTANEOUS
Qty: 0 | Refills: 0 | Status: COMPLETED
Start: 2023-05-31

## 2023-06-14 RX ORDER — CAMPHOR 0.45 %
25 GEL (GRAM) TOPICAL
Qty: 0 | Refills: 0 | Status: COMPLETED
Start: 2023-05-31

## 2023-06-14 RX ORDER — ACETAMINOPHEN 325 MG/1
325 TABLET, FILM COATED ORAL
Qty: 0 | Refills: 0 | Status: COMPLETED
Start: 2023-05-31

## 2023-06-14 NOTE — HISTORY OF PRESENT ILLNESS
[5] : 5 [N/A] : N/A [Denies] : Denies [Yes] : Yes [24g] : 24g [Medication Name: ___] : Medication Name: [unfilled] [Start Time: ___] : Medication Start Time: [unfilled] [End Time: ___] : Medication End Time: [unfilled] [IV discontinued. Intact. No signs or symptoms of IV complications noted. Time: ___] : IV discontinued. Intact. No signs or symptoms of IV complications noted. Time: [unfilled] [Patient  instructed to seek medical attention with signs and symptoms of adverse effects] : Patient  instructed to seek medical attention with signs and symptoms of adverse effects [Patient left unit in no acute distress] : Patient left unit in no acute distress [Medications administered as ordered and tolerated well.] : Medications administered as ordered and tolerated well. [de-identified] : sciatica nerve pain and bilateral knees [de-identified] : 2 blisters on the right thigh  [de-identified] : wheelchair [de-identified] : right arm accessory cephalic vein [de-identified] : No labs [de-identified] : Patient presents for day 2:3 monthly IVIG infusion, via w/c accompanied by her . Today, patient reports no changes in symptoms and continues to reports pain as rated above, and stated that knee pain is much better since she received nerve block injections in October. Patient is planing  another injection or possibly nerve ablation on the right knee. Patient reports two blisters on the right thigh that started a week ago. Patient continues to report being bruised easily and persistent fatigue. Denies any recent falls. Fall precaution addressed. No other concerns verbalized. Patient pre-medicated as prescribed, infusion titrated as per protocol and tolerated well.

## 2023-06-16 ENCOUNTER — APPOINTMENT (OUTPATIENT)
Dept: RHEUMATOLOGY | Facility: CLINIC | Age: 73
End: 2023-06-16
Payer: MEDICARE

## 2023-06-16 VITALS
DIASTOLIC BLOOD PRESSURE: 66 MMHG | SYSTOLIC BLOOD PRESSURE: 106 MMHG | OXYGEN SATURATION: 95 % | HEART RATE: 85 BPM | RESPIRATION RATE: 16 BRPM | TEMPERATURE: 98.3 F

## 2023-06-16 VITALS
DIASTOLIC BLOOD PRESSURE: 63 MMHG | SYSTOLIC BLOOD PRESSURE: 105 MMHG | OXYGEN SATURATION: 98 % | RESPIRATION RATE: 16 BRPM | HEART RATE: 80 BPM

## 2023-06-16 PROCEDURE — 96365 THER/PROPH/DIAG IV INF INIT: CPT

## 2023-06-16 PROCEDURE — 96366 THER/PROPH/DIAG IV INF ADDON: CPT

## 2023-06-16 RX ORDER — CAMPHOR 0.45 %
25 GEL (GRAM) TOPICAL
Qty: 0 | Refills: 0 | Status: COMPLETED
Start: 2023-05-31

## 2023-06-16 RX ORDER — ACETAMINOPHEN 325 MG/1
325 TABLET, FILM COATED ORAL
Qty: 0 | Refills: 0 | Status: COMPLETED
Start: 2023-05-31

## 2023-06-16 RX ORDER — IMMUNE GLOBULIN INFUSION (HUMAN) 100 MG/ML
20 INJECTION, SOLUTION INTRAVENOUS; SUBCUTANEOUS
Qty: 0 | Refills: 0 | Status: COMPLETED
Start: 2023-05-31

## 2023-06-16 NOTE — HISTORY OF PRESENT ILLNESS
[6] : 6 [N/A] : N/A [Denies] : Denies [Yes] : Yes [Right upper extremity] : Right upper extremity [24g] : 24g [Medication Name: ___] : Medication Name: [unfilled] [Start Time: ___] : Medication Start Time: [unfilled] [End Time: ___] : Medication End Time: [unfilled] [IV discontinued. Intact. No signs or symptoms of IV complications noted. Time: ___] : IV discontinued. Intact. No signs or symptoms of IV complications noted. Time: [unfilled] [Patient  instructed to seek medical attention with signs and symptoms of adverse effects] : Patient  instructed to seek medical attention with signs and symptoms of adverse effects [Patient left unit in no acute distress] : Patient left unit in no acute distress [Medications administered as ordered and tolerated well.] : Medications administered as ordered and tolerated well. [de-identified] : sciatica nerve pain and bilateral knees [de-identified] : 2 blisters on the right thigh  [de-identified] : wheelchair [de-identified] : dorsal hand vein [de-identified] : No labs [de-identified] : Patient presents for day 3:3 monthly IVIG infusion, via w/c accompanied by her . Today, patient reports no changes in symptoms and continues to reports pain as rated above, and stated that knee pain is much better since she received nerve block injections in October. Patient is planing  another injection to the right knee due to lack of improvement. Patient reports two blisters on the right thigh that started a week ago. Patient continues to report being bruised easily and persistent fatigue. Denies any recent falls. Fall precaution addressed. No other concerns verbalized. Patient pre-medicated as prescribed, infusion titrated as per protocol and tolerated well.

## 2023-07-10 ENCOUNTER — APPOINTMENT (OUTPATIENT)
Dept: RHEUMATOLOGY | Facility: CLINIC | Age: 73
End: 2023-07-10
Payer: MEDICARE

## 2023-07-10 VITALS
TEMPERATURE: 97.9 F | DIASTOLIC BLOOD PRESSURE: 64 MMHG | SYSTOLIC BLOOD PRESSURE: 120 MMHG | RESPIRATION RATE: 16 BRPM | OXYGEN SATURATION: 96 % | HEART RATE: 89 BPM

## 2023-07-10 VITALS
OXYGEN SATURATION: 94 % | HEART RATE: 83 BPM | RESPIRATION RATE: 16 BRPM | DIASTOLIC BLOOD PRESSURE: 64 MMHG | SYSTOLIC BLOOD PRESSURE: 104 MMHG

## 2023-07-10 PROCEDURE — 96366 THER/PROPH/DIAG IV INF ADDON: CPT

## 2023-07-10 PROCEDURE — 96365 THER/PROPH/DIAG IV INF INIT: CPT

## 2023-07-10 RX ORDER — IMMUNE GLOBULIN INFUSION (HUMAN) 100 MG/ML
20 INJECTION, SOLUTION INTRAVENOUS; SUBCUTANEOUS
Qty: 0 | Refills: 0 | Status: COMPLETED
Start: 2023-05-31

## 2023-07-10 RX ORDER — ACETAMINOPHEN 325 MG/1
325 TABLET, FILM COATED ORAL
Qty: 0 | Refills: 0 | Status: COMPLETED
Start: 2023-05-31

## 2023-07-10 RX ORDER — CAMPHOR 0.45 %
25 GEL (GRAM) TOPICAL
Qty: 0 | Refills: 0 | Status: COMPLETED
Start: 2023-05-31

## 2023-07-10 NOTE — HISTORY OF PRESENT ILLNESS
[6] : 6 [N/A] : N/A [Denies] : Denies [Yes] : Yes [Right upper extremity] : Right upper extremity [24g] : 24g [Medication Name: ___] : Medication Name: [unfilled] [Start Time: ___] : Medication Start Time: [unfilled] [End Time: ___] : Medication End Time: [unfilled] [IV discontinued. Intact. No signs or symptoms of IV complications noted. Time: ___] : IV discontinued. Intact. No signs or symptoms of IV complications noted. Time: [unfilled] [Patient  instructed to seek medical attention with signs and symptoms of adverse effects] : Patient  instructed to seek medical attention with signs and symptoms of adverse effects [Patient left unit in no acute distress] : Patient left unit in no acute distress [Medications administered as ordered and tolerated well.] : Medications administered as ordered and tolerated well. [de-identified] : sciatica nerve pain and bilateral knees [de-identified] : blisters on the thigh  [de-identified] : wheelchair [de-identified] : Median cubital vein 24g [de-identified] : No labs [de-identified] : Patient presents for day 1:3 monthly IVIG infusion, via w/c accompanied by her son. Today, patient reports no changes in symptoms and continues to reports pain as rated above. Patient continues to report blisters on the extremities. Patient continues to report being bruised easily and persistent fatigue. Denies any recent falls. Fall precaution addressed. No other concerns verbalized. Patient pre-medicated as prescribed, infusion titrated as per protocol and tolerated well.

## 2023-07-12 ENCOUNTER — APPOINTMENT (OUTPATIENT)
Dept: RHEUMATOLOGY | Facility: CLINIC | Age: 73
End: 2023-07-12
Payer: MEDICARE

## 2023-07-12 VITALS
DIASTOLIC BLOOD PRESSURE: 62 MMHG | SYSTOLIC BLOOD PRESSURE: 105 MMHG | OXYGEN SATURATION: 97 % | RESPIRATION RATE: 16 BRPM | HEART RATE: 89 BPM

## 2023-07-12 VITALS
RESPIRATION RATE: 16 BRPM | DIASTOLIC BLOOD PRESSURE: 50 MMHG | SYSTOLIC BLOOD PRESSURE: 98 MMHG | OXYGEN SATURATION: 97 % | HEART RATE: 76 BPM

## 2023-07-12 PROCEDURE — 96365 THER/PROPH/DIAG IV INF INIT: CPT

## 2023-07-12 PROCEDURE — 96366 THER/PROPH/DIAG IV INF ADDON: CPT

## 2023-07-12 RX ORDER — ACETAMINOPHEN 325 MG/1
325 TABLET, FILM COATED ORAL
Qty: 0 | Refills: 0 | Status: COMPLETED
Start: 2023-05-31

## 2023-07-12 RX ORDER — CAMPHOR 0.45 %
25 GEL (GRAM) TOPICAL
Qty: 0 | Refills: 0 | Status: COMPLETED
Start: 2023-05-31

## 2023-07-12 RX ORDER — IMMUNE GLOBULIN INFUSION (HUMAN) 100 MG/ML
20 INJECTION, SOLUTION INTRAVENOUS; SUBCUTANEOUS
Qty: 0 | Refills: 0 | Status: COMPLETED
Start: 2023-05-31

## 2023-07-12 NOTE — HISTORY OF PRESENT ILLNESS
[N/A] : N/A [Denies] : Denies [Yes] : Yes [Declined] : Declined [Left upper extremity] : Left upper extremity [24g] : 24g [Medication Name: ___] : Medication Name: [unfilled] [Start Time: ___] : Medication Start Time: [unfilled] [End Time: ___] : Medication End Time: [unfilled] [IV discontinued. Intact. No signs or symptoms of IV complications noted. Time: ___] : IV discontinued. Intact. No signs or symptoms of IV complications noted. Time: [unfilled] [Patient  instructed to seek medical attention with signs and symptoms of adverse effects] : Patient  instructed to seek medical attention with signs and symptoms of adverse effects [Patient left unit in no acute distress] : Patient left unit in no acute distress [Medications administered as ordered and tolerated well.] : Medications administered as ordered and tolerated well. [de-identified] : wheelchair [de-identified] : left hand [de-identified] : Patient presents for 2:3 IVIG infusion. Patient denies any new symptoms or complaints since last infusion. Patient reports of being easily bruised and fatigue persisting. Denies any recent falls. Fall precaution addressed. Patient pre-medicated as per order. Infusion titrated and tolerated well.

## 2023-07-14 ENCOUNTER — APPOINTMENT (OUTPATIENT)
Dept: RHEUMATOLOGY | Facility: CLINIC | Age: 73
End: 2023-07-14
Payer: MEDICARE

## 2023-07-14 VITALS
DIASTOLIC BLOOD PRESSURE: 52 MMHG | OXYGEN SATURATION: 95 % | HEART RATE: 88 BPM | TEMPERATURE: 98 F | SYSTOLIC BLOOD PRESSURE: 103 MMHG | RESPIRATION RATE: 16 BRPM

## 2023-07-14 VITALS
SYSTOLIC BLOOD PRESSURE: 108 MMHG | HEART RATE: 75 BPM | OXYGEN SATURATION: 96 % | RESPIRATION RATE: 16 BRPM | DIASTOLIC BLOOD PRESSURE: 69 MMHG

## 2023-07-14 PROCEDURE — 96365 THER/PROPH/DIAG IV INF INIT: CPT

## 2023-07-14 PROCEDURE — 96366 THER/PROPH/DIAG IV INF ADDON: CPT

## 2023-07-14 RX ORDER — IMMUNE GLOBULIN INFUSION (HUMAN) 100 MG/ML
20 INJECTION, SOLUTION INTRAVENOUS; SUBCUTANEOUS
Qty: 0 | Refills: 0 | Status: COMPLETED
Start: 2023-05-31

## 2023-07-14 RX ORDER — ACETAMINOPHEN 325 MG/1
325 TABLET, FILM COATED ORAL
Qty: 0 | Refills: 0 | Status: COMPLETED
Start: 2023-05-31

## 2023-07-14 RX ORDER — CAMPHOR 0.45 %
25 GEL (GRAM) TOPICAL
Qty: 0 | Refills: 0 | Status: COMPLETED
Start: 2023-05-31

## 2023-07-14 NOTE — HISTORY OF PRESENT ILLNESS
[N/A] : N/A [Denies] : Denies [Yes] : Yes [Declined] : Declined [Right upper extremity] : Right upper extremity [24g] : 24g [Medication Name: ___] : Medication Name: [unfilled] [Start Time: ___] : Medication Start Time: [unfilled] [End Time: ___] : Medication End Time: [unfilled] [IV discontinued. Intact. No signs or symptoms of IV complications noted. Time: ___] : IV discontinued. Intact. No signs or symptoms of IV complications noted. Time: [unfilled] [Patient  instructed to seek medical attention with signs and symptoms of adverse effects] : Patient  instructed to seek medical attention with signs and symptoms of adverse effects [Patient left unit in no acute distress] : Patient left unit in no acute distress [Medications administered as ordered and tolerated well.] : Medications administered as ordered and tolerated well. [de-identified] : wheelchair [de-identified] : dorsal hand vein [de-identified] : Patient presents for 3:3 IVIG infusion. Patient denies any new symptoms or complaints since last infusion. Patient reports of being easily bruised and fatigue persisting. Denies any recent falls. Fall precaution addressed. Patient pre-medicated as per order. Infusion titrated and tolerated well.

## 2023-08-07 ENCOUNTER — APPOINTMENT (OUTPATIENT)
Dept: RHEUMATOLOGY | Facility: CLINIC | Age: 73
End: 2023-08-07
Payer: MEDICARE

## 2023-08-07 VITALS
RESPIRATION RATE: 16 BRPM | DIASTOLIC BLOOD PRESSURE: 69 MMHG | TEMPERATURE: 97.4 F | OXYGEN SATURATION: 97 % | SYSTOLIC BLOOD PRESSURE: 115 MMHG | HEART RATE: 80 BPM

## 2023-08-07 VITALS — DIASTOLIC BLOOD PRESSURE: 67 MMHG | SYSTOLIC BLOOD PRESSURE: 109 MMHG | HEART RATE: 67 BPM | RESPIRATION RATE: 16 BRPM

## 2023-08-07 PROCEDURE — 96366 THER/PROPH/DIAG IV INF ADDON: CPT

## 2023-08-07 PROCEDURE — 96365 THER/PROPH/DIAG IV INF INIT: CPT

## 2023-08-07 RX ORDER — ACETAMINOPHEN 325 MG/1
325 TABLET, FILM COATED ORAL
Qty: 0 | Refills: 0 | Status: COMPLETED
Start: 2023-05-31

## 2023-08-07 RX ORDER — IMMUNE GLOBULIN INFUSION (HUMAN) 100 MG/ML
20 INJECTION, SOLUTION INTRAVENOUS; SUBCUTANEOUS
Qty: 0 | Refills: 0 | Status: COMPLETED
Start: 2023-05-31

## 2023-08-07 RX ORDER — CAMPHOR 0.45 %
25 GEL (GRAM) TOPICAL
Qty: 0 | Refills: 0 | Status: COMPLETED
Start: 2023-05-31

## 2023-08-07 NOTE — HISTORY OF PRESENT ILLNESS
[7] : 7 [N/A] : N/A [Denies] : Denies [Yes] : Yes [Declined] : Declined [Right upper extremity] : Right upper extremity [24g] : 24g [Medication Name: ___] : Medication Name: [unfilled] [Start Time: ___] : Medication Start Time: [unfilled] [End Time: ___] : Medication End Time: [unfilled] [IV discontinued. Intact. No signs or symptoms of IV complications noted. Time: ___] : IV discontinued. Intact. No signs or symptoms of IV complications noted. Time: [unfilled] [Patient  instructed to seek medical attention with signs and symptoms of adverse effects] : Patient  instructed to seek medical attention with signs and symptoms of adverse effects [Patient left unit in no acute distress] : Patient left unit in no acute distress [Medications administered as ordered and tolerated well.] : Medications administered as ordered and tolerated well. [de-identified] : knees, sciatica  [de-identified] : wheelchair [de-identified] : Dorsal hand vein [de-identified] : Patient presents for 1:3 Gammagard infusion, doing well overall. Patient denies any recent infection or antibiotic use. Patient reports pain to knees and sciatica pain rated a 7 on the pain scale above. Patient reports new bruises to the skin and increase in fatigue. Patient denies any recent falls. Patient reports will be having a f/u appointment with Dr. Pina in September. No other symptoms or concerns noted at this time. Patient pre-medicated, infusion titrated and tolerated well.

## 2023-08-09 ENCOUNTER — APPOINTMENT (OUTPATIENT)
Dept: RHEUMATOLOGY | Facility: CLINIC | Age: 73
End: 2023-08-09
Payer: MEDICARE

## 2023-08-09 VITALS
RESPIRATION RATE: 16 BRPM | TEMPERATURE: 98 F | DIASTOLIC BLOOD PRESSURE: 68 MMHG | HEART RATE: 76 BPM | OXYGEN SATURATION: 97 % | SYSTOLIC BLOOD PRESSURE: 110 MMHG

## 2023-08-09 VITALS
RESPIRATION RATE: 16 BRPM | OXYGEN SATURATION: 97 % | SYSTOLIC BLOOD PRESSURE: 100 MMHG | DIASTOLIC BLOOD PRESSURE: 62 MMHG | HEART RATE: 67 BPM

## 2023-08-09 PROCEDURE — 96365 THER/PROPH/DIAG IV INF INIT: CPT

## 2023-08-09 PROCEDURE — 96366 THER/PROPH/DIAG IV INF ADDON: CPT

## 2023-08-09 RX ORDER — CAMPHOR 0.45 %
25 GEL (GRAM) TOPICAL
Qty: 0 | Refills: 0 | Status: COMPLETED
Start: 2023-05-31

## 2023-08-09 RX ORDER — ACETAMINOPHEN 325 MG/1
325 TABLET, FILM COATED ORAL
Qty: 0 | Refills: 0 | Status: COMPLETED
Start: 2023-05-31

## 2023-08-09 RX ORDER — IMMUNE GLOBULIN INFUSION (HUMAN) 100 MG/ML
20 INJECTION, SOLUTION INTRAVENOUS; SUBCUTANEOUS
Qty: 0 | Refills: 0 | Status: COMPLETED
Start: 2023-05-31

## 2023-08-09 NOTE — HISTORY OF PRESENT ILLNESS
[N/A] : N/A [Denies] : Denies [Yes] : Yes [Declined] : Declined [Left upper extremity] : Left upper extremity [24g] : 24g [Medication Name: ___] : Medication Name: [unfilled] [Start Time: ___] : Medication Start Time: [unfilled] [End Time: ___] : Medication End Time: [unfilled] [IV discontinued. Intact. No signs or symptoms of IV complications noted. Time: ___] : IV discontinued. Intact. No signs or symptoms of IV complications noted. Time: [unfilled] [Patient  instructed to seek medical attention with signs and symptoms of adverse effects] : Patient  instructed to seek medical attention with signs and symptoms of adverse effects [Patient left unit in no acute distress] : Patient left unit in no acute distress [Medications administered as ordered and tolerated well.] : Medications administered as ordered and tolerated well. [de-identified] : wheelchair [de-identified] : left hand [de-identified] : Patient presents for 2:3 IVIG infusion. Patient denies any new symptoms or complaints since last infusion. Patient reports of being easily bruised and fatigue persisting. Denies any recent falls. Fall precaution addressed. Patient pre-medicated as per order. Infusion titrated and tolerated well.

## 2023-08-11 ENCOUNTER — APPOINTMENT (OUTPATIENT)
Dept: RHEUMATOLOGY | Facility: CLINIC | Age: 73
End: 2023-08-11
Payer: MEDICARE

## 2023-08-11 VITALS
RESPIRATION RATE: 16 BRPM | SYSTOLIC BLOOD PRESSURE: 123 MMHG | HEART RATE: 79 BPM | OXYGEN SATURATION: 96 % | TEMPERATURE: 98 F | DIASTOLIC BLOOD PRESSURE: 66 MMHG

## 2023-08-11 VITALS — HEART RATE: 66 BPM | DIASTOLIC BLOOD PRESSURE: 72 MMHG | SYSTOLIC BLOOD PRESSURE: 127 MMHG | OXYGEN SATURATION: 98 %

## 2023-08-11 PROCEDURE — 96365 THER/PROPH/DIAG IV INF INIT: CPT

## 2023-08-11 PROCEDURE — 96366 THER/PROPH/DIAG IV INF ADDON: CPT

## 2023-08-11 RX ORDER — IMMUNE GLOBULIN INFUSION (HUMAN) 100 MG/ML
20 INJECTION, SOLUTION INTRAVENOUS; SUBCUTANEOUS
Qty: 0 | Refills: 0 | Status: COMPLETED
Start: 2023-05-31

## 2023-08-11 RX ORDER — CAMPHOR 0.45 %
25 GEL (GRAM) TOPICAL
Qty: 0 | Refills: 0 | Status: COMPLETED
Start: 2023-05-31

## 2023-08-11 RX ORDER — ACETAMINOPHEN 325 MG/1
325 TABLET, FILM COATED ORAL
Qty: 0 | Refills: 0 | Status: COMPLETED
Start: 2023-05-31

## 2023-08-11 NOTE — HISTORY OF PRESENT ILLNESS
[N/A] : N/A [Denies] : Denies [Yes] : Yes [24g] : 24g [Medication Name: ___] : Medication Name: [unfilled] [Start Time: ___] : Medication Start Time: [unfilled] [End Time: ___] : Medication End Time: [unfilled] [IV discontinued. Intact. No signs or symptoms of IV complications noted. Time: ___] : IV discontinued. Intact. No signs or symptoms of IV complications noted. Time: [unfilled] [Patient  instructed to seek medical attention with signs and symptoms of adverse effects] : Patient  instructed to seek medical attention with signs and symptoms of adverse effects [Patient left unit in no acute distress] : Patient left unit in no acute distress [Medications administered as ordered and tolerated well.] : Medications administered as ordered and tolerated well. [de-identified] : wheelchair [de-identified] : right hand [de-identified] : Patient presents for 3:3 IVIG infusion. Patient denies any new symptoms or complaints since last infusion. Patient reports of being easily bruised and fatigue persisting. Denies any recent falls. Fall precaution addressed. Patient pre-medicated as per order. Infusion titrated and tolerated well.

## 2023-08-16 ENCOUNTER — RX RENEWAL (OUTPATIENT)
Age: 73
End: 2023-08-16

## 2023-08-31 NOTE — ED PROVIDER NOTE - MUSCULOSKELETAL [+], MLM
RLE erythema, warmth surrounding area of previous/ruptured bullae Alert-The patient is alert, awake and responds to voice. The patient is oriented to time, place, and person. The triage nurse is able to obtain subjective information.

## 2023-09-05 ENCOUNTER — APPOINTMENT (OUTPATIENT)
Dept: RHEUMATOLOGY | Facility: CLINIC | Age: 73
End: 2023-09-05
Payer: MEDICARE

## 2023-09-05 VITALS
HEART RATE: 84 BPM | TEMPERATURE: 97.9 F | DIASTOLIC BLOOD PRESSURE: 63 MMHG | SYSTOLIC BLOOD PRESSURE: 105 MMHG | RESPIRATION RATE: 16 BRPM | OXYGEN SATURATION: 95 %

## 2023-09-05 VITALS — SYSTOLIC BLOOD PRESSURE: 93 MMHG | DIASTOLIC BLOOD PRESSURE: 55 MMHG | HEART RATE: 66 BPM

## 2023-09-05 PROCEDURE — 96365 THER/PROPH/DIAG IV INF INIT: CPT

## 2023-09-05 PROCEDURE — 96366 THER/PROPH/DIAG IV INF ADDON: CPT

## 2023-09-06 ENCOUNTER — APPOINTMENT (OUTPATIENT)
Dept: RHEUMATOLOGY | Facility: CLINIC | Age: 73
End: 2023-09-06
Payer: MEDICARE

## 2023-09-06 VITALS
HEART RATE: 90 BPM | TEMPERATURE: 98.2 F | OXYGEN SATURATION: 95 % | DIASTOLIC BLOOD PRESSURE: 66 MMHG | SYSTOLIC BLOOD PRESSURE: 112 MMHG | RESPIRATION RATE: 16 BRPM

## 2023-09-06 VITALS
SYSTOLIC BLOOD PRESSURE: 96 MMHG | OXYGEN SATURATION: 96 % | DIASTOLIC BLOOD PRESSURE: 56 MMHG | HEART RATE: 65 BPM | RESPIRATION RATE: 16 BRPM

## 2023-09-06 PROCEDURE — 96365 THER/PROPH/DIAG IV INF INIT: CPT

## 2023-09-06 PROCEDURE — 96366 THER/PROPH/DIAG IV INF ADDON: CPT

## 2023-09-06 RX ORDER — IMMUNE GLOBULIN INFUSION (HUMAN) 100 MG/ML
20 INJECTION, SOLUTION INTRAVENOUS; SUBCUTANEOUS
Qty: 0 | Refills: 0 | Status: COMPLETED
Start: 2023-05-31

## 2023-09-06 RX ORDER — CAMPHOR 0.45 %
25 GEL (GRAM) TOPICAL
Qty: 0 | Refills: 0 | Status: COMPLETED
Start: 2023-05-31

## 2023-09-06 RX ORDER — ACETAMINOPHEN 325 MG/1
325 TABLET, FILM COATED ORAL
Qty: 0 | Refills: 0 | Status: COMPLETED
Start: 2023-05-31

## 2023-09-06 NOTE — HISTORY OF PRESENT ILLNESS
[6] : 6 [N/A] : N/A [Denies] : Denies [Yes] : Yes [de-identified] : b/l knees [de-identified] : wheelchair [24g] : 24g [Medication Name: ___] : Medication Name: [unfilled] [Start Time: ___] : Medication Start Time: [unfilled] [End Time: ___] : Medication End Time: [unfilled] [IV discontinued. Intact. No signs or symptoms of IV complications noted. Time: ___] : IV discontinued. Intact. No signs or symptoms of IV complications noted. Time: [unfilled] [Patient  instructed to seek medical attention with signs and symptoms of adverse effects] : Patient  instructed to seek medical attention with signs and symptoms of adverse effects [Patient left unit in no acute distress] : Patient left unit in no acute distress [Medications administered as ordered and tolerated well.] : Medications administered as ordered and tolerated well. [de-identified] : right hand [de-identified] : Patient presents for 1:3 IVIG infusion. Patient denies any new symptoms or complaints since last infusion. Patient reports of being easily bruised and fatigue persisting. Denies any recent falls. Fall precaution addressed. Reports one new, healing lesion to LE, pain to b/l knees. Admits to improvement of pain to lower back, none reported today. Patient pre-medicated as per order. Infusion titrated and tolerated well.

## 2023-09-06 NOTE — HISTORY OF PRESENT ILLNESS
[6] : 6 [N/A] : N/A [Denies] : Denies [Yes] : Yes [24g] : 24g [Medication Name: ___] : Medication Name: [unfilled] [Start Time: ___] : Medication Start Time: [unfilled] [End Time: ___] : Medication End Time: [unfilled] [IV discontinued. Intact. No signs or symptoms of IV complications noted. Time: ___] : IV discontinued. Intact. No signs or symptoms of IV complications noted. Time: [unfilled] [Patient  instructed to seek medical attention with signs and symptoms of adverse effects] : Patient  instructed to seek medical attention with signs and symptoms of adverse effects [Patient left unit in no acute distress] : Patient left unit in no acute distress [Medications administered as ordered and tolerated well.] : Medications administered as ordered and tolerated well. [de-identified] : b/l knees [de-identified] : wheelchair [de-identified] : right arm cephalic vein  [de-identified] : no labs [de-identified] : Patient presents for scheduled Gammagard infusion, day 2:3 monthly dose, via w/c accompanied by . Patient denies any new symptoms or complaints since last infusion. Patient reports of being easily bruised and fatigue persisting. Reports one new, healing lesion to LE, pain to b/l knees. Admits to improvement of pain to lower back, none reported today. Denies any recent falls. Fall precaution addressed. Patient pre-medicated as prescribed, infusion titrated as per protocol and tolerated well. Patient left unit via w/c accompanied by .

## 2023-09-08 ENCOUNTER — APPOINTMENT (OUTPATIENT)
Dept: RHEUMATOLOGY | Facility: CLINIC | Age: 73
End: 2023-09-08
Payer: MEDICARE

## 2023-09-08 VITALS — OXYGEN SATURATION: 97 % | HEART RATE: 70 BPM | DIASTOLIC BLOOD PRESSURE: 66 MMHG | SYSTOLIC BLOOD PRESSURE: 106 MMHG

## 2023-09-08 VITALS
HEART RATE: 83 BPM | RESPIRATION RATE: 16 BRPM | OXYGEN SATURATION: 96 % | SYSTOLIC BLOOD PRESSURE: 116 MMHG | TEMPERATURE: 98 F | DIASTOLIC BLOOD PRESSURE: 58 MMHG

## 2023-09-08 PROCEDURE — 96366 THER/PROPH/DIAG IV INF ADDON: CPT

## 2023-09-08 PROCEDURE — 96365 THER/PROPH/DIAG IV INF INIT: CPT

## 2023-09-08 RX ORDER — ACETAMINOPHEN 325 MG/1
325 TABLET, FILM COATED ORAL
Qty: 0 | Refills: 0 | Status: COMPLETED
Start: 2023-05-31

## 2023-09-08 RX ORDER — IMMUNE GLOBULIN INFUSION (HUMAN) 100 MG/ML
20 INJECTION, SOLUTION INTRAVENOUS; SUBCUTANEOUS
Qty: 0 | Refills: 0 | Status: COMPLETED
Start: 2023-05-31

## 2023-09-08 RX ORDER — CAMPHOR 0.45 %
25 GEL (GRAM) TOPICAL
Qty: 0 | Refills: 0 | Status: COMPLETED
Start: 2023-05-31

## 2023-09-08 NOTE — HISTORY OF PRESENT ILLNESS
[7] : 7 [N/A] : N/A [Denies] : Denies [Yes] : Yes [24g] : 24g [Medication Name: ___] : Medication Name: [unfilled] [Start Time: ___] : Medication Start Time: [unfilled] [End Time: ___] : Medication End Time: [unfilled] [IV discontinued. Intact. No signs or symptoms of IV complications noted. Time: ___] : IV discontinued. Intact. No signs or symptoms of IV complications noted. Time: [unfilled] [Patient  instructed to seek medical attention with signs and symptoms of adverse effects] : Patient  instructed to seek medical attention with signs and symptoms of adverse effects [Patient left unit in no acute distress] : Patient left unit in no acute distress [Medications administered as ordered and tolerated well.] : Medications administered as ordered and tolerated well. [de-identified] : b/l knees [de-identified] : wheelchair [de-identified] : right hand dorsal vein  [de-identified] : no labs [de-identified] : Patient presents for scheduled Gammagard infusion, day 3:3 monthly dose, via w/c accompanied by . Patient denies any new symptoms or complaints since last infusion. Patient reports of being easily bruised and fatigue persisting. Reports having pain to b/l knees, rated as above. Denies any recent falls. Fall precaution addressed. Patient pre-medicated as prescribed, infusion titrated as per protocol and tolerated well. Patient left unit via w/c accompanied by .

## 2023-09-13 ENCOUNTER — APPOINTMENT (OUTPATIENT)
Dept: DERMATOLOGY | Facility: CLINIC | Age: 73
End: 2023-09-13
Payer: MEDICARE

## 2023-09-13 DIAGNOSIS — I83.10 VARICOSE VEINS OF UNSPECIFIED LOWER EXTREMITY WITH INFLAMMATION: ICD-10-CM

## 2023-09-13 PROCEDURE — 99214 OFFICE O/P EST MOD 30 MIN: CPT

## 2023-09-19 ENCOUNTER — OUTPATIENT (OUTPATIENT)
Dept: OUTPATIENT SERVICES | Facility: HOSPITAL | Age: 73
LOS: 1 days | End: 2023-09-19
Payer: MEDICARE

## 2023-09-19 ENCOUNTER — RESULT REVIEW (OUTPATIENT)
Age: 73
End: 2023-09-19

## 2023-09-19 ENCOUNTER — APPOINTMENT (OUTPATIENT)
Dept: MAMMOGRAPHY | Facility: IMAGING CENTER | Age: 73
End: 2023-09-19
Payer: MEDICARE

## 2023-09-19 DIAGNOSIS — Z90.710 ACQUIRED ABSENCE OF BOTH CERVIX AND UTERUS: Chronic | ICD-10-CM

## 2023-09-19 DIAGNOSIS — C54.1 MALIGNANT NEOPLASM OF ENDOMETRIUM: Chronic | ICD-10-CM

## 2023-09-19 DIAGNOSIS — C54.1 MALIGNANT NEOPLASM OF ENDOMETRIUM: ICD-10-CM

## 2023-09-19 DIAGNOSIS — Z98.89 OTHER SPECIFIED POSTPROCEDURAL STATES: Chronic | ICD-10-CM

## 2023-09-19 DIAGNOSIS — Z90.722 ACQUIRED ABSENCE OF OVARIES, BILATERAL: Chronic | ICD-10-CM

## 2023-09-19 PROCEDURE — 77066 DX MAMMO INCL CAD BI: CPT | Mod: 26

## 2023-09-19 PROCEDURE — G0279: CPT | Mod: 26

## 2023-09-19 PROCEDURE — G0279: CPT

## 2023-09-19 PROCEDURE — 77066 DX MAMMO INCL CAD BI: CPT

## 2023-10-02 ENCOUNTER — APPOINTMENT (OUTPATIENT)
Dept: RHEUMATOLOGY | Facility: CLINIC | Age: 73
End: 2023-10-02
Payer: MEDICARE

## 2023-10-02 VITALS
RESPIRATION RATE: 16 BRPM | DIASTOLIC BLOOD PRESSURE: 64 MMHG | HEART RATE: 82 BPM | OXYGEN SATURATION: 97 % | SYSTOLIC BLOOD PRESSURE: 103 MMHG

## 2023-10-02 VITALS
TEMPERATURE: 97.8 F | OXYGEN SATURATION: 96 % | HEART RATE: 78 BPM | DIASTOLIC BLOOD PRESSURE: 80 MMHG | RESPIRATION RATE: 16 BRPM | SYSTOLIC BLOOD PRESSURE: 118 MMHG

## 2023-10-02 PROCEDURE — 96366 THER/PROPH/DIAG IV INF ADDON: CPT

## 2023-10-02 PROCEDURE — 96365 THER/PROPH/DIAG IV INF INIT: CPT

## 2023-10-02 RX ORDER — ACETAMINOPHEN 325 MG/1
325 TABLET, FILM COATED ORAL
Qty: 0 | Refills: 0 | Status: COMPLETED
Start: 2023-05-31

## 2023-10-02 RX ORDER — IMMUNE GLOBULIN INFUSION (HUMAN) 100 MG/ML
20 INJECTION, SOLUTION INTRAVENOUS; SUBCUTANEOUS
Qty: 0 | Refills: 0 | Status: COMPLETED
Start: 2023-05-31

## 2023-10-02 RX ORDER — CAMPHOR 0.45 %
25 GEL (GRAM) TOPICAL
Qty: 0 | Refills: 0 | Status: COMPLETED
Start: 2023-05-31

## 2023-10-04 ENCOUNTER — APPOINTMENT (OUTPATIENT)
Dept: RHEUMATOLOGY | Facility: CLINIC | Age: 73
End: 2023-10-04
Payer: MEDICARE

## 2023-10-04 VITALS
RESPIRATION RATE: 16 BRPM | DIASTOLIC BLOOD PRESSURE: 64 MMHG | HEART RATE: 71 BPM | SYSTOLIC BLOOD PRESSURE: 104 MMHG | OXYGEN SATURATION: 97 %

## 2023-10-04 VITALS
DIASTOLIC BLOOD PRESSURE: 66 MMHG | OXYGEN SATURATION: 93 % | TEMPERATURE: 97.7 F | SYSTOLIC BLOOD PRESSURE: 127 MMHG | RESPIRATION RATE: 16 BRPM | HEART RATE: 81 BPM

## 2023-10-04 PROCEDURE — G0008: CPT

## 2023-10-04 PROCEDURE — 90662 IIV NO PRSV INCREASED AG IM: CPT

## 2023-10-04 PROCEDURE — 96365 THER/PROPH/DIAG IV INF INIT: CPT

## 2023-10-04 PROCEDURE — 96366 THER/PROPH/DIAG IV INF ADDON: CPT

## 2023-10-04 RX ORDER — ACETAMINOPHEN 325 MG/1
325 TABLET, FILM COATED ORAL
Qty: 0 | Refills: 0 | Status: COMPLETED
Start: 2023-05-31

## 2023-10-04 RX ORDER — CAMPHOR 0.45 %
25 GEL (GRAM) TOPICAL
Qty: 0 | Refills: 0 | Status: COMPLETED
Start: 2023-05-31

## 2023-10-04 RX ORDER — IMMUNE GLOBULIN INFUSION (HUMAN) 100 MG/ML
20 INJECTION, SOLUTION INTRAVENOUS; SUBCUTANEOUS
Qty: 0 | Refills: 0 | Status: COMPLETED
Start: 2023-05-31

## 2023-10-06 ENCOUNTER — APPOINTMENT (OUTPATIENT)
Dept: RHEUMATOLOGY | Facility: CLINIC | Age: 73
End: 2023-10-06
Payer: MEDICARE

## 2023-10-06 VITALS
RESPIRATION RATE: 16 BRPM | HEART RATE: 87 BPM | TEMPERATURE: 97.9 F | DIASTOLIC BLOOD PRESSURE: 70 MMHG | SYSTOLIC BLOOD PRESSURE: 138 MMHG | OXYGEN SATURATION: 95 %

## 2023-10-06 PROCEDURE — 96366 THER/PROPH/DIAG IV INF ADDON: CPT

## 2023-10-06 PROCEDURE — 96365 THER/PROPH/DIAG IV INF INIT: CPT

## 2023-10-06 RX ORDER — CAMPHOR 0.45 %
25 GEL (GRAM) TOPICAL
Qty: 0 | Refills: 0 | Status: COMPLETED
Start: 2023-05-31

## 2023-10-06 RX ORDER — ACETAMINOPHEN 325 MG/1
325 TABLET, FILM COATED ORAL
Qty: 0 | Refills: 0 | Status: COMPLETED
Start: 2023-05-31

## 2023-10-06 RX ORDER — IMMUNE GLOBULIN INFUSION (HUMAN) 100 MG/ML
20 INJECTION, SOLUTION INTRAVENOUS; SUBCUTANEOUS
Qty: 0 | Refills: 0 | Status: COMPLETED
Start: 2023-05-31

## 2023-10-30 ENCOUNTER — APPOINTMENT (OUTPATIENT)
Dept: RHEUMATOLOGY | Facility: CLINIC | Age: 73
End: 2023-10-30
Payer: MEDICARE

## 2023-10-30 VITALS
RESPIRATION RATE: 16 BRPM | TEMPERATURE: 97.7 F | SYSTOLIC BLOOD PRESSURE: 122 MMHG | DIASTOLIC BLOOD PRESSURE: 71 MMHG | OXYGEN SATURATION: 96 % | HEART RATE: 74 BPM

## 2023-10-30 VITALS — OXYGEN SATURATION: 95 % | DIASTOLIC BLOOD PRESSURE: 71 MMHG | SYSTOLIC BLOOD PRESSURE: 122 MMHG | HEART RATE: 67 BPM

## 2023-10-30 PROCEDURE — 96366 THER/PROPH/DIAG IV INF ADDON: CPT

## 2023-10-30 PROCEDURE — 96365 THER/PROPH/DIAG IV INF INIT: CPT

## 2023-10-30 RX ORDER — CAMPHOR 0.45 %
25 GEL (GRAM) TOPICAL
Qty: 0 | Refills: 0 | Status: COMPLETED
Start: 2023-05-31

## 2023-10-30 RX ORDER — ACETAMINOPHEN 325 MG/1
325 TABLET, FILM COATED ORAL
Qty: 0 | Refills: 0 | Status: COMPLETED
Start: 2023-05-31

## 2023-10-30 RX ORDER — IMMUNE GLOBULIN INFUSION (HUMAN) 100 MG/ML
20 INJECTION, SOLUTION INTRAVENOUS; SUBCUTANEOUS
Qty: 0 | Refills: 0 | Status: COMPLETED
Start: 2023-05-31

## 2023-11-01 ENCOUNTER — APPOINTMENT (OUTPATIENT)
Dept: RHEUMATOLOGY | Facility: CLINIC | Age: 73
End: 2023-11-01
Payer: MEDICARE

## 2023-11-01 VITALS
TEMPERATURE: 97.4 F | OXYGEN SATURATION: 97 % | SYSTOLIC BLOOD PRESSURE: 130 MMHG | DIASTOLIC BLOOD PRESSURE: 76 MMHG | HEART RATE: 86 BPM | RESPIRATION RATE: 16 BRPM

## 2023-11-01 VITALS
DIASTOLIC BLOOD PRESSURE: 67 MMHG | RESPIRATION RATE: 16 BRPM | OXYGEN SATURATION: 97 % | SYSTOLIC BLOOD PRESSURE: 103 MMHG | HEART RATE: 80 BPM

## 2023-11-01 PROCEDURE — 96365 THER/PROPH/DIAG IV INF INIT: CPT

## 2023-11-01 PROCEDURE — 96366 THER/PROPH/DIAG IV INF ADDON: CPT

## 2023-11-01 RX ORDER — IMMUNE GLOBULIN INFUSION (HUMAN) 100 MG/ML
20 INJECTION, SOLUTION INTRAVENOUS; SUBCUTANEOUS
Qty: 0 | Refills: 0 | Status: COMPLETED
Start: 2023-05-31

## 2023-11-01 RX ORDER — ACETAMINOPHEN 325 MG/1
325 TABLET, FILM COATED ORAL
Qty: 0 | Refills: 0 | Status: COMPLETED
Start: 2023-05-31

## 2023-11-01 RX ORDER — CAMPHOR 0.45 %
25 GEL (GRAM) TOPICAL
Qty: 0 | Refills: 0 | Status: COMPLETED
Start: 2023-05-31

## 2023-11-03 ENCOUNTER — APPOINTMENT (OUTPATIENT)
Dept: RHEUMATOLOGY | Facility: CLINIC | Age: 73
End: 2023-11-03
Payer: MEDICARE

## 2023-11-03 VITALS
OXYGEN SATURATION: 96 % | RESPIRATION RATE: 16 BRPM | TEMPERATURE: 97.9 F | DIASTOLIC BLOOD PRESSURE: 73 MMHG | HEART RATE: 75 BPM | SYSTOLIC BLOOD PRESSURE: 136 MMHG

## 2023-11-03 VITALS
DIASTOLIC BLOOD PRESSURE: 66 MMHG | RESPIRATION RATE: 16 BRPM | HEART RATE: 76 BPM | SYSTOLIC BLOOD PRESSURE: 106 MMHG | OXYGEN SATURATION: 99 %

## 2023-11-03 PROCEDURE — 96365 THER/PROPH/DIAG IV INF INIT: CPT

## 2023-11-03 PROCEDURE — 96366 THER/PROPH/DIAG IV INF ADDON: CPT

## 2023-11-03 RX ORDER — CAMPHOR 0.45 %
25 GEL (GRAM) TOPICAL
Qty: 0 | Refills: 0 | Status: COMPLETED
Start: 2023-05-31

## 2023-11-03 RX ORDER — IMMUNE GLOBULIN INFUSION (HUMAN) 100 MG/ML
20 INJECTION, SOLUTION INTRAVENOUS; SUBCUTANEOUS
Qty: 0 | Refills: 0 | Status: COMPLETED
Start: 2023-05-31

## 2023-11-03 RX ORDER — ACETAMINOPHEN 325 MG/1
325 TABLET, FILM COATED ORAL
Qty: 0 | Refills: 0 | Status: COMPLETED
Start: 2023-05-31

## 2023-11-21 RX ORDER — ACETAMINOPHEN 325 MG/1
325 TABLET, FILM COATED ORAL
Refills: 0 | Status: COMPLETED | OUTPATIENT
Start: 2023-11-17 | End: 1900-01-01

## 2023-11-21 RX ORDER — IMMUNE GLOBULIN INFUSION (HUMAN) 100 MG/ML
20 INJECTION, SOLUTION INTRAVENOUS; SUBCUTANEOUS
Refills: 0 | Status: COMPLETED | OUTPATIENT
Start: 2023-11-17 | End: 1900-01-01

## 2023-11-21 RX ORDER — CAMPHOR 0.45 %
25 GEL (GRAM) TOPICAL
Refills: 0 | Status: COMPLETED | OUTPATIENT
Start: 2023-11-17 | End: 1900-01-01

## 2023-11-27 ENCOUNTER — APPOINTMENT (OUTPATIENT)
Dept: RHEUMATOLOGY | Facility: CLINIC | Age: 73
End: 2023-11-27
Payer: MEDICARE

## 2023-11-27 VITALS
DIASTOLIC BLOOD PRESSURE: 70 MMHG | SYSTOLIC BLOOD PRESSURE: 111 MMHG | HEART RATE: 73 BPM | OXYGEN SATURATION: 95 % | RESPIRATION RATE: 16 BRPM

## 2023-11-27 VITALS
RESPIRATION RATE: 16 BRPM | TEMPERATURE: 97.4 F | OXYGEN SATURATION: 98 % | SYSTOLIC BLOOD PRESSURE: 136 MMHG | DIASTOLIC BLOOD PRESSURE: 66 MMHG | HEART RATE: 98 BPM

## 2023-11-27 PROCEDURE — 96366 THER/PROPH/DIAG IV INF ADDON: CPT

## 2023-11-27 PROCEDURE — 96365 THER/PROPH/DIAG IV INF INIT: CPT

## 2023-11-27 RX ORDER — ACETAMINOPHEN 325 MG/1
325 TABLET, FILM COATED ORAL
Qty: 0 | Refills: 0 | Status: COMPLETED
Start: 2023-11-17

## 2023-11-27 RX ORDER — IMMUNE GLOBULIN INFUSION (HUMAN) 100 MG/ML
20 INJECTION, SOLUTION INTRAVENOUS; SUBCUTANEOUS
Qty: 0 | Refills: 0 | Status: COMPLETED
Start: 2023-11-17

## 2023-11-27 RX ORDER — CAMPHOR 0.45 %
25 GEL (GRAM) TOPICAL
Qty: 0 | Refills: 0 | Status: COMPLETED
Start: 2023-11-17

## 2023-11-29 ENCOUNTER — APPOINTMENT (OUTPATIENT)
Dept: RHEUMATOLOGY | Facility: CLINIC | Age: 73
End: 2023-11-29
Payer: MEDICARE

## 2023-11-29 VITALS — HEART RATE: 68 BPM | OXYGEN SATURATION: 98 % | DIASTOLIC BLOOD PRESSURE: 67 MMHG | SYSTOLIC BLOOD PRESSURE: 106 MMHG

## 2023-11-29 VITALS
TEMPERATURE: 97.7 F | SYSTOLIC BLOOD PRESSURE: 104 MMHG | OXYGEN SATURATION: 97 % | DIASTOLIC BLOOD PRESSURE: 71 MMHG | HEART RATE: 82 BPM

## 2023-11-29 PROCEDURE — 96365 THER/PROPH/DIAG IV INF INIT: CPT

## 2023-11-29 PROCEDURE — 96366 THER/PROPH/DIAG IV INF ADDON: CPT

## 2023-11-29 RX ORDER — ACETAMINOPHEN 325 MG/1
325 TABLET, FILM COATED ORAL
Qty: 0 | Refills: 0 | Status: COMPLETED
Start: 2023-11-17

## 2023-11-29 RX ORDER — CAMPHOR 0.45 %
25 GEL (GRAM) TOPICAL
Qty: 0 | Refills: 0 | Status: COMPLETED
Start: 2023-11-17

## 2023-11-29 RX ORDER — IMMUNE GLOBULIN INFUSION (HUMAN) 100 MG/ML
20 INJECTION, SOLUTION INTRAVENOUS; SUBCUTANEOUS
Qty: 0 | Refills: 0 | Status: COMPLETED
Start: 2023-11-17

## 2023-12-01 ENCOUNTER — APPOINTMENT (OUTPATIENT)
Dept: RHEUMATOLOGY | Facility: CLINIC | Age: 73
End: 2023-12-01
Payer: MEDICARE

## 2023-12-01 VITALS
HEART RATE: 88 BPM | RESPIRATION RATE: 16 BRPM | DIASTOLIC BLOOD PRESSURE: 61 MMHG | OXYGEN SATURATION: 96 % | TEMPERATURE: 97.6 F | SYSTOLIC BLOOD PRESSURE: 126 MMHG

## 2023-12-01 PROCEDURE — 96365 THER/PROPH/DIAG IV INF INIT: CPT

## 2023-12-01 PROCEDURE — 96367 TX/PROPH/DG ADDL SEQ IV INF: CPT

## 2023-12-01 RX ORDER — CAMPHOR 0.45 %
25 GEL (GRAM) TOPICAL
Qty: 0 | Refills: 0 | Status: COMPLETED
Start: 2023-11-17

## 2023-12-01 RX ORDER — ACETAMINOPHEN 325 MG/1
325 TABLET, FILM COATED ORAL
Qty: 0 | Refills: 0 | Status: COMPLETED
Start: 2023-11-17

## 2023-12-01 RX ORDER — IMMUNE GLOBULIN INFUSION (HUMAN) 100 MG/ML
20 INJECTION, SOLUTION INTRAVENOUS; SUBCUTANEOUS
Qty: 0 | Refills: 0 | Status: COMPLETED
Start: 2023-11-17

## 2023-12-26 ENCOUNTER — APPOINTMENT (OUTPATIENT)
Dept: RHEUMATOLOGY | Facility: CLINIC | Age: 73
End: 2023-12-26
Payer: MEDICARE

## 2023-12-26 VITALS
SYSTOLIC BLOOD PRESSURE: 128 MMHG | OXYGEN SATURATION: 97 % | DIASTOLIC BLOOD PRESSURE: 74 MMHG | RESPIRATION RATE: 16 BRPM | TEMPERATURE: 97.4 F | HEART RATE: 100 BPM

## 2023-12-26 VITALS
HEART RATE: 78 BPM | OXYGEN SATURATION: 97 % | SYSTOLIC BLOOD PRESSURE: 121 MMHG | RESPIRATION RATE: 16 BRPM | DIASTOLIC BLOOD PRESSURE: 72 MMHG

## 2023-12-26 PROCEDURE — 96365 THER/PROPH/DIAG IV INF INIT: CPT

## 2023-12-26 PROCEDURE — 96366 THER/PROPH/DIAG IV INF ADDON: CPT

## 2023-12-26 RX ORDER — IMMUNE GLOBULIN INFUSION (HUMAN) 100 MG/ML
20 INJECTION, SOLUTION INTRAVENOUS; SUBCUTANEOUS
Qty: 0 | Refills: 0 | Status: COMPLETED
Start: 2023-11-17

## 2023-12-26 RX ORDER — ACETAMINOPHEN 325 MG/1
325 TABLET, FILM COATED ORAL
Qty: 0 | Refills: 0 | Status: COMPLETED
Start: 2023-11-17

## 2023-12-26 RX ORDER — CAMPHOR 0.45 %
25 GEL (GRAM) TOPICAL
Qty: 0 | Refills: 0 | Status: COMPLETED
Start: 2023-11-17

## 2023-12-26 NOTE — HISTORY OF PRESENT ILLNESS
[9] : 9 [N/A] : N/A [Denies] : Denies [Yes] : Yes [24g] : 24g [Start Time: ___] : Medication Start Time: [unfilled] [End Time: ___] : Medication End Time: [unfilled] [Medication Name: ___] : Medication Name: [unfilled] [Total Amount Administered: ___] : Total Amount Administered: [unfilled] [IV discontinued. Intact. No signs or symptoms of IV complications noted. Time: ___] : IV discontinued. Intact. No signs or symptoms of IV complications noted. Time: [unfilled] [Patient  instructed to seek medical attention with signs and symptoms of adverse effects] : Patient  instructed to seek medical attention with signs and symptoms of adverse effects [Patient left unit in no acute distress] : Patient left unit in no acute distress [Medications administered as ordered and tolerated well.] : Medications administered as ordered and tolerated well. [de-identified] : lower back and knees [de-identified] : dull [de-identified] : open lesions to lower extremities [de-identified] : wheelchair [de-identified] : right hand dorsal vein  [de-identified] : no labs [de-identified] : Patient presents for scheduled Gammagard infusion, day 1:3 monthly dose, via w/c accompanied by . Patient reports few new small open lesions to LE, continues to reports of being easily bruised and fatigue persisting. Today, reports pain s rated above. Denies any recent falls. Fall precaution addressed. Patient pre-medicated as prescribed, infusion titrated as per protocol and tolerated well. Patient left unit via w/c accompanied by .

## 2023-12-27 ENCOUNTER — APPOINTMENT (OUTPATIENT)
Dept: RHEUMATOLOGY | Facility: CLINIC | Age: 73
End: 2023-12-27
Payer: MEDICARE

## 2023-12-27 VITALS
DIASTOLIC BLOOD PRESSURE: 63 MMHG | SYSTOLIC BLOOD PRESSURE: 95 MMHG | RESPIRATION RATE: 16 BRPM | HEART RATE: 81 BPM | OXYGEN SATURATION: 97 %

## 2023-12-27 VITALS
DIASTOLIC BLOOD PRESSURE: 61 MMHG | TEMPERATURE: 97.3 F | HEART RATE: 92 BPM | RESPIRATION RATE: 16 BRPM | SYSTOLIC BLOOD PRESSURE: 98 MMHG | OXYGEN SATURATION: 97 %

## 2023-12-27 PROCEDURE — 96366 THER/PROPH/DIAG IV INF ADDON: CPT

## 2023-12-27 PROCEDURE — 96365 THER/PROPH/DIAG IV INF INIT: CPT

## 2023-12-27 RX ORDER — ACETAMINOPHEN 325 MG/1
325 TABLET, FILM COATED ORAL
Qty: 0 | Refills: 0 | Status: COMPLETED
Start: 2023-11-17

## 2023-12-27 RX ORDER — CAMPHOR 0.45 %
25 GEL (GRAM) TOPICAL
Qty: 0 | Refills: 0 | Status: COMPLETED
Start: 2023-11-17

## 2023-12-27 RX ORDER — IMMUNE GLOBULIN INFUSION (HUMAN) 100 MG/ML
20 INJECTION, SOLUTION INTRAVENOUS; SUBCUTANEOUS
Qty: 0 | Refills: 0 | Status: COMPLETED
Start: 2023-11-17

## 2023-12-27 NOTE — HISTORY OF PRESENT ILLNESS
[N/A] : N/A [Denies] : Denies [Yes] : Yes [Declined] : Declined [Influenza] : Influenza [de-identified] : wheelchair [Right upper extremity] : Right upper extremity [24g] : 24g [Start Time: ___] : Medication Start Time: [unfilled] [End Time: ___] : Medication End Time: [unfilled] [Medication Name: ___] : Medication Name: [unfilled] [Total Amount Administered: ___] : Total Amount Administered: [unfilled] [IV discontinued. Intact. No signs or symptoms of IV complications noted. Time: ___] : IV discontinued. Intact. No signs or symptoms of IV complications noted. Time: [unfilled] [Patient  instructed to seek medical attention with signs and symptoms of adverse effects] : Patient  instructed to seek medical attention with signs and symptoms of adverse effects [Patient left unit in no acute distress] : Patient left unit in no acute distress [Medications administered as ordered and tolerated well.] : Medications administered as ordered and tolerated well. [de-identified] : Right hand [de-identified] : Patient presents for 2:3 IVIG infusion. Patient denies any new symptoms or complaints since last infusion. Patient reports of being easily bruised and fatigue persisting. Denies any recent falls. Fall precaution addressed. Patient pre-medicated as per order. Infusion titrated and tolerated well. Patient tolerated well.

## 2023-12-29 ENCOUNTER — APPOINTMENT (OUTPATIENT)
Dept: RHEUMATOLOGY | Facility: CLINIC | Age: 73
End: 2023-12-29
Payer: MEDICARE

## 2023-12-29 VITALS
TEMPERATURE: 97.9 F | DIASTOLIC BLOOD PRESSURE: 66 MMHG | HEART RATE: 84 BPM | OXYGEN SATURATION: 95 % | RESPIRATION RATE: 16 BRPM | SYSTOLIC BLOOD PRESSURE: 119 MMHG

## 2023-12-29 VITALS — DIASTOLIC BLOOD PRESSURE: 79 MMHG | SYSTOLIC BLOOD PRESSURE: 144 MMHG | HEART RATE: 70 BPM | OXYGEN SATURATION: 96 %

## 2023-12-29 PROCEDURE — 96366 THER/PROPH/DIAG IV INF ADDON: CPT

## 2023-12-29 PROCEDURE — 96365 THER/PROPH/DIAG IV INF INIT: CPT

## 2023-12-29 RX ORDER — ACETAMINOPHEN 325 MG/1
325 TABLET, FILM COATED ORAL
Qty: 0 | Refills: 0 | Status: COMPLETED
Start: 2023-11-17

## 2023-12-29 RX ORDER — CAMPHOR 0.45 %
25 GEL (GRAM) TOPICAL
Qty: 0 | Refills: 0 | Status: COMPLETED
Start: 2023-11-17

## 2023-12-29 RX ORDER — IMMUNE GLOBULIN INFUSION (HUMAN) 100 MG/ML
20 INJECTION, SOLUTION INTRAVENOUS; SUBCUTANEOUS
Qty: 0 | Refills: 0 | Status: COMPLETED
Start: 2023-11-17

## 2023-12-29 NOTE — HISTORY OF PRESENT ILLNESS
[N/A] : N/A [Denies] : Denies [Yes] : Yes [Declined] : Declined [Influenza] : Influenza [de-identified] : wheelchair [Left upper extremity] : Left upper extremity [24g] : 24g [Start Time: ___] : Medication Start Time: [unfilled] [End Time: ___] : Medication End Time: [unfilled] [Medication Name: ___] : Medication Name: [unfilled] [Total Amount Administered: ___] : Total Amount Administered: [unfilled] [IV discontinued. Intact. No signs or symptoms of IV complications noted. Time: ___] : IV discontinued. Intact. No signs or symptoms of IV complications noted. Time: [unfilled] [Patient  instructed to seek medical attention with signs and symptoms of adverse effects] : Patient  instructed to seek medical attention with signs and symptoms of adverse effects [Patient left unit in no acute distress] : Patient left unit in no acute distress [Medications administered as ordered and tolerated well.] : Medications administered as ordered and tolerated well. [de-identified] : forearm [de-identified] : Patient presents for 3:3 IVIG infusion. Patient denies any new symptoms or complaints since last infusion. Patient reports of being easily bruised and fatigue persisting. Denies any recent falls. Fall precaution addressed. Patient pre-medicated as per order. Infusion titrated and tolerated well. Patient tolerated well.

## 2024-01-22 ENCOUNTER — APPOINTMENT (OUTPATIENT)
Dept: RHEUMATOLOGY | Facility: CLINIC | Age: 74
End: 2024-01-22
Payer: MEDICARE

## 2024-01-22 VITALS
SYSTOLIC BLOOD PRESSURE: 129 MMHG | TEMPERATURE: 98 F | OXYGEN SATURATION: 98 % | HEART RATE: 97 BPM | RESPIRATION RATE: 16 BRPM | DIASTOLIC BLOOD PRESSURE: 77 MMHG

## 2024-01-22 VITALS
DIASTOLIC BLOOD PRESSURE: 68 MMHG | OXYGEN SATURATION: 97 % | HEART RATE: 70 BPM | RESPIRATION RATE: 16 BRPM | SYSTOLIC BLOOD PRESSURE: 106 MMHG

## 2024-01-22 PROCEDURE — 96365 THER/PROPH/DIAG IV INF INIT: CPT

## 2024-01-22 PROCEDURE — 96366 THER/PROPH/DIAG IV INF ADDON: CPT

## 2024-01-22 RX ORDER — CAMPHOR 0.45 %
25 GEL (GRAM) TOPICAL
Qty: 0 | Refills: 0 | Status: COMPLETED
Start: 2023-11-17

## 2024-01-22 RX ORDER — IMMUNE GLOBULIN INFUSION (HUMAN) 100 MG/ML
20 INJECTION, SOLUTION INTRAVENOUS; SUBCUTANEOUS
Qty: 0 | Refills: 0 | Status: COMPLETED
Start: 2023-11-17

## 2024-01-22 RX ORDER — ACETAMINOPHEN 325 MG/1
325 TABLET, FILM COATED ORAL
Qty: 0 | Refills: 0 | Status: COMPLETED
Start: 2023-11-17

## 2024-01-23 NOTE — HISTORY OF PRESENT ILLNESS
[N/A] : N/A [Denies] : Denies [Yes] : Yes [Declined] : Declined [Influenza] : Influenza [Right upper extremity] : Right upper extremity [24g] : 24g [Start Time: ___] : Medication Start Time: [unfilled] [End Time: ___] : Medication End Time: [unfilled] [Medication Name: ___] : Medication Name: [unfilled] [Total Amount Administered: ___] : Total Amount Administered: [unfilled] [IV discontinued. Intact. No signs or symptoms of IV complications noted. Time: ___] : IV discontinued. Intact. No signs or symptoms of IV complications noted. Time: [unfilled] [Patient  instructed to seek medical attention with signs and symptoms of adverse effects] : Patient  instructed to seek medical attention with signs and symptoms of adverse effects [Patient left unit in no acute distress] : Patient left unit in no acute distress [Medications administered as ordered and tolerated well.] : Medications administered as ordered and tolerated well. [de-identified] : wheelchair [Left upper extremity] : Left upper extremity [de-identified] : left hand [de-identified] : Patient presents for 1:3 IVIG infusion. Patient denies any new symptoms or complaints since last infusion. Patient reports of being easily bruised and fatigue persisting. Denies any recent falls. Fall precaution addressed. Patient pre-medicated as per order. Infusion titrated and tolerated well. Patient tolerated well.

## 2024-01-24 ENCOUNTER — APPOINTMENT (OUTPATIENT)
Dept: RHEUMATOLOGY | Facility: CLINIC | Age: 74
End: 2024-01-24
Payer: MEDICARE

## 2024-01-24 VITALS
DIASTOLIC BLOOD PRESSURE: 76 MMHG | HEART RATE: 75 BPM | RESPIRATION RATE: 16 BRPM | OXYGEN SATURATION: 95 % | SYSTOLIC BLOOD PRESSURE: 127 MMHG

## 2024-01-24 VITALS
SYSTOLIC BLOOD PRESSURE: 120 MMHG | RESPIRATION RATE: 16 BRPM | OXYGEN SATURATION: 97 % | TEMPERATURE: 97.8 F | HEART RATE: 88 BPM | DIASTOLIC BLOOD PRESSURE: 65 MMHG

## 2024-01-24 PROCEDURE — 96366 THER/PROPH/DIAG IV INF ADDON: CPT

## 2024-01-24 PROCEDURE — 96365 THER/PROPH/DIAG IV INF INIT: CPT

## 2024-01-24 RX ORDER — IMMUNE GLOBULIN INFUSION (HUMAN) 100 MG/ML
20 INJECTION, SOLUTION INTRAVENOUS; SUBCUTANEOUS
Qty: 0 | Refills: 0 | Status: COMPLETED
Start: 2023-11-17

## 2024-01-24 RX ORDER — CAMPHOR 0.45 %
25 GEL (GRAM) TOPICAL
Qty: 0 | Refills: 0 | Status: COMPLETED
Start: 2023-11-17

## 2024-01-24 RX ORDER — ACETAMINOPHEN 325 MG/1
325 TABLET, FILM COATED ORAL
Qty: 0 | Refills: 0 | Status: COMPLETED
Start: 2023-11-17

## 2024-01-24 NOTE — HISTORY OF PRESENT ILLNESS
[N/A] : N/A [Denies] : Denies [Yes] : Yes [Declined] : Declined [Right upper extremity] : Right upper extremity [24g] : 24g [Start Time: ___] : Medication Start Time: [unfilled] [End Time: ___] : Medication End Time: [unfilled] [Medication Name: ___] : Medication Name: [unfilled] [Total Amount Administered: ___] : Total Amount Administered: [unfilled] [IV discontinued. Intact. No signs or symptoms of IV complications noted. Time: ___] : IV discontinued. Intact. No signs or symptoms of IV complications noted. Time: [unfilled] [Patient  instructed to seek medical attention with signs and symptoms of adverse effects] : Patient  instructed to seek medical attention with signs and symptoms of adverse effects [Patient left unit in no acute distress] : Patient left unit in no acute distress [Medications administered as ordered and tolerated well.] : Medications administered as ordered and tolerated well. [de-identified] : wheelchair [de-identified] : dorsal hand vein [de-identified] : Patient presents for 2:3 IVIG infusion, doing well overall. Patient denies any recent infection or antibiotic use. Patient reports having bruises to B/L arms and legs. Patient reports having an open lesion to the back of her (R) upper thigh for over 1 week. Patient reports she will be having an appointment with wound doctor next week. Patient continues to report experiencing fatigue. Patient denies any recent falls. No other symptoms or concerns noted at this time. Patient pre-medicated, infusion titrated and tolerated well.

## 2024-01-26 ENCOUNTER — APPOINTMENT (OUTPATIENT)
Dept: RHEUMATOLOGY | Facility: CLINIC | Age: 74
End: 2024-01-26
Payer: MEDICARE

## 2024-01-26 VITALS
DIASTOLIC BLOOD PRESSURE: 64 MMHG | RESPIRATION RATE: 16 BRPM | HEART RATE: 76 BPM | SYSTOLIC BLOOD PRESSURE: 114 MMHG | OXYGEN SATURATION: 95 %

## 2024-01-26 VITALS
TEMPERATURE: 97.4 F | RESPIRATION RATE: 16 BRPM | DIASTOLIC BLOOD PRESSURE: 81 MMHG | HEART RATE: 79 BPM | SYSTOLIC BLOOD PRESSURE: 143 MMHG | OXYGEN SATURATION: 95 %

## 2024-01-26 PROCEDURE — 96365 THER/PROPH/DIAG IV INF INIT: CPT

## 2024-01-26 PROCEDURE — 96366 THER/PROPH/DIAG IV INF ADDON: CPT

## 2024-01-26 RX ORDER — IMMUNE GLOBULIN INFUSION (HUMAN) 100 MG/ML
20 INJECTION, SOLUTION INTRAVENOUS; SUBCUTANEOUS
Qty: 0 | Refills: 0 | Status: COMPLETED
Start: 2023-11-17

## 2024-01-26 RX ORDER — CAMPHOR 0.45 %
25 GEL (GRAM) TOPICAL
Qty: 0 | Refills: 0 | Status: COMPLETED
Start: 2023-11-17

## 2024-01-26 RX ORDER — ACETAMINOPHEN 325 MG/1
325 TABLET, FILM COATED ORAL
Qty: 0 | Refills: 0 | Status: COMPLETED
Start: 2023-11-17

## 2024-01-26 NOTE — HISTORY OF PRESENT ILLNESS
[8] : 8 [N/A] : N/A [Denies] : Denies [Yes] : Yes [Declined] : Declined [Right upper extremity] : Right upper extremity [24g] : 24g [Start Time: ___] : Medication Start Time: [unfilled] [End Time: ___] : Medication End Time: [unfilled] [Medication Name: ___] : Medication Name: [unfilled] [Total Amount Administered: ___] : Total Amount Administered: [unfilled] [IV discontinued. Intact. No signs or symptoms of IV complications noted. Time: ___] : IV discontinued. Intact. No signs or symptoms of IV complications noted. Time: [unfilled] [Patient  instructed to seek medical attention with signs and symptoms of adverse effects] : Patient  instructed to seek medical attention with signs and symptoms of adverse effects [Patient left unit in no acute distress] : Patient left unit in no acute distress [Medications administered as ordered and tolerated well.] : Medications administered as ordered and tolerated well. [de-identified] : B/L knee [de-identified] : achy [de-identified] : wheelchair [de-identified] : dorsal hand vein [de-identified] : Patient presents for 3:3 IVIG infusion, doing well overall. Patient denies any recent infection or antibiotic use. Patient reports having bruises to B/L arms and legs. Patient continues to report having an open lesion to the back of her (R) upper thigh for over 1 week that is shrinking a little. Patient reports achy pain to B/L knees rated an 8 on the pain scale above, due to weather. Patient reports she will be having an appointment with wound doctor next week. Patient continues to report experiencing fatigue. Patient denies any recent falls. No other symptoms or concerns noted at this time. Patient pre-medicated, infusion titrated and tolerated well.

## 2024-01-30 ENCOUNTER — APPOINTMENT (OUTPATIENT)
Dept: WOUND CARE | Facility: HOSPITAL | Age: 74
End: 2024-01-30
Payer: MEDICARE

## 2024-01-30 ENCOUNTER — OUTPATIENT (OUTPATIENT)
Dept: OUTPATIENT SERVICES | Facility: HOSPITAL | Age: 74
LOS: 1 days | End: 2024-01-30
Payer: MEDICARE

## 2024-01-30 VITALS
TEMPERATURE: 97.2 F | OXYGEN SATURATION: 96 % | RESPIRATION RATE: 18 BRPM | DIASTOLIC BLOOD PRESSURE: 76 MMHG | SYSTOLIC BLOOD PRESSURE: 136 MMHG | HEART RATE: 122 BPM

## 2024-01-30 VITALS — WEIGHT: 252 LBS | BODY MASS INDEX: 41.94 KG/M2

## 2024-01-30 DIAGNOSIS — Z98.89 OTHER SPECIFIED POSTPROCEDURAL STATES: Chronic | ICD-10-CM

## 2024-01-30 DIAGNOSIS — I87.2 VENOUS INSUFFICIENCY (CHRONIC) (PERIPHERAL): ICD-10-CM

## 2024-01-30 DIAGNOSIS — L97.919 NON-PRESSURE CHRONIC ULCER OF UNSPECIFIED PART OF RIGHT LOWER LEG WITH UNSPECIFIED SEVERITY: ICD-10-CM

## 2024-01-30 DIAGNOSIS — Z90.722 ACQUIRED ABSENCE OF OVARIES, BILATERAL: Chronic | ICD-10-CM

## 2024-01-30 DIAGNOSIS — C54.1 MALIGNANT NEOPLASM OF ENDOMETRIUM: Chronic | ICD-10-CM

## 2024-01-30 DIAGNOSIS — Z90.710 ACQUIRED ABSENCE OF BOTH CERVIX AND UTERUS: Chronic | ICD-10-CM

## 2024-01-30 PROCEDURE — 99213 OFFICE O/P EST LOW 20 MIN: CPT

## 2024-01-30 PROCEDURE — G0463: CPT

## 2024-01-30 NOTE — PHYSICAL EXAM
[JVD] : no jugular venous distention  [Normal Breath Sounds] : Normal breath sounds [1+] : right 1+ [Ankle Swelling (On Exam)] : present [Ankle Swelling Bilaterally] : bilaterally  [Ankle Swelling On The Left] : moderate [] : of the right leg [Ankle Swelling On The Right] : mild [Skin Ulcer] : ulcer [Alert] : alert [Oriented to Person] : oriented to person [Oriented to Place] : oriented to place [Oriented to Time] : oriented to time [Calm] : calm [de-identified] : NAD, arrives in WC, [place] [de-identified] : very ambulation [Please See PDF for [employer]] : Please See PDF for [employer]. [FreeTextEntry1] : medial leg [FreeTextEntry2] : 3 [FreeTextEntry3] : 4 [FreeTextEntry4] : .1 [de-identified] : eschar [de-identified] : some in previous blister site [de-identified] : telemedicine vist- see photos [de-identified] : \par  previous 1.3x2.2x.1\par   see photos\par  new blister, already flat\par  now open- bulla burst\par  \par  previous blister anterior to eschar 3.5 x 1. 2\par  \par  ankle 24 right\par  ankle 23 left [FreeTextEntry7] : posterior calf [FreeTextEntry8] : 2 [FreeTextEntry9] : 3.2 [de-identified] : 0.2 [de-identified] : bleeding and slough [de-identified] : gent/non stick dressing [de-identified] : painful- larger\par   [de-identified] : left leg [de-identified] : 4 [de-identified] : 1 [de-identified] : .1 [de-identified] : other [TWNoteComboBox1] : Right [TWNoteComboBox6] : Other [de-identified] : None [de-identified] : 50% [TWNoteComboBox7] : Mechanical [TWNoteComboBox9] : Right [de-identified] : Other [de-identified] : None [de-identified] : >75% [de-identified] : Yes [TWNoteComboBox8] : Mechanical [de-identified] : Venous

## 2024-01-30 NOTE — ASSESSMENT
[FreeTextEntry1] : 73 year old DM woman w/ open wound to her right lower leg  pemphigoid, large varicosities  that are patent and drain ,empty  with compression superficial cluster? candidate for ablation MO with pemphigous, would check to see if secondary tributary originating from saphenous vein, ? candidate for ablation- followup with Dr Pettit   h/o prednisone, leukopenic, anemic on ivig h/o tahbso uterine ca stage 1a- no fever     xeroform foam for small shalow ulcer offload every 2 hours      right wounds legs /  foam/ would like compression     chris low on left leg/ high pressure on right, palpable pulses/ no Vinsuff 2018- may need reeval  datacomplexity lab, xr, old rec, test resultsreviewed, visualize image  risk-low-no surgery at this time

## 2024-01-30 NOTE — PLAN
[FreeTextEntry1] :    RLE wound posterior thigh, non stick xeroform/ foam  fup with Dr HANG couch eval for ablation

## 2024-01-30 NOTE — REASON FOR VISIT
[Follow-Up: _____] : a [unfilled] follow-up visit [Family Member] : family member [FreeTextEntry1] : leg wound-  post thigh

## 2024-01-30 NOTE — HISTORY OF PRESENT ILLNESS
[FreeTextEntry1] : 74 yo f with wounds   worried about posterior right thigh wounds no bleeding  has pain, no fever   7/22/2022  had  wound since 6/25/2022 after hitting her leg against her walker. The patient reported the leg bruising then forming a large blister. The wound is located on  the right medial leg/  The patient has no other complaints or associated symptoms. HbA1c is 4.5 as of May 2022 per Patient.  h/o clobetasol, muprocin, had been treated in 2018 right leg  prior ulcers , no fever, seeing derm next week would like vna- left leg also opened, but settled down with gent uses telfa   Patient developed leg wounds since 12/2017. Takes igg treatment and prednisone had been using betadine, no blisters - previous one broke, now has open ulcer at that site     h/o Bullous pemphigoid.h/o doxy 100mg BID, nicotinamide 500mg BID,  prednisone 10mg daily, and IVIG [id number] She feels that her BP is under best control in the 2 weeks immediately after her IVIG infusion.    Lipodermatosclerosis. Has an erosion on R medial calf present since December that started as a blister. It was originally draining profusely per pt; she denies fevers, chills, foul smell.  Wound nurse is applying clob oint to periphery and Alginate dressing over the wound.  BACKGROUND:PMH Stage IA carcinosarcoma of the uterus (s/p Carbo/Taxol x 6 cycles from 12/15 - 4/16), currently in remission,  Biopsy and DIF from 8/2014 consistent with BP (scanned into file).  Previously well controlled with Cellcept, prednisone, and clobetasol with flare after stopping Cellcept due to decreasing blood counts.  +H/o vaginal erosions Her primary dermatologist in the past was Dr Silva.   Pt previously stated that whenever predisone has been decreased below 10mg daily she flares. 9/5- leg wounds related to body habitus and pemphigus

## 2024-02-01 DIAGNOSIS — I83.899 VARICOSE VEINS OF UNSPECIFIED LOWER EXTREMITY WITH OTHER COMPLICATIONS: ICD-10-CM

## 2024-02-01 DIAGNOSIS — L12.0 BULLOUS PEMPHIGOID: ICD-10-CM

## 2024-02-01 DIAGNOSIS — E66.01 MORBID (SEVERE) OBESITY DUE TO EXCESS CALORIES: ICD-10-CM

## 2024-02-01 DIAGNOSIS — L97.919 NON-PRESSURE CHRONIC ULCER OF UNSPECIFIED PART OF RIGHT LOWER LEG WITH UNSPECIFIED SEVERITY: ICD-10-CM

## 2024-02-01 DIAGNOSIS — I87.2 VENOUS INSUFFICIENCY (CHRONIC) (PERIPHERAL): ICD-10-CM

## 2024-02-02 ENCOUNTER — APPOINTMENT (OUTPATIENT)
Dept: DERMATOLOGY | Facility: CLINIC | Age: 74
End: 2024-02-02
Payer: MEDICARE

## 2024-02-02 DIAGNOSIS — L12.0 BULLOUS PEMPHIGOID: ICD-10-CM

## 2024-02-02 PROCEDURE — 99214 OFFICE O/P EST MOD 30 MIN: CPT

## 2024-02-02 RX ORDER — PREDNISONE 10 MG/1
10 TABLET ORAL
Qty: 90 | Refills: 1 | Status: ACTIVE | COMMUNITY
Start: 2021-09-08 | End: 1900-01-01

## 2024-02-02 NOTE — PHYSICAL EXAM
[Alert] : alert [Oriented x 3] : ~L oriented x 3 [Well Nourished] : well nourished [Conjunctiva Non-injected] : conjunctiva non-injected [No Visual Lymphadenopathy] : no visual  lymphadenopathy [No Clubbing] : no clubbing [No Edema] : no edema [No Bromhidrosis] : no bromhidrosis [No Chromhidrosis] : no chromhidrosis [FreeTextEntry3] : - pink plaque on medial lower legs, R > L, with desquamative scale  - R posterior thigh > L posterior thigh with red vascular compressible papulonodules

## 2024-02-02 NOTE — HISTORY OF PRESENT ILLNESS
[FreeTextEntry1] : f/u BP, wound [de-identified] : 73 year old woman with hx of T2DM, Stage IA uterine carcinosarcoma (currently in remission) here with her  for f/u of  1. BP. On Dupixent [id number] along with IVIG [id number]. Since LV has been stable on prednisone 10 mg daily. Notes stable, last bad flare in Nov 2022, though gets a few blisters prior to her next infusion, uses clobetasol to these which helps. Tolerating IVIG. Minimal itching.   2. Bleeding sore on the back of her L posterior leg. Was seen by Dr. Noriega and was thought to have varicosities. Referred to Dr. Pettit for ablation.

## 2024-02-20 ENCOUNTER — APPOINTMENT (OUTPATIENT)
Dept: RHEUMATOLOGY | Facility: CLINIC | Age: 74
End: 2024-02-20
Payer: MEDICARE

## 2024-02-20 VITALS — DIASTOLIC BLOOD PRESSURE: 72 MMHG | HEART RATE: 75 BPM | OXYGEN SATURATION: 98 % | SYSTOLIC BLOOD PRESSURE: 118 MMHG

## 2024-02-20 VITALS
DIASTOLIC BLOOD PRESSURE: 79 MMHG | TEMPERATURE: 97.2 F | HEART RATE: 99 BPM | RESPIRATION RATE: 16 BRPM | OXYGEN SATURATION: 95 % | SYSTOLIC BLOOD PRESSURE: 116 MMHG

## 2024-02-20 PROCEDURE — 96365 THER/PROPH/DIAG IV INF INIT: CPT

## 2024-02-20 PROCEDURE — 96366 THER/PROPH/DIAG IV INF ADDON: CPT

## 2024-02-20 RX ORDER — CAMPHOR 0.45 %
25 GEL (GRAM) TOPICAL
Qty: 0 | Refills: 0 | Status: COMPLETED
Start: 2023-11-17

## 2024-02-20 RX ORDER — ACETAMINOPHEN 325 MG/1
325 TABLET, FILM COATED ORAL
Qty: 0 | Refills: 0 | Status: COMPLETED
Start: 2023-11-17

## 2024-02-20 RX ORDER — IMMUNE GLOBULIN INFUSION (HUMAN) 100 MG/ML
20 INJECTION, SOLUTION INTRAVENOUS; SUBCUTANEOUS
Qty: 0 | Refills: 0 | Status: COMPLETED
Start: 2023-11-17

## 2024-02-20 NOTE — HISTORY OF PRESENT ILLNESS
[6] : 6 [N/A] : N/A [Denies] : Denies [Yes] : Yes [Declined] : Declined [de-identified] : B/L knee, back of leg [de-identified] : achy [de-identified] : blood filled blisters to back of legs [de-identified] : wheelchair [Right upper extremity] : Right upper extremity [24g] : 24g [Start Time: ___] : Medication Start Time: [unfilled] [End Time: ___] : Medication End Time: [unfilled] [Medication Name: ___] : Medication Name: [unfilled] [Total Amount Administered: ___] : Total Amount Administered: [unfilled] [IV discontinued. Intact. No signs or symptoms of IV complications noted. Time: ___] : IV discontinued. Intact. No signs or symptoms of IV complications noted. Time: [unfilled] [Patient  instructed to seek medical attention with signs and symptoms of adverse effects] : Patient  instructed to seek medical attention with signs and symptoms of adverse effects [Patient left unit in no acute distress] : Patient left unit in no acute distress [Medications administered as ordered and tolerated well.] : Medications administered as ordered and tolerated well. [de-identified] : forearm [de-identified] : Patient presents for 1:3 IVIG infusion, doing well overall. Patient denies any recent infection or antibiotic use. Patient reports having blood filled blisters to back of legs x2 months. Patient s/p f/u visit with dermatology and vascular, planned for doppler to evaluate for vascular cause for bleeding. Blisters prescribed to be dressed every other day as per Dermatologist. Patient reports achy pain to B/L knees rated an 8 on the pain scale above, due to weather. Patient reports she will be having an appointment with wound doctor next week. Patient continues to report experiencing fatigue. Patient denies any recent falls. No other symptoms or concerns noted at this time. Patient pre-medicated, infusion titrated and tolerated well.

## 2024-02-21 ENCOUNTER — APPOINTMENT (OUTPATIENT)
Dept: RHEUMATOLOGY | Facility: CLINIC | Age: 74
End: 2024-02-21
Payer: MEDICARE

## 2024-02-21 VITALS
SYSTOLIC BLOOD PRESSURE: 137 MMHG | RESPIRATION RATE: 16 BRPM | OXYGEN SATURATION: 95 % | TEMPERATURE: 97.7 F | HEART RATE: 91 BPM | DIASTOLIC BLOOD PRESSURE: 67 MMHG

## 2024-02-21 VITALS
SYSTOLIC BLOOD PRESSURE: 127 MMHG | DIASTOLIC BLOOD PRESSURE: 66 MMHG | RESPIRATION RATE: 16 BRPM | HEART RATE: 74 BPM | OXYGEN SATURATION: 69 %

## 2024-02-21 PROCEDURE — 96366 THER/PROPH/DIAG IV INF ADDON: CPT

## 2024-02-21 PROCEDURE — 96365 THER/PROPH/DIAG IV INF INIT: CPT

## 2024-02-21 RX ORDER — ACETAMINOPHEN 325 MG/1
325 TABLET, FILM COATED ORAL
Qty: 0 | Refills: 0 | Status: COMPLETED
Start: 2023-11-17

## 2024-02-21 RX ORDER — IMMUNE GLOBULIN INFUSION (HUMAN) 100 MG/ML
20 INJECTION, SOLUTION INTRAVENOUS; SUBCUTANEOUS
Qty: 0 | Refills: 0 | Status: COMPLETED
Start: 2023-11-17

## 2024-02-21 RX ORDER — CAMPHOR 0.45 %
25 GEL (GRAM) TOPICAL
Qty: 0 | Refills: 0 | Status: COMPLETED
Start: 2023-11-17

## 2024-02-21 NOTE — HISTORY OF PRESENT ILLNESS
[6] : 6 [N/A] : N/A [Denies] : Denies [Yes] : Yes [Declined] : Declined [Right upper extremity] : Right upper extremity [24g] : 24g [Start Time: ___] : Medication Start Time: [unfilled] [End Time: ___] : Medication End Time: [unfilled] [Medication Name: ___] : Medication Name: [unfilled] [Total Amount Administered: ___] : Total Amount Administered: [unfilled] [IV discontinued. Intact. No signs or symptoms of IV complications noted. Time: ___] : IV discontinued. Intact. No signs or symptoms of IV complications noted. Time: [unfilled] [Patient  instructed to seek medical attention with signs and symptoms of adverse effects] : Patient  instructed to seek medical attention with signs and symptoms of adverse effects [Patient left unit in no acute distress] : Patient left unit in no acute distress [Medications administered as ordered and tolerated well.] : Medications administered as ordered and tolerated well.

## 2024-02-23 ENCOUNTER — APPOINTMENT (OUTPATIENT)
Dept: RHEUMATOLOGY | Facility: CLINIC | Age: 74
End: 2024-02-23
Payer: MEDICARE

## 2024-02-23 VITALS
OXYGEN SATURATION: 96 % | SYSTOLIC BLOOD PRESSURE: 116 MMHG | DIASTOLIC BLOOD PRESSURE: 74 MMHG | HEART RATE: 70 BPM | RESPIRATION RATE: 16 BRPM

## 2024-02-23 VITALS
HEART RATE: 86 BPM | TEMPERATURE: 97.7 F | SYSTOLIC BLOOD PRESSURE: 124 MMHG | OXYGEN SATURATION: 98 % | DIASTOLIC BLOOD PRESSURE: 76 MMHG | RESPIRATION RATE: 16 BRPM

## 2024-02-23 PROCEDURE — 96366 THER/PROPH/DIAG IV INF ADDON: CPT

## 2024-02-23 PROCEDURE — 96365 THER/PROPH/DIAG IV INF INIT: CPT

## 2024-02-23 RX ORDER — ACETAMINOPHEN 325 MG/1
325 TABLET, FILM COATED ORAL
Qty: 0 | Refills: 0 | Status: COMPLETED
Start: 2023-11-17

## 2024-02-23 RX ORDER — IMMUNE GLOBULIN INFUSION (HUMAN) 100 MG/ML
20 INJECTION, SOLUTION INTRAVENOUS; SUBCUTANEOUS
Qty: 0 | Refills: 0 | Status: COMPLETED
Start: 2023-11-17

## 2024-02-23 RX ORDER — CAMPHOR 0.45 %
25 GEL (GRAM) TOPICAL
Qty: 0 | Refills: 0 | Status: COMPLETED
Start: 2023-11-17

## 2024-02-23 NOTE — HISTORY OF PRESENT ILLNESS
[6] : 6 [N/A] : N/A [Denies] : Denies [Yes] : Yes [Declined] : Declined [Left upper extremity] : Left upper extremity [24g] : 24g [Start Time: ___] : Medication Start Time: [unfilled] [End Time: ___] : Medication End Time: [unfilled] [Medication Name: ___] : Medication Name: [unfilled] [Total Amount Administered: ___] : Total Amount Administered: [unfilled] [IV discontinued. Intact. No signs or symptoms of IV complications noted. Time: ___] : IV discontinued. Intact. No signs or symptoms of IV complications noted. Time: [unfilled] [Patient  instructed to seek medical attention with signs and symptoms of adverse effects] : Patient  instructed to seek medical attention with signs and symptoms of adverse effects [Patient left unit in no acute distress] : Patient left unit in no acute distress [Medications administered as ordered and tolerated well.] : Medications administered as ordered and tolerated well. [de-identified] : B/L knee, back of leg [de-identified] : achy [de-identified] : blood filled blisters to back of legs [de-identified] : wheelchair [de-identified] : dorsal hand  [de-identified] : Patient presents for 3:3 IVIG infusion, doing well overall. Patient denies any recent infection or antibiotic use. NO changes since Wednesday's infusion. Patient reports feeling nauseous on Wednesday at midnight - all day Thursday, feeling better now. Patient reports having blood filled blisters to back of legs x2 months. Patient s/p f/u visit with dermatology and vascular, planned for doppler to evaluate for vascular cause for bleeding. Blisters prescribed to be dressed every other day as per Dermatologist. Patient reports achy pain to B/L knees rated an 6 on the pain scale above, due to weather. Patient reports she will be having an appointment with wound doctor next week. Patient continues to report experiencing fatigue. Patient denies any recent falls. No other symptoms or concerns noted at this time. Patient pre-medicated, infusion titrated and tolerated well.

## 2024-02-26 ENCOUNTER — APPOINTMENT (OUTPATIENT)
Dept: WOUND CARE | Facility: HOSPITAL | Age: 74
End: 2024-02-26
Payer: MEDICARE

## 2024-02-26 ENCOUNTER — OUTPATIENT (OUTPATIENT)
Dept: OUTPATIENT SERVICES | Facility: HOSPITAL | Age: 74
LOS: 1 days | End: 2024-02-26

## 2024-02-26 VITALS
RESPIRATION RATE: 18 BRPM | HEART RATE: 100 BPM | OXYGEN SATURATION: 95 % | SYSTOLIC BLOOD PRESSURE: 160 MMHG | DIASTOLIC BLOOD PRESSURE: 78 MMHG | TEMPERATURE: 97.6 F

## 2024-02-26 DIAGNOSIS — L97.911 NON-PRESSURE CHRONIC ULCER OF UNSPECIFIED PART OF RIGHT LOWER LEG LIMITED TO BREAKDOWN OF SKIN: ICD-10-CM

## 2024-02-26 DIAGNOSIS — Z98.89 OTHER SPECIFIED POSTPROCEDURAL STATES: Chronic | ICD-10-CM

## 2024-02-26 DIAGNOSIS — Z90.710 ACQUIRED ABSENCE OF BOTH CERVIX AND UTERUS: Chronic | ICD-10-CM

## 2024-02-26 DIAGNOSIS — I83.899 VARICOSE VEINS OF UNSPECIFIED LOWER EXTREMITY WITH OTHER COMPLICATIONS: ICD-10-CM

## 2024-02-26 PROCEDURE — 93970 EXTREMITY STUDY: CPT | Mod: 26

## 2024-02-26 PROCEDURE — 99203 OFFICE O/P NEW LOW 30 MIN: CPT

## 2024-02-26 PROCEDURE — 93970 EXTREMITY STUDY: CPT

## 2024-02-26 NOTE — PLAN
[FreeTextEntry1] :    RLE wound posterior thigh, non stick xeroform/ foam  fup with Dr MAURICIO for eval for ablation   2/26/24 Plan: Wash wound with soap and water Apply Adaptic/Aquacel/Spandage Change daily Offload area by standing every half hour. Turn and reposition every two hours. Patient given copy with DME contact. MD access given for Bariatric Surgery.

## 2024-02-26 NOTE — ASSESSMENT
[FreeTextEntry1] : 73 year old DM woman w/ open wound to her right lower leg  pemphigoid, large varicosities  that are patent and drain ,empty  with compression superficial cluster? candidate for ablation MO with pemphigous, would check to see if secondary tributary originating from saphenous vein, ? candidate for ablation- followup with Dr Pettit   h/o prednisone, leukopenic, anemic on ivig h/o tahbso uterine ca stage 1a- no fever     xeroform foam for small shalow ulcer offload every 2 hours      right wounds legs /  foam/ would like compression     chris low on left leg/ high pressure on right, palpable pulses/ no Vinsuff 2018- may need reeval  datacomplexity lab, xr, old rec, test resultsreviewed, visualize image  risk-low-no surgery at this time  2/26/24 Patient presents for follow up accompanied by her . On exam: Patient with Bullous Pemphigus of posterior bilateral lower extremities Vascular studies performed today. Results discussed with patient and spouse. Open wound covered with Adaptic/Aquacel/Spandage Change daily

## 2024-02-26 NOTE — PHYSICAL EXAM
[Normal Breath Sounds] : Normal breath sounds [1+] : right 1+ [Ankle Swelling (On Exam)] : present [Ankle Swelling Bilaterally] : bilaterally  [Ankle Swelling On The Left] : moderate [] : of the right leg [Ankle Swelling On The Right] : mild [Skin Ulcer] : ulcer [Alert] : alert [Oriented to Person] : oriented to person [Oriented to Place] : oriented to place [Oriented to Time] : oriented to time [Calm] : calm [Please See PDF for [employer]] : Please See PDF for [employer]. [JVD] : no jugular venous distention  [de-identified] : NAD, arrives in WC, MO [de-identified] : very ambulation [FreeTextEntry2] : 3 [FreeTextEntry3] : 4 [FreeTextEntry1] : medial leg [de-identified] : eschar [FreeTextEntry4] : .1 [de-identified] : some in previous blister site [de-identified] : telemedicine vist- see photos [de-identified] : \par  previous 1.3x2.2x.1\par   see photos\par  new blister, already flat\par  now open- bulla burst\par  \par  previous blister anterior to eschar 3.5 x 1. 2\par  \par  ankle 24 right\par  ankle 23 left [FreeTextEntry7] : posterior calf [FreeTextEntry9] : 3.2 [FreeTextEntry8] : 2 [de-identified] : 0.2 [de-identified] : bleeding and slough [de-identified] : gent/non stick dressing [de-identified] : painful- larger\par   [de-identified] : left leg [de-identified] : 4 [de-identified] : .1 [de-identified] : 1 [TWNoteComboBox1] : Right [de-identified] : other [TWNoteComboBox6] : Other [de-identified] : None [de-identified] : 50% [TWNoteComboBox7] : Mechanical [TWNoteComboBox9] : Right [de-identified] : Other [de-identified] : >75% [de-identified] : Yes [de-identified] : None [TWNoteComboBox8] : Mechanical [de-identified] : Venous

## 2024-02-26 NOTE — HISTORY OF PRESENT ILLNESS
[FreeTextEntry1] : 74 yo f with wounds   worried about posterior right thigh wounds no bleeding  has pain, no fever   7/22/2022  had  wound since 6/25/2022 after hitting her leg against her walker. The patient reported the leg bruising then forming a large blister. The wound is located on  the right medial leg/  The patient has no other complaints or associated symptoms. HbA1c is 4.5 as of May 2022 per Patient.  h/o clobetasol, muprocin, had been treated in 2018 right leg  prior ulcers , no fever, seeing derm next week would like vna- left leg also opened, but settled down with gent uses telfa   Patient developed leg wounds since 12/2017. Takes igg treatment and prednisone had been using betadine, no blisters - previous one broke, now has open ulcer at that site     h/o Bullous pemphigoid.h/o doxy 100mg BID, nicotinamide 500mg BID,  prednisone 10mg daily, and IVIG [id number] She feels that her BP is under best control in the 2 weeks immediately after her IVIG infusion.    Lipodermatosclerosis. Has an erosion on R medial calf present since December that started as a blister. It was originally draining profusely per pt; she denies fevers, chills, foul smell.  Wound nurse is applying clob oint to periphery and Alginate dressing over the wound.  BACKGROUND:PMH Stage IA carcinosarcoma of the uterus (s/p Carbo/Taxol x 6 cycles from 12/15 - 4/16), currently in remission,  Biopsy and DIF from 8/2014 consistent with BP (scanned into file).  Previously well controlled with Cellcept, prednisone, and clobetasol with flare after stopping Cellcept due to decreasing blood counts.  +H/o vaginal erosions Her primary dermatologist in the past was Dr Silva.   Pt previously stated that whenever predisone has been decreased below 10mg daily she flares. 9/5- leg wounds related to body habitus and pemphigus

## 2024-03-18 ENCOUNTER — APPOINTMENT (OUTPATIENT)
Dept: RHEUMATOLOGY | Facility: CLINIC | Age: 74
End: 2024-03-18
Payer: MEDICARE

## 2024-03-18 VITALS
SYSTOLIC BLOOD PRESSURE: 115 MMHG | RESPIRATION RATE: 16 BRPM | DIASTOLIC BLOOD PRESSURE: 71 MMHG | HEART RATE: 80 BPM | OXYGEN SATURATION: 96 %

## 2024-03-18 VITALS
OXYGEN SATURATION: 96 % | TEMPERATURE: 97.7 F | RESPIRATION RATE: 16 BRPM | HEART RATE: 78 BPM | SYSTOLIC BLOOD PRESSURE: 110 MMHG | DIASTOLIC BLOOD PRESSURE: 67 MMHG

## 2024-03-18 PROCEDURE — 96365 THER/PROPH/DIAG IV INF INIT: CPT

## 2024-03-18 PROCEDURE — 96366 THER/PROPH/DIAG IV INF ADDON: CPT

## 2024-03-18 RX ORDER — CAMPHOR 0.45 %
25 GEL (GRAM) TOPICAL
Qty: 0 | Refills: 0 | Status: COMPLETED
Start: 2023-11-17

## 2024-03-18 RX ORDER — IMMUNE GLOBULIN INFUSION (HUMAN) 100 MG/ML
20 INJECTION, SOLUTION INTRAVENOUS; SUBCUTANEOUS
Qty: 0 | Refills: 0 | Status: COMPLETED
Start: 2023-11-17

## 2024-03-18 RX ORDER — ACETAMINOPHEN 325 MG/1
325 TABLET, FILM COATED ORAL
Qty: 0 | Refills: 0 | Status: COMPLETED
Start: 2023-11-17

## 2024-03-18 NOTE — HISTORY OF PRESENT ILLNESS
[6] : 6 [N/A] : N/A [Denies] : Denies [Yes] : Yes [Declined] : Declined [de-identified] : B/L knee, back of leg [de-identified] : achy [de-identified] : blood filled blisters to back of legs [de-identified] : wheelchair [Right upper extremity] : Right upper extremity [24g] : 24g [Start Time: ___] : Medication Start Time: [unfilled] [End Time: ___] : Medication End Time: [unfilled] [Medication Name: ___] : Medication Name: [unfilled] [Total Amount Administered: ___] : Total Amount Administered: [unfilled] [IV discontinued. Intact. No signs or symptoms of IV complications noted. Time: ___] : IV discontinued. Intact. No signs or symptoms of IV complications noted. Time: [unfilled] [Patient  instructed to seek medical attention with signs and symptoms of adverse effects] : Patient  instructed to seek medical attention with signs and symptoms of adverse effects [Patient left unit in no acute distress] : Patient left unit in no acute distress [Medications administered as ordered and tolerated well.] : Medications administered as ordered and tolerated well. [de-identified] : dorsal hand  [de-identified] : Patient presents for 1:3 IVIG infusion, doing well overall. Patient denies any recent infection or antibiotic use. Patient continues to report having (3) blood filled blisters on the back of her (R) leg and a new blister to her (L) leg. Patient had a f/u visit with dermatology and vascular, doppler was done and results showed no DVT.  Patient reports achy pain to B/L knees rated an 6 on the pain scale above, due to weather.  Patient continues to report experiencing fatigue. Patient denies any recent falls. No other symptoms or concerns noted at this time. Patient pre-medicated, infusion titrated and tolerated well.

## 2024-03-20 ENCOUNTER — APPOINTMENT (OUTPATIENT)
Dept: RHEUMATOLOGY | Facility: CLINIC | Age: 74
End: 2024-03-20
Payer: MEDICARE

## 2024-03-20 VITALS
SYSTOLIC BLOOD PRESSURE: 116 MMHG | RESPIRATION RATE: 16 BRPM | OXYGEN SATURATION: 97 % | HEART RATE: 77 BPM | DIASTOLIC BLOOD PRESSURE: 72 MMHG

## 2024-03-20 VITALS
DIASTOLIC BLOOD PRESSURE: 72 MMHG | RESPIRATION RATE: 16 BRPM | OXYGEN SATURATION: 96 % | TEMPERATURE: 97.4 F | SYSTOLIC BLOOD PRESSURE: 127 MMHG | HEART RATE: 81 BPM

## 2024-03-20 PROCEDURE — 96366 THER/PROPH/DIAG IV INF ADDON: CPT

## 2024-03-20 PROCEDURE — 96365 THER/PROPH/DIAG IV INF INIT: CPT

## 2024-03-20 RX ORDER — IMMUNE GLOBULIN INFUSION (HUMAN) 100 MG/ML
20 INJECTION, SOLUTION INTRAVENOUS; SUBCUTANEOUS
Qty: 0 | Refills: 0 | Status: COMPLETED
Start: 2023-11-17

## 2024-03-20 RX ORDER — ACETAMINOPHEN 325 MG/1
325 TABLET, FILM COATED ORAL
Qty: 0 | Refills: 0 | Status: COMPLETED
Start: 2023-11-17

## 2024-03-20 RX ORDER — CAMPHOR 0.45 %
25 GEL (GRAM) TOPICAL
Qty: 0 | Refills: 0 | Status: COMPLETED
Start: 2023-11-17

## 2024-03-20 NOTE — HISTORY OF PRESENT ILLNESS
[6] : 6 [N/A] : N/A [Denies] : Denies [Yes] : Yes [Declined] : Declined [Left upper extremity] : Left upper extremity [24g] : 24g [Start Time: ___] : Medication Start Time: [unfilled] [End Time: ___] : Medication End Time: [unfilled] [Medication Name: ___] : Medication Name: [unfilled] [Total Amount Administered: ___] : Total Amount Administered: [unfilled] [IV discontinued. Intact. No signs or symptoms of IV complications noted. Time: ___] : IV discontinued. Intact. No signs or symptoms of IV complications noted. Time: [unfilled] [Patient  instructed to seek medical attention with signs and symptoms of adverse effects] : Patient  instructed to seek medical attention with signs and symptoms of adverse effects [Patient left unit in no acute distress] : Patient left unit in no acute distress [Medications administered as ordered and tolerated well.] : Medications administered as ordered and tolerated well. [de-identified] : achy [de-identified] : B/L knee, back of leg [de-identified] : blood filled blisters to back of legs [de-identified] : wheelchair [de-identified] : dorsal hand  [de-identified] : Patient presents for 2:3 IVIG infusion, doing well overall. Patient denies any changes since last infusion 2 days ago.  Patient continues to report having (3) blood filled blisters on the back of her (R) leg and a new blister to her (L) leg. Patient had a f/u visit with dermatology and vascular, doppler was done and results showed no DVT.  Patient reports achy pain to B/L knees rated an 6 on the pain scale above.  Patient continues to report experiencing fatigue. Patient denies any recent falls. No other symptoms or concerns noted at this time. Patient pre-medicated, infusion titrated and tolerated well.

## 2024-03-22 ENCOUNTER — APPOINTMENT (OUTPATIENT)
Dept: RHEUMATOLOGY | Facility: CLINIC | Age: 74
End: 2024-03-22
Payer: MEDICARE

## 2024-03-22 VITALS — OXYGEN SATURATION: 95 % | SYSTOLIC BLOOD PRESSURE: 125 MMHG | DIASTOLIC BLOOD PRESSURE: 75 MMHG | HEART RATE: 87 BPM

## 2024-03-22 VITALS
SYSTOLIC BLOOD PRESSURE: 117 MMHG | RESPIRATION RATE: 16 BRPM | DIASTOLIC BLOOD PRESSURE: 70 MMHG | HEART RATE: 87 BPM | OXYGEN SATURATION: 96 % | TEMPERATURE: 97.5 F

## 2024-03-22 PROCEDURE — 96365 THER/PROPH/DIAG IV INF INIT: CPT

## 2024-03-22 PROCEDURE — 96366 THER/PROPH/DIAG IV INF ADDON: CPT

## 2024-03-22 RX ORDER — IMMUNE GLOBULIN INFUSION (HUMAN) 100 MG/ML
20 INJECTION, SOLUTION INTRAVENOUS; SUBCUTANEOUS
Qty: 0 | Refills: 0 | Status: COMPLETED
Start: 2023-11-17

## 2024-03-22 RX ORDER — CAMPHOR 0.45 %
25 GEL (GRAM) TOPICAL
Qty: 0 | Refills: 0 | Status: COMPLETED
Start: 2023-11-17

## 2024-03-22 RX ORDER — ACETAMINOPHEN 325 MG/1
325 TABLET, FILM COATED ORAL
Qty: 0 | Refills: 0 | Status: COMPLETED
Start: 2023-11-17

## 2024-03-22 NOTE — HISTORY OF PRESENT ILLNESS
[6] : 6 [N/A] : N/A [Denies] : Denies [Yes] : Yes [Declined] : Declined [Left upper extremity] : Left upper extremity [24g] : 24g [Start Time: ___] : Medication Start Time: [unfilled] [End Time: ___] : Medication End Time: [unfilled] [Total Amount Administered: ___] : Total Amount Administered: [unfilled] [Medication Name: ___] : Medication Name: [unfilled] [IV discontinued. Intact. No signs or symptoms of IV complications noted. Time: ___] : IV discontinued. Intact. No signs or symptoms of IV complications noted. Time: [unfilled] [Patient  instructed to seek medical attention with signs and symptoms of adverse effects] : Patient  instructed to seek medical attention with signs and symptoms of adverse effects [Patient left unit in no acute distress] : Patient left unit in no acute distress [Medications administered as ordered and tolerated well.] : Medications administered as ordered and tolerated well. [de-identified] : B/L knees and back of legs [de-identified] : achy [de-identified] : blood filled blisters to back of legs [de-identified] : wheelchair [de-identified] : dorsal hand  [de-identified] : Patient presents for 3:3 IVIG infusion, doing well overall. Patient denies any changes since last infusion 2 days ago.  Patient continues to report having blood filled blisters on the back of her legs. Patient reports achy pain to B/L knees rated as above.  Patient continues to report experiencing fatigue. Patient denies any recent falls. No other symptoms or concerns noted at this time. Patient pre-medicated, infusion titrated and tolerated well.

## 2024-03-25 RX ORDER — DUPILUMAB 300 MG/2ML
300 INJECTION, SOLUTION SUBCUTANEOUS
Qty: 1 | Refills: 5 | Status: ACTIVE | COMMUNITY
Start: 2020-12-08

## 2024-04-02 ENCOUNTER — APPOINTMENT (OUTPATIENT)
Dept: SURGERY | Facility: CLINIC | Age: 74
End: 2024-04-02
Payer: MEDICARE

## 2024-04-02 VITALS
BODY MASS INDEX: 45.4 KG/M2 | WEIGHT: 272.5 LBS | HEIGHT: 65 IN | DIASTOLIC BLOOD PRESSURE: 75 MMHG | HEART RATE: 102 BPM | TEMPERATURE: 96.8 F | SYSTOLIC BLOOD PRESSURE: 119 MMHG | RESPIRATION RATE: 18 BRPM

## 2024-04-02 DIAGNOSIS — Z56.0 UNEMPLOYMENT, UNSPECIFIED: ICD-10-CM

## 2024-04-02 DIAGNOSIS — E66.01 MORBID (SEVERE) OBESITY DUE TO EXCESS CALORIES: ICD-10-CM

## 2024-04-02 DIAGNOSIS — Z86.79 PERSONAL HISTORY OF OTHER DISEASES OF THE CIRCULATORY SYSTEM: ICD-10-CM

## 2024-04-02 DIAGNOSIS — Z86.2 PERSONAL HISTORY OF DISEASES OF THE BLOOD AND BLOOD-FORMING ORGANS AND CERTAIN DISORDERS INVOLVING THE IMMUNE MECHANISM: ICD-10-CM

## 2024-04-02 DIAGNOSIS — E11.9 TYPE 2 DIABETES MELLITUS W/OUT COMPLICATIONS: ICD-10-CM

## 2024-04-02 DIAGNOSIS — Z86.39 PERSONAL HISTORY OF OTHER ENDOCRINE, NUTRITIONAL AND METABOLIC DISEASE: ICD-10-CM

## 2024-04-02 PROCEDURE — 99204 OFFICE O/P NEW MOD 45 MIN: CPT

## 2024-04-02 PROCEDURE — 99214 OFFICE O/P EST MOD 30 MIN: CPT

## 2024-04-02 RX ORDER — PIOGLITAZONE 15 MG/1
15 TABLET ORAL
Refills: 0 | Status: DISCONTINUED | COMMUNITY
Start: 2017-11-29 | End: 2024-04-02

## 2024-04-02 RX ORDER — SILVER SULFADIAZINE 10 MG/G
1 CREAM TOPICAL DAILY
Qty: 1 | Refills: 3 | Status: DISCONTINUED | COMMUNITY
Start: 2020-11-24 | End: 2024-04-02

## 2024-04-02 RX ORDER — TACROLIMUS 1 MG/G
0.1 OINTMENT TOPICAL
Qty: 1 | Refills: 0 | Status: DISCONTINUED | COMMUNITY
Start: 2021-04-28 | End: 2024-04-02

## 2024-04-02 RX ORDER — DOXYCYCLINE HYCLATE 100 MG/1
100 TABLET ORAL
Qty: 20 | Refills: 0 | Status: DISCONTINUED | COMMUNITY
Start: 2022-07-14 | End: 2024-04-02

## 2024-04-02 SDOH — ECONOMIC STABILITY - INCOME SECURITY: UNEMPLOYMENT, UNSPECIFIED: Z56.0

## 2024-04-02 NOTE — PHYSICAL EXAM
[Normal] : affect appropriate [de-identified] : well appearing obese female in wheelchair [de-identified] : supple [de-identified] : regular rate [de-identified] : non labored respirations on room air [de-identified] : thin [de-identified] : obese, non tender, soft, non distended

## 2024-04-02 NOTE — HISTORY OF PRESENT ILLNESS
[de-identified] : Kalyn is a 74 y/o female here for weight loss consultation.     73 year old F with a longstanding history of obesity presents for initial consultation for weight-loss management. Pt reports weight gain over her whole life. Pt has tried various diets and exercise programs with limited long-term success.  She has very limited mobility, and it is hard for her to stay active.  She is concerned about operations given her bullous pemphigoid. Pt is interested in weight loss medications.   Weight Loss Medication Screening: Reports no history of anxiety, substance abuse, uncontrolled blood pressure, kidney stones, or pancreatitis. Reports no family history thyroid cancer or MEN 2 syndrome. History of bariatric surgery: none Obesity comorbidities: diabetes controlled with metformin, hypertension, hyperlipidemia Comorbidities improved/resolved: n/a Anti-obesity medication: none Obesity medication side effects: n/a

## 2024-04-02 NOTE — PLAN
[FreeTextEntry1] :   I had a lengthy discussion with the patient regarding nutrition, exercise, weight loss medications, and bariatric surgery. The patient qualifies for bariatric surgery but is not interested at this time. We reviewed surgical options such as the gastric RNY bypass and sleeve gastrectomy and the risks and benefits. We discuss how bariatric surgery may offer greater weight loss results with better long-term success. Patient qualifies for and is currently most interested in weight loss medications.   I emphasized the importance of making healthier food choices including fresh fruits and vegetables, lean meats, and protein sources. I recommended front loading calories, incorporating whole grains, and eliminating fast foods. I also discussed the importance of avoiding fried/fatty foods and foods containing high sugar content including juices/shakes/sodas/desserts.   I also encouraged beginning to increase her activity, even if only slightly.   Patient will work on the following: -Meet with nutritionist -Eliminate snacks -Focus on eating 3 well-balanced meals during the daytime with appropriate portion size -Cooking fresh meals rather than take out/processed/ready-made foods -Incorporating more movement -Obtain bloodwork   I have discussed initiating Qsymia therapy for pt. All risks and benefits have been discussed with the patient.  Currently there are no contraindications for the use of phentermine-topiramate after reviewing pts medical history and labs including CAD, arrhythmias, severe anxiety and does not have a history of substance abuse or glaucoma. Possible side effects were discussed with the patient including increased anxiety, tachycardia, elevated BP and teratogenicity in case of pregnancy.  She would like to try a GLP1ra but given her insurance, lack of coverage, we decided to try this to start.   Pt verbalizes understanding and wishes to proceed with medical therapy. Start Qsymia based on authorization and tolerance. Patient educated to call with questions/concerns. All questions answered.   Return to office 1 month after starting to evaluate how she is doing and see if she wants to escalate her dose.   Lilibeth Marcelo MD  [facility]  [address] [place] [address]  tel. [phone]  fax [phone]

## 2024-04-02 NOTE — PATIENT PROFILE ADULT. - REASON FOR ADMISSION
Interval History: Overnight bradycardia noticed on tele, pt asymptomatic, EKG findings c/f complete AVB. Pts HR higher in AM, discussed with EP, no acute intervention indicated as he already has a leadless pacemaker in place.      Objective:     Vital Signs (Most Recent):  Temp: 99.4 °F (37.4 °C) (04/02/24 1220)  Pulse: 62 (04/02/24 1220)  Resp: 18 (04/02/24 1220)  BP: 139/64 (04/02/24 1220)  SpO2: 96 % (04/02/24 1220) Vital Signs (24h Range):  Temp:  [97.8 °F (36.6 °C)-99.4 °F (37.4 °C)] 99.4 °F (37.4 °C)  Pulse:  [47-62] 62  Resp:  [16-20] 18  SpO2:  [95 %-100 %] 96 %  BP: (139-172)/(64-76) 139/64     Weight: 119.7 kg (264 lb)  Body mass index is 38.99 kg/m².    Intake/Output Summary (Last 24 hours) at 4/2/2024 1359  Last data filed at 4/2/2024 1347  Gross per 24 hour   Intake 200 ml   Output 2000 ml   Net -1800 ml         Physical Exam  Constitutional:       General: He is not in acute distress.     Appearance: Normal appearance. He is well-developed. Ill appearance: chronically ill appearing.   HENT:      Head: Normocephalic and atraumatic.      Right Ear: External ear normal.      Left Ear: External ear normal.      Nose: Nose normal.   Eyes:      General:         Right eye: No discharge.         Left eye: No discharge.      Conjunctiva/sclera: Conjunctivae normal.   Cardiovascular:      Rate and Rhythm: Normal rate and regular rhythm.      Pulses: Normal pulses.   Pulmonary:      Effort: Pulmonary effort is normal. No respiratory distress.      Breath sounds: No stridor.   Abdominal:      General: Abdomen is flat.      Palpations: Abdomen is soft.      Tenderness: There is no abdominal tenderness.   Musculoskeletal:         General: Swelling and deformity present.      Cervical back: Normal range of motion.      Comments: S/p L-AKA, bandage CDI  Interval decrease in UE swelling   Skin:     General: Skin is warm and dry.   Neurological:      General: No focal deficit present.      Mental Status: He is alert.              Significant Labs: All pertinent labs within the past 24 hours have been reviewed.    Significant Imaging: I have reviewed all pertinent imaging results/findings within the past 24 hours.   right leg cellulitis

## 2024-04-05 RX ORDER — PHENTERMINE AND TOPIRAMATE 3.75; 23 MG/1; MG/1
3.75-23 CAPSULE, EXTENDED RELEASE ORAL
Qty: 30 | Refills: 0 | Status: ACTIVE | COMMUNITY
Start: 2024-04-02

## 2024-04-10 ENCOUNTER — APPOINTMENT (OUTPATIENT)
Dept: SURGERY | Facility: CLINIC | Age: 74
End: 2024-04-10

## 2024-04-15 ENCOUNTER — APPOINTMENT (OUTPATIENT)
Dept: RHEUMATOLOGY | Facility: CLINIC | Age: 74
End: 2024-04-15
Payer: MEDICARE

## 2024-04-15 VITALS
RESPIRATION RATE: 16 BRPM | HEART RATE: 75 BPM | OXYGEN SATURATION: 97 % | SYSTOLIC BLOOD PRESSURE: 111 MMHG | DIASTOLIC BLOOD PRESSURE: 65 MMHG

## 2024-04-15 VITALS
DIASTOLIC BLOOD PRESSURE: 76 MMHG | OXYGEN SATURATION: 96 % | TEMPERATURE: 97.6 F | SYSTOLIC BLOOD PRESSURE: 125 MMHG | HEART RATE: 68 BPM | RESPIRATION RATE: 16 BRPM

## 2024-04-15 PROCEDURE — 96366 THER/PROPH/DIAG IV INF ADDON: CPT

## 2024-04-15 PROCEDURE — 96365 THER/PROPH/DIAG IV INF INIT: CPT

## 2024-04-15 RX ORDER — ACETAMINOPHEN 325 MG/1
325 TABLET, FILM COATED ORAL
Qty: 0 | Refills: 0 | Status: COMPLETED
Start: 2023-11-17

## 2024-04-15 RX ORDER — CAMPHOR 0.45 %
25 GEL (GRAM) TOPICAL
Qty: 0 | Refills: 0 | Status: COMPLETED
Start: 2023-11-17

## 2024-04-15 RX ORDER — IMMUNE GLOBULIN INFUSION (HUMAN) 100 MG/ML
20 INJECTION, SOLUTION INTRAVENOUS; SUBCUTANEOUS
Qty: 0 | Refills: 0 | Status: COMPLETED
Start: 2023-11-17

## 2024-04-15 NOTE — HISTORY OF PRESENT ILLNESS
[5] : 5 [N/A] : N/A [Denies] : Denies [Yes] : Yes [Declined] : Declined [Left upper extremity] : Left upper extremity [24g] : 24g [Start Time: ___] : Medication Start Time: [unfilled] [End Time: ___] : Medication End Time: [unfilled] [Medication Name: ___] : Medication Name: [unfilled] [Total Amount Administered: ___] : Total Amount Administered: [unfilled] [IV discontinued. Intact. No signs or symptoms of IV complications noted. Time: ___] : IV discontinued. Intact. No signs or symptoms of IV complications noted. Time: [unfilled] [Patient  instructed to seek medical attention with signs and symptoms of adverse effects] : Patient  instructed to seek medical attention with signs and symptoms of adverse effects [Patient left unit in no acute distress] : Patient left unit in no acute distress [Medications administered as ordered and tolerated well.] : Medications administered as ordered and tolerated well. [de-identified] : B/L knees and back of legs [de-identified] : achy [de-identified] : blisters to back of legs [de-identified] : wheelchair [de-identified] : dorsal hand  [de-identified] : Patient presents for 1:3 IVIG infusion, doing well overall. Patient denies any recent infection, antibiotic use or hospitalizations. Patient reports new lesions to the (L) leg, patient states "lesions broke" in the shower. Patient reports blisters to the back of B/L legs has improved and gotten smaller. Patient continues to report back to the back of her legs and knees. Patient continues to experience fatigue. Patient denies any recent falls. No other symptoms or concerns noted at this time. Patient pre-medicated, infusion titrated and tolerated well.

## 2024-04-17 ENCOUNTER — APPOINTMENT (OUTPATIENT)
Dept: RHEUMATOLOGY | Facility: CLINIC | Age: 74
End: 2024-04-17
Payer: MEDICARE

## 2024-04-17 VITALS
HEART RATE: 82 BPM | TEMPERATURE: 97.7 F | SYSTOLIC BLOOD PRESSURE: 123 MMHG | OXYGEN SATURATION: 97 % | DIASTOLIC BLOOD PRESSURE: 75 MMHG

## 2024-04-17 VITALS — SYSTOLIC BLOOD PRESSURE: 93 MMHG | OXYGEN SATURATION: 97 % | DIASTOLIC BLOOD PRESSURE: 59 MMHG | HEART RATE: 75 BPM

## 2024-04-17 PROCEDURE — 96366 THER/PROPH/DIAG IV INF ADDON: CPT

## 2024-04-17 PROCEDURE — 96365 THER/PROPH/DIAG IV INF INIT: CPT

## 2024-04-17 RX ORDER — CAMPHOR 0.45 %
25 GEL (GRAM) TOPICAL
Qty: 0 | Refills: 0 | Status: COMPLETED
Start: 2023-11-17

## 2024-04-17 RX ORDER — IMMUNE GLOBULIN INFUSION (HUMAN) 100 MG/ML
20 INJECTION, SOLUTION INTRAVENOUS; SUBCUTANEOUS
Qty: 0 | Refills: 0 | Status: COMPLETED
Start: 2023-11-17

## 2024-04-17 RX ORDER — ACETAMINOPHEN 325 MG/1
325 TABLET, FILM COATED ORAL
Qty: 0 | Refills: 0 | Status: COMPLETED
Start: 2023-11-17

## 2024-04-17 NOTE — HISTORY OF PRESENT ILLNESS
[5] : 5 [N/A] : N/A [Denies] : Denies [Yes] : Yes [Declined] : Declined [de-identified] : B/L knees and back of legs [de-identified] : achy [de-identified] : blisters to back of legs [de-identified] : wheelchair [Left upper extremity] : Left upper extremity [24g] : 24g [Start Time: ___] : Medication Start Time: [unfilled] [End Time: ___] : Medication End Time: [unfilled] [Medication Name: ___] : Medication Name: [unfilled] [Total Amount Administered: ___] : Total Amount Administered: [unfilled] [IV discontinued. Intact. No signs or symptoms of IV complications noted. Time: ___] : IV discontinued. Intact. No signs or symptoms of IV complications noted. Time: [unfilled] [Patient  instructed to seek medical attention with signs and symptoms of adverse effects] : Patient  instructed to seek medical attention with signs and symptoms of adverse effects [Patient left unit in no acute distress] : Patient left unit in no acute distress [Medications administered as ordered and tolerated well.] : Medications administered as ordered and tolerated well. [de-identified] : forearm [de-identified] : Patient presents for 2:3 IVIG infusion, doing well overall. Patient denies any recent infection, antibiotic use or hospitalizations. Patient reports new lesions to the (L) leg, patient states "lesions broke" in the shower. Patient reports blisters to the back of B/L legs has improved and gotten smaller. Patient continues to report back to the back of her legs and knees. Patient continues to experience fatigue. Patient denies any recent falls. No other symptoms or concerns noted at this time. Patient pre-medicated, infusion titrated and tolerated well.

## 2024-04-19 ENCOUNTER — APPOINTMENT (OUTPATIENT)
Dept: RHEUMATOLOGY | Facility: CLINIC | Age: 74
End: 2024-04-19
Payer: MEDICARE

## 2024-04-19 VITALS
TEMPERATURE: 97.6 F | RESPIRATION RATE: 16 BRPM | OXYGEN SATURATION: 97 % | SYSTOLIC BLOOD PRESSURE: 132 MMHG | DIASTOLIC BLOOD PRESSURE: 78 MMHG | HEART RATE: 75 BPM

## 2024-04-19 VITALS — OXYGEN SATURATION: 97 % | DIASTOLIC BLOOD PRESSURE: 68 MMHG | HEART RATE: 79 BPM | SYSTOLIC BLOOD PRESSURE: 109 MMHG

## 2024-04-19 PROCEDURE — 96366 THER/PROPH/DIAG IV INF ADDON: CPT

## 2024-04-19 PROCEDURE — 96365 THER/PROPH/DIAG IV INF INIT: CPT

## 2024-04-19 RX ORDER — ACETAMINOPHEN 325 MG/1
325 TABLET, FILM COATED ORAL
Qty: 0 | Refills: 0 | Status: COMPLETED
Start: 2023-11-17

## 2024-04-19 RX ORDER — IMMUNE GLOBULIN INFUSION (HUMAN) 100 MG/ML
20 INJECTION, SOLUTION INTRAVENOUS; SUBCUTANEOUS
Qty: 0 | Refills: 0 | Status: COMPLETED
Start: 2023-11-17

## 2024-04-19 RX ORDER — CAMPHOR 0.45 %
25 GEL (GRAM) TOPICAL
Qty: 0 | Refills: 0 | Status: COMPLETED
Start: 2023-11-17

## 2024-05-02 RX ORDER — CAMPHOR 0.45 %
25 GEL (GRAM) TOPICAL
Refills: 0 | Status: ACTIVE | OUTPATIENT
Start: 2024-05-02 | End: 1900-01-01

## 2024-05-02 RX ORDER — ACETAMINOPHEN 325 MG/1
325 TABLET, FILM COATED ORAL
Refills: 0 | Status: ACTIVE | OUTPATIENT
Start: 2024-05-02 | End: 1900-01-01

## 2024-05-02 RX ORDER — IMMUNE GLOBULIN INFUSION (HUMAN) 100 MG/ML
20 INJECTION, SOLUTION INTRAVENOUS; SUBCUTANEOUS
Refills: 0 | Status: ACTIVE | OUTPATIENT
Start: 2024-05-02 | End: 1900-01-01

## 2024-05-07 ENCOUNTER — APPOINTMENT (OUTPATIENT)
Dept: SURGERY | Facility: CLINIC | Age: 74
End: 2024-05-07

## 2024-05-13 ENCOUNTER — APPOINTMENT (OUTPATIENT)
Dept: RHEUMATOLOGY | Facility: CLINIC | Age: 74
End: 2024-05-13
Payer: MEDICARE

## 2024-05-13 VITALS
SYSTOLIC BLOOD PRESSURE: 116 MMHG | TEMPERATURE: 97.3 F | OXYGEN SATURATION: 95 % | DIASTOLIC BLOOD PRESSURE: 72 MMHG | RESPIRATION RATE: 16 BRPM | HEART RATE: 88 BPM

## 2024-05-13 VITALS
DIASTOLIC BLOOD PRESSURE: 63 MMHG | HEART RATE: 74 BPM | RESPIRATION RATE: 16 BRPM | SYSTOLIC BLOOD PRESSURE: 102 MMHG | OXYGEN SATURATION: 96 %

## 2024-05-13 PROCEDURE — 96366 THER/PROPH/DIAG IV INF ADDON: CPT

## 2024-05-13 PROCEDURE — 96365 THER/PROPH/DIAG IV INF INIT: CPT

## 2024-05-13 RX ORDER — ACETAMINOPHEN 325 MG/1
325 TABLET, FILM COATED ORAL
Qty: 0 | Refills: 0 | Status: COMPLETED
Start: 2024-05-02

## 2024-05-13 RX ORDER — CAMPHOR 0.45 %
25 GEL (GRAM) TOPICAL
Qty: 0 | Refills: 0 | Status: COMPLETED
Start: 2024-05-02

## 2024-05-13 RX ORDER — IMMUNE GLOBULIN INFUSION (HUMAN) 100 MG/ML
20 INJECTION, SOLUTION INTRAVENOUS; SUBCUTANEOUS
Qty: 0 | Refills: 0 | Status: COMPLETED
Start: 2024-05-02

## 2024-05-13 NOTE — HISTORY OF PRESENT ILLNESS
[5] : 5 [N/A] : N/A [Denies] : Denies [Yes] : Yes [Declined] : Declined [Left upper extremity] : Left upper extremity [24g] : 24g [Start Time: ___] : Medication Start Time: [unfilled] [End Time: ___] : Medication End Time: [unfilled] [Medication Name: ___] : Medication Name: [unfilled] [Total Amount Administered: ___] : Total Amount Administered: [unfilled] [IV discontinued. Intact. No signs or symptoms of IV complications noted. Time: ___] : IV discontinued. Intact. No signs or symptoms of IV complications noted. Time: [unfilled] [Patient  instructed to seek medical attention with signs and symptoms of adverse effects] : Patient  instructed to seek medical attention with signs and symptoms of adverse effects [Patient left unit in no acute distress] : Patient left unit in no acute distress [Medications administered as ordered and tolerated well.] : Medications administered as ordered and tolerated well. [de-identified] : BL knees and back of legs  [de-identified] : wheelchair [de-identified] : dorsal hand vein  [de-identified] : Patient presents for dose 1:3 Gammagard infusion, doing well overall. Patient denies any recent infection, antibiotic use or hospitalizations. Patient reports pain to B/L knees and back of legs rated a 5/10 on the pain scale above. Patient reports blisters to the back of B/L legs have gotten smaller and are healing. Patient reports having a few blisters between last infusion until now that were small and have healed. Patient continues to experience fatigue. Patient denies any falls. Patient pre-medicated, infusion titrated and tolerated well.

## 2024-05-15 ENCOUNTER — APPOINTMENT (OUTPATIENT)
Dept: RHEUMATOLOGY | Facility: CLINIC | Age: 74
End: 2024-05-15
Payer: MEDICARE

## 2024-05-15 VITALS
OXYGEN SATURATION: 96 % | SYSTOLIC BLOOD PRESSURE: 96 MMHG | DIASTOLIC BLOOD PRESSURE: 60 MMHG | RESPIRATION RATE: 16 BRPM | HEART RATE: 80 BPM

## 2024-05-15 VITALS
DIASTOLIC BLOOD PRESSURE: 78 MMHG | SYSTOLIC BLOOD PRESSURE: 149 MMHG | RESPIRATION RATE: 16 BRPM | HEART RATE: 90 BPM | OXYGEN SATURATION: 96 % | TEMPERATURE: 97.8 F

## 2024-05-15 PROCEDURE — 96365 THER/PROPH/DIAG IV INF INIT: CPT

## 2024-05-15 PROCEDURE — 96366 THER/PROPH/DIAG IV INF ADDON: CPT

## 2024-05-15 RX ORDER — ACETAMINOPHEN 325 MG/1
325 TABLET, FILM COATED ORAL
Qty: 0 | Refills: 0 | Status: COMPLETED
Start: 2024-05-02

## 2024-05-15 RX ORDER — CAMPHOR 0.45 %
25 GEL (GRAM) TOPICAL
Qty: 0 | Refills: 0 | Status: COMPLETED
Start: 2024-05-02

## 2024-05-15 RX ORDER — IMMUNE GLOBULIN INFUSION (HUMAN) 100 MG/ML
20 INJECTION, SOLUTION INTRAVENOUS; SUBCUTANEOUS
Qty: 0 | Refills: 0 | Status: COMPLETED
Start: 2024-05-02

## 2024-05-15 NOTE — HISTORY OF PRESENT ILLNESS
[7] : 7 [N/A] : N/A [Denies] : Denies [Yes] : Yes [Declined] : Declined [Informed consent documented in EHR.] : Informed consent documented in EHR. [24g] : 24g [Start Time: ___] : Medication Start Time: [unfilled] [End Time: ___] : Medication End Time: [unfilled] [Medication Name: ___] : Medication Name: [unfilled] [Total Amount Administered: ___] : Total Amount Administered: [unfilled] [IV discontinued. Intact. No signs or symptoms of IV complications noted. Time: ___] : IV discontinued. Intact. No signs or symptoms of IV complications noted. Time: [unfilled] [Patient  instructed to seek medical attention with signs and symptoms of adverse effects] : Patient  instructed to seek medical attention with signs and symptoms of adverse effects [Patient left unit in no acute distress] : Patient left unit in no acute distress [Medications administered as ordered and tolerated well.] : Medications administered as ordered and tolerated well. [de-identified] : both knees, legs and lower back [de-identified] : wheelchair [de-identified] : right arm cephalic vein  [de-identified] : no labs [de-identified] : Patient presents for scheduled Gammagard 2:3 monthly dose infusion, via w/c accompanied by spouse. Today, patient reports pain as rated above, states that raining weather makes her pain worst. Patient reports blisters to the back of B/L legs have gotten smaller and are healing. Patient reports having a few blisters between last infusion until now that were small and have healed. Patient continues to experience fatigue, denies any falls. No other concerns verbalized. Patient pre-medicated, infusion titrated asper protocol and tolerated well.

## 2024-05-17 ENCOUNTER — APPOINTMENT (OUTPATIENT)
Dept: RHEUMATOLOGY | Facility: CLINIC | Age: 74
End: 2024-05-17
Payer: MEDICARE

## 2024-05-17 VITALS
SYSTOLIC BLOOD PRESSURE: 131 MMHG | HEART RATE: 109 BPM | TEMPERATURE: 97.6 F | RESPIRATION RATE: 16 BRPM | OXYGEN SATURATION: 98 % | DIASTOLIC BLOOD PRESSURE: 70 MMHG

## 2024-05-17 PROCEDURE — 96366 THER/PROPH/DIAG IV INF ADDON: CPT

## 2024-05-17 PROCEDURE — 96365 THER/PROPH/DIAG IV INF INIT: CPT

## 2024-05-17 RX ORDER — IMMUNE GLOBULIN INFUSION (HUMAN) 100 MG/ML
20 INJECTION, SOLUTION INTRAVENOUS; SUBCUTANEOUS
Qty: 0 | Refills: 0 | Status: COMPLETED
Start: 2024-05-02

## 2024-05-17 RX ORDER — ACETAMINOPHEN 325 MG/1
325 TABLET, FILM COATED ORAL
Qty: 0 | Refills: 0 | Status: COMPLETED
Start: 2024-05-02

## 2024-05-17 RX ORDER — CAMPHOR 0.45 %
25 GEL (GRAM) TOPICAL
Qty: 0 | Refills: 0 | Status: COMPLETED
Start: 2024-05-02

## 2024-05-17 NOTE — HISTORY OF PRESENT ILLNESS
[8] : 8 [N/A] : N/A [Denies] : Denies [Yes] : Yes [Declined] : Declined [Informed consent documented in EHR.] : Informed consent documented in EHR. [de-identified] : wheelchair [de-identified] : both knees, legs and lower back [24g] : 24g [Start Time: ___] : Medication Start Time: [unfilled] [End Time: ___] : Medication End Time: [unfilled] [Medication Name: ___] : Medication Name: [unfilled] [Total Amount Administered: ___] : Total Amount Administered: [unfilled] [IV discontinued. Intact. No signs or symptoms of IV complications noted. Time: ___] : IV discontinued. Intact. No signs or symptoms of IV complications noted. Time: [unfilled] [Patient  instructed to seek medical attention with signs and symptoms of adverse effects] : Patient  instructed to seek medical attention with signs and symptoms of adverse effects [Patient left unit in no acute distress] : Patient left unit in no acute distress [Medications administered as ordered and tolerated well.] : Medications administered as ordered and tolerated well. [de-identified] : right arm cephalic vein  [de-identified] : no labs [de-identified] : Patient presents for scheduled Gammagard 3:3 monthly dose infusion, via w/c accompanied by spouse. Today, patient reports increased level of pain and fatigue, today pain as rated above. Otherwise reports no changes and denies any new lesions or blisters. Patient reports blisters to the back of B/L legs have gotten smaller and are healing. Continues to experience fatigue, denies any falls. No other concerns verbalized. Patient pre-medicated, infusion titrated as per protocol and tolerated well.

## 2024-06-10 ENCOUNTER — APPOINTMENT (OUTPATIENT)
Dept: RHEUMATOLOGY | Facility: CLINIC | Age: 74
End: 2024-06-10
Payer: MEDICARE

## 2024-06-10 VITALS
SYSTOLIC BLOOD PRESSURE: 123 MMHG | HEART RATE: 67 BPM | RESPIRATION RATE: 16 BRPM | TEMPERATURE: 97.6 F | DIASTOLIC BLOOD PRESSURE: 74 MMHG | OXYGEN SATURATION: 95 %

## 2024-06-10 VITALS — DIASTOLIC BLOOD PRESSURE: 64 MMHG | HEART RATE: 75 BPM | OXYGEN SATURATION: 95 % | SYSTOLIC BLOOD PRESSURE: 94 MMHG

## 2024-06-10 PROCEDURE — 96365 THER/PROPH/DIAG IV INF INIT: CPT

## 2024-06-10 PROCEDURE — 96366 THER/PROPH/DIAG IV INF ADDON: CPT

## 2024-06-10 RX ORDER — ACETAMINOPHEN 325 MG/1
325 TABLET, FILM COATED ORAL
Qty: 0 | Refills: 0 | Status: COMPLETED
Start: 2024-05-02

## 2024-06-10 RX ORDER — CAMPHOR 0.45 %
25 GEL (GRAM) TOPICAL
Qty: 0 | Refills: 0 | Status: COMPLETED
Start: 2024-05-02

## 2024-06-10 RX ORDER — IMMUNE GLOBULIN INFUSION (HUMAN) 100 MG/ML
20 INJECTION, SOLUTION INTRAVENOUS; SUBCUTANEOUS
Qty: 0 | Refills: 0 | Status: COMPLETED
Start: 2024-05-02

## 2024-06-10 NOTE — HISTORY OF PRESENT ILLNESS
[N/A] : N/A [Denies] : Denies [Yes] : Yes [Declined] : Declined [Informed consent documented in EHR.] : Informed consent documented in EHR. [24g] : 24g [Start Time: ___] : Medication Start Time: [unfilled] [End Time: ___] : Medication End Time: [unfilled] [Medication Name: ___] : Medication Name: [unfilled] [Total Amount Administered: ___] : Total Amount Administered: [unfilled] [IV discontinued. Intact. No signs or symptoms of IV complications noted. Time: ___] : IV discontinued. Intact. No signs or symptoms of IV complications noted. Time: [unfilled] [Patient  instructed to seek medical attention with signs and symptoms of adverse effects] : Patient  instructed to seek medical attention with signs and symptoms of adverse effects [Patient left unit in no acute distress] : Patient left unit in no acute distress [Medications administered as ordered and tolerated well.] : Medications administered as ordered and tolerated well. [7] : 7 [de-identified] : both knees, legs and lower back [de-identified] : wheelchair [Left upper extremity] : Left upper extremity [de-identified] : L hand [de-identified] : no labs [de-identified] : Patient presents for scheduled Gammagard 1:3 monthly dose infusion, via w/c accompanied by spouse. Patient denies any recent infections, ABX use or hospitalizations. Today, patient reports pain 7/10 in knees legs and lower back. Patient reports continued moderate fatigue. Patient reports blisters to the back of B/L legs are still reddened but healing. Patient denies any new blistered, rashes or concerns. Patient denies any falls. No other symptoms or concerns verbalized. Patient pre-medicated, infusion titrated as per protocol and tolerated well. Patient left via w/c with spouse, NAD.

## 2024-06-12 ENCOUNTER — APPOINTMENT (OUTPATIENT)
Dept: RHEUMATOLOGY | Facility: CLINIC | Age: 74
End: 2024-06-12
Payer: MEDICARE

## 2024-06-12 VITALS
HEART RATE: 88 BPM | RESPIRATION RATE: 16 BRPM | OXYGEN SATURATION: 96 % | DIASTOLIC BLOOD PRESSURE: 69 MMHG | SYSTOLIC BLOOD PRESSURE: 118 MMHG

## 2024-06-12 VITALS
SYSTOLIC BLOOD PRESSURE: 125 MMHG | DIASTOLIC BLOOD PRESSURE: 71 MMHG | TEMPERATURE: 98 F | RESPIRATION RATE: 16 BRPM | HEART RATE: 94 BPM | OXYGEN SATURATION: 95 %

## 2024-06-12 PROCEDURE — 96365 THER/PROPH/DIAG IV INF INIT: CPT

## 2024-06-12 PROCEDURE — 96366 THER/PROPH/DIAG IV INF ADDON: CPT

## 2024-06-12 RX ORDER — IMMUNE GLOBULIN INFUSION (HUMAN) 100 MG/ML
20 INJECTION, SOLUTION INTRAVENOUS; SUBCUTANEOUS
Qty: 0 | Refills: 0 | Status: COMPLETED
Start: 2024-05-02

## 2024-06-12 RX ORDER — ACETAMINOPHEN 325 MG/1
325 TABLET, FILM COATED ORAL
Qty: 0 | Refills: 0 | Status: COMPLETED
Start: 2024-05-02

## 2024-06-12 RX ORDER — CAMPHOR 0.45 %
25 GEL (GRAM) TOPICAL
Qty: 0 | Refills: 0 | Status: COMPLETED
Start: 2024-05-02

## 2024-06-12 NOTE — HISTORY OF PRESENT ILLNESS
[7] : 7 [N/A] : N/A [Denies] : Denies [Yes] : Yes [Declined] : Declined [Informed consent documented in EHR.] : Informed consent documented in EHR. [Left upper extremity] : Left upper extremity [24g] : 24g [Start Time: ___] : Medication Start Time: [unfilled] [End Time: ___] : Medication End Time: [unfilled] [Medication Name: ___] : Medication Name: [unfilled] [Total Amount Administered: ___] : Total Amount Administered: [unfilled] [IV discontinued. Intact. No signs or symptoms of IV complications noted. Time: ___] : IV discontinued. Intact. No signs or symptoms of IV complications noted. Time: [unfilled] [Patient  instructed to seek medical attention with signs and symptoms of adverse effects] : Patient  instructed to seek medical attention with signs and symptoms of adverse effects [Patient left unit in no acute distress] : Patient left unit in no acute distress [Medications administered as ordered and tolerated well.] : Medications administered as ordered and tolerated well. [de-identified] : both knees, legs and lower back [de-identified] : wheelchair [de-identified] : left arm cephalic vein [de-identified] : no labs [de-identified] : Patient presents for scheduled Gammagard 2:3 monthly dose infusion, via w/c accompanied by spouse. Patient denies any recent infections, ABX use or hospitalizations. Today, patient reports pain as rated above. Patient reports continued moderate fatigue. Patient reports blisters to the back of B/L legs are still reddened but healing.  Patient denies any new blistered or rashes. Patient reports easy bruising/ with multiple bruises and red skin discoloration to upper arms. Patient denies falls. No other symptoms or concerns verbalized. Patient pre-medicated, infusion titrated as per protocol and tolerated well. Patient left via w/c with spouse, NAD.

## 2024-06-14 ENCOUNTER — APPOINTMENT (OUTPATIENT)
Dept: RHEUMATOLOGY | Facility: CLINIC | Age: 74
End: 2024-06-14
Payer: MEDICARE

## 2024-06-14 VITALS
HEIGHT: 65 IN | TEMPERATURE: 97.9 F | RESPIRATION RATE: 16 BRPM | OXYGEN SATURATION: 93 % | SYSTOLIC BLOOD PRESSURE: 124 MMHG | WEIGHT: 272 LBS | HEART RATE: 91 BPM | BODY MASS INDEX: 45.32 KG/M2 | DIASTOLIC BLOOD PRESSURE: 70 MMHG

## 2024-06-14 PROCEDURE — 96365 THER/PROPH/DIAG IV INF INIT: CPT

## 2024-06-14 PROCEDURE — 96366 THER/PROPH/DIAG IV INF ADDON: CPT

## 2024-06-14 RX ORDER — CAMPHOR 0.45 %
25 GEL (GRAM) TOPICAL
Qty: 0 | Refills: 0 | Status: COMPLETED
Start: 2024-05-02

## 2024-06-14 RX ORDER — IMMUNE GLOBULIN INFUSION (HUMAN) 100 MG/ML
20 INJECTION, SOLUTION INTRAVENOUS; SUBCUTANEOUS
Qty: 0 | Refills: 0 | Status: COMPLETED
Start: 2024-05-02

## 2024-06-14 RX ORDER — ACETAMINOPHEN 325 MG/1
325 TABLET, FILM COATED ORAL
Qty: 0 | Refills: 0 | Status: COMPLETED
Start: 2024-05-02

## 2024-06-14 NOTE — HISTORY OF PRESENT ILLNESS
[8] : 8 [N/A] : N/A [Denies] : Denies [Yes] : Yes [Declined] : Declined [Informed consent documented in EHR.] : Informed consent documented in EHR. [de-identified] : both knees, legs and lower back [de-identified] : wheelchair [24g] : 24g [Start Time: ___] : Medication Start Time: [unfilled] [End Time: ___] : Medication End Time: [unfilled] [Medication Name: ___] : Medication Name: [unfilled] [Total Amount Administered: ___] : Total Amount Administered: [unfilled] [IV discontinued. Intact. No signs or symptoms of IV complications noted. Time: ___] : IV discontinued. Intact. No signs or symptoms of IV complications noted. Time: [unfilled] [Patient  instructed to seek medical attention with signs and symptoms of adverse effects] : Patient  instructed to seek medical attention with signs and symptoms of adverse effects [Patient left unit in no acute distress] : Patient left unit in no acute distress [Medications administered as ordered and tolerated well.] : Medications administered as ordered and tolerated well. [de-identified] : Right arm cephalic vein [de-identified] : no labs [de-identified] : Patient presents for scheduled Gammagard 3:3 monthly dose infusion, via w/c accompanied by spouse. Today, patient reports increased pain to knees and back, pain as rated above. Patient reports continued moderate fatigue. Patient reports blisters to the back of B/L legs are still reddened but healing.  Patient denies any new blistered or rashes. Patient reports easy bruising/ with multiple bruises and red skin discoloration to upper arms. Patient denies falls. No other symptoms or concerns verbalized. Patient pre-medicated, infusion titrated as per protocol and tolerated well. Patient left via w/c with spouse, NAD.

## 2024-07-04 NOTE — PATIENT PROFILE ADULT - NSPROGENPREVTRANSF_GEN_A_NUR
Pt on call light, screaming \"The bed is electrifying me! My knees, I'm in pain\". This RN reassessed pt, pt in no acute distress. Pt calm initially speaking to this RN requesting to be admitted to hospital. This RN explained there was no medical reason to admit pt. This RN explained plan for discharge and discharge medications. Pt continued to yell at this RN and stated \"why are you looking at me with such hate in your eyes? What have I ever done to you?\". Pt requesting to speak with PA again regarding plan of care, PA aware of conversation.    no

## 2024-07-08 ENCOUNTER — APPOINTMENT (OUTPATIENT)
Dept: RHEUMATOLOGY | Facility: CLINIC | Age: 74
End: 2024-07-08
Payer: MEDICARE

## 2024-07-08 VITALS
OXYGEN SATURATION: 96 % | TEMPERATURE: 97.9 F | HEART RATE: 108 BPM | RESPIRATION RATE: 16 BRPM | SYSTOLIC BLOOD PRESSURE: 96 MMHG | DIASTOLIC BLOOD PRESSURE: 69 MMHG

## 2024-07-08 VITALS — DIASTOLIC BLOOD PRESSURE: 70 MMHG | SYSTOLIC BLOOD PRESSURE: 110 MMHG | HEART RATE: 96 BPM | OXYGEN SATURATION: 98 %

## 2024-07-08 PROCEDURE — 96366 THER/PROPH/DIAG IV INF ADDON: CPT

## 2024-07-08 PROCEDURE — 96365 THER/PROPH/DIAG IV INF INIT: CPT

## 2024-07-08 RX ORDER — CAMPHOR 0.45 %
25 GEL (GRAM) TOPICAL
Qty: 0 | Refills: 0 | Status: COMPLETED
Start: 2024-05-02

## 2024-07-08 RX ORDER — IMMUNE GLOBULIN INFUSION (HUMAN) 100 MG/ML
20 INJECTION, SOLUTION INTRAVENOUS; SUBCUTANEOUS
Qty: 0 | Refills: 0 | Status: COMPLETED
Start: 2024-05-02

## 2024-07-08 RX ORDER — ACETAMINOPHEN 325 MG/1
325 TABLET, FILM COATED ORAL
Qty: 0 | Refills: 0 | Status: COMPLETED
Start: 2024-05-02

## 2024-07-10 ENCOUNTER — APPOINTMENT (OUTPATIENT)
Dept: RHEUMATOLOGY | Facility: CLINIC | Age: 74
End: 2024-07-10
Payer: MEDICARE

## 2024-07-10 VITALS
TEMPERATURE: 97.8 F | DIASTOLIC BLOOD PRESSURE: 64 MMHG | RESPIRATION RATE: 16 BRPM | OXYGEN SATURATION: 95 % | HEART RATE: 88 BPM | SYSTOLIC BLOOD PRESSURE: 99 MMHG

## 2024-07-10 VITALS — DIASTOLIC BLOOD PRESSURE: 65 MMHG | HEART RATE: 80 BPM | OXYGEN SATURATION: 96 % | SYSTOLIC BLOOD PRESSURE: 100 MMHG

## 2024-07-10 PROCEDURE — 96366 THER/PROPH/DIAG IV INF ADDON: CPT

## 2024-07-10 PROCEDURE — 96365 THER/PROPH/DIAG IV INF INIT: CPT

## 2024-07-10 RX ORDER — IMMUNE GLOBULIN INFUSION (HUMAN) 100 MG/ML
20 INJECTION, SOLUTION INTRAVENOUS; SUBCUTANEOUS
Qty: 0 | Refills: 0 | Status: COMPLETED
Start: 2024-05-02

## 2024-07-10 RX ORDER — CAMPHOR 0.45 %
25 GEL (GRAM) TOPICAL
Qty: 0 | Refills: 0 | Status: COMPLETED
Start: 2024-05-02

## 2024-07-10 RX ORDER — ACETAMINOPHEN 325 MG/1
325 TABLET, FILM COATED ORAL
Qty: 0 | Refills: 0 | Status: COMPLETED
Start: 2024-05-02

## 2024-07-12 ENCOUNTER — APPOINTMENT (OUTPATIENT)
Dept: RHEUMATOLOGY | Facility: CLINIC | Age: 74
End: 2024-07-12
Payer: MEDICARE

## 2024-07-12 VITALS
HEART RATE: 72 BPM | DIASTOLIC BLOOD PRESSURE: 67 MMHG | RESPIRATION RATE: 16 BRPM | OXYGEN SATURATION: 97 % | SYSTOLIC BLOOD PRESSURE: 105 MMHG

## 2024-07-12 VITALS
SYSTOLIC BLOOD PRESSURE: 120 MMHG | DIASTOLIC BLOOD PRESSURE: 69 MMHG | TEMPERATURE: 97.7 F | OXYGEN SATURATION: 95 % | HEART RATE: 91 BPM | RESPIRATION RATE: 16 BRPM

## 2024-07-12 PROCEDURE — 96366 THER/PROPH/DIAG IV INF ADDON: CPT

## 2024-07-12 PROCEDURE — 96365 THER/PROPH/DIAG IV INF INIT: CPT

## 2024-07-12 RX ORDER — CAMPHOR 0.45 %
25 GEL (GRAM) TOPICAL
Qty: 0 | Refills: 0 | Status: COMPLETED
Start: 2024-05-02

## 2024-07-12 RX ORDER — IMMUNE GLOBULIN INFUSION (HUMAN) 100 MG/ML
20 INJECTION, SOLUTION INTRAVENOUS; SUBCUTANEOUS
Qty: 0 | Refills: 0 | Status: COMPLETED
Start: 2024-05-02

## 2024-07-12 RX ORDER — ACETAMINOPHEN 325 MG/1
325 TABLET, FILM COATED ORAL
Qty: 0 | Refills: 0 | Status: COMPLETED
Start: 2024-05-02

## 2024-08-05 ENCOUNTER — APPOINTMENT (OUTPATIENT)
Dept: RHEUMATOLOGY | Facility: CLINIC | Age: 74
End: 2024-08-05

## 2024-08-05 PROCEDURE — 96366 THER/PROPH/DIAG IV INF ADDON: CPT

## 2024-08-05 PROCEDURE — 96365 THER/PROPH/DIAG IV INF INIT: CPT

## 2024-08-05 NOTE — HISTORY OF PRESENT ILLNESS
[6] : 6 [N/A] : N/A [Denies] : Denies [Yes] : Yes [Declined] : Declined [Left upper extremity] : Left upper extremity [24g] : 24g [Start Time: ___] : Medication Start Time: [unfilled] [End Time: ___] : Medication End Time: [unfilled] [Medication Name: ___] : Medication Name: [unfilled] [Total Amount Administered: ___] : Total Amount Administered: [unfilled] [IV discontinued. Intact. No signs or symptoms of IV complications noted. Time: ___] : IV discontinued. Intact. No signs or symptoms of IV complications noted. Time: [unfilled] [Patient  instructed to seek medical attention with signs and symptoms of adverse effects] : Patient  instructed to seek medical attention with signs and symptoms of adverse effects [Patient left unit in no acute distress] : Patient left unit in no acute distress [Medications administered as ordered and tolerated well.] : Medications administered as ordered and tolerated well. [de-identified] : knees [de-identified] : wheelchair [de-identified] : dorsal hand vein  [de-identified] : no labs [de-identified] : Patient presents for scheduled Gammagard 1:3 monthly dose infusion, via w/c accompanied by spouse. Today, patient reports having pain to knees as rated above. Patient continues to report moderate fatigue. Patient reports always having new bruises to her skin. Patient denies any new blistered or rashes. Patient continues to report easy bruising/ with multiple bruises and red skin discoloration to b/l arms and legs. Patient denies falls. No other symptoms or concerns verbalized. Patient pre-medicated, infusion titrated as per protocol and tolerated well.

## 2024-08-07 ENCOUNTER — APPOINTMENT (OUTPATIENT)
Dept: RHEUMATOLOGY | Facility: CLINIC | Age: 74
End: 2024-08-07

## 2024-08-07 PROCEDURE — 96365 THER/PROPH/DIAG IV INF INIT: CPT

## 2024-08-07 PROCEDURE — 96366 THER/PROPH/DIAG IV INF ADDON: CPT

## 2024-08-07 NOTE — HISTORY OF PRESENT ILLNESS
[9] : 9 [N/A] : N/A [Denies] : Denies [Yes] : Yes [Declined] : Declined [Informed consent documented in EHR.] : Informed consent documented in EHR. [Left upper extremity] : Left upper extremity [24g] : 24g [Start Time: ___] : Medication Start Time: [unfilled] [End Time: ___] : Medication End Time: [unfilled] [Medication Name: ___] : Medication Name: [unfilled] [Total Amount Administered: ___] : Total Amount Administered: [unfilled] [IV discontinued. Intact. No signs or symptoms of IV complications noted. Time: ___] : IV discontinued. Intact. No signs or symptoms of IV complications noted. Time: [unfilled] [Patient  instructed to seek medical attention with signs and symptoms of adverse effects] : Patient  instructed to seek medical attention with signs and symptoms of adverse effects [Patient left unit in no acute distress] : Patient left unit in no acute distress [Medications administered as ordered and tolerated well.] : Medications administered as ordered and tolerated well. [de-identified] : both knees, legs and lower back [de-identified] : wheelchair [de-identified] : Right arm cephalic vein [de-identified] : no labs [de-identified] : Patient presents for scheduled Gammagard 2:3 monthly dose infusion, via w/c accompanied by spouse. Today, patient reports increased pain to knees and back, pain as rated above. Patient reports continued moderate fatigue. Patient reports blisters to the back of B/L legs are still reddened but healing.  Patient denies any new blistered or lesions. Patient reports easy bruising/ with multiple bruises and red skin discoloration to upper arms. Patient denies falls. No other symptoms or concerns verbalized. Patient pre-medicated, infusion titrated as per protocol and tolerated well. Patient left via w/c with spouse, NAD.

## 2024-08-09 ENCOUNTER — APPOINTMENT (OUTPATIENT)
Dept: RHEUMATOLOGY | Facility: CLINIC | Age: 74
End: 2024-08-09

## 2024-08-09 PROCEDURE — 96365 THER/PROPH/DIAG IV INF INIT: CPT

## 2024-08-09 PROCEDURE — 96366 THER/PROPH/DIAG IV INF ADDON: CPT

## 2024-08-09 NOTE — HISTORY OF PRESENT ILLNESS
[7] : 7 [N/A] : N/A [Denies] : Denies [Yes] : Yes [Declined] : Declined [Informed consent documented in EHR.] : Informed consent documented in EHR. [de-identified] : both knees, legs and lower back [de-identified] : wheelchair [Left upper extremity] : Left upper extremity [24g] : 24g [Start Time: ___] : Medication Start Time: [unfilled] [End Time: ___] : Medication End Time: [unfilled] [Medication Name: ___] : Medication Name: [unfilled] [Total Amount Administered: ___] : Total Amount Administered: [unfilled] [IV discontinued. Intact. No signs or symptoms of IV complications noted. Time: ___] : IV discontinued. Intact. No signs or symptoms of IV complications noted. Time: [unfilled] [Patient  instructed to seek medical attention with signs and symptoms of adverse effects] : Patient  instructed to seek medical attention with signs and symptoms of adverse effects [Patient left unit in no acute distress] : Patient left unit in no acute distress [Medications administered as ordered and tolerated well.] : Medications administered as ordered and tolerated well. [de-identified] : Right arm cephalic vein [de-identified] : no labs [de-identified] : Patient presents for scheduled Gammagard 3:3 monthly dose infusion, via w/c accompanied by spouse. Today, patient reports no changes in symptoms from day 2 assessment and continues to reports pain to knees and back, pain as rated above. Patient reports moderate fatigue, lesions/blisters to the back of B/L legs are still reddened but healing.  Patient denies any new blistered or lesions. Patient reports easy bruising/ with multiple bruises and red skin discoloration to upper arms. Patient denies falls. No other symptoms or concerns verbalized. Patient pre-medicated, infusion titrated as per protocol and tolerated well. Patient left via w/c with spouse, NAD.

## 2024-08-29 NOTE — DIETITIAN INITIAL EVALUATION ADULT. - PROBLEM SELECTOR PLAN 6
Dapsone Counseling: I discussed with the patient the risks of dapsone including but not limited to hemolytic anemia, agranulocytosis, rashes, methemoglobinemia, kidney failure, peripheral neuropathy, headaches, GI upset, and liver toxicity.  Patients who start dapsone require monitoring including baseline LFTs and weekly CBCs for the first month, then every month thereafter.  The patient verbalized understanding of the proper use and possible adverse effects of dapsone.  All of the patient's questions and concerns were addressed. Azithromycin Counseling:  I discussed with the patient the risks of azithromycin including but not limited to GI upset, allergic reaction, drug rash, diarrhea, and yeast infections. Doxycycline Pregnancy And Lactation Text: This medication is Pregnancy Category D and not consider safe during pregnancy. It is also excreted in breast milk but is considered safe for shorter treatment courses. Spironolactone Pregnancy And Lactation Text: This medication can cause feminization of the male fetus and should be avoided during pregnancy. The active metabolite is also found in breast milk. High Dose Vitamin A Counseling: Side effects reviewed, pt to contact office should one occur. Winlevi Counseling:  I discussed with the patient the risks of topical clascoterone including but not limited to erythema, scaling, itching, and stinging. Patient voiced their understanding. Tetracycline Pregnancy And Lactation Text: This medication is Pregnancy Category D and not consider safe during pregnancy. It is also excreted in breast milk. Aklief counseling:  Patient advised to apply a pea-sized amount only at bedtime and wait 30 minutes after washing their face before applying.  If too drying, patient may add a non-comedogenic moisturizer.  The most commonly reported side effects including irritation, redness, scaling, dryness, stinging, burning, itching, and increased risk of sunburn.  The patient verbalized understanding of the proper use and possible adverse effects of retinoids.  All of the patient's questions and concerns were addressed. Bactrim Pregnancy And Lactation Text: This medication is Pregnancy Category D and is known to cause fetal risk.  It is also excreted in breast milk. Bactrim Counseling:  I discussed with the patient the risks of sulfa antibiotics including but not limited to GI upset, allergic reaction, drug rash, diarrhea, dizziness, photosensitivity, and yeast infections.  Rarely, more serious reactions can occur including but not limited to aplastic anemia, agranulocytosis, methemoglobinemia, blood dyscrasias, liver or kidney failure, lung infiltrates or desquamative/blistering drug rashes. Include Pregnancy/Lactation Warning?: No Topical Retinoid Pregnancy And Lactation Text: This medication is Pregnancy Category C. It is unknown if this medication is excreted in breast milk. Detail Level: Zone Erythromycin Pregnancy And Lactation Text: This medication is Pregnancy Category B and is considered safe during pregnancy. It is also excreted in breast milk. Tetracycline Counseling: Patient counseled regarding possible photosensitivity and increased risk for sunburn.  Patient instructed to avoid sunlight, if possible.  When exposed to sunlight, patients should wear protective clothing, sunglasses, and sunscreen.  The patient was instructed to call the office immediately if the following severe adverse effects occur:  hearing changes, easy bruising/bleeding, severe headache, or vision changes.  The patient verbalized understanding of the proper use and possible adverse effects of tetracycline.  All of the patient's questions and concerns were addressed. Patient understands to avoid pregnancy while on therapy due to potential birth defects. Doxycycline Counseling:  Patient counseled regarding possible photosensitivity and increased risk for sunburn.  Patient instructed to avoid sunlight, if possible.  When exposed to sunlight, patients should wear protective clothing, sunglasses, and sunscreen.  The patient was instructed to call the office immediately if the following severe adverse effects occur:  hearing changes, easy bruising/bleeding, severe headache, or vision changes.  The patient verbalized understanding of the proper use and possible adverse effects of doxycycline.  All of the patient's questions and concerns were addressed. Benzoyl Peroxide Pregnancy And Lactation Text: This medication is Pregnancy Category C. It is unknown if benzoyl peroxide is excreted in breast milk. Isotretinoin Counseling: Patient should get monthly blood tests, not donate blood, not drive at night if vision affected, not share medication, and not undergo elective surgery for 6 months after tx completed. Side effects reviewed, pt to contact office should one occur. Azelaic Acid Pregnancy And Lactation Text: This medication is considered safe during pregnancy and breast feeding. Benzoyl Peroxide Counseling: Patient counseled that medicine may cause skin irritation and bleach clothing.  In the event of skin irritation, the patient was advised to reduce the amount of the drug applied or use it less frequently.   The patient verbalized understanding of the proper use and possible adverse effects of benzoyl peroxide.  All of the patient's questions and concerns were addressed. Birth Control Pills Pregnancy And Lactation Text: This medication should be avoided if pregnant and for the first 30 days post-partum. High Dose Vitamin A Pregnancy And Lactation Text: High dose vitamin A therapy is contraindicated during pregnancy and breast feeding. Topical Retinoid counseling:  Patient advised to apply a pea-sized amount only at bedtime and wait 30 minutes after washing their face before applying.  If too drying, patient may add a non-comedogenic moisturizer. The patient verbalized understanding of the proper use and possible adverse effects of retinoids.  All of the patient's questions and concerns were addressed. Topical Sulfur Applications Pregnancy And Lactation Text: This medication is Pregnancy Category C and has an unknown safety profile during pregnancy. It is unknown if this topical medication is excreted in breast milk. Winlevi Pregnancy And Lactation Text: This medication is considered safe during pregnancy and breastfeeding. Sarecycline Counseling: Patient advised regarding possible photosensitivity and discoloration of the teeth, skin, lips, tongue and gums.  Patient instructed to avoid sunlight, if possible.  When exposed to sunlight, patients should wear protective clothing, sunglasses, and sunscreen.  The patient was instructed to call the office immediately if the following severe adverse effects occur:  hearing changes, easy bruising/bleeding, severe headache, or vision changes.  The patient verbalized understanding of the proper use and possible adverse effects of sarecycline.  All of the patient's questions and concerns were addressed. Aklief Pregnancy And Lactation Text: It is unknown if this medication is safe to use during pregnancy.  It is unknown if this medication is excreted in breast milk.  Breastfeeding women should use the topical cream on the smallest area of the skin for the shortest time needed while breastfeeding.  Do not apply to nipple and areola. Spironolactone Counseling: Patient advised regarding risks of diarrhea, abdominal pain, hyperkalemia, birth defects (for female patients), liver toxicity and renal toxicity. The patient may need blood work to monitor liver and kidney function and potassium levels while on therapy. The patient verbalized understanding of the proper use and possible adverse effects of spironolactone.  All of the patient's questions and concerns were addressed. Tazorac Counseling:  Patient advised that medication is irritating and drying.  Patient may need to apply sparingly and wash off after an hour before eventually leaving it on overnight.  The patient verbalized understanding of the proper use and possible adverse effects of tazorac.  All of the patient's questions and concerns were addressed. Erythromycin Counseling:  I discussed with the patient the risks of erythromycin including but not limited to GI upset, allergic reaction, drug rash, diarrhea, increase in liver enzymes, and yeast infections. Birth Control Pills Counseling: Birth Control Pill Counseling: I discussed with the patient the potential side effects of OCPs including but not limited to increased risk of stroke, heart attack, thrombophlebitis, deep venous thrombosis, hepatic adenomas, breast changes, GI upset, headaches, and depression.  The patient verbalized understanding of the proper use and possible adverse effects of OCPs. All of the patient's questions and concerns were addressed. Tazorac Pregnancy And Lactation Text: This medication is not safe during pregnancy. It is unknown if this medication is excreted in breast milk. Azelaic Acid Counseling: Patient counseled that medicine may cause skin irritation and to avoid applying near the eyes.  In the event of skin irritation, the patient was advised to reduce the amount of the drug applied or use it less frequently.   The patient verbalized understanding of the proper use and possible adverse effects of azelaic acid.  All of the patient's questions and concerns were addressed. Dapsone Pregnancy And Lactation Text: This medication is Pregnancy Category C and is not considered safe during pregnancy or breast feeding. Topical Clindamycin Pregnancy And Lactation Text: This medication is Pregnancy Category B and is considered safe during pregnancy. It is unknown if it is excreted in breast milk. Azithromycin Pregnancy And Lactation Text: This medication is considered safe during pregnancy and is also secreted in breast milk. Topical Clindamycin Counseling: Patient counseled that this medication may cause skin irritation or allergic reactions.  In the event of skin irritation, the patient was advised to reduce the amount of the drug applied or use it less frequently.   The patient verbalized understanding of the proper use and possible adverse effects of clindamycin.  All of the patient's questions and concerns were addressed. Topical Sulfur Applications Counseling: Topical Sulfur Counseling: Patient counseled that this medication may cause skin irritation or allergic reactions.  In the event of skin irritation, the patient was advised to reduce the amount of the drug applied or use it less frequently.   The patient verbalized understanding of the proper use and possible adverse effects of topical sulfur application.  All of the patient's questions and concerns were addressed. Isotretinoin Pregnancy And Lactation Text: This medication is Pregnancy Category X and is considered extremely dangerous during pregnancy. It is unknown if it is excreted in breast milk. Minocycline Counseling: Patient advised regarding possible photosensitivity and discoloration of the teeth, skin, lips, tongue and gums.  Patient instructed to avoid sunlight, if possible.  When exposed to sunlight, patients should wear protective clothing, sunglasses, and sunscreen.  The patient was instructed to call the office immediately if the following severe adverse effects occur:  hearing changes, easy bruising/bleeding, severe headache, or vision changes.  The patient verbalized understanding of the proper use and possible adverse effects of minocycline.  All of the patient's questions and concerns were addressed. Transitions of Care Status:  1.  Name of PCP:     Olivia Ross MD (PCP) [phone]  2.  PCP Contacted on Admission: [ ] Y    [x ] N    3.  PCP contacted at Discharge: [ ] Y    [ ] N    [ ] N/A  4.  Post-Discharge Appointment Date and Location:  5.  Summary of Handoff given to PCP:

## 2024-09-02 ENCOUNTER — NON-APPOINTMENT (OUTPATIENT)
Age: 74
End: 2024-09-02

## 2024-09-03 ENCOUNTER — APPOINTMENT (OUTPATIENT)
Dept: RHEUMATOLOGY | Facility: CLINIC | Age: 74
End: 2024-09-03
Payer: MEDICARE

## 2024-09-03 VITALS
DIASTOLIC BLOOD PRESSURE: 67 MMHG | OXYGEN SATURATION: 96 % | RESPIRATION RATE: 16 BRPM | HEART RATE: 81 BPM | SYSTOLIC BLOOD PRESSURE: 112 MMHG

## 2024-09-03 VITALS
DIASTOLIC BLOOD PRESSURE: 62 MMHG | TEMPERATURE: 98 F | RESPIRATION RATE: 16 BRPM | SYSTOLIC BLOOD PRESSURE: 119 MMHG | HEART RATE: 94 BPM | OXYGEN SATURATION: 97 %

## 2024-09-03 PROCEDURE — 96366 THER/PROPH/DIAG IV INF ADDON: CPT

## 2024-09-03 PROCEDURE — 96365 THER/PROPH/DIAG IV INF INIT: CPT

## 2024-09-03 RX ORDER — ACETAMINOPHEN 325 MG/1
325 TABLET ORAL
Qty: 0 | Refills: 0 | Status: COMPLETED
Start: 2024-05-02

## 2024-09-03 RX ORDER — CAMPHOR 0.45 %
25 GEL (GRAM) TOPICAL
Qty: 0 | Refills: 0 | Status: COMPLETED
Start: 2024-05-02

## 2024-09-03 RX ORDER — IMMUNE GLOBULIN INFUSION (HUMAN) 100 MG/ML
20 INJECTION, SOLUTION INTRAVENOUS; SUBCUTANEOUS
Qty: 0 | Refills: 0 | Status: COMPLETED
Start: 2024-05-02

## 2024-09-03 NOTE — HISTORY OF PRESENT ILLNESS
[6] : 6 [N/A] : N/A [Denies] : Denies [Yes] : Yes [Informed consent documented in EHR.] : Informed consent documented in EHR. [Left upper extremity] : Left upper extremity [24g] : 24g [Start Time: ___] : Medication Start Time: [unfilled] [End Time: ___] : Medication End Time: [unfilled] [Medication Name: ___] : Medication Name: [unfilled] [Total Amount Administered: ___] : Total Amount Administered: [unfilled] [IV discontinued. Intact. No signs or symptoms of IV complications noted. Time: ___] : IV discontinued. Intact. No signs or symptoms of IV complications noted. Time: [unfilled] [Patient  instructed to seek medical attention with signs and symptoms of adverse effects] : Patient  instructed to seek medical attention with signs and symptoms of adverse effects [Patient left unit in no acute distress] : Patient left unit in no acute distress [Medications administered as ordered and tolerated well.] : Medications administered as ordered and tolerated well. [de-identified] : Lower back and knees [de-identified] : wheelchair [de-identified] : left arm cephalic vein  [de-identified] : no labs [de-identified] : Patient presents for scheduled Gammagard 1:3 monthly dose infusion, via w/c accompanied by spouse. Today, patient reports having pain to lower back and knees as rated above. Patient continues to report moderate fatigue. Patient reports always having new bruises to her skin. Patient reports having few new small blisters / lesions to her left leg. Patient continues to report easy bruising/ with multiple bruises and red skin discoloration to b/l arms and legs. Patient denies recent falls. No other symptoms or concerns verbalized. Patient pre-medicated, infusion titrated as per protocol and tolerated well.

## 2024-09-04 ENCOUNTER — APPOINTMENT (OUTPATIENT)
Dept: RHEUMATOLOGY | Facility: CLINIC | Age: 74
End: 2024-09-04
Payer: MEDICARE

## 2024-09-04 VITALS
TEMPERATURE: 97.5 F | RESPIRATION RATE: 16 BRPM | DIASTOLIC BLOOD PRESSURE: 70 MMHG | HEART RATE: 95 BPM | OXYGEN SATURATION: 96 % | SYSTOLIC BLOOD PRESSURE: 106 MMHG

## 2024-09-04 VITALS
HEART RATE: 82 BPM | OXYGEN SATURATION: 95 % | DIASTOLIC BLOOD PRESSURE: 61 MMHG | RESPIRATION RATE: 16 BRPM | SYSTOLIC BLOOD PRESSURE: 97 MMHG

## 2024-09-04 PROCEDURE — 96366 THER/PROPH/DIAG IV INF ADDON: CPT

## 2024-09-04 PROCEDURE — 96365 THER/PROPH/DIAG IV INF INIT: CPT

## 2024-09-04 RX ORDER — ACETAMINOPHEN 325 MG/1
325 TABLET ORAL
Qty: 0 | Refills: 0 | Status: COMPLETED
Start: 2024-05-02

## 2024-09-04 RX ORDER — IMMUNE GLOBULIN INFUSION (HUMAN) 100 MG/ML
20 INJECTION, SOLUTION INTRAVENOUS; SUBCUTANEOUS
Qty: 0 | Refills: 0 | Status: COMPLETED
Start: 2024-05-02

## 2024-09-04 RX ORDER — CAMPHOR 0.45 %
25 GEL (GRAM) TOPICAL
Qty: 0 | Refills: 0 | Status: COMPLETED
Start: 2024-05-02

## 2024-09-04 NOTE — HISTORY OF PRESENT ILLNESS
[N/A] : N/A [Denies] : Denies [Yes] : Yes [Declined] : Declined [Right upper extremity] : Right upper extremity [24g] : 24g [Start Time: ___] : Medication Start Time: [unfilled] [End Time: ___] : Medication End Time: [unfilled] [Medication Name: ___] : Medication Name: [unfilled] [Total Amount Administered: ___] : Total Amount Administered: [unfilled] [IV discontinued. Intact. No signs or symptoms of IV complications noted. Time: ___] : IV discontinued. Intact. No signs or symptoms of IV complications noted. Time: [unfilled] [Patient  instructed to seek medical attention with signs and symptoms of adverse effects] : Patient  instructed to seek medical attention with signs and symptoms of adverse effects [Patient left unit in no acute distress] : Patient left unit in no acute distress [Medications administered as ordered and tolerated well.] : Medications administered as ordered and tolerated well. [de-identified] : 7-8 [de-identified] : Lower back and knees [de-identified] : wheelchair [de-identified] : dorsal hand vein [de-identified] : no labs [de-identified] : Patient presents for Gammagard 2:3 monthly dose infusion, via w/c accompanied by spouse. No changes since yesterday. Patient reports having pain to lower back and knees as rated above. Patient continues to report moderate fatigue. Patient reports always having new bruises to her skin. Patient reports having few new small blisters / lesions to her left leg. Patient continues to report easy bruising/ with multiple bruises and red skin discoloration to b/l arms and legs. Patient denies recent falls. No other symptoms or concerns verbalized. Patient pre-medicated, infusion titrated as per protocol and tolerated well.

## 2024-09-06 ENCOUNTER — APPOINTMENT (OUTPATIENT)
Dept: RHEUMATOLOGY | Facility: CLINIC | Age: 74
End: 2024-09-06
Payer: MEDICARE

## 2024-09-06 VITALS
SYSTOLIC BLOOD PRESSURE: 115 MMHG | DIASTOLIC BLOOD PRESSURE: 67 MMHG | RESPIRATION RATE: 16 BRPM | OXYGEN SATURATION: 96 % | TEMPERATURE: 97.3 F | HEART RATE: 98 BPM

## 2024-09-06 VITALS — OXYGEN SATURATION: 98 % | HEART RATE: 89 BPM | SYSTOLIC BLOOD PRESSURE: 110 MMHG | DIASTOLIC BLOOD PRESSURE: 68 MMHG

## 2024-09-06 PROCEDURE — 96365 THER/PROPH/DIAG IV INF INIT: CPT

## 2024-09-06 PROCEDURE — 96366 THER/PROPH/DIAG IV INF ADDON: CPT

## 2024-09-06 RX ORDER — CAMPHOR 0.45 %
25 GEL (GRAM) TOPICAL
Qty: 0 | Refills: 0 | Status: COMPLETED
Start: 2024-05-02

## 2024-09-06 RX ORDER — IMMUNE GLOBULIN INFUSION (HUMAN) 100 MG/ML
20 INJECTION, SOLUTION INTRAVENOUS; SUBCUTANEOUS
Qty: 0 | Refills: 0 | Status: COMPLETED
Start: 2024-05-02

## 2024-09-06 RX ORDER — ACETAMINOPHEN 325 MG/1
325 TABLET ORAL
Qty: 0 | Refills: 0 | Status: COMPLETED
Start: 2024-05-02

## 2024-09-06 NOTE — HISTORY OF PRESENT ILLNESS
[N/A] : N/A [Denies] : Denies [Yes] : Yes [Declined] : Declined [Right upper extremity] : Right upper extremity [24g] : 24g [Start Time: ___] : Medication Start Time: [unfilled] [End Time: ___] : Medication End Time: [unfilled] [Medication Name: ___] : Medication Name: [unfilled] [Total Amount Administered: ___] : Total Amount Administered: [unfilled] [IV discontinued. Intact. No signs or symptoms of IV complications noted. Time: ___] : IV discontinued. Intact. No signs or symptoms of IV complications noted. Time: [unfilled] [Patient  instructed to seek medical attention with signs and symptoms of adverse effects] : Patient  instructed to seek medical attention with signs and symptoms of adverse effects [Patient left unit in no acute distress] : Patient left unit in no acute distress [Medications administered as ordered and tolerated well.] : Medications administered as ordered and tolerated well. [de-identified] : 6 [de-identified] : knees [de-identified] : wheelchair [de-identified] : dorsal hand vein [de-identified] : no labs [de-identified] : Patient presents for Gammagard 3:3 monthly dose infusion, via w/c accompanied by spouse. No changes since last infusion. Patient reports having pain to knees as rated above. Patient continues to report moderate fatigue. Patient reports always having new bruises to her skin. Patient reports having few new small blisters / lesions to her left leg. Patient continues to report easy bruising/ with multiple bruises and red skin discoloration to b/l arms and legs. Patient denies recent falls. No other symptoms or concerns verbalized. Patient pre-medicated, infusion titrated as per protocol and tolerated well.

## 2024-09-17 DIAGNOSIS — R60.1 GENERALIZED EDEMA: ICD-10-CM

## 2024-09-30 ENCOUNTER — APPOINTMENT (OUTPATIENT)
Dept: RHEUMATOLOGY | Facility: CLINIC | Age: 74
End: 2024-09-30
Payer: MEDICARE

## 2024-09-30 VITALS
DIASTOLIC BLOOD PRESSURE: 73 MMHG | SYSTOLIC BLOOD PRESSURE: 114 MMHG | OXYGEN SATURATION: 96 % | RESPIRATION RATE: 16 BRPM | HEART RATE: 107 BPM | TEMPERATURE: 97.2 F

## 2024-09-30 VITALS — HEART RATE: 80 BPM | OXYGEN SATURATION: 97 % | DIASTOLIC BLOOD PRESSURE: 80 MMHG | SYSTOLIC BLOOD PRESSURE: 96 MMHG

## 2024-09-30 PROCEDURE — 96366 THER/PROPH/DIAG IV INF ADDON: CPT

## 2024-09-30 PROCEDURE — 90662 IIV NO PRSV INCREASED AG IM: CPT

## 2024-09-30 PROCEDURE — 96365 THER/PROPH/DIAG IV INF INIT: CPT

## 2024-09-30 PROCEDURE — G0008: CPT

## 2024-09-30 RX ORDER — IMMUNE GLOBULIN INFUSION (HUMAN) 100 MG/ML
20 INJECTION, SOLUTION INTRAVENOUS; SUBCUTANEOUS
Qty: 0 | Refills: 0 | Status: COMPLETED
Start: 2024-05-02

## 2024-09-30 RX ORDER — ACETAMINOPHEN 325 MG/1
325 TABLET ORAL
Qty: 0 | Refills: 0 | Status: COMPLETED
Start: 2024-05-02

## 2024-09-30 RX ORDER — CAMPHOR 0.45 %
25 GEL (GRAM) TOPICAL
Qty: 0 | Refills: 0 | Status: COMPLETED
Start: 2024-05-02

## 2024-09-30 NOTE — HISTORY OF PRESENT ILLNESS
[N/A] : N/A [Denies] : Denies [Yes] : Yes [Declined] : Declined [de-identified] : 6 [de-identified] : knees [de-identified] : bronchitis 2 weeks ago [de-identified] : wheelchair [Left upper extremity] : Left upper extremity [24g] : 24g [Start Time: ___] : Medication Start Time: [unfilled] [End Time: ___] : Medication End Time: [unfilled] [Medication Name: ___] : Medication Name: [unfilled] [Total Amount Administered: ___] : Total Amount Administered: [unfilled] [IV discontinued. Intact. No signs or symptoms of IV complications noted. Time: ___] : IV discontinued. Intact. No signs or symptoms of IV complications noted. Time: [unfilled] [Patient  instructed to seek medical attention with signs and symptoms of adverse effects] : Patient  instructed to seek medical attention with signs and symptoms of adverse effects [Patient left unit in no acute distress] : Patient left unit in no acute distress [Medications administered as ordered and tolerated well.] : Medications administered as ordered and tolerated well. [de-identified] : forearm [de-identified] : no labs Fluzone High-Dose vaccine administered to left deltoid, tolerated without immediate AE's [de-identified] : Patient presents for Gammagard 1:3 monthly dose infusion, via w/c accompanied by spouse. No changes since last infusion. Patient reports having pain to knees as rated above. Patient continues to report moderate fatigue. Patient reports always having new bruises to her skin. Patient reports having few new small blisters / lesions to her left leg. Patient continues to report easy bruising/ with multiple bruises and red skin discoloration to b/l arms and legs. Patient denies recent falls. Patient reports bronchitis 2 weeks prior. Admits to completing PO antibiotics, today with residual nasal congestion and mild dry cough. Patient planned for follow up visit with PMD tomorrow. No other symptoms or concerns verbalized. Patient pre-medicated, infusion titrated as per protocol and tolerated well.

## 2024-10-02 ENCOUNTER — APPOINTMENT (OUTPATIENT)
Dept: RHEUMATOLOGY | Facility: CLINIC | Age: 74
End: 2024-10-02
Payer: MEDICARE

## 2024-10-02 VITALS
DIASTOLIC BLOOD PRESSURE: 52 MMHG | HEART RATE: 90 BPM | TEMPERATURE: 97.8 F | SYSTOLIC BLOOD PRESSURE: 96 MMHG | RESPIRATION RATE: 16 BRPM | OXYGEN SATURATION: 97 %

## 2024-10-02 VITALS
DIASTOLIC BLOOD PRESSURE: 59 MMHG | HEART RATE: 85 BPM | RESPIRATION RATE: 16 BRPM | OXYGEN SATURATION: 97 % | SYSTOLIC BLOOD PRESSURE: 93 MMHG

## 2024-10-02 PROCEDURE — 96365 THER/PROPH/DIAG IV INF INIT: CPT

## 2024-10-02 PROCEDURE — 96366 THER/PROPH/DIAG IV INF ADDON: CPT

## 2024-10-02 RX ORDER — CAMPHOR 0.45 %
25 GEL (GRAM) TOPICAL
Qty: 0 | Refills: 0 | Status: COMPLETED
Start: 2024-05-02

## 2024-10-02 RX ORDER — ACETAMINOPHEN 325 MG/1
325 TABLET ORAL
Qty: 0 | Refills: 0 | Status: COMPLETED
Start: 2024-05-02

## 2024-10-02 RX ORDER — IMMUNE GLOBULIN INFUSION (HUMAN) 100 MG/ML
20 INJECTION, SOLUTION INTRAVENOUS; SUBCUTANEOUS
Qty: 0 | Refills: 0 | Status: COMPLETED
Start: 2024-05-02

## 2024-10-02 NOTE — HISTORY OF PRESENT ILLNESS
[6] : 6 [N/A] : N/A [Denies] : Denies [Yes] : Yes [Declined] : Declined [Informed consent documented in EHR.] : Informed consent documented in EHR. [24g] : 24g [Start Time: ___] : Medication Start Time: [unfilled] [End Time: ___] : Medication End Time: [unfilled] [Medication Name: ___] : Medication Name: [unfilled] [Total Amount Administered: ___] : Total Amount Administered: [unfilled] [IV discontinued. Intact. No signs or symptoms of IV complications noted. Time: ___] : IV discontinued. Intact. No signs or symptoms of IV complications noted. Time: [unfilled] [Patient  instructed to seek medical attention with signs and symptoms of adverse effects] : Patient  instructed to seek medical attention with signs and symptoms of adverse effects [Patient left unit in no acute distress] : Patient left unit in no acute distress [Medications administered as ordered and tolerated well.] : Medications administered as ordered and tolerated well. [de-identified] : 6 [de-identified] : knees  and lower back pain [de-identified] : bronchitis 2 week ago [de-identified] : wheelchair [de-identified] : left arm cephalic vein  [de-identified] : no labs  [de-identified] : Patient presents for scheduled Gammagard 2:3 monthly dose infusion, via w/c accompanied by spouse. Today, patient reports having pain as rated above. Patient continues to report moderate fatigue. Patient reports having few small healing blisters / lesions to her left leg. Patient continues to report easy bruising/ with multiple bruises and red skin discoloration to b/l arms and legs. Patient denies recent falls. Patient reports bronchitis 2 weeks prior. Admits to completing PO antibiotics, today with residual nasal congestion and mild dry cough. No other symptoms or concerns verbalized. Patient pre-medicated, infusion titrated as per protocol and tolerated well.

## 2024-10-04 ENCOUNTER — APPOINTMENT (OUTPATIENT)
Dept: RHEUMATOLOGY | Facility: CLINIC | Age: 74
End: 2024-10-04
Payer: MEDICARE

## 2024-10-04 VITALS
DIASTOLIC BLOOD PRESSURE: 69 MMHG | RESPIRATION RATE: 16 BRPM | SYSTOLIC BLOOD PRESSURE: 107 MMHG | HEART RATE: 109 BPM | OXYGEN SATURATION: 98 % | TEMPERATURE: 97.8 F

## 2024-10-04 PROCEDURE — 96366 THER/PROPH/DIAG IV INF ADDON: CPT

## 2024-10-04 PROCEDURE — 96365 THER/PROPH/DIAG IV INF INIT: CPT

## 2024-10-04 RX ORDER — ACETAMINOPHEN 325 MG/1
325 TABLET ORAL
Qty: 0 | Refills: 0 | Status: COMPLETED
Start: 2024-05-02

## 2024-10-04 RX ORDER — IMMUNE GLOBULIN INFUSION (HUMAN) 100 MG/ML
20 INJECTION, SOLUTION INTRAVENOUS; SUBCUTANEOUS
Qty: 0 | Refills: 0 | Status: COMPLETED
Start: 2024-05-02

## 2024-10-04 RX ORDER — CAMPHOR 0.45 %
25 GEL (GRAM) TOPICAL
Qty: 0 | Refills: 0 | Status: COMPLETED
Start: 2024-05-02

## 2024-10-04 NOTE — HISTORY OF PRESENT ILLNESS
[8] : 8 [N/A] : N/A [Denies] : Denies [Yes] : Yes [Declined] : Declined [Informed consent documented in EHR.] : Informed consent documented in EHR. [de-identified] : 6 [de-identified] : bronchitis 2 week ago [de-identified] : knees (8)  and lower back (6) pain [de-identified] : wheelchair [24g] : 24g [Start Time: ___] : Medication Start Time: [unfilled] [End Time: ___] : Medication End Time: [unfilled] [Medication Name: ___] : Medication Name: [unfilled] [Total Amount Administered: ___] : Total Amount Administered: [unfilled] [IV discontinued. Intact. No signs or symptoms of IV complications noted. Time: ___] : IV discontinued. Intact. No signs or symptoms of IV complications noted. Time: [unfilled] [Patient  instructed to seek medical attention with signs and symptoms of adverse effects] : Patient  instructed to seek medical attention with signs and symptoms of adverse effects [Patient left unit in no acute distress] : Patient left unit in no acute distress [Medications administered as ordered and tolerated well.] : Medications administered as ordered and tolerated well. [de-identified] : no labs  [de-identified] : left arm cephalic vein  [de-identified] : Patient presents for scheduled Gammagard 3:3 monthly dose infusion, via w/c accompanied by spouse. Today, patient reports having pain as rated above, otherwise reports no changes in symptoms.  Patient continues to report moderate fatigue. Patient reports having few small healing blisters / lesions to her left leg. Patient continues to report easy bruising/ with multiple bruises and red skin discoloration to b/l arms and legs. Patient denies recent falls. Patient reports bronchitis 2 weeks prior. Admits to completing PO antibiotics, today with residual nasal congestion and mild dry cough. No other symptoms or concerns verbalized. Patient pre-medicated, infusion titrated as per protocol and tolerated well.

## 2024-10-10 NOTE — PATIENT PROFILE ADULT. - HEALTH/HEALTHCARE ANXIETIES, PROFILE
Expiration Date (Optional): 03/2027 Syringe Size Used (Required For Enhanced Ndc): 150 mg/1ml prefilled syringe Consent: The risks of pain and injection site reactions were reviewed with the patient prior to the injection. Detail Level: None Treatment Number (Optional): 1 Adbry Amount: 600 mg J-Code:  Use Enhanced Ndc?: Yes Was The Medication Purchased By The Clinic?: No Date Of Next Injection: 2 Weeks Lot # (Optional): [id number] Ndc (150 Mg Prefilled Syringe): [id number] none

## 2024-10-22 RX ORDER — DIPHENHYDRAMINE HCL 25 MG/1
25 TABLET ORAL
Refills: 0 | Status: ACTIVE | OUTPATIENT
Start: 2024-10-22 | End: 1900-01-01

## 2024-10-22 RX ORDER — ACETAMINOPHEN 325 MG/1
325 TABLET ORAL
Refills: 0 | Status: ACTIVE | OUTPATIENT
Start: 2024-10-22 | End: 1900-01-01

## 2024-10-22 RX ORDER — IMMUNE GLOBULIN INFUSION (HUMAN) 100 MG/ML
20 INJECTION, SOLUTION INTRAVENOUS; SUBCUTANEOUS
Refills: 0 | Status: ACTIVE | OUTPATIENT
Start: 2024-10-22 | End: 1900-01-01

## 2024-10-28 ENCOUNTER — APPOINTMENT (OUTPATIENT)
Dept: RHEUMATOLOGY | Facility: CLINIC | Age: 74
End: 2024-10-28
Payer: MEDICARE

## 2024-10-28 ENCOUNTER — INPATIENT (INPATIENT)
Facility: HOSPITAL | Age: 74
LOS: 16 days | Discharge: NURSING, REHAB OR LONG-TERM CARE | DRG: 266 | End: 2024-11-14
Attending: STUDENT IN AN ORGANIZED HEALTH CARE EDUCATION/TRAINING PROGRAM | Admitting: STUDENT IN AN ORGANIZED HEALTH CARE EDUCATION/TRAINING PROGRAM
Payer: MEDICARE

## 2024-10-28 VITALS
DIASTOLIC BLOOD PRESSURE: 92 MMHG | OXYGEN SATURATION: 96 % | RESPIRATION RATE: 20 BRPM | HEART RATE: 105 BPM | SYSTOLIC BLOOD PRESSURE: 113 MMHG

## 2024-10-28 VITALS — DIASTOLIC BLOOD PRESSURE: 49 MMHG | SYSTOLIC BLOOD PRESSURE: 72 MMHG

## 2024-10-28 VITALS
OXYGEN SATURATION: 99 % | SYSTOLIC BLOOD PRESSURE: 102 MMHG | RESPIRATION RATE: 16 BRPM | DIASTOLIC BLOOD PRESSURE: 66 MMHG | HEART RATE: 93 BPM

## 2024-10-28 VITALS
DIASTOLIC BLOOD PRESSURE: 59 MMHG | OXYGEN SATURATION: 96 % | TEMPERATURE: 97.2 F | RESPIRATION RATE: 16 BRPM | HEART RATE: 108 BPM | SYSTOLIC BLOOD PRESSURE: 95 MMHG

## 2024-10-28 VITALS — SYSTOLIC BLOOD PRESSURE: 83 MMHG | DIASTOLIC BLOOD PRESSURE: 64 MMHG

## 2024-10-28 VITALS — DIASTOLIC BLOOD PRESSURE: 59 MMHG | SYSTOLIC BLOOD PRESSURE: 91 MMHG

## 2024-10-28 DIAGNOSIS — Z98.89 OTHER SPECIFIED POSTPROCEDURAL STATES: Chronic | ICD-10-CM

## 2024-10-28 DIAGNOSIS — Z90.710 ACQUIRED ABSENCE OF BOTH CERVIX AND UTERUS: Chronic | ICD-10-CM

## 2024-10-28 DIAGNOSIS — Z90.722 ACQUIRED ABSENCE OF OVARIES, BILATERAL: Chronic | ICD-10-CM

## 2024-10-28 DIAGNOSIS — R55 SYNCOPE AND COLLAPSE: ICD-10-CM

## 2024-10-28 DIAGNOSIS — D64.9 ANEMIA, UNSPECIFIED: ICD-10-CM

## 2024-10-28 DIAGNOSIS — C54.1 MALIGNANT NEOPLASM OF ENDOMETRIUM: Chronic | ICD-10-CM

## 2024-10-28 DIAGNOSIS — R73.9 HYPERGLYCEMIA, UNSPECIFIED: ICD-10-CM

## 2024-10-28 LAB
ALBUMIN SERPL ELPH-MCNC: 3.3 G/DL — SIGNIFICANT CHANGE UP (ref 3.3–5)
ALP SERPL-CCNC: 89 U/L — SIGNIFICANT CHANGE UP (ref 40–120)
ALT FLD-CCNC: 29 U/L — SIGNIFICANT CHANGE UP (ref 10–45)
ANION GAP SERPL CALC-SCNC: 18 MMOL/L — HIGH (ref 5–17)
ANISOCYTOSIS BLD QL: SIGNIFICANT CHANGE UP
APTT BLD: 25.2 SEC — SIGNIFICANT CHANGE UP (ref 24.5–35.6)
AST SERPL-CCNC: 39 U/L — SIGNIFICANT CHANGE UP (ref 10–40)
BASOPHILS # BLD AUTO: 0.11 K/UL — SIGNIFICANT CHANGE UP (ref 0–0.2)
BASOPHILS NFR BLD AUTO: 0.9 % — SIGNIFICANT CHANGE UP (ref 0–2)
BILIRUB SERPL-MCNC: 0.2 MG/DL — SIGNIFICANT CHANGE UP (ref 0.2–1.2)
BLD GP AB SCN SERPL QL: NEGATIVE — SIGNIFICANT CHANGE UP
BUN SERPL-MCNC: 45 MG/DL — HIGH (ref 7–23)
CALCIUM SERPL-MCNC: 8.3 MG/DL — LOW (ref 8.4–10.5)
CHLORIDE SERPL-SCNC: 99 MMOL/L — SIGNIFICANT CHANGE UP (ref 96–108)
CK MB CFR SERPL CALC: 1.8 NG/ML — SIGNIFICANT CHANGE UP (ref 0–3.8)
CK SERPL-CCNC: 36 U/L — SIGNIFICANT CHANGE UP (ref 25–170)
CO2 SERPL-SCNC: 15 MMOL/L — LOW (ref 22–31)
CREAT SERPL-MCNC: 0.89 MG/DL — SIGNIFICANT CHANGE UP (ref 0.5–1.3)
DACRYOCYTES BLD QL SMEAR: SLIGHT — SIGNIFICANT CHANGE UP
EGFR: 69 ML/MIN/1.73M2 — SIGNIFICANT CHANGE UP
ELLIPTOCYTES BLD QL SMEAR: SLIGHT — SIGNIFICANT CHANGE UP
EOSINOPHIL # BLD AUTO: 0 K/UL — SIGNIFICANT CHANGE UP (ref 0–0.5)
EOSINOPHIL NFR BLD AUTO: 0 % — SIGNIFICANT CHANGE UP (ref 0–6)
GAS PNL BLDV: SIGNIFICANT CHANGE UP
GIANT PLATELETS BLD QL SMEAR: PRESENT — SIGNIFICANT CHANGE UP
GLUCOSE SERPL-MCNC: 300 MG/DL — HIGH (ref 70–99)
HCT VFR BLD CALC: 18.2 % — CRITICAL LOW (ref 34.5–45)
HGB BLD-MCNC: 5.1 G/DL — CRITICAL LOW (ref 11.5–15.5)
HYPOCHROMIA BLD QL: SLIGHT — SIGNIFICANT CHANGE UP
INR BLD: 1.02 RATIO — SIGNIFICANT CHANGE UP (ref 0.85–1.16)
LYMPHOCYTES # BLD AUTO: 0.53 K/UL — LOW (ref 1–3.3)
LYMPHOCYTES # BLD AUTO: 4.4 % — LOW (ref 13–44)
MACROCYTES BLD QL: SIGNIFICANT CHANGE UP
MAGNESIUM SERPL-MCNC: 1.8 MG/DL — SIGNIFICANT CHANGE UP (ref 1.6–2.6)
MANUAL SMEAR VERIFICATION: SIGNIFICANT CHANGE UP
MCHC RBC-ENTMCNC: 26.7 PG — LOW (ref 27–34)
MCHC RBC-ENTMCNC: 28 GM/DL — LOW (ref 32–36)
MCV RBC AUTO: 95.3 FL — SIGNIFICANT CHANGE UP (ref 80–100)
MICROCYTES BLD QL: SLIGHT — SIGNIFICANT CHANGE UP
MONOCYTES # BLD AUTO: 0.53 K/UL — SIGNIFICANT CHANGE UP (ref 0–0.9)
MONOCYTES NFR BLD AUTO: 4.4 % — SIGNIFICANT CHANGE UP (ref 2–14)
NEUTROPHILS # BLD AUTO: 10.97 K/UL — HIGH (ref 1.8–7.4)
NEUTROPHILS NFR BLD AUTO: 90.3 % — HIGH (ref 43–77)
NT-PROBNP SERPL-SCNC: 2369 PG/ML — HIGH (ref 0–300)
OVALOCYTES BLD QL SMEAR: SLIGHT — SIGNIFICANT CHANGE UP
PLAT MORPH BLD: ABNORMAL
PLATELET # BLD AUTO: 512 K/UL — HIGH (ref 150–400)
POIKILOCYTOSIS BLD QL AUTO: SLIGHT — SIGNIFICANT CHANGE UP
POLYCHROMASIA BLD QL SMEAR: SIGNIFICANT CHANGE UP
POTASSIUM SERPL-MCNC: 4.6 MMOL/L — SIGNIFICANT CHANGE UP (ref 3.5–5.3)
POTASSIUM SERPL-SCNC: 4.6 MMOL/L — SIGNIFICANT CHANGE UP (ref 3.5–5.3)
PROT SERPL-MCNC: 7.9 G/DL — SIGNIFICANT CHANGE UP (ref 6–8.3)
PROTHROM AB SERPL-ACNC: 11.6 SEC — SIGNIFICANT CHANGE UP (ref 9.9–13.4)
RBC # BLD: 1.91 M/UL — LOW (ref 3.8–5.2)
RBC # FLD: 20 % — HIGH (ref 10.3–14.5)
RBC BLD AUTO: ABNORMAL
RH IG SCN BLD-IMP: POSITIVE — SIGNIFICANT CHANGE UP
SODIUM SERPL-SCNC: 132 MMOL/L — LOW (ref 135–145)
TROPONIN T, HIGH SENSITIVITY RESULT: 26 NG/L — SIGNIFICANT CHANGE UP (ref 0–51)
TROPONIN T, HIGH SENSITIVITY RESULT: 28 NG/L — SIGNIFICANT CHANGE UP (ref 0–51)
WBC # BLD: 12.15 K/UL — HIGH (ref 3.8–10.5)
WBC # FLD AUTO: 12.15 K/UL — HIGH (ref 3.8–10.5)

## 2024-10-28 PROCEDURE — 71275 CT ANGIOGRAPHY CHEST: CPT | Mod: 26,MC

## 2024-10-28 PROCEDURE — 99223 1ST HOSP IP/OBS HIGH 75: CPT

## 2024-10-28 PROCEDURE — 99291 CRITICAL CARE FIRST HOUR: CPT | Mod: GC

## 2024-10-28 PROCEDURE — 96365 THER/PROPH/DIAG IV INF INIT: CPT

## 2024-10-28 PROCEDURE — 93308 TTE F-UP OR LMTD: CPT | Mod: 26

## 2024-10-28 PROCEDURE — 96366 THER/PROPH/DIAG IV INF ADDON: CPT

## 2024-10-28 PROCEDURE — 71045 X-RAY EXAM CHEST 1 VIEW: CPT | Mod: 26

## 2024-10-28 RX ORDER — IMMUNE GLOBULIN INFUSION (HUMAN) 100 MG/ML
20 INJECTION, SOLUTION INTRAVENOUS; SUBCUTANEOUS
Qty: 0 | Refills: 0 | Status: COMPLETED
Start: 2024-10-22

## 2024-10-28 RX ORDER — FENTANYL CITRAT/DEXTROSE 5%/PF 1250MCG/50
50 PATIENT CONTROLLED ANALGESIA SYRINGE INTRAVENOUS ONCE
Refills: 0 | Status: DISCONTINUED | OUTPATIENT
Start: 2024-10-28 | End: 2024-10-28

## 2024-10-28 RX ORDER — ACETAMINOPHEN 500 MG
650 TABLET ORAL EVERY 6 HOURS
Refills: 0 | Status: DISCONTINUED | OUTPATIENT
Start: 2024-10-28 | End: 2024-11-11

## 2024-10-28 RX ORDER — ACETAMINOPHEN 325 MG/1
325 TABLET ORAL
Qty: 0 | Refills: 0 | Status: COMPLETED
Start: 2024-10-22

## 2024-10-28 RX ORDER — DIPHENHYDRAMINE HCL 25 MG/1
25 TABLET ORAL
Qty: 0 | Refills: 0 | Status: COMPLETED
Start: 2024-10-22

## 2024-10-28 RX ORDER — MELATONIN 5 MG
3 TABLET ORAL AT BEDTIME
Refills: 0 | Status: DISCONTINUED | OUTPATIENT
Start: 2024-10-28 | End: 2024-11-11

## 2024-10-28 RX ORDER — PANTOPRAZOLE SODIUM 40 MG/1
80 TABLET, DELAYED RELEASE ORAL ONCE
Refills: 0 | Status: COMPLETED | OUTPATIENT
Start: 2024-10-28 | End: 2024-10-28

## 2024-10-28 RX ADMIN — Medication 50 MICROGRAM(S): at 17:18

## 2024-10-28 RX ADMIN — PANTOPRAZOLE SODIUM 80 MILLIGRAM(S): 40 TABLET, DELAYED RELEASE ORAL at 17:57

## 2024-10-28 NOTE — ED PROVIDER NOTE - CLINICAL SUMMARY MEDICAL DECISION MAKING FREE TEXT BOX
74-year-old female history of bullous pemphigoid on IVIG, hypertension hyperlipidemia presents for evaluation after syncopal episode and being found unresponsive.  Per EMS patient passed out at 3:35 PM.  Found to have a blood pressure of 70s over 40s, when EMS checked patient did not have a pulse and did 1 minute of chest compressions with ROSC and return of mental status.  Questionable whether patient truly lost pulses versus syncopized. Pt believes she just passed out. En route patient's blood pressure was in the 80s.  Patient states that she went and received her IVIG transfusion today and was not feeling well afterwards, and has noted that over the weekend was short of breath with exertion and had chest pain in between her shoulder blades.  Patient has been bedbound more recently.  Denies any fevers chest pain at this time, abdominal pain.  Patient is reporting nausea and dry heaving.  Denies urinary symptoms, cough, diarrhea, constipation 74-year-old female history of bullous pemphigoid on IVIG, hypertension hyperlipidemia presents for evaluation after syncopal episode and being found unresponsive.  Per EMS patient passed out at 3:35 PM.  Found to have a blood pressure of 70s over 40s, when EMS checked patient did not have a pulse and did 1 minute of chest compressions with ROSC and return of mental status.  Questionable whether patient truly lost pulses versus syncopized. Pt believes she just passed out. En route patient's blood pressure was in the 80s.  Patient states that she went and received her IVIG transfusion today and was not feeling well afterwards, and has noted that over the weekend was short of breath with exertion and had chest pain in between her shoulder blades.  Patient has been bedbound more recently.  Denies any fevers chest pain at this time, abdominal pain.  Patient is reporting nausea and dry heaving.  Denies urinary symptoms, cough, diarrhea, constipation    Patient's vitals on arrival were significant for blood pressure 113/92, patient became hypotensive to 77/63, patient was afebrile with appropriate heart rate.      Differential diagnosis included but is not limited to PE versus ACS versus pneumonia versus anemia versus heart failure versus electrolyte derangement.  Patient's labs were significant for a white blood cell count of 12 and hemoglobin of 5.1 patient denies any black or bloody stools.  Patient received 1 units PRBCs with improvement in blood pressure.  Labs were nonactionable.  Lactate down trended after fluids. 74-year-old female history of bullous pemphigoid on IVIG, hypertension hyperlipidemia presents for evaluation after syncopal episode and being found unresponsive.  Per EMS patient passed out at 3:35 PM.  Found to have a blood pressure of 70s over 40s, when EMS checked patient did not have a pulse and did 1 minute of chest compressions with ROSC and return of mental status.  Questionable whether patient truly lost pulses versus syncopized. Pt believes she just passed out. En route patient's blood pressure was in the 80s.  Patient states that she went and received her IVIG transfusion today and was not feeling well afterwards, and has noted that over the weekend was short of breath with exertion and had chest pain in between her shoulder blades.  Patient has been bedbound more recently.  Denies any fevers chest pain at this time, abdominal pain.  Patient is reporting nausea and dry heaving.  Denies urinary symptoms, cough, diarrhea, constipation    Patient's vitals on arrival were significant for blood pressure 113/92, patient became hypotensive to 77/63, patient was afebrile with appropriate heart rate.      Differential diagnosis included but is not limited to PE versus ACS versus pneumonia versus anemia versus heart failure versus electrolyte derangement.  Patient's labs were significant for a white blood cell count of 12 and hemoglobin of 5.1 patient denies any black or bloody stools.  Patient received 1 units PRBCs with improvement in blood pressure.  Labs were nonactionable.  Lactate down trended after fluids.    Attending Amaris: See attending attestation.

## 2024-10-28 NOTE — ED ADULT NURSE REASSESSMENT NOTE - NS ED NURSE REASSESS COMMENT FT1
1 unit emergent PRBC given. Consent in chart. Risks and benefits explained to patient. Patient verbalized understanding of risks and benefits. Patient aware of possible side effects. Vital signs stable. Second RN AI at bedside for confirmation.

## 2024-10-28 NOTE — H&P ADULT - ASSESSMENT
74F w/Hx of chronic anemia, bullous pemphigoid on IVIG, HTN, HLD, DM, hypothyroidism, chronic back pain presents after syncopal episode.

## 2024-10-28 NOTE — ED PROVIDER NOTE - ATTENDING CONTRIBUTION TO CARE
Attending Amaris: I performed a history and physical exam of the patient and discussed their management with the resident/fellow/student. I have reviewed the resident/fellow/student note and agree with the documented findings and plan of care, except as noted. I have personally performed a substantive portion of the visit including all aspects of the medical decision making. My medical decision making and observations are found below. Please refer to any progress notes for updates on clinical course. My notes supersedes the above resident/fellow/student note in case of discrepancy    MDM:  73 y/o F w/ PMH of bullous pemphigoid on IVIG, HLD, HTN, anemia presents for evaluation after syncopal episode and unresponsive episode. Seen w/ partner. Per EMS pt was at outpatient infusion center.  Found to have a blood pressure of 70s over 40s, when EMS checked patient did not have a pulse and did 1 minute of chest compressions with ROSC and return of mental status. Pt believes she just passed out. En route patient's blood pressure was in the 80s.  Patient states that she went and received her IVIG transfusion today and was not feeling well afterwards, and has noted that over the weekend was short of breath with exertion and had chest pain in between her shoulder blades. Has not had an issue w/ receiving IVIG in the past.  Patient has been bedbound more recently.  Endorsing chest pain and back pain.  Patient is reporting nausea and dry heaving.  Denies urinary symptoms, cough, diarrhea, constipation.    Gen: NAD, AOx3, able to make needs known, non-toxic  Head: NCAT  HEENT: EOMI, oral mucosa moist, normal conjunctiva  Lung: no respiratory distress, CTAB, no wheezes/rhonchi/rales B/L, speaking in full sentences  CV: Tachycardic, no murmurs  Abd: non distended, soft, nontender, no guarding, no CVA tenderness  MSK: no visible deformities  Neuro: Appears non focal  Skin: Warm, well perfused  Psych: normal affect     Pt uncomfortable appearing, but no acute distress. During exam while retching pt had brief LOC. Was on monitor at the time and no lethal arrhythmias noted. Suspect likely had syncope earlier and not true cardiac arrest and lost of pulses. POCUS w/ reduced LV systolic function, no clinically significant pericardial effusion, IVC mildly plethoric w/ no resp variation. Given hx and symptoms, concern for possible PE. Will expedite CT scan. Monitor for arrhythmia. Eval for ACS. Eval for metabolic abnormalities. Eval for anemia. Suspect pt will require admission. Plan for labs, imaging, EKG, meds. Will reassess the need for additional interventions as clinically warranted. Refer to any progress notes for updates on clinical course and as a continuation of this MDM.     I, Dr. Cisco Glez, independently interpreted the EKG which showed sinus tachycardia at a rate of 104.    Critical care billing:  Upon my evaluation, this patient had a high probability of imminent or life-threatening deterioration due to hypotension in setting of severe anemia requiring 1 unit emergent release PRBC and additional PRBC unit, which required my direct attention, intervention, and personal management.  The patient has a  medical condition that impairs one or more vital organ systems.  Frequent personal assessment and adjustment of medical interventions was performed.    I have personally provided 60 minutes of critical care time exclusive of time spent on separately billable procedures. Time includes review of any laboratory data, radiology results, discussion with consultants, patient and/or family; monitoring for potential decompensation, as well as time spent retrieving data and reviewing the chart and documenting the visit. Interventions were performed as documented above.

## 2024-10-28 NOTE — ED ADULT NURSE NOTE - OBJECTIVE STATEMENT
73 y/o F, pmh HLD, HLN, on IVIG, presents to the ED from medical office via EMS post cardiac arrest. per EMS report pt was at Dr. office, received full session of IVIG, went to the bathroom and came back to the chair, was unresponsive for 5 min, EMS started compression for 1 min with reported ROSC, BP 70/40s prior to arrival to ED. EMS further states seizure like activity noted. EMS states pt had cp over the weekend and sob. upon arrival to the ED, pt

## 2024-10-28 NOTE — H&P ADULT - PROBLEM SELECTOR PLAN 2
patient with episode of syncope liekly 2/2 to hypotension (transient)  - Tele monitoring  - Fall risk  - TTE

## 2024-10-28 NOTE — ED ADULT NURSE NOTE - NS ED NOTE ABUSE SUSPICION NEGLECT YN
"After applying all of Visi mobile equipment, patient began crying saying "I can't do all of this, it's too much. I'm claustrophobic." Visi equipment removed.   "
Patient and son states she normally takes xanax for anxiety. Med not listed on home med list. Notified [facility].   
Pt discharged home with daughter, iv d/c before discharge. Discharge instructions given to pt and spouse, both verbalized understanding. Aware of follow up appt and referrals.   
Received call from [facility] stating that they will relay xanax request to primary team, being that the patient already received klonopin this morning and it's basically the same med as xanax for anxiety. Monitoring.   
No

## 2024-10-28 NOTE — ED PROVIDER NOTE - PHYSICAL EXAMINATION
GENERAL: dry heaving   HEAD: normocephalic, atraumatic  HEENT: normal conjunctiva, oral mucosa dry   CARDIAC: regular rate and rhythm, no appreciable murmurs, 2+ pulses in UE/LE b/l  PULM: normal breath sounds, clear to ascultation bilaterally, no rales, rhonchi, wheezing  GI: abdomen nondistended, soft, nontender, no guarding, rebound tenderness  NEURO: no focal motor or sensory deficits, CN2-12 intact, normal speech, PERRLA, EOMI, AAOx3  MSK: no peripheral edema, no calf tenderness b/l  SKIN: well-perfused, extremities warm, no visible rashes  PSYCH: appropriate mood and affect

## 2024-10-28 NOTE — ED PROVIDER NOTE - PROGRESS NOTE DETAILS
Attending Amaris: anemic to 5.1. per chart review (alternate MRN). Baseline Hb appears to be approx 8. pt hypotensive to 60s-80s systolic. pt consented for PRBC, 1 unit emergent release ordered. pt denies any vomiting or stooling blood. no black stools. reports unclear what is the cause of her anemia despite testing. states she does take iron supplements daily. has required PRBC in the past, no prior adverse events w/ receiving blood. Attending Amaris: signed out to Dr. Ha pending further resuscitation and admission. Nathalia Melissa MD, PGY3: pt reassessed appears improved, agrees with admission, BP improved s/p 1 u pRBCs.

## 2024-10-28 NOTE — H&P ADULT - PROBLEM SELECTOR PLAN 1
Hgb of 5.1  - Improved to 7.1 after 2u pRBCs  - Now back down to 6.9  - Additional 1u pRBCs ordered  - F/u anemia labs  - Please obtain heme consult in AM  - Unclear etiology of anemia

## 2024-10-28 NOTE — ED ADULT NURSE NOTE - NURSING NEURO LEVEL OF CONSCIOUSNESS
ACTIVITY: Rest and take it easy for the first 24 hours.  A responsible adult is recommended to remain with you during that time.  It is normal to feel sleepy.  We encourage you to not do anything that requires balance, judgment or coordination.    MILD FLU-LIKE SYMPTOMS ARE NORMAL. YOU MAY EXPERIENCE GENERALIZED MUSCLE ACHES, THROAT IRRITATION, HEADACHE AND/OR SOME NAUSEA.    FOR 24 HOURS DO NOT:  Drive, operate machinery or run household appliances.  Drink beer or alcoholic beverages.   Make important decisions or sign legal documents.    SPECIAL INSTRUCTIONS: ACTIVITY AS TOLERATED WITH CRUTCHES AND POST OP BOOT ON.  NON WEIGHT BEARING WITH RIGHT LOWER EXTREMITY FOR 24 HOURS.  AFTER 24 HOURS, FOLLOW DR. GILL' INSTRUCTIONS FOR WEIGHT BEARING STATUS.  ELEVATE AND APPLY ICE PACK TO ANKLE.  (20 MINUTES ON, 20 MINUTES OFF) WHILE AWAKE.     DIET: To avoid nausea, slowly advance diet as tolerated, avoiding spicy or greasy foods for the first day.  Add more substantial food to your diet according to your physician's instructions.  Babies can be fed formula or breast milk as soon as they are hungry.  INCREASE FLUIDS AND FIBER TO AVOID CONSTIPATION.    SURGICAL DRESSING/BATHING: KEEP DRESSING CLEAN AND DRY. DO NOT REMOVE UNTIL INSTRUCTED BY DR. GILL.      FOLLOW-UP APPOINTMENT:  A follow-up appointment should be arranged with your doctor; call to schedule.    You should CALL YOUR PHYSICIAN if you develop:  Fever greater than 101 degrees F.  Pain not relieved by medication, or persistent nausea or vomiting.  Excessive bleeding (blood soaking through dressing) or unexpected drainage from the wound.  Extreme redness or swelling around the incision site, drainage of pus or foul smelling drainage.  Inability to urinate or empty your bladder within 8 hours.  Problems with breathing or chest pain.    You should call 911 if you develop problems with breathing or chest pain.  If you are unable to contact your doctor or surgical  center, you should go to the nearest emergency room or urgent care center.      Physician's telephone #: DR. GILL  [phone]    If any questions arise, call your doctor.  If your doctor is not available, please feel free to call the Surgical Center at [phone]. The Contact Center is open Monday through Friday 7AM to 5PM and may speak to a nurse at [phone], or toll free at [phone].     A registered nurse may call you a few days after your surgery to see how you are doing after your procedure.    MEDICATIONS: Resume taking daily medication.  Take prescribed pain medication with food.  If no medication is prescribed, you may take non-aspirin pain medication if needed.  PAIN MEDICATION CAN BE VERY CONSTIPATING.  Take a stool softener or laxative such as senokot, pericolace, or milk of magnesia if needed.    Prescription given PRE OPERATIVELY     NO ORAL PAIN MEDICATION GIVEN IN RECOVERY.    If your physician has prescribed pain medication that includes Acetaminophen (Tylenol), do not take additional Acetaminophen (Tylenol) while taking the prescribed medication.      Depression / Suicide Risk    As you are discharged from this [facility] facility, it is important to learn how to keep safe from harming yourself.    Recognize the warning signs:  · Abrupt changes in personality, positive or negative- including increase in energy   · Giving away possessions  · Change in eating patterns- significant weight changes-  positive or negative  · Change in sleeping patterns- unable to sleep or sleeping all the time   · Unwillingness or inability to communicate  · Depression  · Unusual sadness, discouragement and loneliness  · Talk of wanting to die  · Neglect of personal appearance   · Rebelliousness- reckless behavior  · Withdrawal from people/activities they love  · Confusion- inability to concentrate     If you or a loved one observes any of these behaviors or has concerns about self-harm, here's what  you can do:  · Talk about it- your feelings and reasons for harming yourself  · Remove any means that you might use to hurt yourself (examples: pills, rope, extension cords, firearm)  · Get professional help from the community (Mental Health, Substance Abuse, psychological counseling)  · Do not be alone:Call your Safe Contact- someone whom you trust who will be there for you.  · Call your local CRISIS HOTLINE 720-2092 or 349-782-1086  · Call your local Children's Mobile Crisis Response Team [place] (382) 977-2759 or wwwiyzico  · Call the toll free National Suicide Prevention Hotlines   · National Suicide Prevention Lifeline 792-956-TLHN (3134)  National [employer] Line Network 800-SUICIDE (423-1602)    Peripheral Nerve Block Discharge Instructions from Same Day Surgery and Inpatient :    What to Expect - Lower Extremity  · The block may cause you to experience numbness and weakness in your calf and foot on the same side as your surgery  · Numbness, tingling and / or weakness are all normal. For some people, this may be an unpleasant sensation  · These issues will be resolved when the local anesthetic wears off   · You may experience numbness and tingling in your thigh on the same side as your surgery if the block medicine was injected at your groin area  · Numbness will make it difficult to walk  · You may have problems with balance and walking so be very careful   · Follow your surgeon's direction regarding weight bearing on your surgical limb  · Be very careful with your numb limb  Precautions  · The numbness may affect your balance  · Be careful when walking or moving around  · Your leg may be weak: be very careful putting weight on it  · If your surgeon did not specify a time, you should not bear weight for 24 hours  · Be sure to ask for help when you need it  · It is better to have help than to fall and hurt yourself  Prevent Injury  · Protect the limb like a baby  · Beware of exposing your limb to  "extreme heat or cold or trauma  · The limb may be injured without you noticing because it is numb  · Keep the limb elevated whenever possible  · Do not sleep on the limb  · Change the position of the limb regularly  · Avoid putting pressure on your surgical limb  Pain Control  · The initial block on the day of surgery will make your extremity feel \"numb\"  · Any consecutive injection including prior to discharge from the hospital will make your extremity feel \"numb\"  · You may feel an aching or burning when the local anesthesia starts to wear off  · Take pain pills as prescribed by your surgeon  · Call your surgeon or anesthesiologist if you do not have adequate pain control  ·   " alert and awake/follows commands

## 2024-10-28 NOTE — H&P ADULT - NSHPPHYSICALEXAM_GEN_ALL_CORE
Vital Signs Last 24 Hrs  T(C): 37.2 (29 Oct 2024 04:42), Max: 37.2 (29 Oct 2024 04:42)  T(F): 99 (29 Oct 2024 04:42), Max: 99 (29 Oct 2024 04:42)  HR: 94 (29 Oct 2024 04:42) (78 - 105)  BP: 106/68 (29 Oct 2024 04:42) (77/63 - 113/92)  BP(mean): 70 (28 Oct 2024 23:50) (66 - 82)  RR: 19 (29 Oct 2024 04:42) (14 - 22)  SpO2: 98% (29 Oct 2024 04:42) (96% - 100%)    Parameters below as of 29 Oct 2024 04:42  Patient On (Oxygen Delivery Method): room air        CONSTITUTIONAL: No apparent distress  EYES: PERRLA and symmetric, EOMI, No conjunctival or scleral injection, non-icteric  ENMT: Oral mucosa with moist membranes.  NECK: Supple, symmetric. No JVD.  RESP: No respiratory distress, no use of accessory muscles; CTA b/l, no WRR  CV: RRR, +S1S2, 2/6 systolic murmur  GI: Soft, NT, ND, no rebound, no guarding  : No suprapubic tenderness. No CVA tenderness.  LYMPH: No cervical LAD or tenderness  MSK: No spinal tenderness.  EXTREMITIES: No pedal edema  SKIN: No rashes or ulcers noted. Scattered ecchymoses.  NEURO: Alert, responsive. No tremor.

## 2024-10-28 NOTE — ED ADULT NURSE NOTE - NSFALLHARMRISKINTERV_ED_ALL_ED

## 2024-10-28 NOTE — H&P ADULT - HISTORY OF PRESENT ILLNESS
74F w/Hx of chronic anemia, bullous pemphigoid on IVIG, HTN, HLD, DM, hypothyroidism, chronic back pain presents after syncopal episode. 1 minute of CPR was initiated in the field by EMS because there was concern of pulseness with unresponsiveness. Patient was hypotensive upon arrival and found to have a hgb of 5.1. Patient reports she syncopized after receiving IVIG. Reports she had been feeling lightheaded and SOB for a few days prior to this event. Has had bout of anemia in the past treated with iron supplementation, folate and B12. She denies any hematuria, melena, gingival bleeding. She reports significant improvement of symptoms since receiving 2u of pRBCs. Her only current complaint is acute on chronic back pain from her stretcher. Patient denies fever, chills, chest pain, SOB, headache, dizziness, abd pain, nausea, vomiting, constipation, dysuria. 74F w/Hx of chronic anemia, bullous pemphigoid on IVIG, HTN, HLD, DM, hypothyroidism, chronic back pain presents after syncopal episode. 1 minute of CPR was initiated in the field by EMS because there was concern of pulseness with unresponsiveness. Patient was hypotensive upon arrival and found to have a hgb of 5.1. Patient reports she syncopized after receiving IVIG. Reports she had been feeling lightheaded and SOB for a few days prior to this event. Has had bout of anemia in the past treated with iron supplementation, folate and B12. She denies any hematuria, melena, gingival bleeding. She reports significant improvement of symptoms since receiving 2u of pRBCs. Her only current complaint is acute on chronic back pain from her stretcher. Patient denies fever, chills, chest pain, SOB, headache, dizziness, abd pain, nausea, vomiting, constipation, dysuria.    Patient has had work up for anemia in the past including bone marrow biopsy in 2014, but no clear diagnosis/cause was ever established.

## 2024-10-29 DIAGNOSIS — L12.0 BULLOUS PEMPHIGOID: ICD-10-CM

## 2024-10-29 DIAGNOSIS — E78.5 HYPERLIPIDEMIA, UNSPECIFIED: ICD-10-CM

## 2024-10-29 DIAGNOSIS — E27.9 DISORDER OF ADRENAL GLAND, UNSPECIFIED: ICD-10-CM

## 2024-10-29 DIAGNOSIS — E11.9 TYPE 2 DIABETES MELLITUS WITHOUT COMPLICATIONS: ICD-10-CM

## 2024-10-29 DIAGNOSIS — E03.9 HYPOTHYROIDISM, UNSPECIFIED: ICD-10-CM

## 2024-10-29 LAB
A1C WITH ESTIMATED AVERAGE GLUCOSE RESULT: 5 % — SIGNIFICANT CHANGE UP (ref 4–5.6)
ALBUMIN SERPL ELPH-MCNC: 3.1 G/DL — LOW (ref 3.3–5)
ALBUMIN SERPL ELPH-MCNC: 3.3 G/DL — SIGNIFICANT CHANGE UP (ref 3.3–5)
ALP SERPL-CCNC: 83 U/L — SIGNIFICANT CHANGE UP (ref 40–120)
ALP SERPL-CCNC: 86 U/L — SIGNIFICANT CHANGE UP (ref 40–120)
ALT FLD-CCNC: 33 U/L — SIGNIFICANT CHANGE UP (ref 10–45)
ALT FLD-CCNC: 33 U/L — SIGNIFICANT CHANGE UP (ref 10–45)
ANION GAP SERPL CALC-SCNC: 10 MMOL/L — SIGNIFICANT CHANGE UP (ref 5–17)
ANION GAP SERPL CALC-SCNC: 9 MMOL/L — SIGNIFICANT CHANGE UP (ref 5–17)
ANISOCYTOSIS BLD QL: SLIGHT — SIGNIFICANT CHANGE UP
AST SERPL-CCNC: 45 U/L — HIGH (ref 10–40)
AST SERPL-CCNC: 45 U/L — HIGH (ref 10–40)
BASOPHILS # BLD AUTO: 0 K/UL — SIGNIFICANT CHANGE UP (ref 0–0.2)
BASOPHILS NFR BLD AUTO: 0 % — SIGNIFICANT CHANGE UP (ref 0–2)
BILIRUB SERPL-MCNC: 0.5 MG/DL — SIGNIFICANT CHANGE UP (ref 0.2–1.2)
BILIRUB SERPL-MCNC: 0.5 MG/DL — SIGNIFICANT CHANGE UP (ref 0.2–1.2)
BUN SERPL-MCNC: 28 MG/DL — HIGH (ref 7–23)
BUN SERPL-MCNC: 37 MG/DL — HIGH (ref 7–23)
CALCIUM SERPL-MCNC: 7.8 MG/DL — LOW (ref 8.4–10.5)
CALCIUM SERPL-MCNC: 8.2 MG/DL — LOW (ref 8.4–10.5)
CHLORIDE SERPL-SCNC: 101 MMOL/L — SIGNIFICANT CHANGE UP (ref 96–108)
CHLORIDE SERPL-SCNC: 101 MMOL/L — SIGNIFICANT CHANGE UP (ref 96–108)
CHOLEST SERPL-MCNC: 118 MG/DL — SIGNIFICANT CHANGE UP
CO2 SERPL-SCNC: 20 MMOL/L — LOW (ref 22–31)
CO2 SERPL-SCNC: 24 MMOL/L — SIGNIFICANT CHANGE UP (ref 22–31)
CREAT SERPL-MCNC: 0.84 MG/DL — SIGNIFICANT CHANGE UP (ref 0.5–1.3)
CREAT SERPL-MCNC: 1.13 MG/DL — SIGNIFICANT CHANGE UP (ref 0.5–1.3)
DACRYOCYTES BLD QL SMEAR: SLIGHT — SIGNIFICANT CHANGE UP
EGFR: 52 ML/MIN/1.73M2 — LOW
EGFR: 74 ML/MIN/1.73M2 — SIGNIFICANT CHANGE UP
EOSINOPHIL # BLD AUTO: 0.13 K/UL — SIGNIFICANT CHANGE UP (ref 0–0.5)
EOSINOPHIL NFR BLD AUTO: 0.9 % — SIGNIFICANT CHANGE UP (ref 0–6)
ESTIMATED AVERAGE GLUCOSE: 97 MG/DL — SIGNIFICANT CHANGE UP (ref 68–114)
GLUCOSE BLDC GLUCOMTR-MCNC: 121 MG/DL — HIGH (ref 70–99)
GLUCOSE BLDC GLUCOMTR-MCNC: 194 MG/DL — HIGH (ref 70–99)
GLUCOSE BLDC GLUCOMTR-MCNC: 259 MG/DL — HIGH (ref 70–99)
GLUCOSE SERPL-MCNC: 103 MG/DL — HIGH (ref 70–99)
GLUCOSE SERPL-MCNC: 193 MG/DL — HIGH (ref 70–99)
HAPTOGLOB SERPL-MCNC: 151 MG/DL — SIGNIFICANT CHANGE UP (ref 34–200)
HCT VFR BLD CALC: 23.1 % — LOW (ref 34.5–45)
HCT VFR BLD CALC: 23.8 % — LOW (ref 34.5–45)
HCT VFR BLD CALC: 25.3 % — LOW (ref 34.5–45)
HCT VFR BLD CALC: 27 % — LOW (ref 34.5–45)
HCT VFR BLD CALC: 27.2 % — LOW (ref 34.5–45)
HDLC SERPL-MCNC: 53 MG/DL — SIGNIFICANT CHANGE UP
HGB BLD-MCNC: 6.9 G/DL — CRITICAL LOW (ref 11.5–15.5)
HGB BLD-MCNC: 7.1 G/DL — LOW (ref 11.5–15.5)
HGB BLD-MCNC: 8 G/DL — LOW (ref 11.5–15.5)
HGB BLD-MCNC: 8.1 G/DL — LOW (ref 11.5–15.5)
HGB BLD-MCNC: 8.2 G/DL — LOW (ref 11.5–15.5)
IRON SATN MFR SERPL: 15 % — SIGNIFICANT CHANGE UP (ref 14–50)
IRON SATN MFR SERPL: 51 UG/DL — SIGNIFICANT CHANGE UP (ref 30–160)
LACTATE SERPL-SCNC: 2.3 MMOL/L — HIGH (ref 0.5–2)
LDH SERPL L TO P-CCNC: 255 U/L — HIGH (ref 50–242)
LIPID PNL WITH DIRECT LDL SERPL: 48 MG/DL — SIGNIFICANT CHANGE UP
LYMPHOCYTES # BLD AUTO: 0.13 K/UL — LOW (ref 1–3.3)
LYMPHOCYTES # BLD AUTO: 0.9 % — LOW (ref 13–44)
MACROCYTES BLD QL: SLIGHT — SIGNIFICANT CHANGE UP
MANUAL SMEAR VERIFICATION: SIGNIFICANT CHANGE UP
MCHC RBC-ENTMCNC: 27.2 PG — SIGNIFICANT CHANGE UP (ref 27–34)
MCHC RBC-ENTMCNC: 27.6 PG — SIGNIFICANT CHANGE UP (ref 27–34)
MCHC RBC-ENTMCNC: 28.3 PG — SIGNIFICANT CHANGE UP (ref 27–34)
MCHC RBC-ENTMCNC: 28.9 PG — SIGNIFICANT CHANGE UP (ref 27–34)
MCHC RBC-ENTMCNC: 29.8 GM/DL — LOW (ref 32–36)
MCHC RBC-ENTMCNC: 29.9 GM/DL — LOW (ref 32–36)
MCHC RBC-ENTMCNC: 30 G/DL — LOW (ref 32–36)
MCHC RBC-ENTMCNC: 30.1 G/DL — LOW (ref 32–36)
MCHC RBC-ENTMCNC: 30.1 PG — SIGNIFICANT CHANGE UP (ref 27–34)
MCHC RBC-ENTMCNC: 31.6 G/DL — LOW (ref 32–36)
MCV RBC AUTO: 90.9 FL — SIGNIFICANT CHANGE UP (ref 80–100)
MCV RBC AUTO: 92.6 FL — SIGNIFICANT CHANGE UP (ref 80–100)
MCV RBC AUTO: 93.8 FL — SIGNIFICANT CHANGE UP (ref 80–100)
MCV RBC AUTO: 95.1 FL — SIGNIFICANT CHANGE UP (ref 80–100)
MCV RBC AUTO: 96.4 FL — SIGNIFICANT CHANGE UP (ref 80–100)
MICROCYTES BLD QL: SLIGHT — SIGNIFICANT CHANGE UP
MONOCYTES # BLD AUTO: 1.15 K/UL — HIGH (ref 0–0.9)
MONOCYTES NFR BLD AUTO: 7.8 % — SIGNIFICANT CHANGE UP (ref 2–14)
MYELOCYTES NFR BLD: 0.9 % — HIGH (ref 0–0)
NEUTROPHILS # BLD AUTO: 13.22 K/UL — HIGH (ref 1.8–7.4)
NEUTROPHILS NFR BLD AUTO: 89.5 % — HIGH (ref 43–77)
NON HDL CHOLESTEROL: 64 MG/DL — SIGNIFICANT CHANGE UP
NRBC # BLD: 0 /100 WBCS — SIGNIFICANT CHANGE UP (ref 0–0)
OVALOCYTES BLD QL SMEAR: SLIGHT — SIGNIFICANT CHANGE UP
PLAT MORPH BLD: ABNORMAL
PLATELET # BLD AUTO: 414 K/UL — HIGH (ref 150–400)
PLATELET # BLD AUTO: 422 K/UL — HIGH (ref 150–400)
PLATELET # BLD AUTO: 426 K/UL — HIGH (ref 150–400)
PLATELET # BLD AUTO: 447 K/UL — HIGH (ref 150–400)
PLATELET # BLD AUTO: 448 K/UL — HIGH (ref 150–400)
POIKILOCYTOSIS BLD QL AUTO: SLIGHT — SIGNIFICANT CHANGE UP
POLYCHROMASIA BLD QL SMEAR: SIGNIFICANT CHANGE UP
POTASSIUM SERPL-MCNC: 4.6 MMOL/L — SIGNIFICANT CHANGE UP (ref 3.5–5.3)
POTASSIUM SERPL-MCNC: 4.9 MMOL/L — SIGNIFICANT CHANGE UP (ref 3.5–5.3)
POTASSIUM SERPL-SCNC: 4.6 MMOL/L — SIGNIFICANT CHANGE UP (ref 3.5–5.3)
POTASSIUM SERPL-SCNC: 4.9 MMOL/L — SIGNIFICANT CHANGE UP (ref 3.5–5.3)
PROT SERPL-MCNC: 7.6 G/DL — SIGNIFICANT CHANGE UP (ref 6–8.3)
PROT SERPL-MCNC: 7.6 G/DL — SIGNIFICANT CHANGE UP (ref 6–8.3)
RBC # BLD: 2.54 M/UL — LOW (ref 3.8–5.2)
RBC # BLD: 2.57 M/UL — LOW (ref 3.8–5.2)
RBC # BLD: 2.67 M/UL — LOW (ref 3.8–5.2)
RBC # BLD: 2.67 M/UL — LOW (ref 3.8–5.2)
RBC # BLD: 2.8 M/UL — LOW (ref 3.8–5.2)
RBC # BLD: 2.9 M/UL — LOW (ref 3.8–5.2)
RBC # FLD: 19.7 % — HIGH (ref 10.3–14.5)
RBC # FLD: 19.8 % — HIGH (ref 10.3–14.5)
RBC # FLD: 20.1 % — HIGH (ref 10.3–14.5)
RBC # FLD: 20.3 % — HIGH (ref 10.3–14.5)
RBC # FLD: 20.4 % — HIGH (ref 10.3–14.5)
RBC BLD AUTO: ABNORMAL
RETICS #: 88.8 K/UL — SIGNIFICANT CHANGE UP (ref 25–125)
RETICS/RBC NFR: 3.3 % — HIGH (ref 0.5–2.5)
SODIUM SERPL-SCNC: 131 MMOL/L — LOW (ref 135–145)
SODIUM SERPL-SCNC: 134 MMOL/L — LOW (ref 135–145)
TIBC SERPL-MCNC: 339 UG/DL — SIGNIFICANT CHANGE UP (ref 220–430)
TRIGL SERPL-MCNC: 84 MG/DL — SIGNIFICANT CHANGE UP
UIBC SERPL-MCNC: 288 UG/DL — SIGNIFICANT CHANGE UP (ref 110–370)
WBC # BLD: 12.44 K/UL — HIGH (ref 3.8–10.5)
WBC # BLD: 14.77 K/UL — HIGH (ref 3.8–10.5)
WBC # BLD: 8.37 K/UL — SIGNIFICANT CHANGE UP (ref 3.8–10.5)
WBC # BLD: 8.39 K/UL — SIGNIFICANT CHANGE UP (ref 3.8–10.5)
WBC # BLD: 9.06 K/UL — SIGNIFICANT CHANGE UP (ref 3.8–10.5)
WBC # FLD AUTO: 12.44 K/UL — HIGH (ref 3.8–10.5)
WBC # FLD AUTO: 14.77 K/UL — HIGH (ref 3.8–10.5)
WBC # FLD AUTO: 8.37 K/UL — SIGNIFICANT CHANGE UP (ref 3.8–10.5)
WBC # FLD AUTO: 8.39 K/UL — SIGNIFICANT CHANGE UP (ref 3.8–10.5)
WBC # FLD AUTO: 9.06 K/UL — SIGNIFICANT CHANGE UP (ref 3.8–10.5)

## 2024-10-29 PROCEDURE — 99233 SBSQ HOSP IP/OBS HIGH 50: CPT

## 2024-10-29 PROCEDURE — 99223 1ST HOSP IP/OBS HIGH 75: CPT | Mod: GC

## 2024-10-29 RX ORDER — GLUCAGON INJECTION, SOLUTION 1 MG/.2ML
1 INJECTION, SOLUTION SUBCUTANEOUS ONCE
Refills: 0 | Status: DISCONTINUED | OUTPATIENT
Start: 2024-10-29 | End: 2024-11-11

## 2024-10-29 RX ORDER — LEVOTHYROXINE SODIUM 88 MCG
300 TABLET ORAL
Refills: 0 | Status: DISCONTINUED | OUTPATIENT
Start: 2024-10-29 | End: 2024-11-11

## 2024-10-29 RX ORDER — INSULIN LISPRO 100/ML
VIAL (ML) SUBCUTANEOUS
Refills: 0 | Status: DISCONTINUED | OUTPATIENT
Start: 2024-10-29 | End: 2024-11-04

## 2024-10-29 RX ORDER — LEVOTHYROXINE SODIUM 88 MCG
150 TABLET ORAL ONCE
Refills: 0 | Status: COMPLETED | OUTPATIENT
Start: 2024-10-29 | End: 2024-10-29

## 2024-10-29 RX ORDER — PANTOPRAZOLE SODIUM 40 MG/1
40 TABLET, DELAYED RELEASE ORAL DAILY
Refills: 0 | Status: DISCONTINUED | OUTPATIENT
Start: 2024-10-29 | End: 2024-10-29

## 2024-10-29 RX ORDER — AZTREONAM 75 MG/ML
KIT INHALATION
Refills: 0 | Status: DISCONTINUED | OUTPATIENT
Start: 2024-10-29 | End: 2024-10-31

## 2024-10-29 RX ORDER — AZTREONAM 75 MG/ML
2000 KIT INHALATION EVERY 8 HOURS
Refills: 0 | Status: DISCONTINUED | OUTPATIENT
Start: 2024-10-30 | End: 2024-10-31

## 2024-10-29 RX ORDER — PANTOPRAZOLE SODIUM 40 MG/1
40 TABLET, DELAYED RELEASE ORAL
Refills: 0 | Status: DISCONTINUED | OUTPATIENT
Start: 2024-10-29 | End: 2024-11-05

## 2024-10-29 RX ORDER — LEVOTHYROXINE SODIUM 88 MCG
150 TABLET ORAL
Refills: 0 | Status: DISCONTINUED | OUTPATIENT
Start: 2024-11-02 | End: 2024-11-11

## 2024-10-29 RX ORDER — GABAPENTIN 300 MG/1
800 CAPSULE ORAL
Refills: 0 | Status: DISCONTINUED | OUTPATIENT
Start: 2024-10-29 | End: 2024-11-11

## 2024-10-29 RX ORDER — AZTREONAM 75 MG/ML
2000 KIT INHALATION ONCE
Refills: 0 | Status: COMPLETED | OUTPATIENT
Start: 2024-10-29 | End: 2024-10-29

## 2024-10-29 RX ORDER — GABAPENTIN 300 MG/1
400 CAPSULE ORAL ONCE
Refills: 0 | Status: COMPLETED | OUTPATIENT
Start: 2024-10-29 | End: 2024-10-29

## 2024-10-29 RX ADMIN — Medication 150 MICROGRAM(S): at 07:44

## 2024-10-29 RX ADMIN — Medication 6: at 17:30

## 2024-10-29 RX ADMIN — Medication 650 MILLIGRAM(S): at 10:11

## 2024-10-29 RX ADMIN — Medication 650 MILLIGRAM(S): at 01:15

## 2024-10-29 RX ADMIN — Medication 650 MILLIGRAM(S): at 18:50

## 2024-10-29 RX ADMIN — Medication 650 MILLIGRAM(S): at 08:57

## 2024-10-29 RX ADMIN — GABAPENTIN 800 MILLIGRAM(S): 300 CAPSULE ORAL at 23:55

## 2024-10-29 RX ADMIN — PANTOPRAZOLE SODIUM 40 MILLIGRAM(S): 40 TABLET, DELAYED RELEASE ORAL at 17:22

## 2024-10-29 RX ADMIN — Medication 650 MILLIGRAM(S): at 00:33

## 2024-10-29 RX ADMIN — GABAPENTIN 800 MILLIGRAM(S): 300 CAPSULE ORAL at 17:22

## 2024-10-29 RX ADMIN — AZTREONAM 100 MILLIGRAM(S): KIT at 21:22

## 2024-10-29 RX ADMIN — Medication 20 MILLIGRAM(S): at 21:22

## 2024-10-29 RX ADMIN — Medication 2: at 11:49

## 2024-10-29 RX ADMIN — GABAPENTIN 400 MILLIGRAM(S): 300 CAPSULE ORAL at 02:53

## 2024-10-29 RX ADMIN — GABAPENTIN 800 MILLIGRAM(S): 300 CAPSULE ORAL at 11:49

## 2024-10-29 NOTE — PROGRESS NOTE ADULT - PROBLEM SELECTOR PLAN 6
- Continue prednisone 10mg qD - continue home prednisone 10mg QD  - patient also takes dupixent every 2 weeks and IVIG every 4 weeks  - follows with Dr. Hung ramirez Derm

## 2024-10-29 NOTE — PROGRESS NOTE ADULT - PROBLEM SELECTOR PLAN 3
Glucose of 300  - Hold home PO meds  - Mod ISS  - F/u POCT, A1c - at home takes synthroid 150mg 3x weekly(T/Sat/Sun), 300mg 4x weekly (M/W/Th/Fri)

## 2024-10-29 NOTE — CONSULT NOTE ADULT - ASSESSMENT
74F w/Hx of chronic anemia, bullous pemphigoid on IVIG, HTN, HLD, DM, hypothyroidism, chronic back pain presents after syncopal episode, found to be anemic to 5.1.   GI consulted for anemia. Patient denies any overt GI bleeding, though does have risk factors for GI bleed including nsaid use and chronic steroids.   Ddx for GI causes include pud, avm, malignancy, gastritis, diverticulosis. May also be unrelated to GI causes given known chronic anemia.     #Normocytic anemia    Recommendations:  -obtain iron studies (would try to add on to initial blood work prior to transfusions)   -monitor hgb, transfuse for <7  -CLD , NPO at midnight for potential procedure tomorrow  -IV PPI BID for now     Note incomplete until finalized by attending signature/attestation.    Antonieta Shah  GI/Hepatology Fellow    MONDAY-FRIDAY 8AM-5PM:  Pager# [phone] ([facility]) or [phone] ([facility])    NON-URGENT CONSULTS:  Please email [email or web address]  AT NIGHT AND ON WEEKENDS:  Contact on-call GI fellow from 5pm-8am and on weekends/holidays   74F w/Hx of chronic anemia, bullous pemphigoid on IVIG, HTN, HLD, DM, hypothyroidism, chronic back pain presents after syncopal episode, found to be anemic to 5.1.   GI consulted for anemia. Patient denies any overt GI bleeding, though does have risk factors for GI bleed including nsaid use and chronic steroids.   Ddx for GI causes include pud, avm, malignancy, gastritis, diverticulosis. May also be unrelated to GI causes given known chronic anemia.     #Normocytic anemia    Recommendations:  -obtain iron studies (would try to add on to initial blood work prior to transfusions)   -hematology consult   -monitor hgb, transfuse for <7  -CLD , NPO at midnight for potential procedure tomorrow  -IV PPI BID for now     Note incomplete until finalized by attending signature/attestation.    Antonieta Shah  GI/Hepatology Fellow    MONDAY-FRIDAY 8AM-5PM:  Pager# [phone] ([facility]) or [phone] ([facility])    NON-URGENT CONSULTS:  Please email [email or web address]  AT NIGHT AND ON WEEKENDS:  Contact on-call GI fellow from 5pm-8am and on weekends/holidays   74F w/Hx of chronic anemia, bullous pemphigoid on IVIG, HTN, HLD, DM, hypothyroidism, chronic back pain presents after syncopal episode, found to be anemic to 5.1.   GI consulted for anemia. Patient denies any overt GI bleeding, though does have risk factors for GI bleed including nsaid use and chronic steroids.   Ddx for GI causes include pud, avm, malignancy, gastritis, diverticulosis. May also be unrelated to GI causes given known chronic anemia.     #Normocytic anemia    Recommendations:  -obtain iron studies (would try to add on to initial blood work prior to transfusions)   -hematology consult   -monitor hgb, transfuse for <7  -continue CLD, potential EGD/ colonoscopy Thursday   -please obtain cardiac clearance for procedures   -IV PPI BID for now     Note incomplete until finalized by attending signature/attestation.    Antonieta Shah  GI/Hepatology Fellow    MONDAY-FRIDAY 8AM-5PM:  Pager# [phone] ([facility]) or [phone] ([facility])    NON-URGENT CONSULTS:  Please email [email or web address]  AT NIGHT AND ON WEEKENDS:  Contact on-call GI fellow from 5pm-8am and on weekends/holidays   74F w/Hx of chronic anemia, bullous pemphigoid on IVIG, HTN, HLD, DM, hypothyroidism, chronic back pain presents after syncopal episode, found to be anemic to 5.1.   GI consulted for anemia. Patient denies any overt GI bleeding, though does have risk factors for GI bleed including nsaid use and chronic steroids.   Ddx for GI causes include pud, avm, malignancy, gastritis, diverticulosis. May also be unrelated to GI causes given known chronic anemia.     #Normocytic anemia    Recommendations:  -obtain iron studies (would try to add on to initial blood work prior to transfusions)   -monitor hgb, transfuse for <7  -Cardiology evaluation  -continue CLD, potential EGD/ colonoscopy Thursday   -please obtain cardiac clearance for procedures   -IV PPI BID for now     Note incomplete until finalized by attending signature/attestation.    Antonieta Shah  GI/Hepatology Fellow    MONDAY-FRIDAY 8AM-5PM:  Pager# [phone] ([facility]) or [phone] ([facility])    NON-URGENT CONSULTS:  Please email [email or web address]  AT NIGHT AND ON WEEKENDS:  Contact on-call GI fellow from 5pm-8am and on weekends/holidays

## 2024-10-29 NOTE — PATIENT PROFILE ADULT - FALL HARM RISK - HARM RISK INTERVENTIONS

## 2024-10-29 NOTE — PROGRESS NOTE ADULT - PROBLEM SELECTOR PLAN 2
patient with episode of syncope liekly 2/2 to hypotension (transient)  - Tele monitoring  - Fall risk  - TTE patient with episode of syncope suspected due to hypotension in light of severe anemia. unclear if patient had true cardiac arrest(low clinical suspicion)  - CTA chest neg PE  - f/u TTE   - tele monitoring  - trop flat  - cardiology consulted

## 2024-10-29 NOTE — CONSULT NOTE ADULT - SUBJECTIVE AND OBJECTIVE BOX
DATE OF SERVICE: 10-29-24 @ 13:33    CHIEF COMPLAINT:Patient is a 74y old  Female who presents with a chief complaint of Symptomatic anemia, Syncope (29 Oct 2024 13:03)      HISTORY OF PRESENT ILLNESS:  74F w/Hx of chronic anemia, bullous pemphigoid on IVIG, HTN, HLD, DM, hypothyroidism, chronic back pain presents after syncopal episode. 1 minute of CPR was initiated in the field by EMS because there was concern of pulseness with unresponsiveness. Patient was hypotensive upon arrival and found to have a hgb of 5.1. Patient reports she syncopized after receiving IVIG. Reports she had been feeling lightheaded and SOB for a few days prior to this event. Has had bout of anemia in the past treated with iron supplementation, folate and B12. She denies any hematuria, melena, gingival bleeding. She reports significant improvement of symptoms since receiving 2u of pRBCs. Her only current complaint is acute on chronic back pain from her stretcher. Patient denies fever, chills, chest pain, SOB, headache, dizziness, abd pain, nausea, vomiting, constipation, dysuria.    Patient has had work up for anemia in the past including bone marrow biopsy in 2014, but no clear diagnosis/cause was ever established. (28 Oct 2024 23:52)      PAST MEDICAL & SURGICAL HISTORY:  BP (bullous pemphigoid)      HTN (hypertension)      HLD (hyperlipidemia)      Hypothyroidism              MEDICATIONS:        acetaminophen     Tablet .. 650 milliGRAM(s) Oral every 6 hours PRN  gabapentin 800 milliGRAM(s) Oral four times a day  melatonin 3 milliGRAM(s) Oral at bedtime PRN    pantoprazole   Suspension 40 milliGRAM(s) Oral daily    atorvastatin 20 milliGRAM(s) Oral at bedtime  dextrose 50% Injectable 25 Gram(s) IV Push once  dextrose 50% Injectable 25 Gram(s) IV Push once  dextrose 50% Injectable 12.5 Gram(s) IV Push once  dextrose Oral Gel 15 Gram(s) Oral once PRN  glucagon  Injectable 1 milliGRAM(s) IntraMuscular once  insulin lispro (ADMELOG) corrective regimen sliding scale   SubCutaneous three times a day before meals  levothyroxine 300 MICROGram(s) Oral <User Schedule>  predniSONE   Tablet 10 milliGRAM(s) Oral daily    dextrose 5%. 1000 milliLiter(s) IV Continuous <Continuous>  dextrose 5%. 1000 milliLiter(s) IV Continuous <Continuous>      FAMILY HISTORY:      Non-contributory    SOCIAL HISTORY:    [- ] Tobacco  [- ] Drugs  [- ] Alcohol    Allergies    penicillin (Unknown)    Intolerances    	    REVIEW OF SYSTEMS:  CONSTITUTIONAL: No fever  EYES: No eye pain, visual disturbances, or discharge  ENMT:  No difficulty hearing, tinnitus  NECK: No pain or stiffness  RESPIRATORY: No cough, wheezing,  CARDIOVASCULAR: No chest pain, palpitations, passing out, dizziness, or leg swelling  GASTROINTESTINAL:  No nausea, vomiting, diarrhea or constipation. No melena.  GENITOURINARY: No dysuria, hematuria  NEUROLOGICAL: No stroke like symptoms  SKIN: No burning or lesions   ENDOCRINE: No heat or cold intolerance  MUSCULOSKELETAL: No joint pain or swelling  PSYCHIATRIC: No  anxiety, mood swings  HEME/LYMPH: No bleeding gums  ALLERGY AND IMMUNOLOGIC: No hives or eczema	    All other ROS negative    PHYSICAL EXAM:  T(C): 37.2 (10-29-24 @ 11:08), Max: 37.2 (10-29-24 @ 04:42)  HR: 93 (10-29-24 @ 11:08) (78 - 105)  BP: 106/63 (10-29-24 @ 11:08) (77/63 - 141/75)  RR: 18 (10-29-24 @ 11:08) (14 - 22)  SpO2: 98% (10-29-24 @ 11:08) (96% - 100%)  Wt(kg): --  I&O's Summary      Appearance: Normal	  HEENT:   Normal oral mucosa, EOMI	  Cardiovascular:  S1 S2, No JVD,    Respiratory: Lungs clear to auscultation	  Psychiatry: Alert  Gastrointestinal:  Soft, Non-tender, + BS	  Skin: No rashes   Neurologic: Non-focal  Extremities:  No edema  Vascular: Peripheral pulses palpable    	    	  	  CARDIAC MARKERS:  Labs personally reviewed by me                                  6.9    8.37  )-----------( 414      ( 29 Oct 2024 06:04 )             23.1     10-29    134[L]  |  101  |  37[H]  ----------------------------<  103[H]  4.6   |  24  |  0.84    Ca    8.2[L]      29 Oct 2024 06:04  Mg     1.8     10-28    TPro  7.6  /  Alb  3.3  /  TBili  0.5  /  DBili  x   /  AST  45[H]  /  ALT  33  /  AlkPhos  83  10-29          EKG: Personally reviewed by me -   Radiology: Personally reviewed by me -   < from: Xray Chest 1 View- PORTABLE-Urgent (Xray Chest 1 View- PORTABLE-Urgent .) (10.28.24 @ 18:06) >  FINDINGS:  The heart is normal in size.  The lungs are clear.  There is no pneumothorax or pleural effusion.    IMPRESSION:  Clear lungs.  No focal consolidations.    < end of copied text >  < from: CT Angio Chest PE Protocol w/ IV Cont (10.28.24 @ 16:46) >    IMPRESSION:  No evidence of pulmonary embolism.    Indeterminate 2 cm left adrenal gland nodule. Consider nonemergent MRI   adrenal gland for further assessment.    < end of copied text >  < from: POCUS ED TTE 2D F/U, Limited w/o Cont. (10.28.24 @ 19:41) >  IMPRESSION:  No clinically significant pericardial effusion  Globally reduced LV systolic function.      Assessment /Plan:     74F w/Hx of chronic anemia, bullous pemphigoid on IVIG, HTN, HLD, DM, hypothyroidism, chronic back pain presents after syncopal episode. 1 minute of CPR was initiated in the field by EMS because there was concern of pulseness with unresponsiveness. Patient was hypotensive upon arrival and found to have a hgb of 5.1. Patient reports she syncopized after receiving IVIG. Reports she had been feeling lightheaded and SOB for a few days prior to this event. Has had bout of anemia in the past treated with iron supplementation, folate and B12. She denies any hematuria, melena, gingival bleeding. She reports significant improvement of symptoms since receiving 2u of pRBCs. Her only current complaint is acute on chronic back pain from her stretcher. Patient denies fever, chills, chest pain, SOB, headache, dizziness, abd pain, nausea, vomiting, constipation, dysuria.    Problem/Plan #1:  Problem: Syncope s/p Cardiac Arrest  - Reports acute SOB and lethargy for 203 days prior to the event  - Hgb/Hct on arrival 5.1/18.2  - Trop 26 --> 28  - No ECG found in chart or EMR; ED provider notes ST with STD in I, II, v3, v-6, no DIANA; Will repeat.  - POCUS noted no pericardial effusion, globally reduced LV function; Will obtain formal TTE.  - Replete Hgb > 7  - Appreciate GI recommendation  - Further cardiac testing pending review of repeat ECG and TTE    Problem/Plan #2:  Problem: Hypertension  - Home lisinopril 2.5mg PO daily on hold    Problem/Plan #3:  Problem: HLD  - c/w atorvastatin 20mg PO daily    Problem/Plan #4:  Problem: Cardiac Risk Stratification  - Reports average functional capacity with no SOB or CP prior to 3 days ago  - No hx of severe valvular dysfunction or tachy yomaira arrhythmia  - Further risks stratification pending review of ECG and TTE    Differential diagnosis and plan of care discussed with patient after the evaluation. Counseling on diet, nutritional counseling, weight management, exercise and medication compliance was done.   Advanced care planning/advanced directives discussed with patient/family. DNR status including forceful chest compressions to attempt to restart the heart, ventilator support/artificial breathing, electric shock, artificial nutrition, health care proxy, Molst form all discussed with pt. Pt wishes to consider. More than fifteen minutes spent on discussing advanced directives.     Joann Teageu [facility]  Adama Rollins DO [facility]  Cardiovascular Medicine  [address], [address]  Available for call or text via [employer]  Office [phone]   08-Jan-2021 13:30 DATE OF SERVICE: 10-29-24 @ 13:33    CHIEF COMPLAINT:Patient is a 74y old  Female who presents with a chief complaint of Symptomatic anemia, Syncope (29 Oct 2024 13:03)      HISTORY OF PRESENT ILLNESS:  74F w/Hx of chronic anemia, bullous pemphigoid on IVIG, HTN, HLD, DM, hypothyroidism, chronic back pain presents after syncopal episode. 1 minute of CPR was initiated in the field by EMS because there was concern of pulseness with unresponsiveness. Patient was hypotensive upon arrival and found to have a hgb of 5.1. Patient reports she syncopized after receiving IVIG. Reports she had been feeling lightheaded and SOB for a few days prior to this event. Has had bout of anemia in the past treated with iron supplementation, folate and B12. She denies any hematuria, melena, gingival bleeding. She reports significant improvement of symptoms since receiving 2u of pRBCs. Her only current complaint is acute on chronic back pain from her stretcher. Patient denies fever, chills, chest pain, SOB, headache, dizziness, abd pain, nausea, vomiting, constipation, dysuria.    Patient has had work up for anemia in the past including bone marrow biopsy in 2014, but no clear diagnosis/cause was ever established. (28 Oct 2024 23:52)      PAST MEDICAL & SURGICAL HISTORY:  BP (bullous pemphigoid)      HTN (hypertension)      HLD (hyperlipidemia)      Hypothyroidism              MEDICATIONS:        acetaminophen     Tablet .. 650 milliGRAM(s) Oral every 6 hours PRN  gabapentin 800 milliGRAM(s) Oral four times a day  melatonin 3 milliGRAM(s) Oral at bedtime PRN    pantoprazole   Suspension 40 milliGRAM(s) Oral daily    atorvastatin 20 milliGRAM(s) Oral at bedtime  dextrose 50% Injectable 25 Gram(s) IV Push once  dextrose 50% Injectable 25 Gram(s) IV Push once  dextrose 50% Injectable 12.5 Gram(s) IV Push once  dextrose Oral Gel 15 Gram(s) Oral once PRN  glucagon  Injectable 1 milliGRAM(s) IntraMuscular once  insulin lispro (ADMELOG) corrective regimen sliding scale   SubCutaneous three times a day before meals  levothyroxine 300 MICROGram(s) Oral <User Schedule>  predniSONE   Tablet 10 milliGRAM(s) Oral daily    dextrose 5%. 1000 milliLiter(s) IV Continuous <Continuous>  dextrose 5%. 1000 milliLiter(s) IV Continuous <Continuous>      FAMILY HISTORY:      Non-contributory    SOCIAL HISTORY:    [- ] Tobacco  [- ] Drugs  [- ] Alcohol    Allergies    penicillin (Unknown)    Intolerances    	    REVIEW OF SYSTEMS:  CONSTITUTIONAL: No fever  EYES: No eye pain, visual disturbances, or discharge  ENMT:  No difficulty hearing, tinnitus  NECK: No pain or stiffness  RESPIRATORY: No cough, wheezing,  CARDIOVASCULAR: No chest pain, palpitations, passing out, dizziness, or leg swelling  GASTROINTESTINAL:  No nausea, vomiting, diarrhea or constipation. No melena.  GENITOURINARY: No dysuria, hematuria  NEUROLOGICAL: No stroke like symptoms  SKIN: No burning or lesions   ENDOCRINE: No heat or cold intolerance  MUSCULOSKELETAL: No joint pain or swelling  PSYCHIATRIC: No  anxiety, mood swings  HEME/LYMPH: No bleeding gums  ALLERGY AND IMMUNOLOGIC: No hives or eczema	    All other ROS negative    PHYSICAL EXAM:  T(C): 37.2 (10-29-24 @ 11:08), Max: 37.2 (10-29-24 @ 04:42)  HR: 93 (10-29-24 @ 11:08) (78 - 105)  BP: 106/63 (10-29-24 @ 11:08) (77/63 - 141/75)  RR: 18 (10-29-24 @ 11:08) (14 - 22)  SpO2: 98% (10-29-24 @ 11:08) (96% - 100%)  Wt(kg): --  I&O's Summary      Appearance: Normal	  HEENT:   Normal oral mucosa, EOMI	  Cardiovascular:  S1 S2, No JVD,    Respiratory: Lungs clear to auscultation	  Psychiatry: Alert  Gastrointestinal:  Soft, Non-tender, + BS	  Skin: No rashes   Neurologic: Non-focal  Extremities:  No edema  Vascular: Peripheral pulses palpable    	    	  	  CARDIAC MARKERS:  Labs personally reviewed by me                                  6.9    8.37  )-----------( 414      ( 29 Oct 2024 06:04 )             23.1     10-29    134[L]  |  101  |  37[H]  ----------------------------<  103[H]  4.6   |  24  |  0.84    Ca    8.2[L]      29 Oct 2024 06:04  Mg     1.8     10-28    TPro  7.6  /  Alb  3.3  /  TBili  0.5  /  DBili  x   /  AST  45[H]  /  ALT  33  /  AlkPhos  83  10-29          EKG: Personally reviewed by me - Diagnosis Line SINUS TACHYCARDIA  ST & T WAVE ABNORMALITY, CONSIDER INFERIOR ISCHEMIA  ST ABNORMALITY, CONSIDER ANTEROLATERAL ISCHEMIA    Radiology: Personally reviewed by me - CXR IMPRESSION:  Clear lungs.  No focal consolidations.     CT Angio Chest PE Protocol -No evidence of pulmonary embolism.             Assessment /Plan:   74F w/Hx of chronic anemia, bullous pemphigoid on IVIG, HTN, HLD, DM, hypothyroidism, chronic back pain presents after syncopal episode. 1 minute of CPR was initiated in the field by EMS because there was concern of pulseness with unresponsiveness. Patient was hypotensive upon arrival and found to have a hgb of 5.1. Patient reports she syncopized after receiving IVIG. Reports she had been feeling lightheaded and SOB for a few days prior to this event. Has had bout of anemia in the past treated with iron supplementation, folate and B12. She denies any hematuria, melena, gingival bleeding. She reports significant improvement of symptoms since receiving 2u of pRBCs. Her only current complaint is acute on chronic back pain from her stretcher. Patient denies fever, chills, chest pain, SOB, headache, dizziness, abd pain, nausea, vomiting, constipation, dysuria.      Problem/Plan #1:  Problem: Syncope   - Likely symptomatic anemia  - Trop 26 --> 28  -EKG ischemic but in setting of Hb of 5.1  - POCUS noted no pericardial effusion, globally reduced LV function; Will obtain formal TTE.  - Replete Hgb > 7     Problem/Plan #2:  Problem: Hypertension  - Home lisinopril 2.5mg PO daily on hold    Problem/Plan #3:  Problem: HLD  - c/w atorvastatin 20mg PO daily    Problem/Plan #4:  Problem: Cardiac Risk Stratification  - Symptomatic anemia  - No hx of severe valvular dysfunction or tachy yomaira arrhythmia  - Mod risk for low risk EGD/colon pending review of TTE          Differential diagnosis and plan of care discussed with patient after the evaluation. Counseling on diet, nutritional counseling, weight management, exercise and medication compliance was done.   Advanced care planning/advanced directives discussed with patient/family. DNR status including forceful chest compressions to attempt to restart the heart, ventilator support/artificial breathing, electric shock, artificial nutrition, health care proxy, Molst form all discussed with pt. Pt wishes to consider. More than fifteen minutes spent on discussing advanced directives.     Joann Teague [facility]  Adama Rollins DO [facility]  Cardiovascular Medicine  [address] [place] [address] Dr, [address]  Available for call or text via [employer]  Office [phone]

## 2024-10-29 NOTE — PROGRESS NOTE ADULT - ASSESSMENT
74F w/Hx of chronic anemia, bullous pemphigoid on IVIG, HTN, HLD, DM, hypothyroidism, chronic back pain presents after syncopal episode. 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 cardiac arrest after receiving IVIG, found to have severe anemia with Hgb 5.

## 2024-10-29 NOTE — PROGRESS NOTE ADULT - TIME BILLING
Independently obtaining a history, performing a physical examination, discussing the plan with the patient, ordering medications/tests, documenting clinical information, and coordinating care. reviewing medical record, evaluating the patient, discussing plan of care with consultants, interdisciplinary team, documenting in EMR.

## 2024-10-29 NOTE — PROGRESS NOTE ADULT - SUBJECTIVE AND OBJECTIVE BOX
[facility] Division of Hospital Medicine  Audrey Mendez MD  Available via MS Teams      SUBJECTIVE / OVERNIGHT EVENTS: patient seen and examined this morning. denies any melena, hematochezia, vaginal bleed, hematuria. feels better now after transfusion. no chest pain or SOB.         MEDICATIONS  (STANDING):  atorvastatin 20 milliGRAM(s) Oral at bedtime  dextrose 5%. 1000 milliLiter(s) (50 mL/Hr) IV Continuous <Continuous>  dextrose 5%. 1000 milliLiter(s) (100 mL/Hr) IV Continuous <Continuous>  dextrose 50% Injectable 25 Gram(s) IV Push once  dextrose 50% Injectable 25 Gram(s) IV Push once  dextrose 50% Injectable 12.5 Gram(s) IV Push once  gabapentin 800 milliGRAM(s) Oral four times a day  glucagon  Injectable 1 milliGRAM(s) IntraMuscular once  insulin lispro (ADMELOG) corrective regimen sliding scale   SubCutaneous three times a day before meals  levothyroxine 300 MICROGram(s) Oral <User Schedule>  predniSONE   Tablet 10 milliGRAM(s) Oral daily    MEDICATIONS  (PRN):  acetaminophen     Tablet .. 650 milliGRAM(s) Oral every 6 hours PRN Temp greater or equal to 38C (100.4F), Mild Pain (1 - 3)  dextrose Oral Gel 15 Gram(s) Oral once PRN Blood Glucose LESS THAN 70 milliGRAM(s)/deciliter  melatonin 3 milliGRAM(s) Oral at bedtime PRN Insomnia      I&O's Summary      PHYSICAL EXAM:  Vital Signs Last 24 Hrs  T(C): 37.2 (29 Oct 2024 11:08), Max: 37.2 (29 Oct 2024 04:42)  T(F): 99 (29 Oct 2024 11:08), Max: 99 (29 Oct 2024 04:42)  HR: 93 (29 Oct 2024 11:08) (78 - 105)  BP: 106/63 (29 Oct 2024 11:08) (77/63 - 141/75)  BP(mean): 70 (28 Oct 2024 23:50) (66 - 82)  RR: 18 (29 Oct 2024 11:08) (14 - 22)  SpO2: 98% (29 Oct 2024 11:08) (96% - 100%)    Parameters below as of 29 Oct 2024 11:08  Patient On (Oxygen Delivery Method): room air      CONSTITUTIONAL: NAD, well-groomed, obese  NECK: Supple  RESPIRATORY: Normal respiratory effort; lungs are clear to auscultation bilaterally  CARDIOVASCULAR: normal S1 and S2, + murmur; No lower extremity edema  ABDOMEN: Nontender to palpation, normoactive bowel sounds  MUSCULOSKELETAL: no clubbing or cyanosis of digits; no joint swelling or tenderness to palpation  PSYCH: A+O to person, place, and time; affect appropriate  SKIN: + scattered ecchymosis, healed rash and skin discoloration in legs    LABS:                        6.9    8.37  )-----------( 414      ( 29 Oct 2024 06:04 )             23.1     10-29    134[L]  |  101  |  37[H]  ----------------------------<  103[H]  4.6   |  24  |  0.84    Ca    8.2[L]      29 Oct 2024 06:04  Mg     1.8     10-28    TPro  7.6  /  Alb  3.3  /  TBili  0.5  /  DBili  x   /  AST  45[H]  /  ALT  33  /  AlkPhos  83  10-29    PT/INR - ( 28 Oct 2024 16:31 )   PT: 11.6 sec;   INR: 1.02 ratio         PTT - ( 28 Oct 2024 16:31 )  PTT:25.2 sec  CARDIAC MARKERS ( 28 Oct 2024 16:31 )  x     / x     / x     / x     / 1.8 ng/mL      Urinalysis Basic - ( 29 Oct 2024 06:04 )    Color: x / Appearance: x / SG: x / pH: x  Gluc: 103 mg/dL / Ketone: x  / Bili: x / Urobili: x   Blood: x / Protein: x / Nitrite: x   Leuk Esterase: x / RBC: x / WBC x   Sq Epi: x / Non Sq Epi: x / Bacteria: x

## 2024-10-29 NOTE — PROGRESS NOTE ADULT - PROBLEM SELECTOR PLAN 1
Hgb of 5.1  - Improved to 7.1 after 2u pRBCs  - Now back down to 6.9  - Additional 1u pRBCs ordered  - F/u anemia labs  - Please obtain heme consult in AM  - Unclear etiology of anemia Hgb of 5.1 on admission. Per chart review in [facility], prior Hgb in 7-8 on 2021 but per patient report, recent Hgb was around 10 in 7/2024. Follows with new PMD (Dr. Lieberman?).  - Improved to 7.1 after 2u PRBCs, most recently 6.9  - getting additional 1u PRBC, f/u repeat CBC and transfuse to keep Hgb > 7 and/of if symptomatic  - no gross evidence of GI bleed, vaginal bleed, or hematuria  - patient reportedly had extensive work up of anemia with hematology in the past (used to follow with Dr. Jose Daniel Martinez at [facility] for uterine CA s/p resection/chemo)  - last colonoscopy reportedly in 2015 was normal. never had EGD.   - GI consulted  - f/u iron studies and may need to consider heme eval if GI work up unrevealing  - start PPI as patient is on chronic steroid

## 2024-10-29 NOTE — CONSULT NOTE ADULT - SUBJECTIVE AND OBJECTIVE BOX
Chief Complaint:  Patient is a 74y old  Female who presents with a chief complaint of Symptomatic anemia, Syncope (29 Oct 2024 13:03)      HPI:  74F w/Hx of chronic anemia, bullous pemphigoid on IVIG, HTN, HLD, DM, hypothyroidism, chronic back pain presents after syncopal episode. 1 minute of CPR was initiated in the field by EMS because there was concern of pulseness with unresponsiveness. Patient was hypotensive upon arrival and found to have a hgb of 5.1. Patient reports she syncopized after receiving IVIG. Reports she had been feeling lightheaded and SOB for a few days prior to this event. Has had bout of anemia in the past treated with iron supplementation, folate and B12. She denies any hematuria, melena, gingival bleeding. She reports significant improvement of symptoms since receiving 2u of pRBCs. Her only current complaint is acute on chronic back pain from her stretcher. Patient denies fever, chills, chest pain, SOB, headache, dizziness, abd pain, nausea, vomiting, constipation, dysuria.    Patient has had work up for anemia in the past including bone marrow biopsy in 2014, but no clear diagnosis/cause was ever established. Denies any melena, hematochezia, diarrhea, constipation. Says stool is occasionally dark though takes iron supplementation BID. Does take an aspirin daily and occasional diclofenac. Denies any gerd symptoms or dyspepsia.   Last colonoscopy was 2015, normal. Never had an endoscopy.     In the ED, hemoglobin 5.1, improved to 7.1 with 2U PRBC. MCV 95. , BUN 45, Crt 0.89.     Allergies:  penicillin (Unknown)      Home Medications:    Hospital Medications:  acetaminophen     Tablet .. 650 milliGRAM(s) Oral every 6 hours PRN  atorvastatin 20 milliGRAM(s) Oral at bedtime  dextrose 5%. 1000 milliLiter(s) IV Continuous <Continuous>  dextrose 5%. 1000 milliLiter(s) IV Continuous <Continuous>  dextrose 50% Injectable 25 Gram(s) IV Push once  dextrose 50% Injectable 12.5 Gram(s) IV Push once  dextrose 50% Injectable 25 Gram(s) IV Push once  dextrose Oral Gel 15 Gram(s) Oral once PRN  gabapentin 800 milliGRAM(s) Oral four times a day  glucagon  Injectable 1 milliGRAM(s) IntraMuscular once  insulin lispro (ADMELOG) corrective regimen sliding scale   SubCutaneous three times a day before meals  levothyroxine 300 MICROGram(s) Oral <User Schedule>  melatonin 3 milliGRAM(s) Oral at bedtime PRN  pantoprazole   Suspension 40 milliGRAM(s) Oral daily  predniSONE   Tablet 10 milliGRAM(s) Oral daily      PMHX/PSHX:  BP (bullous pemphigoid)    HTN (hypertension)    HLD (hyperlipidemia)    Hypothyroidism        Family history:      Denies family history of colon cancer/polyps, stomach cancer/polyps, pancreatic cancer/masses, liver cancer/disease, ovarian cancer and endometrial cancer.    Social History:   Tob: Denies  EtOH: Denies  Illicit Drugs: Denies    ROS:     General:  No wt loss, fevers, chills, night sweats, fatigue  Eyes:  Good vision, no reported pain  ENT:  No sore throat, pain, runny nose, dysphagia  CV:  No pain, palpitations, hypo/hypertension  Pulm:  No dyspnea, cough, tachypnea, wheezing  GI:  see HPI  :  No pain, bleeding, incontinence, nocturia  Muscle:  No pain, weakness  Neuro:  No weakness, tingling, memory problems  Psych:  No fatigue, insomnia, mood problems, depression  Endocrine:  No polyuria, polydipsia, cold/heat intolerance  Heme:  No petechiae, ecchymosis, easy bruisability  Skin:  No rash, tattoos, scars, edema    PHYSICAL EXAM:     GENERAL:  No acute distress  HEENT:  NCAT, no scleral icterus   CHEST:  no respiratory distress  HEART:  Regular rate and rhythm  ABDOMEN:  Soft, non-tender, non-distended, normoactive bowel sounds,  no masses, no hepato-splenomegaly, no signs of chronic liver disease  EXTREMITIES: No edema  SKIN:  No rash/erythema/ecchymoses/petechiae/wounds/abscess/warm/dry  NEURO:  Alert and oriented x 3, no asterixis    Vital Signs:  Vital Signs Last 24 Hrs  T(C): 37.2 (29 Oct 2024 11:08), Max: 37.2 (29 Oct 2024 04:42)  T(F): 99 (29 Oct 2024 11:08), Max: 99 (29 Oct 2024 04:42)  HR: 93 (29 Oct 2024 11:08) (78 - 105)  BP: 106/63 (29 Oct 2024 11:08) (77/63 - 141/75)  BP(mean): 70 (28 Oct 2024 23:50) (66 - 82)  RR: 18 (29 Oct 2024 11:08) (14 - 22)  SpO2: 98% (29 Oct 2024 11:08) (96% - 100%)    Parameters below as of 29 Oct 2024 11:08  Patient On (Oxygen Delivery Method): room air      Daily Height in cm: 165.1 (29 Oct 2024 07:16)    Daily     LABS:                        6.9    8.37  )-----------( 414      ( 29 Oct 2024 06:04 )             23.1     Mean Cell Volume: 90.9 fl (10-29-24 @ 06:04)    10-29    134[L]  |  101  |  37[H]  ----------------------------<  103[H]  4.6   |  24  |  0.84    Ca    8.2[L]      29 Oct 2024 06:04  Mg     1.8     10-28    TPro  7.6  /  Alb  3.3  /  TBili  0.5  /  DBili  x   /  AST  45[H]  /  ALT  33  /  AlkPhos  83  10-29    LIVER FUNCTIONS - ( 29 Oct 2024 06:04 )  Alb: 3.3 g/dL / Pro: 7.6 g/dL / ALK PHOS: 83 U/L / ALT: 33 U/L / AST: 45 U/L / GGT: x           PT/INR - ( 28 Oct 2024 16:31 )   PT: 11.6 sec;   INR: 1.02 ratio         PTT - ( 28 Oct 2024 16:31 )  PTT:25.2 sec  Urinalysis Basic - ( 29 Oct 2024 06:04 )    Color: x / Appearance: x / SG: x / pH: x  Gluc: 103 mg/dL / Ketone: x  / Bili: x / Urobili: x   Blood: x / Protein: x / Nitrite: x   Leuk Esterase: x / RBC: x / WBC x   Sq Epi: x / Non Sq Epi: x / Bacteria: x                              6.9    8.37  )-----------( 414      ( 29 Oct 2024 06:04 )             23.1                         7.1    9.06  )-----------( 426      ( 29 Oct 2024 02:05 )             23.8                         5.1    12.15 )-----------( 512      ( 28 Oct 2024 16:31 )             18.2       Imaging:      < from: CT Angio Chest PE Protocol w/ IV Cont (10.28.24 @ 16:46) >  FINDINGS:  Pulmonary Artery: The study is technically adequate with a good contrast   bolus to the pulmonary arteries. No central, lobar, or segmental   pulmonary embolism. Suboptimal assessment of the subsegmental branches   due to poor contrast opacification.    Aorta: Normal in course and caliber with calcifications. Aortic valve   calcifications.    Cardiac: The heart is normal in size. Coronary artery calcifications.   Mitral annular calcification. No pericardial effusion.    Lungs/Airways/Pleura: No focal consolidation. The central airways are   patent. No pleural effusion.    Mediastinum/Lymph nodes: No thoracic adenopathy.    Upper Abdomen: 2 cm left adrenal gland nodule with punctate   calcification. Partially imaged bilateral renal hypodensities.   Cholelithiasis.    Bones and Soft Tissues: No acute osseous abnormality.      IMPRESSION:  No evidence of pulmonary embolism.    Indeterminate 2 cm left adrenal gland nodule. Consider nonemergent MRI   adrenal gland for further assessment.    --- End of Report ---      < end of copied text >

## 2024-10-29 NOTE — CHART NOTE - NSCHARTNOTEFT_GEN_A_CORE
Patient is a 74y old  Female who presents with a chief complaint of Symptomatic anemia, Syncope (29 Oct 2024 13:32)      Vital Signs Last 24 Hrs  T(C): 38.4 (29 Oct 2024 18:52), Max: 38.4 (29 Oct 2024 18:52)  T(F): 101.2 (29 Oct 2024 18:52), Max: 101.2 (29 Oct 2024 18:52)  HR: 115 (29 Oct 2024 18:52) (78 - 122)  BP: 129/81 (29 Oct 2024 18:52) (77/63 - 141/75)  BP(mean): 70 (28 Oct 2024 23:50) (66 - 82)  RR: 18 (29 Oct 2024 18:52) (14 - 22)  SpO2: 94% (29 Oct 2024 18:52) (92% - 100%)    Parameters below as of 29 Oct 2024 18:52  Patient On (Oxygen Delivery Method): nasal cannula  O2 Flow (L/min): 2        Labs:                          8.2    14.77 )-----------( 447      ( 29 Oct 2024 18:43 )             27.2     10-29    134[L]  |  101  |  37[H]  ----------------------------<  103[H]  4.6   |  24  |  0.84    Ca    8.2[L]      29 Oct 2024 06:04  Mg     1.8     10-28    TPro  7.6  /  Alb  3.3  /  TBili  0.5  /  DBili  x   /  AST  45[H]  /  ALT  33  /  AlkPhos  83  10-29      CARDIAC MARKERS ( 28 Oct 2024 16:31 )  x     / x     / x     / x     / 1.8 ng/mL        MEDICATIONS  (PRN):  acetaminophen     Tablet .. 650 milliGRAM(s) Oral every 6 hours PRN Temp greater or equal to 38C (100.4F), Mild Pain (1 - 3)  dextrose Oral Gel 15 Gram(s) Oral once PRN Blood Glucose LESS THAN 70 milliGRAM(s)/deciliter  melatonin 3 milliGRAM(s) Oral at bedtime PRN Insomnia      Physical Exam:  General: WN/WD NAD  Neurology: A&Ox3, nonfocal, COVARRUBIAS x 4  Head:  Normocephalic, atraumatic  Respiratory: CTA B/L  CV: RRR, S1S2, no murmur  Abdominal: Soft, NT, ND no palpable mass  MSK: No edema, + peripheral pulses, FROM all 4 extremity    Assessment & Plan:  HPI:  74F w/Hx of chronic anemia, bullous pemphigoid on IVIG, HTN, HLD, DM, hypothyroidism, chronic back pain presents after syncopal episode. 1 minute of CPR was initiated in the field by EMS because there was concern of pulseness with unresponsiveness. Patient was hypotensive upon arrival and found to have a hgb of 5.1. Patient reports she syncopized after receiving IVIG. Reports she had been feeling lightheaded and SOB for a few days prior to this event. Has had bout of anemia in the past treated with iron supplementation, folate and B12. She denies any hematuria, melena, gingival bleeding. She reports significant improvement of symptoms since receiving 2u of pRBCs. Her only current complaint is acute on chronic back pain from her stretcher. Patient denies fever, chills, chest pain, SOB, headache, dizziness, abd pain, nausea, vomiting, constipation, dysuria.    Patient has had work up for anemia in the past including bone marrow biopsy in 2014, but no clear diagnosis/cause was ever established. (28 Oct 2024 23:52)    >  >  >  >        Follow up with Attending in AM.

## 2024-10-29 NOTE — PROGRESS NOTE ADULT - PROBLEM SELECTOR PLAN 7
- Neha atorvastatin 20mg qD - indeterminate 2cm left adrenal nodule noted on CT  - patient reports it has been there (confirmed on review of outpatient medical record that it was known on prior CT)  - outpatient follow up

## 2024-10-30 ENCOUNTER — APPOINTMENT (OUTPATIENT)
Dept: RHEUMATOLOGY | Facility: CLINIC | Age: 74
End: 2024-10-30

## 2024-10-30 ENCOUNTER — RESULT REVIEW (OUTPATIENT)
Age: 74
End: 2024-10-30

## 2024-10-30 DIAGNOSIS — I50.21 ACUTE SYSTOLIC (CONGESTIVE) HEART FAILURE: ICD-10-CM

## 2024-10-30 DIAGNOSIS — I35.0 NONRHEUMATIC AORTIC (VALVE) STENOSIS: ICD-10-CM

## 2024-10-30 LAB
A1C WITH ESTIMATED AVERAGE GLUCOSE RESULT: 5.3 % — SIGNIFICANT CHANGE UP (ref 4–5.6)
ANION GAP SERPL CALC-SCNC: 7 MMOL/L — SIGNIFICANT CHANGE UP (ref 5–17)
APPEARANCE UR: CLEAR — SIGNIFICANT CHANGE UP
BACTERIA # UR AUTO: ABNORMAL /HPF
BILIRUB UR-MCNC: NEGATIVE — SIGNIFICANT CHANGE UP
BUN SERPL-MCNC: 22 MG/DL — SIGNIFICANT CHANGE UP (ref 7–23)
CALCIUM SERPL-MCNC: 7.9 MG/DL — LOW (ref 8.4–10.5)
CAST: 1 /LPF — SIGNIFICANT CHANGE UP (ref 0–4)
CHLORIDE SERPL-SCNC: 99 MMOL/L — SIGNIFICANT CHANGE UP (ref 96–108)
CO2 SERPL-SCNC: 24 MMOL/L — SIGNIFICANT CHANGE UP (ref 22–31)
COLOR SPEC: YELLOW — SIGNIFICANT CHANGE UP
CREAT SERPL-MCNC: 0.86 MG/DL — SIGNIFICANT CHANGE UP (ref 0.5–1.3)
DIFF PNL FLD: NEGATIVE — SIGNIFICANT CHANGE UP
EGFR: 72 ML/MIN/1.73M2 — SIGNIFICANT CHANGE UP
ESTIMATED AVERAGE GLUCOSE: 105 MG/DL — SIGNIFICANT CHANGE UP (ref 68–114)
FERRITIN SERPL-MCNC: 22 NG/ML — SIGNIFICANT CHANGE UP (ref 13–330)
FOLATE SERPL-MCNC: 19.9 NG/ML — SIGNIFICANT CHANGE UP
GLUCOSE BLDC GLUCOMTR-MCNC: 168 MG/DL — HIGH (ref 70–99)
GLUCOSE BLDC GLUCOMTR-MCNC: 224 MG/DL — HIGH (ref 70–99)
GLUCOSE BLDC GLUCOMTR-MCNC: 264 MG/DL — HIGH (ref 70–99)
GLUCOSE BLDC GLUCOMTR-MCNC: 339 MG/DL — HIGH (ref 70–99)
GLUCOSE BLDC GLUCOMTR-MCNC: 343 MG/DL — HIGH (ref 70–99)
GLUCOSE SERPL-MCNC: 184 MG/DL — HIGH (ref 70–99)
GLUCOSE UR QL: NEGATIVE MG/DL — SIGNIFICANT CHANGE UP
HCT VFR BLD CALC: 25.6 % — LOW (ref 34.5–45)
HGB BLD-MCNC: 7.7 G/DL — LOW (ref 11.5–15.5)
KETONES UR-MCNC: NEGATIVE MG/DL — SIGNIFICANT CHANGE UP
LEUKOCYTE ESTERASE UR-ACNC: ABNORMAL
MCHC RBC-ENTMCNC: 29.2 PG — SIGNIFICANT CHANGE UP (ref 27–34)
MCHC RBC-ENTMCNC: 30.1 G/DL — LOW (ref 32–36)
MCV RBC AUTO: 97 FL — SIGNIFICANT CHANGE UP (ref 80–100)
NITRITE UR-MCNC: POSITIVE
NRBC # BLD: 0 /100 WBCS — SIGNIFICANT CHANGE UP (ref 0–0)
PH UR: 5.5 — SIGNIFICANT CHANGE UP (ref 5–8)
PLATELET # BLD AUTO: 386 K/UL — SIGNIFICANT CHANGE UP (ref 150–400)
POTASSIUM SERPL-MCNC: 4.6 MMOL/L — SIGNIFICANT CHANGE UP (ref 3.5–5.3)
POTASSIUM SERPL-SCNC: 4.6 MMOL/L — SIGNIFICANT CHANGE UP (ref 3.5–5.3)
PROT UR-MCNC: SIGNIFICANT CHANGE UP MG/DL
RBC # BLD: 2.64 M/UL — LOW (ref 3.8–5.2)
RBC # FLD: 19.5 % — HIGH (ref 10.3–14.5)
RBC CASTS # UR COMP ASSIST: 3 /HPF — SIGNIFICANT CHANGE UP (ref 0–4)
SODIUM SERPL-SCNC: 130 MMOL/L — LOW (ref 135–145)
SP GR SPEC: 1.03 — SIGNIFICANT CHANGE UP (ref 1–1.03)
SQUAMOUS # UR AUTO: 1 /HPF — SIGNIFICANT CHANGE UP (ref 0–5)
UROBILINOGEN FLD QL: 0.2 MG/DL — SIGNIFICANT CHANGE UP (ref 0.2–1)
VIT B12 SERPL-MCNC: 1132 PG/ML — SIGNIFICANT CHANGE UP (ref 232–1245)
WBC # BLD: 10.97 K/UL — HIGH (ref 3.8–10.5)
WBC # FLD AUTO: 10.97 K/UL — HIGH (ref 3.8–10.5)
WBC UR QL: 16 /HPF — HIGH (ref 0–5)

## 2024-10-30 PROCEDURE — 99223 1ST HOSP IP/OBS HIGH 75: CPT

## 2024-10-30 PROCEDURE — 93306 TTE W/DOPPLER COMPLETE: CPT | Mod: 26

## 2024-10-30 PROCEDURE — 99233 SBSQ HOSP IP/OBS HIGH 50: CPT

## 2024-10-30 PROCEDURE — 99221 1ST HOSP IP/OBS SF/LOW 40: CPT

## 2024-10-30 PROCEDURE — 99233 SBSQ HOSP IP/OBS HIGH 50: CPT | Mod: GC

## 2024-10-30 PROCEDURE — 93010 ELECTROCARDIOGRAM REPORT: CPT

## 2024-10-30 RX ORDER — SODIUM CHLORIDE 9 G/ML
0.9 INJECTION, SOLUTION INTRAVENOUS
Qty: 0 | Refills: 0 | Status: COMPLETED | OUTPATIENT
Start: 2024-10-29

## 2024-10-30 RX ORDER — FUROSEMIDE 40 MG
40 TABLET ORAL DAILY
Refills: 0 | Status: DISCONTINUED | OUTPATIENT
Start: 2024-10-30 | End: 2024-11-01

## 2024-10-30 RX ORDER — INSULIN GLARGINE,HUM.REC.ANLOG 100/ML
10 VIAL (ML) SUBCUTANEOUS AT BEDTIME
Refills: 0 | Status: DISCONTINUED | OUTPATIENT
Start: 2024-10-30 | End: 2024-11-01

## 2024-10-30 RX ADMIN — PANTOPRAZOLE SODIUM 40 MILLIGRAM(S): 40 TABLET, DELAYED RELEASE ORAL at 05:03

## 2024-10-30 RX ADMIN — GABAPENTIN 800 MILLIGRAM(S): 300 CAPSULE ORAL at 17:51

## 2024-10-30 RX ADMIN — GABAPENTIN 800 MILLIGRAM(S): 300 CAPSULE ORAL at 12:01

## 2024-10-30 RX ADMIN — AZTREONAM 100 MILLIGRAM(S): KIT at 21:05

## 2024-10-30 RX ADMIN — Medication 8: at 12:02

## 2024-10-30 RX ADMIN — Medication 650 MILLIGRAM(S): at 16:24

## 2024-10-30 RX ADMIN — Medication 300 MICROGRAM(S): at 05:03

## 2024-10-30 RX ADMIN — AZTREONAM 100 MILLIGRAM(S): KIT at 05:05

## 2024-10-30 RX ADMIN — Medication 20 MILLIGRAM(S): at 21:05

## 2024-10-30 RX ADMIN — Medication 650 MILLIGRAM(S): at 20:05

## 2024-10-30 RX ADMIN — AZTREONAM 100 MILLIGRAM(S): KIT at 14:24

## 2024-10-30 RX ADMIN — GABAPENTIN 800 MILLIGRAM(S): 300 CAPSULE ORAL at 05:04

## 2024-10-30 RX ADMIN — GABAPENTIN 800 MILLIGRAM(S): 300 CAPSULE ORAL at 23:25

## 2024-10-30 RX ADMIN — Medication 40 MILLIGRAM(S): at 16:25

## 2024-10-30 RX ADMIN — Medication 10 MILLIGRAM(S): at 05:05

## 2024-10-30 RX ADMIN — Medication 2: at 17:52

## 2024-10-30 RX ADMIN — Medication 4: at 08:10

## 2024-10-30 RX ADMIN — Medication 10 UNIT(S): at 21:56

## 2024-10-30 RX ADMIN — PANTOPRAZOLE SODIUM 40 MILLIGRAM(S): 40 TABLET, DELAYED RELEASE ORAL at 17:51

## 2024-10-30 NOTE — CONSULT NOTE ADULT - SUBJECTIVE AND OBJECTIVE BOX
HPI:  74F w/Hx of chronic anemia, bullous pemphigoid on IVIG, HTN, HLD, DM, hypothyroidism, chronic back pain presents after syncopal episode. 1 minute of CPR was initiated in the field by EMS because there was concern of pulseness with unresponsiveness. Patient was hypotensive upon arrival and found to have a hgb of 5.1. Patient reports she syncopized after receiving IVIG. Reports she had been feeling lightheaded and SOB for a few days prior to this event. Has had bout of anemia in the past treated with iron supplementation, folate and B12. She denies any hematuria, melena, gingival bleeding. She reports significant improvement of symptoms since receiving 2u of pRBCs. Her only current complaint is acute on chronic back pain from her stretcher. Patient denies fever, chills, chest pain, SOB, headache, dizziness, abd pain, nausea, vomiting, constipation, dysuria.    Patient has had work up for anemia in the past including bone marrow biopsy in 2014, but no clear diagnosis/cause was ever established. (28 Oct 2024 23:52)    Dermatology was consulted for a rash on the back of the legs. Patient endorses having these spots for some time at sites of prior bulla. Endorses no new bulla         PAST MEDICAL & SURGICAL HISTORY:  BP (bullous pemphigoid)      HTN (hypertension)      HLD (hyperlipidemia)      Hypothyroidism          Review of Systems: ____________________________________  REVIEW OF SYSTEMS      General: no fevers/chills, no lethary	    Skin/Breast: see HPI  	  Ophthalmologic: no eye pain or change in vision  	  ENMT: no dysphagia or change in hearing    Respiratory and Thorax: no SOB or cough  	  Cardiovascular: no palpitations or chest pain    Gastrointestinal: no abdomenal pain or blood in stool     Genitourinary: no dysuria or frequency    Musculoskeletal: no joint pains or weakness	    Neurological:no weakness, numbness , or tingling    MEDICATIONS  (STANDING):  atorvastatin 20 milliGRAM(s) Oral at bedtime  aztreonam  IVPB      aztreonam  IVPB 2000 milliGRAM(s) IV Intermittent every 8 hours  dextrose 5%. 1000 milliLiter(s) IV Continuous <Continuous>  dextrose 5%. 1000 milliLiter(s) IV Continuous <Continuous>  dextrose 50% Injectable 25 Gram(s) IV Push once  dextrose 50% Injectable 25 Gram(s) IV Push once  dextrose 50% Injectable 12.5 Gram(s) IV Push once  furosemide   Injectable 40 milliGRAM(s) IV Push daily  gabapentin 800 milliGRAM(s) Oral four times a day  glucagon  Injectable 1 milliGRAM(s) IntraMuscular once  insulin glargine Injectable (LANTUS) 10 Unit(s) SubCutaneous at bedtime  insulin lispro (ADMELOG) corrective regimen sliding scale   SubCutaneous three times a day before meals  levothyroxine 300 MICROGram(s) Oral <User Schedule>  pantoprazole  Injectable 40 milliGRAM(s) IV Push two times a day  predniSONE   Tablet 10 milliGRAM(s) Oral daily    ALLERGIES: vancomycin  penicillin        SOCIAL HISTORY:  ____________________________________  Social History:  Lives with family  Ambulates with walker  FAMILY HISTORY: ____________________________________  FAMILY HISTORY:        VITAL SIGNS LAST 24 HOURS:  T(F): 97.6 (10-31 @ 05:01), Max: 100.7 (10-30 @ 16:23)  HR: 82 (10-31 @ 05:01) (82 - 104)  BP: 105/72 (10-31 @ 05:01) (97/63 - 132/74)  RR: 18 (10-31 @ 05:01) (18 - 18)    ___________________________________  PHYSICAL EXAM:     The patient was alert and oriented X 3, well nourished, and in no  apparent distress.  OP showed no ulcerations  There was no visible lymphadenopathy.  Conjunctiva were non injected  There was no clubbing or edema of extremities.  The scalp, hair, face, eyebrows, lips, OP, neck, chest, back,   extremities X 4, nails were examined.  There was no hyperhidrosis or bromhidrosis.    Of note on skin exam: atrophic pink scars with surrounding purple purpuric patches on b/l posterior thigh     ____________________________________    LABS:                        7.4    7.32  )-----------( 360      ( 31 Oct 2024 06:15 )             24.6     10-31    137  |  101  |  22  ----------------------------<  162[H]  4.0   |  27  |  0.87    Ca    7.8[L]      31 Oct 2024 06:12    TPro  7.6  /  Alb  3.1[L]  /  TBili  0.5  /  DBili  x   /  AST  45[H]  /  ALT  33  /  AlkPhos  86  10-29      Urinalysis Basic - ( 31 Oct 2024 06:12 )    Color: x / Appearance: x / SG: x / pH: x  Gluc: 162 mg/dL / Ketone: x  / Bili: x / Urobili: x   Blood: x / Protein: x / Nitrite: x   Leuk Esterase: x / RBC: x / WBC x   Sq Epi: x / Non Sq Epi: x / Bacteria: x

## 2024-10-30 NOTE — ADVANCED PRACTICE NURSE CONSULT - REASON FOR CONSULT
Vascular Access Team    Evaluation for: Bedside Midline placement  Requested by name: Salena Storey  Date/Time: 10/30 @ 16:34    Indication: IV access  Allergy to CHG or Heparin or Lidocaine: no    Platelets(>20): 386  INR(<3): 1.02  eGFR(>40): 72  Blood cultures sent: 10/30  Blood culture negative in 48hrs: pending  Anticoagulants: no  Arms DVT: no  Mastectomy: no  Fistula: no  PPM/Defib: no  IR or Nephrology or ID clearance needed: no        Plan: Bedside midline order evaluated.

## 2024-10-30 NOTE — PROGRESS NOTE ADULT - SUBJECTIVE AND OBJECTIVE BOX
Chief Complaint:  Patient is a 74y old  Female who presents with a chief complaint of Symptomatic anemia, Syncope (30 Oct 2024 14:49)      Interval Events:   -hemoglobin stable   -new oxygen requirement    Allergies:  vancomycin ([facility])  penicillin (Unknown)      Hospital Medications:  acetaminophen     Tablet .. 650 milliGRAM(s) Oral every 6 hours PRN  atorvastatin 20 milliGRAM(s) Oral at bedtime  aztreonam  IVPB      aztreonam  IVPB 2000 milliGRAM(s) IV Intermittent every 8 hours  dextrose 5%. 1000 milliLiter(s) IV Continuous <Continuous>  dextrose 5%. 1000 milliLiter(s) IV Continuous <Continuous>  dextrose 50% Injectable 25 Gram(s) IV Push once  dextrose 50% Injectable 12.5 Gram(s) IV Push once  dextrose 50% Injectable 25 Gram(s) IV Push once  dextrose Oral Gel 15 Gram(s) Oral once PRN  furosemide   Injectable 40 milliGRAM(s) IV Push daily  gabapentin 800 milliGRAM(s) Oral four times a day  glucagon  Injectable 1 milliGRAM(s) IntraMuscular once  insulin glargine Injectable (LANTUS) 10 Unit(s) SubCutaneous at bedtime  insulin lispro (ADMELOG) corrective regimen sliding scale   SubCutaneous three times a day before meals  levothyroxine 300 MICROGram(s) Oral <User Schedule>  melatonin 3 milliGRAM(s) Oral at bedtime PRN  pantoprazole  Injectable 40 milliGRAM(s) IV Push two times a day  predniSONE   Tablet 10 milliGRAM(s) Oral daily        PHYSICAL EXAM:   Vital Signs:  Vital Signs Last 24 Hrs  T(C): 37.5 (30 Oct 2024 11:14), Max: 38.4 (29 Oct 2024 18:52)  T(F): 99.5 (30 Oct 2024 11:14), Max: 101.2 (29 Oct 2024 18:52)  HR: 104 (30 Oct 2024 13:30) (101 - 122)  BP: 132/74 (30 Oct 2024 13:30) (97/63 - 132/74)  BP(mean): --  RR: 18 (30 Oct 2024 11:14) (18 - 18)  SpO2: 93% (30 Oct 2024 13:30) (92% - 98%)    Parameters below as of 30 Oct 2024 13:30  Patient On (Oxygen Delivery Method): room air  O2 Flow (L/min): 2    Daily         GENERAL:  No acute distress  HEENT:  NCAT, no scleral icterus  CHEST: no resp distress, on 2L NC   HEART:  RRR  ABDOMEN:  Soft, non-tender, non-distended, normoactive bowel sounds, no masses, no hepato-splenomegaly, no signs of chronic liver disease  EXTREMITIES:   LE edema   SKIN:  No rash/erythema/ecchymoses/petechiae/wounds/abscess/warm/dry  NEURO:  Alert and oriented x 3, no asterixis, no tremor    LABS:                        7.7    10.97 )-----------( 386      ( 30 Oct 2024 06:22 )             25.6     Mean Cell Volume: 97.0 fl (10-30-24 @ 06:22)    10-30    130[L]  |  99  |  22  ----------------------------<  184[H]  4.6   |  24  |  0.86    Ca    7.9[L]      30 Oct 2024 06:22  Mg     1.8     10-28    TPro  7.6  /  Alb  3.1[L]  /  TBili  0.5  /  DBili  x   /  AST  45[H]  /  ALT  33  /  AlkPhos  86  10-29    LIVER FUNCTIONS - ( 29 Oct 2024 20:51 )  Alb: 3.1 g/dL / Pro: 7.6 g/dL / ALK PHOS: 86 U/L / ALT: 33 U/L / AST: 45 U/L / GGT: x           PT/INR - ( 28 Oct 2024 16:31 )   PT: 11.6 sec;   INR: 1.02 ratio         PTT - ( 28 Oct 2024 16:31 )  PTT:25.2 sec  Urinalysis Basic - ( 30 Oct 2024 06:22 )    Color: x / Appearance: x / SG: x / pH: x  Gluc: 184 mg/dL / Ketone: x  / Bili: x / Urobili: x   Blood: x / Protein: x / Nitrite: x   Leuk Esterase: x / RBC: x / WBC x   Sq Epi: x / Non Sq Epi: x / Bacteria: x            Imaging:      < from: TTE W or WO Ultrasound Enhancing Agent (10.30.24 @ 08:31) >  CONCLUSIONS:      1. Left ventricular systolic function is severely decreased with an ejection fraction visually estimated at 25 to 30 %.   2. The right ventricle is not well visualized.   3. Mitral valve was not well visualized.   4. Aortic valve was not well visualized.   5. No pericardial effusion seen.   6. Estimated pulmonary artery systolic pressure is 43 mmHg.   7. Moderate mitral valve stenosis.   8. Severe aortic stenosis.   9. 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  10. There is no evidence of a left ventricular thrombus.  11. There is normal LV mass and concentric remodeling.      < end of copied text >         Chief Complaint:  Patient is a 74y old  Female who presents with a chief complaint of Symptomatic anemia, Syncope (30 Oct 2024 14:49)      Interval Events:   -hemoglobin stable   -new oxygen requirement    Allergies:  vancomycin ([facility])  penicillin (Unknown)      Hospital Medications:  acetaminophen     Tablet .. 650 milliGRAM(s) Oral every 6 hours PRN  atorvastatin 20 milliGRAM(s) Oral at bedtime  aztreonam  IVPB      aztreonam  IVPB 2000 milliGRAM(s) IV Intermittent every 8 hours  dextrose 5%. 1000 milliLiter(s) IV Continuous <Continuous>  dextrose 5%. 1000 milliLiter(s) IV Continuous <Continuous>  dextrose 50% Injectable 25 Gram(s) IV Push once  dextrose 50% Injectable 12.5 Gram(s) IV Push once  dextrose 50% Injectable 25 Gram(s) IV Push once  dextrose Oral Gel 15 Gram(s) Oral once PRN  furosemide   Injectable 40 milliGRAM(s) IV Push daily  gabapentin 800 milliGRAM(s) Oral four times a day  glucagon  Injectable 1 milliGRAM(s) IntraMuscular once  insulin glargine Injectable (LANTUS) 10 Unit(s) SubCutaneous at bedtime  insulin lispro (ADMELOG) corrective regimen sliding scale   SubCutaneous three times a day before meals  levothyroxine 300 MICROGram(s) Oral <User Schedule>  melatonin 3 milliGRAM(s) Oral at bedtime PRN  pantoprazole  Injectable 40 milliGRAM(s) IV Push two times a day  predniSONE   Tablet 10 milliGRAM(s) Oral daily        PHYSICAL EXAM:   Vital Signs:  Vital Signs Last 24 Hrs  T(C): 37.5 (30 Oct 2024 11:14), Max: 38.4 (29 Oct 2024 18:52)  T(F): 99.5 (30 Oct 2024 11:14), Max: 101.2 (29 Oct 2024 18:52)  HR: 104 (30 Oct 2024 13:30) (101 - 122)  BP: 132/74 (30 Oct 2024 13:30) (97/63 - 132/74)  BP(mean): --  RR: 18 (30 Oct 2024 11:14) (18 - 18)  SpO2: 93% (30 Oct 2024 13:30) (92% - 98%)    Parameters below as of 30 Oct 2024 13:30  Patient On (Oxygen Delivery Method): room air  O2 Flow (L/min): 2    Daily         GENERAL:  No acute distress  HEENT:  NCAT, no scleral icterus  CHEST: no resp distress, on 2L NC   HEART:  RRR  ABDOMEN:  Soft, non-tender, non-distended, normoactive bowel sounds, no masses, no hepato-splenomegaly, no signs of chronic liver disease  EXTREMITIES:   LE edema   SKIN:  No rash/erythema/ecchymoses/petechiae/wounds/abscess/warm/dry  NEURO:  Alert and oriented x 3, no asterixis, no tremor    LABS:                        7.7    10.97 )-----------( 386      ( 30 Oct 2024 06:22 )             25.6     Mean Cell Volume: 97.0 fl (10-30-24 @ 06:22)    10-30    130[L]  |  99  |  22  ----------------------------<  184[H]  4.6   |  24  |  0.86    Ca    7.9[L]      30 Oct 2024 06:22  Mg     1.8     10-28    TPro  7.6  /  Alb  3.1[L]  /  TBili  0.5  /  DBili  x   /  AST  45[H]  /  ALT  33  /  AlkPhos  86  10-29    LIVER FUNCTIONS - ( 29 Oct 2024 20:51 )  Alb: 3.1 g/dL / Pro: 7.6 g/dL / ALK PHOS: 86 U/L / ALT: 33 U/L / AST: 45 U/L / GGT: x           PT/INR - ( 28 Oct 2024 16:31 )   PT: 11.6 sec;   INR: 1.02 ratio         PTT - ( 28 Oct 2024 16:31 )  PTT:25.2 sec  Urinalysis Basic - ( 30 Oct 2024 06:22 )    Color: x / Appearance: x / SG: x / pH: x  Gluc: 184 mg/dL / Ketone: x  / Bili: x / Urobili: x   Blood: x / Protein: x / Nitrite: x   Leuk Esterase: x / RBC: x / WBC x   Sq Epi: x / Non Sq Epi: x / Bacteria: x      Iron with Total Binding Capacity (10.29.24 @ 12:09)    Iron - Total Binding Capacity.: 339 ug/dL   % Saturation, Iron: 15 %   Iron Total: 51 ug/dL   Unsaturated Iron Binding Capacity: 288 ug/dL    Ferritin: 22 ng/mL (10.29.24 @ 12:09)          Imaging:      < from: TTE W or WO Ultrasound Enhancing Agent (10.30.24 @ 08:31) >  CONCLUSIONS:      1. Left ventricular systolic function is severely decreased with an ejection fraction visually estimated at 25 to 30 %.   2. The right ventricle is not well visualized.   3. Mitral valve was not well visualized.   4. Aortic valve was not well visualized.   5. No pericardial effusion seen.   6. Estimated pulmonary artery systolic pressure is 43 mmHg.   7. Moderate mitral valve stenosis.   8. Severe aortic stenosis.   9. 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  10. There is no evidence of a left ventricular thrombus.  11. There is normal LV mass and concentric remodeling.      < end of copied text >

## 2024-10-30 NOTE — PROGRESS NOTE ADULT - ASSESSMENT
74F w/Hx of chronic anemia, bullous pemphigoid on IVIG, HTN, HLD, DM, hypothyroidism, chronic back pain presents after syncopal episode, found to be anemic to 5.1.   GI consulted for anemia. Patient denies any overt GI bleeding, though does have risk factors for GI bleed including nsaid use and chronic steroids.   Ddx for GI causes include pud, avm, malignancy, gastritis, diverticulosis. May also be unrelated to GI causes given known chronic anemia.     New diagnosis of HFrEF, AS  and new oxygen requirement, likely volume overloaded from blood transfusions.       #Normocytic anemia  #HFrEF  #Severe AS     Recommendations:  -plan for EGD/colonoscopy when medically optimized, off oxygen   -diuresis per primary team   -monitor Hgb, transfuse for <7    Note incomplete until finalized by attending signature/attestation.    Antonieta Shah  GI/Hepatology Fellow    MONDAY-FRIDAY 8AM-5PM:  Pager# [phone] ([facility]) or [phone] ([facility])    NON-URGENT CONSULTS:  Please email [email or web address]  AT NIGHT AND ON WEEKENDS:  Contact on-call GI fellow from 5pm-8am and on weekends/holidays

## 2024-10-30 NOTE — PROGRESS NOTE ADULT - PROBLEM SELECTOR PLAN 3
unclear cause of her anemia. no evidence of hemolysis  Possible causes include occult GI bleeding. --> GI eval appreciated and pt to eventually get EGD/Colonoscopy.  Iron studies taken after blood transfusion so we are unable to use these results  TTE showed - systolic heart failure and severe aortic stenosis

## 2024-10-30 NOTE — PROGRESS NOTE ADULT - SUBJECTIVE AND OBJECTIVE BOX
24  Sandra ORTIZ Early : 1958 Sex: female  Age: 65 y.o.    Chief Complaint   Patient presents with    Cough     Cough, chest congestion, wheezing,a little sob, headache, fatigue, diarrhea, this started Monday       Patient started 2 days ago with cough, congestion, shortness of breath and wheezing and headache.  She denies fever, chills, sweats, nausea, vomiting, diarrhea.  Patient has been monitoring her blood pressures at home like we have asked her to because she does tend to get elevated blood pressures when she is here in the office her blood pressures have been around 114/76 up to 138/80 at home.  Today in the office she is 148/90.        Review of Systems   Constitutional:  Negative for activity change, appetite change, chills, diaphoresis, fatigue, fever and unexpected weight change.   HENT:  Positive for congestion. Negative for dental problem, drooling, ear discharge, ear pain, facial swelling, hearing loss, mouth sores, nosebleeds, postnasal drip, rhinorrhea, sinus pressure, sinus pain, sneezing, sore throat, tinnitus, trouble swallowing and voice change.    Eyes:  Negative for photophobia, pain, discharge, redness, itching and visual disturbance.   Respiratory:  Positive for cough, chest tightness, shortness of breath and wheezing. Negative for apnea, choking and stridor.    Cardiovascular:  Negative for chest pain, palpitations and leg swelling.   Gastrointestinal:  Negative for abdominal distention, abdominal pain, anal bleeding, blood in stool, constipation, diarrhea, nausea, rectal pain and vomiting.   Endocrine: Negative for cold intolerance, heat intolerance, polydipsia, polyphagia and polyuria.   Genitourinary:  Negative for decreased urine volume, difficulty urinating, dysuria, enuresis, flank pain, frequency, genital sores, hematuria and urgency.   Musculoskeletal:  Negative for arthralgias, back pain, gait problem, joint swelling, myalgias, neck pain and neck stiffness.   Skin:   DATE OF SERVICE: 10-30-24 @ 10:27    Patient is a 74y old  Female who presents with a chief complaint of Symptomatic anemia, Syncope (30 Oct 2024 08:58)      INTERVAL HISTORY: no events    REVIEW OF SYSTEMS:  CONSTITUTIONAL: No weakness  EYES/ENT: No visual changes;  No throat pain   NECK: No pain or stiffness  RESPIRATORY: No cough, wheezing; No shortness of breath  CARDIOVASCULAR: No chest pain or palpitations  GASTROINTESTINAL: No abdominal  pain. No nausea, vomiting, or hematemesis  GENITOURINARY: No dysuria, frequency or hematuria  NEUROLOGICAL: No stroke like symptoms  SKIN: No rashes    TELEMETRY Personally reviewed:  	  MEDICATIONS:        PHYSICAL EXAM:  T(C): 37.2 (10-30-24 @ 08:09), Max: 38.4 (10-29-24 @ 18:52)  HR: 101 (10-30-24 @ 08:09) (93 - 122)  BP: 101/65 (10-30-24 @ 08:09) (98/57 - 129/81)  RR: 18 (10-30-24 @ 08:09) (18 - 18)  SpO2: 96% (10-30-24 @ 08:09) (92% - 98%)  Wt(kg): --  I&O's Summary    29 Oct 2024 07:01  -  30 Oct 2024 07:00  --------------------------------------------------------  IN: 0 mL / OUT: 650 mL / NET: -650 mL          Appearance: In no distress	  HEENT:    PERRL, EOMI	  Cardiovascular:  S1 S2, No JVD  Respiratory: Lungs clear to auscultation	  Gastrointestinal:  Soft, Non-tender, + BS	  Vascularature:  No edema of LE  Psychiatric: Appropriate affect   Neuro: no acute focal deficits                               7.7    10.97 )-----------( 386      ( 30 Oct 2024 06:22 )             25.6     10-30    130[L]  |  99  |  22  ----------------------------<  184[H]  4.6   |  24  |  0.86    Ca    7.9[L]      30 Oct 2024 06:22  Mg     1.8     10-28    TPro  7.6  /  Alb  3.1[L]  /  TBili  0.5  /  DBili  x   /  AST  45[H]  /  ALT  33  /  AlkPhos  86  10-29        Labs personally reviewed      ASSESSMENT/PLAN: 	      74F w/Hx of chronic anemia, bullous pemphigoid on IVIG, HTN, HLD, DM, hypothyroidism, chronic back pain presents after syncopal episode. 1 minute of CPR was initiated in the field by EMS because there was concern of pulseness with unresponsiveness. Patient was hypotensive upon arrival and found to have a hgb of 5.1. Patient reports she syncopized after receiving IVIG. Reports she had been feeling lightheaded and SOB for a few days prior to this event. Has had bout of anemia in the past treated with iron supplementation, folate and B12. She denies any hematuria, melena, gingival bleeding. She reports significant improvement of symptoms since receiving 2u of pRBCs. Her only current complaint is acute on chronic back pain from her stretcher. Patient denies fever, chills, chest pain, SOB, headache, dizziness, abd pain, nausea, vomiting, constipation, dysuria.      Problem/Plan #1:  Problem: Syncope   - Likely symptomatic anemia  - Trop 26 --> 28  -EKG ischemic but in setting of Hb of 5.1  - POCUS noted no pericardial effusion, globally reduced LV function; Will obtain formal TTE.  - Replete Hgb > 7     Problem/Plan #2:  Problem: Hypertension  - Home lisinopril 2.5mg PO daily on hold    Problem/Plan #3:  Problem: HLD  - c/w atorvastatin 20mg PO daily    Problem/Plan #4:  Problem: Cardiac Risk Stratification  - Symptomatic anemia  - No hx of severe valvular dysfunction or tachy yomaira arrhythmia  - Mod risk for low risk EGD/colon pending review of TTE        MADHURI Choi DO [facility]  Cardiovascular Medicine  [address] [place] [address], [address]  Available through call or text on [employer] TEAM[employer]  Office: [phone]     DATE OF SERVICE: 10-30-24 @ 10:27    Patient is a 74y old  Female who presents with a chief complaint of Symptomatic anemia, Syncope (30 Oct 2024 08:58)      INTERVAL HISTORY: no events    REVIEW OF SYSTEMS:  CONSTITUTIONAL: No weakness  EYES/ENT: No visual changes;  No throat pain   NECK: No pain or stiffness  RESPIRATORY: No cough, wheezing; No shortness of breath  CARDIOVASCULAR: No chest pain or palpitations  GASTROINTESTINAL: No abdominal  pain. No nausea, vomiting, or hematemesis  GENITOURINARY: No dysuria, frequency or hematuria  NEUROLOGICAL: No stroke like symptoms  SKIN: No rashes    TELEMETRY Personally reviewed: sinus rhythm 100-110  	  MEDICATIONS:        PHYSICAL EXAM:  T(C): 37.2 (10-30-24 @ 08:09), Max: 38.4 (10-29-24 @ 18:52)  HR: 101 (10-30-24 @ 08:09) (93 - 122)  BP: 101/65 (10-30-24 @ 08:09) (98/57 - 129/81)  RR: 18 (10-30-24 @ 08:09) (18 - 18)  SpO2: 96% (10-30-24 @ 08:09) (92% - 98%)  Wt(kg): --  I&O's Summary    29 Oct 2024 07:01  -  30 Oct 2024 07:00  --------------------------------------------------------  IN: 0 mL / OUT: 650 mL / NET: -650 mL          Appearance: In no distress	  HEENT:    PERRL, EOMI	  Cardiovascular:  S1 S2, No JVD  Respiratory: Lungs clear to auscultation	  Gastrointestinal:  Soft, Non-tender, + BS	  Vascularature:  No edema of LE  Psychiatric: Appropriate affect   Neuro: no acute focal deficits                               7.7    10.97 )-----------( 386      ( 30 Oct 2024 06:22 )             25.6     10-30    130[L]  |  99  |  22  ----------------------------<  184[H]  4.6   |  24  |  0.86    Ca    7.9[L]      30 Oct 2024 06:22  Mg     1.8     10-28    TPro  7.6  /  Alb  3.1[L]  /  TBili  0.5  /  DBili  x   /  AST  45[H]  /  ALT  33  /  AlkPhos  86  10-29        Labs personally reviewed    < from: TTE W or WO Ultrasound Enhancing Agent (10.30.24 @ 08:31) >  CONCLUSIONS:      1. Left ventricular systolic function is severely decreased with an ejection fraction visually estimated at 25 to 30 %.   2. The right ventricle is not well visualized.   3. Mitral valve was not well visualized.   4. Aortic valve was not well visualized.   5. No pericardial effusion seen.   6. Estimated pulmonary artery systolic pressure is 43 mmHg.   7. Moderate mitral valve stenosis.   8. Severe aortic stenosis.   9. 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  10. There is no evidence of a left ventricular thrombus.  11. There is normal LV mass and concentric remodeling.    < end of copied text >      ASSESSMENT/PLAN: 	      74F w/Hx of chronic anemia, bullous pemphigoid on IVIG, HTN, HLD, DM, hypothyroidism, chronic back pain presents after syncopal episode. 1 minute of CPR was initiated in the field by EMS because there was concern of pulseness with unresponsiveness. Patient was hypotensive upon arrival and found to have a hgb of 5.1. Patient reports she syncopized after receiving IVIG. Reports she had been feeling lightheaded and SOB for a few days prior to this event. Has had bout of anemia in the past treated with iron supplementation, folate and B12. She denies any hematuria, melena, gingival bleeding. She reports significant improvement of symptoms since receiving 2u of pRBCs. Her only current complaint is acute on chronic back pain from her stretcher. Patient denies fever, chills, chest pain, SOB, headache, dizziness, abd pain, nausea, vomiting, constipation, dysuria.      Problem/Plan #1:  Problem: Syncope   - Likely symptomatic anemia  - Trop 26 --> 28  - EKG ischemic but in setting of Hb of 5.1  - POCUS noted no pericardial effusion, globally reduced LV function; Will obtain formal TTE.  - Replete Hgb > 7     Problem/Plan #2:  Problem: Hypertension  - Home lisinopril 2.5mg PO daily on hold    Problem/Plan #3:  Problem: HLD  - c/w atorvastatin 20mg PO daily    Problem/Plan #4:  Problem: Cardiac Risk Stratification  - Symptomatic anemia  - Echo with reduced EF noted & severe aortic stenosis, however patient needs GI bleed addressed first prior to potential coronary revascularization which would require DAPT  - High risk for low risk nonelective EGD/Colonoscopy 2/2 systolic HF and severe aortic stenosis   - Consider cardiac anesthesiologist for case    Problem/Plan #5:  Problem: Systolic HF  - TTE 10/30 with EF 25-30%  - Recommend Toprol 25mg,  - Further GDMT as BP tolerates.   - Given cannot r/o CAD, recommend ASA 81mg + Lipitor 40mg for potential CAD when safe    Problem/Plan #6:  - Problem: Severe Aortic Stenosis  - Will consult structural cardio once GI bleed addressed.       MADHURI Choi DO [facility]  Cardiovascular Medicine  [address] [place] [address], [address]  Available through call or text on [employer] TEAMs  Office: [phone]     DATE OF SERVICE: 10-30-24 @ 10:27    Patient is a 74y old  Female who presents with a chief complaint of Symptomatic anemia, Syncope (30 Oct 2024 08:58)      INTERVAL HISTORY: no events    REVIEW OF SYSTEMS:  CONSTITUTIONAL: No weakness  EYES/ENT: No visual changes;  No throat pain   NECK: No pain or stiffness  RESPIRATORY: No cough, wheezing; No shortness of breath  CARDIOVASCULAR: No chest pain or palpitations  GASTROINTESTINAL: No abdominal  pain. No nausea, vomiting, or hematemesis  GENITOURINARY: No dysuria, frequency or hematuria  NEUROLOGICAL: No stroke like symptoms  SKIN: No rashes    TELEMETRY Personally reviewed: sinus rhythm 100-110  	  MEDICATIONS:        PHYSICAL EXAM:  T(C): 37.2 (10-30-24 @ 08:09), Max: 38.4 (10-29-24 @ 18:52)  HR: 101 (10-30-24 @ 08:09) (93 - 122)  BP: 101/65 (10-30-24 @ 08:09) (98/57 - 129/81)  RR: 18 (10-30-24 @ 08:09) (18 - 18)  SpO2: 96% (10-30-24 @ 08:09) (92% - 98%)  Wt(kg): --  I&O's Summary    29 Oct 2024 07:01  -  30 Oct 2024 07:00  --------------------------------------------------------  IN: 0 mL / OUT: 650 mL / NET: -650 mL          Appearance: In no distress	  HEENT:    PERRL, EOMI	  Cardiovascular:  S1 S2, No JVD  Respiratory: Lungs clear to auscultation	  Gastrointestinal:  Soft, Non-tender, + BS	  Vascularature:  No edema of LE  Psychiatric: Appropriate affect   Neuro: no acute focal deficits                               7.7    10.97 )-----------( 386      ( 30 Oct 2024 06:22 )             25.6     10-30    130[L]  |  99  |  22  ----------------------------<  184[H]  4.6   |  24  |  0.86    Ca    7.9[L]      30 Oct 2024 06:22  Mg     1.8     10-28    TPro  7.6  /  Alb  3.1[L]  /  TBili  0.5  /  DBili  x   /  AST  45[H]  /  ALT  33  /  AlkPhos  86  10-29        Labs personally reviewed    < from: TTE W or WO Ultrasound Enhancing Agent (10.30.24 @ 08:31) >  CONCLUSIONS:      1. Left ventricular systolic function is severely decreased with an ejection fraction visually estimated at 25 to 30 %.   2. The right ventricle is not well visualized.   3. Mitral valve was not well visualized.   4. Aortic valve was not well visualized.   5. No pericardial effusion seen.   6. Estimated pulmonary artery systolic pressure is 43 mmHg.   7. Moderate mitral valve stenosis.   8. Severe aortic stenosis.   9. 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  10. There is no evidence of a left ventricular thrombus.  11. There is normal LV mass and concentric remodeling.    < end of copied text >            ASSESSMENT/PLAN:   	  74F w/Hx of chronic anemia, bullous pemphigoid on IVIG, HTN, HLD, DM, hypothyroidism, chronic back pain presents after syncopal episode. 1 minute of CPR was initiated in the field by EMS because there was concern of pulseness with unresponsiveness. Patient was hypotensive upon arrival and found to have a hgb of 5.1. Patient reports she syncopized after receiving IVIG. Reports she had been feeling lightheaded and SOB for a few days prior to this event. Has had bout of anemia in the past treated with iron supplementation, folate and B12. She denies any hematuria, melena, gingival bleeding. She reports significant improvement of symptoms since receiving 2u of pRBCs. Her only current complaint is acute on chronic back pain from her stretcher. Patient denies fever, chills, chest pain, SOB, headache, dizziness, abd pain, nausea, vomiting, constipation, dysuria.      Problem/Plan #1:  Problem: Syncope   - Likely symptomatic anemia  - Trop 26 --> 28  - EKG ischemic but likely in setting of Hb of 5.1  - POCUS noted no pericardial effusion, globally reduced LV function; Will obtain formal TTE.  - Replete Hgb > 7     Problem/Plan #2:  Problem: Hypertension  - Home lisinopril 2.5mg PO daily on hold    Problem/Plan #3:  Problem: HLD  - c/w atorvastatin 20mg PO daily    Problem/Plan #4:  Problem: Cardiac Risk Stratification  - Symptomatic anemia  - Echo with reduced EF noted & severe aortic stenosis, however patient needs GI bleed addressed first prior to potential coronary revascularization which would require DAPT  - High risk for low risk nonelective EGD/Colonoscopy 2/2 systolic HF and severe aortic stenosis   - Consider cardiac anesthesiologist for case    Problem/Plan #5:  Problem: Systolic HF  - TTE 10/30 with EF 25-30%  - Recommend Toprol 25mg,  - Further GDMT as BP tolerates.   - Lasix 40mg IV x1 as overloaded   - Given cannot r/o CAD, recommend ASA 81mg + Lipitor 40mg for potential CAD when safe    Problem/Plan #6:  - Problem: Severe Aortic Stenosis  - Will consult structural cardio once GI bleed addressed.           MADHURI Choi DO [facility]  Cardiovascular Medicine  [address] [place] [address], [address]  Available through call or text on [employer] TEAM[employer]  Office: [phone]

## 2024-10-30 NOTE — PHYSICAL THERAPY INITIAL EVALUATION ADULT - NSPTDISCHREC_GEN_A_CORE
if pt to go home, will require home PT and assistance for ALL functional activities/mobility, pt owns all necessary DME/Sub-acute Rehab

## 2024-10-30 NOTE — PHYSICAL THERAPY INITIAL EVALUATION ADULT - DISCHARGE PLANNER MADE AWARE
Pt comes from [facility] last night, diagnosed 3 days ago w/ salmonella, c/o weakness and nausea, vomited yesterday, states she feels feverish at night time, currently taking abx, mask in place yes

## 2024-10-30 NOTE — CONSULT NOTE ADULT - ASSESSMENT
Impression:    Sacral/bilateral Buttocks and bilateral ischium deep tissue injury present on admission  Bullous Pemphigoid  Incontinence of bowel and bladder  Incontinence Dermatitis    Recommend:  1.) Consider Dermatology for management of Bullous Pemphigoid   2.) Incontinence Management - incontinence cleanser, pads, pericare BID  3.) Maintain on an alternating air with low air loss surface  4.) Turn and reposition Q 2 hours  5.) Nutrition optimization - please add Alo  6.) Offload heels/feet with complete cair air fluidized boots/pillows; ensure that the soles of the feet are not resting on the foot board of the bed.  7.) chair cushion for chair sitting    Care as per medicine. Will not actively follow but will remain available. Please recall for new issues or deterioration.  Upon discharge f/[facility] outpatient at [facility] [address] [place] [address] [phone]  Thank you for this consult  Cinthia Foss NP-C, CWOCN via TEAMS    Nights/ Weekends/ Holidays please call:  General Surgery Consult pager ([phone]) for emergencies

## 2024-10-30 NOTE — PROGRESS NOTE ADULT - PROBLEM SELECTOR PLAN 1
pt denies h/o heart issues  will need further cardiac w/u  pt is hypoxic likely due to volume overload --> will start lasix 40mg daily  pt will need to be started on GDMT

## 2024-10-30 NOTE — CONSULT NOTE ADULT - SUBJECTIVE AND OBJECTIVE BOX
Wound Surgery Consult Note:    HPI:  74F w/Hx of chronic anemia, bullous pemphigoid on IVIG, HTN, HLD, DM, hypothyroidism, chronic back pain presents after syncopal episode. 1 minute of CPR was initiated in the field by EMS because there was concern of pulseness with unresponsiveness. Patient was hypotensive upon arrival and found to have a hgb of 5.1. Patient reports she syncopized after receiving IVIG. Reports she had been feeling lightheaded and SOB for a few days prior to this event. Has had bout of anemia in the past treated with iron supplementation, folate and B12. She denies any hematuria, melena, gingival bleeding. She reports significant improvement of symptoms since receiving 2u of pRBCs. Her only current complaint is acute on chronic back pain from her stretcher. Patient denies fever, chills, chest pain, SOB, headache, dizziness, abd pain, nausea, vomiting, constipation, dysuria.    Patient has had work up for anemia in the past including bone marrow biopsy in 2014, but no clear diagnosis/cause was ever established. (28 Oct 2024 23:52)    Request for wound care evaluation of the sacrum and bilateral buttocks and ischium received from nursing. Ms. Hannon was encountered on an alternating air with low air loss surface. She was seen with her clinical nurse. She stated that Dr. Ross manages her Bullous Pemphigoid and that she is due for an IVIG infusion.  Discussed recommendation for Dermatology consult with ACP.  She is currently incontinent of stool and urine. Due to the discoloration and her immobility, deep tissue injuries can not be ruled out at this time. Her immobility, inactivity, incontinence of bowel and bladder in addition to poor nutritional status all contribute to her risk of pressure injury development and hinder healing. Principles of pressure injury prevention including but not limited to turning and positioning reviewed with patient.     PAST MEDICAL & SURGICAL HISTORY:  BP (bullous pemphigoid)  HTN (hypertension)  HLD (hyperlipidemia)  Hypothyroidism    REVIEW OF SYSTEMS  CONSTITUTIONAL: no weakness, no fevers or chills  EYES/ENT: No visual changes;  No vertigo or throat pain   MOUTH: No oral lesion, moist  NECK: No pain or stiffness  RESPIRATORY: No cough, wheezing, hemoptysis; No shortness of breath  CARDIOVASCULAR: No chest pain or palpitations  GASTROINTESTINAL: No abdominal or epigastric pain. No nausea, vomiting, or hematemesis; No diarrhea or constipation. No melena or hematochezia.  GENITOURINARY: No dysuria, frequency or hematuria  NEUROLOGICAL: no weakness or numbness  SKIN: history of bullous pemphigoid managed by Dr. Ross from Dermatology  PSYCH: no confusion or altered mental status    MEDICATIONS  (STANDING):  atorvastatin 20 milliGRAM(s) Oral at bedtime  aztreonam  IVPB      aztreonam  IVPB 2000 milliGRAM(s) IV Intermittent every 8 hours  dextrose 5%. 1000 milliLiter(s) (50 mL/Hr) IV Continuous <Continuous>  dextrose 5%. 1000 milliLiter(s) (100 mL/Hr) IV Continuous <Continuous>  dextrose 50% Injectable 25 Gram(s) IV Push once  dextrose 50% Injectable 25 Gram(s) IV Push once  dextrose 50% Injectable 12.5 Gram(s) IV Push once  gabapentin 800 milliGRAM(s) Oral four times a day  glucagon  Injectable 1 milliGRAM(s) IntraMuscular once  insulin lispro (ADMELOG) corrective regimen sliding scale   SubCutaneous three times a day before meals  levothyroxine 300 MICROGram(s) Oral <User Schedule>  pantoprazole  Injectable 40 milliGRAM(s) IV Push two times a day  predniSONE   Tablet 10 milliGRAM(s) Oral daily    MEDICATIONS  (PRN):  acetaminophen     Tablet .. 650 milliGRAM(s) Oral every 6 hours PRN Temp greater or equal to 38C (100.4F), Mild Pain (1 - 3)  dextrose Oral Gel 15 Gram(s) Oral once PRN Blood Glucose LESS THAN 70 milliGRAM(s)/deciliter  melatonin 3 milliGRAM(s) Oral at bedtime PRN Insomnia    Allergies    vancomycin ([facility] [place] [facility])  penicillin (Unknown)    Intolerances    SOCIAL HISTORY:  , Denies smoking, ETOH, drugs    FAMILY HISTORY: no pertinent family history among first degree relatives    Vital Signs Last 24 Hrs  T(C): 37.5 (30 Oct 2024 11:14), Max: 38.4 (29 Oct 2024 18:52)  T(F): 99.5 (30 Oct 2024 11:14), Max: 101.2 (29 Oct 2024 18:52)  HR: 104 (30 Oct 2024 13:30) (101 - 122)  BP: 132/74 (30 Oct 2024 13:30) (97/63 - 132/74)  BP(mean): --  RR: 18 (30 Oct 2024 11:14) (18 - 18)  SpO2: 93% (30 Oct 2024 13:30) (92% - 98%)    Parameters below as of 30 Oct 2024 13:30  Patient On (Oxygen Delivery Method): room air  O2 Flow (L/min): 2    Physical Exam:  General: alert, MO  Ophthamology: sclera clear  ENMT: moist mucous membranes, trachea midline  Respiratory: equal chest rise with respirations  Gastrointestinal: soft NT/ND  Neurology: verbal,  following commands  Psych: calm, cooperative  Musculoskeletal: no contractures  Vascular: BLE edema equal  Skin:  Sacrum/bilateral buttocks and ischium deep maroon discolored skin with multiple intact large bulla, no drainage   No odor, increased warmth, tenderness, induration, fluctuance, erythema    LABS:  10-30    130[L]  |  99  |  22  ----------------------------<  184[H]  4.6   |  24  |  0.86    Ca    7.9[L]      30 Oct 2024 06:22  Mg     1.8     10-28    TPro  7.6  /  Alb  3.1[L]  /  TBili  0.5  /  DBili  x   /  AST  45[H]  /  ALT  33  /  AlkPhos  86  10-29                          7.7    10.97 )-----------( 386      ( 30 Oct 2024 06:22 )             25.6     PT/INR - ( 28 Oct 2024 16:31 )   PT: 11.6 sec;   INR: 1.02 ratio         PTT - ( 28 Oct 2024 16:31 )  PTT:25.2 sec  Urinalysis Basic - ( 30 Oct 2024 06:22 )    Color: x / Appearance: x / SG: x / pH: x  Gluc: 184 mg/dL / Ketone: x  / Bili: x / Urobili: x   Blood: x / Protein: x / Nitrite: x   Leuk Esterase: x / RBC: x / WBC x   Sq Epi: x / Non Sq Epi: x / Bacteria: x

## 2024-10-30 NOTE — CONSULT NOTE ADULT - ASSESSMENT
#Atrophic scars  Patient with scars at site of prior BP, no new lesions. BP well controlled on IVIG  and dupixent  Hypotension, while may be caused by IVIG infusion, could also be from adrenal suppression of chronic prednisone (though has been on 10mg for past 2 years). Since patient denies such episodes in the past, recommend re-challenging IVIG and monitoring if patient can tolerate w/o hypotension  --Check am cortisol  --Skin lesions c/w scarring from prior lesions, no treatment indicated       The patient's chart was reviewed in addition to being seen and examined at bedside with the dermatology attending Dr. Gan.  Recommendations were communicated with the primary team.  Please page [phone] with a 10-digit call-back number for further related questions.    _______________ Thank you for allowing us to participate in the care of this patient.  Please alert Dermatology for any new or worsening developments.    Galina Esquivel MD  Resident Physician, PGY-2  [facility]  Pager: [phone]  Office: [phone] #Bullous pemphigoid  Patient with scars at site of prior BP, no new lesions. BP well controlled on IVIG  and dupixent  Hypotension, while may be caused by IVIG infusion, could also be from adrenal suppression of chronic prednisone (though has been on 10mg for past 2 years). Since patient denies such episodes in the past, recommend re-challenging IVIG and monitoring if patient can tolerate w/o hypotension  --Check am cortisol  --Skin lesions c/w scarring from prior lesions, no treatment indicated       The patient's chart was reviewed in addition to being seen and examined at bedside with the dermatology attending Dr. Gan.  Recommendations were communicated with the primary team.  Please page [phone] with a 10-[phone] call-back number for further related questions.    _______________ Thank you for allowing us to participate in the care of this patient.  Please alert Dermatology for any new or worsening developments.    Galina Esquivel MD  Resident Physician, PGY-2  [facility] Dermat[facility]  Pager: [phone]  Office: [phone]

## 2024-10-30 NOTE — PHYSICAL THERAPY INITIAL EVALUATION ADULT - PERTINENT HX OF CURRENT PROBLEM, REHAB EVAL
74F w/Hx of chronic anemia, bullous pemphigoid on IVIG, HTN, HLD, DM, hypothyroidism, chronic back pain presents after syncopal episode. 1 minute of CPR was initiated in the field by EMS because there was concern of pulseness with unresponsiveness. Patient was hypotensive upon arrival and found to have a hgb of 5.1. Patient reports she syncopized after receiving IVIG. Reports she had been feeling lightheaded and SOB for a few days prior to this event. Has had bout of anemia in the past treated with iron supplementation, folate and B12. She denies any hematuria, melena, gingival bleeding. She reports significant improvement of symptoms since receiving 2u of pRBCs. Her only current complaint is acute on chronic back pain from her stretcher. Patient denies fever, chills, chest pain, SOB, headache, dizziness, abd pain, nausea, vomiting, constipation, dysuria. CT angio chest: No evidence of pulmonary embolism.  Indeterminate 2 cm left adrenal gland nodule. CXR:(-)

## 2024-10-30 NOTE — ADVANCED PRACTICE NURSE CONSULT - ASSESSMENT
Midline Catheter Insertion Note    Catheter type: 4F  : Bard  Power injectable: Yes  Lot# [id number]                                                                                                                                                                                                          Procedure assisted by: DEVON Barfield RN  Time out was preformed, confirming the patient's first and last name, date of birth, procedure, and correct site prior to state of procedure.    Patient was placed with HOB 30 degrees. Patient placement site was prepped with chlorhexidine solution, then draped using maximum sterile barrier protection. The area was injected with 2cc of 1% Lidocaine. Using the Bard Site Rite 8, the catheter was placed using the Modified Seldinger Technique. Strict adherence to outline aseptic technique including handwashing, glove and gown, utilizing mask and cap, plus draping the patient with a sterile drape was observed. Upon completion of line placement, the insertion site was covered with a sterile occlusive CHG dressing. Pt tolerated procedure well.     All materials used for catheter insertion, including the intact guide wires, were accounted for at the end of the procedure.  Number of attempts: 1  Complications/Comments: None    Emergency Placement: No  Site: New  Anatomical Site of insertion: Left Cephalic  Catheter size/length: 4F, 20cm  US guided Bard single lumen power midline placed

## 2024-10-30 NOTE — PROGRESS NOTE ADULT - ASSESSMENT
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

## 2024-10-30 NOTE — PROGRESS NOTE ADULT - SUBJECTIVE AND OBJECTIVE BOX
Andre Reyes, M.D.  Office: [phone]  Available thru Microsoft Teams     Patient is a 74y old  Female who presents with a chief complaint of Symptomatic anemia, Syncope (29 Oct 2024 13:32)          SUBJECTIVE / OVERNIGHT EVENTS:    No acute overnight events.    ROS: ( - ) Fever, ( - )Chills,  ( - )Nausea/Vomiting, ( - ) Cough, ( - )Shortness of breath, ( - )Chest Pain    MEDICATIONS  (STANDING):  atorvastatin 20 milliGRAM(s) Oral at bedtime  aztreonam  IVPB      aztreonam  IVPB 2000 milliGRAM(s) IV Intermittent every 8 hours  dextrose 5%. 1000 milliLiter(s) (50 mL/Hr) IV Continuous <Continuous>  dextrose 5%. 1000 milliLiter(s) (100 mL/Hr) IV Continuous <Continuous>  dextrose 50% Injectable 25 Gram(s) IV Push once  dextrose 50% Injectable 25 Gram(s) IV Push once  dextrose 50% Injectable 12.5 Gram(s) IV Push once  gabapentin 800 milliGRAM(s) Oral four times a day  glucagon  Injectable 1 milliGRAM(s) IntraMuscular once  insulin lispro (ADMELOG) corrective regimen sliding scale   SubCutaneous three times a day before meals  levothyroxine 300 MICROGram(s) Oral <User Schedule>  pantoprazole  Injectable 40 milliGRAM(s) IV Push two times a day  predniSONE   Tablet 10 milliGRAM(s) Oral daily    MEDICATIONS  (PRN):  acetaminophen     Tablet .. 650 milliGRAM(s) Oral every 6 hours PRN Temp greater or equal to 38C (100.4F), Mild Pain (1 - 3)  dextrose Oral Gel 15 Gram(s) Oral once PRN Blood Glucose LESS THAN 70 milliGRAM(s)/deciliter  melatonin 3 milliGRAM(s) Oral at bedtime PRN Insomnia          T(C): 37.2 (10-30 @ 08:09), Max: 38.4 (10-29 @ 18:52)   HR: 101   BP: 101/65   RR: 18   SpO2: 96%    PHYSICAL EXAM:    CONSTITUTIONAL: NAD, well-developed, well-groomed  EYES: PERRLA; conjunctiva and sclera clear  ENMT: Moist oral mucosa, no pharyngeal injection or exudates; normal dentition  NECK: Supple, no palpable masses; no thyromegaly  RESPIRATORY: Normal respiratory effort; lungs are clear to auscultation bilaterally  CARDIOVASCULAR: Regular rate and rhythm, normal S1 and S2, no murmur/rub/gallop; No lower extremity edema; Peripheral pulses are 2+ bilaterally  ABDOMEN: Nontender to palpation, normoactive bowel sounds, no rebound/guarding; No hepatosplenomegaly  MUSCULOSKELETAL:  no clubbing or cyanosis of digits; no joint swelling or tenderness to palpation  PSYCH: A+O to person, place, and time; affect appropriate  NEUROLOGY: CN 2-12 are intact and symmetric; no gross sensory deficits   SKIN: No rashes; no palpable lesions      LABS:                        7.7    10.97 )-----------( 386      ( 30 Oct 2024 06:22 )             25.6      10-30    130[L]  |  99  |  22  ----------------------------<  184[H]  4.6   |  24  |  0.86    Ca    7.9[L]      30 Oct 2024 06:22  Mg     1.8     10-28    TPro  7.6  /  Alb  3.1[L]  /  TBili  0.5  /  DBili  x   /  AST  45[H]  /  ALT  33  /  AlkPhos  86  10-29       CAPILLARY BLOOD GLUCOSE      POCT Blood Glucose.: 224 mg/dL (30 Oct 2024 07:35)  POCT Blood Glucose.: 259 mg/dL (29 Oct 2024 17:28)  POCT Blood Glucose.: 194 mg/dL (29 Oct 2024 11:39)      RADIOLOGY & ADDITIONAL TESTS:    Imaging Personally Reviewed:  Consultant(s) Notes Reviewed:    Care Discussed with Consultants/Other Providers:         Andre Reyes, M.D.  Office: [phone]  Available thru Microsoft Teams     Patient is a 74y old  Female who presents with a chief complaint of Symptomatic anemia, Syncope (29 Oct 2024 13:32)          SUBJECTIVE / OVERNIGHT EVENTS:    No acute overnight events.  pt requiring supplemental oxygen    ROS: ( - ) Fever, ( - )Chills,  ( - )Nausea/Vomiting, ( - ) Cough, ( - )Shortness of breath, ( - )Chest Pain    MEDICATIONS  (STANDING):  atorvastatin 20 milliGRAM(s) Oral at bedtime  aztreonam  IVPB      aztreonam  IVPB 2000 milliGRAM(s) IV Intermittent every 8 hours  dextrose 5%. 1000 milliLiter(s) (50 mL/Hr) IV Continuous <Continuous>  dextrose 5%. 1000 milliLiter(s) (100 mL/Hr) IV Continuous <Continuous>  dextrose 50% Injectable 25 Gram(s) IV Push once  dextrose 50% Injectable 25 Gram(s) IV Push once  dextrose 50% Injectable 12.5 Gram(s) IV Push once  gabapentin 800 milliGRAM(s) Oral four times a day  glucagon  Injectable 1 milliGRAM(s) IntraMuscular once  insulin lispro (ADMELOG) corrective regimen sliding scale   SubCutaneous three times a day before meals  levothyroxine 300 MICROGram(s) Oral <User Schedule>  pantoprazole  Injectable 40 milliGRAM(s) IV Push two times a day  predniSONE   Tablet 10 milliGRAM(s) Oral daily    MEDICATIONS  (PRN):  acetaminophen     Tablet .. 650 milliGRAM(s) Oral every 6 hours PRN Temp greater or equal to 38C (100.4F), Mild Pain (1 - 3)  dextrose Oral Gel 15 Gram(s) Oral once PRN Blood Glucose LESS THAN 70 milliGRAM(s)/deciliter  melatonin 3 milliGRAM(s) Oral at bedtime PRN Insomnia          T(C): 37.2 (10-30 @ 08:09), Max: 38.4 (10-29 @ 18:52)   HR: 101   BP: 101/65   RR: 18   SpO2: 96%    PHYSICAL EXAM:    CONSTITUTIONAL: NAD, well-developed, well-groomed, obese  EYES: PERRLA; conjunctiva and sclera clear  ENMT: Moist oral mucosa, no pharyngeal injection or exudates; normal dentition  RESPIRATORY: Normal respiratory effort; lungs are clear to auscultation bilaterally  CARDIOVASCULAR: Regular rate and rhythm, normal S1 and S2, +murmur; + 2+ lymph edema of the lower extremity  ABDOMEN: Nontender to palpation, normoactive bowel sounds, no rebound/guarding; No hepatosplenomegaly  MUSCULOSKELETAL:  no clubbing or cyanosis of digits; no joint swelling or tenderness to palpation  PSYCH: A+O to person, place, and time; affect appropriate  NEUROLOGY: CN 2-12 are intact and symmetric; no gross sensory deficits   SKIN: No rashes; no palpable lesions      LABS:                        7.7    10.97 )-----------( 386      ( 30 Oct 2024 06:22 )             25.6      10-30    130[L]  |  99  |  22  ----------------------------<  184[H]  4.6   |  24  |  0.86    Ca    7.9[L]      30 Oct 2024 06:22  Mg     1.8     10-28    TPro  7.6  /  Alb  3.1[L]  /  TBili  0.5  /  DBili  x   /  AST  45[H]  /  ALT  33  /  AlkPhos  86  10-29       CAPILLARY BLOOD GLUCOSE      POCT Blood Glucose.: 224 mg/dL (30 Oct 2024 07:35)  POCT Blood Glucose.: 259 mg/dL (29 Oct 2024 17:28)  POCT Blood Glucose.: 194 mg/dL (29 Oct 2024 11:39)      RADIOLOGY & ADDITIONAL TESTS:    Imaging Personally Reviewed:  Consultant(s) Notes Reviewed:    Care Discussed with Consultants/Other Providers:

## 2024-10-30 NOTE — PROGRESS NOTE ADULT - PROBLEM SELECTOR PLAN 4
pt with possible etiologies which have been identified which include severe anemia, acute systolic heart failure (?arrythmia), severe aortic stenosis.

## 2024-10-30 NOTE — PHYSICAL THERAPY INITIAL EVALUATION ADULT - ADDITIONAL COMMENTS
pt lives with spouse in private house, pt independent with mobility, pt owns rolling walker, rollator, wheelchair, electric wheelchair, limited amb distance prior, 4 steps to enter, first floor set up

## 2024-10-31 DIAGNOSIS — R50.9 FEVER, UNSPECIFIED: ICD-10-CM

## 2024-10-31 LAB
ANION GAP SERPL CALC-SCNC: 9 MMOL/L — SIGNIFICANT CHANGE UP (ref 5–17)
APTT BLD: 25.1 SEC — SIGNIFICANT CHANGE UP (ref 24.5–35.6)
BUN SERPL-MCNC: 22 MG/DL — SIGNIFICANT CHANGE UP (ref 7–23)
CALCIUM SERPL-MCNC: 7.8 MG/DL — LOW (ref 8.4–10.5)
CHLORIDE SERPL-SCNC: 101 MMOL/L — SIGNIFICANT CHANGE UP (ref 96–108)
CO2 SERPL-SCNC: 27 MMOL/L — SIGNIFICANT CHANGE UP (ref 22–31)
CORTIS AM PEAK SERPL-MCNC: 24.6 UG/DL — HIGH (ref 6–18.4)
CREAT SERPL-MCNC: 0.87 MG/DL — SIGNIFICANT CHANGE UP (ref 0.5–1.3)
EGFR: 71 ML/MIN/1.73M2 — SIGNIFICANT CHANGE UP
GLUCOSE BLDC GLUCOMTR-MCNC: 209 MG/DL — HIGH (ref 70–99)
GLUCOSE BLDC GLUCOMTR-MCNC: 236 MG/DL — HIGH (ref 70–99)
GLUCOSE BLDC GLUCOMTR-MCNC: 256 MG/DL — HIGH (ref 70–99)
GLUCOSE BLDC GLUCOMTR-MCNC: 304 MG/DL — HIGH (ref 70–99)
GLUCOSE SERPL-MCNC: 162 MG/DL — HIGH (ref 70–99)
HCT VFR BLD CALC: 24.6 % — LOW (ref 34.5–45)
HGB BLD-MCNC: 7.4 G/DL — LOW (ref 11.5–15.5)
INR BLD: 1.15 RATIO — SIGNIFICANT CHANGE UP (ref 0.85–1.16)
MCHC RBC-ENTMCNC: 28.9 PG — SIGNIFICANT CHANGE UP (ref 27–34)
MCHC RBC-ENTMCNC: 30.1 G/DL — LOW (ref 32–36)
MCV RBC AUTO: 96.1 FL — SIGNIFICANT CHANGE UP (ref 80–100)
NRBC # BLD: 0 /100 WBCS — SIGNIFICANT CHANGE UP (ref 0–0)
PLATELET # BLD AUTO: 360 K/UL — SIGNIFICANT CHANGE UP (ref 150–400)
POTASSIUM SERPL-MCNC: 4 MMOL/L — SIGNIFICANT CHANGE UP (ref 3.5–5.3)
POTASSIUM SERPL-SCNC: 4 MMOL/L — SIGNIFICANT CHANGE UP (ref 3.5–5.3)
PROTHROM AB SERPL-ACNC: 13.2 SEC — SIGNIFICANT CHANGE UP (ref 9.9–13.4)
RBC # BLD: 2.56 M/UL — LOW (ref 3.8–5.2)
RBC # FLD: 18.4 % — HIGH (ref 10.3–14.5)
SODIUM SERPL-SCNC: 137 MMOL/L — SIGNIFICANT CHANGE UP (ref 135–145)
WBC # BLD: 7.32 K/UL — SIGNIFICANT CHANGE UP (ref 3.8–10.5)
WBC # FLD AUTO: 7.32 K/UL — SIGNIFICANT CHANGE UP (ref 3.8–10.5)

## 2024-10-31 PROCEDURE — 99233 SBSQ HOSP IP/OBS HIGH 50: CPT

## 2024-10-31 PROCEDURE — 93970 EXTREMITY STUDY: CPT | Mod: 26

## 2024-10-31 PROCEDURE — 99232 SBSQ HOSP IP/OBS MODERATE 35: CPT | Mod: GC

## 2024-10-31 RX ORDER — HEPARIN SODIUM 10000 [USP'U]/ML
5000 INJECTION INTRAVENOUS; SUBCUTANEOUS EVERY 6 HOURS
Refills: 0 | Status: DISCONTINUED | OUTPATIENT
Start: 2024-10-31 | End: 2024-11-05

## 2024-10-31 RX ORDER — HEPARIN SODIUM 10000 [USP'U]/ML
INJECTION INTRAVENOUS; SUBCUTANEOUS
Qty: 25000 | Refills: 0 | Status: DISCONTINUED | OUTPATIENT
Start: 2024-10-31 | End: 2024-11-05

## 2024-10-31 RX ORDER — HEPARIN SODIUM 10000 [USP'U]/ML
10000 INJECTION INTRAVENOUS; SUBCUTANEOUS EVERY 6 HOURS
Refills: 0 | Status: DISCONTINUED | OUTPATIENT
Start: 2024-10-31 | End: 2024-11-05

## 2024-10-31 RX ORDER — ASCORBIC ACID 500 MG
500 TABLET ORAL DAILY
Refills: 0 | Status: DISCONTINUED | OUTPATIENT
Start: 2024-10-31 | End: 2024-11-11

## 2024-10-31 RX ORDER — B-COMPLEX WITH VITAMIN C
1 VIAL (ML) INJECTION DAILY
Refills: 0 | Status: DISCONTINUED | OUTPATIENT
Start: 2024-10-31 | End: 2024-11-11

## 2024-10-31 RX ORDER — METOPROLOL TARTRATE 50 MG
25 TABLET ORAL DAILY
Refills: 0 | Status: DISCONTINUED | OUTPATIENT
Start: 2024-10-31 | End: 2024-11-01

## 2024-10-31 RX ADMIN — Medication 25 MILLIGRAM(S): at 12:26

## 2024-10-31 RX ADMIN — PANTOPRAZOLE SODIUM 40 MILLIGRAM(S): 40 TABLET, DELAYED RELEASE ORAL at 05:08

## 2024-10-31 RX ADMIN — Medication 20 MILLIGRAM(S): at 21:11

## 2024-10-31 RX ADMIN — GABAPENTIN 800 MILLIGRAM(S): 300 CAPSULE ORAL at 05:08

## 2024-10-31 RX ADMIN — Medication 1 TABLET(S): at 17:21

## 2024-10-31 RX ADMIN — HEPARIN SODIUM 2200 UNIT(S)/HR: 10000 INJECTION INTRAVENOUS; SUBCUTANEOUS at 18:03

## 2024-10-31 RX ADMIN — Medication 650 MILLIGRAM(S): at 21:30

## 2024-10-31 RX ADMIN — PANTOPRAZOLE SODIUM 40 MILLIGRAM(S): 40 TABLET, DELAYED RELEASE ORAL at 17:17

## 2024-10-31 RX ADMIN — AZTREONAM 100 MILLIGRAM(S): KIT at 05:09

## 2024-10-31 RX ADMIN — Medication 300 MICROGRAM(S): at 05:08

## 2024-10-31 RX ADMIN — Medication 4: at 17:15

## 2024-10-31 RX ADMIN — Medication 10 MILLIGRAM(S): at 05:08

## 2024-10-31 RX ADMIN — Medication 6: at 11:50

## 2024-10-31 RX ADMIN — Medication 500 MILLIGRAM(S): at 17:21

## 2024-10-31 RX ADMIN — Medication 40 MILLIGRAM(S): at 05:09

## 2024-10-31 RX ADMIN — Medication 10 UNIT(S): at 21:22

## 2024-10-31 RX ADMIN — GABAPENTIN 800 MILLIGRAM(S): 300 CAPSULE ORAL at 11:13

## 2024-10-31 RX ADMIN — Medication 650 MILLIGRAM(S): at 20:28

## 2024-10-31 RX ADMIN — Medication 4: at 08:01

## 2024-10-31 RX ADMIN — GABAPENTIN 800 MILLIGRAM(S): 300 CAPSULE ORAL at 17:20

## 2024-10-31 RX ADMIN — GABAPENTIN 800 MILLIGRAM(S): 300 CAPSULE ORAL at 23:14

## 2024-10-31 NOTE — DIETITIAN INITIAL EVALUATION ADULT - ORAL INTAKE PTA/DIET HISTORY
Patient visited. Per Patient, denies adhering to a therapeutic diet and normal / good appetite prior to admission. Patient reports having 2-3 meals daily. NFKA or intolerances reported. Per Patient, denies micronutrient supplementation and drinking Ensure Max on occasion prior to admission.

## 2024-10-31 NOTE — PROGRESS NOTE ADULT - PROBLEM SELECTOR PLAN 2
The patient continues to have low grade fevers without any focal symptoms currently she's being treated for a possible urinary tract infection with as trio Nam but given the persistent fevers will transition to levaquin for better gram positive coverage. In addition we'll look for other causes of fever like DVT so we'll order a lower extremity duplex

## 2024-10-31 NOTE — DIETITIAN INITIAL EVALUATION ADULT - PERTINENT LABORATORY DATA
10-31    137  |  101  |  22  ----------------------------<  162[H]  4.0   |  27  |  0.87    Ca    7.8[L]      31 Oct 2024 06:12    TPro  7.6  /  Alb  3.1[L]  /  TBili  0.5  /  DBili  x   /  AST  45[H]  /  ALT  33  /  AlkPhos  86  10-29  POCT Blood Glucose.: 256 mg/dL (10-31-24 @ 11:28)  A1C with Estimated Average Glucose Result: 5.3 % (10-30-24 @ 06:22)  A1C with Estimated Average Glucose Result: 5.0 % (10-29-24 @ 06:04)

## 2024-10-31 NOTE — PROGRESS NOTE ADULT - SUBJECTIVE AND OBJECTIVE BOX
DATE OF SERVICE: 10-31-24 @ 09:46    Patient is a 74y old  Female who presents with a chief complaint of Symptomatic anemia, Syncope (31 Oct 2024 08:56)      INTERVAL HISTORY: Feels ok.     REVIEW OF SYSTEMS:  CONSTITUTIONAL: No weakness  EYES/ENT: No visual changes;  No throat pain   NECK: No pain or stiffness  RESPIRATORY: No cough, wheezing; No shortness of breath  CARDIOVASCULAR: No chest pain or palpitations  GASTROINTESTINAL: No abdominal  pain. No nausea, vomiting, or hematemesis  GENITOURINARY: No dysuria, frequency or hematuria  NEUROLOGICAL: No stroke like symptoms  SKIN: No rashes    TELEMETRY Personally reviewed: SR   	  MEDICATIONS:  furosemide   Injectable 40 milliGRAM(s) IV Push daily        PHYSICAL EXAM:  T(C): 36.4 (10-31-24 @ 05:01), Max: 38.2 (10-30-24 @ 16:23)  HR: 82 (10-31-24 @ 05:01) (82 - 104)  BP: 105/72 (10-31-24 @ 05:01) (97/63 - 132/74)  RR: 18 (10-31-24 @ 05:01) (18 - 18)  SpO2: 98% (10-31-24 @ 05:01) (93% - 98%)  Wt(kg): --  I&O's Summary    30 Oct 2024 07:01  -  31 Oct 2024 07:00  --------------------------------------------------------  IN: 580 mL / OUT: 4600 mL / NET: -4020 mL          Appearance: In no distress	  HEENT:    PERRL, EOMI	  Cardiovascular:  S1 S2, No JVD  Respiratory: Lungs clear to auscultation	  Gastrointestinal:  Soft, Non-tender, + BS	  Vascularature:  No edema of LE  Psychiatric: Appropriate affect   Neuro: no acute focal deficits                               7.4    7.32  )-----------( 360      ( 31 Oct 2024 06:15 )             24.6     10-31    137  |  101  |  22  ----------------------------<  162[H]  4.0   |  27  |  0.87    Ca    7.8[L]      31 Oct 2024 06:12    TPro  7.6  /  Alb  3.1[L]  /  TBili  0.5  /  DBili  x   /  AST  45[H]  /  ALT  33  /  AlkPhos  86  10-29        Labs personally reviewed      ASSESSMENT/PLAN: 	    74F w/Hx of chronic anemia, bullous pemphigoid on IVIG, HTN, HLD, DM, hypothyroidism, chronic back pain presents after syncopal episode. 1 minute of CPR was initiated in the field by EMS because there was concern of pulseness with unresponsiveness. Patient was hypotensive upon arrival and found to have a hgb of 5.1. Patient reports she syncopized after receiving IVIG. Reports she had been feeling lightheaded and SOB for a few days prior to this event. Has had bout of anemia in the past treated with iron supplementation, folate and B12. She denies any hematuria, melena, gingival bleeding. She reports significant improvement of symptoms since receiving 2u of pRBCs. Her only current complaint is acute on chronic back pain from her stretcher. Patient denies fever, chills, chest pain, SOB, headache, dizziness, abd pain, nausea, vomiting, constipation, dysuria.      Problem/Plan #1:  Problem: Syncope   - Likely symptomatic anemia  - Trop 26 --> 28  - EKG ischemic but likely in setting of Hb of 5.1  - POCUS noted no pericardial effusion, globally reduced LV function   Echo with reduced EF & severe aortic stenosis, however patient needs GI bleed addressed first prior to potential coronary revascularization which would require DAPT  - Replete Hgb > 7     Problem/Plan #2:  Problem: Hypertension  - Home lisinopril 2.5mg PO daily on hold    Problem/Plan #3:  Problem: HLD  - c/w atorvastatin 20mg PO daily    Problem/Plan #4:  Problem: Cardiac Risk Stratification  - Symptomatic anemia  - Echo with reduced EF noted & severe aortic stenosis, however patient needs GI bleed addressed first prior to potential coronary revascularization which would require DAPT  - High risk for low risk nonelective EGD/Colonoscopy 2/2 systolic HF and severe aortic stenosis   - Consider cardiac anesthesiologist for case    Problem/Plan #5:  Problem: Systolic HF  - TTE 10/30 with EF 25-30%  - Recommend Toprol 25mg,  - Further GDMT as BP tolerates.   - Lasix 40mg IV daily  - Given cannot r/o CAD, recommend ASA 81mg + Lipitor 40mg for potential CAD when safe    Problem/Plan #6:  - Problem: Severe Aortic Stenosis  - Will consult structural cardio once GI bleed addressed.           MERRICK Mcelroy-NP   Adama Rollins DO [facility]  Cardiovascular Medicine  [address] [place] [address], [address]  Available through call or text on [employer] TE[employer]  Office: [phone]

## 2024-10-31 NOTE — PROGRESS NOTE ADULT - ASSESSMENT
Impression:  1. Iron deficiency anemia without overt bleeding - ddx includes angioectasia given severe AS, vs PUD, gastric/colonic neoplasms, GAVe  2. Severe aortic stenosis  3. Depressed EF 25% with new oxygen requirements    Plan:  -Patient ate solid food today, also wants colonoscopy Monday  -Would continue diuresis per cardiology  -Wean oxygen if able  -possibly need cardiac anesthesia  -Plan for EGD/colonoscopy Monday - keep on clears Sunday, will prep Sunday night  -Monitor H/H, transfuse as needed    Brennon (Michael Kit) MD NAKIA Cunningham e-mail: [email or web address]

## 2024-10-31 NOTE — PROGRESS NOTE ADULT - SUBJECTIVE AND OBJECTIVE BOX
Chief Complaint:  Patient is a 74y old  Female who presents with a chief complaint of Symptomatic anemia, Syncope (31 Oct 2024 09:45)      Interval Events: Feels ok, not dyspneic now. Still on nasal cannula.    Allergies:  penicillin (Stomach Upset; Nausea; Swelling; Hives)  cephalexin (Swelling; Rash; Nausea; Hives)  vancomycin ([place] Synd)      Hospital Medications:  acetaminophen     Tablet .. 650 milliGRAM(s) Oral every 6 hours PRN  atorvastatin 20 milliGRAM(s) Oral at bedtime  dextrose 5%. 1000 milliLiter(s) IV Continuous <Continuous>  dextrose 5%. 1000 milliLiter(s) IV Continuous <Continuous>  dextrose 50% Injectable 12.5 Gram(s) IV Push once  dextrose 50% Injectable 25 Gram(s) IV Push once  dextrose 50% Injectable 25 Gram(s) IV Push once  dextrose Oral Gel 15 Gram(s) Oral once PRN  furosemide   Injectable 40 milliGRAM(s) IV Push daily  gabapentin 800 milliGRAM(s) Oral four times a day  glucagon  Injectable 1 milliGRAM(s) IntraMuscular once  insulin glargine Injectable (LANTUS) 10 Unit(s) SubCutaneous at bedtime  insulin lispro (ADMELOG) corrective regimen sliding scale   SubCutaneous three times a day before meals  levoFLOXacin IVPB 750 milliGRAM(s) IV Intermittent every 24 hours  levothyroxine 300 MICROGram(s) Oral <User Schedule>  melatonin 3 milliGRAM(s) Oral at bedtime PRN  metoprolol succinate ER 25 milliGRAM(s) Oral daily  pantoprazole  Injectable 40 milliGRAM(s) IV Push two times a day  predniSONE   Tablet 10 milliGRAM(s) Oral daily      PMHX/PSHX:  BP (bullous pemphigoid)    HTN (hypertension)    HLD (hyperlipidemia)    Hypothyroidism        Family history:      ROS:     General:  No wt loss, fevers, chills, night sweats, fatigue,   Eyes:  Good vision, no reported pain  ENT:  No sore throat, pain, runny nose, dysphagia  CV:  No pain, palpitations, hypo/hypertension  Resp:  No dyspnea, cough, tachypnea, wheezing  GI:  See HPI  :  No pain, bleeding, incontinence, nocturia  Muscle:  No pain, weakness  Neuro:  No weakness, tingling, memory problems  Skin:  No rash, edema      PHYSICAL EXAM:   Vital Signs:  Vital Signs Last 24 Hrs  T(C): 36.6 (31 Oct 2024 11:15), Max: 38.2 (30 Oct 2024 16:23)  T(F): 97.9 (31 Oct 2024 11:15), Max: 100.7 (30 Oct 2024 16:23)  HR: 102 (31 Oct 2024 12:23) (82 - 102)  BP: 113/73 (31 Oct 2024 12:23) (105/65 - 116/71)  BP(mean): --  RR: 18 (31 Oct 2024 11:15) (18 - 18)  SpO2: 94% (31 Oct 2024 11:15) (93% - 98%)    Parameters below as of 31 Oct 2024 11:15  Patient On (Oxygen Delivery Method): nasal cannula  O2 Flow (L/min): 2    Daily     Daily     GENERAL:  Appears stated age, obese  HEENT:  NC/AT,  conjunctivae clear, sclera -anicteric  CHEST:  Full & symmetric excursion, no increased effort, breath sounds clear  HEART:  Regular rhythm, S1, S2   ABDOMEN:  Soft, non-tender, non-distended   EXTREMITIES:  no cyanosis,clubbing or edema  SKIN:  No rash/erythema   NEURO:  Alert, oriented x 3    LABS:                        7.4    7.32  )-----------( 360      ( 31 Oct 2024 06:15 )             24.6     10-31    137  |  101  |  22  ----------------------------<  162[H]  4.0   |  27  |  0.87    Ca    7.8[L]      31 Oct 2024 06:12    TPro  7.6  /  Alb  3.1[L]  /  TBili  0.5  /  DBili  x   /  AST  45[H]  /  ALT  33  /  AlkPhos  86  10-29    LIVER FUNCTIONS - ( 29 Oct 2024 20:51 )  Alb: 3.1 g/dL / Pro: 7.6 g/dL / ALK PHOS: 86 U/L / ALT: 33 U/L / AST: 45 U/L / GGT: x             Urinalysis Basic - ( 31 Oct 2024 06:12 )    Color: x / Appearance: x / SG: x / pH: x  Gluc: 162 mg/dL / Ketone: x  / Bili: x / Urobili: x   Blood: x / Protein: x / Nitrite: x   Leuk Esterase: x / RBC: x / WBC x   Sq Epi: x / Non Sq Epi: x / Bacteria: x      Iron with Total Binding Capacity (10.29.24 @ 12:09)    Iron - Total Binding Capacity.: 339 ug/dL   % Saturation, Iron: 15 %   Iron Total: 51 ug/dL   Unsaturated Iron Binding Capacity: 288 ug/dL    Ferritin: 22 ng/mL (10.29.24 @ 12:09)

## 2024-10-31 NOTE — DIETITIAN INITIAL EVALUATION ADULT - REASON
Patient denies recent unintentional weight loss. No overt signs of muscle wasting or subcutaneous fat loss observed at this time

## 2024-10-31 NOTE — DIETITIAN INITIAL EVALUATION ADULT - OTHER INFO
- POCT  (H), Glucose 162 (H). Hyperglycemia noted during admission. No hx DM noted. HbA1c 5.3% (10/30/2024), indicating good glycemic control. Insulin regimen (Lantus, ADMELOG) , and consistent carbohydrate diet ordered  - Steroids in use, elevated glucose to be expected  - Diuretics in use, weight fluctuations to be expected   - Antibiotics in use  - Hyponatremia noted during admission. Now resolved.   - BNP 2369 (H) (10/28/2024)

## 2024-10-31 NOTE — CHART NOTE - NSCHARTNOTEFT_GEN_A_CORE
Medicine PA Note    Informed by Vascular Lab of the following results:    IMPRESSION: Acute lower extremity DVT, below the knee, involving the right gastrocnemius vein. No acute DVT of the left lower extremity.    Plan:  >Discussed with Dr. Reyes who advised to start heparin gtt given pt's immobility    EVELYN McdonaldC  [id number]

## 2024-10-31 NOTE — DIETITIAN INITIAL EVALUATION ADULT - NSFNSPHYEXAMSKINFT_GEN_A_CORE
Per wound care 10/30/2024, "Sacral/bilateral Buttocks and bilateral ischium deep tissue injury present on admission"

## 2024-10-31 NOTE — DIETITIAN INITIAL EVALUATION ADULT - LITERATURE/VIDEOS GIVEN
Reviewed the importance of high-protein foods, and oral nutrition supplement compliance to optimize nutrition and promote skin integrity. Patient demonstrated a fair-level of understanding. RD remains available should additional diet education be indicated.

## 2024-10-31 NOTE — DIETITIAN INITIAL EVALUATION ADULT - NSICDXPASTMEDICALHX_GEN_ALL_CORE_FT
PAST MEDICAL HISTORY:  BP (bullous pemphigoid)     HLD (hyperlipidemia)     HTN (hypertension)     Hypothyroidism

## 2024-10-31 NOTE — DIETITIAN INITIAL EVALUATION ADULT - PERTINENT MEDS FT
MEDICATIONS  (STANDING):  atorvastatin 20 milliGRAM(s) Oral at bedtime  dextrose 5%. 1000 milliLiter(s) (100 mL/Hr) IV Continuous <Continuous>  dextrose 5%. 1000 milliLiter(s) (50 mL/Hr) IV Continuous <Continuous>  dextrose 50% Injectable 25 Gram(s) IV Push once  dextrose 50% Injectable 12.5 Gram(s) IV Push once  dextrose 50% Injectable 25 Gram(s) IV Push once  furosemide   Injectable 40 milliGRAM(s) IV Push daily  gabapentin 800 milliGRAM(s) Oral four times a day  glucagon  Injectable 1 milliGRAM(s) IntraMuscular once  insulin glargine Injectable (LANTUS) 10 Unit(s) SubCutaneous at bedtime  insulin lispro (ADMELOG) corrective regimen sliding scale   SubCutaneous three times a day before meals  levoFLOXacin IVPB 750 milliGRAM(s) IV Intermittent every 24 hours  levothyroxine 300 MICROGram(s) Oral <User Schedule>  metoprolol succinate ER 25 milliGRAM(s) Oral daily  pantoprazole  Injectable 40 milliGRAM(s) IV Push two times a day  predniSONE   Tablet 10 milliGRAM(s) Oral daily    MEDICATIONS  (PRN):  acetaminophen     Tablet .. 650 milliGRAM(s) Oral every 6 hours PRN Temp greater or equal to 38C (100.4F), Mild Pain (1 - 3)  dextrose Oral Gel 15 Gram(s) Oral once PRN Blood Glucose LESS THAN 70 milliGRAM(s)/deciliter  melatonin 3 milliGRAM(s) Oral at bedtime PRN Insomnia

## 2024-10-31 NOTE — PROGRESS NOTE ADULT - SUBJECTIVE AND OBJECTIVE BOX
Andre Reyes, M.D.  Office: [phone]  Available thru [employer] Teams     Patient is a 74y old  Female who presents with a chief complaint of Symptomatic anemia, Syncope (30 Oct 2024 18:24)          SUBJECTIVE / OVERNIGHT EVENTS:    No acute overnight events.    ROS: ( - ) Fever, ( - )Chills,  ( - )Nausea/Vomiting, ( - ) Cough, ( - )Shortness of breath, ( - )Chest Pain    MEDICATIONS  (STANDING):  atorvastatin 20 milliGRAM(s) Oral at bedtime  aztreonam  IVPB      aztreonam  IVPB 2000 milliGRAM(s) IV Intermittent every 8 hours  dextrose 5%. 1000 milliLiter(s) (50 mL/Hr) IV Continuous <Continuous>  dextrose 5%. 1000 milliLiter(s) (100 mL/Hr) IV Continuous <Continuous>  dextrose 50% Injectable 25 Gram(s) IV Push once  dextrose 50% Injectable 25 Gram(s) IV Push once  dextrose 50% Injectable 12.5 Gram(s) IV Push once  furosemide   Injectable 40 milliGRAM(s) IV Push daily  gabapentin 800 milliGRAM(s) Oral four times a day  glucagon  Injectable 1 milliGRAM(s) IntraMuscular once  insulin glargine Injectable (LANTUS) 10 Unit(s) SubCutaneous at bedtime  insulin lispro (ADMELOG) corrective regimen sliding scale   SubCutaneous three times a day before meals  levothyroxine 300 MICROGram(s) Oral <User Schedule>  pantoprazole  Injectable 40 milliGRAM(s) IV Push two times a day  predniSONE   Tablet 10 milliGRAM(s) Oral daily    MEDICATIONS  (PRN):  acetaminophen     Tablet .. 650 milliGRAM(s) Oral every 6 hours PRN Temp greater or equal to 38C (100.4F), Mild Pain (1 - 3)  dextrose Oral Gel 15 Gram(s) Oral once PRN Blood Glucose LESS THAN 70 milliGRAM(s)/deciliter  melatonin 3 milliGRAM(s) Oral at bedtime PRN Insomnia          T(C): 36.4 (10-31 @ 05:01), Max: 38.2 (10-30 @ 16:23)   HR: 82   BP: 105/72   RR: 18   SpO2: 98%    PHYSICAL EXAM:    CONSTITUTIONAL: NAD, well-developed, well-groomed  EYES: PERRLA; conjunctiva and sclera clear  ENMT: Moist oral mucosa, no pharyngeal injection or exudates; normal dentition  RESPIRATORY: Normal respiratory effort; lungs are clear to auscultation bilaterally  CARDIOVASCULAR: Regular rate and rhythm, normal S1 and S2, no murmur/rub/gallop; No lower extremity edema; Peripheral pulses are 2+ bilaterally  ABDOMEN: Nontender to palpation, normoactive bowel sounds, no rebound/guarding; No hepatosplenomegaly  MUSCULOSKELETAL:  no clubbing or cyanosis of digits; no joint swelling or tenderness to palpation  PSYCH: A+O to person, place, and time; affect appropriate  NEUROLOGY: CN 2-12 are intact and symmetric; no gross sensory deficits       LABS:                        7.4    7.32  )-----------( 360      ( 31 Oct 2024 06:15 )             24.6      10-31    137  |  101  |  22  ----------------------------<  162[H]  4.0   |  27  |  0.87    Ca    7.8[L]      31 Oct 2024 06:12    TPro  7.6  /  Alb  3.1[L]  /  TBili  0.5  /  DBili  x   /  AST  45[H]  /  ALT  33  /  AlkPhos  86  10-29       CAPILLARY BLOOD GLUCOSE      POCT Blood Glucose.: 209 mg/dL (31 Oct 2024 07:59)  POCT Blood Glucose.: 264 mg/dL (30 Oct 2024 21:17)  POCT Blood Glucose.: 168 mg/dL (30 Oct 2024 17:48)  POCT Blood Glucose.: 339 mg/dL (30 Oct 2024 11:32)      RADIOLOGY & ADDITIONAL TESTS:    Imaging Personally Reviewed:  Consultant(s) Notes Reviewed:    Care Discussed with Consultants/Other Providers:   Andre Reyes, M.D.  Office: [phone]  Available thru [employer] Teams     Patient is a 74y old  Female who presents with a chief complaint of Symptomatic anemia, Syncope (30 Oct 2024 18:24)          SUBJECTIVE / OVERNIGHT EVENTS:    Patient is without any specific complaints. But she was noted to have a low grade temperature of 100.7. Patient denies any localizing symptoms. No burning with urination no shortness of breath no cough no no rashes no joint pain excetera    MEDICATIONS  (STANDING):  atorvastatin 20 milliGRAM(s) Oral at bedtime  aztreonam  IVPB      aztreonam  IVPB 2000 milliGRAM(s) IV Intermittent every 8 hours  dextrose 5%. 1000 milliLiter(s) (50 mL/Hr) IV Continuous <Continuous>  dextrose 5%. 1000 milliLiter(s) (100 mL/Hr) IV Continuous <Continuous>  dextrose 50% Injectable 25 Gram(s) IV Push once  dextrose 50% Injectable 25 Gram(s) IV Push once  dextrose 50% Injectable 12.5 Gram(s) IV Push once  furosemide   Injectable 40 milliGRAM(s) IV Push daily  gabapentin 800 milliGRAM(s) Oral four times a day  glucagon  Injectable 1 milliGRAM(s) IntraMuscular once  insulin glargine Injectable (LANTUS) 10 Unit(s) SubCutaneous at bedtime  insulin lispro (ADMELOG) corrective regimen sliding scale   SubCutaneous three times a day before meals  levothyroxine 300 MICROGram(s) Oral <User Schedule>  pantoprazole  Injectable 40 milliGRAM(s) IV Push two times a day  predniSONE   Tablet 10 milliGRAM(s) Oral daily    MEDICATIONS  (PRN):  acetaminophen     Tablet .. 650 milliGRAM(s) Oral every 6 hours PRN Temp greater or equal to 38C (100.4F), Mild Pain (1 - 3)  dextrose Oral Gel 15 Gram(s) Oral once PRN Blood Glucose LESS THAN 70 milliGRAM(s)/deciliter  melatonin 3 milliGRAM(s) Oral at bedtime PRN Insomnia          T(C): 36.4 (10-31 @ 05:01), Max: 38.2 (10-30 @ 16:23)   HR: 82   BP: 105/72   RR: 18   SpO2: 98%    PHYSICAL EXAM:    CONSTITUTIONAL: NAD, well-developed, well-groomed  EYES: PERRLA; conjunctiva and sclera clear  ENMT: Moist oral mucosa, no pharyngeal injection or exudates; normal dentition  RESPIRATORY: Normal respiratory effort; lungs are clear to auscultation bilaterally  CARDIOVASCULAR: Regular rate and rhythm, normal S1 and S2, +murmur; no pitting lower extremity edema, + lymph ededma ; Peripheral pulses are 2+ bilaterally  ABDOMEN: Nontender to palpation, normoactive bowel sounds, no rebound/guarding; No hepatosplenomegaly  MUSCULOSKELETAL:  no clubbing or cyanosis of digits; no joint swelling or tenderness to palpation  PSYCH: A+O to person, place, and time; affect appropriate  NEUROLOGY: CN 2-12 are intact and symmetric; no gross sensory deficits       LABS:                        7.4    7.32  )-----------( 360      ( 31 Oct 2024 06:15 )             24.6      10-31    137  |  101  |  22  ----------------------------<  162[H]  4.0   |  27  |  0.87    Ca    7.8[L]      31 Oct 2024 06:12    TPro  7.6  /  Alb  3.1[L]  /  TBili  0.5  /  DBili  x   /  AST  45[H]  /  ALT  33  /  AlkPhos  86  10-29       CAPILLARY BLOOD GLUCOSE      POCT Blood Glucose.: 209 mg/dL (31 Oct 2024 07:59)  POCT Blood Glucose.: 264 mg/dL (30 Oct 2024 21:17)  POCT Blood Glucose.: 168 mg/dL (30 Oct 2024 17:48)  POCT Blood Glucose.: 339 mg/dL (30 Oct 2024 11:32)      RADIOLOGY & ADDITIONAL TESTS:    Imaging Personally Reviewed:  Consultant(s) Notes Reviewed:    Care Discussed with Consultants/Other Providers:

## 2024-10-31 NOTE — PHARMACOTHERAPY INTERVENTION NOTE - COMMENTS
Patient is a 74y old  Female who presents with a chief complaint of Symptomatic anemia, Syncope (30 Oct 2024 18:24). Currently on Aztreonam 2g q8 for sepsis, reviewed allergy history with patient to see if possibly can change antibiotics. Confirmed patient is allergic with keflex and penicillin, reactions were hives, nausea, and swelling of lower extremities. Not able to confirm last time patient took penicillin/keflex. 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    Edgardo (Dean Adamson) Jeyson  Transitions of Care Pharmacist  Available on [employer] Teams (Preferred)  Spectra: [phone]

## 2024-10-31 NOTE — DIETITIAN INITIAL EVALUATION ADULT - PHYSCIAL ASSESSMENT
- [facility] weight trend history not available    - Per Patient, denies recent unintentional weight loss. Reports usual and current body weight to fluctuate between ~250-272lbs. Per electronic medical record, current dosing marie 274lbs (10/29/2024)

## 2024-10-31 NOTE — PROGRESS NOTE ADULT - PROBLEM SELECTOR PLAN 1
pt denies h/o heart issues  will need further cardiac w/u  Patient did have acute hypoxic respiratory failure. Likely in the setting of severe systolic dysfunction and having received 2 units of packed red blood cells. Patient was started in an IV lasix and seems to be improving from the respiratory status. We'll continue the IV lasix for now and consider transferring to oral lasix tomorrow.  We'll start to implement goal directed medical therapy for her new systolic heart failure. We'll start metoprolol today and see how her blush pressure does. Further cardiac evaluation to be done after colonoscopy    pt will need to be started on GDMT

## 2024-10-31 NOTE — DIETITIAN INITIAL EVALUATION ADULT - ENERGY INTAKE
Per nursing flowsheets, fair to adequate PO intake, %.   Patient reports normal/ fair appetite during admission. Offered oral nutrition shake to optimize PO intake. Patient accepting./Fair (50-75%)

## 2024-10-31 NOTE — DIETITIAN INITIAL EVALUATION ADULT - REASON INDICATOR FOR ASSESSMENT
Dietitian consult received for: Pressure injury stage 2 or >   Chart reviewed, events noted  Information obtained from: electronic medical record, Patient

## 2024-10-31 NOTE — DIETITIAN INITIAL EVALUATION ADULT - SIGNS/SYMPTOMS
as evidenced by BMI > 40 as evidenced by sacral/bilateral buttocks and bilateral ischium deep tissue injury

## 2024-11-01 ENCOUNTER — APPOINTMENT (OUTPATIENT)
Dept: RHEUMATOLOGY | Facility: CLINIC | Age: 74
End: 2024-11-01

## 2024-11-01 DIAGNOSIS — I82.409 ACUTE EMBOLISM AND THROMBOSIS OF UNSPECIFIED DEEP VEINS OF UNSPECIFIED LOWER EXTREMITY: ICD-10-CM

## 2024-11-01 DIAGNOSIS — N39.0 URINARY TRACT INFECTION, SITE NOT SPECIFIED: ICD-10-CM

## 2024-11-01 LAB
-  AMOXICILLIN/CLAVULANIC ACID: SIGNIFICANT CHANGE UP
-  AMPICILLIN/SULBACTAM: SIGNIFICANT CHANGE UP
-  AMPICILLIN: SIGNIFICANT CHANGE UP
-  AZTREONAM: SIGNIFICANT CHANGE UP
-  CEFAZOLIN: SIGNIFICANT CHANGE UP
-  CEFEPIME: SIGNIFICANT CHANGE UP
-  CEFOXITIN: SIGNIFICANT CHANGE UP
-  CEFTRIAXONE: SIGNIFICANT CHANGE UP
-  CEFUROXIME: SIGNIFICANT CHANGE UP
-  CIPROFLOXACIN: SIGNIFICANT CHANGE UP
-  ERTAPENEM: SIGNIFICANT CHANGE UP
-  GENTAMICIN: SIGNIFICANT CHANGE UP
-  IMIPENEM: SIGNIFICANT CHANGE UP
-  LEVOFLOXACIN: SIGNIFICANT CHANGE UP
-  MEROPENEM: SIGNIFICANT CHANGE UP
-  NITROFURANTOIN: SIGNIFICANT CHANGE UP
-  PIPERACILLIN/TAZOBACTAM: SIGNIFICANT CHANGE UP
-  TOBRAMYCIN: SIGNIFICANT CHANGE UP
-  TRIMETHOPRIM/SULFAMETHOXAZOLE: SIGNIFICANT CHANGE UP
ANION GAP SERPL CALC-SCNC: 12 MMOL/L — SIGNIFICANT CHANGE UP (ref 5–17)
APTT BLD: 128.5 SEC — CRITICAL HIGH (ref 24.5–35.6)
APTT BLD: 85 SEC — HIGH (ref 24.5–35.6)
APTT BLD: >200 SEC — CRITICAL HIGH (ref 24.5–35.6)
BUN SERPL-MCNC: 33 MG/DL — HIGH (ref 7–23)
CALCIUM SERPL-MCNC: 8 MG/DL — LOW (ref 8.4–10.5)
CHLORIDE SERPL-SCNC: 97 MMOL/L — SIGNIFICANT CHANGE UP (ref 96–108)
CO2 SERPL-SCNC: 26 MMOL/L — SIGNIFICANT CHANGE UP (ref 22–31)
CREAT SERPL-MCNC: 1.06 MG/DL — SIGNIFICANT CHANGE UP (ref 0.5–1.3)
CULTURE RESULTS: ABNORMAL
EGFR: 56 ML/MIN/1.73M2 — LOW
GLUCOSE BLDC GLUCOMTR-MCNC: 219 MG/DL — HIGH (ref 70–99)
GLUCOSE BLDC GLUCOMTR-MCNC: 248 MG/DL — HIGH (ref 70–99)
GLUCOSE BLDC GLUCOMTR-MCNC: 291 MG/DL — HIGH (ref 70–99)
GLUCOSE BLDC GLUCOMTR-MCNC: 315 MG/DL — HIGH (ref 70–99)
GLUCOSE SERPL-MCNC: 328 MG/DL — HIGH (ref 70–99)
HCT VFR BLD CALC: 24.4 % — LOW (ref 34.5–45)
HCT VFR BLD CALC: 25.2 % — LOW (ref 34.5–45)
HGB BLD-MCNC: 7.3 G/DL — LOW (ref 11.5–15.5)
HGB BLD-MCNC: 7.6 G/DL — LOW (ref 11.5–15.5)
MAGNESIUM SERPL-MCNC: 1.9 MG/DL — SIGNIFICANT CHANGE UP (ref 1.6–2.6)
MCHC RBC-ENTMCNC: 28.5 PG — SIGNIFICANT CHANGE UP (ref 27–34)
MCHC RBC-ENTMCNC: 28.5 PG — SIGNIFICANT CHANGE UP (ref 27–34)
MCHC RBC-ENTMCNC: 29.9 G/DL — LOW (ref 32–36)
MCHC RBC-ENTMCNC: 30.2 G/DL — LOW (ref 32–36)
MCV RBC AUTO: 94.4 FL — SIGNIFICANT CHANGE UP (ref 80–100)
MCV RBC AUTO: 95.3 FL — SIGNIFICANT CHANGE UP (ref 80–100)
METHOD TYPE: SIGNIFICANT CHANGE UP
NRBC # BLD: 0 /100 WBCS — SIGNIFICANT CHANGE UP (ref 0–0)
NRBC # BLD: 0 /100 WBCS — SIGNIFICANT CHANGE UP (ref 0–0)
ORGANISM # SPEC MICROSCOPIC CNT: ABNORMAL
ORGANISM # SPEC MICROSCOPIC CNT: ABNORMAL
PHOSPHATE SERPL-MCNC: 2.2 MG/DL — LOW (ref 2.5–4.5)
PLATELET # BLD AUTO: 367 K/UL — SIGNIFICANT CHANGE UP (ref 150–400)
PLATELET # BLD AUTO: 405 K/UL — HIGH (ref 150–400)
POTASSIUM SERPL-MCNC: 3.5 MMOL/L — SIGNIFICANT CHANGE UP (ref 3.5–5.3)
POTASSIUM SERPL-SCNC: 3.5 MMOL/L — SIGNIFICANT CHANGE UP (ref 3.5–5.3)
RBC # BLD: 2.56 M/UL — LOW (ref 3.8–5.2)
RBC # BLD: 2.67 M/UL — LOW (ref 3.8–5.2)
RBC # FLD: 18.5 % — HIGH (ref 10.3–14.5)
RBC # FLD: 18.6 % — HIGH (ref 10.3–14.5)
SODIUM SERPL-SCNC: 135 MMOL/L — SIGNIFICANT CHANGE UP (ref 135–145)
SPECIMEN SOURCE: SIGNIFICANT CHANGE UP
WBC # BLD: 11.02 K/UL — HIGH (ref 3.8–10.5)
WBC # BLD: 9.99 K/UL — SIGNIFICANT CHANGE UP (ref 3.8–10.5)
WBC # FLD AUTO: 11.02 K/UL — HIGH (ref 3.8–10.5)
WBC # FLD AUTO: 9.99 K/UL — SIGNIFICANT CHANGE UP (ref 3.8–10.5)

## 2024-11-01 PROCEDURE — 99233 SBSQ HOSP IP/OBS HIGH 50: CPT

## 2024-11-01 PROCEDURE — 75574 CT ANGIO HRT W/3D IMAGE: CPT | Mod: 26

## 2024-11-01 PROCEDURE — 71275 CT ANGIOGRAPHY CHEST: CPT | Mod: 26

## 2024-11-01 PROCEDURE — 99232 SBSQ HOSP IP/OBS MODERATE 35: CPT

## 2024-11-01 PROCEDURE — 74174 CTA ABD&PLVS W/CONTRAST: CPT | Mod: 26

## 2024-11-01 RX ORDER — INSULIN GLARGINE,HUM.REC.ANLOG 100/ML
16 VIAL (ML) SUBCUTANEOUS AT BEDTIME
Refills: 0 | Status: DISCONTINUED | OUTPATIENT
Start: 2024-11-01 | End: 2024-11-10

## 2024-11-01 RX ORDER — POLYETHYLENE GLYCOL 3350 17 G/17G
17 POWDER, FOR SOLUTION ORAL DAILY
Refills: 0 | Status: DISCONTINUED | OUTPATIENT
Start: 2024-11-01 | End: 2024-11-11

## 2024-11-01 RX ORDER — SACUBITRIL AND VALSARTAN 97; 103 MG/1; MG/1
1 TABLET, FILM COATED ORAL
Refills: 0 | Status: DISCONTINUED | OUTPATIENT
Start: 2024-11-01 | End: 2024-11-11

## 2024-11-01 RX ORDER — INSULIN LISPRO 100/ML
2 VIAL (ML) SUBCUTANEOUS
Refills: 0 | Status: DISCONTINUED | OUTPATIENT
Start: 2024-11-01 | End: 2024-11-02

## 2024-11-01 RX ORDER — CHLORHEXIDINE GLUCONATE 40 MG/ML
1 SOLUTION TOPICAL DAILY
Refills: 0 | Status: DISCONTINUED | OUTPATIENT
Start: 2024-11-01 | End: 2024-11-11

## 2024-11-01 RX ORDER — SPIRONOLACTONE 100 MG
25 TABLET ORAL DAILY
Refills: 0 | Status: DISCONTINUED | OUTPATIENT
Start: 2024-11-02 | End: 2024-11-11

## 2024-11-01 RX ORDER — METOPROLOL TARTRATE 50 MG
25 TABLET ORAL
Refills: 0 | Status: DISCONTINUED | OUTPATIENT
Start: 2024-11-01 | End: 2024-11-03

## 2024-11-01 RX ORDER — POTASSIUM PHOSPHATE 236; 224 MG/ML; MG/ML
15 INJECTION, SOLUTION INTRAVENOUS ONCE
Refills: 0 | Status: COMPLETED | OUTPATIENT
Start: 2024-11-01 | End: 2024-11-01

## 2024-11-01 RX ORDER — SENNA 187 MG
2 TABLET ORAL AT BEDTIME
Refills: 0 | Status: DISCONTINUED | OUTPATIENT
Start: 2024-11-01 | End: 2024-11-11

## 2024-11-01 RX ORDER — POTASSIUM CHLORIDE 10 MEQ
40 TABLET, EXTENDED RELEASE ORAL ONCE
Refills: 0 | Status: COMPLETED | OUTPATIENT
Start: 2024-11-01 | End: 2024-11-01

## 2024-11-01 RX ADMIN — PANTOPRAZOLE SODIUM 40 MILLIGRAM(S): 40 TABLET, DELAYED RELEASE ORAL at 17:36

## 2024-11-01 RX ADMIN — Medication 40 MILLIEQUIVALENT(S): at 15:33

## 2024-11-01 RX ADMIN — HEPARIN SODIUM 1400 UNIT(S)/HR: 10000 INJECTION INTRAVENOUS; SUBCUTANEOUS at 19:21

## 2024-11-01 RX ADMIN — Medication 1 TABLET(S): at 12:03

## 2024-11-01 RX ADMIN — HEPARIN SODIUM 2200 UNIT(S)/HR: 10000 INJECTION INTRAVENOUS; SUBCUTANEOUS at 06:55

## 2024-11-01 RX ADMIN — Medication 16 UNIT(S): at 21:37

## 2024-11-01 RX ADMIN — POTASSIUM PHOSPHATE 62.5 MILLIMOLE(S): 236; 224 INJECTION, SOLUTION INTRAVENOUS at 15:31

## 2024-11-01 RX ADMIN — HEPARIN SODIUM 2200 UNIT(S)/HR: 10000 INJECTION INTRAVENOUS; SUBCUTANEOUS at 05:10

## 2024-11-01 RX ADMIN — GABAPENTIN 800 MILLIGRAM(S): 300 CAPSULE ORAL at 05:12

## 2024-11-01 RX ADMIN — Medication 300 MICROGRAM(S): at 05:12

## 2024-11-01 RX ADMIN — Medication 650 MILLIGRAM(S): at 05:20

## 2024-11-01 RX ADMIN — HEPARIN SODIUM 1400 UNIT(S)/HR: 10000 INJECTION INTRAVENOUS; SUBCUTANEOUS at 17:56

## 2024-11-01 RX ADMIN — Medication 4: at 17:39

## 2024-11-01 RX ADMIN — Medication 25 MILLIGRAM(S): at 17:35

## 2024-11-01 RX ADMIN — Medication 25 MILLIGRAM(S): at 05:12

## 2024-11-01 RX ADMIN — HEPARIN SODIUM 2200 UNIT(S)/HR: 10000 INJECTION INTRAVENOUS; SUBCUTANEOUS at 02:08

## 2024-11-01 RX ADMIN — Medication 6: at 12:00

## 2024-11-01 RX ADMIN — GABAPENTIN 800 MILLIGRAM(S): 300 CAPSULE ORAL at 23:52

## 2024-11-01 RX ADMIN — HEPARIN SODIUM 0 UNIT(S)/HR: 10000 INJECTION INTRAVENOUS; SUBCUTANEOUS at 09:03

## 2024-11-01 RX ADMIN — Medication 10 MILLIGRAM(S): at 05:13

## 2024-11-01 RX ADMIN — HEPARIN SODIUM 0 UNIT(S)/HR: 10000 INJECTION INTRAVENOUS; SUBCUTANEOUS at 16:54

## 2024-11-01 RX ADMIN — Medication 40 MILLIGRAM(S): at 05:12

## 2024-11-01 RX ADMIN — GABAPENTIN 800 MILLIGRAM(S): 300 CAPSULE ORAL at 12:06

## 2024-11-01 RX ADMIN — Medication 20 MILLIGRAM(S): at 21:35

## 2024-11-01 RX ADMIN — Medication 2 UNIT(S): at 17:40

## 2024-11-01 RX ADMIN — Medication 650 MILLIGRAM(S): at 06:38

## 2024-11-01 RX ADMIN — Medication 4: at 08:04

## 2024-11-01 RX ADMIN — HEPARIN SODIUM 1800 UNIT(S)/HR: 10000 INJECTION INTRAVENOUS; SUBCUTANEOUS at 10:06

## 2024-11-01 RX ADMIN — PANTOPRAZOLE SODIUM 40 MILLIGRAM(S): 40 TABLET, DELAYED RELEASE ORAL at 05:13

## 2024-11-01 RX ADMIN — Medication 500 MILLIGRAM(S): at 12:03

## 2024-11-01 RX ADMIN — GABAPENTIN 800 MILLIGRAM(S): 300 CAPSULE ORAL at 17:35

## 2024-11-01 RX ADMIN — Medication 2 UNIT(S): at 12:01

## 2024-11-01 RX ADMIN — HEPARIN SODIUM 1400 UNIT(S)/HR: 10000 INJECTION INTRAVENOUS; SUBCUTANEOUS at 23:51

## 2024-11-01 RX ADMIN — SACUBITRIL AND VALSARTAN 1 TABLET(S): 97; 103 TABLET, FILM COATED ORAL at 17:36

## 2024-11-01 RX ADMIN — CHLORHEXIDINE GLUCONATE 1 APPLICATION(S): 40 SOLUTION TOPICAL at 17:32

## 2024-11-01 NOTE — PROGRESS NOTE ADULT - PROBLEM SELECTOR PLAN 1
pt denies h/o heart issues  will need further cardiac w/u  Patient did have acute hypoxic respiratory failure. Likely in the setting of severe systolic dysfunction and having received 2 units of packed red blood cells.   Patient was started in an IV lasix and respiratory status improved --> . We'll transition to spironalactone 25mg daily  GDMT: start to implement goal directed medical therapy for her new systolic heart failure-->Patient tolerated metoprolol so now we'll add entresto    We'll start metoprolol today and see how her blush pressure does. Further cardiac evaluation to be done after colonoscopy

## 2024-11-01 NOTE — PROGRESS NOTE ADULT - SUBJECTIVE AND OBJECTIVE BOX
*****Structural Heart Team*****    Subjective:    Patient resting in bed, offering no complaints. She currently denies any SOB or CP.      PAST MEDICAL & SURGICAL HISTORY:  BP (bullous pemphigoid)    HTN (hypertension)    HLD (hyperlipidemia)    Hypothyroidism          T(C): 37.1 (11-01-24 @ 12:45), Max: 38 (10-31-24 @ 20:13)  HR: 103 (11-01-24 @ 12:45) (84 - 103)  BP: 113/70 (11-01-24 @ 12:45) (99/65 - 113/70)  RR: 18 (11-01-24 @ 12:45) (18 - 18)  SpO2: 95% (11-01-24 @ 12:45) (94% - 96%)  Wt(kg): --  10-31 @ 07:01  -  11-01 @ 07:00  --------------------------------------------------------  IN: 810 mL / OUT: 900 mL / NET: -90 mL    11-01 @ 07:01  -  11-01 @ 15:37  --------------------------------------------------------  IN: 810 mL / OUT: 1300 mL / NET: -490 mL      MEDICATIONS  (STANDING):  ascorbic acid 500 milliGRAM(s) Oral daily  atorvastatin 20 milliGRAM(s) Oral at bedtime  furosemide   Injectable 40 milliGRAM(s) IV Push daily  gabapentin 800 milliGRAM(s) Oral four times a day  glucagon  Injectable 1 milliGRAM(s) IntraMuscular once  heparin  Infusion.  Unit(s)/Hr (22 mL/Hr) IV Continuous <Continuous>  insulin glargine Injectable (LANTUS) 16 Unit(s) SubCutaneous at bedtime  insulin lispro (ADMELOG) corrective regimen sliding scale   SubCutaneous three times a day before meals  insulin lispro Injectable (ADMELOG) 2 Unit(s) SubCutaneous three times a day before meals  levoFLOXacin IVPB 750 milliGRAM(s) IV Intermittent every 24 hours  levothyroxine 300 MICROGram(s) Oral <User Schedule>  metoprolol succinate ER 25 milliGRAM(s) Oral daily  multivitamin 1 Tablet(s) Oral daily  pantoprazole  Injectable 40 milliGRAM(s) IV Push two times a day  polyethylene glycol 3350 17 Gram(s) Oral daily  predniSONE   Tablet 10 milliGRAM(s) Oral daily  senna 2 Tablet(s) Oral at bedtime    MEDICATIONS  (PRN):  acetaminophen     Tablet .. 650 milliGRAM(s) Oral every 6 hours PRN Temp greater or equal to 38C (100.4F), Mild Pain (1 - 3)  dextrose Oral Gel 15 Gram(s) Oral once PRN Blood Glucose LESS THAN 70 milliGRAM(s)/deciliter  heparin   Injectable 49048 Unit(s) IV Push every 6 hours PRN For aPTT less than 40  heparin   Injectable 5000 Unit(s) IV Push every 6 hours PRN For aPTT between 40 - 57  melatonin 3 milliGRAM(s) Oral at bedtime PRN Insomnia      Review of Symptoms:  Constitutional: Awake, Alert, Follows commands  Respiratory: + SOB/OLIVA  Cardiac: Denies CP, Denies Palpitations  Gastrointestinal: Denies Pain, Denies N/V, tolerating po intake  Vascular: Negative  Extremities: + Edema, No joint pain or swelling  Neurological: + Dizziness  Endocrine: No heat or cold intolerance, No excessive thirst  Heme/Onc: Negative    Exam:  General: A/Ox3, HINA  HEENT: Supple, No JVD, Trachea midline, no masses  Pulmonary: CTAB, = Chest Excursion, no accessory muscle use  Cor: S1S2, RRR, III/VI NEEL  ECG: SR  Gastrointestinal: Soft, NT/ND, + Bowel Sounds  Neuro: = motor and sensory B/L, No focal deficits  Vascular: 1+ Pulses B/L  Extremities: No joint pain or swelling  Skin: Warm/Dry/Normal color, Normal turgor, no rashes                          7.3    9.99  )-----------( 367      ( 01 Nov 2024 05:57 )             24.4   11-01    135  |  97  |  33[H]  ----------------------------<  328[H]  3.5   |  26  |  1.06    Ca    8.0[L]      01 Nov 2024 13:43  Phos  2.2     11-01  Mg     1.9     11-01    PT/INR - ( 31 Oct 2024 17:41 )   PT: 13.2 sec;   INR: 1.15 ratio         PTT - ( 01 Nov 2024 08:12 )  PTT:>200.0 sec    Imaging Reviewed:    Echocardiogram:    Cardiac Catheterization:        Assesment/Plan:  73 y/o female with Severe Aortic Stenosis, ABLA    1.) Aortic Stenosis: Patient undergoing workup for TAVR. She had Cardiac CT earlier today. On Monday she will undergo Endoscopy/Colonoscopy for Anemia. Once cleared she will undergo Cardiac Catheterization. Once that is complete, we will review all imaging and testing to determine best plan.  2.) Anemia/ABLA: Patient to undergo Endoscopy and Colonoscopy on Monday. If no active bleeding and cleared by GI, Will send her for Cardiac Catheterization .  3.) Ambulate as tolerated.    Discussed with Dr. Greene.    [place]  [id number]

## 2024-11-01 NOTE — PROGRESS NOTE ADULT - PROBLEM SELECTOR PLAN 4
unclear cause of her anemia. no evidence of hemolysis  Possible causes include occult GI bleeding. --> Patient is planned for an EGD colonoscopy on Monday. Patient should have clear liquid diets on Sunday and should be made NPO Sunday at midnight

## 2024-11-01 NOTE — PROGRESS NOTE ADULT - PROBLEM SELECTOR PLAN 7
- HbA1c 5.0    - Patients blood glucose has been above our target goal of less than 200 -->  - will add 6 units of lantus at night   - will start 2 units of admelog before meals and continue ISS

## 2024-11-01 NOTE — PROGRESS NOTE ADULT - SUBJECTIVE AND OBJECTIVE BOX
Andre Reyes, M.D.  Office: [phone]  Available thru [employer] Teams     Patient is a 74y old  Female who presents with a chief complaint of Syncope and collapse     (31 Oct 2024 16:35)          SUBJECTIVE / OVERNIGHT EVENTS:    No acute overnight events.    ROS: ( - ) Fever, ( - )Chills,  ( - )Nausea/Vomiting, ( - ) Cough, ( - )Shortness of breath, ( - )Chest Pain    MEDICATIONS  (STANDING):  ascorbic acid 500 milliGRAM(s) Oral daily  atorvastatin 20 milliGRAM(s) Oral at bedtime  dextrose 5%. 1000 milliLiter(s) (50 mL/Hr) IV Continuous <Continuous>  dextrose 5%. 1000 milliLiter(s) (100 mL/Hr) IV Continuous <Continuous>  dextrose 50% Injectable 25 Gram(s) IV Push once  dextrose 50% Injectable 25 Gram(s) IV Push once  dextrose 50% Injectable 12.5 Gram(s) IV Push once  furosemide   Injectable 40 milliGRAM(s) IV Push daily  gabapentin 800 milliGRAM(s) Oral four times a day  glucagon  Injectable 1 milliGRAM(s) IntraMuscular once  heparin  Infusion.  Unit(s)/Hr (22 mL/Hr) IV Continuous <Continuous>  insulin glargine Injectable (LANTUS) 10 Unit(s) SubCutaneous at bedtime  insulin lispro (ADMELOG) corrective regimen sliding scale   SubCutaneous three times a day before meals  levoFLOXacin IVPB 750 milliGRAM(s) IV Intermittent every 24 hours  levothyroxine 300 MICROGram(s) Oral <User Schedule>  metoprolol succinate ER 25 milliGRAM(s) Oral daily  multivitamin 1 Tablet(s) Oral daily  pantoprazole  Injectable 40 milliGRAM(s) IV Push two times a day  predniSONE   Tablet 10 milliGRAM(s) Oral daily    MEDICATIONS  (PRN):  acetaminophen     Tablet .. 650 milliGRAM(s) Oral every 6 hours PRN Temp greater or equal to 38C (100.4F), Mild Pain (1 - 3)  dextrose Oral Gel 15 Gram(s) Oral once PRN Blood Glucose LESS THAN 70 milliGRAM(s)/deciliter  heparin   Injectable 30487 Unit(s) IV Push every 6 hours PRN For aPTT less than 40  heparin   Injectable 5000 Unit(s) IV Push every 6 hours PRN For aPTT between 40 - 57  melatonin 3 milliGRAM(s) Oral at bedtime PRN Insomnia          T(C): 36.9 (11-01 @ 04:39), Max: 38 (10-31 @ 20:13)   HR: 84   BP: 104/67   RR: 18   SpO2: 94%    PHYSICAL EXAM:    CONSTITUTIONAL: NAD, well-developed, well-groomed  EYES: PERRLA; conjunctiva and sclera clear  ENMT: Moist oral mucosa, no pharyngeal injection or exudates; normal dentition  RESPIRATORY: Normal respiratory effort; lungs are clear to auscultation bilaterally  CARDIOVASCULAR: Regular rate and rhythm, normal S1 and S2, no murmur/rub/gallop; No lower extremity edema; Peripheral pulses are 2+ bilaterally  ABDOMEN: Nontender to palpation, normoactive bowel sounds, no rebound/guarding; No hepatosplenomegaly  MUSCULOSKELETAL:  no clubbing or cyanosis of digits; no joint swelling or tenderness to palpation  PSYCH: A+O to person, place, and time; affect appropriate  NEUROLOGY: CN 2-12 are intact and symmetric; no gross sensory deficits       LABS:                        7.3    9.99  )-----------( 367      ( 01 Nov 2024 05:57 )             24.4      10-31    137  |  101  |  22  ----------------------------<  162[H]  4.0   |  27  |  0.87    Ca    7.8[L]      31 Oct 2024 06:12         CAPILLARY BLOOD GLUCOSE      POCT Blood Glucose.: 248 mg/dL (01 Nov 2024 07:50)  POCT Blood Glucose.: 304 mg/dL (31 Oct 2024 21:07)  POCT Blood Glucose.: 236 mg/dL (31 Oct 2024 17:06)  POCT Blood Glucose.: 256 mg/dL (31 Oct 2024 11:28)      RADIOLOGY & ADDITIONAL TESTS:    Imaging Personally Reviewed:  Consultant(s) Notes Reviewed:    Care Discussed with Consultants/Other Providers:   Andre Reyes, M.D.  Office: [phone]  Available thru [employer] Teams     Patient is a 74y old  Female who presents with a chief complaint of Syncope and collapse     (31 Oct 2024 16:35)          SUBJECTIVE / OVERNIGHT EVENTS:    No acute overnight events.  No complaints  denies any melena with the start of heparin.    ROS: ( - ) Fever, ( - )Chills,  ( - )Nausea/Vomiting, ( - ) Cough, ( - )Shortness of breath, ( - )Chest Pain    MEDICATIONS  (STANDING):  ascorbic acid 500 milliGRAM(s) Oral daily  atorvastatin 20 milliGRAM(s) Oral at bedtime  dextrose 5%. 1000 milliLiter(s) (50 mL/Hr) IV Continuous <Continuous>  dextrose 5%. 1000 milliLiter(s) (100 mL/Hr) IV Continuous <Continuous>  dextrose 50% Injectable 25 Gram(s) IV Push once  dextrose 50% Injectable 25 Gram(s) IV Push once  dextrose 50% Injectable 12.5 Gram(s) IV Push once  furosemide   Injectable 40 milliGRAM(s) IV Push daily  gabapentin 800 milliGRAM(s) Oral four times a day  glucagon  Injectable 1 milliGRAM(s) IntraMuscular once  heparin  Infusion.  Unit(s)/Hr (22 mL/Hr) IV Continuous <Continuous>  insulin glargine Injectable (LANTUS) 10 Unit(s) SubCutaneous at bedtime  insulin lispro (ADMELOG) corrective regimen sliding scale   SubCutaneous three times a day before meals  levoFLOXacin IVPB 750 milliGRAM(s) IV Intermittent every 24 hours  levothyroxine 300 MICROGram(s) Oral <User Schedule>  metoprolol succinate ER 25 milliGRAM(s) Oral daily  multivitamin 1 Tablet(s) Oral daily  pantoprazole  Injectable 40 milliGRAM(s) IV Push two times a day  predniSONE   Tablet 10 milliGRAM(s) Oral daily    MEDICATIONS  (PRN):  acetaminophen     Tablet .. 650 milliGRAM(s) Oral every 6 hours PRN Temp greater or equal to 38C (100.4F), Mild Pain (1 - 3)  dextrose Oral Gel 15 Gram(s) Oral once PRN Blood Glucose LESS THAN 70 milliGRAM(s)/deciliter  heparin   Injectable 90396 Unit(s) IV Push every 6 hours PRN For aPTT less than 40  heparin   Injectable 5000 Unit(s) IV Push every 6 hours PRN For aPTT between 40 - 57  melatonin 3 milliGRAM(s) Oral at bedtime PRN Insomnia          T(C): 36.9 (11-01 @ 04:39), Max: 38 (10-31 @ 20:13)   HR: 84   BP: 104/67   RR: 18   SpO2: 94%    PHYSICAL EXAM:    CONSTITUTIONAL: NAD, well-developed, well-groomed  EYES: PERRLA; conjunctiva and sclera clear  ENMT: Moist oral mucosa, no pharyngeal injection or exudates; normal dentition  RESPIRATORY: Normal respiratory effort; lungs are clear to auscultation bilaterally  CARDIOVASCULAR: Regular rate and rhythm, normal S1 and S2, +murmur; no pitting lower extremity edema, + lymph ededma ; Peripheral pulses are 2+ bilaterally  ABDOMEN: Nontender to palpation, normoactive bowel sounds, no rebound/guarding; No hepatosplenomegaly  MUSCULOSKELETAL:  no clubbing or cyanosis of digits; no joint swelling or tenderness to palpation  PSYCH: A+O to person, place, and time; affect appropriate  NEUROLOGY: CN 2-12 are intact and symmetric; no gross sensory deficits       LABS:                        7.3    9.99  )-----------( 367      ( 01 Nov 2024 05:57 )             24.4      10-31    137  |  101  |  22  ----------------------------<  162[H]  4.0   |  27  |  0.87    Ca    7.8[L]      31 Oct 2024 06:12         CAPILLARY BLOOD GLUCOSE      POCT Blood Glucose.: 248 mg/dL (01 Nov 2024 07:50)  POCT Blood Glucose.: 304 mg/dL (31 Oct 2024 21:07)  POCT Blood Glucose.: 236 mg/dL (31 Oct 2024 17:06)  POCT Blood Glucose.: 256 mg/dL (31 Oct 2024 11:28)      RADIOLOGY & ADDITIONAL TESTS:    Imaging Personally Reviewed:  Consultant(s) Notes Reviewed:    Care Discussed with Consultants/Other Providers:

## 2024-11-01 NOTE — PROGRESS NOTE ADULT - SUBJECTIVE AND OBJECTIVE BOX
DATE OF SERVICE: 11-01-24 @ 11:31    Patient is a 74y old  Female who presents with a chief complaint of Symptomatic anemia, Syncope (01 Nov 2024 10:55)      INTERVAL HISTORY: no events    REVIEW OF SYSTEMS:  CONSTITUTIONAL: No weakness  EYES/ENT: No visual changes;  No throat pain   NECK: No pain or stiffness  RESPIRATORY: No cough, wheezing; No shortness of breath  CARDIOVASCULAR: No chest pain or palpitations  GASTROINTESTINAL: No abdominal  pain. No nausea, vomiting, or hematemesis  GENITOURINARY: No dysuria, frequency or hematuria  NEUROLOGICAL: No stroke like symptoms  SKIN: No rashes    TELEMETRY Personally reviewed:  	  MEDICATIONS:  furosemide   Injectable 40 milliGRAM(s) IV Push daily  metoprolol succinate ER 25 milliGRAM(s) Oral daily        PHYSICAL EXAM:  T(C): 37.1 (11-01-24 @ 11:10), Max: 38 (10-31-24 @ 20:13)  HR: 86 (11-01-24 @ 11:10) (84 - 102)  BP: 99/65 (11-01-24 @ 11:10) (99/65 - 113/73)  RR: 18 (11-01-24 @ 11:10) (18 - 18)  SpO2: 96% (11-01-24 @ 11:10) (94% - 96%)  Wt(kg): --  I&O's Summary    31 Oct 2024 07:01  -  01 Nov 2024 07:00  --------------------------------------------------------  IN: 810 mL / OUT: 900 mL / NET: -90 mL          Appearance: In no distress	  HEENT:    PERRL, EOMI	  Cardiovascular:  S1 S2, No JVD  Respiratory: Lungs clear to auscultation	  Gastrointestinal:  Soft, Non-tender, + BS	  Vascularature:  No edema of LE  Psychiatric: Appropriate affect   Neuro: no acute focal deficits                               7.3    9.99  )-----------( 367      ( 01 Nov 2024 05:57 )             24.4     10-31    137  |  101  |  22  ----------------------------<  162[H]  4.0   |  27  |  0.87    Ca    7.8[L]      31 Oct 2024 06:12          Labs personally reviewed      ASSESSMENT/PLAN: 	    74F w/Hx of chronic anemia, bullous pemphigoid on IVIG, HTN, HLD, DM, hypothyroidism, chronic back pain presents after syncopal episode. 1 minute of CPR was initiated in the field by EMS because there was concern of pulseness with unresponsiveness. Patient was hypotensive upon arrival and found to have a hgb of 5.1. Patient reports she syncopized after receiving IVIG. Reports she had been feeling lightheaded and SOB for a few days prior to this event. Has had bout of anemia in the past treated with iron supplementation, folate and B12. She denies any hematuria, melena, gingival bleeding. She reports significant improvement of symptoms since receiving 2u of pRBCs. Her only current complaint is acute on chronic back pain from her stretcher. Patient denies fever, chills, chest pain, SOB, headache, dizziness, abd pain, nausea, vomiting, constipation, dysuria.      Problem/Plan #1:  Problem: Syncope   - Likely symptomatic anemia  - Trop 26 --> 28  - EKG ischemic but likely in setting of Hb of 5.1  - POCUS noted no pericardial effusion, globally reduced LV function   Echo with reduced EF & severe aortic stenosis, however patient needs GI bleed addressed first prior to potential coronary revascularization which would require DAPT  - Replete Hgb > 7     Problem/Plan #2:  Problem: Hypertension  - Home lisinopril 2.5mg PO daily on hold    Problem/Plan #3:  Problem: HLD  - c/w atorvastatin 20mg PO daily    Problem/Plan #4:  Problem: Cardiac Risk Stratification  - Symptomatic anemia  - Echo with reduced EF noted & severe aortic stenosis, however patient needs GI bleed addressed first prior to potential coronary revascularization which would require DAPT  - High risk for low risk nonelective EGD/Colonoscopy 2/2 systolic HF and severe aortic stenosis, planned for monday 11/4  - Consider cardiac anesthesiologist for case    Problem/Plan #5:  Problem: Systolic HF  - TTE 10/30 with EF 25-30%  - Recommend Toprol 25mg,  - Further GDMT as BP tolerates.   - Lasix 40mg IV daily  - Given cannot r/o CAD, recommend ASA 81mg + Lipitor 40mg for potential CAD when safe    Problem/Plan #6:  - Problem: Severe Aortic Stenosis  - Will consult structural cardio once GI bleed addressed.     Problem/Plan #7:  - Problem: Acute right LE DVT  - Per medicine, advised to start heparin gtt given pt's immobility          MADHURI Choi DO [facility]  Cardiovascular Medicine  [address] [facility], [address]  Available through call or text on [employer] TE[employer]  Office: [phone]     DATE OF SERVICE: 11-01-24 @ 11:31    Patient is a 74y old  Female who presents with a chief complaint of Symptomatic anemia, Syncope (01 Nov 2024 10:55)      INTERVAL HISTORY: no events, asymptomatic WCT overnight & PSVT today    REVIEW OF SYSTEMS:  CONSTITUTIONAL: No weakness  EYES/ENT: No visual changes;  No throat pain   NECK: No pain or stiffness  RESPIRATORY: No cough, wheezing; No shortness of breath  CARDIOVASCULAR: No chest pain or palpitations  GASTROINTESTINAL: No abdominal  pain. No nausea, vomiting, or hematemesis  GENITOURINARY: No dysuria, frequency or hematuria  NEUROLOGICAL: No stroke like symptoms  SKIN: No rashes    TELEMETRY Personally reviewed: sinus , 7 beats WCT overnight  	  MEDICATIONS:  furosemide   Injectable 40 milliGRAM(s) IV Push daily  metoprolol succinate ER 25 milliGRAM(s) Oral daily        PHYSICAL EXAM:  T(C): 37.1 (11-01-24 @ 11:10), Max: 38 (10-31-24 @ 20:13)  HR: 86 (11-01-24 @ 11:10) (84 - 102)  BP: 99/65 (11-01-24 @ 11:10) (99/65 - 113/73)  RR: 18 (11-01-24 @ 11:10) (18 - 18)  SpO2: 96% (11-01-24 @ 11:10) (94% - 96%)  Wt(kg): --  I&O's Summary    31 Oct 2024 07:01  -  01 Nov 2024 07:00  --------------------------------------------------------  IN: 810 mL / OUT: 900 mL / NET: -90 mL          Appearance: In no distress	  HEENT:    PERRL, EOMI	  Cardiovascular:  S1 S2, No JVD, + systolic murmur  Respiratory: Lungs clear to auscultation	  Gastrointestinal:  Soft, Non-tender, + BS	  Vascularature:  No edema of LE  Psychiatric: Appropriate affect   Neuro: no acute focal deficits                              7.3    9.99  )-----------( 367      ( 01 Nov 2024 05:57 )             24.4     10-31    137  |  101  |  22  ----------------------------<  162[H]  4.0   |  27  |  0.87    Ca    7.8[L]      31 Oct 2024 06:12          Labs personally reviewed      ASSESSMENT/PLAN: 	    74F w/Hx of chronic anemia, bullous pemphigoid on IVIG, HTN, HLD, DM, hypothyroidism, chronic back pain presents after syncopal episode. 1 minute of CPR was initiated in the field by EMS because there was concern of pulseness with unresponsiveness. Patient was hypotensive upon arrival and found to have a hgb of 5.1. Patient reports she syncopized after receiving IVIG. Reports she had been feeling lightheaded and SOB for a few days prior to this event. Has had bout of anemia in the past treated with iron supplementation, folate and B12. She denies any hematuria, melena, gingival bleeding. She reports significant improvement of symptoms since receiving 2u of pRBCs. Her only current complaint is acute on chronic back pain from her stretcher. Patient denies fever, chills, chest pain, SOB, headache, dizziness, abd pain, nausea, vomiting, constipation, dysuria.      Problem/Plan #1:  Problem: Syncope   - Likely symptomatic anemia  - Trop 26 --> 28  - EKG ischemic but likely in setting of Hb of 5.1  - POCUS noted no pericardial effusion, globally reduced LV function   Echo with reduced EF & severe aortic stenosis, however patient needs GI bleed addressed first prior to potential coronary revascularization which would require DAPT  - Replete Hgb > 7     Problem/Plan #2:  Problem: Hypertension  - Home lisinopril 2.5mg PO daily on hold    Problem/Plan #3:  Problem: HLD  - c/w atorvastatin 20mg PO daily    Problem/Plan #4:  Problem: Cardiac Risk Stratification  - Symptomatic anemia  - Echo with reduced EF noted & severe aortic stenosis, however patient needs GI bleed addressed first prior to potential coronary revascularization which would require DAPT  - High risk for low risk nonelective EGD/Colonoscopy 2/2 systolic HF and severe aortic stenosis, planned for monday 11/4  - Consider cardiac anesthesiologist for case    Problem/Plan #5:  Problem: Systolic HF  - TTE 10/30 with EF 25-30%  - Switched to Metoprolol tartrate 25mg BID due to episodes PSVT & WCT 11/1  - Entresto 24-26mg BID  - Lasix 40mg IV daily  - Given cannot r/o CAD, recommend ASA 81mg + Lipitor 40mg for potential CAD when safe    Problem/Plan #6:  - Problem: Severe Aortic Stenosis  - Structural Dr. Greene consulted     Problem/Plan #7:  - Problem: Acute right LE DVT  - Per medicine, advised to start heparin gtt given pt's immobility          MADHURI Choi,  [facility]  Cardiovascular Medicine  [address] [place] [address], [address]  Available through call or text on [employer] TEAM[employer]  Office: [phone]     DATE OF SERVICE: 11-01-24 @ 11:31    Patient is a 74y old  Female who presents with a chief complaint of Symptomatic anemia, Syncope (01 Nov 2024 10:55)      INTERVAL HISTORY: no events, asymptomatic WCT overnight & PSVT today    REVIEW OF SYSTEMS:  CONSTITUTIONAL: No weakness  EYES/ENT: No visual changes;  No throat pain   NECK: No pain or stiffness  RESPIRATORY: No cough, wheezing; No shortness of breath  CARDIOVASCULAR: No chest pain or palpitations  GASTROINTESTINAL: No abdominal  pain. No nausea, vomiting, or hematemesis  GENITOURINARY: No dysuria, frequency or hematuria  NEUROLOGICAL: No stroke like symptoms  SKIN: No rashes    TELEMETRY Personally reviewed: sinus , 7 beats WCT overnight  	  MEDICATIONS:  furosemide   Injectable 40 milliGRAM(s) IV Push daily  metoprolol succinate ER 25 milliGRAM(s) Oral daily        PHYSICAL EXAM:  T(C): 37.1 (11-01-24 @ 11:10), Max: 38 (10-31-24 @ 20:13)  HR: 86 (11-01-24 @ 11:10) (84 - 102)  BP: 99/65 (11-01-24 @ 11:10) (99/65 - 113/73)  RR: 18 (11-01-24 @ 11:10) (18 - 18)  SpO2: 96% (11-01-24 @ 11:10) (94% - 96%)  Wt(kg): --  I&O's Summary    31 Oct 2024 07:01  -  01 Nov 2024 07:00  --------------------------------------------------------  IN: 810 mL / OUT: 900 mL / NET: -90 mL          Appearance: In no distress	  HEENT:    PERRL, EOMI	  Cardiovascular:  S1 S2, No JVD, + systolic murmur  Respiratory: Lungs clear to auscultation	  Gastrointestinal:  Soft, Non-tender, + BS	  Vascularature:  No edema of LE  Psychiatric: Appropriate affect   Neuro: no acute focal deficits                              7.3    9.99  )-----------( 367      ( 01 Nov 2024 05:57 )             24.4     10-31    137  |  101  |  22  ----------------------------<  162[H]  4.0   |  27  |  0.87    Ca    7.8[L]      31 Oct 2024 06:12          Labs personally reviewed      ASSESSMENT/PLAN: 	    74F w/Hx of chronic anemia, bullous pemphigoid on IVIG, HTN, HLD, DM, hypothyroidism, chronic back pain presents after syncopal episode. 1 minute of CPR was initiated in the field by EMS because there was concern of pulseness with unresponsiveness. Patient was hypotensive upon arrival and found to have a hgb of 5.1. Patient reports she syncopized after receiving IVIG. Reports she had been feeling lightheaded and SOB for a few days prior to this event. Has had bout of anemia in the past treated with iron supplementation, folate and B12. She denies any hematuria, melena, gingival bleeding. She reports significant improvement of symptoms since receiving 2u of pRBCs. Her only current complaint is acute on chronic back pain from her stretcher. Patient denies fever, chills, chest pain, SOB, headache, dizziness, abd pain, nausea, vomiting, constipation, dysuria.      Problem/Plan #1:  Problem: Syncope   - Likely symptomatic anemia  - Trop 26 --> 28  - EKG ischemic but likely in setting of Hb of 5.1  - POCUS noted no pericardial effusion, globally reduced LV function   Echo with reduced EF & severe aortic stenosis, however patient needs GI bleed addressed first prior to potential coronary revascularization which would require DAPT  - Replete Hgb > 7     Problem/Plan #2:  Problem: Hypertension  - Home lisinopril 2.5mg PO daily on hold    Problem/Plan #3:  Problem: HLD  - c/w atorvastatin 20mg PO daily    Problem/Plan #4:  Problem: Cardiac Risk Stratification  - Symptomatic anemia  - Echo with reduced EF noted & severe aortic stenosis, however patient needs GI bleed addressed first prior to potential coronary revascularization which would require DAPT  - High risk for low risk nonelective EGD/Colonoscopy 2/2 systolic HF and severe aortic stenosis, planned for monday 11/4  - Consider cardiac anesthesiologist for case    Problem/Plan #5:  Problem: Systolic HF  - TTE 10/30 with EF 25-30%  - Switched to Metoprolol tartrate 25mg BID due to episodes PSVT & WCT 11/1  - Entresto 24-26mg BID  - Lasix 40mg IV daily  - Given cannot r/o CAD, recommend ASA 81mg + Lipitor 40mg for potential CAD when safe    Problem/Plan #6:  - Problem: Severe Aortic Stenosis  - Structural Dr. Greene consulted     Problem/Plan #7:  - Problem: Acute right LE DVT  - Per medicine          MADHURI Choi,  [facility]  Cardiovascular Medicine  [address] [place] [address], [address]  Available through call or text on [employer] TEAM[employer]  Office: [phone]

## 2024-11-01 NOTE — PROGRESS NOTE ADULT - PROBLEM SELECTOR PLAN 2
Patient was noted to have persistent low grade fevers upon further investigation she was found to have a DVT of the right lower extremity. Given patient is immobile or minimally mobile, per her 's -->  will plan to start anticoagulation.   - For now given her need for upcoming procedures will start a heparin drip with plans for transitioning to eliquis upon discharge

## 2024-11-02 LAB
ANION GAP SERPL CALC-SCNC: 10 MMOL/L — SIGNIFICANT CHANGE UP (ref 5–17)
APTT BLD: 107.4 SEC — HIGH (ref 24.5–35.6)
APTT BLD: 61.7 SEC — HIGH (ref 24.5–35.6)
APTT BLD: 69.2 SEC — HIGH (ref 24.5–35.6)
APTT BLD: 93.8 SEC — HIGH (ref 24.5–35.6)
BUN SERPL-MCNC: 31 MG/DL — HIGH (ref 7–23)
CALCIUM SERPL-MCNC: 7.7 MG/DL — LOW (ref 8.4–10.5)
CHLORIDE SERPL-SCNC: 102 MMOL/L — SIGNIFICANT CHANGE UP (ref 96–108)
CO2 SERPL-SCNC: 25 MMOL/L — SIGNIFICANT CHANGE UP (ref 22–31)
CREAT SERPL-MCNC: 0.77 MG/DL — SIGNIFICANT CHANGE UP (ref 0.5–1.3)
EGFR: 82 ML/MIN/1.73M2 — SIGNIFICANT CHANGE UP
GLUCOSE BLDC GLUCOMTR-MCNC: 192 MG/DL — HIGH (ref 70–99)
GLUCOSE BLDC GLUCOMTR-MCNC: 237 MG/DL — HIGH (ref 70–99)
GLUCOSE BLDC GLUCOMTR-MCNC: 251 MG/DL — HIGH (ref 70–99)
GLUCOSE BLDC GLUCOMTR-MCNC: 288 MG/DL — HIGH (ref 70–99)
GLUCOSE SERPL-MCNC: 206 MG/DL — HIGH (ref 70–99)
HCT VFR BLD CALC: 26.2 % — LOW (ref 34.5–45)
HCT VFR BLD CALC: 27.5 % — LOW (ref 34.5–45)
HGB BLD-MCNC: 7.7 G/DL — LOW (ref 11.5–15.5)
HGB BLD-MCNC: 8.1 G/DL — LOW (ref 11.5–15.5)
MCHC RBC-ENTMCNC: 27.2 PG — SIGNIFICANT CHANGE UP (ref 27–34)
MCHC RBC-ENTMCNC: 28 PG — SIGNIFICANT CHANGE UP (ref 27–34)
MCHC RBC-ENTMCNC: 29.4 G/DL — LOW (ref 32–36)
MCHC RBC-ENTMCNC: 29.5 G/DL — LOW (ref 32–36)
MCV RBC AUTO: 92.6 FL — SIGNIFICANT CHANGE UP (ref 80–100)
MCV RBC AUTO: 95.2 FL — SIGNIFICANT CHANGE UP (ref 80–100)
MRSA PCR RESULT.: SIGNIFICANT CHANGE UP
NRBC # BLD: 1 /100 WBCS — HIGH (ref 0–0)
NRBC # BLD: 1 /100 WBCS — HIGH (ref 0–0)
PLATELET # BLD AUTO: 431 K/UL — HIGH (ref 150–400)
PLATELET # BLD AUTO: 431 K/UL — HIGH (ref 150–400)
POTASSIUM SERPL-MCNC: 3.8 MMOL/L — SIGNIFICANT CHANGE UP (ref 3.5–5.3)
POTASSIUM SERPL-SCNC: 3.8 MMOL/L — SIGNIFICANT CHANGE UP (ref 3.5–5.3)
RBC # BLD: 2.83 M/UL — LOW (ref 3.8–5.2)
RBC # BLD: 2.89 M/UL — LOW (ref 3.8–5.2)
RBC # FLD: 18.4 % — HIGH (ref 10.3–14.5)
RBC # FLD: 18.5 % — HIGH (ref 10.3–14.5)
S AUREUS DNA NOSE QL NAA+PROBE: DETECTED
SODIUM SERPL-SCNC: 137 MMOL/L — SIGNIFICANT CHANGE UP (ref 135–145)
WBC # BLD: 10.15 K/UL — SIGNIFICANT CHANGE UP (ref 3.8–10.5)
WBC # BLD: 10.3 K/UL — SIGNIFICANT CHANGE UP (ref 3.8–10.5)
WBC # FLD AUTO: 10.15 K/UL — SIGNIFICANT CHANGE UP (ref 3.8–10.5)
WBC # FLD AUTO: 10.3 K/UL — SIGNIFICANT CHANGE UP (ref 3.8–10.5)

## 2024-11-02 PROCEDURE — 99233 SBSQ HOSP IP/OBS HIGH 50: CPT

## 2024-11-02 RX ORDER — INSULIN LISPRO 100/ML
4 VIAL (ML) SUBCUTANEOUS
Refills: 0 | Status: DISCONTINUED | OUTPATIENT
Start: 2024-11-02 | End: 2024-11-11

## 2024-11-02 RX ADMIN — Medication 4 UNIT(S): at 11:49

## 2024-11-02 RX ADMIN — Medication 16 UNIT(S): at 21:18

## 2024-11-02 RX ADMIN — Medication 4 UNIT(S): at 17:45

## 2024-11-02 RX ADMIN — Medication 150 MICROGRAM(S): at 05:22

## 2024-11-02 RX ADMIN — PANTOPRAZOLE SODIUM 40 MILLIGRAM(S): 40 TABLET, DELAYED RELEASE ORAL at 05:21

## 2024-11-02 RX ADMIN — SACUBITRIL AND VALSARTAN 1 TABLET(S): 97; 103 TABLET, FILM COATED ORAL at 05:23

## 2024-11-02 RX ADMIN — Medication 25 MILLIGRAM(S): at 17:48

## 2024-11-02 RX ADMIN — GABAPENTIN 800 MILLIGRAM(S): 300 CAPSULE ORAL at 05:20

## 2024-11-02 RX ADMIN — HEPARIN SODIUM 1100 UNIT(S)/HR: 10000 INJECTION INTRAVENOUS; SUBCUTANEOUS at 07:28

## 2024-11-02 RX ADMIN — SACUBITRIL AND VALSARTAN 1 TABLET(S): 97; 103 TABLET, FILM COATED ORAL at 17:46

## 2024-11-02 RX ADMIN — Medication 2 UNIT(S): at 07:52

## 2024-11-02 RX ADMIN — Medication 25 MILLIGRAM(S): at 05:22

## 2024-11-02 RX ADMIN — Medication 2: at 17:45

## 2024-11-02 RX ADMIN — GABAPENTIN 800 MILLIGRAM(S): 300 CAPSULE ORAL at 23:36

## 2024-11-02 RX ADMIN — GABAPENTIN 800 MILLIGRAM(S): 300 CAPSULE ORAL at 17:45

## 2024-11-02 RX ADMIN — HEPARIN SODIUM 1400 UNIT(S)/HR: 10000 INJECTION INTRAVENOUS; SUBCUTANEOUS at 00:45

## 2024-11-02 RX ADMIN — HEPARIN SODIUM 1100 UNIT(S)/HR: 10000 INJECTION INTRAVENOUS; SUBCUTANEOUS at 21:44

## 2024-11-02 RX ADMIN — CHLORHEXIDINE GLUCONATE 1 APPLICATION(S): 40 SOLUTION TOPICAL at 11:50

## 2024-11-02 RX ADMIN — Medication 4: at 07:52

## 2024-11-02 RX ADMIN — Medication 1 TABLET(S): at 11:49

## 2024-11-02 RX ADMIN — GABAPENTIN 800 MILLIGRAM(S): 300 CAPSULE ORAL at 11:50

## 2024-11-02 RX ADMIN — Medication 6: at 11:49

## 2024-11-02 RX ADMIN — Medication 20 MILLIGRAM(S): at 21:12

## 2024-11-02 RX ADMIN — Medication 500 MILLIGRAM(S): at 11:49

## 2024-11-02 RX ADMIN — HEPARIN SODIUM 1100 UNIT(S)/HR: 10000 INJECTION INTRAVENOUS; SUBCUTANEOUS at 19:15

## 2024-11-02 RX ADMIN — Medication 10 MILLIGRAM(S): at 05:21

## 2024-11-02 RX ADMIN — HEPARIN SODIUM 1100 UNIT(S)/HR: 10000 INJECTION INTRAVENOUS; SUBCUTANEOUS at 14:28

## 2024-11-02 RX ADMIN — Medication 25 MILLIGRAM(S): at 05:24

## 2024-11-02 RX ADMIN — PANTOPRAZOLE SODIUM 40 MILLIGRAM(S): 40 TABLET, DELAYED RELEASE ORAL at 17:47

## 2024-11-02 NOTE — PROGRESS NOTE ADULT - SUBJECTIVE AND OBJECTIVE BOX
Sylvie Stanford MD  Hospitalist  Available on MS Teams      PROGRESS NOTE:     Patient is a 74y old  Female who presents with a chief complaint of Symptomatic anemia, Syncope (02 Nov 2024 13:53)      SUBJECTIVE / OVERNIGHT EVENTS:  The patient has no acute complaints. Last BM was yesterday and was brown.     MEDICATIONS  (STANDING):  ascorbic acid 500 milliGRAM(s) Oral daily  atorvastatin 20 milliGRAM(s) Oral at bedtime  chlorhexidine 2% Cloths 1 Application(s) Topical daily  dextrose 5%. 1000 milliLiter(s) (50 mL/Hr) IV Continuous <Continuous>  dextrose 5%. 1000 milliLiter(s) (100 mL/Hr) IV Continuous <Continuous>  dextrose 50% Injectable 12.5 Gram(s) IV Push once  dextrose 50% Injectable 25 Gram(s) IV Push once  dextrose 50% Injectable 25 Gram(s) IV Push once  gabapentin 800 milliGRAM(s) Oral four times a day  glucagon  Injectable 1 milliGRAM(s) IntraMuscular once  heparin  Infusion.  Unit(s)/Hr (22 mL/Hr) IV Continuous <Continuous>  insulin glargine Injectable (LANTUS) 16 Unit(s) SubCutaneous at bedtime  insulin lispro (ADMELOG) corrective regimen sliding scale   SubCutaneous three times a day before meals  insulin lispro Injectable (ADMELOG) 4 Unit(s) SubCutaneous three times a day before meals  levoFLOXacin IVPB 750 milliGRAM(s) IV Intermittent every 24 hours  levothyroxine 150 MICROGram(s) Oral <User Schedule>  levothyroxine 300 MICROGram(s) Oral <User Schedule>  metoprolol tartrate 25 milliGRAM(s) Oral two times a day  multivitamin 1 Tablet(s) Oral daily  pantoprazole  Injectable 40 milliGRAM(s) IV Push two times a day  polyethylene glycol 3350 17 Gram(s) Oral daily  predniSONE   Tablet 10 milliGRAM(s) Oral daily  sacubitril 24 mG/valsartan 26 mG 1 Tablet(s) Oral two times a day  senna 2 Tablet(s) Oral at bedtime  spironolactone 25 milliGRAM(s) Oral daily    MEDICATIONS  (PRN):  acetaminophen     Tablet .. 650 milliGRAM(s) Oral every 6 hours PRN Temp greater or equal to 38C (100.4F), Mild Pain (1 - 3)  dextrose Oral Gel 15 Gram(s) Oral once PRN Blood Glucose LESS THAN 70 milliGRAM(s)/deciliter  heparin   Injectable 54101 Unit(s) IV Push every 6 hours PRN For aPTT less than 40  heparin   Injectable 5000 Unit(s) IV Push every 6 hours PRN For aPTT between 40 - 57  melatonin 3 milliGRAM(s) Oral at bedtime PRN Insomnia      CAPILLARY BLOOD GLUCOSE      POCT Blood Glucose.: 288 mg/dL (02 Nov 2024 11:31)  POCT Blood Glucose.: 237 mg/dL (02 Nov 2024 07:43)  POCT Blood Glucose.: 315 mg/dL (01 Nov 2024 21:27)  POCT Blood Glucose.: 219 mg/dL (01 Nov 2024 17:12)    I&O's Summary    01 Nov 2024 07:01  -  02 Nov 2024 07:00  --------------------------------------------------------  IN: 810 mL / OUT: 2250 mL / NET: -1440 mL    02 Nov 2024 08:01  -  02 Nov 2024 16:10  --------------------------------------------------------  IN: 480 mL / OUT: 700 mL / NET: -220 mL        PHYSICAL EXAM:  Vital Signs Last 24 Hrs  T(C): 36.9 (02 Nov 2024 11:12), Max: 37.5 (01 Nov 2024 20:42)  T(F): 98.4 (02 Nov 2024 11:12), Max: 99.5 (01 Nov 2024 20:42)  HR: 81 (02 Nov 2024 11:12) (81 - 97)  BP: 101/58 (02 Nov 2024 11:12) (101/58 - 117/70)  BP(mean): --  RR: 18 (02 Nov 2024 11:12) (18 - 18)  SpO2: 96% (02 Nov 2024 11:12) (96% - 96%)    Parameters below as of 02 Nov 2024 05:07  Patient On (Oxygen Delivery Method): room air    CONSTITUTIONAL: NAD, well-developed, well-groomed  EYES: PERRLA; conjunctiva and sclera clear  ENMT: Moist oral mucosa, no pharyngeal injection or exudates; normal dentition  RESPIRATORY: Normal respiratory effort; lungs are clear to auscultation bilaterally  CARDIOVASCULAR: Regular rate and rhythm, normal S1 and S2, +murmur; no pitting lower extremity edema, + lymph ededma ; Peripheral pulses are 2+ bilaterally  ABDOMEN: Nontender to palpation, normoactive bowel sounds, no rebound/guarding; No hepatosplenomegaly  MUSCULOSKELETAL:  no clubbing or cyanosis of digits; no joint swelling or tenderness to palpation  PSYCH: A+O to person, place, and time; affect appropriate  NEUROLOGY: CN 2-12 are intact and symmetric; no gross sensory deficits       LABS:                        7.7    10.30 )-----------( 431      ( 02 Nov 2024 06:47 )             26.2     11-02    137  |  102  |  31[H]  ----------------------------<  206[H]  3.8   |  25  |  0.77    Ca    7.7[L]      02 Nov 2024 06:47  Phos  2.2     11-01  Mg     1.9     11-01      PT/INR - ( 31 Oct 2024 17:41 )   PT: 13.2 sec;   INR: 1.15 ratio         PTT - ( 02 Nov 2024 13:36 )  PTT:69.2 sec      Urinalysis Basic - ( 02 Nov 2024 06:47 )    Color: x / Appearance: x / SG: x / pH: x  Gluc: 206 mg/dL / Ketone: x  / Bili: x / Urobili: x   Blood: x / Protein: x / Nitrite: x   Leuk Esterase: x / RBC: x / WBC x   Sq Epi: x / Non Sq Epi: x / Bacteria: x

## 2024-11-02 NOTE — PROGRESS NOTE ADULT - PROBLEM SELECTOR PLAN 7
- HbA1c 5.0    - Patients blood glucose has been above our target goal of less than 200 -->  - lantus 16 units qHS  - admelog 4 units TID with meals  - moderate admelog mealtime sliding scale

## 2024-11-02 NOTE — PROGRESS NOTE ADULT - PROBLEM SELECTOR PLAN 1
pt denies h/o heart issues  will need further cardiac w/u  Patient did have acute hypoxic respiratory failure. Likely in the setting of severe systolic dysfunction and having received 2 units of packed red blood cells.   s/p IV diuretics with improvement in respiratory status  Continue with spironolactone 25mg daily  GDMT: start to implement goal directed medical therapy for her new systolic heart failure-->metoprolol, entresto, spironolactone     Further cardiac evaluation to be done after colonoscopy

## 2024-11-02 NOTE — PROGRESS NOTE ADULT - PROBLEM SELECTOR PLAN 2
Patient was noted to have persistent low grade fevers upon further investigation she was found to have a DVT of the right lower extremity. Given patient is immobile or minimally mobile, per her 's -->  started on heparin gtt on 11/1  - For now given her need for upcoming procedures will start a heparin drip with plans for transitioning to eliquis upon discharge  - CBC q12hr

## 2024-11-02 NOTE — PROGRESS NOTE ADULT - SUBJECTIVE AND OBJECTIVE BOX
DATE OF SERVICE: 11-02-24 @ 13:54    Patient is a 74y old  Female who presents with a chief complaint of Symptomatic anemia, Syncope (01 Nov 2024 15:34)      INTERVAL HISTORY: no complaints     REVIEW OF SYSTEMS:  CONSTITUTIONAL: No weakness  EYES/ENT: No visual changes;  No throat pain   NECK: No pain or stiffness  RESPIRATORY: No cough, wheezing; No shortness of breath  CARDIOVASCULAR: No chest pain or palpitations  GASTROINTESTINAL: No abdominal  pain. No nausea, vomiting, or hematemesis  GENITOURINARY: No dysuria, frequency or hematuria  NEUROLOGICAL: No stroke like symptoms  SKIN: No rashes    TELEMETRY Personally reviewed:  	  MEDICATIONS:  metoprolol tartrate 25 milliGRAM(s) Oral two times a day  sacubitril 24 mG/valsartan 26 mG 1 Tablet(s) Oral two times a day  spironolactone 25 milliGRAM(s) Oral daily        PHYSICAL EXAM:  T(C): 36.9 (11-02-24 @ 11:12), Max: 37.5 (11-01-24 @ 20:42)  HR: 81 (11-02-24 @ 11:12) (81 - 97)  BP: 101/58 (11-02-24 @ 11:12) (101/58 - 117/70)  RR: 18 (11-02-24 @ 11:12) (18 - 18)  SpO2: 96% (11-02-24 @ 11:12) (96% - 96%)  Wt(kg): --  I&O's Summary    01 Nov 2024 07:01  -  02 Nov 2024 07:00  --------------------------------------------------------  IN: 810 mL / OUT: 2250 mL / NET: -1440 mL    02 Nov 2024 08:01  -  02 Nov 2024 13:54  --------------------------------------------------------  IN: 480 mL / OUT: 0 mL / NET: 480 mL          Appearance: In no distress	  HEENT:    PERRL, EOMI	  Cardiovascular:  S1 S2, No JVD  Respiratory: Lungs clear to auscultation	  Gastrointestinal:  Soft, Non-tender, + BS	  Vascularature:  No edema of LE  Psychiatric: Appropriate affect   Neuro: no acute focal deficits                               7.7    10.30 )-----------( 431      ( 02 Nov 2024 06:47 )             26.2     11-02    137  |  102  |  31[H]  ----------------------------<  206[H]  3.8   |  25  |  0.77    Ca    7.7[L]      02 Nov 2024 06:47  Phos  2.2     11-01  Mg     1.9     11-01          Labs personally reviewed      ASSESSMENT/PLAN: 	    74F w/Hx of chronic anemia, bullous pemphigoid on IVIG, HTN, HLD, DM, hypothyroidism, chronic back pain presents after syncopal episode. 1 minute of CPR was initiated in the field by EMS because there was concern of pulseness with unresponsiveness. Patient was hypotensive upon arrival and found to have a hgb of 5.1. Patient reports she syncopized after receiving IVIG. Reports she had been feeling lightheaded and SOB for a few days prior to this event. Has had bout of anemia in the past treated with iron supplementation, folate and B12. She denies any hematuria, melena, gingival bleeding. She reports significant improvement of symptoms since receiving 2u of pRBCs. Her only current complaint is acute on chronic back pain from her stretcher. Patient denies fever, chills, chest pain, SOB, headache, dizziness, abd pain, nausea, vomiting, constipation, dysuria.      Problem/Plan #1:  Problem: Syncope   - Likely symptomatic anemia  - Trop 26 --> 28  - EKG ischemic but likely in setting of Hb of 5.1  - POCUS noted no pericardial effusion, globally reduced LV function   Echo with reduced EF & severe aortic stenosis, however patient needs GI bleed addressed first prior to potential coronary revascularization which would require DAPT  - Replete Hgb > 7     Problem/Plan #2:  Problem: Hypertension  - Home lisinopril 2.5mg PO daily on hold    Problem/Plan #3:  Problem: HLD  - c/w atorvastatin 20mg PO daily    Problem/Plan #4:  Problem: Cardiac Risk Stratification  - Symptomatic anemia  - Echo with reduced EF noted & severe aortic stenosis, however patient needs GI bleed addressed first prior to potential coronary revascularization which would require DAPT  - High risk for low risk nonelective EGD/Colonoscopy 2/2 systolic HF and severe aortic stenosis, planned for monday 11/4  - Consider cardiac anesthesiologist for case    Problem/Plan #5:  Problem: Systolic HF  - TTE 10/30 with EF 25-30%  - Switched to Metoprolol tartrate 25mg BID due to episodes PSVT & WCT 11/1  - Entresto 24-26mg BID  - Lasix 40mg IV daily  - Given cannot r/o CAD, recommend ASA 81mg + Lipitor 40mg for potential CAD when safe  - 11/2 Increase Lopressor to 25mg TID due to recurrent PSVT overnight     Problem/Plan #6:  - Problem: Severe Aortic Stenosis  - Structural Dr. Greene consulted     Problem/Plan #7:  - Problem: Acute right LE DVT  - Per medicine            MADHURI Stewart DO [facility]  Cardiovascular Medicine  [address] [place] [address], [facility]  Office: [phone]  Available via call/text on [employer] Teams

## 2024-11-03 LAB
ANION GAP SERPL CALC-SCNC: 11 MMOL/L — SIGNIFICANT CHANGE UP (ref 5–17)
APTT BLD: 62.7 SEC — HIGH (ref 24.5–35.6)
BUN SERPL-MCNC: 30 MG/DL — HIGH (ref 7–23)
CALCIUM SERPL-MCNC: 8 MG/DL — LOW (ref 8.4–10.5)
CHLORIDE SERPL-SCNC: 102 MMOL/L — SIGNIFICANT CHANGE UP (ref 96–108)
CO2 SERPL-SCNC: 26 MMOL/L — SIGNIFICANT CHANGE UP (ref 22–31)
CREAT SERPL-MCNC: 0.76 MG/DL — SIGNIFICANT CHANGE UP (ref 0.5–1.3)
EGFR: 83 ML/MIN/1.73M2 — SIGNIFICANT CHANGE UP
GLUCOSE BLDC GLUCOMTR-MCNC: 154 MG/DL — HIGH (ref 70–99)
GLUCOSE BLDC GLUCOMTR-MCNC: 193 MG/DL — HIGH (ref 70–99)
GLUCOSE BLDC GLUCOMTR-MCNC: 208 MG/DL — HIGH (ref 70–99)
GLUCOSE BLDC GLUCOMTR-MCNC: 369 MG/DL — HIGH (ref 70–99)
GLUCOSE SERPL-MCNC: 206 MG/DL — HIGH (ref 70–99)
HCT VFR BLD CALC: 26.2 % — LOW (ref 34.5–45)
HGB BLD-MCNC: 7.7 G/DL — LOW (ref 11.5–15.5)
MCHC RBC-ENTMCNC: 28 PG — SIGNIFICANT CHANGE UP (ref 27–34)
MCHC RBC-ENTMCNC: 29.4 G/DL — LOW (ref 32–36)
MCV RBC AUTO: 95.3 FL — SIGNIFICANT CHANGE UP (ref 80–100)
NRBC # BLD: 1 /100 WBCS — HIGH (ref 0–0)
PLATELET # BLD AUTO: 388 K/UL — SIGNIFICANT CHANGE UP (ref 150–400)
POTASSIUM SERPL-MCNC: 3.8 MMOL/L — SIGNIFICANT CHANGE UP (ref 3.5–5.3)
POTASSIUM SERPL-SCNC: 3.8 MMOL/L — SIGNIFICANT CHANGE UP (ref 3.5–5.3)
RBC # BLD: 2.75 M/UL — LOW (ref 3.8–5.2)
RBC # FLD: 18.6 % — HIGH (ref 10.3–14.5)
SODIUM SERPL-SCNC: 139 MMOL/L — SIGNIFICANT CHANGE UP (ref 135–145)
WBC # BLD: 7.08 K/UL — SIGNIFICANT CHANGE UP (ref 3.8–10.5)
WBC # FLD AUTO: 7.08 K/UL — SIGNIFICANT CHANGE UP (ref 3.8–10.5)

## 2024-11-03 PROCEDURE — 99232 SBSQ HOSP IP/OBS MODERATE 35: CPT

## 2024-11-03 PROCEDURE — 99232 SBSQ HOSP IP/OBS MODERATE 35: CPT | Mod: GC

## 2024-11-03 RX ORDER — METOPROLOL TARTRATE 50 MG
50 TABLET ORAL
Refills: 0 | Status: DISCONTINUED | OUTPATIENT
Start: 2024-11-03 | End: 2024-11-11

## 2024-11-03 RX ORDER — INSULIN GLARGINE,HUM.REC.ANLOG 100/ML
8 VIAL (ML) SUBCUTANEOUS ONCE
Refills: 0 | Status: COMPLETED | OUTPATIENT
Start: 2024-11-03 | End: 2024-11-03

## 2024-11-03 RX ORDER — POLYETHYLENE GLYCOL 3350, SODIUM SULFATE ANHYDROUS, SODIUM BICARBONATE, SODIUM CHLORIDE, POTASSIUM CHLORIDE 236; 22.74; 6.74; 5.86; 2.97 G/4L; G/4L; G/4L; G/4L; G/4L
4000 POWDER, FOR SOLUTION ORAL ONCE
Refills: 0 | Status: COMPLETED | OUTPATIENT
Start: 2024-11-03 | End: 2024-11-03

## 2024-11-03 RX ADMIN — Medication 25 MILLIGRAM(S): at 05:14

## 2024-11-03 RX ADMIN — Medication 1 TABLET(S): at 11:38

## 2024-11-03 RX ADMIN — SACUBITRIL AND VALSARTAN 1 TABLET(S): 97; 103 TABLET, FILM COATED ORAL at 05:14

## 2024-11-03 RX ADMIN — PANTOPRAZOLE SODIUM 40 MILLIGRAM(S): 40 TABLET, DELAYED RELEASE ORAL at 17:06

## 2024-11-03 RX ADMIN — Medication 500 MILLIGRAM(S): at 11:37

## 2024-11-03 RX ADMIN — Medication 150 MICROGRAM(S): at 05:14

## 2024-11-03 RX ADMIN — Medication 10: at 11:38

## 2024-11-03 RX ADMIN — Medication 650 MILLIGRAM(S): at 11:39

## 2024-11-03 RX ADMIN — HEPARIN SODIUM 1100 UNIT(S)/HR: 10000 INJECTION INTRAVENOUS; SUBCUTANEOUS at 19:27

## 2024-11-03 RX ADMIN — GABAPENTIN 800 MILLIGRAM(S): 300 CAPSULE ORAL at 05:16

## 2024-11-03 RX ADMIN — Medication 8 UNIT(S): at 21:51

## 2024-11-03 RX ADMIN — GABAPENTIN 800 MILLIGRAM(S): 300 CAPSULE ORAL at 11:37

## 2024-11-03 RX ADMIN — Medication 650 MILLIGRAM(S): at 10:39

## 2024-11-03 RX ADMIN — HEPARIN SODIUM 1100 UNIT(S)/HR: 10000 INJECTION INTRAVENOUS; SUBCUTANEOUS at 07:24

## 2024-11-03 RX ADMIN — Medication 4 UNIT(S): at 17:13

## 2024-11-03 RX ADMIN — Medication 2: at 17:13

## 2024-11-03 RX ADMIN — HEPARIN SODIUM 1100 UNIT(S)/HR: 10000 INJECTION INTRAVENOUS; SUBCUTANEOUS at 07:59

## 2024-11-03 RX ADMIN — PANTOPRAZOLE SODIUM 40 MILLIGRAM(S): 40 TABLET, DELAYED RELEASE ORAL at 05:14

## 2024-11-03 RX ADMIN — POLYETHYLENE GLYCOL 3350, SODIUM SULFATE ANHYDROUS, SODIUM BICARBONATE, SODIUM CHLORIDE, POTASSIUM CHLORIDE 4000 MILLILITER(S): 236; 22.74; 6.74; 5.86; 2.97 POWDER, FOR SOLUTION ORAL at 17:07

## 2024-11-03 RX ADMIN — Medication 5 MILLIGRAM(S): at 21:52

## 2024-11-03 RX ADMIN — CHLORHEXIDINE GLUCONATE 1 APPLICATION(S): 40 SOLUTION TOPICAL at 11:39

## 2024-11-03 RX ADMIN — Medication 20 MILLIGRAM(S): at 21:52

## 2024-11-03 RX ADMIN — Medication 4 UNIT(S): at 11:38

## 2024-11-03 RX ADMIN — Medication 650 MILLIGRAM(S): at 23:53

## 2024-11-03 RX ADMIN — GABAPENTIN 800 MILLIGRAM(S): 300 CAPSULE ORAL at 23:54

## 2024-11-03 RX ADMIN — Medication 10 MILLIGRAM(S): at 05:14

## 2024-11-03 RX ADMIN — Medication 4: at 07:57

## 2024-11-03 RX ADMIN — SACUBITRIL AND VALSARTAN 1 TABLET(S): 97; 103 TABLET, FILM COATED ORAL at 17:06

## 2024-11-03 RX ADMIN — GABAPENTIN 800 MILLIGRAM(S): 300 CAPSULE ORAL at 17:06

## 2024-11-03 NOTE — PROGRESS NOTE ADULT - PROBLEM SELECTOR PLAN 4
unclear cause of her anemia. no evidence of hemolysis  Possible causes include occult GI bleeding. --> Patient is planned for an EGD colonoscopy on Monday. CLD today with bowel prep

## 2024-11-03 NOTE — PROGRESS NOTE ADULT - PROBLEM SELECTOR PLAN 2
Patient was noted to have persistent low grade fevers upon further investigation she was found to have a DVT of the right lower extremity. Given patient is immobile or minimally mobile, per her 's -->  started on heparin gtt on 11/1  - For now given her need for upcoming procedures will start a heparin drip with plans for transitioning to eliquis upon discharge

## 2024-11-03 NOTE — PROGRESS NOTE ADULT - SUBJECTIVE AND OBJECTIVE BOX
Andre Reyes, M.D.  Office: [phone]  Available thru [employer] Teams     Patient is a 74y old  Female who presents with a chief complaint of Symptomatic anemia, Syncope (03 Nov 2024 08:45)          SUBJECTIVE / OVERNIGHT EVENTS:    No acute overnight events.    ROS: ( - ) Fever, ( - )Chills,  ( - )Nausea/Vomiting, ( - ) Cough, ( - )Shortness of breath, ( - )Chest Pain    MEDICATIONS  (STANDING):  ascorbic acid 500 milliGRAM(s) Oral daily  atorvastatin 20 milliGRAM(s) Oral at bedtime  chlorhexidine 2% Cloths 1 Application(s) Topical daily  dextrose 5%. 1000 milliLiter(s) (50 mL/Hr) IV Continuous <Continuous>  dextrose 5%. 1000 milliLiter(s) (100 mL/Hr) IV Continuous <Continuous>  dextrose 50% Injectable 25 Gram(s) IV Push once  dextrose 50% Injectable 12.5 Gram(s) IV Push once  dextrose 50% Injectable 25 Gram(s) IV Push once  gabapentin 800 milliGRAM(s) Oral four times a day  glucagon  Injectable 1 milliGRAM(s) IntraMuscular once  heparin  Infusion.  Unit(s)/Hr (22 mL/Hr) IV Continuous <Continuous>  insulin glargine Injectable (LANTUS) 16 Unit(s) SubCutaneous at bedtime  insulin lispro (ADMELOG) corrective regimen sliding scale   SubCutaneous three times a day before meals  insulin lispro Injectable (ADMELOG) 4 Unit(s) SubCutaneous three times a day before meals  levothyroxine 150 MICROGram(s) Oral <User Schedule>  levothyroxine 300 MICROGram(s) Oral <User Schedule>  metoprolol tartrate 25 milliGRAM(s) Oral two times a day  multivitamin 1 Tablet(s) Oral daily  pantoprazole  Injectable 40 milliGRAM(s) IV Push two times a day  polyethylene glycol 3350 17 Gram(s) Oral daily  polyethylene glycol/electrolyte Solution. 4000 milliLiter(s) Oral once  predniSONE   Tablet 10 milliGRAM(s) Oral daily  sacubitril 24 mG/valsartan 26 mG 1 Tablet(s) Oral two times a day  senna 2 Tablet(s) Oral at bedtime  spironolactone 25 milliGRAM(s) Oral daily    MEDICATIONS  (PRN):  acetaminophen     Tablet .. 650 milliGRAM(s) Oral every 6 hours PRN Temp greater or equal to 38C (100.4F), Mild Pain (1 - 3)  dextrose Oral Gel 15 Gram(s) Oral once PRN Blood Glucose LESS THAN 70 milliGRAM(s)/deciliter  heparin   Injectable 20906 Unit(s) IV Push every 6 hours PRN For aPTT less than 40  heparin   Injectable 5000 Unit(s) IV Push every 6 hours PRN For aPTT between 40 - 57  melatonin 3 milliGRAM(s) Oral at bedtime PRN Insomnia          T(C): 36.7 (11-03 @ 04:15), Max: 37 (11-02 @ 20:27)   HR: 80   BP: 111/64   RR: 18   SpO2: 96%    PHYSICAL EXAM:    CONSTITUTIONAL: NAD, well-developed, well-groomed  EYES: PERRLA; conjunctiva and sclera clear  ENMT: Moist oral mucosa, no pharyngeal injection or exudates; normal dentition  RESPIRATORY: Normal respiratory effort; lungs are clear to auscultation bilaterally  CARDIOVASCULAR: Regular rate and rhythm, normal S1 and S2, no murmur/rub/gallop; No lower extremity edema; Peripheral pulses are 2+ bilaterally  ABDOMEN: Nontender to palpation, normoactive bowel sounds, no rebound/guarding; No hepatosplenomegaly  MUSCULOSKELETAL:  no clubbing or cyanosis of digits; no joint swelling or tenderness to palpation  PSYCH: A+O to person, place, and time; affect appropriate  NEUROLOGY: CN 2-12 are intact and symmetric; no gross sensory deficits       LABS:                        7.7    7.08  )-----------( 388      ( 03 Nov 2024 07:09 )             26.2      11-03    139  |  102  |  30[H]  ----------------------------<  206[H]  3.8   |  26  |  0.76    Ca    8.0[L]      03 Nov 2024 07:09  Phos  2.2     11-01  Mg     1.9     11-01         CAPILLARY BLOOD GLUCOSE      POCT Blood Glucose.: 208 mg/dL (03 Nov 2024 07:25)  POCT Blood Glucose.: 251 mg/dL (02 Nov 2024 21:16)  POCT Blood Glucose.: 192 mg/dL (02 Nov 2024 17:09)  POCT Blood Glucose.: 288 mg/dL (02 Nov 2024 11:31)      RADIOLOGY & ADDITIONAL TESTS:    Imaging Personally Reviewed:  Consultant(s) Notes Reviewed:    Care Discussed with Consultants/Other Providers:   Andre Reyes, M.D.  Office: [phone]  Available thru Microsoft Teams     Patient is a 74y old  Female who presents with a chief complaint of Symptomatic anemia, Syncope (03 Nov 2024 08:45)          SUBJECTIVE / OVERNIGHT EVENTS:    No acute overnight events.  feels well  no complaints    ROS: ( - ) Fever, ( - )Chills,  ( - )Nausea/Vomiting, ( - ) Cough, ( - )Shortness of breath, ( - )Chest Pain    MEDICATIONS  (STANDING):  ascorbic acid 500 milliGRAM(s) Oral daily  atorvastatin 20 milliGRAM(s) Oral at bedtime  chlorhexidine 2% Cloths 1 Application(s) Topical daily  dextrose 5%. 1000 milliLiter(s) (50 mL/Hr) IV Continuous <Continuous>  dextrose 5%. 1000 milliLiter(s) (100 mL/Hr) IV Continuous <Continuous>  dextrose 50% Injectable 25 Gram(s) IV Push once  dextrose 50% Injectable 12.5 Gram(s) IV Push once  dextrose 50% Injectable 25 Gram(s) IV Push once  gabapentin 800 milliGRAM(s) Oral four times a day  glucagon  Injectable 1 milliGRAM(s) IntraMuscular once  heparin  Infusion.  Unit(s)/Hr (22 mL/Hr) IV Continuous <Continuous>  insulin glargine Injectable (LANTUS) 16 Unit(s) SubCutaneous at bedtime  insulin lispro (ADMELOG) corrective regimen sliding scale   SubCutaneous three times a day before meals  insulin lispro Injectable (ADMELOG) 4 Unit(s) SubCutaneous three times a day before meals  levothyroxine 150 MICROGram(s) Oral <User Schedule>  levothyroxine 300 MICROGram(s) Oral <User Schedule>  metoprolol tartrate 25 milliGRAM(s) Oral two times a day  multivitamin 1 Tablet(s) Oral daily  pantoprazole  Injectable 40 milliGRAM(s) IV Push two times a day  polyethylene glycol 3350 17 Gram(s) Oral daily  polyethylene glycol/electrolyte Solution. 4000 milliLiter(s) Oral once  predniSONE   Tablet 10 milliGRAM(s) Oral daily  sacubitril 24 mG/valsartan 26 mG 1 Tablet(s) Oral two times a day  senna 2 Tablet(s) Oral at bedtime  spironolactone 25 milliGRAM(s) Oral daily    MEDICATIONS  (PRN):  acetaminophen     Tablet .. 650 milliGRAM(s) Oral every 6 hours PRN Temp greater or equal to 38C (100.4F), Mild Pain (1 - 3)  dextrose Oral Gel 15 Gram(s) Oral once PRN Blood Glucose LESS THAN 70 milliGRAM(s)/deciliter  heparin   Injectable 84567 Unit(s) IV Push every 6 hours PRN For aPTT less than 40  heparin   Injectable 5000 Unit(s) IV Push every 6 hours PRN For aPTT between 40 - 57  melatonin 3 milliGRAM(s) Oral at bedtime PRN Insomnia          T(C): 36.7 (11-03 @ 04:15), Max: 37 (11-02 @ 20:27)   HR: 80   BP: 111/64   RR: 18   SpO2: 96%    PHYSICAL EXAM:    CONSTITUTIONAL: NAD, well-developed, well-groomed  EYES: PERRLA; conjunctiva and sclera clear  ENMT: Moist oral mucosa, no pharyngeal injection or exudates; normal dentition  RESPIRATORY: Normal respiratory effort; lungs are clear to auscultation bilaterally  CARDIOVASCULAR: Regular rate and rhythm, normal S1 and S2, no murmur/rub/gallop; No lower extremity edema; Peripheral pulses are 2+ bilaterally  ABDOMEN: Nontender to palpation, normoactive bowel sounds, no rebound/guarding; No hepatosplenomegaly  MUSCULOSKELETAL:  no clubbing or cyanosis of digits; no joint swelling or tenderness to palpation  PSYCH: A+O to person, place, and time; affect appropriate  NEUROLOGY: CN 2-12 are intact and symmetric; no gross sensory deficits       LABS:                        7.7    7.08  )-----------( 388      ( 03 Nov 2024 07:09 )             26.2      11-03    139  |  102  |  30[H]  ----------------------------<  206[H]  3.8   |  26  |  0.76    Ca    8.0[L]      03 Nov 2024 07:09  Phos  2.2     11-01  Mg     1.9     11-01         CAPILLARY BLOOD GLUCOSE      POCT Blood Glucose.: 208 mg/dL (03 Nov 2024 07:25)  POCT Blood Glucose.: 251 mg/dL (02 Nov 2024 21:16)  POCT Blood Glucose.: 192 mg/dL (02 Nov 2024 17:09)  POCT Blood Glucose.: 288 mg/dL (02 Nov 2024 11:31)      RADIOLOGY & ADDITIONAL TESTS:    Imaging Personally Reviewed:  Consultant(s) Notes Reviewed:    Care Discussed with Consultants/Other Providers:   Andre Reyes, M.D.  Office: [phone]  Available thru [employer] Teams     Patient is a 74y old  Female who presents with a chief complaint of Symptomatic anemia, Syncope (03 Nov 2024 08:45)          SUBJECTIVE / OVERNIGHT EVENTS:    No acute overnight events.  feels well  no complaints    ROS: ( - ) Fever, ( - )Chills,  ( - )Nausea/Vomiting, ( - ) Cough, ( - )Shortness of breath, ( - )Chest Pain    MEDICATIONS  (STANDING):  ascorbic acid 500 milliGRAM(s) Oral daily  atorvastatin 20 milliGRAM(s) Oral at bedtime  chlorhexidine 2% Cloths 1 Application(s) Topical daily  dextrose 5%. 1000 milliLiter(s) (50 mL/Hr) IV Continuous <Continuous>  dextrose 5%. 1000 milliLiter(s) (100 mL/Hr) IV Continuous <Continuous>  dextrose 50% Injectable 25 Gram(s) IV Push once  dextrose 50% Injectable 12.5 Gram(s) IV Push once  dextrose 50% Injectable 25 Gram(s) IV Push once  gabapentin 800 milliGRAM(s) Oral four times a day  glucagon  Injectable 1 milliGRAM(s) IntraMuscular once  heparin  Infusion.  Unit(s)/Hr (22 mL/Hr) IV Continuous <Continuous>  insulin glargine Injectable (LANTUS) 16 Unit(s) SubCutaneous at bedtime  insulin lispro (ADMELOG) corrective regimen sliding scale   SubCutaneous three times a day before meals  insulin lispro Injectable (ADMELOG) 4 Unit(s) SubCutaneous three times a day before meals  levothyroxine 150 MICROGram(s) Oral <User Schedule>  levothyroxine 300 MICROGram(s) Oral <User Schedule>  metoprolol tartrate 25 milliGRAM(s) Oral two times a day  multivitamin 1 Tablet(s) Oral daily  pantoprazole  Injectable 40 milliGRAM(s) IV Push two times a day  polyethylene glycol 3350 17 Gram(s) Oral daily  polyethylene glycol/electrolyte Solution. 4000 milliLiter(s) Oral once  predniSONE   Tablet 10 milliGRAM(s) Oral daily  sacubitril 24 mG/valsartan 26 mG 1 Tablet(s) Oral two times a day  senna 2 Tablet(s) Oral at bedtime  spironolactone 25 milliGRAM(s) Oral daily    MEDICATIONS  (PRN):  acetaminophen     Tablet .. 650 milliGRAM(s) Oral every 6 hours PRN Temp greater or equal to 38C (100.4F), Mild Pain (1 - 3)  dextrose Oral Gel 15 Gram(s) Oral once PRN Blood Glucose LESS THAN 70 milliGRAM(s)/deciliter  heparin   Injectable 66278 Unit(s) IV Push every 6 hours PRN For aPTT less than 40  heparin   Injectable 5000 Unit(s) IV Push every 6 hours PRN For aPTT between 40 - 57  melatonin 3 milliGRAM(s) Oral at bedtime PRN Insomnia          T(C): 36.7 (11-03 @ 04:15), Max: 37 (11-02 @ 20:27)   HR: 80   BP: 111/64   RR: 18   SpO2: 96%    PHYSICAL EXAM:    CONSTITUTIONAL: NAD, well-developed, well-groomed  EYES: PERRLA; conjunctiva and sclera clear  ENMT: Moist oral mucosa, no pharyngeal injection or exudates; normal dentition  RESPIRATORY: Normal respiratory effort; lungs are clear to auscultation bilaterally  CARDIOVASCULAR: Regular rate and rhythm, normal S1 and S2, + murmur; + lower extremity lymph edema; Peripheral pulses are 2+ bilaterally  ABDOMEN: Nontender to palpation, normoactive bowel sounds, no rebound/guarding; No hepatosplenomegaly  MUSCULOSKELETAL:  no clubbing or cyanosis of digits; no joint swelling or tenderness to palpation  PSYCH: A+O to person, place, and time; affect appropriate  NEUROLOGY: CN 2-12 are intact and symmetric; no gross sensory deficits       LABS:                        7.7    7.08  )-----------( 388      ( 03 Nov 2024 07:09 )             26.2      11-03    139  |  102  |  30[H]  ----------------------------<  206[H]  3.8   |  26  |  0.76    Ca    8.0[L]      03 Nov 2024 07:09  Phos  2.2     11-01  Mg     1.9     11-01         CAPILLARY BLOOD GLUCOSE      POCT Blood Glucose.: 208 mg/dL (03 Nov 2024 07:25)  POCT Blood Glucose.: 251 mg/dL (02 Nov 2024 21:16)  POCT Blood Glucose.: 192 mg/dL (02 Nov 2024 17:09)  POCT Blood Glucose.: 288 mg/dL (02 Nov 2024 11:31)      RADIOLOGY & ADDITIONAL TESTS:    Imaging Personally Reviewed:  Consultant(s) Notes Reviewed:    Care Discussed with Consultants/Other Providers:

## 2024-11-03 NOTE — PROGRESS NOTE ADULT - SUBJECTIVE AND OBJECTIVE BOX
Chief Complaint:  Patient is a 74y old  Female who presents with a chief complaint of Symptomatic anemia, Syncope (31 Oct 2024 09:45)    Interval Events:  - Dyspnea resolved, breathing comfortably on room air  - Denies melena or hematochezia or abdominal pain    Allergies:  penicillin (Stomach Upset; Nausea; Swelling; Hives)  cephalexin (Swelling; Rash; Nausea; Hives)  vancomycin ([place] [facility])      Hospital Medications:  acetaminophen     Tablet .. 650 milliGRAM(s) Oral every 6 hours PRN  atorvastatin 20 milliGRAM(s) Oral at bedtime  dextrose 5%. 1000 milliLiter(s) IV Continuous <Continuous>  dextrose 5%. 1000 milliLiter(s) IV Continuous <Continuous>  dextrose 50% Injectable 12.5 Gram(s) IV Push once  dextrose 50% Injectable 25 Gram(s) IV Push once  dextrose 50% Injectable 25 Gram(s) IV Push once  dextrose Oral Gel 15 Gram(s) Oral once PRN  furosemide   Injectable 40 milliGRAM(s) IV Push daily  gabapentin 800 milliGRAM(s) Oral four times a day  glucagon  Injectable 1 milliGRAM(s) IntraMuscular once  insulin glargine Injectable (LANTUS) 10 Unit(s) SubCutaneous at bedtime  insulin lispro (ADMELOG) corrective regimen sliding scale   SubCutaneous three times a day before meals  levoFLOXacin IVPB 750 milliGRAM(s) IV Intermittent every 24 hours  levothyroxine 300 MICROGram(s) Oral <User Schedule>  melatonin 3 milliGRAM(s) Oral at bedtime PRN  metoprolol succinate ER 25 milliGRAM(s) Oral daily  pantoprazole  Injectable 40 milliGRAM(s) IV Push two times a day  predniSONE   Tablet 10 milliGRAM(s) Oral daily      PMHX/PSHX:  BP (bullous pemphigoid)    HTN (hypertension)    HLD (hyperlipidemia)    Hypothyroidism        Family history:      ROS:     General:  No wt loss, fevers, chills, night sweats, fatigue,   Eyes:  Good vision, no reported pain  ENT:  No sore throat, pain, runny nose, dysphagia  CV:  No pain, palpitations, hypo/hypertension  Resp:  No dyspnea, cough, tachypnea, wheezing  GI:  See HPI  :  No pain, bleeding, incontinence, nocturia  Muscle:  No pain, weakness  Neuro:  No weakness, tingling, memory problems  Skin:  No rash, edema      PHYSICAL EXAM:   Vital Signs:  Vital Signs Last 24 Hrs  T(C): 36.6 (31 Oct 2024 11:15), Max: 38.2 (30 Oct 2024 16:23)  T(F): 97.9 (31 Oct 2024 11:15), Max: 100.7 (30 Oct 2024 16:23)  HR: 102 (31 Oct 2024 12:23) (82 - 102)  BP: 113/73 (31 Oct 2024 12:23) (105/65 - 116/71)  BP(mean): --  RR: 18 (31 Oct 2024 11:15) (18 - 18)  SpO2: 94% (31 Oct 2024 11:15) (93% - 98%)    Parameters below as of 31 Oct 2024 11:15  Patient On (Oxygen Delivery Method): nasal cannula  O2 Flow (L/min): 2    Daily     Daily     GENERAL:  Appears stated age, obese  HEENT:  NC/AT,  conjunctivae clear, sclera -anicteric  CHEST:  Full & symmetric excursion, no increased effort, breath sounds clear  HEART:  Regular rhythm, S1, S2   ABDOMEN:  Soft, non-tender, non-distended   EXTREMITIES:  no cyanosis,clubbing or edema  SKIN:  No rash/erythema   NEURO:  Alert, oriented x 3    LABS:                        7.4    7.32  )-----------( 360      ( 31 Oct 2024 06:15 )             24.6     10-31    137  |  101  |  22  ----------------------------<  162[H]  4.0   |  27  |  0.87    Ca    7.8[L]      31 Oct 2024 06:12    TPro  7.6  /  Alb  3.1[L]  /  TBili  0.5  /  DBili  x   /  AST  45[H]  /  ALT  33  /  AlkPhos  86  10-29    LIVER FUNCTIONS - ( 29 Oct 2024 20:51 )  Alb: 3.1 g/dL / Pro: 7.6 g/dL / ALK PHOS: 86 U/L / ALT: 33 U/L / AST: 45 U/L / GGT: x             Urinalysis Basic - ( 31 Oct 2024 06:12 )    Color: x / Appearance: x / SG: x / pH: x  Gluc: 162 mg/dL / Ketone: x  / Bili: x / Urobili: x   Blood: x / Protein: x / Nitrite: x   Leuk Esterase: x / RBC: x / WBC x   Sq Epi: x / Non Sq Epi: x / Bacteria: x      Iron with Total Binding Capacity (10.29.24 @ 12:09)    Iron - Total Binding Capacity.: 339 ug/dL   % Saturation, Iron: 15 %   Iron Total: 51 ug/dL   Unsaturated Iron Binding Capacity: 288 ug/dL    Ferritin: 22 ng/mL (10.29.24 @ 12:09)

## 2024-11-03 NOTE — PROGRESS NOTE ADULT - SUBJECTIVE AND OBJECTIVE BOX
DATE OF SERVICE: 11-03-24 @ 13:35    Patient is a 74y old  Female who presents with a chief complaint of Symptomatic anemia, Syncope (03 Nov 2024 08:58)      INTERVAL HISTORY: no complaints     REVIEW OF SYSTEMS:  CONSTITUTIONAL: No weakness  EYES/ENT: No visual changes;  No throat pain   NECK: No pain or stiffness  RESPIRATORY: No cough, wheezing; No shortness of breath  CARDIOVASCULAR: No chest pain or palpitations  GASTROINTESTINAL: No abdominal  pain. No nausea, vomiting, or hematemesis  GENITOURINARY: No dysuria, frequency or hematuria  NEUROLOGICAL: No stroke like symptoms  SKIN: No rashes       	  MEDICATIONS:  metoprolol tartrate 50 milliGRAM(s) Oral two times a day  sacubitril 24 mG/valsartan 26 mG 1 Tablet(s) Oral two times a day  spironolactone 25 milliGRAM(s) Oral daily        PHYSICAL EXAM:  T(C): 37.1 (11-03-24 @ 11:11), Max: 37.1 (11-03-24 @ 11:11)  HR: 84 (11-03-24 @ 11:11) (80 - 93)  BP: 94/66 (11-03-24 @ 11:11) (94/66 - 111/64)  RR: 18 (11-03-24 @ 11:11) (18 - 18)  SpO2: 97% (11-03-24 @ 11:11) (96% - 97%)  Wt(kg): --  I&O's Summary    02 Nov 2024 08:01  -  03 Nov 2024 07:00  --------------------------------------------------------  IN: 1092 mL / OUT: 1500 mL / NET: -408 mL          Appearance: In no distress	  HEENT:    PERRL, EOMI	  Cardiovascular:  S1 S2, No JVD  Respiratory: Lungs clear to auscultation	  Gastrointestinal:  Soft, Non-tender, + BS	  Vascularature:  No edema of LE  Psychiatric: Appropriate affect   Neuro: no acute focal deficits                               7.7    7.08  )-----------( 388      ( 03 Nov 2024 07:09 )             26.2     11-03    139  |  102  |  30[H]  ----------------------------<  206[H]  3.8   |  26  |  0.76    Ca    8.0[L]      03 Nov 2024 07:09  Phos  2.2     11-01  Mg     1.9     11-01          Labs personally reviewed      ASSESSMENT/PLAN: 	      74F w/Hx of chronic anemia, bullous pemphigoid on IVIG, HTN, HLD, DM, hypothyroidism, chronic back pain presents after syncopal episode. 1 minute of CPR was initiated in the field by EMS because there was concern of pulseness with unresponsiveness. Patient was hypotensive upon arrival and found to have a hgb of 5.1. Patient reports she syncopized after receiving IVIG. Reports she had been feeling lightheaded and SOB for a few days prior to this event. Has had bout of anemia in the past treated with iron supplementation, folate and B12. She denies any hematuria, melena, gingival bleeding. She reports significant improvement of symptoms since receiving 2u of pRBCs. Her only current complaint is acute on chronic back pain from her stretcher. Patient denies fever, chills, chest pain, SOB, headache, dizziness, abd pain, nausea, vomiting, constipation, dysuria.      Problem/Plan #1:  Problem: Syncope   - Likely symptomatic anemia  - Trop 26 --> 28  - EKG ischemic but likely in setting of Hb of 5.1  - POCUS noted no pericardial effusion, globally reduced LV function   Echo with reduced EF & severe aortic stenosis, however patient needs GI bleed addressed first prior to potential coronary revascularization which would require DAPT  - Replete Hgb > 7     Problem/Plan #2:  Problem: Hypertension  - Home lisinopril 2.5mg PO daily on hold    Problem/Plan #3:  Problem: HLD  - c/w atorvastatin 20mg PO daily    Problem/Plan #4:  Problem: Cardiac Risk Stratification  - Symptomatic anemia  - Echo with reduced EF noted & severe aortic stenosis, however patient needs GI bleed addressed first prior to potential coronary revascularization which would require DAPT  - High risk for low risk nonelective EGD/Colonoscopy 2/2 systolic HF and severe aortic stenosis, planned for monday 11/4  - Consider cardiac anesthesiologist for case    Problem/Plan #5:  Problem: Systolic HF  - TTE 10/30 with EF 25-30%  - Switched to Metoprolol tartrate 25mg BID due to episodes PSVT & WCT 11/1  - Entresto 24-26mg BID  - Lasix 40mg IV daily  - Given cannot r/o CAD, recommend ASA 81mg + Lipitor 40mg for potential CAD when safe  - 11/2 Increase Lopressor to 25mg TID due to recurrent PSVT overnight     Problem/Plan #6:  - Problem: Severe Aortic Stenosis  - Structural Dr. Greene consulted     Problem/Plan #7:  - Problem: Acute right LE DVT  - Per medicine            MADHURI Stewart DO [facility]  Cardiovascular Medicine  [address] [place] [address], [address]  Office: [phone]  Available via call/text on [employer] Teams

## 2024-11-03 NOTE — PROGRESS NOTE ADULT - ASSESSMENT
74F w/Hx of chronic anemia, bullous pemphigoid on IVIG, HTN, HLD, DM, hypothyroidism, chronic back pain presents after syncopal episode, found to be anemic to 5.1 and have new HFrEF.    Impression:  #JANE  #HFrEF  #Severe AS    P/w anemia with Hgb 5.1 after syncopal episode without overt bleeding. DDx includes angioectasia given severe AS, vs PUD, gastric/colonic neoplasms, GAVE. Pt now on room air    Recommendations:  - Plan for EGD/colonoscopy tomorrow  - CLD today then NPO at [place]  - Ordered Golytely prep for 6 pm today. Please ensure stools clear  - Please obtain early AM labs for procedure (cbc, coags, T&S, bmp)  - Goal to maintain hgb > 7 per cardiology recommendations  - Appreciate cardiology recommendations. Will coordinate case w/ cardiac anesthesia    All recommendations are tentative until note is attested by attending.     Adriana Atwood, PGY4  Gastroenterology/Hepatology Fellow  Available on Microsoft Teams  [phone] (Long Range Pager)  [phone] (Short Range Pager [facility])    After 5 pm, please contact the on-call GI fellow for any urgent issues via the Hospital Call       74F w/Hx of chronic anemia, bullous pemphigoid on IVIG, HTN, HLD, DM, hypothyroidism, chronic back pain presents after syncopal episode, found to be anemic to 5.1 and have new HFrEF.    Impression:  #JANE  #HFrEF  #Severe AS    P/w anemia with Hgb 5.1 after syncopal episode without overt bleeding. DDx includes angioectasia given severe AS, vs PUD, gastric/colonic neoplasms, GAVE. Pt now on room air    Recommendations:  - Plan for EGD/colonoscopy tomorrow  - CLD today then NPO at [place]  - Ordered Golytely prep for 6 pm today. Please ensure stools clear  - Hold heparin gtt at 2 am for procedure  - Please obtain early AM labs for procedure (cbc, coags, T&S, bmp)  - Goal to maintain hgb > 7 per cardiology recommendations  - Appreciate cardiology recommendations. Will coordinate case w/ cardiac anesthesia    All recommendations are tentative until note is attested by attending.     Adriana Atwood, PGY4  Gastroenterology/Hepatology Fellow  Available on Microsoft Teams  [phone] (Long Range Pager)  [phone] (Short Range Pager [facility])    After 5 pm, please contact the on-call GI fellow for any urgent issues via the Hospital Call

## 2024-11-04 LAB
ANION GAP SERPL CALC-SCNC: 7 MMOL/L — SIGNIFICANT CHANGE UP (ref 5–17)
APTT BLD: 31 SEC — SIGNIFICANT CHANGE UP (ref 24.5–35.6)
BUN SERPL-MCNC: 20 MG/DL — SIGNIFICANT CHANGE UP (ref 7–23)
CALCIUM SERPL-MCNC: 8 MG/DL — LOW (ref 8.4–10.5)
CHLORIDE SERPL-SCNC: 104 MMOL/L — SIGNIFICANT CHANGE UP (ref 96–108)
CO2 SERPL-SCNC: 30 MMOL/L — SIGNIFICANT CHANGE UP (ref 22–31)
CREAT SERPL-MCNC: 0.68 MG/DL — SIGNIFICANT CHANGE UP (ref 0.5–1.3)
CULTURE RESULTS: SIGNIFICANT CHANGE UP
CULTURE RESULTS: SIGNIFICANT CHANGE UP
EGFR: 92 ML/MIN/1.73M2 — SIGNIFICANT CHANGE UP
GLUCOSE BLDC GLUCOMTR-MCNC: 136 MG/DL — HIGH (ref 70–99)
GLUCOSE BLDC GLUCOMTR-MCNC: 171 MG/DL — HIGH (ref 70–99)
GLUCOSE BLDC GLUCOMTR-MCNC: 174 MG/DL — HIGH (ref 70–99)
GLUCOSE BLDC GLUCOMTR-MCNC: 235 MG/DL — HIGH (ref 70–99)
GLUCOSE SERPL-MCNC: 123 MG/DL — HIGH (ref 70–99)
HCT VFR BLD CALC: 25.2 % — LOW (ref 34.5–45)
HGB BLD-MCNC: 7.3 G/DL — LOW (ref 11.5–15.5)
INR BLD: 1.05 RATIO — SIGNIFICANT CHANGE UP (ref 0.85–1.16)
MCHC RBC-ENTMCNC: 26.8 PG — LOW (ref 27–34)
MCHC RBC-ENTMCNC: 29 G/DL — LOW (ref 32–36)
MCV RBC AUTO: 92.6 FL — SIGNIFICANT CHANGE UP (ref 80–100)
NRBC # BLD: 1 /100 WBCS — HIGH (ref 0–0)
PLATELET # BLD AUTO: 316 K/UL — SIGNIFICANT CHANGE UP (ref 150–400)
POTASSIUM SERPL-MCNC: 4 MMOL/L — SIGNIFICANT CHANGE UP (ref 3.5–5.3)
POTASSIUM SERPL-SCNC: 4 MMOL/L — SIGNIFICANT CHANGE UP (ref 3.5–5.3)
PROTHROM AB SERPL-ACNC: 12.1 SEC — SIGNIFICANT CHANGE UP (ref 9.9–13.4)
RBC # BLD: 2.72 M/UL — LOW (ref 3.8–5.2)
RBC # FLD: 18.4 % — HIGH (ref 10.3–14.5)
SODIUM SERPL-SCNC: 141 MMOL/L — SIGNIFICANT CHANGE UP (ref 135–145)
SPECIMEN SOURCE: SIGNIFICANT CHANGE UP
SPECIMEN SOURCE: SIGNIFICANT CHANGE UP
WBC # BLD: 5.17 K/UL — SIGNIFICANT CHANGE UP (ref 3.8–10.5)
WBC # FLD AUTO: 5.17 K/UL — SIGNIFICANT CHANGE UP (ref 3.8–10.5)

## 2024-11-04 PROCEDURE — 45378 DIAGNOSTIC COLONOSCOPY: CPT | Mod: GC

## 2024-11-04 PROCEDURE — 44360 SMALL BOWEL ENDOSCOPY: CPT | Mod: GC

## 2024-11-04 PROCEDURE — 99232 SBSQ HOSP IP/OBS MODERATE 35: CPT

## 2024-11-04 RX ORDER — INSULIN LISPRO 100/ML
VIAL (ML) SUBCUTANEOUS EVERY 6 HOURS
Refills: 0 | Status: DISCONTINUED | OUTPATIENT
Start: 2024-11-04 | End: 2024-11-04

## 2024-11-04 RX ORDER — INSULIN LISPRO 100/ML
VIAL (ML) SUBCUTANEOUS
Refills: 0 | Status: DISCONTINUED | OUTPATIENT
Start: 2024-11-04 | End: 2024-11-11

## 2024-11-04 RX ORDER — SODIUM CHLORIDE 9 MG/ML
500 INJECTION, SOLUTION INTRAMUSCULAR; INTRAVENOUS; SUBCUTANEOUS
Refills: 0 | Status: COMPLETED | OUTPATIENT
Start: 2024-11-04 | End: 2024-11-04

## 2024-11-04 RX ORDER — SODIUM CHLORIDE 9 MG/ML
500 INJECTION, SOLUTION INTRAMUSCULAR; INTRAVENOUS; SUBCUTANEOUS
Refills: 0 | Status: DISCONTINUED | OUTPATIENT
Start: 2024-11-04 | End: 2024-11-04

## 2024-11-04 RX ORDER — INSULIN LISPRO 100/ML
VIAL (ML) SUBCUTANEOUS AT BEDTIME
Refills: 0 | Status: DISCONTINUED | OUTPATIENT
Start: 2024-11-04 | End: 2024-11-11

## 2024-11-04 RX ADMIN — GABAPENTIN 800 MILLIGRAM(S): 300 CAPSULE ORAL at 18:20

## 2024-11-04 RX ADMIN — GABAPENTIN 800 MILLIGRAM(S): 300 CAPSULE ORAL at 13:51

## 2024-11-04 RX ADMIN — Medication 1 TABLET(S): at 13:52

## 2024-11-04 RX ADMIN — Medication 300 MICROGRAM(S): at 05:05

## 2024-11-04 RX ADMIN — Medication 650 MILLIGRAM(S): at 17:50

## 2024-11-04 RX ADMIN — Medication 4 UNIT(S): at 13:52

## 2024-11-04 RX ADMIN — Medication 650 MILLIGRAM(S): at 16:50

## 2024-11-04 RX ADMIN — Medication 16 UNIT(S): at 21:40

## 2024-11-04 RX ADMIN — PANTOPRAZOLE SODIUM 40 MILLIGRAM(S): 40 TABLET, DELAYED RELEASE ORAL at 18:20

## 2024-11-04 RX ADMIN — SACUBITRIL AND VALSARTAN 1 TABLET(S): 97; 103 TABLET, FILM COATED ORAL at 05:05

## 2024-11-04 RX ADMIN — Medication 650 MILLIGRAM(S): at 00:23

## 2024-11-04 RX ADMIN — GABAPENTIN 800 MILLIGRAM(S): 300 CAPSULE ORAL at 05:04

## 2024-11-04 RX ADMIN — SODIUM CHLORIDE 30 MILLILITER(S): 9 INJECTION, SOLUTION INTRAMUSCULAR; INTRAVENOUS; SUBCUTANEOUS at 19:43

## 2024-11-04 RX ADMIN — CHLORHEXIDINE GLUCONATE 1 APPLICATION(S): 40 SOLUTION TOPICAL at 13:53

## 2024-11-04 RX ADMIN — PANTOPRAZOLE SODIUM 40 MILLIGRAM(S): 40 TABLET, DELAYED RELEASE ORAL at 05:06

## 2024-11-04 RX ADMIN — Medication 500 MILLIGRAM(S): at 13:52

## 2024-11-04 RX ADMIN — Medication 2: at 13:52

## 2024-11-04 RX ADMIN — Medication 4: at 18:20

## 2024-11-04 RX ADMIN — GABAPENTIN 800 MILLIGRAM(S): 300 CAPSULE ORAL at 23:34

## 2024-11-04 RX ADMIN — Medication 4 UNIT(S): at 18:19

## 2024-11-04 RX ADMIN — Medication 20 MILLIGRAM(S): at 21:41

## 2024-11-04 RX ADMIN — Medication 50 MILLIGRAM(S): at 05:05

## 2024-11-04 RX ADMIN — Medication 25 MILLIGRAM(S): at 14:19

## 2024-11-04 RX ADMIN — Medication 10 MILLIGRAM(S): at 05:05

## 2024-11-04 RX ADMIN — HEPARIN SODIUM 1600 UNIT(S)/HR: 10000 INJECTION INTRAVENOUS; SUBCUTANEOUS at 19:39

## 2024-11-04 NOTE — PRE-ANESTHESIA EVALUATION ADULT - NSRADCARDRESULTSFT_GEN_ALL_CORE
< from: TTE W or WO Ultrasound Enhancing Agent (10.30.24 @ 08:31) >    1. Left ventricular systolic function is severely decreased with an ejection fraction visually estimated at 25 to 30 %.   2. The right ventricle is not well visualized.   3. Mitral valve was not well visualized.   4. Aortic valve was not well visualized.   5. No pericardial effusion seen.   6. Estimated pulmonary artery systolic pressure is 43 mmHg.   7. Moderate mitral valve stenosis.   8. Severe aortic stenosis.   9. 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  10. There is no evidence of a left ventricular thrombus.  11. There is normal LV mass and concentric remodeling.    < end of copied text >

## 2024-11-04 NOTE — PROGRESS NOTE ADULT - PROBLEM SELECTOR PLAN 1
pt denies h/o heart issues  will need further cardiac w/u  Patient did have acute hypoxic respiratory failure. Likely in the setting of severe systolic dysfunction and having received 2 units of packed red blood cells.   s/p IV diuretics with improvement in respiratory status  Continue with spironolactone 25mg daily  GDMT: start to implement goal directed medical therapy for her new systolic heart failure-->metoprolol, entresto, spironolactone     Further cardiac evaluation to be done once cleared to start DAPT if needed

## 2024-11-04 NOTE — PRE-ANESTHESIA EVALUATION ADULT - NSANTHPMHFT_GEN_ALL_CORE
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

## 2024-11-04 NOTE — PROGRESS NOTE ADULT - SUBJECTIVE AND OBJECTIVE BOX
DATE OF SERVICE: 11-04-24 @ 13:33    Patient is a 74y old  Female who presents with a chief complaint of Symptomatic anemia, Syncope (04 Nov 2024 09:04)      INTERVAL HISTORY: feels okay, seen s/p egd/colonoscopy    REVIEW OF SYSTEMS:  CONSTITUTIONAL: No weakness  EYES/ENT: No visual changes;  No throat pain   NECK: No pain or stiffness  RESPIRATORY: No cough, wheezing; No shortness of breath  CARDIOVASCULAR: No chest pain or palpitations  GASTROINTESTINAL: No abdominal  pain. No nausea, vomiting, or hematemesis  GENITOURINARY: No dysuria, frequency or hematuria  NEUROLOGICAL: No stroke like symptoms  SKIN: No rashes    TELEMETRY Personally reviewed: SR 80s-90s  	  MEDICATIONS:  metoprolol tartrate 50 milliGRAM(s) Oral two times a day  sacubitril 24 mG/valsartan 26 mG 1 Tablet(s) Oral two times a day  spironolactone 25 milliGRAM(s) Oral daily        PHYSICAL EXAM:  T(C): 36.7 (11-04-24 @ 10:37), Max: 37.4 (11-03-24 @ 21:02)  HR: 80 (11-04-24 @ 11:50) (67 - 94)  BP: 95/51 (11-04-24 @ 11:50) (88/48 - 128/51)  RR: 18 (11-04-24 @ 11:50) (15 - 20)  SpO2: 98% (11-04-24 @ 11:50) (94% - 98%)  Wt(kg): --  I&O's Summary    03 Nov 2024 07:01  -  04 Nov 2024 07:00  --------------------------------------------------------  IN: 480 mL / OUT: 2450 mL / NET: -1970 mL      Height (cm): 165.1 (11-04 @ 10:37)  Weight (kg): 124.7 (11-04 @ 10:37)  BMI (kg/m2): 45.7 (11-04 @ 10:37)  BSA (m2): 2.26 (11-04 @ 10:37)    Appearance: In no distress	  HEENT:    PERRL, EOMI	  Cardiovascular:  S1 S2, No JVD  Respiratory: Lungs clear to auscultation	  Gastrointestinal:  Soft, Non-tender, + BS	  Vascularature:  No edema of LE  Psychiatric: Appropriate affect   Neuro: no acute focal deficits                               7.3    5.17  )-----------( 316      ( 04 Nov 2024 06:39 )             25.2     11-04    141  |  104  |  20  ----------------------------<  123[H]  4.0   |  30  |  0.68    Ca    8.0[L]      04 Nov 2024 06:38          Labs personally reviewed      ASSESSMENT/PLAN: 	    74F w/Hx of chronic anemia, bullous pemphigoid on IVIG, HTN, HLD, DM, hypothyroidism, chronic back pain presents after syncopal episode. 1 minute of CPR was initiated in the field by EMS because there was concern of pulseness with unresponsiveness. Patient was hypotensive upon arrival and found to have a hgb of 5.1. Patient reports she syncopized after receiving IVIG. Reports she had been feeling lightheaded and SOB for a few days prior to this event. Has had bout of anemia in the past treated with iron supplementation, folate and B12. She denies any hematuria, melena, gingival bleeding. She reports significant improvement of symptoms since receiving 2u of pRBCs. Her only current complaint is acute on chronic back pain from her stretcher. Patient denies fever, chills, chest pain, SOB, headache, dizziness, abd pain, nausea, vomiting, constipation, dysuria.      Problem/Plan #1:  Problem: Syncope   - Likely symptomatic anemia  - Trop 26 --> 28  - EKG ischemic but likely in setting of Hb of 5.1  - POCUS noted no pericardial effusion, globally reduced LV function   Echo with reduced EF & severe aortic stenosis, however patient needs GI bleed addressed first prior to potential coronary revascularization which would require DAPT  - Replete Hgb > 7     Problem/Plan #2:  Problem: Hypertension  - Home lisinopril 2.5mg PO daily on hold    Problem/Plan #3:  Problem: HLD  - c/w atorvastatin 20mg PO daily    Problem/Plan #4:  Problem: Cardiac Risk Stratification  - Symptomatic anemia  - Echo with reduced EF noted & severe aortic stenosis, however patient needs GI bleed addressed first prior to potential coronary revascularization which would require DAPT  - High risk for low risk nonelective EGD/Colonoscopy 2/2 systolic HF and severe aortic stenosis, planned for monday 11/4  - Consider cardiac anesthesiologist for case  - s/p egd/colonoscopy today 11/4, no source of bleed identified    Problem/Plan #5:  Problem: Systolic HF  - TTE 10/30 with EF 25-30%  - Switched to Metoprolol tartrate 25mg BID due to episodes PSVT & WCT 11/1  - Entresto 24-26mg BID  - Lasix 40mg IV daily  - Given cannot r/o CAD, recommend ASA 81mg + Lipitor 40mg for potential CAD when safe  - 11/2 Increase Lopressor to 25mg TID due to recurrent PSVT overnight     Problem/Plan #6:  - Problem: Severe Aortic Stenosis  - Structural Dr. Greene consulted     Problem/Plan #7:  - Problem: Acute right LE DVT  - Per medicine          Iolani Behrbom, AG-NP   Adama Rollins DO [facility]  Cardiovascular Medicine  [address] [place] [address], [address]  Available through call or text on [employer] TEAM[employer]  Office: [phone]

## 2024-11-04 NOTE — PRE PROCEDURE NOTE - PRE PROCEDURE EVALUATION
Attending Physician: Dr Lebron                            Procedure: EGD/colonoscopy    Indication for Procedure: Iron deficiency anemia   ________________________________________________________  PAST MEDICAL & SURGICAL HISTORY:  BP (bullous pemphigoid)      HTN (hypertension)      HLD (hyperlipidemia)      Hypothyroidism        ALLERGIES:  penicillin (Stomach Upset; Nausea; Swelling; Hives)  cephalexin (Swelling; Rash; Nausea; Hives)  vancomycin ([place] Synd)    HOME MEDICATIONS:  atorvastatin 20 mg oral tablet: 1 tab(s) orally once a day (at bedtime)  dupilumab:   gabapentin 800 mg oral tablet: 1 tab(s) orally 4 times a day  levothyroxine 300 mcg (0.3 mg) oral tablet: 1 tab(s) orally 4 times a week M, W, Th, F  metFORMIN 1000 mg oral tablet: 1 tab(s) orally once a day (at bedtime)  metFORMIN 500 mg oral tablet: 1 tab(s) orally once a day  predniSONE 10 mg oral tablet: 1 tab(s) orally once a day  Synthroid 150 mcg (0.15 mg) oral tablet: 1 tab(s) orally 3 times a week T, Sat, Sun    AICD/PPM: [ ] yes   [x ] no    PERTINENT LAB DATA:                        7.3    5.17  )-----------( 316      ( 04 Nov 2024 06:39 )             25.2     11-04    141  |  104  |  20  ----------------------------<  123[H]  4.0   |  30  |  0.68    Ca    8.0[L]      04 Nov 2024 06:38      PT/INR - ( 04 Nov 2024 06:39 )   PT: 12.1 sec;   INR: 1.05 ratio         PTT - ( 04 Nov 2024 06:39 )  PTT:31.0 sec            PHYSICAL EXAMINATION:    T(C): 36.7  HR: 75  BP: 128/51  RR: 15  SpO2: 96%    Constitutional: NAD    Neck:  No JVD  Respiratory: normal effort   Cardiovascular: RRR  Extremities: No peripheral edema  Neurological: A/O x 4, no focal deficits        COMMENTS:    The patient is a suitable candidate for the planned procedure unless box checked [ ]  No, explain:

## 2024-11-04 NOTE — PROGRESS NOTE ADULT - PROBLEM SELECTOR PLAN 4
unclear cause of her anemia. no evidence of hemolysis  Possible causes include occult GI bleeding. -->s/p EGD/Colonoscopy--> no source of bleeding identified --> will f/u with GI regarding need for Capsule endoscopy.

## 2024-11-04 NOTE — PROGRESS NOTE ADULT - PROBLEM SELECTOR PLAN 7
- HbA1c 5.0    CAPILLARY BLOOD GLUCOSE      POCT Blood Glucose.: 174 mg/dL (04 Nov 2024 13:32)  POCT Blood Glucose.: 136 mg/dL (04 Nov 2024 06:02)  POCT Blood Glucose.: 154 mg/dL (03 Nov 2024 21:13)  POCT Blood Glucose.: 193 mg/dL (03 Nov 2024 17:12)    Blood glucose at goal  - c/w lantus 16 units qHS  - c/w admelog 4 units TID with meals  - c/w moderate admelog mealtime sliding scale

## 2024-11-04 NOTE — PROGRESS NOTE ADULT - SUBJECTIVE AND OBJECTIVE BOX
Andre Reyes, M.D.  Office: [phone]  Available thru [employer] Teams     Patient is a 74y old  Female who presents with a chief complaint of Symptomatic anemia, Syncope (03 Nov 2024 13:33)          SUBJECTIVE / OVERNIGHT EVENTS:    No acute overnight events.    ROS: ( - ) Fever, ( - )Chills,  ( - )Nausea/Vomiting, ( - ) Cough, ( - )Shortness of breath, ( - )Chest Pain    MEDICATIONS  (STANDING):  ascorbic acid 500 milliGRAM(s) Oral daily  atorvastatin 20 milliGRAM(s) Oral at bedtime  chlorhexidine 2% Cloths 1 Application(s) Topical daily  dextrose 5%. 1000 milliLiter(s) (100 mL/Hr) IV Continuous <Continuous>  dextrose 5%. 1000 milliLiter(s) (50 mL/Hr) IV Continuous <Continuous>  dextrose 50% Injectable 25 Gram(s) IV Push once  dextrose 50% Injectable 12.5 Gram(s) IV Push once  dextrose 50% Injectable 25 Gram(s) IV Push once  gabapentin 800 milliGRAM(s) Oral four times a day  glucagon  Injectable 1 milliGRAM(s) IntraMuscular once  heparin  Infusion.  Unit(s)/Hr (22 mL/Hr) IV Continuous <Continuous>  insulin glargine Injectable (LANTUS) 16 Unit(s) SubCutaneous at bedtime  insulin lispro (ADMELOG) corrective regimen sliding scale   SubCutaneous every 6 hours  insulin lispro Injectable (ADMELOG) 4 Unit(s) SubCutaneous three times a day before meals  levothyroxine 150 MICROGram(s) Oral <User Schedule>  levothyroxine 300 MICROGram(s) Oral <User Schedule>  metoprolol tartrate 50 milliGRAM(s) Oral two times a day  multivitamin 1 Tablet(s) Oral daily  pantoprazole  Injectable 40 milliGRAM(s) IV Push two times a day  polyethylene glycol 3350 17 Gram(s) Oral daily  predniSONE   Tablet 10 milliGRAM(s) Oral daily  sacubitril 24 mG/valsartan 26 mG 1 Tablet(s) Oral two times a day  senna 2 Tablet(s) Oral at bedtime  spironolactone 25 milliGRAM(s) Oral daily    MEDICATIONS  (PRN):  acetaminophen     Tablet .. 650 milliGRAM(s) Oral every 6 hours PRN Temp greater or equal to 38C (100.4F), Mild Pain (1 - 3)  dextrose Oral Gel 15 Gram(s) Oral once PRN Blood Glucose LESS THAN 70 milliGRAM(s)/deciliter  heparin   Injectable 77023 Unit(s) IV Push every 6 hours PRN For aPTT less than 40  heparin   Injectable 5000 Unit(s) IV Push every 6 hours PRN For aPTT between 40 - 57  melatonin 3 milliGRAM(s) Oral at bedtime PRN Insomnia          T(C): 36.8 (11-04 @ 06:43), Max: 37.4 (11-03 @ 21:02)   HR: 67   BP: 98/57   RR: 18   SpO2: 95%    PHYSICAL EXAM:    CONSTITUTIONAL: NAD, well-developed, well-groomed  EYES: PERRLA; conjunctiva and sclera clear  ENMT: Moist oral mucosa, no pharyngeal injection or exudates; normal dentition  RESPIRATORY: Normal respiratory effort; lungs are clear to auscultation bilaterally  CARDIOVASCULAR: Regular rate and rhythm, normal S1 and S2, no murmur/rub/gallop; No lower extremity edema; Peripheral pulses are 2+ bilaterally  ABDOMEN: Nontender to palpation, normoactive bowel sounds, no rebound/guarding; No hepatosplenomegaly  MUSCULOSKELETAL:  no clubbing or cyanosis of digits; no joint swelling or tenderness to palpation  PSYCH: A+O to person, place, and time; affect appropriate  NEUROLOGY: CN 2-12 are intact and symmetric; no gross sensory deficits       LABS:                        7.3    5.17  )-----------( 316      ( 04 Nov 2024 06:39 )             25.2      11-04    141  |  104  |  20  ----------------------------<  123[H]  4.0   |  30  |  0.68    Ca    8.0[L]      04 Nov 2024 06:38         CAPILLARY BLOOD GLUCOSE      POCT Blood Glucose.: 136 mg/dL (04 Nov 2024 06:02)  POCT Blood Glucose.: 154 mg/dL (03 Nov 2024 21:13)  POCT Blood Glucose.: 193 mg/dL (03 Nov 2024 17:12)  POCT Blood Glucose.: 369 mg/dL (03 Nov 2024 11:27)      RADIOLOGY & ADDITIONAL TESTS:    Imaging Personally Reviewed:  Consultant(s) Notes Reviewed:    Care Discussed with Consultants/Other Providers:   Andre Reyes, M.D.  Office: [phone]  Available thru Microsoft Teams     Patient is a 74y old  Female who presents with a chief complaint of Symptomatic anemia, Syncope (03 Nov 2024 13:33)          SUBJECTIVE / OVERNIGHT EVENTS:    No acute overnight events.  no complaints    MEDICATIONS  (STANDING):  ascorbic acid 500 milliGRAM(s) Oral daily  atorvastatin 20 milliGRAM(s) Oral at bedtime  chlorhexidine 2% Cloths 1 Application(s) Topical daily  dextrose 5%. 1000 milliLiter(s) (100 mL/Hr) IV Continuous <Continuous>  dextrose 5%. 1000 milliLiter(s) (50 mL/Hr) IV Continuous <Continuous>  dextrose 50% Injectable 25 Gram(s) IV Push once  dextrose 50% Injectable 12.5 Gram(s) IV Push once  dextrose 50% Injectable 25 Gram(s) IV Push once  gabapentin 800 milliGRAM(s) Oral four times a day  glucagon  Injectable 1 milliGRAM(s) IntraMuscular once  heparin  Infusion.  Unit(s)/Hr (22 mL/Hr) IV Continuous <Continuous>  insulin glargine Injectable (LANTUS) 16 Unit(s) SubCutaneous at bedtime  insulin lispro (ADMELOG) corrective regimen sliding scale   SubCutaneous every 6 hours  insulin lispro Injectable (ADMELOG) 4 Unit(s) SubCutaneous three times a day before meals  levothyroxine 150 MICROGram(s) Oral <User Schedule>  levothyroxine 300 MICROGram(s) Oral <User Schedule>  metoprolol tartrate 50 milliGRAM(s) Oral two times a day  multivitamin 1 Tablet(s) Oral daily  pantoprazole  Injectable 40 milliGRAM(s) IV Push two times a day  polyethylene glycol 3350 17 Gram(s) Oral daily  predniSONE   Tablet 10 milliGRAM(s) Oral daily  sacubitril 24 mG/valsartan 26 mG 1 Tablet(s) Oral two times a day  senna 2 Tablet(s) Oral at bedtime  spironolactone 25 milliGRAM(s) Oral daily    MEDICATIONS  (PRN):  acetaminophen     Tablet .. 650 milliGRAM(s) Oral every 6 hours PRN Temp greater or equal to 38C (100.4F), Mild Pain (1 - 3)  dextrose Oral Gel 15 Gram(s) Oral once PRN Blood Glucose LESS THAN 70 milliGRAM(s)/deciliter  heparin   Injectable 79825 Unit(s) IV Push every 6 hours PRN For aPTT less than 40  heparin   Injectable 5000 Unit(s) IV Push every 6 hours PRN For aPTT between 40 - 57  melatonin 3 milliGRAM(s) Oral at bedtime PRN Insomnia          T(C): 36.8 (11-04 @ 06:43), Max: 37.4 (11-03 @ 21:02)   HR: 67   BP: 98/57   RR: 18   SpO2: 95%    PHYSICAL EXAM:    CONSTITUTIONAL: NAD, well-developed, well-groomed  EYES: PERRLA; conjunctiva and sclera clear  ENMT: Moist oral mucosa, no pharyngeal injection or exudates; normal dentition  RESPIRATORY: Normal respiratory effort; lungs are clear to auscultation bilaterally  CARDIOVASCULAR: Regular rate and rhythm, normal S1 and S2, + murmur; + lower extremity lymph edema; Peripheral pulses are 2+ bilaterally  ABDOMEN: Nontender to palpation, normoactive bowel sounds, no rebound/guarding; No hepatosplenomegaly  MUSCULOSKELETAL:  no clubbing or cyanosis of digits; no joint swelling or tenderness to palpation  PSYCH: A+O to person, place, and time; affect appropriate  NEUROLOGY: CN 2-12 are intact and symmetric; no gross sensory deficits       LABS:                        7.3    5.17  )-----------( 316      ( 04 Nov 2024 06:39 )             25.2      11-04    141  |  104  |  20  ----------------------------<  123[H]  4.0   |  30  |  0.68    Ca    8.0[L]      04 Nov 2024 06:38         CAPILLARY BLOOD GLUCOSE      POCT Blood Glucose.: 136 mg/dL (04 Nov 2024 06:02)  POCT Blood Glucose.: 154 mg/dL (03 Nov 2024 21:13)  POCT Blood Glucose.: 193 mg/dL (03 Nov 2024 17:12)  POCT Blood Glucose.: 369 mg/dL (03 Nov 2024 11:27)      RADIOLOGY & ADDITIONAL TESTS:    Imaging Personally Reviewed:  Consultant(s) Notes Reviewed:    Care Discussed with Consultants/Other Providers:

## 2024-11-05 LAB
APTT BLD: 157.5 SEC — CRITICAL HIGH (ref 24.5–35.6)
APTT BLD: 81.9 SEC — HIGH (ref 24.5–35.6)
GLUCOSE BLDC GLUCOMTR-MCNC: 139 MG/DL — HIGH (ref 70–99)
GLUCOSE BLDC GLUCOMTR-MCNC: 204 MG/DL — HIGH (ref 70–99)
GLUCOSE BLDC GLUCOMTR-MCNC: 228 MG/DL — HIGH (ref 70–99)
GLUCOSE BLDC GLUCOMTR-MCNC: 279 MG/DL — HIGH (ref 70–99)
HCT VFR BLD CALC: 25.1 % — LOW (ref 34.5–45)
HGB BLD-MCNC: 7.3 G/DL — LOW (ref 11.5–15.5)
MCHC RBC-ENTMCNC: 26.6 PG — LOW (ref 27–34)
MCHC RBC-ENTMCNC: 29.1 G/DL — LOW (ref 32–36)
MCV RBC AUTO: 91.6 FL — SIGNIFICANT CHANGE UP (ref 80–100)
NRBC # BLD: 0 /100 WBCS — SIGNIFICANT CHANGE UP (ref 0–0)
PLATELET # BLD AUTO: 315 K/UL — SIGNIFICANT CHANGE UP (ref 150–400)
RBC # BLD: 2.74 M/UL — LOW (ref 3.8–5.2)
RBC # FLD: 18.6 % — HIGH (ref 10.3–14.5)
WBC # BLD: 9.34 K/UL — SIGNIFICANT CHANGE UP (ref 3.8–10.5)
WBC # FLD AUTO: 9.34 K/UL — SIGNIFICANT CHANGE UP (ref 3.8–10.5)

## 2024-11-05 PROCEDURE — 99233 SBSQ HOSP IP/OBS HIGH 50: CPT

## 2024-11-05 PROCEDURE — 99232 SBSQ HOSP IP/OBS MODERATE 35: CPT | Mod: GC

## 2024-11-05 PROCEDURE — 93454 CORONARY ARTERY ANGIO S&I: CPT | Mod: 26

## 2024-11-05 PROCEDURE — 99232 SBSQ HOSP IP/OBS MODERATE 35: CPT

## 2024-11-05 PROCEDURE — 99152 MOD SED SAME PHYS/QHP 5/>YRS: CPT

## 2024-11-05 RX ORDER — HEPARIN SODIUM 10000 [USP'U]/ML
5000 INJECTION INTRAVENOUS; SUBCUTANEOUS EVERY 6 HOURS
Refills: 0 | Status: DISCONTINUED | OUTPATIENT
Start: 2024-11-05 | End: 2024-11-10

## 2024-11-05 RX ORDER — NYSTATIN 100000 U/G
1 POWDER TOPICAL
Refills: 0 | Status: DISCONTINUED | OUTPATIENT
Start: 2024-11-05 | End: 2024-11-11

## 2024-11-05 RX ORDER — HEPARIN SODIUM 10000 [USP'U]/ML
10000 INJECTION INTRAVENOUS; SUBCUTANEOUS EVERY 6 HOURS
Refills: 0 | Status: DISCONTINUED | OUTPATIENT
Start: 2024-11-05 | End: 2024-11-05

## 2024-11-05 RX ORDER — HEPARIN SODIUM 10000 [USP'U]/ML
1200 INJECTION INTRAVENOUS; SUBCUTANEOUS
Qty: 25000 | Refills: 0 | Status: DISCONTINUED | OUTPATIENT
Start: 2024-11-05 | End: 2024-11-10

## 2024-11-05 RX ORDER — HEPARIN SODIUM 10000 [USP'U]/ML
10000 INJECTION INTRAVENOUS; SUBCUTANEOUS EVERY 6 HOURS
Refills: 0 | Status: DISCONTINUED | OUTPATIENT
Start: 2024-11-05 | End: 2024-11-10

## 2024-11-05 RX ORDER — HEPARIN SODIUM 10000 [USP'U]/ML
5000 INJECTION INTRAVENOUS; SUBCUTANEOUS EVERY 6 HOURS
Refills: 0 | Status: DISCONTINUED | OUTPATIENT
Start: 2024-11-05 | End: 2024-11-05

## 2024-11-05 RX ORDER — HEPARIN SODIUM 10000 [USP'U]/ML
1200 INJECTION INTRAVENOUS; SUBCUTANEOUS
Qty: 25000 | Refills: 0 | Status: DISCONTINUED | OUTPATIENT
Start: 2024-11-05 | End: 2024-11-05

## 2024-11-05 RX ADMIN — Medication 50 MILLIGRAM(S): at 05:10

## 2024-11-05 RX ADMIN — SACUBITRIL AND VALSARTAN 1 TABLET(S): 97; 103 TABLET, FILM COATED ORAL at 05:10

## 2024-11-05 RX ADMIN — Medication 10 MILLIGRAM(S): at 05:10

## 2024-11-05 RX ADMIN — NYSTATIN 1 APPLICATION(S): 100000 POWDER TOPICAL at 05:11

## 2024-11-05 RX ADMIN — GABAPENTIN 800 MILLIGRAM(S): 300 CAPSULE ORAL at 05:11

## 2024-11-05 RX ADMIN — PANTOPRAZOLE SODIUM 40 MILLIGRAM(S): 40 TABLET, DELAYED RELEASE ORAL at 05:11

## 2024-11-05 RX ADMIN — Medication 2: at 11:47

## 2024-11-05 RX ADMIN — Medication 4 UNIT(S): at 11:46

## 2024-11-05 RX ADMIN — Medication 4 UNIT(S): at 17:20

## 2024-11-05 RX ADMIN — Medication 650 MILLIGRAM(S): at 10:01

## 2024-11-05 RX ADMIN — GABAPENTIN 800 MILLIGRAM(S): 300 CAPSULE ORAL at 23:12

## 2024-11-05 RX ADMIN — GABAPENTIN 800 MILLIGRAM(S): 300 CAPSULE ORAL at 12:07

## 2024-11-05 RX ADMIN — Medication 500 MILLIGRAM(S): at 11:46

## 2024-11-05 RX ADMIN — GABAPENTIN 800 MILLIGRAM(S): 300 CAPSULE ORAL at 17:20

## 2024-11-05 RX ADMIN — Medication 16 UNIT(S): at 21:17

## 2024-11-05 RX ADMIN — Medication 2: at 07:34

## 2024-11-05 RX ADMIN — Medication 1: at 21:17

## 2024-11-05 RX ADMIN — HEPARIN SODIUM 1200 UNIT(S)/HR: 10000 INJECTION INTRAVENOUS; SUBCUTANEOUS at 09:48

## 2024-11-05 RX ADMIN — Medication 650 MILLIGRAM(S): at 09:01

## 2024-11-05 RX ADMIN — HEPARIN SODIUM 1600 UNIT(S)/HR: 10000 INJECTION INTRAVENOUS; SUBCUTANEOUS at 07:19

## 2024-11-05 RX ADMIN — CHLORHEXIDINE GLUCONATE 1 APPLICATION(S): 40 SOLUTION TOPICAL at 11:46

## 2024-11-05 RX ADMIN — HEPARIN SODIUM 1200 UNIT(S)/HR: 10000 INJECTION INTRAVENOUS; SUBCUTANEOUS at 19:47

## 2024-11-05 RX ADMIN — Medication 20 MILLIGRAM(S): at 21:18

## 2024-11-05 RX ADMIN — SACUBITRIL AND VALSARTAN 1 TABLET(S): 97; 103 TABLET, FILM COATED ORAL at 17:29

## 2024-11-05 RX ADMIN — HEPARIN SODIUM 1200 UNIT(S)/HR: 10000 INJECTION INTRAVENOUS; SUBCUTANEOUS at 09:46

## 2024-11-05 RX ADMIN — Medication 1 TABLET(S): at 11:46

## 2024-11-05 RX ADMIN — NYSTATIN 1 APPLICATION(S): 100000 POWDER TOPICAL at 17:21

## 2024-11-05 RX ADMIN — HEPARIN SODIUM 1600 UNIT(S)/HR: 10000 INJECTION INTRAVENOUS; SUBCUTANEOUS at 01:54

## 2024-11-05 RX ADMIN — Medication 25 MILLIGRAM(S): at 05:09

## 2024-11-05 RX ADMIN — Medication 4 UNIT(S): at 07:34

## 2024-11-05 RX ADMIN — Medication 50 MILLIGRAM(S): at 17:29

## 2024-11-05 RX ADMIN — Medication 150 MICROGRAM(S): at 05:10

## 2024-11-05 NOTE — PROGRESS NOTE ADULT - SUBJECTIVE AND OBJECTIVE BOX
*****Structural Heart Team*****    Subjective:    Ms. Hannon is resting comfortably in a chair, currently offering no complaints. She denies any SOB or CP, and has had no further Syncope since her admission.    PAST MEDICAL & SURGICAL HISTORY:  BP (bullous pemphigoid)    HTN (hypertension)    HLD (hyperlipidemia)    Hypothyroidism          T(C): 37.5 (11-05-24 @ 11:14), Max: 37.6 (11-04-24 @ 20:02)  HR: 81 (11-05-24 @ 11:14) (80 - 94)  BP: 91/52 (11-05-24 @ 11:14) (90/54 - 115/62)  RR: 18 (11-05-24 @ 11:14) (16 - 18)  SpO2: 96% (11-05-24 @ 11:14) (94% - 98%)  Wt(kg): --  11-04 @ 07:01  -  11-05 @ 07:00  --------------------------------------------------------  IN: 460 mL / OUT: 1350 mL / NET: -890 mL      MEDICATIONS  (STANDING):  ascorbic acid 500 milliGRAM(s) Oral daily  atorvastatin 20 milliGRAM(s) Oral at bedtime  gabapentin 800 milliGRAM(s) Oral four times a day  glucagon  Injectable 1 milliGRAM(s) IntraMuscular once  heparin  Infusion. 1200 Unit(s)/Hr (12 mL/Hr) IV Continuous <Continuous>  insulin glargine Injectable (LANTUS) 16 Unit(s) SubCutaneous at bedtime  insulin lispro (ADMELOG) corrective regimen sliding scale   SubCutaneous three times a day before meals  insulin lispro (ADMELOG) corrective regimen sliding scale   SubCutaneous at bedtime  insulin lispro Injectable (ADMELOG) 4 Unit(s) SubCutaneous three times a day before meals  levothyroxine 300 MICROGram(s) Oral <User Schedule>  levothyroxine 150 MICROGram(s) Oral <User Schedule>  metoprolol tartrate 50 milliGRAM(s) Oral two times a day  multivitamin 1 Tablet(s) Oral daily  nystatin Powder 1 Application(s) Topical two times a day  pantoprazole  Injectable 40 milliGRAM(s) IV Push two times a day  polyethylene glycol 3350 17 Gram(s) Oral daily  predniSONE   Tablet 10 milliGRAM(s) Oral daily  sacubitril 24 mG/valsartan 26 mG 1 Tablet(s) Oral two times a day  senna 2 Tablet(s) Oral at bedtime  spironolactone 25 milliGRAM(s) Oral daily    MEDICATIONS  (PRN):  acetaminophen     Tablet .. 650 milliGRAM(s) Oral every 6 hours PRN Temp greater or equal to 38C (100.4F), Mild Pain (1 - 3)  dextrose Oral Gel 15 Gram(s) Oral once PRN Blood Glucose LESS THAN 70 milliGRAM(s)/deciliter  heparin   Injectable 5000 Unit(s) IV Push every 6 hours PRN For aPTT between 40 - 57  heparin   Injectable 80309 Unit(s) IV Push every 6 hours PRN For aPTT less than 40  melatonin 3 milliGRAM(s) Oral at bedtime PRN Insomnia      Review of Symptoms:  Constitutional: Awake, Alert, Follows commands  Respiratory: Denies  Cardiac: Denies CP, Denies Palpitations  Gastrointestinal: Denies Pain, Denies N/V, tolerating po intake  Vascular: Negative  Extremities: No Edema, No joint pain or swelling  Neurological: + Syncope  Endocrine: No heat or cold intolerance, No excessive thirst  Heme/Onc: Negative    Exam:  General: A/Ox3, HINA, NAD  HEENT: Supple, No JVD, Trachea midline, no masses  Pulmonary: CTAB, = Chest Excursion, no accessory muscle use  Cor: S1S2, RRR, III/VI NEEL loudest at LUSB  ECG: SR  Groin/Wound:  Gastrointestinal: Soft, NT/ND, + Bowel Sounds  Neuro: = motor and sensory B/L, No focal deficits  Vascular: 1+ Pulses B/L, LUE PICC line  Extremities: NoJoint pain or swelling  Skin: Warm/Dry/Normal color, Normal turgor, no rashes                          7.3    9.34  )-----------( 315      ( 05 Nov 2024 07:55 )             25.1   11-04    141  |  104  |  20  ----------------------------<  123[H]  4.0   |  30  |  0.68    Ca    8.0[L]      04 Nov 2024 06:38    PT/INR - ( 04 Nov 2024 06:39 )   PT: 12.1 sec;   INR: 1.05 ratio         PTT - ( 05 Nov 2024 07:58 )  PTT:157.5 sec    Imaging Reviewed:    Echocardiogram:        < from: TTE W or WO Ultrasound Enhancing Agent (10.30.24 @ 08:31) >  CONCLUSIONS:      1. Left ventricular systolic function is severely decreased with an ejection fraction visually estimated at 25 to 30 %.   2. The right ventricle is not well visualized.   3. Mitral valve was not well visualized.   4. Aortic valve was not well visualized.   5. No pericardial effusion seen.   6. Estimated pulmonary artery systolic pressure is 43 mmHg.   7. Moderate mitral valve stenosis.   8. Severe aortic stenosis.   9. 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  10. There is no evidence of a left ventricular thrombus.  11. There is normal LV mass and concentric remodeling.    ________________________________________________________________________________________  FINDINGS:     Left Ventricle:  The left ventricular cavity is normal in size. Left ventricular systolic function is severely decreased with an ejection fraction visually estimated at 25 to 30%. There is normal LV mass and concentric remodeling. There is no evidence of a left ventricular thrombus. The left ventricular diastolic function is indeterminate, with elevated left ventricular filling pressure.     Right Ventricle:  The right ventricle is not well visualized.     Left Atrium:  The left atrium is severely dilated with an indexed volume of 46.58 ml/m².     Right Atrium:  The right atrium was not well visualized.     Aortic Valve:  The aortic valve was not well visualized. The aortic valve anatomy cannot be determined with reduced systolic excursion. There is severe calcification of the aortic valve leaflets. There is severe aortic stenosis.     Mitral Valve:  The mitral valve was not well visualized. There is calcification of the mitral valve annulus. There is moderate mitral valve stenosis. There is trace mitral regurgitation.     Tricuspid Valve:  The tricuspid valve was not well visualized. Estimated pulmonary artery systolic pressure is 43 mmHg, consistent with mild pulmonary hypertension.     Pulmonic Valve:  The pulmonic valve was not well visualized. There is trace pulmonic regurgitation.     Pericardium:  No pericardial effusion seen.     Pleura:  Right pleural effusion noted.     Systemic Veins:  The inferior vena cava is dilated measuring 2.53 cm in diameter, (dilated >2.1cm) with abnormal inspiratory collapse (abnormal <50%) consistent with elevated right atrial pressure (~15, range 10-20mmHg).  ____________________________________________________________________  QUANTITATIVE DATA:  Left Ventricle Measurements: (Indexed to BSA)     IVSd (2D):   1.2 cm  LVPWd (2D):  1.2 cm  LVIDd (2D):  4.6 cm  LVIDs (2D):  4.0 cm  LV Mass:     210 g  93.4 g/m²  Visualized LV EF%: 25 to 30%     MV E Vmax:    1.40 m/s  MV A Vmax: 1.32 m/s  MV E/A:       1.06  e' lateral:   6.00 cm/s  e' medial:    4.00 cm/s  E/e' lateral: 23.33  E/e' medial:  35.00  E/e' Average: 28.00    Aorta Measurements: (Normal range) (Indexed to BSA)     Ao Root d 2.80 cm (2.7 - 3.3 cm) 1.24 cm/m²            Left Atrium Measurements: (Indexed to BSA)  LA Diam 2D: 4.30 cm            LVOT / RVOT/ Qp/Qs Data: (Indexed to BSA)  LVOT Vmax:      0.69 m/s  LVOT Vmn:       0.527 m/s  LVOT VTI:       17.30 cm  LVOT peak grad: 2 mmHg  LVOT mean grad: 1.0 mmHg    Aortic Valve Measurements:  AV Vmax:                5.5 m/s  AV Peak Gradient:       120.1 mmHg  AV Mean Gradient:       84.0 mmHg  AV VTI:                 137.0 cm  AV VTI Ratio:           0.13  AoV Dimensionless Index 0.13    Mitral Valve Measurements:     MV Vmax:        1.66 m/s  MV VTI, connie     0.322 m  MV Mean Grad:   6.5 mmHg  MV Peak Grad:   11.3 mmHg  MV E Vmax:      1.4 m/s  MV A Vmax:      1.3 m/s  MV E/A:         1.1  MV Gradient HR: 100 bpm       Tricuspid Valve Measurements:     TR Vmax:          2.7 m/s  TR Peak Gradient: 28.3 mmHg  RA Pressure:      15 mmHg  PASP:             43 mmHg    < end of copied text >    Cardiac Catheterization: Later today        Assesment/Plan:  75 y/o female with Critical Aortic Stenosis, Syncope, Anemia, Chronic HFrEF    1.) Aortic Stenosis: Patient is undergoing work up for TAVR procedure. She had her Cardiac TAVR CT last week, which on quick look looks OK for Femoral access. She is to get a Cardiac Catheterization later today with Dr. Greene. We have her tentatively scheduled for her TAVR next Monday 11/11/2024. With her Syncope and critical AS, She should remain in hospital until then. I discussed with the patient and she was ok with this plan.  2.) Syncope: Likely due to a combination of her anemia and Critical AS. She has had no further episodes since her admission.  3.) Anemia: Patient had endoscopy and colonoscopy yesterday with no evident signs of bleeding or source. Will continue to monitor.  4.) Chronic HFrEF: Patient currently breathing well. Continue with Entresto and metoprolol  5.) Incidental CT Findings: Patient aware of findings from prior studies. She will follow up as an outpatient.    Discussed with Dr. Jc Vázquez,[place]  [id number]

## 2024-11-05 NOTE — PROGRESS NOTE ADULT - SUBJECTIVE AND OBJECTIVE BOX
DATE OF SERVICE: 11-05-24 @ 15:01    Patient is a 74y old  Female who presents with a chief complaint of Symptomatic anemia, Syncope (05 Nov 2024 11:43)      INTERVAL HISTORY: Feels ok.     REVIEW OF SYSTEMS:  CONSTITUTIONAL: No weakness  EYES/ENT: No visual changes;  No throat pain   NECK: No pain or stiffness  RESPIRATORY: No cough, wheezing; No shortness of breath  CARDIOVASCULAR: No chest pain or palpitations  GASTROINTESTINAL: No abdominal  pain. No nausea, vomiting, or hematemesis  GENITOURINARY: No dysuria, frequency or hematuria  NEUROLOGICAL: No stroke like symptoms  SKIN: No rashes    TELEMETRY Personally reviewed: SR with [facility]  	  MEDICATIONS:  metoprolol tartrate 50 milliGRAM(s) Oral two times a day  sacubitril 24 mG/valsartan 26 mG 1 Tablet(s) Oral two times a day  spironolactone 25 milliGRAM(s) Oral daily        PHYSICAL EXAM:  T(C): 36.7 (11-05-24 @ 13:14), Max: 37.6 (11-04-24 @ 20:02)  HR: 86 (11-05-24 @ 13:14) (81 - 94)  BP: 107/50 (11-05-24 @ 13:14) (90/54 - 115/62)  RR: 16 (11-05-24 @ 13:14) (16 - 18)  SpO2: 97% (11-05-24 @ 13:14) (94% - 98%)  Wt(kg): --  I&O's Summary    04 Nov 2024 07:01  -  05 Nov 2024 07:00  --------------------------------------------------------  IN: 460 mL / OUT: 1350 mL / NET: -890 mL    05 Nov 2024 07:01  -  05 Nov 2024 15:01  --------------------------------------------------------  IN: 1280 mL / OUT: 0 mL / NET: 1280 mL          Appearance: In no distress	  HEENT:    PERRL, EOMI	  Cardiovascular:  S1 S2, No JVD  Respiratory: Lungs clear to auscultation	  Gastrointestinal:  Soft, Non-tender, + BS	  Vascularature:  No edema of LE  Psychiatric: Appropriate affect   Neuro: no acute focal deficits                               7.3    9.34  )-----------( 315      ( 05 Nov 2024 07:55 )             25.1     11-04    141  |  104  |  20  ----------------------------<  123[H]  4.0   |  30  |  0.68    Ca    8.0[L]      04 Nov 2024 06:38          Labs personally reviewed      ASSESSMENT/PLAN: 	      74F w/Hx of chronic anemia, bullous pemphigoid on IVIG, HTN, HLD, DM, hypothyroidism, chronic back pain presents after syncopal episode. 1 minute of CPR was initiated in the field by EMS because there was concern of pulseness with unresponsiveness. Patient was hypotensive upon arrival and found to have a hgb of 5.1. Patient reports she syncopized after receiving IVIG. Reports she had been feeling lightheaded and SOB for a few days prior to this event. Has had bout of anemia in the past treated with iron supplementation, folate and B12. She denies any hematuria, melena, gingival bleeding. She reports significant improvement of symptoms since receiving 2u of pRBCs. Her only current complaint is acute on chronic back pain from her stretcher. Patient denies fever, chills, chest pain, SOB, headache, dizziness, abd pain, nausea, vomiting, constipation, dysuria.      Problem/Plan #1:  Problem: Syncope   - Likely symptomatic anemia  - Trop 26 --> 28  - EKG ischemic but likely in setting of Hb of 5.1  - POCUS noted no pericardial effusion, globally reduced LV function   Echo with reduced EF & severe aortic stenosis, however patient needs GI bleed addressed first prior to potential coronary revascularization which would require DAPT  - Replete Hgb > 7   - Plan for cath today 11/5    Problem/Plan #2:  Problem: Hypertension  - stable on sHF GDMT    Problem/Plan #3:  Problem: HLD  - c/w atorvastatin 20mg PO daily    Problem/Plan #4:  Problem: Cardiac Risk Stratification  - Symptomatic anemia  - Echo with reduced EF noted & severe aortic stenosis, however patient needs GI bleed addressed first prior to potential coronary revascularization which would require DAPT  - High risk for low risk nonelective EGD/Colonoscopy 2/2 systolic HF and severe aortic stenosis, planned for monday 11/4  - Consider cardiac anesthesiologist for case  - s/p egd/colonoscopy today 11/4, no source of bleed identified    Problem/Plan #5:  Problem: Systolic HF  - TTE 10/30 with EF 25-30%  - Switched to Metoprolol tartrate 50mg BID due to episodes PSVT & WCT 11/1  - Entresto 24-26mg BID  - s/p Lasix 40mg IV   - Given cannot r/o CAD, recommend ASA 81mg + Lipitor 40mg for potential CAD when safe     Problem/Plan #6:  - Problem: Severe Aortic Stenosis  - Structural Dr. Greene consulted   - TAVR workup in progress    Problem/Plan #7:  - Problem: Acute right LE DVT  - c/w heparin gtt        Joann Teague, AG-NP   Adama Rollins DO [facility]  Cardiovascular Medicine  [address] [place] [address], [address]  Available through call or text on [employer]  Office: [phone]   DATE OF SERVICE: 11-05-24 @ 15:01    Patient is a 74y old  Female who presents with a chief complaint of Symptomatic anemia, Syncope (05 Nov 2024 11:43)      INTERVAL HISTORY: Feels ok.     REVIEW OF SYSTEMS:  CONSTITUTIONAL: No weakness  EYES/ENT: No visual changes;  No throat pain   NECK: No pain or stiffness  RESPIRATORY: No cough, wheezing; No shortness of breath  CARDIOVASCULAR: No chest pain or palpitations  GASTROINTESTINAL: No abdominal  pain. No nausea, vomiting, or hematemesis  GENITOURINARY: No dysuria, frequency or hematuria  NEUROLOGICAL: No stroke like symptoms  SKIN: No rashes    TELEMETRY Personally reviewed: SR with [facility]  	  MEDICATIONS:  metoprolol tartrate 50 milliGRAM(s) Oral two times a day  sacubitril 24 mG/valsartan 26 mG 1 Tablet(s) Oral two times a day  spironolactone 25 milliGRAM(s) Oral daily        PHYSICAL EXAM:  T(C): 36.7 (11-05-24 @ 13:14), Max: 37.6 (11-04-24 @ 20:02)  HR: 86 (11-05-24 @ 13:14) (81 - 94)  BP: 107/50 (11-05-24 @ 13:14) (90/54 - 115/62)  RR: 16 (11-05-24 @ 13:14) (16 - 18)  SpO2: 97% (11-05-24 @ 13:14) (94% - 98%)  Wt(kg): --  I&O's Summary    04 Nov 2024 07:01  -  05 Nov 2024 07:00  --------------------------------------------------------  IN: 460 mL / OUT: 1350 mL / NET: -890 mL    05 Nov 2024 07:01  -  05 Nov 2024 15:01  --------------------------------------------------------  IN: 1280 mL / OUT: 0 mL / NET: 1280 mL          Appearance: In no distress	  HEENT:    PERRL, EOMI	  Cardiovascular:  S1 S2, No JVD  Respiratory: Lungs clear to auscultation	  Gastrointestinal:  Soft, Non-tender, + BS	  Vascularature:  No edema of LE  Psychiatric: Appropriate affect   Neuro: no acute focal deficits                               7.3    9.34  )-----------( 315      ( 05 Nov 2024 07:55 )             25.1     11-04    141  |  104  |  20  ----------------------------<  123[H]  4.0   |  30  |  0.68    Ca    8.0[L]      04 Nov 2024 06:38          Labs personally reviewed      ASSESSMENT/PLAN: 	    74F w/Hx of chronic anemia, bullous pemphigoid on IVIG, HTN, HLD, DM, hypothyroidism, chronic back pain presents after syncopal episode. 1 minute of CPR was initiated in the field by EMS because there was concern of pulseness with unresponsiveness. Patient was hypotensive upon arrival and found to have a hgb of 5.1. Patient reports she syncopized after receiving IVIG. Reports she had been feeling lightheaded and SOB for a few days prior to this event. Has had bout of anemia in the past treated with iron supplementation, folate and B12. She denies any hematuria, melena, gingival bleeding. She reports significant improvement of symptoms since receiving 2u of pRBCs. Her only current complaint is acute on chronic back pain from her stretcher. Patient denies fever, chills, chest pain, SOB, headache, dizziness, abd pain, nausea, vomiting, constipation, dysuria.      Problem/Plan #1:  Problem: Syncope   - Likely symptomatic anemia  - Trop 26 --> 28  - EKG ischemic but likely in setting of Hb of 5.1  - POCUS noted no pericardial effusion, globally reduced LV function   Echo with reduced EF & severe aortic stenosis, however patient needs GI bleed addressed first prior to potential coronary revascularization which would require DAPT  - Replete Hgb > 7   - Plan for cath today 11/5    Problem/Plan #2:  Problem: Hypertension  - stable on sHF GDMT    Problem/Plan #3:  Problem: HLD  - c/w atorvastatin 20mg PO daily    Problem/Plan #4:  Problem: Cardiac Risk Stratification  - Symptomatic anemia  - Echo with reduced EF noted & severe aortic stenosis, however patient needs GI bleed addressed first prior to potential coronary revascularization which would require DAPT  - High risk for low risk nonelective EGD/Colonoscopy 2/2 systolic HF and severe aortic stenosis, planned for monday 11/4  - Consider cardiac anesthesiologist for case  - s/p egd/colonoscopy today 11/4, no source of bleed identified    Problem/Plan #5:  Problem: Systolic HF  - TTE 10/30 with EF 25-30%  - Switched to Metoprolol tartrate 50mg BID due to episodes PSVT & WCT 11/1  - Entresto 24-26mg BID  - s/p Lasix 40mg IV   - Given cannot r/o CAD, recommend ASA 81mg + Lipitor 40mg for potential CAD when safe     Problem/Plan #6:  - Problem: Severe Aortic Stenosis  - Structural Dr. Greene consulted   - TAVR workup in progress    Problem/Plan #7:  - Problem: Acute right LE DVT  - c/w heparin gtt        Joann Teague, AG-NP   Adama Rollins DO [facility]  Cardiovascular Medicine  [address] [place] [address], [address]  Available through call or text on [employer]  Office: [phone]

## 2024-11-05 NOTE — PROGRESS NOTE ADULT - SUBJECTIVE AND OBJECTIVE BOX
Andre Reyes, M.D.  Office: [phone]  Available thru [employer] Teams     Patient is a 74y old  Female who presents with a chief complaint of Symptomatic anemia, Syncope (05 Nov 2024 10:14)          SUBJECTIVE / OVERNIGHT EVENTS:    No acute overnight events.    ROS: ( - ) Fever, ( - )Chills,  ( - )Nausea/Vomiting, ( - ) Cough, ( - )Shortness of breath, ( - )Chest Pain    MEDICATIONS  (STANDING):  ascorbic acid 500 milliGRAM(s) Oral daily  atorvastatin 20 milliGRAM(s) Oral at bedtime  chlorhexidine 2% Cloths 1 Application(s) Topical daily  dextrose 5%. 1000 milliLiter(s) (100 mL/Hr) IV Continuous <Continuous>  dextrose 5%. 1000 milliLiter(s) (50 mL/Hr) IV Continuous <Continuous>  dextrose 50% Injectable 25 Gram(s) IV Push once  dextrose 50% Injectable 25 Gram(s) IV Push once  dextrose 50% Injectable 12.5 Gram(s) IV Push once  gabapentin 800 milliGRAM(s) Oral four times a day  glucagon  Injectable 1 milliGRAM(s) IntraMuscular once  heparin  Infusion. 1200 Unit(s)/Hr (12 mL/Hr) IV Continuous <Continuous>  insulin glargine Injectable (LANTUS) 16 Unit(s) SubCutaneous at bedtime  insulin lispro (ADMELOG) corrective regimen sliding scale   SubCutaneous three times a day before meals  insulin lispro (ADMELOG) corrective regimen sliding scale   SubCutaneous at bedtime  insulin lispro Injectable (ADMELOG) 4 Unit(s) SubCutaneous three times a day before meals  levothyroxine 300 MICROGram(s) Oral <User Schedule>  levothyroxine 150 MICROGram(s) Oral <User Schedule>  metoprolol tartrate 50 milliGRAM(s) Oral two times a day  multivitamin 1 Tablet(s) Oral daily  nystatin Powder 1 Application(s) Topical two times a day  pantoprazole  Injectable 40 milliGRAM(s) IV Push two times a day  polyethylene glycol 3350 17 Gram(s) Oral daily  predniSONE   Tablet 10 milliGRAM(s) Oral daily  sacubitril 24 mG/valsartan 26 mG 1 Tablet(s) Oral two times a day  senna 2 Tablet(s) Oral at bedtime  spironolactone 25 milliGRAM(s) Oral daily    MEDICATIONS  (PRN):  acetaminophen     Tablet .. 650 milliGRAM(s) Oral every 6 hours PRN Temp greater or equal to 38C (100.4F), Mild Pain (1 - 3)  dextrose Oral Gel 15 Gram(s) Oral once PRN Blood Glucose LESS THAN 70 milliGRAM(s)/deciliter  heparin   Injectable 5000 Unit(s) IV Push every 6 hours PRN For aPTT between 40 - 57  heparin   Injectable 42892 Unit(s) IV Push every 6 hours PRN For aPTT less than 40  melatonin 3 milliGRAM(s) Oral at bedtime PRN Insomnia          T(C): 37.3 (11-05 @ 04:36), Max: 37.6 (11-04 @ 20:02)   HR: 86   BP: 115/62   RR: 18   SpO2: 98%    PHYSICAL EXAM:    CONSTITUTIONAL: NAD, well-developed, well-groomed  EYES: PERRLA; conjunctiva and sclera clear  ENMT: Moist oral mucosa, no pharyngeal injection or exudates; normal dentition  RESPIRATORY: Normal respiratory effort; lungs are clear to auscultation bilaterally  CARDIOVASCULAR: Regular rate and rhythm, normal S1 and S2, no murmur/rub/gallop; No lower extremity edema; Peripheral pulses are 2+ bilaterally  ABDOMEN: Nontender to palpation, normoactive bowel sounds, no rebound/guarding; No hepatosplenomegaly  MUSCULOSKELETAL:  no clubbing or cyanosis of digits; no joint swelling or tenderness to palpation  PSYCH: A+O to person, place, and time; affect appropriate  NEUROLOGY: CN 2-12 are intact and symmetric; no gross sensory deficits       LABS:                        7.3    9.34  )-----------( 315      ( 05 Nov 2024 07:55 )             25.1      11-04    141  |  104  |  20  ----------------------------<  123[H]  4.0   |  30  |  0.68    Ca    8.0[L]      04 Nov 2024 06:38         CAPILLARY BLOOD GLUCOSE      POCT Blood Glucose.: 204 mg/dL (05 Nov 2024 07:20)  POCT Blood Glucose.: 171 mg/dL (04 Nov 2024 21:22)  POCT Blood Glucose.: 235 mg/dL (04 Nov 2024 17:22)  POCT Blood Glucose.: 174 mg/dL (04 Nov 2024 13:32)      RADIOLOGY & ADDITIONAL TESTS:    Imaging Personally Reviewed:  Consultant(s) Notes Reviewed:    Care Discussed with Consultants/Other Providers:   Andre Reyes, M.D.  Office: [phone]  Available thru [employer] Teams     Patient is a 74y old  Female who presents with a chief complaint of Symptomatic anemia, Syncope (05 Nov 2024 10:14)          SUBJECTIVE / OVERNIGHT EVENTS:    No acute overnight events.  No complaints    ROS: ( - ) Fever, ( - )Chills,  ( - )Nausea/Vomiting, ( - ) Cough, ( - )Shortness of breath, ( - )Chest Pain    MEDICATIONS  (STANDING):  ascorbic acid 500 milliGRAM(s) Oral daily  atorvastatin 20 milliGRAM(s) Oral at bedtime  chlorhexidine 2% Cloths 1 Application(s) Topical daily  dextrose 5%. 1000 milliLiter(s) (100 mL/Hr) IV Continuous <Continuous>  dextrose 5%. 1000 milliLiter(s) (50 mL/Hr) IV Continuous <Continuous>  dextrose 50% Injectable 25 Gram(s) IV Push once  dextrose 50% Injectable 25 Gram(s) IV Push once  dextrose 50% Injectable 12.5 Gram(s) IV Push once  gabapentin 800 milliGRAM(s) Oral four times a day  glucagon  Injectable 1 milliGRAM(s) IntraMuscular once  heparin  Infusion. 1200 Unit(s)/Hr (12 mL/Hr) IV Continuous <Continuous>  insulin glargine Injectable (LANTUS) 16 Unit(s) SubCutaneous at bedtime  insulin lispro (ADMELOG) corrective regimen sliding scale   SubCutaneous three times a day before meals  insulin lispro (ADMELOG) corrective regimen sliding scale   SubCutaneous at bedtime  insulin lispro Injectable (ADMELOG) 4 Unit(s) SubCutaneous three times a day before meals  levothyroxine 300 MICROGram(s) Oral <User Schedule>  levothyroxine 150 MICROGram(s) Oral <User Schedule>  metoprolol tartrate 50 milliGRAM(s) Oral two times a day  multivitamin 1 Tablet(s) Oral daily  nystatin Powder 1 Application(s) Topical two times a day  pantoprazole  Injectable 40 milliGRAM(s) IV Push two times a day  polyethylene glycol 3350 17 Gram(s) Oral daily  predniSONE   Tablet 10 milliGRAM(s) Oral daily  sacubitril 24 mG/valsartan 26 mG 1 Tablet(s) Oral two times a day  senna 2 Tablet(s) Oral at bedtime  spironolactone 25 milliGRAM(s) Oral daily    MEDICATIONS  (PRN):  acetaminophen     Tablet .. 650 milliGRAM(s) Oral every 6 hours PRN Temp greater or equal to 38C (100.4F), Mild Pain (1 - 3)  dextrose Oral Gel 15 Gram(s) Oral once PRN Blood Glucose LESS THAN 70 milliGRAM(s)/deciliter  heparin   Injectable 5000 Unit(s) IV Push every 6 hours PRN For aPTT between 40 - 57  heparin   Injectable 45783 Unit(s) IV Push every 6 hours PRN For aPTT less than 40  melatonin 3 milliGRAM(s) Oral at bedtime PRN Insomnia          T(C): 37.3 (11-05 @ 04:36), Max: 37.6 (11-04 @ 20:02)   HR: 86   BP: 115/62   RR: 18   SpO2: 98%    PHYSICAL EXAM:    CONSTITUTIONAL: NAD, well-developed, well-groomed  EYES: PERRLA; conjunctiva and sclera clear  ENMT: Moist oral mucosa, no pharyngeal injection or exudates; normal dentition  RESPIRATORY: Normal respiratory effort; lungs are clear to auscultation bilaterally  CARDIOVASCULAR: Regular rate and rhythm, normal S1 and S2, no murmur/rub/gallop; No lower extremity edema; Peripheral pulses are 2+ bilaterally  ABDOMEN: Nontender to palpation, normoactive bowel sounds, no rebound/guarding; No hepatosplenomegaly  MUSCULOSKELETAL:  no clubbing or cyanosis of digits; no joint swelling or tenderness to palpation  PSYCH: A+O to person, place, and time; affect appropriate  NEUROLOGY: CN 2-12 are intact and symmetric; no gross sensory deficits       LABS:                        7.3    9.34  )-----------( 315      ( 05 Nov 2024 07:55 )             25.1      11-04    141  |  104  |  20  ----------------------------<  123[H]  4.0   |  30  |  0.68    Ca    8.0[L]      04 Nov 2024 06:38         CAPILLARY BLOOD GLUCOSE      POCT Blood Glucose.: 204 mg/dL (05 Nov 2024 07:20)  POCT Blood Glucose.: 171 mg/dL (04 Nov 2024 21:22)  POCT Blood Glucose.: 235 mg/dL (04 Nov 2024 17:22)  POCT Blood Glucose.: 174 mg/dL (04 Nov 2024 13:32)      RADIOLOGY & ADDITIONAL TESTS:    Imaging Personally Reviewed:  Consultant(s) Notes Reviewed:    Care Discussed with Consultants/Other Providers:

## 2024-11-05 NOTE — PROGRESS NOTE ADULT - ASSESSMENT
74F w/Hx of chronic anemia, bullous pemphigoid on IVIG, HTN, HLD, DM, hypothyroidism, chronic back pain presents after syncopal episode, found to be anemic to 5.1 and have new HFrEF.    Impression:  #JANE  #HFrEF  #Severe AS  P/w anemia with Hgb 5.1 after syncopal episode without overt bleeding. s/p EGD on 11/4 that was normal with no acute findings. Colonoscopy on the same day with non-bleeding internal hemorrhoids, ddiverticulosis in the sigmoid colon with no findings on performed EGD or colonoscopy to account for ongoing JANE. Pt will ultimately need a VCE to complete workup of JANE which can be done in the outpatient setting. At this time, ok to resume or continue antiplatelet or anti-coagulation use if clinically indicated. No further inapatient GI interventions planned at this time.    Recommendations:  -Diet as tolerated   - Ok to resume/continue anti-coagulation if clinically indicated. No absolute contraindication for anti-platlet or anti-coagulation use if clinically indicated in the absence of any overt bleeding.   - Outpatient video capsule endoscopy to complete workup of JANE  - High fiber diet (>30 g per day)    GI will plan to sign off at this time. Please feel free to reach out to our team with any follow up questions. Please provide patient with Gastroenterology Clinic number to confirm/arrange appointment; [phone] ([facility] at [address] [place] [address]) or [phone] ([facility] at [address] [place] [address]) or [phone] ([facility] at [address] [place] [address]).    All recommendations are tentative until note is attested by attending.

## 2024-11-05 NOTE — PROGRESS NOTE ADULT - ATTENDING COMMENTS
Impression:  1. Iron deficiency anemia without overt bleeding: Now clinically improved for endoscopic evaluation.  2. Severe aortic stenosis  3. Depressed EF 25%: Previously on O2, but now on RA    Plan:  - EGD/Colon tomorrow
Agree with above. Patient with new oxygen requirement, and on TTE has severely depressed EF and severe aortic stenosis. H/H is stable after transfusions. Will hold off on endoscopic evaluation until patient is medically optimized from cardiac perspective. Monitor H/H, transfuse as needed. Consider IV iron replacement while inpatient.
GI following for anemia.   No overt signs of GI bleeding.   s/p EGD/Colonoscopy yesterday. No blood or bleeding. No findings to explain JANE. Please see report for full details.   Hemoglobin stable at 7.3 today     Recommend heme c/s and w/u  can consider outpatient VCE   no absolute contraindications for a/c   rest of care per primary team  GI to sign off, please call w questions

## 2024-11-05 NOTE — PROGRESS NOTE ADULT - SUBJECTIVE AND OBJECTIVE BOX
Gastroenterology Progress Note    Interval Events:   Pt overall feeling well with no ongoing nausea, vomiting, abd pain or any overt signs of bleeding  Back on hep gtt with overall stable hgb levels     Allergies:  penicillin (Stomach Upset; Nausea; Swelling; Hives)  cephalexin (Swelling; Rash; Nausea; Hives)  vancomycin ([place] [facility])      Hospital Medications:  acetaminophen     Tablet .. 650 milliGRAM(s) Oral every 6 hours PRN  ascorbic acid 500 milliGRAM(s) Oral daily  atorvastatin 20 milliGRAM(s) Oral at bedtime  chlorhexidine 2% Cloths 1 Application(s) Topical daily  dextrose 5%. 1000 milliLiter(s) IV Continuous <Continuous>  dextrose 5%. 1000 milliLiter(s) IV Continuous <Continuous>  dextrose 50% Injectable 25 Gram(s) IV Push once  dextrose 50% Injectable 25 Gram(s) IV Push once  dextrose 50% Injectable 12.5 Gram(s) IV Push once  dextrose Oral Gel 15 Gram(s) Oral once PRN  gabapentin 800 milliGRAM(s) Oral four times a day  glucagon  Injectable 1 milliGRAM(s) IntraMuscular once  heparin   Injectable 5000 Unit(s) IV Push every 6 hours PRN  heparin   Injectable 37862 Unit(s) IV Push every 6 hours PRN  heparin  Infusion. 1200 Unit(s)/Hr IV Continuous <Continuous>  insulin glargine Injectable (LANTUS) 16 Unit(s) SubCutaneous at bedtime  insulin lispro (ADMELOG) corrective regimen sliding scale   SubCutaneous three times a day before meals  insulin lispro (ADMELOG) corrective regimen sliding scale   SubCutaneous at bedtime  insulin lispro Injectable (ADMELOG) 4 Unit(s) SubCutaneous three times a day before meals  levothyroxine 300 MICROGram(s) Oral <User Schedule>  levothyroxine 150 MICROGram(s) Oral <User Schedule>  melatonin 3 milliGRAM(s) Oral at bedtime PRN  metoprolol tartrate 50 milliGRAM(s) Oral two times a day  multivitamin 1 Tablet(s) Oral daily  nystatin Powder 1 Application(s) Topical two times a day  pantoprazole  Injectable 40 milliGRAM(s) IV Push two times a day  polyethylene glycol 3350 17 Gram(s) Oral daily  predniSONE   Tablet 10 milliGRAM(s) Oral daily  sacubitril 24 mG/valsartan 26 mG 1 Tablet(s) Oral two times a day  senna 2 Tablet(s) Oral at bedtime  spironolactone 25 milliGRAM(s) Oral daily      ROS: 14 point ROS negative unless otherwise state in subjective    PHYSICAL EXAM:   Vital Signs:  Vital Signs Last 24 Hrs  T(C): 37.3 (2024 04:36), Max: 37.6 (2024 20:02)  T(F): 99.1 (2024 04:36), Max: 99.6 (2024 20:02)  HR: 86 (2024 04:36) (75 - 94)  BP: 115/62 (2024 05:08) (88/48 - 128/51)  BP(mean): 70 (2024 10:37) (70 - 70)  RR: 18 (2024 04:36) (15 - 20)  SpO2: 98% (2024 04:36) (94% - 98%)    Parameters below as of 2024 04:36  Patient On (Oxygen Delivery Method): room air      Daily Height in cm: 165.1 (2024 10:37)    Daily Weight in k.4 (2024 07:36)    GENERAL:  No acute distress  HEENT:  NCAT, no scleral icterus  CHEST: no resp distress  HEART:  RRR. +murmur   ABDOMEN:  Soft, non-tender, non-distended, normoactive bowel sound  NEURO:  Alert and oriented x 3, no asterixis, no tremor    LABS:                        7.3    9.34  )-----------( 315      ( 2024 07:55 )             25.1     Mean Cell Volume: 91.6 fl (24 @ 07:55)        141  |  104  |  20  ----------------------------<  123[H]  4.0   |  30  |  0.68    Ca    8.0[L]      2024 06:38        PT/INR - ( 2024 06:39 )   PT: 12.1 sec;   INR: 1.05 ratio         PTT - ( 2024 07:58 )  PTT:157.5 sec  Urinalysis Basic - ( 2024 06:38 )    Color: x / Appearance: x / SG: x / pH: x  Gluc: 123 mg/dL / Ketone: x  / Bili: x / Urobili: x   Blood: x / Protein: x / Nitrite: x   Leuk Esterase: x / RBC: x / WBC x   Sq Epi: x / Non Sq Epi: x / Bacteria: x      Imaging:  < from: Colonoscopy (24 @ 10:15) >       The perianal and digital rectal examinations were normal.       Non-bleeding internal hemorrhoids were found during retroflexion. The hemorrhoids were mild,        small and Grade I (internal hemorrhoids that do not prolapse).       Multiple small and large-mouthed diverticula were found in the sigmoid colon.       The exam was otherwise normal throughout the examined colon.                                                                                                        Impression:          - Preparation of the colon was fair.                       - Non-bleeding internal hemorrhoids.                       - Diverticulosis in the sigmoid colon.                       - There were no findings on performed colonoscopy to account for ongoing JANE  Recommendation:      - [facility] to [facility] for [facility].                       - Advance diet as tolerated today.                       - Ok to resume anti-coagulation if clinically indicated. No absolute            contraindication for anti-platlet or anti-coagulation use if clinically                        indicated                       - Outpatient video capsule endoscopy to complete workup of JANE                       - High fiber diet (>30 g per day)                                                                                                          < end of copied text >  < from: Upper Endoscopy (24 @ 10:14) >                                                                                                        Findings:       The Z-line was regularand was found 38 cm from the incisors.       The exam of the esophagus was otherwise normal.       The entire examined stomach was normal.       The examined duodenum was normal.                                 Impression:          - Z-line regular, 38 cm from the incisors.                       - Normal stomach.                       - Normal examined duodenum.                       - There were no findings on performed EGD to account for ongoing JANE  Recommendation:      - [facility] patient to [facility] for [facility] care.                       - Advance diet as tolerated today.                       - PO PPI QD for gastroprotection while on AC                       - Proceed with same day colonoscopy as previouslly planned.    < end of copied text >

## 2024-11-05 NOTE — PROGRESS NOTE ADULT - PROBLEM SELECTOR PLAN 4
unclear cause of her anemia. no evidence of hemolysis  Possible causes include occult GI bleeding. -->s/p EGD/Colonoscopy--> no source of bleeding identified --> GI recommending outpatient Capsule endoscopy.

## 2024-11-05 NOTE — PROGRESS NOTE ADULT - PROBLEM SELECTOR PLAN 1
pt denies h/o heart issues  will need further cardiac w/u  Patient did have acute hypoxic respiratory failure. Likely in the setting of severe systolic dysfunction and having received 2 units of packed red blood cells.   s/p IV diuretics with improvement in respiratory status  Continue with spironolactone 25mg daily  GDMT: start to implement goal directed medical therapy for her new systolic heart failure-->metoprolol, entresto, spironolactone     I spoke with Dr. Greene. Will plan for a diagnostic LHC today, if areas of stenosis are noted the she would likely need a capsule endoscopy prior to stent placement.

## 2024-11-06 LAB
ANION GAP SERPL CALC-SCNC: 10 MMOL/L — SIGNIFICANT CHANGE UP (ref 5–17)
APTT BLD: 63.7 SEC — HIGH (ref 24.5–35.6)
APTT BLD: 86.3 SEC — HIGH (ref 24.5–35.6)
BUN SERPL-MCNC: 23 MG/DL — SIGNIFICANT CHANGE UP (ref 7–23)
CALCIUM SERPL-MCNC: 8.2 MG/DL — LOW (ref 8.4–10.5)
CHLORIDE SERPL-SCNC: 103 MMOL/L — SIGNIFICANT CHANGE UP (ref 96–108)
CO2 SERPL-SCNC: 26 MMOL/L — SIGNIFICANT CHANGE UP (ref 22–31)
CREAT SERPL-MCNC: 0.69 MG/DL — SIGNIFICANT CHANGE UP (ref 0.5–1.3)
EGFR: 92 ML/MIN/1.73M2 — SIGNIFICANT CHANGE UP
GLUCOSE BLDC GLUCOMTR-MCNC: 134 MG/DL — HIGH (ref 70–99)
GLUCOSE BLDC GLUCOMTR-MCNC: 149 MG/DL — HIGH (ref 70–99)
GLUCOSE BLDC GLUCOMTR-MCNC: 184 MG/DL — HIGH (ref 70–99)
GLUCOSE BLDC GLUCOMTR-MCNC: 198 MG/DL — HIGH (ref 70–99)
GLUCOSE SERPL-MCNC: 102 MG/DL — HIGH (ref 70–99)
HCT VFR BLD CALC: 24.5 % — LOW (ref 34.5–45)
HCT VFR BLD CALC: 24.8 % — LOW (ref 34.5–45)
HGB BLD-MCNC: 7.1 G/DL — LOW (ref 11.5–15.5)
HGB BLD-MCNC: 7.2 G/DL — LOW (ref 11.5–15.5)
MCHC RBC-ENTMCNC: 26.8 PG — LOW (ref 27–34)
MCHC RBC-ENTMCNC: 27 PG — SIGNIFICANT CHANGE UP (ref 27–34)
MCHC RBC-ENTMCNC: 29 G/DL — LOW (ref 32–36)
MCHC RBC-ENTMCNC: 29 G/DL — LOW (ref 32–36)
MCV RBC AUTO: 92.2 FL — SIGNIFICANT CHANGE UP (ref 80–100)
MCV RBC AUTO: 93.2 FL — SIGNIFICANT CHANGE UP (ref 80–100)
NRBC # BLD: 0 /100 WBCS — SIGNIFICANT CHANGE UP (ref 0–0)
NRBC # BLD: 0 /100 WBCS — SIGNIFICANT CHANGE UP (ref 0–0)
PLATELET # BLD AUTO: 321 K/UL — SIGNIFICANT CHANGE UP (ref 150–400)
PLATELET # BLD AUTO: 328 K/UL — SIGNIFICANT CHANGE UP (ref 150–400)
POTASSIUM SERPL-MCNC: 4 MMOL/L — SIGNIFICANT CHANGE UP (ref 3.5–5.3)
POTASSIUM SERPL-SCNC: 4 MMOL/L — SIGNIFICANT CHANGE UP (ref 3.5–5.3)
RBC # BLD: 2.63 M/UL — LOW (ref 3.8–5.2)
RBC # BLD: 2.69 M/UL — LOW (ref 3.8–5.2)
RBC # FLD: 18.4 % — HIGH (ref 10.3–14.5)
RBC # FLD: 18.5 % — HIGH (ref 10.3–14.5)
SODIUM SERPL-SCNC: 139 MMOL/L — SIGNIFICANT CHANGE UP (ref 135–145)
WBC # BLD: 7.74 K/UL — SIGNIFICANT CHANGE UP (ref 3.8–10.5)
WBC # BLD: 8.04 K/UL — SIGNIFICANT CHANGE UP (ref 3.8–10.5)
WBC # FLD AUTO: 7.74 K/UL — SIGNIFICANT CHANGE UP (ref 3.8–10.5)
WBC # FLD AUTO: 8.04 K/UL — SIGNIFICANT CHANGE UP (ref 3.8–10.5)

## 2024-11-06 PROCEDURE — 99232 SBSQ HOSP IP/OBS MODERATE 35: CPT

## 2024-11-06 RX ADMIN — HEPARIN SODIUM 1200 UNIT(S)/HR: 10000 INJECTION INTRAVENOUS; SUBCUTANEOUS at 07:27

## 2024-11-06 RX ADMIN — HEPARIN SODIUM 1200 UNIT(S)/HR: 10000 INJECTION INTRAVENOUS; SUBCUTANEOUS at 19:20

## 2024-11-06 RX ADMIN — Medication 650 MILLIGRAM(S): at 12:07

## 2024-11-06 RX ADMIN — HEPARIN SODIUM 1200 UNIT(S)/HR: 10000 INJECTION INTRAVENOUS; SUBCUTANEOUS at 13:15

## 2024-11-06 RX ADMIN — Medication 4 UNIT(S): at 17:31

## 2024-11-06 RX ADMIN — Medication 650 MILLIGRAM(S): at 23:59

## 2024-11-06 RX ADMIN — Medication 25 MILLIGRAM(S): at 05:59

## 2024-11-06 RX ADMIN — Medication 650 MILLIGRAM(S): at 17:11

## 2024-11-06 RX ADMIN — Medication 10 MILLIGRAM(S): at 05:59

## 2024-11-06 RX ADMIN — CHLORHEXIDINE GLUCONATE 1 APPLICATION(S): 40 SOLUTION TOPICAL at 13:17

## 2024-11-06 RX ADMIN — Medication 500 MILLIGRAM(S): at 12:06

## 2024-11-06 RX ADMIN — Medication 650 MILLIGRAM(S): at 00:41

## 2024-11-06 RX ADMIN — Medication 50 MILLIGRAM(S): at 05:58

## 2024-11-06 RX ADMIN — GABAPENTIN 800 MILLIGRAM(S): 300 CAPSULE ORAL at 05:57

## 2024-11-06 RX ADMIN — Medication 4 UNIT(S): at 07:38

## 2024-11-06 RX ADMIN — SACUBITRIL AND VALSARTAN 1 TABLET(S): 97; 103 TABLET, FILM COATED ORAL at 17:11

## 2024-11-06 RX ADMIN — Medication 650 MILLIGRAM(S): at 12:37

## 2024-11-06 RX ADMIN — GABAPENTIN 800 MILLIGRAM(S): 300 CAPSULE ORAL at 23:59

## 2024-11-06 RX ADMIN — SACUBITRIL AND VALSARTAN 1 TABLET(S): 97; 103 TABLET, FILM COATED ORAL at 05:59

## 2024-11-06 RX ADMIN — Medication 16 UNIT(S): at 21:35

## 2024-11-06 RX ADMIN — Medication 650 MILLIGRAM(S): at 00:11

## 2024-11-06 RX ADMIN — NYSTATIN 1 APPLICATION(S): 100000 POWDER TOPICAL at 17:15

## 2024-11-06 RX ADMIN — HEPARIN SODIUM 1200 UNIT(S)/HR: 10000 INJECTION INTRAVENOUS; SUBCUTANEOUS at 02:59

## 2024-11-06 RX ADMIN — GABAPENTIN 800 MILLIGRAM(S): 300 CAPSULE ORAL at 17:11

## 2024-11-06 RX ADMIN — Medication 20 MILLIGRAM(S): at 21:36

## 2024-11-06 RX ADMIN — Medication 650 MILLIGRAM(S): at 17:41

## 2024-11-06 RX ADMIN — HEPARIN SODIUM 1200 UNIT(S)/HR: 10000 INJECTION INTRAVENOUS; SUBCUTANEOUS at 11:16

## 2024-11-06 RX ADMIN — Medication 300 MICROGRAM(S): at 05:58

## 2024-11-06 RX ADMIN — Medication 4 UNIT(S): at 12:05

## 2024-11-06 RX ADMIN — GABAPENTIN 800 MILLIGRAM(S): 300 CAPSULE ORAL at 12:06

## 2024-11-06 RX ADMIN — Medication 1 TABLET(S): at 12:07

## 2024-11-06 RX ADMIN — NYSTATIN 1 APPLICATION(S): 100000 POWDER TOPICAL at 05:57

## 2024-11-06 RX ADMIN — Medication 1: at 12:06

## 2024-11-06 NOTE — PROGRESS NOTE ADULT - SUBJECTIVE AND OBJECTIVE BOX
DATE OF SERVICE: 11-06-24 @ 10:32    Patient is a 74y old  Female who presents with a chief complaint of Symptomatic anemia, Syncope (06 Nov 2024 10:26)      INTERVAL HISTORY: no events    REVIEW OF SYSTEMS:  CONSTITUTIONAL: No weakness  EYES/ENT: No visual changes;  No throat pain   NECK: No pain or stiffness  RESPIRATORY: No cough, wheezing; No shortness of breath  CARDIOVASCULAR: No chest pain or palpitations  GASTROINTESTINAL: No abdominal  pain. No nausea, vomiting, or hematemesis  GENITOURINARY: No dysuria, frequency or hematuria  NEUROLOGICAL: No stroke like symptoms  SKIN: No rashes    TELEMETRY Personally reviewed:  	  MEDICATIONS:  metoprolol tartrate 50 milliGRAM(s) Oral two times a day  sacubitril 24 mG/valsartan 26 mG 1 Tablet(s) Oral two times a day  spironolactone 25 milliGRAM(s) Oral daily        PHYSICAL EXAM:  T(C): 36.7 (11-06-24 @ 05:06), Max: 37.5 (11-05-24 @ 11:14)  HR: 76 (11-06-24 @ 05:06) (76 - 86)  BP: 110/71 (11-06-24 @ 05:06) (91/52 - 113/64)  RR: 18 (11-06-24 @ 05:06) (16 - 18)  SpO2: 97% (11-06-24 @ 05:06) (94% - 100%)  Wt(kg): --  I&O's Summary    05 Nov 2024 07:01  -  06 Nov 2024 07:00  --------------------------------------------------------  IN: 1520 mL / OUT: 1250 mL / NET: 270 mL          Appearance: In no distress	  HEENT:    PERRL, EOMI	  Cardiovascular:  S1 S2, No JVD  Respiratory: Lungs clear to auscultation	  Gastrointestinal:  Soft, Non-tender, + BS	  Vascularature:  No edema of LE  Psychiatric: Appropriate affect   Neuro: no acute focal deficits                               7.1    8.04  )-----------( 328      ( 06 Nov 2024 06:01 )             24.5     11-06    139  |  103  |  23  ----------------------------<  102[H]  4.0   |  26  |  0.69    Ca    8.2[L]      06 Nov 2024 06:02          Labs personally reviewed      ASSESSMENT/PLAN: 	        74F w/Hx of chronic anemia, bullous pemphigoid on IVIG, HTN, HLD, DM, hypothyroidism, chronic back pain presents after syncopal episode. 1 minute of CPR was initiated in the field by EMS because there was concern of pulseness with unresponsiveness. Patient was hypotensive upon arrival and found to have a hgb of 5.1. Patient reports she syncopized after receiving IVIG. Reports she had been feeling lightheaded and SOB for a few days prior to this event. Has had bout of anemia in the past treated with iron supplementation, folate and B12. She denies any hematuria, melena, gingival bleeding. She reports significant improvement of symptoms since receiving 2u of pRBCs. Her only current complaint is acute on chronic back pain from her stretcher. Patient denies fever, chills, chest pain, SOB, headache, dizziness, abd pain, nausea, vomiting, constipation, dysuria.      Problem/Plan #1:  Problem: Syncope   - Likely symptomatic anemia  - Trop 26 --> 28  - EKG ischemic but likely in setting of Hb of 5.1  - POCUS noted no pericardial effusion, globally reduced LV function   Echo with reduced EF & severe aortic stenosis, however patient needs GI bleed addressed first prior to potential coronary revascularization which would require DAPT  - Replete Hgb > 7   - Plan for cath today 11/5    Problem/Plan #2:  Problem: Hypertension  - stable on sHF GDMT    Problem/Plan #3:  Problem: HLD  - c/w atorvastatin 20mg PO daily    Problem/Plan #4:  Problem: Cardiac Risk Stratification  - Symptomatic anemia  - Echo with reduced EF noted & severe aortic stenosis, however patient needs GI bleed addressed first prior to potential coronary revascularization which would require DAPT  - High risk for low risk nonelective EGD/Colonoscopy 2/2 systolic HF and severe aortic stenosis, planned for monday 11/4  - Consider cardiac anesthesiologist for case  - s/p egd/colonoscopy today 11/4, no source of bleed identified    Problem/Plan #5:  Problem: Systolic HF  - TTE 10/30 with EF 25-30%  - Switched to Metoprolol tartrate 50mg BID due to episodes PSVT & WCT 11/1  - Entresto 24-26mg BID  - s/p Lasix 40mg IV   - Given cannot r/o CAD, recommend ASA 81mg + Lipitor 40mg for potential CAD when safe     Problem/Plan #6:  - Problem: Severe Aortic Stenosis  - Structural Dr. Greene consulted   - TAVR workup in progress; tentatively scheduled TAVR 11/11/24    Problem/Plan #7:  - Problem: Acute right LE DVT  - c/w heparin gtt      MADHURI Choi DO [facility]  Cardiovascular Medicine  [address] [place] [address], [address]  Available through call or text on [employer] TEAM[employer]  Office: [phone]     DATE OF SERVICE: 11-06-24 @ 10:32    Patient is a 74y old  Female who presents with a chief complaint of Symptomatic anemia, Syncope (06 Nov 2024 10:26)      INTERVAL HISTORY: no events    REVIEW OF SYSTEMS:  CONSTITUTIONAL: No weakness  EYES/ENT: No visual changes;  No throat pain   NECK: No pain or stiffness  RESPIRATORY: No cough, wheezing; No shortness of breath  CARDIOVASCULAR: No chest pain or palpitations  GASTROINTESTINAL: No abdominal  pain. No nausea, vomiting, or hematemesis  GENITOURINARY: No dysuria, frequency or hematuria  NEUROLOGICAL: No stroke like symptoms  SKIN: No rashes    TELEMETRY Personally reviewed:  	  MEDICATIONS:  metoprolol tartrate 50 milliGRAM(s) Oral two times a day  sacubitril 24 mG/valsartan 26 mG 1 Tablet(s) Oral two times a day  spironolactone 25 milliGRAM(s) Oral daily        PHYSICAL EXAM:  T(C): 36.7 (11-06-24 @ 05:06), Max: 37.5 (11-05-24 @ 11:14)  HR: 76 (11-06-24 @ 05:06) (76 - 86)  BP: 110/71 (11-06-24 @ 05:06) (91/52 - 113/64)  RR: 18 (11-06-24 @ 05:06) (16 - 18)  SpO2: 97% (11-06-24 @ 05:06) (94% - 100%)  Wt(kg): --  I&O's Summary    05 Nov 2024 07:01  -  06 Nov 2024 07:00  --------------------------------------------------------  IN: 1520 mL / OUT: 1250 mL / NET: 270 mL          Appearance: In no distress	  HEENT:    PERRL, EOMI	  Cardiovascular:  S1 S2, No JVD  Respiratory: Lungs clear to auscultation	  Gastrointestinal:  Soft, Non-tender, + BS	  Vascularature:  No edema of LE  Psychiatric: Appropriate affect   Neuro: no acute focal deficits                               7.1    8.04  )-----------( 328      ( 06 Nov 2024 06:01 )             24.5     11-06    139  |  103  |  23  ----------------------------<  102[H]  4.0   |  26  |  0.69    Ca    8.2[L]      06 Nov 2024 06:02          Labs personally reviewed      ASSESSMENT/PLAN: 	        74F w/Hx of chronic anemia, bullous pemphigoid on IVIG, HTN, HLD, DM, hypothyroidism, chronic back pain presents after syncopal episode. 1 minute of CPR was initiated in the field by EMS because there was concern of pulseness with unresponsiveness. Patient was hypotensive upon arrival and found to have a hgb of 5.1. Patient reports she syncopized after receiving IVIG. Reports she had been feeling lightheaded and SOB for a few days prior to this event. Has had bout of anemia in the past treated with iron supplementation, folate and B12. She denies any hematuria, melena, gingival bleeding. She reports significant improvement of symptoms since receiving 2u of pRBCs. Her only current complaint is acute on chronic back pain from her stretcher. Patient denies fever, chills, chest pain, SOB, headache, dizziness, abd pain, nausea, vomiting, constipation, dysuria.      Problem/Plan #1:  Problem: Syncope   - Likely symptomatic anemia  - Trop 26 --> 28  - EKG ischemic but likely in setting of Hb of 5.1  - POCUS noted no pericardial effusion, globally reduced LV function   Echo with reduced EF & severe aortic stenosis, however patient needs GI bleed addressed first prior to potential coronary revascularization which would require DAPT  - Replete Hgb > 7   - Plan for cath today 11/5    Problem/Plan #2:  Problem: Hypertension  - stable on sHF GDMT    Problem/Plan #3:  Problem: HLD  - c/w atorvastatin 20mg PO daily    Problem/Plan #4:  Problem: Cardiac Risk Stratification  - Symptomatic anemia  - Echo with reduced EF noted & severe aortic stenosis, however patient needs GI bleed addressed first prior to potential coronary revascularization which would require DAPT  - High risk for low risk nonelective EGD/Colonoscopy 2/2 systolic HF and severe aortic stenosis, planned for monday 11/4  - Consider cardiac anesthesiologist for case  - s/p egd/colonoscopy today 11/4, no source of bleed identified    Problem/Plan #5:  Problem: Systolic HF  - TTE 10/30 with EF 25-30%  - Switched to Metoprolol tartrate 50mg BID due to episodes PSVT & WCT 11/1  - Entresto 24-26mg BID  - Spironolactone 25mg daily  - Given cannot r/o CAD, recommend ASA 81mg + Lipitor 40mg for potential CAD when safe     Problem/Plan #6:  - Problem: Severe Aortic Stenosis  - Structural Dr. Greene consulted   - TAVR workup in progress; tentatively scheduled TAVR 11/11/24    Problem/Plan #7:  - Problem: Acute right LE DVT  - c/w heparin gtt      MADHURI Choi DO [facility]  Cardiovascular Medicine  [address] [place] [address], [address]  Available through call or text on [employer] TEAM[employer]  Office: [phone]     DATE OF SERVICE: 11-06-24 @ 10:32    Patient is a 74y old  Female who presents with a chief complaint of Symptomatic anemia, Syncope (06 Nov 2024 10:26)      INTERVAL HISTORY: no events    REVIEW OF SYSTEMS:  CONSTITUTIONAL: No weakness  EYES/ENT: No visual changes;  No throat pain   NECK: No pain or stiffness  RESPIRATORY: No cough, wheezing; No shortness of breath  CARDIOVASCULAR: No chest pain or palpitations  GASTROINTESTINAL: No abdominal  pain. No nausea, vomiting, or hematemesis  GENITOURINARY: No dysuria, frequency or hematuria  NEUROLOGICAL: No stroke like symptoms  SKIN: No rashes    TELEMETRY Personally reviewed:  	  MEDICATIONS:  metoprolol tartrate 50 milliGRAM(s) Oral two times a day  sacubitril 24 mG/valsartan 26 mG 1 Tablet(s) Oral two times a day  spironolactone 25 milliGRAM(s) Oral daily        PHYSICAL EXAM:  T(C): 36.7 (11-06-24 @ 05:06), Max: 37.5 (11-05-24 @ 11:14)  HR: 76 (11-06-24 @ 05:06) (76 - 86)  BP: 110/71 (11-06-24 @ 05:06) (91/52 - 113/64)  RR: 18 (11-06-24 @ 05:06) (16 - 18)  SpO2: 97% (11-06-24 @ 05:06) (94% - 100%)  Wt(kg): --  I&O's Summary    05 Nov 2024 07:01  -  06 Nov 2024 07:00  --------------------------------------------------------  IN: 1520 mL / OUT: 1250 mL / NET: 270 mL          Appearance: In no distress	  HEENT:    PERRL, EOMI	  Cardiovascular:  S1 S2, No JVD  Respiratory: Lungs clear to auscultation	  Gastrointestinal:  Soft, Non-tender, + BS	  Vascularature:  No edema of LE  Psychiatric: Appropriate affect   Neuro: no acute focal deficits                               7.1    8.04  )-----------( 328      ( 06 Nov 2024 06:01 )             24.5     11-06    139  |  103  |  23  ----------------------------<  102[H]  4.0   |  26  |  0.69    Ca    8.2[L]      06 Nov 2024 06:02          Labs personally reviewed      ASSESSMENT/PLAN: 	        74F w/Hx of chronic anemia, bullous pemphigoid on IVIG, HTN, HLD, DM, hypothyroidism, chronic back pain presents after syncopal episode. 1 minute of CPR was initiated in the field by EMS because there was concern of pulseness with unresponsiveness. Patient was hypotensive upon arrival and found to have a hgb of 5.1. Patient reports she syncopized after receiving IVIG. Reports she had been feeling lightheaded and SOB for a few days prior to this event. Has had bout of anemia in the past treated with iron supplementation, folate and B12. She denies any hematuria, melena, gingival bleeding. She reports significant improvement of symptoms since receiving 2u of pRBCs. Her only current complaint is acute on chronic back pain from her stretcher. Patient denies fever, chills, chest pain, SOB, headache, dizziness, abd pain, nausea, vomiting, constipation, dysuria.      Problem/Plan #1:  Problem: Syncope   - Likely symptomatic anemia  - Trop 26 --> 28  - EKG ischemic but likely in setting of Hb of 5.1  - POCUS noted no pericardial effusion, globally reduced LV function   Echo with reduced EF & severe aortic stenosis, however patient needs GI bleed addressed first prior to potential coronary revascularization which would require DAPT  - Replete Hgb > 7   - s/p cath 11/5 with mild disease only, plan for TAVR 11/11    Problem/Plan #2:  Problem: Hypertension  - stable on sHF GDMT    Problem/Plan #3:  Problem: HLD  - c/w atorvastatin 20mg PO daily    Problem/Plan #4:  Problem: Cardiac Risk Stratification  - Symptomatic anemia  - Echo with reduced EF noted & severe aortic stenosis, however patient needs GI bleed addressed first prior to potential coronary revascularization which would require DAPT  - High risk for low risk nonelective EGD/Colonoscopy 2/2 systolic HF and severe aortic stenosis, planned for monday 11/4  - s/p egd/colonoscopy today 11/4, no source of bleed identified    Problem/Plan #5:  Problem: Systolic HF  - TTE 10/30 with EF 25-30%  - Switched to Metoprolol tartrate 50mg BID due to episodes PSVT & WCT 11/1  - Entresto 24-26mg BID  - Spironolactone 25mg daily     Problem/Plan #6:  - Problem: Severe Aortic Stenosis  - Structural Dr. Greene consulted   - TAVR workup in progress; tentatively scheduled TAVR 11/11/24    Problem/Plan #7:  - Problem: Acute right LE DVT  - heparin gtt as per primary team      MADHURI Choi, DO [facility]  Cardiovascular Medicine  [address] [place] [address], [address]  Available through call or text on [employer] TEAMs  Office: [phone]

## 2024-11-06 NOTE — PROGRESS NOTE ADULT - PROBLEM SELECTOR PLAN 1
pt denies h/o heart issues  will need further cardiac w/u  Patient did have acute hypoxic respiratory failure. Likely in the setting of severe systolic dysfunction and having received 2 units of packed red blood cells.   s/p IV diuretics with improvement in respiratory status  Continue with spironolactone 25mg daily  GDMT: start to implement goal directed medical therapy for her new systolic heart failure-->metoprolol, entresto, spironolactone     s/p diagnostic LHC--> nonobstructive coronary disease

## 2024-11-06 NOTE — PROGRESS NOTE ADULT - SUBJECTIVE AND OBJECTIVE BOX
Andre Reyes, M.D.  Office: [phone]  Available thru [employer] Teams     Patient is a 74y old  Female who presents with a chief complaint of Symptomatic anemia, Syncope (05 Nov 2024 15:01)          SUBJECTIVE / OVERNIGHT EVENTS:    No acute overnight events.    ROS: ( - ) Fever, ( - )Chills,  ( - )Nausea/Vomiting, ( - ) Cough, ( - )Shortness of breath, ( - )Chest Pain    MEDICATIONS  (STANDING):  ascorbic acid 500 milliGRAM(s) Oral daily  atorvastatin 20 milliGRAM(s) Oral at bedtime  chlorhexidine 2% Cloths 1 Application(s) Topical daily  dextrose 5%. 1000 milliLiter(s) (100 mL/Hr) IV Continuous <Continuous>  dextrose 5%. 1000 milliLiter(s) (50 mL/Hr) IV Continuous <Continuous>  dextrose 50% Injectable 25 Gram(s) IV Push once  dextrose 50% Injectable 25 Gram(s) IV Push once  dextrose 50% Injectable 12.5 Gram(s) IV Push once  gabapentin 800 milliGRAM(s) Oral four times a day  glucagon  Injectable 1 milliGRAM(s) IntraMuscular once  heparin  Infusion. 1200 Unit(s)/Hr (12 mL/Hr) IV Continuous <Continuous>  insulin glargine Injectable (LANTUS) 16 Unit(s) SubCutaneous at bedtime  insulin lispro (ADMELOG) corrective regimen sliding scale   SubCutaneous three times a day before meals  insulin lispro (ADMELOG) corrective regimen sliding scale   SubCutaneous at bedtime  insulin lispro Injectable (ADMELOG) 4 Unit(s) SubCutaneous three times a day before meals  levothyroxine 300 MICROGram(s) Oral <User Schedule>  levothyroxine 150 MICROGram(s) Oral <User Schedule>  metoprolol tartrate 50 milliGRAM(s) Oral two times a day  multivitamin 1 Tablet(s) Oral daily  nystatin Powder 1 Application(s) Topical two times a day  polyethylene glycol 3350 17 Gram(s) Oral daily  predniSONE   Tablet 10 milliGRAM(s) Oral daily  sacubitril 24 mG/valsartan 26 mG 1 Tablet(s) Oral two times a day  senna 2 Tablet(s) Oral at bedtime  spironolactone 25 milliGRAM(s) Oral daily    MEDICATIONS  (PRN):  acetaminophen     Tablet .. 650 milliGRAM(s) Oral every 6 hours PRN Temp greater or equal to 38C (100.4F), Mild Pain (1 - 3)  dextrose Oral Gel 15 Gram(s) Oral once PRN Blood Glucose LESS THAN 70 milliGRAM(s)/deciliter  heparin   Injectable 02027 Unit(s) IV Push every 6 hours PRN For aPTT less than 40  heparin   Injectable 5000 Unit(s) IV Push every 6 hours PRN For aPTT between 40 - 57  melatonin 3 milliGRAM(s) Oral at bedtime PRN Insomnia          T(C): 36.7 (11-06 @ 05:06), Max: 37.5 (11-05 @ 11:14)   HR: 76   BP: 110/71   RR: 18   SpO2: 97%    PHYSICAL EXAM:    CONSTITUTIONAL: NAD, well-developed, well-groomed  EYES: PERRLA; conjunctiva and sclera clear  ENMT: Moist oral mucosa, no pharyngeal injection or exudates; normal dentition  RESPIRATORY: Normal respiratory effort; lungs are clear to auscultation bilaterally  CARDIOVASCULAR: Regular rate and rhythm, normal S1 and S2, no murmur/rub/gallop; No lower extremity edema; Peripheral pulses are 2+ bilaterally  ABDOMEN: Nontender to palpation, normoactive bowel sounds, no rebound/guarding; No hepatosplenomegaly  MUSCULOSKELETAL:  no clubbing or cyanosis of digits; no joint swelling or tenderness to palpation  PSYCH: A+O to person, place, and time; affect appropriate  NEUROLOGY: CN 2-12 are intact and symmetric; no gross sensory deficits       LABS:                        7.1    8.04  )-----------( 328      ( 06 Nov 2024 06:01 )             24.5      11-06    139  |  103  |  23  ----------------------------<  102[H]  4.0   |  26  |  0.69    Ca    8.2[L]      06 Nov 2024 06:02         CAPILLARY BLOOD GLUCOSE      POCT Blood Glucose.: 134 mg/dL (06 Nov 2024 07:22)  POCT Blood Glucose.: 279 mg/dL (05 Nov 2024 21:15)  POCT Blood Glucose.: 139 mg/dL (05 Nov 2024 17:12)  POCT Blood Glucose.: 228 mg/dL (05 Nov 2024 11:34)      RADIOLOGY & ADDITIONAL TESTS:    Imaging Personally Reviewed:  Consultant(s) Notes Reviewed:    Care Discussed with Consultants/Other Providers:   Andre Reyes, M.D.  Office: [phone]  Available thru [employer] Teams     Patient is a 74y old  Female who presents with a chief complaint of Symptomatic anemia, Syncope (05 Nov 2024 15:01)          SUBJECTIVE / OVERNIGHT EVENTS:    No acute overnight events.  no new complaints  feels well    ROS: ( - ) Fever, ( - )Chills,  ( - )Nausea/Vomiting, ( - ) Cough, ( - )Shortness of breath, ( - )Chest Pain    MEDICATIONS  (STANDING):  ascorbic acid 500 milliGRAM(s) Oral daily  atorvastatin 20 milliGRAM(s) Oral at bedtime  chlorhexidine 2% Cloths 1 Application(s) Topical daily  dextrose 5%. 1000 milliLiter(s) (100 mL/Hr) IV Continuous <Continuous>  dextrose 5%. 1000 milliLiter(s) (50 mL/Hr) IV Continuous <Continuous>  dextrose 50% Injectable 25 Gram(s) IV Push once  dextrose 50% Injectable 25 Gram(s) IV Push once  dextrose 50% Injectable 12.5 Gram(s) IV Push once  gabapentin 800 milliGRAM(s) Oral four times a day  glucagon  Injectable 1 milliGRAM(s) IntraMuscular once  heparin  Infusion. 1200 Unit(s)/Hr (12 mL/Hr) IV Continuous <Continuous>  insulin glargine Injectable (LANTUS) 16 Unit(s) SubCutaneous at bedtime  insulin lispro (ADMELOG) corrective regimen sliding scale   SubCutaneous three times a day before meals  insulin lispro (ADMELOG) corrective regimen sliding scale   SubCutaneous at bedtime  insulin lispro Injectable (ADMELOG) 4 Unit(s) SubCutaneous three times a day before meals  levothyroxine 300 MICROGram(s) Oral <User Schedule>  levothyroxine 150 MICROGram(s) Oral <User Schedule>  metoprolol tartrate 50 milliGRAM(s) Oral two times a day  multivitamin 1 Tablet(s) Oral daily  nystatin Powder 1 Application(s) Topical two times a day  polyethylene glycol 3350 17 Gram(s) Oral daily  predniSONE   Tablet 10 milliGRAM(s) Oral daily  sacubitril 24 mG/valsartan 26 mG 1 Tablet(s) Oral two times a day  senna 2 Tablet(s) Oral at bedtime  spironolactone 25 milliGRAM(s) Oral daily    MEDICATIONS  (PRN):  acetaminophen     Tablet .. 650 milliGRAM(s) Oral every 6 hours PRN Temp greater or equal to 38C (100.4F), Mild Pain (1 - 3)  dextrose Oral Gel 15 Gram(s) Oral once PRN Blood Glucose LESS THAN 70 milliGRAM(s)/deciliter  heparin   Injectable 35346 Unit(s) IV Push every 6 hours PRN For aPTT less than 40  heparin   Injectable 5000 Unit(s) IV Push every 6 hours PRN For aPTT between 40 - 57  melatonin 3 milliGRAM(s) Oral at bedtime PRN Insomnia          T(C): 36.7 (11-06 @ 05:06), Max: 37.5 (11-05 @ 11:14)   HR: 76   BP: 110/71   RR: 18   SpO2: 97%    PHYSICAL EXAM:    CONSTITUTIONAL: NAD, well-developed, well-groomed  EYES: PERRLA; conjunctiva and sclera clear  ENMT: Moist oral mucosa, no pharyngeal injection or exudates; normal dentition  RESPIRATORY: Normal respiratory effort; lungs are clear to auscultation bilaterally  CARDIOVASCULAR: Regular rate and rhythm, normal S1 and S2, no murmur/rub/gallop; No lower extremity edema; Peripheral pulses are 2+ bilaterally  ABDOMEN: Nontender to palpation, normoactive bowel sounds, no rebound/guarding; No hepatosplenomegaly  MUSCULOSKELETAL:  no clubbing or cyanosis of digits; no joint swelling or tenderness to palpation  PSYCH: A+O to person, place, and time; affect appropriate  NEUROLOGY: CN 2-12 are intact and symmetric; no gross sensory deficits       LABS:                        7.1    8.04  )-----------( 328      ( 06 Nov 2024 06:01 )             24.5      11-06    139  |  103  |  23  ----------------------------<  102[H]  4.0   |  26  |  0.69    Ca    8.2[L]      06 Nov 2024 06:02         CAPILLARY BLOOD GLUCOSE      POCT Blood Glucose.: 134 mg/dL (06 Nov 2024 07:22)  POCT Blood Glucose.: 279 mg/dL (05 Nov 2024 21:15)  POCT Blood Glucose.: 139 mg/dL (05 Nov 2024 17:12)  POCT Blood Glucose.: 228 mg/dL (05 Nov 2024 11:34)      RADIOLOGY & ADDITIONAL TESTS:    Imaging Personally Reviewed:  Consultant(s) Notes Reviewed:    Care Discussed with Consultants/Other Providers:

## 2024-11-06 NOTE — PROGRESS NOTE ADULT - PROBLEM SELECTOR PLAN 7
- HbA1c 5.0    CAPILLARY BLOOD GLUCOSE      POCT Blood Glucose.: 184 mg/dL (06 Nov 2024 21:12)  POCT Blood Glucose.: 149 mg/dL (06 Nov 2024 17:19)  POCT Blood Glucose.: 198 mg/dL (06 Nov 2024 11:40)  POCT Blood Glucose.: 134 mg/dL (06 Nov 2024 07:22)    Blood glucose at goal  - c/w lantus 16 units qHS  - c/w admelog 4 units TID with meals  - c/w moderate admelog mealtime sliding scale

## 2024-11-07 LAB
ALBUMIN SERPL ELPH-MCNC: 2.9 G/DL — LOW (ref 3.3–5)
ALP SERPL-CCNC: 67 U/L — SIGNIFICANT CHANGE UP (ref 40–120)
ALT FLD-CCNC: 15 U/L — SIGNIFICANT CHANGE UP (ref 10–45)
ANION GAP SERPL CALC-SCNC: 11 MMOL/L — SIGNIFICANT CHANGE UP (ref 5–17)
APTT BLD: 122.5 SEC — CRITICAL HIGH (ref 24.5–35.6)
APTT BLD: 55.9 SEC — HIGH (ref 24.5–35.6)
APTT BLD: 67.1 SEC — HIGH (ref 24.5–35.6)
AST SERPL-CCNC: 17 U/L — SIGNIFICANT CHANGE UP (ref 10–40)
BILIRUB SERPL-MCNC: 0.3 MG/DL — SIGNIFICANT CHANGE UP (ref 0.2–1.2)
BUN SERPL-MCNC: 33 MG/DL — HIGH (ref 7–23)
CALCIUM SERPL-MCNC: 8.2 MG/DL — LOW (ref 8.4–10.5)
CHLORIDE SERPL-SCNC: 100 MMOL/L — SIGNIFICANT CHANGE UP (ref 96–108)
CO2 SERPL-SCNC: 24 MMOL/L — SIGNIFICANT CHANGE UP (ref 22–31)
CREAT SERPL-MCNC: 1.12 MG/DL — SIGNIFICANT CHANGE UP (ref 0.5–1.3)
EGFR: 52 ML/MIN/1.73M2 — LOW
GLUCOSE BLDC GLUCOMTR-MCNC: 148 MG/DL — HIGH (ref 70–99)
GLUCOSE BLDC GLUCOMTR-MCNC: 165 MG/DL — HIGH (ref 70–99)
GLUCOSE BLDC GLUCOMTR-MCNC: 184 MG/DL — HIGH (ref 70–99)
GLUCOSE BLDC GLUCOMTR-MCNC: 245 MG/DL — HIGH (ref 70–99)
GLUCOSE SERPL-MCNC: 147 MG/DL — HIGH (ref 70–99)
HCT VFR BLD CALC: 22.9 % — LOW (ref 34.5–45)
HCT VFR BLD CALC: 23.7 % — LOW (ref 34.5–45)
HCT VFR BLD CALC: 25.2 % — LOW (ref 34.5–45)
HGB BLD-MCNC: 6.6 G/DL — CRITICAL LOW (ref 11.5–15.5)
HGB BLD-MCNC: 7.2 G/DL — LOW (ref 11.5–15.5)
HGB BLD-MCNC: 7.5 G/DL — LOW (ref 11.5–15.5)
MCHC RBC-ENTMCNC: 26.2 PG — LOW (ref 27–34)
MCHC RBC-ENTMCNC: 26.5 PG — LOW (ref 27–34)
MCHC RBC-ENTMCNC: 26.5 PG — LOW (ref 27–34)
MCHC RBC-ENTMCNC: 28.8 G/DL — LOW (ref 32–36)
MCHC RBC-ENTMCNC: 29.8 G/DL — LOW (ref 32–36)
MCHC RBC-ENTMCNC: 30.4 G/DL — LOW (ref 32–36)
MCV RBC AUTO: 87.1 FL — SIGNIFICANT CHANGE UP (ref 80–100)
MCV RBC AUTO: 88.1 FL — SIGNIFICANT CHANGE UP (ref 80–100)
MCV RBC AUTO: 92 FL — SIGNIFICANT CHANGE UP (ref 80–100)
NRBC # BLD: 0 /100 WBCS — SIGNIFICANT CHANGE UP (ref 0–0)
PLATELET # BLD AUTO: 299 K/UL — SIGNIFICANT CHANGE UP (ref 150–400)
PLATELET # BLD AUTO: 319 K/UL — SIGNIFICANT CHANGE UP (ref 150–400)
PLATELET # BLD AUTO: 332 K/UL — SIGNIFICANT CHANGE UP (ref 150–400)
POTASSIUM SERPL-MCNC: 4.9 MMOL/L — SIGNIFICANT CHANGE UP (ref 3.5–5.3)
POTASSIUM SERPL-SCNC: 4.9 MMOL/L — SIGNIFICANT CHANGE UP (ref 3.5–5.3)
PROT SERPL-MCNC: 6.1 G/DL — SIGNIFICANT CHANGE UP (ref 6–8.3)
RBC # BLD: 2.49 M/UL — LOW (ref 3.8–5.2)
RBC # BLD: 2.72 M/UL — LOW (ref 3.8–5.2)
RBC # BLD: 2.86 M/UL — LOW (ref 3.8–5.2)
RBC # FLD: 18.5 % — HIGH (ref 10.3–14.5)
RBC # FLD: 18.9 % — HIGH (ref 10.3–14.5)
RBC # FLD: 19.5 % — HIGH (ref 10.3–14.5)
SODIUM SERPL-SCNC: 135 MMOL/L — SIGNIFICANT CHANGE UP (ref 135–145)
WBC # BLD: 6.19 K/UL — SIGNIFICANT CHANGE UP (ref 3.8–10.5)
WBC # BLD: 8.23 K/UL — SIGNIFICANT CHANGE UP (ref 3.8–10.5)
WBC # BLD: 8.45 K/UL — SIGNIFICANT CHANGE UP (ref 3.8–10.5)
WBC # FLD AUTO: 6.19 K/UL — SIGNIFICANT CHANGE UP (ref 3.8–10.5)
WBC # FLD AUTO: 8.23 K/UL — SIGNIFICANT CHANGE UP (ref 3.8–10.5)
WBC # FLD AUTO: 8.45 K/UL — SIGNIFICANT CHANGE UP (ref 3.8–10.5)

## 2024-11-07 PROCEDURE — 99232 SBSQ HOSP IP/OBS MODERATE 35: CPT

## 2024-11-07 RX ORDER — DICLOFENAC SODIUM 75 MG
75 TABLET, DELAYED RELEASE (ENTERIC COATED) ORAL DAILY
Refills: 0 | Status: DISCONTINUED | OUTPATIENT
Start: 2024-11-07 | End: 2024-11-08

## 2024-11-07 RX ORDER — LIDOCAINE HYDROCHLORIDE 40 MG/ML
1 SOLUTION TOPICAL EVERY 24 HOURS
Refills: 0 | Status: DISCONTINUED | OUTPATIENT
Start: 2024-11-07 | End: 2024-11-11

## 2024-11-07 RX ADMIN — NYSTATIN 1 APPLICATION(S): 100000 POWDER TOPICAL at 05:09

## 2024-11-07 RX ADMIN — SACUBITRIL AND VALSARTAN 1 TABLET(S): 97; 103 TABLET, FILM COATED ORAL at 05:52

## 2024-11-07 RX ADMIN — LIDOCAINE HYDROCHLORIDE 1 PATCH: 40 SOLUTION TOPICAL at 02:51

## 2024-11-07 RX ADMIN — Medication 10 MILLIGRAM(S): at 05:09

## 2024-11-07 RX ADMIN — HEPARIN SODIUM 1200 UNIT(S)/HR: 10000 INJECTION INTRAVENOUS; SUBCUTANEOUS at 23:23

## 2024-11-07 RX ADMIN — GABAPENTIN 800 MILLIGRAM(S): 300 CAPSULE ORAL at 23:28

## 2024-11-07 RX ADMIN — Medication 50 MILLIGRAM(S): at 05:08

## 2024-11-07 RX ADMIN — Medication 75 MILLIGRAM(S): at 20:35

## 2024-11-07 RX ADMIN — LIDOCAINE HYDROCHLORIDE 1 PATCH: 40 SOLUTION TOPICAL at 05:53

## 2024-11-07 RX ADMIN — Medication 650 MILLIGRAM(S): at 20:13

## 2024-11-07 RX ADMIN — Medication 500 MILLIGRAM(S): at 12:14

## 2024-11-07 RX ADMIN — Medication 1 TABLET(S): at 12:14

## 2024-11-07 RX ADMIN — Medication 20 MILLIGRAM(S): at 21:54

## 2024-11-07 RX ADMIN — Medication 4 UNIT(S): at 11:41

## 2024-11-07 RX ADMIN — GABAPENTIN 800 MILLIGRAM(S): 300 CAPSULE ORAL at 05:08

## 2024-11-07 RX ADMIN — LIDOCAINE HYDROCHLORIDE 1 PATCH: 40 SOLUTION TOPICAL at 14:14

## 2024-11-07 RX ADMIN — Medication 75 MILLIGRAM(S): at 20:50

## 2024-11-07 RX ADMIN — Medication 25 MILLIGRAM(S): at 05:09

## 2024-11-07 RX ADMIN — Medication 300 MICROGRAM(S): at 05:09

## 2024-11-07 RX ADMIN — Medication 50 MILLIGRAM(S): at 17:23

## 2024-11-07 RX ADMIN — HEPARIN SODIUM 1200 UNIT(S)/HR: 10000 INJECTION INTRAVENOUS; SUBCUTANEOUS at 07:21

## 2024-11-07 RX ADMIN — HEPARIN SODIUM 5000 UNIT(S): 10000 INJECTION INTRAVENOUS; SUBCUTANEOUS at 07:47

## 2024-11-07 RX ADMIN — Medication 16 UNIT(S): at 21:48

## 2024-11-07 RX ADMIN — GABAPENTIN 800 MILLIGRAM(S): 300 CAPSULE ORAL at 12:14

## 2024-11-07 RX ADMIN — HEPARIN SODIUM 1200 UNIT(S)/HR: 10000 INJECTION INTRAVENOUS; SUBCUTANEOUS at 16:48

## 2024-11-07 RX ADMIN — HEPARIN SODIUM 1500 UNIT(S)/HR: 10000 INJECTION INTRAVENOUS; SUBCUTANEOUS at 07:44

## 2024-11-07 RX ADMIN — Medication 650 MILLIGRAM(S): at 20:50

## 2024-11-07 RX ADMIN — Medication 4 UNIT(S): at 08:41

## 2024-11-07 RX ADMIN — Medication 4 UNIT(S): at 17:23

## 2024-11-07 RX ADMIN — CHLORHEXIDINE GLUCONATE 1 APPLICATION(S): 40 SOLUTION TOPICAL at 12:15

## 2024-11-07 RX ADMIN — GABAPENTIN 800 MILLIGRAM(S): 300 CAPSULE ORAL at 17:23

## 2024-11-07 RX ADMIN — Medication 2: at 11:41

## 2024-11-07 RX ADMIN — NYSTATIN 1 APPLICATION(S): 100000 POWDER TOPICAL at 17:24

## 2024-11-07 RX ADMIN — Medication 650 MILLIGRAM(S): at 00:59

## 2024-11-07 RX ADMIN — Medication 1: at 08:41

## 2024-11-07 RX ADMIN — SACUBITRIL AND VALSARTAN 1 TABLET(S): 97; 103 TABLET, FILM COATED ORAL at 17:23

## 2024-11-07 NOTE — PROGRESS NOTE ADULT - SUBJECTIVE AND OBJECTIVE BOX
DATE OF SERVICE: 11-07-24 @ 15:07    Patient is a 74y old  Female who presents with a chief complaint of Symptomatic anemia, Syncope (07 Nov 2024 10:10)      INTERVAL HISTORY: Feels ok.     REVIEW OF SYSTEMS:  CONSTITUTIONAL: No weakness  EYES/ENT: No visual changes;  No throat pain   NECK: No pain or stiffness  RESPIRATORY: No cough, wheezing; No shortness of breath  CARDIOVASCULAR: No chest pain or palpitations  GASTROINTESTINAL: No abdominal  pain. No nausea, vomiting, or hematemesis  GENITOURINARY: No dysuria, frequency or hematuria  NEUROLOGICAL: No stroke like symptoms  SKIN: No rashes    TELEMETRY Personally reviewed:  70-90  	  MEDICATIONS:  metoprolol tartrate 50 milliGRAM(s) Oral two times a day  sacubitril 24 mG/valsartan 26 mG 1 Tablet(s) Oral two times a day  spironolactone 25 milliGRAM(s) Oral daily        PHYSICAL EXAM:  T(C): 36.7 (11-07-24 @ 13:30), Max: 36.9 (11-07-24 @ 00:14)  HR: 83 (11-07-24 @ 13:30) (79 - 87)  BP: 95/61 (11-07-24 @ 13:30) (93/52 - 106/64)  RR: 18 (11-07-24 @ 13:30) (18 - 18)  SpO2: 97% (11-07-24 @ 13:30) (96% - 98%)  Wt(kg): --  I&O's Summary    06 Nov 2024 07:01  -  07 Nov 2024 07:00  --------------------------------------------------------  IN: 480 mL / OUT: 3025 mL / NET: -2545 mL    07 Nov 2024 07:01  -  07 Nov 2024 15:07  --------------------------------------------------------  IN: 200 mL / OUT: 0 mL / NET: 200 mL          Appearance: In no distress	  HEENT:    PERRL, EOMI	  Cardiovascular:  S1 S2, No JVD  Respiratory: Lungs clear to auscultation	  Gastrointestinal:  Soft, Non-tender, + BS	  Vascularature:  No edema of LE  Psychiatric: Appropriate affect   Neuro: no acute focal deficits                               7.5    8.23  )-----------( 299      ( 07 Nov 2024 14:26 )             25.2     11-06    139  |  103  |  23  ----------------------------<  102[H]  4.0   |  26  |  0.69    Ca    8.2[L]      06 Nov 2024 06:02          Labs personally reviewed      ASSESSMENT/PLAN: 	    74F w/Hx of chronic anemia, bullous pemphigoid on IVIG, HTN, HLD, DM, hypothyroidism, chronic back pain presents after syncopal episode. 1 minute of CPR was initiated in the field by EMS because there was concern of pulseness with unresponsiveness. Patient was hypotensive upon arrival and found to have a hgb of 5.1. Patient reports she syncopized after receiving IVIG. Reports she had been feeling lightheaded and SOB for a few days prior to this event. Has had bout of anemia in the past treated with iron supplementation, folate and B12. She denies any hematuria, melena, gingival bleeding. She reports significant improvement of symptoms since receiving 2u of pRBCs. Her only current complaint is acute on chronic back pain from her stretcher. Patient denies fever, chills, chest pain, SOB, headache, dizziness, abd pain, nausea, vomiting, constipation, dysuria.      Problem/Plan #1:  Problem: Syncope   - Likely symptomatic anemia  - Trop 26 --> 28  - EKG ischemic but likely in setting of Hb of 5.1  - POCUS noted no pericardial effusion, globally reduced LV function   - Echo with reduced EF & severe aortic stenosis  - Replete Hgb > 7   - s/p cath 11/5 with mild disease only, plan for TAVR 11/11    Problem/Plan #2:  Problem: Hypertension  - stable on sHF GDMT    Problem/Plan #3:  Problem: HLD  - c/w atorvastatin 20mg PO daily    Problem/Plan #4:  Problem: Cardiac Risk Stratification  - Symptomatic anemia  - Echo with reduced EF noted & severe aortic stenosis  - High risk for low risk nonelective EGD/Colonoscopy 2/2 systolic HF and severe aortic stenosis, planned for monday 11/4  - s/p egd/colonoscopy today 11/4, no source of bleed identified    Problem/Plan #5:  Problem: Systolic HF  - TTE 10/30 with EF 25-30%  - Switched to Metoprolol tartrate 50mg BID due to episodes PSVT & WCT 11/1  - Entresto 24-26mg BID  - Spironolactone 25mg daily     Problem/Plan #6:  - Problem: Severe Aortic Stenosis  - Structural Dr. Greene consulted   - TAVR workup in progress; tentatively scheduled TAVR 11/11/24    Problem/Plan #7:  - Problem: Acute right LE DVT  - heparin gtt as per primary team        MERRICK Mcelroy-NICK Rollins, [facility]  Cardiovascular Medicine  [address] [place] [address], [address]  Available through call or text on [employer] TEAMs  Office: [phone]

## 2024-11-07 NOTE — PROGRESS NOTE ADULT - PROBLEM SELECTOR PLAN 4
unclear cause of her anemia. no evidence of hemolysis  Possible causes include occult GI bleeding. -->s/p EGD/Colonoscopy--> no source of bleeding identified --> GI recommending outpatient Capsule endoscopy.  hgb< 7. will plan for 1u PRBCs today

## 2024-11-07 NOTE — PROGRESS NOTE ADULT - ASSESSMENT
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

## 2024-11-07 NOTE — PROGRESS NOTE ADULT - PROBLEM SELECTOR PLAN 7
- HbA1c 5.0  CAPILLARY BLOOD GLUCOSE      POCT Blood Glucose.: 148 mg/dL (07 Nov 2024 17:06)  POCT Blood Glucose.: 245 mg/dL (07 Nov 2024 11:36)  POCT Blood Glucose.: 184 mg/dL (07 Nov 2024 08:15)  POCT Blood Glucose.: 184 mg/dL (06 Nov 2024 21:12)      Blood glucose at goal  - c/w lantus 16 units qHS  - c/w admelog 4 units TID with meals  - c/w moderate admelog mealtime sliding scale

## 2024-11-07 NOTE — PROGRESS NOTE ADULT - SUBJECTIVE AND OBJECTIVE BOX
Andre Reyes, M.D.  Office: [phone]  Available thru [employer] Teams     Patient is a 74y old  Female who presents with a chief complaint of Symptomatic anemia, Syncope (06 Nov 2024 10:31)          SUBJECTIVE / OVERNIGHT EVENTS:    No acute overnight events.    ROS: ( - ) Fever, ( - )Chills,  ( - )Nausea/Vomiting, ( - ) Cough, ( - )Shortness of breath, ( - )Chest Pain    MEDICATIONS  (STANDING):  ascorbic acid 500 milliGRAM(s) Oral daily  atorvastatin 20 milliGRAM(s) Oral at bedtime  chlorhexidine 2% Cloths 1 Application(s) Topical daily  dextrose 5%. 1000 milliLiter(s) (50 mL/Hr) IV Continuous <Continuous>  dextrose 5%. 1000 milliLiter(s) (100 mL/Hr) IV Continuous <Continuous>  dextrose 50% Injectable 25 Gram(s) IV Push once  dextrose 50% Injectable 25 Gram(s) IV Push once  dextrose 50% Injectable 12.5 Gram(s) IV Push once  gabapentin 800 milliGRAM(s) Oral four times a day  glucagon  Injectable 1 milliGRAM(s) IntraMuscular once  heparin  Infusion. 1200 Unit(s)/Hr (12 mL/Hr) IV Continuous <Continuous>  insulin glargine Injectable (LANTUS) 16 Unit(s) SubCutaneous at bedtime  insulin lispro (ADMELOG) corrective regimen sliding scale   SubCutaneous three times a day before meals  insulin lispro (ADMELOG) corrective regimen sliding scale   SubCutaneous at bedtime  insulin lispro Injectable (ADMELOG) 4 Unit(s) SubCutaneous three times a day before meals  levothyroxine 300 MICROGram(s) Oral <User Schedule>  levothyroxine 150 MICROGram(s) Oral <User Schedule>  lidocaine   4% Patch 1 Patch Transdermal every 24 hours  metoprolol tartrate 50 milliGRAM(s) Oral two times a day  multivitamin 1 Tablet(s) Oral daily  nystatin Powder 1 Application(s) Topical two times a day  polyethylene glycol 3350 17 Gram(s) Oral daily  predniSONE   Tablet 10 milliGRAM(s) Oral daily  sacubitril 24 mG/valsartan 26 mG 1 Tablet(s) Oral two times a day  senna 2 Tablet(s) Oral at bedtime  spironolactone 25 milliGRAM(s) Oral daily    MEDICATIONS  (PRN):  acetaminophen     Tablet .. 650 milliGRAM(s) Oral every 6 hours PRN Temp greater or equal to 38C (100.4F), Mild Pain (1 - 3)  dextrose Oral Gel 15 Gram(s) Oral once PRN Blood Glucose LESS THAN 70 milliGRAM(s)/deciliter  heparin   Injectable 77473 Unit(s) IV Push every 6 hours PRN For aPTT less than 40  heparin   Injectable 5000 Unit(s) IV Push every 6 hours PRN For aPTT between 40 - 57  melatonin 3 milliGRAM(s) Oral at bedtime PRN Insomnia          T(C): 36.9 (11-07 @ 04:50), Max: 37.1 (11-06 @ 11:29)   HR: 86   BP: 104/62   RR: 18   SpO2: 97%    PHYSICAL EXAM:    CONSTITUTIONAL: NAD, well-developed, well-groomed  EYES: PERRLA; conjunctiva and sclera clear  ENMT: Moist oral mucosa, no pharyngeal injection or exudates; normal dentition  RESPIRATORY: Normal respiratory effort; lungs are clear to auscultation bilaterally  CARDIOVASCULAR: Regular rate and rhythm, normal S1 and S2, no murmur/rub/gallop; No lower extremity edema; Peripheral pulses are 2+ bilaterally  ABDOMEN: Nontender to palpation, normoactive bowel sounds, no rebound/guarding; No hepatosplenomegaly  MUSCULOSKELETAL:  no clubbing or cyanosis of digits; no joint swelling or tenderness to palpation  PSYCH: A+O to person, place, and time; affect appropriate  NEUROLOGY: CN 2-12 are intact and symmetric; no gross sensory deficits       LABS:                        6.6    6.19  )-----------( 319      ( 07 Nov 2024 06:55 )             22.9      11-06    139  |  103  |  23  ----------------------------<  102[H]  4.0   |  26  |  0.69    Ca    8.2[L]      06 Nov 2024 06:02         CAPILLARY BLOOD GLUCOSE      POCT Blood Glucose.: 184 mg/dL (07 Nov 2024 08:15)  POCT Blood Glucose.: 184 mg/dL (06 Nov 2024 21:12)  POCT Blood Glucose.: 149 mg/dL (06 Nov 2024 17:19)  POCT Blood Glucose.: 198 mg/dL (06 Nov 2024 11:40)      RADIOLOGY & ADDITIONAL TESTS:    Imaging Personally Reviewed:  Consultant(s) Notes Reviewed:    Care Discussed with Consultants/Other Providers:   Andre Reyes, M.D.  Office: [phone]  Available thru [employer] Teams     Patient is a 74y old  Female who presents with a chief complaint of Symptomatic anemia, Syncope (06 Nov 2024 10:31)          SUBJECTIVE / OVERNIGHT EVENTS:    No acute overnight events.  No new complaints.    ROS: ( - ) Fever, ( - )Chills,  ( - )Nausea/Vomiting, ( - ) Cough, ( - )Shortness of breath, ( - )Chest Pain    MEDICATIONS  (STANDING):  ascorbic acid 500 milliGRAM(s) Oral daily  atorvastatin 20 milliGRAM(s) Oral at bedtime  chlorhexidine 2% Cloths 1 Application(s) Topical daily  dextrose 5%. 1000 milliLiter(s) (50 mL/Hr) IV Continuous <Continuous>  dextrose 5%. 1000 milliLiter(s) (100 mL/Hr) IV Continuous <Continuous>  dextrose 50% Injectable 25 Gram(s) IV Push once  dextrose 50% Injectable 25 Gram(s) IV Push once  dextrose 50% Injectable 12.5 Gram(s) IV Push once  gabapentin 800 milliGRAM(s) Oral four times a day  glucagon  Injectable 1 milliGRAM(s) IntraMuscular once  heparin  Infusion. 1200 Unit(s)/Hr (12 mL/Hr) IV Continuous <Continuous>  insulin glargine Injectable (LANTUS) 16 Unit(s) SubCutaneous at bedtime  insulin lispro (ADMELOG) corrective regimen sliding scale   SubCutaneous three times a day before meals  insulin lispro (ADMELOG) corrective regimen sliding scale   SubCutaneous at bedtime  insulin lispro Injectable (ADMELOG) 4 Unit(s) SubCutaneous three times a day before meals  levothyroxine 300 MICROGram(s) Oral <User Schedule>  levothyroxine 150 MICROGram(s) Oral <User Schedule>  lidocaine   4% Patch 1 Patch Transdermal every 24 hours  metoprolol tartrate 50 milliGRAM(s) Oral two times a day  multivitamin 1 Tablet(s) Oral daily  nystatin Powder 1 Application(s) Topical two times a day  polyethylene glycol 3350 17 Gram(s) Oral daily  predniSONE   Tablet 10 milliGRAM(s) Oral daily  sacubitril 24 mG/valsartan 26 mG 1 Tablet(s) Oral two times a day  senna 2 Tablet(s) Oral at bedtime  spironolactone 25 milliGRAM(s) Oral daily    MEDICATIONS  (PRN):  acetaminophen     Tablet .. 650 milliGRAM(s) Oral every 6 hours PRN Temp greater or equal to 38C (100.4F), Mild Pain (1 - 3)  dextrose Oral Gel 15 Gram(s) Oral once PRN Blood Glucose LESS THAN 70 milliGRAM(s)/deciliter  heparin   Injectable 37682 Unit(s) IV Push every 6 hours PRN For aPTT less than 40  heparin   Injectable 5000 Unit(s) IV Push every 6 hours PRN For aPTT between 40 - 57  melatonin 3 milliGRAM(s) Oral at bedtime PRN Insomnia          T(C): 36.9 (11-07 @ 04:50), Max: 37.1 (11-06 @ 11:29)   HR: 86   BP: 104/62   RR: 18   SpO2: 97%    PHYSICAL EXAM:    CONSTITUTIONAL: NAD, well-developed, well-groomed  EYES: PERRLA; conjunctiva and sclera clear  ENMT: Moist oral mucosa, no pharyngeal injection or exudates; normal dentition  RESPIRATORY: Normal respiratory effort; lungs are clear to auscultation bilaterally  CARDIOVASCULAR: Regular rate and rhythm, normal S1 and S2, no murmur/rub/gallop; No lower extremity edema; Peripheral pulses are 2+ bilaterally  ABDOMEN: Nontender to palpation, normoactive bowel sounds, no rebound/guarding; No hepatosplenomegaly  MUSCULOSKELETAL:  no clubbing or cyanosis of digits; no joint swelling or tenderness to palpation  PSYCH: A+O to person, place, and time; affect appropriate  NEUROLOGY: CN 2-12 are intact and symmetric; no gross sensory deficits       LABS:                        6.6    6.19  )-----------( 319      ( 07 Nov 2024 06:55 )             22.9      11-06    139  |  103  |  23  ----------------------------<  102[H]  4.0   |  26  |  0.69    Ca    8.2[L]      06 Nov 2024 06:02         CAPILLARY BLOOD GLUCOSE      POCT Blood Glucose.: 184 mg/dL (07 Nov 2024 08:15)  POCT Blood Glucose.: 184 mg/dL (06 Nov 2024 21:12)  POCT Blood Glucose.: 149 mg/dL (06 Nov 2024 17:19)  POCT Blood Glucose.: 198 mg/dL (06 Nov 2024 11:40)      RADIOLOGY & ADDITIONAL TESTS:    Imaging Personally Reviewed:  Consultant(s) Notes Reviewed:    Care Discussed with Consultants/Other Providers:

## 2024-11-08 LAB
APTT BLD: 62.9 SEC — HIGH (ref 24.5–35.6)
GLUCOSE BLDC GLUCOMTR-MCNC: 161 MG/DL — HIGH (ref 70–99)
GLUCOSE BLDC GLUCOMTR-MCNC: 188 MG/DL — HIGH (ref 70–99)
GLUCOSE BLDC GLUCOMTR-MCNC: 189 MG/DL — HIGH (ref 70–99)
GLUCOSE BLDC GLUCOMTR-MCNC: 214 MG/DL — HIGH (ref 70–99)
OB PNL STL: NEGATIVE — SIGNIFICANT CHANGE UP

## 2024-11-08 PROCEDURE — 99232 SBSQ HOSP IP/OBS MODERATE 35: CPT

## 2024-11-08 RX ORDER — ACETAMINOPHEN 500 MG
1000 TABLET ORAL ONCE
Refills: 0 | Status: COMPLETED | OUTPATIENT
Start: 2024-11-08 | End: 2024-11-08

## 2024-11-08 RX ORDER — TRAMADOL HYDROCHLORIDE 50 MG/1
50 TABLET, COATED ORAL EVERY 6 HOURS
Refills: 0 | Status: DISCONTINUED | OUTPATIENT
Start: 2024-11-08 | End: 2024-11-11

## 2024-11-08 RX ADMIN — Medication 1 TABLET(S): at 11:28

## 2024-11-08 RX ADMIN — Medication 4 UNIT(S): at 11:42

## 2024-11-08 RX ADMIN — TRAMADOL HYDROCHLORIDE 50 MILLIGRAM(S): 50 TABLET, COATED ORAL at 17:00

## 2024-11-08 RX ADMIN — NYSTATIN 1 APPLICATION(S): 100000 POWDER TOPICAL at 05:19

## 2024-11-08 RX ADMIN — TRAMADOL HYDROCHLORIDE 50 MILLIGRAM(S): 50 TABLET, COATED ORAL at 10:17

## 2024-11-08 RX ADMIN — GABAPENTIN 800 MILLIGRAM(S): 300 CAPSULE ORAL at 23:38

## 2024-11-08 RX ADMIN — CHLORHEXIDINE GLUCONATE 1 APPLICATION(S): 40 SOLUTION TOPICAL at 11:28

## 2024-11-08 RX ADMIN — TRAMADOL HYDROCHLORIDE 50 MILLIGRAM(S): 50 TABLET, COATED ORAL at 23:00

## 2024-11-08 RX ADMIN — Medication 300 MICROGRAM(S): at 05:18

## 2024-11-08 RX ADMIN — NYSTATIN 1 APPLICATION(S): 100000 POWDER TOPICAL at 17:46

## 2024-11-08 RX ADMIN — LIDOCAINE HYDROCHLORIDE 1 PATCH: 40 SOLUTION TOPICAL at 13:11

## 2024-11-08 RX ADMIN — TRAMADOL HYDROCHLORIDE 50 MILLIGRAM(S): 50 TABLET, COATED ORAL at 22:27

## 2024-11-08 RX ADMIN — Medication 50 MILLIGRAM(S): at 05:17

## 2024-11-08 RX ADMIN — Medication 1: at 17:44

## 2024-11-08 RX ADMIN — Medication 16 UNIT(S): at 22:15

## 2024-11-08 RX ADMIN — Medication 20 MILLIGRAM(S): at 22:32

## 2024-11-08 RX ADMIN — Medication 2: at 11:42

## 2024-11-08 RX ADMIN — Medication 650 MILLIGRAM(S): at 22:28

## 2024-11-08 RX ADMIN — LIDOCAINE HYDROCHLORIDE 1 PATCH: 40 SOLUTION TOPICAL at 06:09

## 2024-11-08 RX ADMIN — LIDOCAINE HYDROCHLORIDE 1 PATCH: 40 SOLUTION TOPICAL at 01:36

## 2024-11-08 RX ADMIN — Medication 500 MILLIGRAM(S): at 11:28

## 2024-11-08 RX ADMIN — GABAPENTIN 800 MILLIGRAM(S): 300 CAPSULE ORAL at 11:28

## 2024-11-08 RX ADMIN — SACUBITRIL AND VALSARTAN 1 TABLET(S): 97; 103 TABLET, FILM COATED ORAL at 05:17

## 2024-11-08 RX ADMIN — Medication 4 UNIT(S): at 07:48

## 2024-11-08 RX ADMIN — HEPARIN SODIUM 1200 UNIT(S)/HR: 10000 INJECTION INTRAVENOUS; SUBCUTANEOUS at 23:13

## 2024-11-08 RX ADMIN — Medication 4 UNIT(S): at 17:44

## 2024-11-08 RX ADMIN — Medication 25 MILLIGRAM(S): at 05:18

## 2024-11-08 RX ADMIN — TRAMADOL HYDROCHLORIDE 50 MILLIGRAM(S): 50 TABLET, COATED ORAL at 16:23

## 2024-11-08 RX ADMIN — Medication 75 MILLIGRAM(S): at 05:17

## 2024-11-08 RX ADMIN — HEPARIN SODIUM 1200 UNIT(S)/HR: 10000 INJECTION INTRAVENOUS; SUBCUTANEOUS at 07:09

## 2024-11-08 RX ADMIN — Medication 1000 MILLIGRAM(S): at 03:05

## 2024-11-08 RX ADMIN — POLYETHYLENE GLYCOL 3350 17 GRAM(S): 17 POWDER, FOR SOLUTION ORAL at 11:28

## 2024-11-08 RX ADMIN — Medication 75 MILLIGRAM(S): at 05:45

## 2024-11-08 RX ADMIN — TRAMADOL HYDROCHLORIDE 50 MILLIGRAM(S): 50 TABLET, COATED ORAL at 10:45

## 2024-11-08 RX ADMIN — Medication 1: at 07:48

## 2024-11-08 RX ADMIN — GABAPENTIN 800 MILLIGRAM(S): 300 CAPSULE ORAL at 17:02

## 2024-11-08 RX ADMIN — GABAPENTIN 800 MILLIGRAM(S): 300 CAPSULE ORAL at 05:18

## 2024-11-08 RX ADMIN — Medication 400 MILLIGRAM(S): at 02:38

## 2024-11-08 RX ADMIN — HEPARIN SODIUM 1200 UNIT(S)/HR: 10000 INJECTION INTRAVENOUS; SUBCUTANEOUS at 06:26

## 2024-11-08 RX ADMIN — Medication 10 MILLIGRAM(S): at 05:17

## 2024-11-08 RX ADMIN — Medication 650 MILLIGRAM(S): at 23:00

## 2024-11-08 NOTE — PROGRESS NOTE ADULT - SUBJECTIVE AND OBJECTIVE BOX
DATE OF SERVICE: 11-08-24 @ 10:56    Patient is a 74y old  Female who presents with a chief complaint of Symptomatic anemia, Syncope (08 Nov 2024 08:52)      INTERVAL HISTORY: hypotensive overnight, feels ok    REVIEW OF SYSTEMS:  CONSTITUTIONAL: No weakness  EYES/ENT: No visual changes;  No throat pain   NECK: No pain or stiffness  RESPIRATORY: No cough, wheezing; No shortness of breath  CARDIOVASCULAR: No chest pain or palpitations  GASTROINTESTINAL: No abdominal  pain. No nausea, vomiting, or hematemesis  GENITOURINARY: No dysuria, frequency or hematuria  NEUROLOGICAL: No stroke like symptoms  SKIN: No rashes    TELEMETRY Personally reviewed: Sinus 70-80bpm  	  MEDICATIONS:  metoprolol tartrate 50 milliGRAM(s) Oral two times a day  sacubitril 24 mG/valsartan 26 mG 1 Tablet(s) Oral two times a day  spironolactone 25 milliGRAM(s) Oral daily        PHYSICAL EXAM:  T(C): 36.7 (11-08-24 @ 04:38), Max: 37.2 (11-07-24 @ 20:23)  HR: 80 (11-08-24 @ 04:38) (72 - 87)  BP: 102/65 (11-08-24 @ 04:38) (77/41 - 114/70)  RR: 18 (11-08-24 @ 04:38) (18 - 18)  SpO2: 97% (11-08-24 @ 04:38) (96% - 97%)  Wt(kg): --  I&O's Summary    07 Nov 2024 07:01  -  08 Nov 2024 07:00  --------------------------------------------------------  IN: 440 mL / OUT: 1800 mL / NET: -1360 mL    08 Nov 2024 07:01  -  08 Nov 2024 10:56  --------------------------------------------------------  IN: 410 mL / OUT: 0 mL / NET: 410 mL          Appearance: In no distress	  HEENT:    PERRL, EOMI	  Cardiovascular:  S1 S2, No JVD  Respiratory: Lungs clear to auscultation	  Gastrointestinal:  Soft, Non-tender, + BS	  Vascularature:  No edema of LE  Psychiatric: Appropriate affect   Neuro: no acute focal deficits                               7.2    8.45  )-----------( 332      ( 07 Nov 2024 22:44 )             23.7     11-07    135  |  100  |  33[H]  ----------------------------<  147[H]  4.9   |  24  |  1.12    Ca    8.2[L]      07 Nov 2024 22:44    TPro  6.1  /  Alb  2.9[L]  /  TBili  0.3  /  DBili  x   /  AST  17  /  ALT  15  /  AlkPhos  67  11-07        Labs personally reviewed      ASSESSMENT/PLAN: 	  74F w/Hx of chronic anemia, bullous pemphigoid on IVIG, HTN, HLD, DM, hypothyroidism, chronic back pain presents after syncopal episode. 1 minute of CPR was initiated in the field by EMS because there was concern of pulseness with unresponsiveness. Patient was hypotensive upon arrival and found to have a hgb of 5.1. Patient reports she syncopized after receiving IVIG. Reports she had been feeling lightheaded and SOB for a few days prior to this event. Has had bout of anemia in the past treated with iron supplementation, folate and B12. She denies any hematuria, melena, gingival bleeding. She reports significant improvement of symptoms since receiving 2u of pRBCs. Her only current complaint is acute on chronic back pain from her stretcher. Patient denies fever, chills, chest pain, SOB, headache, dizziness, abd pain, nausea, vomiting, constipation, dysuria.      Problem/Plan #1:  Problem: Syncope   - Likely symptomatic anemia  - Trop 26 --> 28  - EKG ischemic but likely in setting of Hb of 5.1  - POCUS noted no pericardial effusion, globally reduced LV function   - Echo with reduced EF & severe aortic stenosis  - Replete Hgb > 7   - s/p cath 11/5 with mild disease only, plan for TAVR 11/11    Problem/Plan #2:  Problem: Hypertension  - stable on sHF GDMT    Problem/Plan #3:  Problem: HLD  - c/w atorvastatin 20mg PO daily    Problem/Plan #4:  Problem: Cardiac Risk Stratification  - Symptomatic anemia  - Echo with reduced EF noted & severe aortic stenosis  - High risk for low risk nonelective EGD/Colonoscopy 2/2 systolic HF and severe aortic stenosis, planned for monday 11/4  - s/p egd/colonoscopy today 11/4, no source of bleed identified    Problem/Plan #5:  Problem: Systolic HF  - TTE 10/30 with EF 25-30%  - Switched to Metoprolol tartrate 50mg BID due to episodes PSVT & WCT 11/1  - Entresto 24-26mg BID  - Spironolactone 25mg daily     Problem/Plan #6:  - Problem: Severe Aortic Stenosis  - Structural Dr. Greene consulted   - TAVR workup in progress; tentatively scheduled TAVR 11/11/24    Problem/Plan #7:  - Problem: Acute right LE DVT  - heparin gtt as per primary team              MADHURI Choi, DO [facility]  Cardiovascular Medicine  [address] [place] [address], [place] [address]  Available through call or text on [employer]  Office: [phone]     DATE OF SERVICE: 11-08-24 @ 10:56    Patient is a 74y old  Female who presents with a chief complaint of Symptomatic anemia, Syncope (08 Nov 2024 08:52)      INTERVAL HISTORY: hypotensive overnight, feels ok    REVIEW OF SYSTEMS:  CONSTITUTIONAL: No weakness  EYES/ENT: No visual changes;  No throat pain   NECK: No pain or stiffness  RESPIRATORY: No cough, wheezing; No shortness of breath  CARDIOVASCULAR: No chest pain or palpitations  GASTROINTESTINAL: No abdominal  pain. No nausea, vomiting, or hematemesis  GENITOURINARY: No dysuria, frequency or hematuria  NEUROLOGICAL: No stroke like symptoms  SKIN: No rashes    TELEMETRY Personally reviewed: Sinus 70-80bpm  	  MEDICATIONS:  metoprolol tartrate 50 milliGRAM(s) Oral two times a day  sacubitril 24 mG/valsartan 26 mG 1 Tablet(s) Oral two times a day  spironolactone 25 milliGRAM(s) Oral daily        PHYSICAL EXAM:  T(C): 36.7 (11-08-24 @ 04:38), Max: 37.2 (11-07-24 @ 20:23)  HR: 80 (11-08-24 @ 04:38) (72 - 87)  BP: 102/65 (11-08-24 @ 04:38) (77/41 - 114/70)  RR: 18 (11-08-24 @ 04:38) (18 - 18)  SpO2: 97% (11-08-24 @ 04:38) (96% - 97%)  Wt(kg): --  I&O's Summary    07 Nov 2024 07:01  -  08 Nov 2024 07:00  --------------------------------------------------------  IN: 440 mL / OUT: 1800 mL / NET: -1360 mL    08 Nov 2024 07:01  -  08 Nov 2024 10:56  --------------------------------------------------------  IN: 410 mL / OUT: 0 mL / NET: 410 mL          Appearance: In no distress	  HEENT:    PERRL, EOMI	  Cardiovascular:  S1 S2, No JVD  Respiratory: Lungs clear to auscultation	  Gastrointestinal:  Soft, Non-tender, + BS	  Vascularature:  No edema of LE  Psychiatric: Appropriate affect   Neuro: no acute focal deficits                               7.2    8.45  )-----------( 332      ( 07 Nov 2024 22:44 )             23.7     11-07    135  |  100  |  33[H]  ----------------------------<  147[H]  4.9   |  24  |  1.12    Ca    8.2[L]      07 Nov 2024 22:44    TPro  6.1  /  Alb  2.9[L]  /  TBili  0.3  /  DBili  x   /  AST  17  /  ALT  15  /  AlkPhos  67  11-07        Labs personally reviewed      ASSESSMENT/PLAN: 	  74F w/Hx of chronic anemia, bullous pemphigoid on IVIG, HTN, HLD, DM, hypothyroidism, chronic back pain presents after syncopal episode. 1 minute of CPR was initiated in the field by EMS because there was concern of pulseness with unresponsiveness. Patient was hypotensive upon arrival and found to have a hgb of 5.1. Patient reports she syncopized after receiving IVIG. Reports she had been feeling lightheaded and SOB for a few days prior to this event. Has had bout of anemia in the past treated with iron supplementation, folate and B12. She denies any hematuria, melena, gingival bleeding. She reports significant improvement of symptoms since receiving 2u of pRBCs. Her only current complaint is acute on chronic back pain from her stretcher. Patient denies fever, chills, chest pain, SOB, headache, dizziness, abd pain, nausea, vomiting, constipation, dysuria.      Problem/Plan #1:  Problem: Syncope   - Likely symptomatic anemia  - Trop 26 --> 28  - EKG ischemic but likely in setting of Hb of 5.1  - POCUS noted no pericardial effusion, globally reduced LV function   - Echo with reduced EF & severe aortic stenosis  - Replete Hgb > 7   - s/p cath 11/5 with mild disease only, plan for TAVR 11/11    Problem/Plan #2:  Problem: Hypertension  - stable on sHF GDMT    Problem/Plan #3:  Problem: HLD  - c/w atorvastatin 20mg PO daily    Problem/Plan #4:  Problem: Cardiac Risk Stratification  - Symptomatic anemia  - Echo with reduced EF noted & severe aortic stenosis  - High risk for low risk nonelective EGD/Colonoscopy 2/2 systolic HF and severe aortic stenosis, planned for monday 11/4  - s/p egd/ colonoscopy today 11/4, no source of bleed identified    Problem/Plan #5:  Problem: Systolic HF  - TTE 10/30 with EF 25-30%  - Switched to Metoprolol tartrate 50mg BID due to episodes PSVT & WCT 11/1  - Entresto 24-26mg BID  - Spironolactone 25mg daily     Problem/Plan #6:  - Problem: Severe Aortic Stenosis  - Structural Dr. Greene consulted   - TAVR workup in progress; tentatively scheduled TAVR 11/11/24    Problem/Plan #7:  - Problem: Acute right LE DVT  - heparin gtt as per primary team              MADHURI Choi, DO [facility]  Cardiovascular Medicine  [address] [place] [address], [place] [address]  Available through call or text on [employer]  Office: [phone]

## 2024-11-08 NOTE — PROGRESS NOTE ADULT - SUBJECTIVE AND OBJECTIVE BOX
Andre Reyes, M.D.  Office: [phone]  Available thru [employer] Teams     Patient is a 74y old  Female who presents with a chief complaint of Symptomatic anemia, Syncope (07 Nov 2024 15:06)          SUBJECTIVE / OVERNIGHT EVENTS:    No acute overnight events.    ROS: ( - ) Fever, ( - )Chills,  ( - )Nausea/Vomiting, ( - ) Cough, ( - )Shortness of breath, ( - )Chest Pain    MEDICATIONS  (STANDING):  ascorbic acid 500 milliGRAM(s) Oral daily  atorvastatin 20 milliGRAM(s) Oral at bedtime  chlorhexidine 2% Cloths 1 Application(s) Topical daily  dextrose 5%. 1000 milliLiter(s) (100 mL/Hr) IV Continuous <Continuous>  dextrose 5%. 1000 milliLiter(s) (50 mL/Hr) IV Continuous <Continuous>  dextrose 50% Injectable 25 Gram(s) IV Push once  dextrose 50% Injectable 12.5 Gram(s) IV Push once  dextrose 50% Injectable 25 Gram(s) IV Push once  gabapentin 800 milliGRAM(s) Oral four times a day  glucagon  Injectable 1 milliGRAM(s) IntraMuscular once  heparin  Infusion. 1200 Unit(s)/Hr (12 mL/Hr) IV Continuous <Continuous>  insulin glargine Injectable (LANTUS) 16 Unit(s) SubCutaneous at bedtime  insulin lispro (ADMELOG) corrective regimen sliding scale   SubCutaneous three times a day before meals  insulin lispro (ADMELOG) corrective regimen sliding scale   SubCutaneous at bedtime  insulin lispro Injectable (ADMELOG) 4 Unit(s) SubCutaneous three times a day before meals  levothyroxine 300 MICROGram(s) Oral <User Schedule>  levothyroxine 150 MICROGram(s) Oral <User Schedule>  lidocaine   4% Patch 1 Patch Transdermal every 24 hours  metoprolol tartrate 50 milliGRAM(s) Oral two times a day  multivitamin 1 Tablet(s) Oral daily  nystatin Powder 1 Application(s) Topical two times a day  polyethylene glycol 3350 17 Gram(s) Oral daily  predniSONE   Tablet 10 milliGRAM(s) Oral daily  sacubitril 24 mG/valsartan 26 mG 1 Tablet(s) Oral two times a day  senna 2 Tablet(s) Oral at bedtime  spironolactone 25 milliGRAM(s) Oral daily    MEDICATIONS  (PRN):  acetaminophen     Tablet .. 650 milliGRAM(s) Oral every 6 hours PRN Temp greater or equal to 38C (100.4F), Mild Pain (1 - 3)  dextrose Oral Gel 15 Gram(s) Oral once PRN Blood Glucose LESS THAN 70 milliGRAM(s)/deciliter  diclofenac 75 milliGRAM(s) Oral daily PRN Moderate Pain (4 - 6)  heparin   Injectable 24499 Unit(s) IV Push every 6 hours PRN For aPTT less than 40  heparin   Injectable 5000 Unit(s) IV Push every 6 hours PRN For aPTT between 40 - 57  melatonin 3 milliGRAM(s) Oral at bedtime PRN Insomnia          T(C): 36.7 (11-08 @ 04:38), Max: 37.2 (11-07 @ 20:23)   HR: 80   BP: 102/65   RR: 18   SpO2: 97%    PHYSICAL EXAM:    CONSTITUTIONAL: NAD, well-developed, well-groomed  EYES: PERRLA; conjunctiva and sclera clear  ENMT: Moist oral mucosa, no pharyngeal injection or exudates; normal dentition  RESPIRATORY: Normal respiratory effort; lungs are clear to auscultation bilaterally  CARDIOVASCULAR: Regular rate and rhythm, normal S1 and S2, no murmur/rub/gallop; No lower extremity edema; Peripheral pulses are 2+ bilaterally  ABDOMEN: Nontender to palpation, normoactive bowel sounds, no rebound/guarding; No hepatosplenomegaly  MUSCULOSKELETAL:  no clubbing or cyanosis of digits; no joint swelling or tenderness to palpation  PSYCH: A+O to person, place, and time; affect appropriate  NEUROLOGY: CN 2-12 are intact and symmetric; no gross sensory deficits       LABS:                        7.2    8.45  )-----------( 332      ( 07 Nov 2024 22:44 )             23.7      11-07    135  |  100  |  33[H]  ----------------------------<  147[H]  4.9   |  24  |  1.12    Ca    8.2[L]      07 Nov 2024 22:44    TPro  6.1  /  Alb  2.9[L]  /  TBili  0.3  /  DBili  x   /  AST  17  /  ALT  15  /  AlkPhos  67  11-07       CAPILLARY BLOOD GLUCOSE      POCT Blood Glucose.: 188 mg/dL (08 Nov 2024 07:43)  POCT Blood Glucose.: 165 mg/dL (07 Nov 2024 21:09)  POCT Blood Glucose.: 148 mg/dL (07 Nov 2024 17:06)  POCT Blood Glucose.: 245 mg/dL (07 Nov 2024 11:36)      RADIOLOGY & ADDITIONAL TESTS:    Imaging Personally Reviewed:  Consultant(s) Notes Reviewed:    Care Discussed with Consultants/Other Providers:   Andre Reyes, M.D.  Office: [phone]  Available thru [employer] Teams     Patient is a 74y old  Female who presents with a chief complaint of Symptomatic anemia, Syncope (07 Nov 2024 15:06)          SUBJECTIVE / OVERNIGHT EVENTS:    No acute overnight events.  no complaints    ROS: ( - ) Fever, ( - )Chills,  ( - )Nausea/Vomiting, ( - ) Cough, ( - )Shortness of breath, ( - )Chest Pain    MEDICATIONS  (STANDING):  ascorbic acid 500 milliGRAM(s) Oral daily  atorvastatin 20 milliGRAM(s) Oral at bedtime  chlorhexidine 2% Cloths 1 Application(s) Topical daily  dextrose 5%. 1000 milliLiter(s) (100 mL/Hr) IV Continuous <Continuous>  dextrose 5%. 1000 milliLiter(s) (50 mL/Hr) IV Continuous <Continuous>  dextrose 50% Injectable 25 Gram(s) IV Push once  dextrose 50% Injectable 12.5 Gram(s) IV Push once  dextrose 50% Injectable 25 Gram(s) IV Push once  gabapentin 800 milliGRAM(s) Oral four times a day  glucagon  Injectable 1 milliGRAM(s) IntraMuscular once  heparin  Infusion. 1200 Unit(s)/Hr (12 mL/Hr) IV Continuous <Continuous>  insulin glargine Injectable (LANTUS) 16 Unit(s) SubCutaneous at bedtime  insulin lispro (ADMELOG) corrective regimen sliding scale   SubCutaneous three times a day before meals  insulin lispro (ADMELOG) corrective regimen sliding scale   SubCutaneous at bedtime  insulin lispro Injectable (ADMELOG) 4 Unit(s) SubCutaneous three times a day before meals  levothyroxine 300 MICROGram(s) Oral <User Schedule>  levothyroxine 150 MICROGram(s) Oral <User Schedule>  lidocaine   4% Patch 1 Patch Transdermal every 24 hours  metoprolol tartrate 50 milliGRAM(s) Oral two times a day  multivitamin 1 Tablet(s) Oral daily  nystatin Powder 1 Application(s) Topical two times a day  polyethylene glycol 3350 17 Gram(s) Oral daily  predniSONE   Tablet 10 milliGRAM(s) Oral daily  sacubitril 24 mG/valsartan 26 mG 1 Tablet(s) Oral two times a day  senna 2 Tablet(s) Oral at bedtime  spironolactone 25 milliGRAM(s) Oral daily    MEDICATIONS  (PRN):  acetaminophen     Tablet .. 650 milliGRAM(s) Oral every 6 hours PRN Temp greater or equal to 38C (100.4F), Mild Pain (1 - 3)  dextrose Oral Gel 15 Gram(s) Oral once PRN Blood Glucose LESS THAN 70 milliGRAM(s)/deciliter  diclofenac 75 milliGRAM(s) Oral daily PRN Moderate Pain (4 - 6)  heparin   Injectable 34240 Unit(s) IV Push every 6 hours PRN For aPTT less than 40  heparin   Injectable 5000 Unit(s) IV Push every 6 hours PRN For aPTT between 40 - 57  melatonin 3 milliGRAM(s) Oral at bedtime PRN Insomnia          T(C): 36.7 (11-08 @ 04:38), Max: 37.2 (11-07 @ 20:23)   HR: 80   BP: 102/65   RR: 18   SpO2: 97%    PHYSICAL EXAM:    CONSTITUTIONAL: NAD, well-developed, well-groomed  EYES: PERRLA; conjunctiva and sclera clear  ENMT: Moist oral mucosa, no pharyngeal injection or exudates; normal dentition  RESPIRATORY: Normal respiratory effort; lungs are clear to auscultation bilaterally  CARDIOVASCULAR: Regular rate and rhythm, normal S1 and S2, no murmur/rub/gallop; No lower extremity edema; Peripheral pulses are 2+ bilaterally  ABDOMEN: Nontender to palpation, normoactive bowel sounds, no rebound/guarding; No hepatosplenomegaly  MUSCULOSKELETAL:  no clubbing or cyanosis of digits; no joint swelling or tenderness to palpation  PSYCH: A+O to person, place, and time; affect appropriate  NEUROLOGY: CN 2-12 are intact and symmetric; no gross sensory deficits       LABS:                        7.2    8.45  )-----------( 332      ( 07 Nov 2024 22:44 )             23.7      11-07    135  |  100  |  33[H]  ----------------------------<  147[H]  4.9   |  24  |  1.12    Ca    8.2[L]      07 Nov 2024 22:44    TPro  6.1  /  Alb  2.9[L]  /  TBili  0.3  /  DBili  x   /  AST  17  /  ALT  15  /  AlkPhos  67  11-07       CAPILLARY BLOOD GLUCOSE      POCT Blood Glucose.: 188 mg/dL (08 Nov 2024 07:43)  POCT Blood Glucose.: 165 mg/dL (07 Nov 2024 21:09)  POCT Blood Glucose.: 148 mg/dL (07 Nov 2024 17:06)  POCT Blood Glucose.: 245 mg/dL (07 Nov 2024 11:36)      RADIOLOGY & ADDITIONAL TESTS:    Imaging Personally Reviewed:  Consultant(s) Notes Reviewed:    Care Discussed with Consultants/Other Providers:

## 2024-11-08 NOTE — PROGRESS NOTE ADULT - PROBLEM SELECTOR PLAN 4
unclear cause of her anemia. no evidence of hemolysis  Possible causes include occult GI bleeding. -->s/p EGD/Colonoscopy--> no source of bleeding identified --> GI recommending outpatient Capsule endoscopy.  hgb< 7. s/p 1u PRBCs

## 2024-11-08 NOTE — PROGRESS NOTE ADULT - SUBJECTIVE AND OBJECTIVE BOX
*****Structural Heart Team*****    Subjective:    The patient is resting comfortably in bed, offering no complaints. She will be getting OOB today.    PAST MEDICAL & SURGICAL HISTORY:  BP (bullous pemphigoid)    HTN (hypertension)    HLD (hyperlipidemia)    Hypothyroidism          T(C): 36.6 (11-08-24 @ 11:17), Max: 37.2 (11-07-24 @ 20:23)  HR: 85 (11-08-24 @ 11:17) (72 - 86)  BP: 95/50 (11-08-24 @ 11:17) (77/41 - 114/70)  RR: 18 (11-08-24 @ 11:17) (18 - 18)  SpO2: 98% (11-08-24 @ 11:17) (96% - 98%)  Wt(kg): --  11-07 @ 07:01  -  11-08 @ 07:00  --------------------------------------------------------  IN: 440 mL / OUT: 1800 mL / NET: -1360 mL    11-08 @ 07:01  -  11-08 @ 11:29  --------------------------------------------------------  IN: 410 mL / OUT: 0 mL / NET: 410 mL      MEDICATIONS  (STANDING):  ascorbic acid 500 milliGRAM(s) Oral daily  atorvastatin 20 milliGRAM(s) Oral at bedtime  chlorhexidine 2% Cloths 1 Application(s) Topical daily  gabapentin 800 milliGRAM(s) Oral four times a day  glucagon  Injectable 1 milliGRAM(s) IntraMuscular once  heparin  Infusion. 1200 Unit(s)/Hr (12 mL/Hr) IV Continuous <Continuous>  insulin glargine Injectable (LANTUS) 16 Unit(s) SubCutaneous at bedtime  insulin lispro (ADMELOG) corrective regimen sliding scale   SubCutaneous at bedtime  insulin lispro (ADMELOG) corrective regimen sliding scale   SubCutaneous three times a day before meals  insulin lispro Injectable (ADMELOG) 4 Unit(s) SubCutaneous three times a day before meals  levothyroxine 300 MICROGram(s) Oral <User Schedule>  levothyroxine 150 MICROGram(s) Oral <User Schedule>  lidocaine   4% Patch 1 Patch Transdermal every 24 hours  metoprolol tartrate 50 milliGRAM(s) Oral two times a day  multivitamin 1 Tablet(s) Oral daily  nystatin Powder 1 Application(s) Topical two times a day  polyethylene glycol 3350 17 Gram(s) Oral daily  predniSONE   Tablet 10 milliGRAM(s) Oral daily  sacubitril 24 mG/valsartan 26 mG 1 Tablet(s) Oral two times a day  senna 2 Tablet(s) Oral at bedtime  spironolactone 25 milliGRAM(s) Oral daily    MEDICATIONS  (PRN):  acetaminophen     Tablet .. 650 milliGRAM(s) Oral every 6 hours PRN Temp greater or equal to 38C (100.4F), Mild Pain (1 - 3)  dextrose Oral Gel 15 Gram(s) Oral once PRN Blood Glucose LESS THAN 70 milliGRAM(s)/deciliter  heparin   Injectable 24512 Unit(s) IV Push every 6 hours PRN For aPTT less than 40  heparin   Injectable 5000 Unit(s) IV Push every 6 hours PRN For aPTT between 40 - 57  melatonin 3 milliGRAM(s) Oral at bedtime PRN Insomnia  traMADol 50 milliGRAM(s) Oral every 6 hours PRN Moderate Pain (4 - 6)      Review of Symptoms:  Constitutional: Awake, Alert, Follows commands  Respiratory: + OLIVA  Cardiac: Denies CP, Denies Palpitations  Gastrointestinal: Denies Pain, Denies N/V, tolerating po intake  Vascular: Negative  Extremities: No Edema, No joint pain or swelling  Neurological: + Syncope  Endocrine: No heat or cold intolerance, No excessive thirst  Heme/Onc: Anemia    Exam:  General: A/Ox3, HINA, NAD  HEENT: Supple, No JVD, Trachea midline, no masses  Pulmonary: CTAB, = Chest Excursion, no accessory muscle use  Cor: S1S2, RRR, III/VI NEEL  ECG: SR  Gastrointestinal: Soft, NT/ND, + Bowel Sounds, no melena  Neuro: = motor and sensory B/L, No focal deficits  Vascular: No Edema noted, 1+ Pulses B/L  Extremities: No joint pain or swelling  Skin: Warm/Dry/Normal color, Normal turgor, no rashes                          7.2    8.45  )-----------( 332      ( 07 Nov 2024 22:44 )             23.7   11-07    135  |  100  |  33[H]  ----------------------------<  147[H]  4.9   |  24  |  1.12    Ca    8.2[L]      07 Nov 2024 22:44    TPro  6.1  /  Alb  2.9[L]  /  TBili  0.3  /  DBili  x   /  AST  17  /  ALT  15  /  AlkPhos  67  11-07  PTT - ( 08 Nov 2024 05:50 )  PTT:62.9 sec    Imaging Reviewed:    Echocardiogram:    Cardiac Catheterization:        Assesment/Plan:  75 y/o female with Severe Symptomatic Aortic Stenosis, Syncope    1.) Severe AS: patient has completed her TAVR workup. We currently have her scheduled for this coming Monday 11/11, Time to be determined. I discussed with the patient the procedure, and answered any questions she had regarding the procedure and post op recovery. She is agreeable to plan and is looking forward to getting it done.  2.) Syncope: Patient has had no further syncopal events while in the hospital, continue to monitor.  3.) Anemia: Continue to monitor CBC, no outward signs of GI bleeding at this time.  4.) OOB to chair, bedside exercises  5.) She should be NPO after midnight this Sunday into Monday.      PHAN Vázquez  [id number]

## 2024-11-09 LAB
APTT BLD: 43.8 SEC — HIGH (ref 24.5–35.6)
APTT BLD: 63.2 SEC — HIGH (ref 24.5–35.6)
GLUCOSE BLDC GLUCOMTR-MCNC: 175 MG/DL — HIGH (ref 70–99)
GLUCOSE BLDC GLUCOMTR-MCNC: 194 MG/DL — HIGH (ref 70–99)
GLUCOSE BLDC GLUCOMTR-MCNC: 195 MG/DL — HIGH (ref 70–99)
GLUCOSE BLDC GLUCOMTR-MCNC: 265 MG/DL — HIGH (ref 70–99)
GLUCOSE BLDC GLUCOMTR-MCNC: 501 MG/DL — CRITICAL HIGH (ref 70–99)
HCT VFR BLD CALC: 24.1 % — LOW (ref 34.5–45)
HGB BLD-MCNC: 7.1 G/DL — LOW (ref 11.5–15.5)
MCHC RBC-ENTMCNC: 26.6 PG — LOW (ref 27–34)
MCHC RBC-ENTMCNC: 29.5 G/DL — LOW (ref 32–36)
MCV RBC AUTO: 90.3 FL — SIGNIFICANT CHANGE UP (ref 80–100)
NRBC # BLD: 0 /100 WBCS — SIGNIFICANT CHANGE UP (ref 0–0)
PLATELET # BLD AUTO: 399 K/UL — SIGNIFICANT CHANGE UP (ref 150–400)
RBC # BLD: 2.67 M/UL — LOW (ref 3.8–5.2)
RBC # FLD: 18.3 % — HIGH (ref 10.3–14.5)
WBC # BLD: 10.64 K/UL — HIGH (ref 3.8–10.5)
WBC # FLD AUTO: 10.64 K/UL — HIGH (ref 3.8–10.5)

## 2024-11-09 PROCEDURE — 99232 SBSQ HOSP IP/OBS MODERATE 35: CPT

## 2024-11-09 RX ORDER — OXYCODONE HYDROCHLORIDE 30 MG/1
2.5 TABLET ORAL ONCE
Refills: 0 | Status: DISCONTINUED | OUTPATIENT
Start: 2024-11-09 | End: 2024-11-09

## 2024-11-09 RX ORDER — ACETAMINOPHEN 500 MG
1000 TABLET ORAL ONCE
Refills: 0 | Status: COMPLETED | OUTPATIENT
Start: 2024-11-09 | End: 2024-11-09

## 2024-11-09 RX ADMIN — TRAMADOL HYDROCHLORIDE 50 MILLIGRAM(S): 50 TABLET, COATED ORAL at 04:30

## 2024-11-09 RX ADMIN — TRAMADOL HYDROCHLORIDE 50 MILLIGRAM(S): 50 TABLET, COATED ORAL at 03:52

## 2024-11-09 RX ADMIN — GABAPENTIN 800 MILLIGRAM(S): 300 CAPSULE ORAL at 21:44

## 2024-11-09 RX ADMIN — Medication 4 UNIT(S): at 17:19

## 2024-11-09 RX ADMIN — HEPARIN SODIUM 1500 UNIT(S)/HR: 10000 INJECTION INTRAVENOUS; SUBCUTANEOUS at 19:47

## 2024-11-09 RX ADMIN — Medication 1: at 22:13

## 2024-11-09 RX ADMIN — TRAMADOL HYDROCHLORIDE 50 MILLIGRAM(S): 50 TABLET, COATED ORAL at 15:01

## 2024-11-09 RX ADMIN — HEPARIN SODIUM 1500 UNIT(S)/HR: 10000 INJECTION INTRAVENOUS; SUBCUTANEOUS at 16:09

## 2024-11-09 RX ADMIN — TRAMADOL HYDROCHLORIDE 50 MILLIGRAM(S): 50 TABLET, COATED ORAL at 22:00

## 2024-11-09 RX ADMIN — Medication 4 UNIT(S): at 08:15

## 2024-11-09 RX ADMIN — NYSTATIN 1 APPLICATION(S): 100000 POWDER TOPICAL at 06:05

## 2024-11-09 RX ADMIN — Medication 3 MILLIGRAM(S): at 01:02

## 2024-11-09 RX ADMIN — Medication 25 MILLIGRAM(S): at 06:04

## 2024-11-09 RX ADMIN — SACUBITRIL AND VALSARTAN 1 TABLET(S): 97; 103 TABLET, FILM COATED ORAL at 06:04

## 2024-11-09 RX ADMIN — Medication 16 UNIT(S): at 22:12

## 2024-11-09 RX ADMIN — Medication 400 MILLIGRAM(S): at 03:57

## 2024-11-09 RX ADMIN — TRAMADOL HYDROCHLORIDE 50 MILLIGRAM(S): 50 TABLET, COATED ORAL at 16:11

## 2024-11-09 RX ADMIN — Medication 1: at 17:19

## 2024-11-09 RX ADMIN — GABAPENTIN 800 MILLIGRAM(S): 300 CAPSULE ORAL at 06:04

## 2024-11-09 RX ADMIN — TRAMADOL HYDROCHLORIDE 50 MILLIGRAM(S): 50 TABLET, COATED ORAL at 10:35

## 2024-11-09 RX ADMIN — Medication 1 TABLET(S): at 11:41

## 2024-11-09 RX ADMIN — Medication 20 MILLIGRAM(S): at 21:44

## 2024-11-09 RX ADMIN — OXYCODONE HYDROCHLORIDE 2.5 MILLIGRAM(S): 30 TABLET ORAL at 01:30

## 2024-11-09 RX ADMIN — TRAMADOL HYDROCHLORIDE 50 MILLIGRAM(S): 50 TABLET, COATED ORAL at 11:35

## 2024-11-09 RX ADMIN — Medication 4 UNIT(S): at 11:41

## 2024-11-09 RX ADMIN — Medication 150 MICROGRAM(S): at 06:04

## 2024-11-09 RX ADMIN — HEPARIN SODIUM 5000 UNIT(S): 10000 INJECTION INTRAVENOUS; SUBCUTANEOUS at 07:32

## 2024-11-09 RX ADMIN — OXYCODONE HYDROCHLORIDE 2.5 MILLIGRAM(S): 30 TABLET ORAL at 01:02

## 2024-11-09 RX ADMIN — Medication 1: at 08:15

## 2024-11-09 RX ADMIN — Medication 650 MILLIGRAM(S): at 12:35

## 2024-11-09 RX ADMIN — Medication 650 MILLIGRAM(S): at 11:41

## 2024-11-09 RX ADMIN — NYSTATIN 1 APPLICATION(S): 100000 POWDER TOPICAL at 17:20

## 2024-11-09 RX ADMIN — Medication 1: at 11:42

## 2024-11-09 RX ADMIN — GABAPENTIN 800 MILLIGRAM(S): 300 CAPSULE ORAL at 17:18

## 2024-11-09 RX ADMIN — Medication 50 MILLIGRAM(S): at 06:04

## 2024-11-09 RX ADMIN — Medication 500 MILLIGRAM(S): at 11:41

## 2024-11-09 RX ADMIN — HEPARIN SODIUM 1500 UNIT(S)/HR: 10000 INJECTION INTRAVENOUS; SUBCUTANEOUS at 07:30

## 2024-11-09 RX ADMIN — Medication 10 MILLIGRAM(S): at 06:04

## 2024-11-09 RX ADMIN — GABAPENTIN 800 MILLIGRAM(S): 300 CAPSULE ORAL at 11:41

## 2024-11-09 RX ADMIN — CHLORHEXIDINE GLUCONATE 1 APPLICATION(S): 40 SOLUTION TOPICAL at 13:07

## 2024-11-09 RX ADMIN — TRAMADOL HYDROCHLORIDE 50 MILLIGRAM(S): 50 TABLET, COATED ORAL at 21:45

## 2024-11-09 RX ADMIN — Medication 1000 MILLIGRAM(S): at 04:30

## 2024-11-09 NOTE — PROGRESS NOTE ADULT - PROBLEM SELECTOR PLAN 2
Patient was noted to have persistent low grade fevers upon further investigation she was found to have a DVT of the right lower extremity. Given patient is immobile or minimally mobile, per her 's -->  started on heparin gtt on 11/1  - For now given her need for upcoming procedures c/w heparin drip with plans for transitioning to eliquis upon discharge

## 2024-11-09 NOTE — PROGRESS NOTE ADULT - SUBJECTIVE AND OBJECTIVE BOX
Ronen Soliman DO  Available on [employer] TEAMS    Patient is a 74y old  Female who presents with a chief complaint of Symptomatic anemia, Syncope (08 Nov 2024 11:28)      INTERVAL HPI/OVERNIGHT EVENTS: Patient seen and examined at bedside. No overnight events. C/o back pain. Denies fever, chills, chest pain, shortness of breath, abdominal pain, nausea/vomiting, headache.    MEDICATIONS  (STANDING):  ascorbic acid 500 milliGRAM(s) Oral daily  atorvastatin 20 milliGRAM(s) Oral at bedtime  chlorhexidine 2% Cloths 1 Application(s) Topical daily  dextrose 5%. 1000 milliLiter(s) (50 mL/Hr) IV Continuous <Continuous>  dextrose 5%. 1000 milliLiter(s) (100 mL/Hr) IV Continuous <Continuous>  dextrose 50% Injectable 25 Gram(s) IV Push once  dextrose 50% Injectable 12.5 Gram(s) IV Push once  dextrose 50% Injectable 25 Gram(s) IV Push once  gabapentin 800 milliGRAM(s) Oral four times a day  glucagon  Injectable 1 milliGRAM(s) IntraMuscular once  heparin  Infusion. 1200 Unit(s)/Hr (12 mL/Hr) IV Continuous <Continuous>  insulin glargine Injectable (LANTUS) 16 Unit(s) SubCutaneous at bedtime  insulin lispro (ADMELOG) corrective regimen sliding scale   SubCutaneous at bedtime  insulin lispro (ADMELOG) corrective regimen sliding scale   SubCutaneous three times a day before meals  insulin lispro Injectable (ADMELOG) 4 Unit(s) SubCutaneous three times a day before meals  levothyroxine 300 MICROGram(s) Oral <User Schedule>  levothyroxine 150 MICROGram(s) Oral <User Schedule>  lidocaine   4% Patch 1 Patch Transdermal every 24 hours  metoprolol tartrate 50 milliGRAM(s) Oral two times a day  multivitamin 1 Tablet(s) Oral daily  nystatin Powder 1 Application(s) Topical two times a day  polyethylene glycol 3350 17 Gram(s) Oral daily  predniSONE   Tablet 10 milliGRAM(s) Oral daily  sacubitril 24 mG/valsartan 26 mG 1 Tablet(s) Oral two times a day  senna 2 Tablet(s) Oral at bedtime  spironolactone 25 milliGRAM(s) Oral daily    MEDICATIONS  (PRN):  acetaminophen     Tablet .. 650 milliGRAM(s) Oral every 6 hours PRN Temp greater or equal to 38C (100.4F), Mild Pain (1 - 3)  dextrose Oral Gel 15 Gram(s) Oral once PRN Blood Glucose LESS THAN 70 milliGRAM(s)/deciliter  heparin   Injectable 53802 Unit(s) IV Push every 6 hours PRN For aPTT less than 40  heparin   Injectable 5000 Unit(s) IV Push every 6 hours PRN For aPTT between 40 - 57  melatonin 3 milliGRAM(s) Oral at bedtime PRN Insomnia  traMADol 50 milliGRAM(s) Oral every 6 hours PRN Moderate Pain (4 - 6)      Allergies    penicillin (Stomach Upset; Nausea; Swelling; Hives)  cephalexin (Swelling; Rash; Nausea; Hives)    Intolerances    vancomycin ([place])      REVIEW OF SYSTEMS:  All other review of systems is negative unless indicated above    Vital Signs Last 24 Hrs  T(C): 36.5 (09 Nov 2024 16:47), Max: 37.1 (09 Nov 2024 00:19)  T(F): 97.7 (09 Nov 2024 16:47), Max: 98.8 (09 Nov 2024 00:19)  HR: 71 (09 Nov 2024 16:47) (71 - 93)  BP: 90/60 (09 Nov 2024 16:47) (84/53 - 115/72)  BP(mean): --  RR: 18 (09 Nov 2024 16:47) (18 - 18)  SpO2: 97% (09 Nov 2024 16:47) (95% - 99%)    Parameters below as of 09 Nov 2024 16:47  Patient On (Oxygen Delivery Method): room air        PHYSICAL EXAM:  GENERAL: NAD  HEENT:  anicteric, moist mucous membranes  CHEST/LUNG:  CTA b/l, no rales, wheezes, or rhonchi  HEART:  RRR, S1, S2  ABDOMEN:  BS+, soft, nontender, nondistended  EXTREMITIES: no edema, cyanosis, or calf tenderness  NERVOUS SYSTEM: answers questions and follows commands appropriately    LABS:                        7.1    10.64 )-----------( 399      ( 09 Nov 2024 15:35 )             24.1     CBC Full  -  ( 09 Nov 2024 15:35 )  WBC Count : 10.64 K/uL  Hemoglobin : 7.1 g/dL  Hematocrit : 24.1 %  Platelet Count - Automated : 399 K/uL  Mean Cell Volume : 90.3 fl  Mean Cell Hemoglobin : 26.6 pg  Mean Cell Hemoglobin Concentration : 29.5 g/dL  Auto Neutrophil # : x  Auto Lymphocyte # : x  Auto Monocyte # : x  Auto Eosinophil # : x  Auto Basophil # : x  Auto Neutrophil % : x  Auto Lymphocyte % : x  Auto Monocyte % : x  Auto Eosinophil % : x  Auto Basophil % : x      Ca    8.2        07 Nov 2024 22:44      PTT - ( 09 Nov 2024 15:35 )  PTT:63.2 sec  Urinalysis Basic - ( 07 Nov 2024 22:44 )    Color: x / Appearance: x / SG: x / pH: x  Gluc: 147 mg/dL / Ketone: x  / Bili: x / Urobili: x   Blood: x / Protein: x / Nitrite: x   Leuk Esterase: x / RBC: x / WBC x   Sq Epi: x / Non Sq Epi: x / Bacteria: x      CAPILLARY BLOOD GLUCOSE      POCT Blood Glucose.: 194 mg/dL (09 Nov 2024 17:14)  POCT Blood Glucose.: 195 mg/dL (09 Nov 2024 11:38)  POCT Blood Glucose.: 175 mg/dL (09 Nov 2024 07:49)  POCT Blood Glucose.: 189 mg/dL (08 Nov 2024 21:33)          RADIOLOGY & ADDITIONAL TESTS:    Personally reviewed.     Consultant(s) Notes Reviewed:  [x] YES  [ ] NO

## 2024-11-10 PROBLEM — Z00.00 ENCOUNTER FOR PREVENTIVE HEALTH EXAMINATION: Status: ACTIVE | Noted: 2024-11-10

## 2024-11-10 LAB
ADD ON TEST-SPECIMEN IN LAB: SIGNIFICANT CHANGE UP
ANION GAP SERPL CALC-SCNC: 9 MMOL/L — SIGNIFICANT CHANGE UP (ref 5–17)
APPEARANCE UR: CLEAR — SIGNIFICANT CHANGE UP
APTT BLD: 50.7 SEC — HIGH (ref 24.5–35.6)
APTT BLD: 87.2 SEC — HIGH (ref 24.5–35.6)
BASOPHILS # BLD AUTO: 0.07 K/UL — SIGNIFICANT CHANGE UP (ref 0–0.2)
BASOPHILS NFR BLD AUTO: 0.5 % — SIGNIFICANT CHANGE UP (ref 0–2)
BILIRUB UR-MCNC: NEGATIVE — SIGNIFICANT CHANGE UP
BUN SERPL-MCNC: 40 MG/DL — HIGH (ref 7–23)
CALCIUM SERPL-MCNC: 8.8 MG/DL — SIGNIFICANT CHANGE UP (ref 8.4–10.5)
CHLORIDE SERPL-SCNC: 95 MMOL/L — LOW (ref 96–108)
CO2 SERPL-SCNC: 25 MMOL/L — SIGNIFICANT CHANGE UP (ref 22–31)
COLOR SPEC: YELLOW — SIGNIFICANT CHANGE UP
CREAT SERPL-MCNC: 0.83 MG/DL — SIGNIFICANT CHANGE UP (ref 0.5–1.3)
DIFF PNL FLD: NEGATIVE — SIGNIFICANT CHANGE UP
EGFR: 75 ML/MIN/1.73M2 — SIGNIFICANT CHANGE UP
EOSINOPHIL # BLD AUTO: 0.05 K/UL — SIGNIFICANT CHANGE UP (ref 0–0.5)
EOSINOPHIL NFR BLD AUTO: 0.4 % — SIGNIFICANT CHANGE UP (ref 0–6)
GLUCOSE BLDC GLUCOMTR-MCNC: 139 MG/DL — HIGH (ref 70–99)
GLUCOSE BLDC GLUCOMTR-MCNC: 204 MG/DL — HIGH (ref 70–99)
GLUCOSE BLDC GLUCOMTR-MCNC: 207 MG/DL — HIGH (ref 70–99)
GLUCOSE BLDC GLUCOMTR-MCNC: 223 MG/DL — HIGH (ref 70–99)
GLUCOSE SERPL-MCNC: 204 MG/DL — HIGH (ref 70–99)
GLUCOSE UR QL: NEGATIVE MG/DL — SIGNIFICANT CHANGE UP
HCT VFR BLD CALC: 21.5 % — LOW (ref 34.5–45)
HCT VFR BLD CALC: 22.7 % — LOW (ref 34.5–45)
HCT VFR BLD CALC: 24.7 % — LOW (ref 34.5–45)
HGB BLD-MCNC: 6.4 G/DL — CRITICAL LOW (ref 11.5–15.5)
HGB BLD-MCNC: 6.7 G/DL — CRITICAL LOW (ref 11.5–15.5)
HGB BLD-MCNC: 7.3 G/DL — LOW (ref 11.5–15.5)
IMM GRANULOCYTES NFR BLD AUTO: 1 % — HIGH (ref 0–0.9)
INR BLD: 1.06 RATIO — SIGNIFICANT CHANGE UP (ref 0.85–1.16)
KETONES UR-MCNC: NEGATIVE MG/DL — SIGNIFICANT CHANGE UP
LEUKOCYTE ESTERASE UR-ACNC: NEGATIVE — SIGNIFICANT CHANGE UP
LYMPHOCYTES # BLD AUTO: 0.87 K/UL — LOW (ref 1–3.3)
LYMPHOCYTES # BLD AUTO: 6.8 % — LOW (ref 13–44)
MAGNESIUM SERPL-MCNC: 2.3 MG/DL — SIGNIFICANT CHANGE UP (ref 1.6–2.6)
MCHC RBC-ENTMCNC: 26.3 PG — LOW (ref 27–34)
MCHC RBC-ENTMCNC: 26.4 PG — LOW (ref 27–34)
MCHC RBC-ENTMCNC: 27.1 PG — SIGNIFICANT CHANGE UP (ref 27–34)
MCHC RBC-ENTMCNC: 29.5 G/DL — LOW (ref 32–36)
MCHC RBC-ENTMCNC: 29.6 G/DL — LOW (ref 32–36)
MCHC RBC-ENTMCNC: 29.8 G/DL — LOW (ref 32–36)
MCV RBC AUTO: 88.8 FL — SIGNIFICANT CHANGE UP (ref 80–100)
MCV RBC AUTO: 89.4 FL — SIGNIFICANT CHANGE UP (ref 80–100)
MCV RBC AUTO: 91.1 FL — SIGNIFICANT CHANGE UP (ref 80–100)
MONOCYTES # BLD AUTO: 0.98 K/UL — HIGH (ref 0–0.9)
MONOCYTES NFR BLD AUTO: 7.7 % — SIGNIFICANT CHANGE UP (ref 2–14)
NEUTROPHILS # BLD AUTO: 10.69 K/UL — HIGH (ref 1.8–7.4)
NEUTROPHILS NFR BLD AUTO: 83.6 % — HIGH (ref 43–77)
NITRITE UR-MCNC: NEGATIVE — SIGNIFICANT CHANGE UP
NRBC # BLD: 0 /100 WBCS — SIGNIFICANT CHANGE UP (ref 0–0)
PH UR: 5.5 — SIGNIFICANT CHANGE UP (ref 5–8)
PHOSPHATE SERPL-MCNC: 4.4 MG/DL — SIGNIFICANT CHANGE UP (ref 2.5–4.5)
PLATELET # BLD AUTO: 385 K/UL — SIGNIFICANT CHANGE UP (ref 150–400)
PLATELET # BLD AUTO: 405 K/UL — HIGH (ref 150–400)
PLATELET # BLD AUTO: 427 K/UL — HIGH (ref 150–400)
POTASSIUM SERPL-MCNC: 5.6 MMOL/L — HIGH (ref 3.5–5.3)
POTASSIUM SERPL-SCNC: 5.6 MMOL/L — HIGH (ref 3.5–5.3)
PROT UR-MCNC: NEGATIVE MG/DL — SIGNIFICANT CHANGE UP
PROTHROM AB SERPL-ACNC: 12.1 SEC — SIGNIFICANT CHANGE UP (ref 9.9–13.4)
RBC # BLD: 2.36 M/UL — LOW (ref 3.8–5.2)
RBC # BLD: 2.54 M/UL — LOW (ref 3.8–5.2)
RBC # BLD: 2.78 M/UL — LOW (ref 3.8–5.2)
RBC # FLD: 17.7 % — HIGH (ref 10.3–14.5)
RBC # FLD: 18.3 % — HIGH (ref 10.3–14.5)
RBC # FLD: 18.6 % — HIGH (ref 10.3–14.5)
SODIUM SERPL-SCNC: 129 MMOL/L — LOW (ref 135–145)
SP GR SPEC: 1.02 — SIGNIFICANT CHANGE UP (ref 1–1.03)
UROBILINOGEN FLD QL: 0.2 MG/DL — SIGNIFICANT CHANGE UP (ref 0.2–1)
WBC # BLD: 11.74 K/UL — HIGH (ref 3.8–10.5)
WBC # BLD: 12.22 K/UL — HIGH (ref 3.8–10.5)
WBC # BLD: 13.19 K/UL — HIGH (ref 3.8–10.5)
WBC # FLD AUTO: 11.74 K/UL — HIGH (ref 3.8–10.5)
WBC # FLD AUTO: 12.22 K/UL — HIGH (ref 3.8–10.5)
WBC # FLD AUTO: 13.19 K/UL — HIGH (ref 3.8–10.5)

## 2024-11-10 PROCEDURE — 99232 SBSQ HOSP IP/OBS MODERATE 35: CPT

## 2024-11-10 PROCEDURE — 99222 1ST HOSP IP/OBS MODERATE 55: CPT | Mod: GC

## 2024-11-10 RX ORDER — OXYCODONE HYDROCHLORIDE 30 MG/1
7.5 TABLET ORAL ONCE
Refills: 0 | Status: DISCONTINUED | OUTPATIENT
Start: 2024-11-10 | End: 2024-11-10

## 2024-11-10 RX ORDER — ACETAMINOPHEN 500 MG
1000 TABLET ORAL ONCE
Refills: 0 | Status: COMPLETED | OUTPATIENT
Start: 2024-11-10 | End: 2024-11-10

## 2024-11-10 RX ORDER — VANCOMYCIN HYDROCHLORIDE 50 MG/ML
1750 KIT ORAL ONCE
Refills: 0 | Status: DISCONTINUED | OUTPATIENT
Start: 2024-11-10 | End: 2024-11-10

## 2024-11-10 RX ORDER — OXYCODONE HYDROCHLORIDE 30 MG/1
2.5 TABLET ORAL ONCE
Refills: 0 | Status: DISCONTINUED | OUTPATIENT
Start: 2024-11-10 | End: 2024-11-10

## 2024-11-10 RX ORDER — CHLORHEXIDINE GLUCONATE 40 MG/ML
1 SOLUTION TOPICAL ONCE
Refills: 0 | Status: COMPLETED | OUTPATIENT
Start: 2024-11-10 | End: 2024-11-10

## 2024-11-10 RX ORDER — CHLORHEXIDINE GLUCONATE 40 MG/ML
1 SOLUTION TOPICAL ONCE
Refills: 0 | Status: COMPLETED | OUTPATIENT
Start: 2024-11-11 | End: 2024-11-11

## 2024-11-10 RX ORDER — OXYCODONE HYDROCHLORIDE 30 MG/1
5 TABLET ORAL ONCE
Refills: 0 | Status: DISCONTINUED | OUTPATIENT
Start: 2024-11-10 | End: 2024-11-10

## 2024-11-10 RX ORDER — CHLORHEXIDINE GLUCONATE 40 MG/ML
30 SOLUTION TOPICAL ONCE
Refills: 0 | Status: COMPLETED | OUTPATIENT
Start: 2024-11-10 | End: 2024-11-11

## 2024-11-10 RX ORDER — VANCOMYCIN HYDROCHLORIDE 50 MG/ML
1500 KIT ORAL ONCE
Refills: 0 | Status: DISCONTINUED | OUTPATIENT
Start: 2024-11-10 | End: 2024-11-11

## 2024-11-10 RX ORDER — INSULIN GLARGINE,HUM.REC.ANLOG 100/ML
13 VIAL (ML) SUBCUTANEOUS AT BEDTIME
Refills: 0 | Status: DISCONTINUED | OUTPATIENT
Start: 2024-11-10 | End: 2024-11-11

## 2024-11-10 RX ADMIN — GABAPENTIN 800 MILLIGRAM(S): 300 CAPSULE ORAL at 05:18

## 2024-11-10 RX ADMIN — HEPARIN SODIUM 1800 UNIT(S)/HR: 10000 INJECTION INTRAVENOUS; SUBCUTANEOUS at 13:04

## 2024-11-10 RX ADMIN — NYSTATIN 1 APPLICATION(S): 100000 POWDER TOPICAL at 17:17

## 2024-11-10 RX ADMIN — OXYCODONE HYDROCHLORIDE 7.5 MILLIGRAM(S): 30 TABLET ORAL at 11:16

## 2024-11-10 RX ADMIN — Medication 650 MILLIGRAM(S): at 18:06

## 2024-11-10 RX ADMIN — LIDOCAINE HYDROCHLORIDE 1 PATCH: 40 SOLUTION TOPICAL at 14:12

## 2024-11-10 RX ADMIN — TRAMADOL HYDROCHLORIDE 50 MILLIGRAM(S): 50 TABLET, COATED ORAL at 12:05

## 2024-11-10 RX ADMIN — TRAMADOL HYDROCHLORIDE 50 MILLIGRAM(S): 50 TABLET, COATED ORAL at 13:06

## 2024-11-10 RX ADMIN — OXYCODONE HYDROCHLORIDE 5 MILLIGRAM(S): 30 TABLET ORAL at 19:51

## 2024-11-10 RX ADMIN — GABAPENTIN 800 MILLIGRAM(S): 300 CAPSULE ORAL at 11:25

## 2024-11-10 RX ADMIN — OXYCODONE HYDROCHLORIDE 2.5 MILLIGRAM(S): 30 TABLET ORAL at 03:34

## 2024-11-10 RX ADMIN — Medication 4 UNIT(S): at 11:37

## 2024-11-10 RX ADMIN — TRAMADOL HYDROCHLORIDE 50 MILLIGRAM(S): 50 TABLET, COATED ORAL at 06:00

## 2024-11-10 RX ADMIN — Medication 13 UNIT(S): at 21:17

## 2024-11-10 RX ADMIN — Medication 2: at 08:02

## 2024-11-10 RX ADMIN — Medication 500 MILLIGRAM(S): at 11:25

## 2024-11-10 RX ADMIN — Medication 1 TABLET(S): at 11:26

## 2024-11-10 RX ADMIN — Medication 400 MILLIGRAM(S): at 00:27

## 2024-11-10 RX ADMIN — CHLORHEXIDINE GLUCONATE 1 APPLICATION(S): 40 SOLUTION TOPICAL at 21:21

## 2024-11-10 RX ADMIN — HEPARIN SODIUM 1800 UNIT(S)/HR: 10000 INJECTION INTRAVENOUS; SUBCUTANEOUS at 00:26

## 2024-11-10 RX ADMIN — Medication 650 MILLIGRAM(S): at 03:34

## 2024-11-10 RX ADMIN — LIDOCAINE HYDROCHLORIDE 1 PATCH: 40 SOLUTION TOPICAL at 03:22

## 2024-11-10 RX ADMIN — CHLORHEXIDINE GLUCONATE 1 APPLICATION(S): 40 SOLUTION TOPICAL at 11:26

## 2024-11-10 RX ADMIN — TRAMADOL HYDROCHLORIDE 50 MILLIGRAM(S): 50 TABLET, COATED ORAL at 05:39

## 2024-11-10 RX ADMIN — NYSTATIN 1 APPLICATION(S): 100000 POWDER TOPICAL at 05:20

## 2024-11-10 RX ADMIN — Medication 2: at 11:37

## 2024-11-10 RX ADMIN — Medication 650 MILLIGRAM(S): at 10:10

## 2024-11-10 RX ADMIN — Medication 650 MILLIGRAM(S): at 09:43

## 2024-11-10 RX ADMIN — Medication 650 MILLIGRAM(S): at 16:42

## 2024-11-10 RX ADMIN — Medication 20 MILLIGRAM(S): at 21:17

## 2024-11-10 RX ADMIN — Medication 50 MILLIGRAM(S): at 17:18

## 2024-11-10 RX ADMIN — OXYCODONE HYDROCHLORIDE 2.5 MILLIGRAM(S): 30 TABLET ORAL at 09:58

## 2024-11-10 RX ADMIN — OXYCODONE HYDROCHLORIDE 2.5 MILLIGRAM(S): 30 TABLET ORAL at 09:04

## 2024-11-10 RX ADMIN — OXYCODONE HYDROCHLORIDE 2.5 MILLIGRAM(S): 30 TABLET ORAL at 04:05

## 2024-11-10 RX ADMIN — Medication 4 UNIT(S): at 17:20

## 2024-11-10 RX ADMIN — Medication 4 UNIT(S): at 08:01

## 2024-11-10 RX ADMIN — Medication 650 MILLIGRAM(S): at 04:05

## 2024-11-10 RX ADMIN — LIDOCAINE HYDROCHLORIDE 1 PATCH: 40 SOLUTION TOPICAL at 06:40

## 2024-11-10 RX ADMIN — TRAMADOL HYDROCHLORIDE 50 MILLIGRAM(S): 50 TABLET, COATED ORAL at 18:04

## 2024-11-10 RX ADMIN — OXYCODONE HYDROCHLORIDE 7.5 MILLIGRAM(S): 30 TABLET ORAL at 10:09

## 2024-11-10 RX ADMIN — OXYCODONE HYDROCHLORIDE 5 MILLIGRAM(S): 30 TABLET ORAL at 20:20

## 2024-11-10 RX ADMIN — SACUBITRIL AND VALSARTAN 1 TABLET(S): 97; 103 TABLET, FILM COATED ORAL at 17:18

## 2024-11-10 RX ADMIN — Medication 1000 MILLIGRAM(S): at 01:02

## 2024-11-10 RX ADMIN — Medication 650 MILLIGRAM(S): at 23:55

## 2024-11-10 RX ADMIN — Medication 2: at 17:20

## 2024-11-10 RX ADMIN — TRAMADOL HYDROCHLORIDE 50 MILLIGRAM(S): 50 TABLET, COATED ORAL at 19:02

## 2024-11-10 RX ADMIN — Medication 25 MILLIGRAM(S): at 05:19

## 2024-11-10 RX ADMIN — Medication 10 MILLIGRAM(S): at 05:19

## 2024-11-10 RX ADMIN — GABAPENTIN 800 MILLIGRAM(S): 300 CAPSULE ORAL at 23:55

## 2024-11-10 RX ADMIN — Medication 150 MICROGRAM(S): at 05:19

## 2024-11-10 RX ADMIN — GABAPENTIN 800 MILLIGRAM(S): 300 CAPSULE ORAL at 17:22

## 2024-11-10 NOTE — PROVIDER CONTACT NOTE (OTHER) - ACTION/TREATMENT ORDERED:
provider assessed pt at bedside. stat cbc/cmp to be done. continuous monitoring in progress. call bell within reach and safety maintained.
PHAN SAMUEL notified, labs drawn and sent, continue to monitor.
Provider ordered STAT BMP W/mag/phosph. All interventions completed as ordered. Safety measures maintained, will continue to monitor.
JUDSON Perez notified. Hold entresto and metoprolol. No other interventions ordered. Plan of care ongoing.
continuous monitoring in progress. call bell within reach and safety maintained.

## 2024-11-10 NOTE — PROVIDER CONTACT NOTE (OTHER) - REASON
BP 90/54
patient had WCT 7 beats
pt hypotensive 77/41
pt safely lowered to floor while attempting to move pt from recliner to bed
Patient had 4.91 seconds of PSVT HR up to 180

## 2024-11-10 NOTE — PROGRESS NOTE ADULT - PROBLEM SELECTOR PLAN 9
- continue home prednisone 10mg QD  - patient also takes dupixent every 2 weeks and IVIG every 4 weeks  - follows with Dr. Hung Gaviria --> I spoke with him regarding IVIG at this time he would like to hold off and have the patient follow up upon discharge for outpatient infusion.
- indeterminate 2cm left adrenal nodule noted on CT  - patient reports it has been there for years (confirmed on review of outpatient medical record that it was known on prior CT)  - outpatient follow up
- continue home prednisone 10mg QD  - patient also takes dupixent every 2 weeks and IVIG every 4 weeks  - follows with Dr. Hung Gaviria --> I spoke with him regarding IVIG at this time he would like to hold off and have the patient follow up upon discharge for outpatient infusion.

## 2024-11-10 NOTE — PROGRESS NOTE ADULT - PROBLEM SELECTOR PLAN 11
Urine Culture Results: >100,000 CFU/ml Escherichia coli (10.30.24 @ 00:17)  - completed 3 day of abx
Urine Culture Results: >100,000 CFU/ml Escherichia coli (10.30.24 @ 00:17)  - completed 3 day of abx
Urine Culture Results: >100,000 CFU/ml Escherichia coli (10.30.24 @ 00:17)  - completed 3 day of abx today
Urine Culture Results: >100,000 CFU/ml Escherichia coli (10.30.24 @ 00:17)  - will completed 3 day of abx today (levaquin 10/29 - 11/2)
Urine Culture Results: >100,000 CFU/ml Escherichia coli (10.30.24 @ 00:17)  - completed 3 day of abx
Urine Culture Results: >100,000 CFU/ml Escherichia coli (10.30.24 @ 00:17)  - will completed 3 day of abx
Urine Culture Results: >100,000 CFU/ml Escherichia coli (10.30.24 @ 00:17)  - completed 3 day of abx
Urine Culture Results: >100,000 CFU/ml Escherichia coli (10.30.24 @ 00:17)  - will completed 3 day of abx today (levaquin 10/29 - 11/2)
Urine Culture Results: >100,000 CFU/ml Escherichia coli (10.30.24 @ 00:17)  - completed 3 day of abx today
Urine Culture Results: >100,000 CFU/ml Escherichia coli (10.30.24 @ 00:17)  - completed 3 day of abx

## 2024-11-10 NOTE — PROGRESS NOTE ADULT - PROBLEM SELECTOR PROBLEM 8
Chronic back pain
Chronic back pain
BP (bullous pemphigoid)
Chronic back pain

## 2024-11-10 NOTE — PROGRESS NOTE ADULT - SUBJECTIVE AND OBJECTIVE BOX
DATE OF SERVICE: 11-10-24 @ 23:36    Patient is a 74y old  Female who presents with a chief complaint of Symptomatic anemia, Syncope (10 Nov 2024 14:44)      INTERVAL HISTORY: feels ok    TELEMETRY Personally reviewed: no events  	  MEDICATIONS:  metoprolol tartrate 50 milliGRAM(s) Oral two times a day  sacubitril 24 mG/valsartan 26 mG 1 Tablet(s) Oral two times a day  spironolactone 25 milliGRAM(s) Oral daily        PHYSICAL EXAM:  T(C): 36.9 (11-10-24 @ 20:34), Max: 37.2 (11-10-24 @ 04:50)  HR: 73 (11-10-24 @ 20:34) (73 - 112)  BP: 98/75 (11-10-24 @ 20:47) (95/56 - 123/65)  RR: 16 (11-10-24 @ 20:34) (16 - 18)  SpO2: 93% (11-10-24 @ 20:34) (93% - 98%)  Wt(kg): --  I&O's Summary    09 Nov 2024 07:01  -  10 Nov 2024 07:00  --------------------------------------------------------  IN: 830 mL / OUT: 1850 mL / NET: -1020 mL    10 Nov 2024 07:01  -  10 Nov 2024 23:36  --------------------------------------------------------  IN: 810 mL / OUT: 1000 mL / NET: -190 mL          Appearance: In no distress	  HEENT:    PERRL, EOMI	  Cardiovascular:  S1 S2, No JVD  Respiratory: Lungs clear to auscultation	  Gastrointestinal:  Soft, Non-tender, + BS	  Vascularature:  No edema of LE  Psychiatric: Appropriate affect   Neuro: no acute focal deficits                               7.3    12.22 )-----------( 405      ( 10 Nov 2024 18:14 )             24.7     11-10    129[L]  |  95[L]  |  40[H]  ----------------------------<  204[H]  5.6[H]   |  25  |  0.83    Ca    8.8      10 Nov 2024 11:27  Phos  4.4     11-10  Mg     2.3     11-10          Labs personally reviewed      ASSESSMENT/PLAN: 	  ASSESSMENT/PLAN: 	  74F w/Hx of chronic anemia, bullous pemphigoid on IVIG, HTN, HLD, DM, hypothyroidism, chronic back pain presents after syncopal episode. 1 minute of CPR was initiated in the field by EMS because there was concern of pulseness with unresponsiveness. Patient was hypotensive upon arrival and found to have a hgb of 5.1. Patient reports she syncopized after receiving IVIG. Reports she had been feeling lightheaded and SOB for a few days prior to this event. Has had bout of anemia in the past treated with iron supplementation, folate and B12. She denies any hematuria, melena, gingival bleeding. She reports significant improvement of symptoms since receiving 2u of pRBCs. Her only current complaint is acute on chronic back pain from her stretcher. Patient denies fever, chills, chest pain, SOB, headache, dizziness, abd pain, nausea, vomiting, constipation, dysuria.      Problem/Plan #1:  Problem: Syncope   - Likely symptomatic anemia  - Trop 26 --> 28  - EKG ischemic but likely in setting of Hb of 5.1  - POCUS noted no pericardial effusion, globally reduced LV function   - Echo with reduced EF & severe aortic stenosis  - Replete Hgb > 7   - s/p cath 11/5 with mild disease only, plan for TAVR 11/11    Problem/Plan #2:  Problem: Hypertension  - stable on sHF GDMT    Problem/Plan #3:  Problem: HLD  - c/w atorvastatin 20mg PO daily    Problem/Plan #4:  Problem: Cardiac Risk Stratification  - Symptomatic anemia  - Echo with reduced EF noted & severe aortic stenosis  - High risk for low risk nonelective EGD/Colonoscopy 2/2 systolic HF and severe aortic stenosis, planned for monday 11/4  - s/p egd/ colonoscopy today 11/4, no source of bleed identified    Problem/Plan #5:  Problem: Systolic HF  - TTE 10/30 with EF 25-30%  - Switched to Metoprolol tartrate 50mg BID due to episodes PSVT & WCT 11/1  - Entresto 24-26mg BID  - Spironolactone 25mg daily     Problem/Plan #6:  - Problem: Severe Aortic Stenosis  - Structural Dr. Greene consulted   - TAVR workup in progress; tentatively scheduled TAVR 11/11/24    Problem/Plan #7:  - Problem: Acute right LE DVT  - heparin gtt as per primary team            Adama Rollins DO [facility]  Cardiovascular Medicine  [address] [place] [address], [address]  Office: [phone]  Available via Text/call on Microsoft Teams

## 2024-11-10 NOTE — PROGRESS NOTE ADULT - TIME BILLING
Time spent on review of vitals, physical exam, documentation, and discussion of plan of care with patient and multidisciplinary team.

## 2024-11-10 NOTE — PROGRESS NOTE ADULT - PROBLEM SELECTOR PROBLEM 5
Syncope
Chronic back pain
Hypothyroidism
Syncope

## 2024-11-10 NOTE — PROGRESS NOTE ADULT - PROBLEM SELECTOR PROBLEM 10
Adrenal nodule

## 2024-11-10 NOTE — PROGRESS NOTE ADULT - PROBLEM SELECTOR PROBLEM 9
BP (bullous pemphigoid)
Adrenal nodule
BP (bullous pemphigoid)

## 2024-11-10 NOTE — CONSULT NOTE ADULT - ASSESSMENT
Impression/Hospital Course: 74F with  chronic anemia, bullous pemphigoid on IVIG, HTN, HLD, DM, hypothyroidism, chronic back pain presents after syncopal episode. She was admitted for syncopal workup and for symptomatic anemia. She was found to have new onset heart failure, severe aortic stenosis and RLE DVT. She also urine culture 10/30 growing E.coli which was empirically treated with aztrenam. She is scheduled for TAVR 11/11. ID consulted for antibiotic prophylaxis recommendation.     Assessment:  #Severe AS  #Leukocytosis - WBC 13K. Patient with no cough, SOB, abdominal pain or urinary symptoms.   #DVT    Recommendations:   - Start Vancomcyin 1.5g IV Q12H (slow infusion)  - Monitor Vancomycin trough   - Monitor vitals    Case discussed with attending. Recommendation made to the primary team.       Adela Lennon DO, PGY-4  Infectious Disease Fellow  [employer] Teams Preferred  After 5pm/weekends call [phone]

## 2024-11-10 NOTE — PROVIDER CONTACT NOTE (OTHER) - RECOMMENDATIONS
provider made aware
provider made aware
Notify JUDSON Perez
Provider ordered STAT BMP with mag/phosph.
notify PHAN SAMUEL

## 2024-11-10 NOTE — PROGRESS NOTE ADULT - PROBLEM SELECTOR PLAN 4
unclear cause of her anemia. no evidence of hemolysis  Possible causes include occult GI bleeding. -->s/p EGD/Colonoscopy--> no source of bleeding identified --> GI recommending outpatient Capsule endoscopy.  hgb< 7. s/p 2u PRBCs  - f/u post transfusion CBC, transfuse Hgb<7

## 2024-11-10 NOTE — PROVIDER CONTACT NOTE (OTHER) - ASSESSMENT
pt a&o4. vss on ra. pt denies cp, palpitations, sob at this time prior to and after. pt expressed readiness to stand/walk prior to attempt to get back into bed, but while ambulating (with RN/PCA & walker), she screams "I can't stand anymore. it's too painful". no injuries/skin breakdown noted after event
Patient had 4.91 seconds of PSVT HR up to 180. Patient asymptomatic upon assessment, denies any CP/Palpitations.   Vitals: HR-103, BP-113/70, Temp-98.8, RR-19, 02-95% room air
Pt A&Ox4, able to make needs known. Pt asymptomatic, except lethargic today s/p colonoscopy/endoscopy. VSS, BP 90/54. No s/s of bleeding. Pt denies cp, denies SOB, denies pain.
patient A&Ox4, denies any chest pain or discomfort, vital signs stable
pt a&o4. vss on ra. pt denies chest pain, palpitations, sob, but complaining of some lightheadedness.

## 2024-11-10 NOTE — PROVIDER CONTACT NOTE (OTHER) - SITUATION
Patient had 4.91 seconds of PSVT HR up to 180
patient had 7 beats WCT
pt safely lowered to floor after attempting to move pt from recliner to bed. pt was premedicated with tramadol and gabapentin for 8/10 sciatica and OA knee pain.
BP 90/54
pt hypotensive 77/41, with both electronic BP and zoll defib

## 2024-11-10 NOTE — PROGRESS NOTE ADULT - PROBLEM SELECTOR PROBLEM 6
BP (bullous pemphigoid)
Hypothyroidism
DM (diabetes mellitus)
Hypothyroidism

## 2024-11-10 NOTE — PROGRESS NOTE ADULT - PROBLEM SELECTOR PLAN 5
pt with possible etiologies which have been identified which include severe anemia, acute systolic heart failure (?arrythmia), severe aortic stenosis.
- Continue gabapentin 800mg 4 times a day
pt with possible etiologies which have been identified which include severe anemia, acute systolic heart failure (?arrythmia), severe aortic stenosis.
- at home takes synthroid 150mg 3x weekly(T/Sat/Sun), 300mg 4x weekly (M/W/Th/Fri)
pt with possible etiologies which have been identified which include severe anemia, acute systolic heart failure (?arrythmia), severe aortic stenosis.

## 2024-11-10 NOTE — PROGRESS NOTE ADULT - PROBLEM SELECTOR PLAN 8
- Continue gabapentin 800mg 4 times a day
- continue home prednisone 10mg QD  - patient also takes dupixent every 2 weeks and IVIG every 4 weeks  - follows with Dr. Hung Gaviria --> I spoke with him regarding IVIG at this time he would like to hold off and have the patient follow up upon discharge for outpatient infusion.
- Continue gabapentin 800mg 4 times a day

## 2024-11-10 NOTE — PROGRESS NOTE ADULT - PROBLEM SELECTOR PLAN 10
- indeterminate 2cm left adrenal nodule noted on CT  - patient reports it has been there for years (confirmed on review of outpatient medical record that it was known on prior CT)  - outpatient follow up

## 2024-11-10 NOTE — PROGRESS NOTE ADULT - SUBJECTIVE AND OBJECTIVE BOX
[facility] Division of Hospital Medicine  Juliana Mcguire MD  Available via MS Teams    SUBJECTIVE / OVERNIGHT EVENTS:  +back pain. Had episode of feeling LH/weak when attempting to walk yesterday. Had to be lowered to ground. Hgb decreased from 7.1 to 6.7 this AM. No overt bleeding noted.     MEDICATIONS  (STANDING):  ascorbic acid 500 milliGRAM(s) Oral daily  atorvastatin 20 milliGRAM(s) Oral at bedtime  chlorhexidine 0.12% Liquid 30 milliLiter(s) Swish and Spit once  chlorhexidine 2% Cloths 1 Application(s) Topical daily  chlorhexidine 4% Liquid 1 Application(s) Topical once  dextrose 5%. 1000 milliLiter(s) (50 mL/Hr) IV Continuous <Continuous>  dextrose 5%. 1000 milliLiter(s) (100 mL/Hr) IV Continuous <Continuous>  dextrose 50% Injectable 25 Gram(s) IV Push once  dextrose 50% Injectable 12.5 Gram(s) IV Push once  dextrose 50% Injectable 25 Gram(s) IV Push once  gabapentin 800 milliGRAM(s) Oral four times a day  glucagon  Injectable 1 milliGRAM(s) IntraMuscular once  insulin glargine Injectable (LANTUS) 13 Unit(s) SubCutaneous at bedtime  insulin lispro (ADMELOG) corrective regimen sliding scale   SubCutaneous at bedtime  insulin lispro (ADMELOG) corrective regimen sliding scale   SubCutaneous three times a day before meals  insulin lispro Injectable (ADMELOG) 4 Unit(s) SubCutaneous three times a day before meals  levothyroxine 300 MICROGram(s) Oral <User Schedule>  levothyroxine 150 MICROGram(s) Oral <User Schedule>  lidocaine   4% Patch 1 Patch Transdermal every 24 hours  metoprolol tartrate 50 milliGRAM(s) Oral two times a day  multivitamin 1 Tablet(s) Oral daily  nystatin Powder 1 Application(s) Topical two times a day  polyethylene glycol 3350 17 Gram(s) Oral daily  predniSONE   Tablet 10 milliGRAM(s) Oral daily  sacubitril 24 mG/valsartan 26 mG 1 Tablet(s) Oral two times a day  senna 2 Tablet(s) Oral at bedtime  spironolactone 25 milliGRAM(s) Oral daily    MEDICATIONS  (PRN):  acetaminophen     Tablet .. 650 milliGRAM(s) Oral every 6 hours PRN Temp greater or equal to 38C (100.4F), Mild Pain (1 - 3)  dextrose Oral Gel 15 Gram(s) Oral once PRN Blood Glucose LESS THAN 70 milliGRAM(s)/deciliter  melatonin 3 milliGRAM(s) Oral at bedtime PRN Insomnia  traMADol 50 milliGRAM(s) Oral every 6 hours PRN Moderate Pain (4 - 6)      I&O's Summary    09 Nov 2024 07:01  -  10 Nov 2024 07:00  --------------------------------------------------------  IN: 830 mL / OUT: 1850 mL / NET: -1020 mL    10 Nov 2024 07:01  -  10 Nov 2024 14:44  --------------------------------------------------------  IN: 810 mL / OUT: 1000 mL / NET: -190 mL        PHYSICAL EXAM:  Vital Signs Last 24 Hrs  T(C): 36.7 (10 Nov 2024 11:45), Max: 37.2 (10 Nov 2024 04:50)  T(F): 98 (10 Nov 2024 11:45), Max: 98.9 (10 Nov 2024 04:50)  HR: 100 (10 Nov 2024 11:45) (71 - 112)  BP: 95/59 (10 Nov 2024 11:45) (90/60 - 123/65)  BP(mean): --  RR: 18 (10 Nov 2024 11:45) (18 - 18)  SpO2: 94% (10 Nov 2024 11:45) (94% - 98%)    Parameters below as of 10 Nov 2024 11:45  Patient On (Oxygen Delivery Method): room air      PHYSICAL EXAM:  GENERAL: in distress from back pain  HEENT:  anicteric, moist mucous membranes  CHEST/LUNG:  CTA b/l, no rales, wheezes, or rhonchi  HEART:  RRR, S1, S2  ABDOMEN:  BS+, soft, nontender, nondistended  EXTREMITIES: no cyanosis, or calf tenderness  NERVOUS SYSTEM: answers questions and follows commands appropriately    LABS:                        6.7    13.19 )-----------( 427      ( 10 Nov 2024 12:48 )             22.7     11-10    129[L]  |  95[L]  |  40[H]  ----------------------------<  204[H]  5.6[H]   |  25  |  0.83    Ca    8.8      10 Nov 2024 11:27  Phos  4.4     11-10  Mg     2.3     11-10      PT/INR - ( 10 Nov 2024 11:27 )   PT: 12.1 sec;   INR: 1.06 ratio         PTT - ( 10 Nov 2024 11:27 )  PTT:87.2 sec      Urinalysis Basic - ( 10 Nov 2024 11:27 )    Color: x / Appearance: x / SG: x / pH: x  Gluc: 204 mg/dL / Ketone: x  / Bili: x / Urobili: x   Blood: x / Protein: x / Nitrite: x   Leuk Esterase: x / RBC: x / WBC x   Sq Epi: x / Non Sq Epi: x / Bacteria: x            RADIOLOGY & ADDITIONAL TESTS:  New Imaging Personally Reviewed Today:  New Electrocardiogram Personally Reviewed Today:  Other Results Reviewed Today:   Prior or Outpatient Records Reviewed Today with Summary:    COORDINATION OF CARE:  Consultant Communication and Details of Discussion (where applicable):

## 2024-11-10 NOTE — PROGRESS NOTE ADULT - PROBLEM SELECTOR PLAN 7
- HbA1c 5.0  CAPILLARY BLOOD GLUCOSE      POCT Blood Glucose.: 148 mg/dL (07 Nov 2024 17:06)  POCT Blood Glucose.: 245 mg/dL (07 Nov 2024 11:36)  POCT Blood Glucose.: 184 mg/dL (07 Nov 2024 08:15)  POCT Blood Glucose.: 184 mg/dL (06 Nov 2024 21:12)      Blood glucose above goal  - however given NPO for procedure, decrease lantus to 13 units qHS  - c/w admelog 4 units TID with meals  - c/w moderate admelog mealtime sliding scale

## 2024-11-10 NOTE — PROVIDER CONTACT NOTE (OTHER) - BACKGROUND
Dx: syncope, symptomatic anemia, nonocclusive L gastroc DVT on heparin gtt, severe AS EF 25-30% s/p cath via RRA - pending TAVR   PMH: bullous pemphigoid, HTN. HLD, hypothyroidism
75 y/o female with PMHx HTN, HLD, presents for eval after syncopal episode and being found unresponsive. EMS did 1 minute of chest compressions with ROSC and return of mental status.
Dx: syncope, symptomatic anemia, nonocclusive L gastroc DVT on heparin gtt, severe AS EF 25-30% s/p cath via RRA - pending TAVR   PMH: bullous pemphigoid, HTN. HLD, hypothyroidism
Pt admitted for syncopal episode. PMHx bullous pemphigoid, HTN, HLD, anemia.
patient admitted for syncope and collapse, PMHx severe aortic stenosis, acute CHF, DM

## 2024-11-10 NOTE — PRE PROCEDURE NOTE - PRE PROCEDURE EVALUATION
CARDIAC SURGERY PRE-OP NOTE    CC: Patient is a 74y old  Female who presents with a chief complaint of Symptomatic anemia, Syncope (2024 09:45)    Referring Physician: Dr. Fred Greene                                                                                                           Surgeon: Dr. Rey Onofre    Procedure: 24, TAVR    Allergies  penicillin (Stomach Upset; Nausea; Swelling; Hives)  cephalexin (Swelling; Rash; Nausea; Hives)    Intolerances  vancomycin ([facility] Synd)      HPI:  74F w/Hx of chronic anemia, bullous pemphigoid on IVIG, HTN, HLD, DM, hypothyroidism, chronic back pain presents after syncopal episode. 1 minute of CPR was initiated in the field by EMS because there was concern of pulseness with unresponsiveness. Patient was hypotensive upon arrival and found to have a hgb of 5.1. Patient reports she syncopized after receiving IVIG. Reports she had been feeling lightheaded and SOB for a few days prior to this event. Has had bout of anemia in the past treated with iron supplementation, folate and B12. She denies any hematuria, melena, gingival bleeding. She reports significant improvement of symptoms since receiving 2u of pRBCs. Her only current complaint is acute on chronic back pain from her stretcher. Patient denies fever, chills, chest pain, SOB, headache, dizziness, abd pain, nausea, vomiting, constipation, dysuria.  Patient has had work up for anemia in the past including bone marrow biopsy in , but no clear diagnosis/cause was ever established. (28 Oct 2024 23:52)      PAST MEDICAL & SURGICAL HISTORY:  BP (bullous pemphigoid)  HTN (hypertension)  HLD (hyperlipidemia)  Hypothyroidism      MEDICATIONS  (STANDING):  ascorbic acid 500 milliGRAM(s) Oral daily  atorvastatin 20 milliGRAM(s) Oral at bedtime  chlorhexidine 0.12% Liquid 30 milliLiter(s) Swish and Spit once  chlorhexidine 2% Cloths 1 Application(s) Topical daily  chlorhexidine 4% Liquid 1 Application(s) Topical once  dextrose 5%. 1000 milliLiter(s) (50 mL/Hr) IV Continuous <Continuous>  dextrose 5%. 1000 milliLiter(s) (100 mL/Hr) IV Continuous <Continuous>  dextrose 50% Injectable 25 Gram(s) IV Push once  dextrose 50% Injectable 25 Gram(s) IV Push once  dextrose 50% Injectable 12.5 Gram(s) IV Push once  gabapentin 800 milliGRAM(s) Oral four times a day  glucagon  Injectable 1 milliGRAM(s) IntraMuscular once  heparin  Infusion. 1200 Unit(s)/Hr (12 mL/Hr) IV Continuous <Continuous>  insulin glargine Injectable (LANTUS) 16 Unit(s) SubCutaneous at bedtime  insulin lispro (ADMELOG) corrective regimen sliding scale   SubCutaneous at bedtime  insulin lispro (ADMELOG) corrective regimen sliding scale   SubCutaneous three times a day before meals  insulin lispro Injectable (ADMELOG) 4 Unit(s) SubCutaneous three times a day before meals  levothyroxine 300 MICROGram(s) Oral <User Schedule>  levothyroxine 150 MICROGram(s) Oral <User Schedule>  lidocaine   4% Patch 1 Patch Transdermal every 24 hours  metoprolol tartrate 50 milliGRAM(s) Oral two times a day  multivitamin 1 Tablet(s) Oral daily  nystatin Powder 1 Application(s) Topical two times a day  polyethylene glycol 3350 17 Gram(s) Oral daily  predniSONE   Tablet 10 milliGRAM(s) Oral daily  sacubitril 24 mG/valsartan 26 mG 1 Tablet(s) Oral two times a day  senna 2 Tablet(s) Oral at bedtime  spironolactone 25 milliGRAM(s) Oral daily  vancomycin  IVPB 1750 milliGRAM(s) IV Intermittent once    MEDICATIONS  (PRN):  acetaminophen     Tablet .. 650 milliGRAM(s) Oral every 6 hours PRN Temp greater or equal to 38C (100.4F), Mild Pain (1 - 3)  dextrose Oral Gel 15 Gram(s) Oral once PRN Blood Glucose LESS THAN 70 milliGRAM(s)/deciliter  heparin   Injectable 42324 Unit(s) IV Push every 6 hours PRN For aPTT less than 40  heparin   Injectable 5000 Unit(s) IV Push every 6 hours PRN For aPTT between 40 - 57  melatonin 3 milliGRAM(s) Oral at bedtime PRN Insomnia  traMADol 50 milliGRAM(s) Oral every 6 hours PRN Moderate Pain (4 - 6)      Vital Signs Last 24 Hrs  T(C): 36.7 (11-10-24 @ 11:45), Max: 37.2 (11-10-24 @ 04:50)  T(F): 98 (11-10-24 @ 11:45), Max: 98.9 (11-10-24 @ 04:50)  HR: 100 (11-10-24 @ 11:45) (71 - 112)  BP: 95/59 (11-10-24 @ 11:45) (90/60 - 123/65)  RR: 18 (11-10-24 @ 11:45) (18 - 18)  SpO2: 94% (11-10-24 @ 11:45) (94% - 98%)         Daily Weight in k.7 (10 Nov 2024 08:00)  Admit Wt: Drug Dosing Weight  Height (cm): 165.1 (2024 10:37)  Weight (kg): 124.7 (2024 10:37)  BMI (kg/m2): 45.7 (2024 10:37)  BSA (m2): 2.26 (2024 10:37)    LABS:             6.4    11.74 )-----------( 385      ( 10 Nov 2024 11:27 ); getting 1U PRBC per primary team             21.5   11-10  129[L]  |  95[L]  |  40[H]  ----------------------------<  204[H]  5.6[H]   |  25  |  0.83  Ca    8.8      10 Nov 2024 11:27  Phos  4.4     11-10  Mg     2.3     11-10  PT/INR - ( 10 Nov 2024 11:27 )   PT: 12.1 sec;   INR: 1.06 ratio    PTT - ( 10 Nov 2024 11:27 )  PTT:87.2 sec  Blood Type: ABO Interpretation: A ( @ 05:17)  HGB A1C: 5.3 (10/30/24)  Pro-BNP: 2369 pg/mL (10.28.24 @ 16:31)  MRSA: not detected  / MSSA: detected (24 @ 16:19)      RADIOLOGY:  < from: CT Angio Coronary TAVR BRENDON w/ IV Cont (24 @ 15:13) >  IMPRESSION:  Technically difficult study.  1. Calcified aortic valve. The calcium score of the aortic valve is 3777.  2. Please see the body of the report for aortic and peripheral access   vessel measurements.  3  Interval development of small bilateral pleural effusions with   bilateral lower lobe compressive atelectasis. Additional development of   patchy groundglassopacities most prominent in the bilateral upper lobes   may represent early pulmonary edema versus infectious or inflammatory   etiology.  4. Indeterminate left adrenal nodule. Indeterminate subcentimeter right   renal lesion.    --- End of Report ---    < end of copied text >    < from: TTE W or WO Ultrasound Enhancing Agent (10.3024 @ 08:31) >  CONCLUSIONS:      1. Left ventricular systolic function is severely decreased with an ejection fraction visually estimated at 25 to 30 %.   2. The right ventricle is not well visualized.   3. Mitral valve was not well visualized.   4. Aortic valve was not well visualized.   5. No pericardial effusion seen.   6. Estimated pulmonary artery systolic pressure is 43 mmHg.   7. Moderate mitral valve stenosis.   8. Severe aortic stenosis.   9. 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  10. There is no evidence of a left ventricular thrombus.  11. There is normal LV mass and concentric remodeling.      < end of copied text >        PHYSICAL EXAM:  Gen: no acute distress  Neuro: AAOx3, no gross focal deficits appreciated at time of evaluation  Pulm: nonlabored respirations with no use of accessory muscles  CV: S1S2  Abd: ++obese, soft, NT, ND +BS in all four quadrants  Ext: ++edema, + peripheral pulses intact bilaterally  Skin: multiple skin wounds, rash under b/l breast and abdomen (on nystatin), +atrophic pink scars with surrounding purple purpuric patches on b/l LE (followed by derm for bullous pemphigoid, rec no treatment  *PE findings d/w CTS Attending Dr. Onofre, ok to proceed for OR tomorrow per Dr. Onofre      Pt has AICD/PPM [ ] Yes  [x ] No             Brand Name:  Type & Cross  [x ] Yes  [ ] No  NPO after Midnight [x ] Yes  [ ] No  Pre-op ABX ordered, to be taped on chart:  [x ] Yes  [ ] No     Hibiclens/Peridex ordered [x ] Yes  [ ] No  Intraop on Hold: PRBCs, CXR, NAPOLEON [x ]   Consent obtained  [x ] Yes  [ ] No CARDIAC SURGERY PRE-OP NOTE    CC: Patient is a 74y old  Female who presents with a chief complaint of Symptomatic anemia, Syncope (2024 09:45)    Referring Physician: Dr. Fred Greene                                                                                                           Surgeon: Dr. Rey Onofre    Procedure: 24, TAVR    Allergies  penicillin (Stomach Upset; Nausea; Swelling; Hives)  cephalexin (Swelling; Rash; Nausea; Hives)    Intolerances  vancomycin ([facility] Synd)      HPI:  74F w/Hx of chronic anemia, bullous pemphigoid on IVIG, HTN, HLD, DM, hypothyroidism, chronic back pain presents after syncopal episode. 1 minute of CPR was initiated in the field by EMS because there was concern of pulseness with unresponsiveness. Patient was hypotensive upon arrival and found to have a hgb of 5.1. Patient reports she syncopized after receiving IVIG. Reports she had been feeling lightheaded and SOB for a few days prior to this event. Has had bout of anemia in the past treated with iron supplementation, folate and B12. She denies any hematuria, melena, gingival bleeding. She reports significant improvement of symptoms since receiving 2u of pRBCs. Her only current complaint is acute on chronic back pain from her stretcher. Patient denies fever, chills, chest pain, SOB, headache, dizziness, abd pain, nausea, vomiting, constipation, dysuria.  Patient has had work up for anemia in the past including bone marrow biopsy in , but no clear diagnosis/cause was ever established. (28 Oct 2024 23:52)      PAST MEDICAL & SURGICAL HISTORY:  BP (bullous pemphigoid)  HTN (hypertension)  HLD (hyperlipidemia)  Hypothyroidism      MEDICATIONS  (STANDING):  ascorbic acid 500 milliGRAM(s) Oral daily  atorvastatin 20 milliGRAM(s) Oral at bedtime  chlorhexidine 0.12% Liquid 30 milliLiter(s) Swish and Spit once  chlorhexidine 2% Cloths 1 Application(s) Topical daily  chlorhexidine 4% Liquid 1 Application(s) Topical once  dextrose 5%. 1000 milliLiter(s) (50 mL/Hr) IV Continuous <Continuous>  dextrose 5%. 1000 milliLiter(s) (100 mL/Hr) IV Continuous <Continuous>  dextrose 50% Injectable 25 Gram(s) IV Push once  dextrose 50% Injectable 25 Gram(s) IV Push once  dextrose 50% Injectable 12.5 Gram(s) IV Push once  gabapentin 800 milliGRAM(s) Oral four times a day  glucagon  Injectable 1 milliGRAM(s) IntraMuscular once  heparin  Infusion. 1200 Unit(s)/Hr (12 mL/Hr) IV Continuous <Continuous>  insulin glargine Injectable (LANTUS) 16 Unit(s) SubCutaneous at bedtime  insulin lispro (ADMELOG) corrective regimen sliding scale   SubCutaneous at bedtime  insulin lispro (ADMELOG) corrective regimen sliding scale   SubCutaneous three times a day before meals  insulin lispro Injectable (ADMELOG) 4 Unit(s) SubCutaneous three times a day before meals  levothyroxine 300 MICROGram(s) Oral <User Schedule>  levothyroxine 150 MICROGram(s) Oral <User Schedule>  lidocaine   4% Patch 1 Patch Transdermal every 24 hours  metoprolol tartrate 50 milliGRAM(s) Oral two times a day  multivitamin 1 Tablet(s) Oral daily  nystatin Powder 1 Application(s) Topical two times a day  polyethylene glycol 3350 17 Gram(s) Oral daily  predniSONE   Tablet 10 milliGRAM(s) Oral daily  sacubitril 24 mG/valsartan 26 mG 1 Tablet(s) Oral two times a day  senna 2 Tablet(s) Oral at bedtime  spironolactone 25 milliGRAM(s) Oral daily    MEDICATIONS  (PRN):  acetaminophen     Tablet .. 650 milliGRAM(s) Oral every 6 hours PRN Temp greater or equal to 38C (100.4F), Mild Pain (1 - 3)  dextrose Oral Gel 15 Gram(s) Oral once PRN Blood Glucose LESS THAN 70 milliGRAM(s)/deciliter  heparin   Injectable 37619 Unit(s) IV Push every 6 hours PRN For aPTT less than 40  heparin   Injectable 5000 Unit(s) IV Push every 6 hours PRN For aPTT between 40 - 57  melatonin 3 milliGRAM(s) Oral at bedtime PRN Insomnia  traMADol 50 milliGRAM(s) Oral every 6 hours PRN Moderate Pain (4 - 6)      Vital Signs Last 24 Hrs  T(C): 36.7 (11-10-24 @ 11:45), Max: 37.2 (11-10-24 @ 04:50)  T(F): 98 (11-10-24 @ 11:45), Max: 98.9 (11-10-24 @ 04:50)  HR: 100 (11-10-24 @ 11:45) (71 - 112)  BP: 95/59 (11-10-24 @ 11:45) (90/60 - 123/65)  RR: 18 (11-10-24 @ 11:45) (18 - 18)  SpO2: 94% (11-10-24 @ 11:45) (94% - 98%)         Daily Weight in k.7 (10 Nov 2024 08:00)  Admit Wt: Drug Dosing Weight  Height (cm): 165.1 (2024 10:37)  Weight (kg): 124.7 (2024 10:37)  BMI (kg/m2): 45.7 (2024 10:37)  BSA (m2): 2.26 (2024 10:37)    LABS:             6.4    11.74 )-----------( 385      ( 10 Nov 2024 11:27 ); getting 1U PRBC per primary team             21.5   11-10  129[L]  |  95[L]  |  40[H]  ----------------------------<  204[H]  5.6[H]   |  25  |  0.83  Ca    8.8      10 Nov 2024 11:27  Phos  4.4     11-10  Mg     2.3     11-10  PT/INR - ( 10 Nov 2024 11:27 )   PT: 12.1 sec;   INR: 1.06 ratio    PTT - ( 10 Nov 2024 11:27 )  PTT:87.2 sec  Blood Type: ABO Interpretation: A ( @ 05:17)  HGB A1C: 5.3 (10/30/24)  Pro-BNP: 2369 pg/mL (10.28.24 @ 16:31)  MRSA: not detected  / MSSA: detected (24 @ 16:19)      RADIOLOGY:  < from: CT Angio Coronary TAVR BRENDON w/ IV Cont (24 @ 15:13) >  IMPRESSION:  Technically difficult study.  1. Calcified aortic valve. The calcium score of the aortic valve is 3777.  2. Please see the body of the report for aortic and peripheral access   vessel measurements.  3  Interval development of small bilateral pleural effusions with   bilateral lower lobe compressive atelectasis. Additional development of   patchy groundglassopacities most prominent in the bilateral upper lobes   may represent early pulmonary edema versus infectious or inflammatory   etiology.  4. Indeterminate left adrenal nodule. Indeterminate subcentimeter right   renal lesion.    --- End of Report ---    < end of copied text >    < from: TTE W or WO Ultrasound Enhancing Agent (1030.24 @ 08:31) >  CONCLUSIONS:      1. Left ventricular systolic function is severely decreased with an ejection fraction visually estimated at 25 to 30 %.   2. The right ventricle is not well visualized.   3. Mitral valve was not well visualized.   4. Aortic valve was not well visualized.   5. No pericardial effusion seen.   6. Estimated pulmonary artery systolic pressure is 43 mmHg.   7. Moderate mitral valve stenosis.   8. Severe aortic stenosis.   9. 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  10. There is no evidence of a left ventricular thrombus.  11. There is normal LV mass and concentric remodeling.      < end of copied text >        PHYSICAL EXAM:  Gen: no acute distress  Neuro: AAOx3, no gross focal deficits appreciated at time of evaluation  Pulm: nonlabored respirations with no use of accessory muscles  CV: S1S2  Abd: ++obese, soft, NT, ND +BS in all four quadrants  Ext: ++edema, + peripheral pulses intact bilaterally  Skin: multiple skin wounds, rash under b/l breast and abdomen (on nystatin), +atrophic pink scars with surrounding purple purpuric patches on b/l LE (followed by derm for bullous pemphigoid, rec no treatment  *PE findings d/w CTS Attending Dr. Onofre, ok to proceed for OR tomorrow per Dr. nOofre      Pt has AICD/PPM [ ] Yes  [x ] No             Brand Name:  Type & Cross  [x ] Yes  [ ] No  NPO after Midnight [x ] Yes  [ ] No  Pre-op ABX ordered, to be taped on chart:  [x ] Yes  [ ] No     Hibiclens/Peridex ordered [x ] Yes  [ ] No  Intraop on Hold: PRBCs, CXR, NAPOLEON [x ]   Consent obtained  [x ] Yes  [ ] No CARDIAC SURGERY PRE-OP NOTE    CC: Patient is a 74y old  Female who presents with a chief complaint of Symptomatic anemia, Syncope (2024 09:45)    Referring Physician: Dr. Fred Greene                                                                                                           Surgeon: Dr. Rey Onofre    Procedure: 24, TAVR    Allergies  penicillin (Stomach Upset; Nausea; Swelling; Hives)  cephalexin (Swelling; Rash; Nausea; Hives)    Intolerances  vancomycin ([facility] Synd)      HPI:  74F w/Hx of chronic anemia, bullous pemphigoid on IVIG, HTN, HLD, DM, hypothyroidism, chronic back pain presents after syncopal episode. 1 minute of CPR was initiated in the field by EMS because there was concern of pulseness with unresponsiveness. Patient was hypotensive upon arrival and found to have a hgb of 5.1. Patient reports she syncopized after receiving IVIG. Reports she had been feeling lightheaded and SOB for a few days prior to this event. Has had bout of anemia in the past treated with iron supplementation, folate and B12. She denies any hematuria, melena, gingival bleeding. She reports significant improvement of symptoms since receiving 2u of pRBCs. Her only current complaint is acute on chronic back pain from her stretcher. Patient denies fever, chills, chest pain, SOB, headache, dizziness, abd pain, nausea, vomiting, constipation, dysuria.  Patient has had work up for anemia in the past including bone marrow biopsy in , but no clear diagnosis/cause was ever established. (28 Oct 2024 23:52)      PAST MEDICAL & SURGICAL HISTORY:  BP (bullous pemphigoid)  HTN (hypertension)  HLD (hyperlipidemia)  Hypothyroidism      MEDICATIONS  (STANDING):  ascorbic acid 500 milliGRAM(s) Oral daily  atorvastatin 20 milliGRAM(s) Oral at bedtime  chlorhexidine 0.12% Liquid 30 milliLiter(s) Swish and Spit once  chlorhexidine 2% Cloths 1 Application(s) Topical daily  chlorhexidine 4% Liquid 1 Application(s) Topical once  dextrose 5%. 1000 milliLiter(s) (100 mL/Hr) IV Continuous <Continuous>  dextrose 5%. 1000 milliLiter(s) (50 mL/Hr) IV Continuous <Continuous>  dextrose 50% Injectable 25 Gram(s) IV Push once  dextrose 50% Injectable 12.5 Gram(s) IV Push once  dextrose 50% Injectable 25 Gram(s) IV Push once  gabapentin 800 milliGRAM(s) Oral four times a day  glucagon  Injectable 1 milliGRAM(s) IntraMuscular once  insulin glargine Injectable (LANTUS) 13 Unit(s) SubCutaneous at bedtime  insulin lispro (ADMELOG) corrective regimen sliding scale   SubCutaneous at bedtime  insulin lispro (ADMELOG) corrective regimen sliding scale   SubCutaneous three times a day before meals  insulin lispro Injectable (ADMELOG) 4 Unit(s) SubCutaneous three times a day before meals  levothyroxine 300 MICROGram(s) Oral <User Schedule>  levothyroxine 150 MICROGram(s) Oral <User Schedule>  lidocaine   4% Patch 1 Patch Transdermal every 24 hours  metoprolol tartrate 50 milliGRAM(s) Oral two times a day  multivitamin 1 Tablet(s) Oral daily  nystatin Powder 1 Application(s) Topical two times a day  polyethylene glycol 3350 17 Gram(s) Oral daily  predniSONE   Tablet 10 milliGRAM(s) Oral daily  sacubitril 24 mG/valsartan 26 mG 1 Tablet(s) Oral two times a day  senna 2 Tablet(s) Oral at bedtime  spironolactone 25 milliGRAM(s) Oral daily  vancomycin  IVPB 1500 milliGRAM(s) IV Intermittent once    MEDICATIONS  (PRN):  acetaminophen     Tablet .. 650 milliGRAM(s) Oral every 6 hours PRN Temp greater or equal to 38C (100.4F), Mild Pain (1 - 3)  dextrose Oral Gel 15 Gram(s) Oral once PRN Blood Glucose LESS THAN 70 milliGRAM(s)/deciliter  melatonin 3 milliGRAM(s) Oral at bedtime PRN Insomnia  traMADol 50 milliGRAM(s) Oral every 6 hours PRN Moderate Pain (4 - 6)        Vital Signs Last 24 Hrs  T(C): 36.7 (11-10-24 @ 11:45), Max: 37.2 (11-10-24 @ 04:50)  T(F): 98 (11-10-24 @ 11:45), Max: 98.9 (11-10-24 @ 04:50)  HR: 100 (11-10-24 @ 11:45) (71 - 112)  BP: 95/59 (11-10-24 @ 11:45) (90/60 - 123/65)  RR: 18 (11-10-24 @ 11:45) (18 - 18)  SpO2: 94% (11-10-24 @ 11:45) (94% - 98%)         Daily Weight in k.7 (10 Nov 2024 08:00)  Admit Wt: Drug Dosing Weight  Height (cm): 165.1 (2024 10:37)  Weight (kg): 124.7 (2024 10:37)  BMI (kg/m2): 45.7 (2024 10:37)  BSA (m2): 2.26 (2024 10:37)    LABS:             6.4    11.74 )-----------( 385      ( 10 Nov 2024 11:27 ); getting 1U PRBC per primary team             21.5   11-10  129[L]  |  95[L]  |  40[H]  ----------------------------<  204[H]  5.6[H]   |  25  |  0.83  Ca    8.8      10 Nov 2024 11:27  Phos  4.4     11-10  Mg     2.3     11-10  PT/INR - ( 10 Nov 2024 11:27 )   PT: 12.1 sec;   INR: 1.06 ratio    PTT - ( 10 Nov 2024 11: )  PTT:87.2 sec  Blood Type: ABO Interpretation: A ( @ 05:17)  HGB A1C: 5.3 (10/30/24)  Pro-BNP: 2369 pg/mL (10.28.24 @ 16:31)  MRSA: not detected  / MSSA: detected (24 @ 16:19)      RADIOLOGY:  < from: CT Angio Coronary TAVR BRENDON w/ IV Cont (24 @ 15:13) >  IMPRESSION:  Technically difficult study.  1. Calcified aortic valve. The calcium score of the aortic valve is 3777.  2. Please see the body of the report for aortic and peripheral access   vessel measurements.  3  Interval development of small bilateral pleural effusions with   bilateral lower lobe compressive atelectasis. Additional development of   patchy groundglassopacities most prominent in the bilateral upper lobes   may represent early pulmonary edema versus infectious or inflammatory   etiology.  4. Indeterminate left adrenal nodule. Indeterminate subcentimeter right   renal lesion.    --- End of Report ---    < end of copied text >    < from: TTE W or WO Ultrasound Enhancing Agent (10.30.24 @ 08:31) >  CONCLUSIONS:      1. Left ventricular systolic function is severely decreased with an ejection fraction visually estimated at 25 to 30 %.   2. The right ventricle is not well visualized.   3. Mitral valve was not well visualized.   4. Aortic valve was not well visualized.   5. No pericardial effusion seen.   6. Estimated pulmonary artery systolic pressure is 43 mmHg.   7. Moderate mitral valve stenosis.   8. Severe aortic stenosis.   9. 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  10. There is no evidence of a left ventricular thrombus.  11. There is normal LV mass and concentric remodeling.      < end of copied text >        PHYSICAL EXAM:  Gen: no acute distress  Neuro: AAOx3, no gross focal deficits appreciated at time of evaluation  Pulm: nonlabored respirations with no use of accessory muscles  CV: S1S2  Abd: ++obese, soft, NT, ND +BS in all four quadrants  Ext: ++edema, + peripheral pulses intact bilaterally  Skin: multiple skin wounds, rash under b/l breast and abdomen (on nystatin), +atrophic pink scars with surrounding purple purpuric patches on b/l LE (followed by derm for bullous pemphigoid, rec no treatment  *PE findings d/w CTS Attending Dr. Onofre, ok to proceed for OR tomorrow per Dr. nOofre      Pt has AICD/PPM [ ] Yes  [x ] No             Brand Name:  Type & Cross  [x ] Yes  [ ] No  NPO after Midnight [x ] Yes  [ ] No  Pre-op ABX ordered, to be taped on chart:  [x ] Yes  [ ] No   (Ordered as per ID clearance. As per ID Dr. Adela Lennon, patient tolerated Vancomycin and recommended ABx for pre-op).  Hibiclens/Peridex ordered [x ] Yes  [ ] No  Intraop on Hold: PRBCs, CXR, NAPOLEON [x ]   Consent obtained  [x ] Yes  [ ] No

## 2024-11-10 NOTE — CONSULT NOTE ADULT - SUBJECTIVE AND OBJECTIVE BOX
INFECTIOUS DISEASE CONSULT NOTE    Patient is a 74y old  Female who presents with a chief complaint of Symptomatic anemia, Syncope (10 Nov 2024 14:44)    HPI:  74F with  chronic anemia, bullous pemphigoid on IVIG, HTN, HLD, DM, hypothyroidism, chronic back pain presents after syncopal episode. 1 minute of CPR was initiated in the field by EMS because there was concern of pulseness with unresponsiveness. Patient was hypotensive upon arrival and found to have a hgb of 5.1. Patient reports she syncopized after receiving IVIG. Reports she had been feeling lightheaded and SOB for a few days prior to this event. Has had bout of anemia in the past treated with iron supplementation, folate and B12. She denies any hematuria, melena, gingival bleeding. She reports significant improvement of symptoms since receiving 2u of pRBCs. Her only current complaint is acute on chronic back pain from her stretcher. Patient denies fever, chills, chest pain, SOB, headache, dizziness, abd pain, nausea, vomiting, constipation, dysuria.    Patient has had work up for anemia in the past including bone marrow biopsy in , but no clear diagnosis/cause was ever established. (28 Oct 2024 23:52)      As above  Patient admitted for syncope and symptomatic anemia workup   Found to have severe AS scheduled for TAVR  ID consulted for surgical prophylaxis antibiotic recommendation due to history of allergies  Patient had rash and hives with cephalexin ~5 years ago   Patient had hives with penicillin ~10 years ago   Infusion reaction with vancomcyin, has tolerated slower infusion in the past.     REVIEW OF SYSTEMS:  Constitutional: No fevers, No chills, No weight loss, No fatigue   Skin: No rash, no phlebitis	  Eyes: No discharge, No change in vision	  ENMT: No sore throat, No ulcers  Respiratory: No cough, no SOB  Cardiovascular:  No chest pain, No palpitations   Gastrointestinal: No pain, No nausea, No vomiting, No diarrhea, No constipation	  Genitourinary: No dysuria, No frequency, No hesitancy, No flank pain  MSK: chronic back pain  Neurological: No HA, no weakness, no seizures, no AMS      Prior hospital charts reviewed [Yes]  Primary team notes reviewed [Yes]  Other consultant notes reviewed [Yes]    PAST MEDICAL & SURGICAL HISTORY:  BP (bullous pemphigoid)  HTN (hypertension)  HLD (hyperlipidemia)  Hypothyroidism          SOCIAL HISTORY:  previously worked as an         Allergies:  penicillin (Stomach Upset; Nausea; Swelling; Hives)  cephalexin (Swelling; Rash; Nausea; Hives)  vancomycin (Red Man Synd)      ANTIMICROBIALS:  vancomycin  IVPB 1500 once      ANTIMICROBIALS (past 90 days):  MEDICATIONS  (STANDING):    aztreonam  IVPB   100 mL/Hr IV Intermittent (10-29-24 @ 21:22)    aztreonam  IVPB   100 mL/Hr IV Intermittent (10-31-24 @ 05:09)   100 mL/Hr IV Intermittent (10-30-24 @ 21:05)   100 mL/Hr IV Intermittent (10-30-24 @ 14:24)   100 mL/Hr IV Intermittent (10-30-24 @ 05:05)    levoFLOXacin IVPB   100 mL/Hr IV Intermittent (24 @ 11:50)   100 mL/Hr IV Intermittent (24 @ 12:03)   100 mL/Hr IV Intermittent (10-31-24 @ 12:24)        OTHER MEDS:   MEDICATIONS  (STANDING):  acetaminophen     Tablet .. 650 every 6 hours PRN  atorvastatin 20 at bedtime  dextrose 50% Injectable 25 once  dextrose 50% Injectable 25 once  dextrose 50% Injectable 12.5 once  dextrose Oral Gel 15 once PRN  gabapentin 800 four times a day  glucagon  Injectable 1 once  insulin glargine Injectable (LANTUS) 13 at bedtime  insulin lispro (ADMELOG) corrective regimen sliding scale  three times a day before meals  insulin lispro (ADMELOG) corrective regimen sliding scale  at bedtime  insulin lispro Injectable (ADMELOG) 4 three times a day before meals  levothyroxine 300 <User Schedule>  levothyroxine 150 <User Schedule>  melatonin 3 at bedtime PRN  metoprolol tartrate 50 two times a day  polyethylene glycol 3350 17 daily  predniSONE   Tablet 10 daily  sacubitril 24 mG/valsartan 26 mG 1 two times a day  senna 2 at bedtime  spironolactone 25 daily  traMADol 50 every 6 hours PRN      VITALS:  Vital Signs Last 24 Hrs  T(F): 98.3 (11-10-24 @ 16:45), Max: 99.6 (24 @ 20:02)    Vital Signs Last 24 Hrs  HR: 99 (11-10-24 @ 16:45) (91 - 112)  BP: 111/70 (11-10-24 @ 16:45) (95/56 - 123/65)  RR: 18 (11-10-24 @ 16:45)  SpO2: 95% (11-10-24 @ 16:45) (94% - 98%)  Wt(kg): --    EXAM:  General: Patient appears comfortable, in no acute distress  HEENT: PERRL, anicteric sclera, mucous membranes moist   Neck: Supple, No lymphadenopathy  CV: +S1/S2, RRR, no murmurs heard  Lungs: No respiratory distress, CTA b/l  Abd:  BS4+, Soft, NTND, no guarding  : No suprapubic tenderness  Neuro: AAOx3. No focal deficits noted.   Ext: No cyanosis, no edema  Msk: freely moving upper and lower extremities  Skin: No rash      Labs:                        6.7    13.19 )-----------( 427      ( 10 Nov 2024 12:48 )             22.7     11-10    129[L]  |  95[L]  |  40[H]  ----------------------------<  204[H]  5.6[H]   |  25  |  0.83    Ca    8.8      10 Nov 2024 11:27  Phos  4.4     11-10  Mg     2.3     11-10        WBC Trend:  WBC Count: 13.19 (11-10-24 @ 12:48)  WBC Count: 11.74 (11-10-24 @ 11:27)  WBC Count: 10.64 (24 @ 15:35)  WBC Count: 8.45 (24 @ 22:44)      Auto Neutrophil #: 10.69 K/uL (11-10-24 @ 12:48)  Auto Neutrophil #: 13.22 K/uL (10-29-24 @ 18:43)  Auto Neutrophil #: 10.97 K/uL (10-28-24 @ 16:31)      Creatine Trend:  Creatinine: 0.83 (11-10)  Creatinine: 1.12 ()  Creatinine: 0.69 ()  Creatinine: 0.68 ()      Liver Biochemical Testing Trend:  Alanine Aminotransferase (ALT/SGPT): 15 ()  Alanine Aminotransferase (ALT/SGPT): 33 (10-29)  Alanine Aminotransferase (ALT/SGPT): 33 (10-29)  Alanine Aminotransferase (ALT/SGPT): 29 (10-28)  Aspartate Aminotransferase (AST/SGOT): 17 (24 @ 22:44)  Aspartate Aminotransferase (AST/SGOT): 45 (10-29-24 @ 20:51)  Aspartate Aminotransferase (AST/SGOT): 45 (10-29-24 @ 06:04)  Aspartate Aminotransferase (AST/SGOT): 39 (10-28-24 @ 16:31)  Bilirubin Total: 0.3 ()  Bilirubin Total: 0.5 (10-29)  Bilirubin Total: 0.5 (10-29)  Bilirubin Total: 0.2 (10-28)      Trend LDH  10-29-24 @ 12:09  255[H]      Auto Eosinophil %: 0.4 % (11-10-24 @ 12:48)      Urinalysis Basic - ( 10 Nov 2024 15:27 )    Color: Yellow / Appearance: Clear / S.018 / pH: x  Gluc: x / Ketone: Negative mg/dL  / Bili: Negative / Urobili: 0.2 mg/dL   Blood: x / Protein: Negative mg/dL / Nitrite: Negative   Leuk Esterase: Negative / RBC: x / WBC x   Sq Epi: x / Non Sq Epi: x / Bacteria: x        MICROBIOLOGY:    MRSA PCR Result.: Romaine (24 @ 16:19)      Culture - Urine (collected 30 Oct 2024 00:17)  Source: Clean Catch Clean Catch (Midstream)  Final Report:    >100,000 CFU/ml Escherichia coli  Organism: Escherichia coli  Organism: Escherichia coli    Sensitivities:      Method Type: ARIANE      -  Amoxicillin/Clavulanic Acid: S <=8/4      -  Ampicillin: S <=8 These ampicillin results predict results for amoxicillin      -  Ampicillin/Sulbactam: S <=4/2      -  Aztreonam: S <=4      -  Cefazolin: S <=2 For uncomplicated UTI with K. pneumoniae, E. coli, or P. mirablis: ARIANE <=16 is sensitive and ARIANE >=32 is resistant. This also predicts results for oral agents cefaclor, cefdinir, cefpodoxime, cefprozil, cefuroxime axetil, cephalexin and locarbef for uncomplicated UTI. Note that some isolates may be susceptible to these agents while testing resistant to cefazolin.      -  Cefepime: S <=2      -  Cefoxitin: S <=8      -  Ceftriaxone: S <=1      -  Cefuroxime: S <=4      -  Ciprofloxacin: S <=0.25      -  Ertapenem: S <=0.5      -  Gentamicin: S <=2      -  Imipenem: S <=1      -  Levofloxacin: S <=0.5      -  Meropenem: S <=1      -  Nitrofurantoin: S <=32 Should not be used to treat pyelonephritis      -  Piperacillin/Tazobactam: S <=8      -  Tobramycin: S <=2      -  Trimethoprim/Sulfamethoxazole: S <=0.5/9.5    Culture - Blood (collected 30 Oct 2024 00:04)  Source: .Blood BLOOD  Final Report:    No growth at 5 days    Culture - Blood (collected 30 Oct 2024 00:02)  Source: .Blood BLOOD  Final Report:    No growth at 5 days              A1C with Estimated Average Glucose Result: 5.3 % (10-30-24 @ 06:22)  A1C with Estimated Average Glucose Result: 5.0 % (10-29-24 @ 06:04)      RADIOLOGY:  < from: CT Angio Chest PE Protocol w/ IV Cont (10.28.24 @ 16:46) >  IMPRESSION:  No evidence of pulmonary embolism.    < end of copied text >  < from: [facility] Duplex Lower Ext Vein Scan, Bilat (10.31.24 @ 17:02) >  IMPRESSION:    Limited study as above.    Acute lower extremity DVT, below the knee, involving the right   gastrocnemius vein.    No acute DVT of the left lower extremity.    < end of copied text >  < from: CT Angio Abdomen and Pelvis TAVR BRENDON w/wo IV Cont (24 @ 15:13) >    IMPRESSION:  Interval development of small to mild bilateral pleural effusions with   bibasilar atelectasis as compared to 10/28/2024. Additional patchy   groundglass airspace disease in the bilateral upper lobes new from prior   exam may represent early pulmonary edema however correlate to exclude   infectious or inflammatory etiology.    Indeterminate left adrenal lesion. Indeterminate subcentimeter right   renal lesion.    Additional findings as described above.    < end of copied text >  imaging below personally reviewed   INFECTIOUS DISEASE CONSULT NOTE    Patient is a 74y old  Female who presents with a chief complaint of Symptomatic anemia, Syncope (10 Nov 2024 14:44)    HPI:  74F with  chronic anemia, bullous pemphigoid on IVIG, HTN, HLD, DM, hypothyroidism, chronic back pain presents after syncopal episode. 1 minute of CPR was initiated in the field by EMS because there was concern of pulseness with unresponsiveness. Patient was hypotensive upon arrival and found to have a hgb of 5.1. Patient reports she syncopized after receiving IVIG. Reports she had been feeling lightheaded and SOB for a few days prior to this event. Has had bout of anemia in the past treated with iron supplementation, folate and B12. She denies any hematuria, melena, gingival bleeding. She reports significant improvement of symptoms since receiving 2u of pRBCs. Her only current complaint is acute on chronic back pain from her stretcher. Patient denies fever, chills, chest pain, SOB, headache, dizziness, abd pain, nausea, vomiting, constipation, dysuria.    Patient has had work up for anemia in the past including bone marrow biopsy in , but no clear diagnosis/cause was ever established. (28 Oct 2024 23:52)      As above  Patient admitted for syncope and symptomatic anemia workup   Found to have severe AS scheduled for TAVR  ID consulted for surgical prophylaxis antibiotic recommendation due to history of allergies  Patient had rash and hives with cephalexin ~5 years ago   Patient had hives with penicillin ~10 years ago   Infusion reaction with vancomcyin, has tolerated slower infusion in the past.     REVIEW OF SYSTEMS:  Constitutional: No fevers, No chills, No weight loss, No fatigue   Skin: No rash, no phlebitis	  Eyes: No discharge, No change in vision	  ENMT: No sore throat, No ulcers  Respiratory: No cough, no SOB  Cardiovascular:  No chest pain, No palpitations   Gastrointestinal: No pain, No nausea, No vomiting, No diarrhea, No constipation	  Genitourinary: No dysuria, No frequency, No hesitancy, No flank pain  MSK: chronic back pain  Neurological: No HA, no weakness, no seizures, no AMS      Prior hospital charts reviewed [Yes]  Primary team notes reviewed [Yes]  Other consultant notes reviewed [Yes]    PAST MEDICAL & SURGICAL HISTORY:  BP (bullous pemphigoid)  HTN (hypertension)  HLD (hyperlipidemia)  Hypothyroidism          SOCIAL HISTORY:  previously worked as an   No tobacco or illicit drugs    FAMILY HISTORY:  No known family history of cardiac disease      Allergies:  penicillin (Stomach Upset; Nausea; Swelling; Hives)  cephalexin (Swelling; Rash; Nausea; Hives)  vancomycin (Red Man Synd)      ANTIMICROBIALS:  vancomycin  IVPB 1500 once      ANTIMICROBIALS (past 90 days):  MEDICATIONS  (STANDING):    aztreonam  IVPB   100 mL/Hr IV Intermittent (10-29-24 @ 21:22)    aztreonam  IVPB   100 mL/Hr IV Intermittent (10-31-24 @ 05:09)   100 mL/Hr IV Intermittent (10-30-24 @ 21:05)   100 mL/Hr IV Intermittent (10-30-24 @ 14:24)   100 mL/Hr IV Intermittent (10-30-24 @ 05:05)    levoFLOXacin IVPB   100 mL/Hr IV Intermittent (24 @ 11:50)   100 mL/Hr IV Intermittent (24 @ 12:03)   100 mL/Hr IV Intermittent (10-31-24 @ 12:24)        OTHER MEDS:   MEDICATIONS  (STANDING):  acetaminophen     Tablet .. 650 every 6 hours PRN  atorvastatin 20 at bedtime  dextrose 50% Injectable 25 once  dextrose 50% Injectable 25 once  dextrose 50% Injectable 12.5 once  dextrose Oral Gel 15 once PRN  gabapentin 800 four times a day  glucagon  Injectable 1 once  insulin glargine Injectable (LANTUS) 13 at bedtime  insulin lispro (ADMELOG) corrective regimen sliding scale  three times a day before meals  insulin lispro (ADMELOG) corrective regimen sliding scale  at bedtime  insulin lispro Injectable (ADMELOG) 4 three times a day before meals  levothyroxine 300 <User Schedule>  levothyroxine 150 <User Schedule>  melatonin 3 at bedtime PRN  metoprolol tartrate 50 two times a day  polyethylene glycol 3350 17 daily  predniSONE   Tablet 10 daily  sacubitril 24 mG/valsartan 26 mG 1 two times a day  senna 2 at bedtime  spironolactone 25 daily  traMADol 50 every 6 hours PRN      VITALS:  Vital Signs Last 24 Hrs  T(F): 98.3 (11-10-24 @ 16:45), Max: 99.6 (24 @ 20:02)    Vital Signs Last 24 Hrs  HR: 99 (11-10-24 @ 16:45) (91 - 112)  BP: 111/70 (11-10-24 @ 16:45) (95/56 - 123/65)  RR: 18 (11-10-24 @ 16:45)  SpO2: 95% (11-10-24 @ 16:45) (94% - 98%)  Wt(kg): --    EXAM:  General: Patient appears comfortable, in no acute distress  HEENT: PERRL, anicteric sclera, mucous membranes moist   Neck: Supple, No lymphadenopathy  CV: +S1/S2, RRR, no murmurs heard  Lungs: No respiratory distress, CTA b/l  Abd:  BS4+, Soft, NTND, no guarding  : No suprapubic tenderness  Neuro: AAOx3. No focal deficits noted.   Ext: No cyanosis, no edema  Msk: freely moving upper and lower extremities  Skin: No rash      Labs:                        6.7    13.19 )-----------( 427      ( 10 Nov 2024 12:48 )             22.7     11-10    129[L]  |  95[L]  |  40[H]  ----------------------------<  204[H]  5.6[H]   |  25  |  0.83    Ca    8.8      10 Nov 2024 11:27  Phos  4.4     11-10  Mg     2.3     11-10        WBC Trend:  WBC Count: 13.19 (11-10-24 @ 12:48)  WBC Count: 11.74 (11-10-24 @ 11:27)  WBC Count: 10.64 (24 @ 15:35)  WBC Count: 8.45 (24 @ 22:44)      Auto Neutrophil #: 10.69 K/uL (11-10-24 @ 12:48)  Auto Neutrophil #: 13.22 K/uL (10-29-24 @ 18:43)  Auto Neutrophil #: 10.97 K/uL (10-28-24 @ 16:31)      Creatine Trend:  Creatinine: 0.83 (11-10)  Creatinine: 1.12 ()  Creatinine: 0.69 ()  Creatinine: 0.68 ()      Liver Biochemical Testing Trend:  Alanine Aminotransferase (ALT/SGPT): 15 ()  Alanine Aminotransferase (ALT/SGPT): 33 (10-29)  Alanine Aminotransferase (ALT/SGPT): 33 (10-29)  Alanine Aminotransferase (ALT/SGPT): 29 (10-28)  Aspartate Aminotransferase (AST/SGOT): 17 (24 @ 22:44)  Aspartate Aminotransferase (AST/SGOT): 45 (10-29-24 @ 20:51)  Aspartate Aminotransferase (AST/SGOT): 45 (10-29-24 @ 06:04)  Aspartate Aminotransferase (AST/SGOT): 39 (10-28-24 @ 16:31)  Bilirubin Total: 0.3 ()  Bilirubin Total: 0.5 (10-29)  Bilirubin Total: 0.5 (10-29)  Bilirubin Total: 0.2 (10-28)      Trend LDH  10-29-24 @ 12:09  255[H]      Auto Eosinophil %: 0.4 % (11-10-24 @ 12:48)      Urinalysis Basic - ( 10 Nov 2024 15:27 )    Color: Yellow / Appearance: Clear / S.018 / pH: x  Gluc: x / Ketone: Negative mg/dL  / Bili: Negative / Urobili: 0.2 mg/dL   Blood: x / Protein: Negative mg/dL / Nitrite: Negative   Leuk Esterase: Negative / RBC: x / WBC x   Sq Epi: x / Non Sq Epi: x / Bacteria: x        MICROBIOLOGY:    MRSA PCR Result.: Romaine (24 @ 16:19)      Culture - Urine (collected 30 Oct 2024 00:17)  Source: Clean Catch Clean Catch (Midstream)  Final Report:    >100,000 CFU/ml Escherichia coli  Organism: Escherichia coli  Organism: Escherichia coli    Sensitivities:      Method Type: ARIANE      -  Amoxicillin/Clavulanic Acid: S <=8/4      -  Ampicillin: S <=8 These ampicillin results predict results for amoxicillin      -  Ampicillin/Sulbactam: S <=4/2      -  Aztreonam: S <=4      -  Cefazolin: S <=2 For uncomplicated UTI with K. pneumoniae, E. coli, or P. mirablis: ARIANE <=16 is sensitive and ARIANE >=32 is resistant. This also predicts results for oral agents cefaclor, cefdinir, cefpodoxime, cefprozil, cefuroxime axetil, cephalexin and locarbef for uncomplicated UTI. Note that some isolates may be susceptible to these agents while testing resistant to cefazolin.      -  Cefepime: S <=2      -  Cefoxitin: S <=8      -  Ceftriaxone: S <=1      -  Cefuroxime: S <=4      -  Ciprofloxacin: S <=0.25      -  Ertapenem: S <=0.5      -  Gentamicin: S <=2      -  Imipenem: S <=1      -  Levofloxacin: S <=0.5      -  Meropenem: S <=1      -  Nitrofurantoin: S <=32 Should not be used to treat pyelonephritis      -  Piperacillin/Tazobactam: S <=8      -  Tobramycin: S <=2      -  Trimethoprim/Sulfamethoxazole: S <=0.5/9.5    Culture - Blood (collected 30 Oct 2024 00:04)  Source: .Blood BLOOD  Final Report:    No growth at 5 days    Culture - Blood (collected 30 Oct 2024 00:02)  Source: .Blood BLOOD  Final Report:    No growth at 5 days              A1C with Estimated Average Glucose Result: 5.3 % (10-30-24 @ 06:22)  A1C with Estimated Average Glucose Result: 5.0 % (10-29-24 @ 06:04)      RADIOLOGY:  < from: CT Angio Chest PE Protocol w/ IV Cont (10.28.24 @ 16:46) >  IMPRESSION:  No evidence of pulmonary embolism.    < end of copied text >  < from: [facility] Duplex Lower Ext Vein Scan, Bilat (10.31.24 @ 17:02) >  IMPRESSION:    Limited study as above.    Acute lower extremity DVT, below the knee, involving the right   gastrocnemius vein.    No acute DVT of the left lower extremity.    < end of copied text >  < from: CT Angio Abdomen and Pelvis TAVR BRENDON w/wo IV Cont (24 @ 15:13) >    IMPRESSION:  Interval development of small to mild bilateral pleural effusions with   bibasilar atelectasis as compared to 10/28/2024. Additional patchy   groundglass airspace disease in the bilateral upper lobes new from prior   exam may represent early pulmonary edema however correlate to exclude   infectious or inflammatory etiology.    Indeterminate left adrenal lesion. Indeterminate subcentimeter right   renal lesion.    Additional findings as described above.    < end of copied text >  imaging below personally reviewed

## 2024-11-10 NOTE — CONSULT NOTE ADULT - REASON FOR ADMISSION
Symptomatic anemia, Syncope

## 2024-11-10 NOTE — CONSULT NOTE ADULT - ATTENDING COMMENTS
74-yo F w/ PMH of bullous pemphigoid on IVIG, DM, and HTN, admitted for syncopal workup, found to have new onset heart failure w/ AS and RLE DVT. UCx 10/30 w/ E coli, s/p aztreonam. Scheduled for TAVR 11/11. ID was consulted for antibiotic prophylaxis i/s/o reported allergic reaction to antibiotics.    Reported rash to penicillin. Had "Red Man Syndrome" with vancomycin in the past, however tolerated with slow infusion.    #CHF  #AS  #Leukocytosis  #DVT  #Antibiotic allergy    Recommendations:  - Vancomycin 1.5 g IV Q12H w/ extended infusion protocol for pre-op prophylaxis. Plan to continue short period post surg pending the nature of the course  - Check vancomycin trough pre-4th dose.  - AC per primary team  - F/u VS and WBC    Plan discussed with ID fellow physician.  Thank you for this consult.    Kenn Green MD, PhD  Attending Physician  Division of Infectious Diseases  Department of Medicine    Please contact through MS Teams message.  Office: [phone] (after 5 PM or weekend)
BP on dupixent, prednisone and IVIG. Well controlled. Lesions on buttocks are not active and represent atrophic scarring in areas of prior bullae. Recent episode of hypotension temporally assoc w/ IVIG. Would rechallenge pt with IVIG while closely monitoring in hospital, considering a slower infusion or lower daily dose given CHF (can spread over 4 days instead of 3)
Agree with above. Patient with severe anemia, and presented with syncope today. She denies any melena/hematochezia or signs of GI bleeding. She reports prior colonoscopy but no prior EGD or capsule endoscopy, reportedly has been told she was anemic but has not had full GI workup. Would follow-up cardiology workup for syncope. If cleared from cardiac perspective, will offer EGD/colonoscopy +/- VCE. Check iron studies. Discussed with patient EGD/colonoscopy and risks including bleeding/infection/perforation. She wants to think about it while she completes cardiac workup.

## 2024-11-11 ENCOUNTER — RESULT REVIEW (OUTPATIENT)
Age: 74
End: 2024-11-11

## 2024-11-11 ENCOUNTER — TRANSCRIPTION ENCOUNTER (OUTPATIENT)
Age: 74
End: 2024-11-11

## 2024-11-11 ENCOUNTER — APPOINTMENT (OUTPATIENT)
Dept: CARDIOTHORACIC SURGERY | Facility: HOSPITAL | Age: 74
End: 2024-11-11

## 2024-11-11 DIAGNOSIS — E87.5 HYPERKALEMIA: ICD-10-CM

## 2024-11-11 DIAGNOSIS — Z95.3 PRESENCE OF XENOGENIC HEART VALVE: ICD-10-CM

## 2024-11-11 DIAGNOSIS — D64.9 ANEMIA, UNSPECIFIED: ICD-10-CM

## 2024-11-11 DIAGNOSIS — I35.0 NONRHEUMATIC AORTIC (VALVE) STENOSIS: ICD-10-CM

## 2024-11-11 LAB
ANION GAP SERPL CALC-SCNC: 12 MMOL/L — SIGNIFICANT CHANGE UP (ref 5–17)
ANION GAP SERPL CALC-SCNC: 9 MMOL/L — SIGNIFICANT CHANGE UP (ref 5–17)
APTT BLD: 26.9 SEC — SIGNIFICANT CHANGE UP (ref 24.5–35.6)
BUN SERPL-MCNC: 37 MG/DL — HIGH (ref 7–23)
BUN SERPL-MCNC: 48 MG/DL — HIGH (ref 7–23)
CALCIUM SERPL-MCNC: 8.4 MG/DL — SIGNIFICANT CHANGE UP (ref 8.4–10.5)
CALCIUM SERPL-MCNC: 9 MG/DL — SIGNIFICANT CHANGE UP (ref 8.4–10.5)
CHLORIDE SERPL-SCNC: 95 MMOL/L — LOW (ref 96–108)
CHLORIDE SERPL-SCNC: 96 MMOL/L — SIGNIFICANT CHANGE UP (ref 96–108)
CO2 SERPL-SCNC: 22 MMOL/L — SIGNIFICANT CHANGE UP (ref 22–31)
CO2 SERPL-SCNC: 25 MMOL/L — SIGNIFICANT CHANGE UP (ref 22–31)
CREAT SERPL-MCNC: 0.81 MG/DL — SIGNIFICANT CHANGE UP (ref 0.5–1.3)
CREAT SERPL-MCNC: 1.07 MG/DL — SIGNIFICANT CHANGE UP (ref 0.5–1.3)
EGFR: 55 ML/MIN/1.73M2 — LOW
EGFR: 77 ML/MIN/1.73M2 — SIGNIFICANT CHANGE UP
GAS PNL BLDA: SIGNIFICANT CHANGE UP
GAS PNL BLDA: SIGNIFICANT CHANGE UP
GLUCOSE BLDC GLUCOMTR-MCNC: 169 MG/DL — HIGH (ref 70–99)
GLUCOSE BLDC GLUCOMTR-MCNC: 227 MG/DL — HIGH (ref 70–99)
GLUCOSE BLDC GLUCOMTR-MCNC: 386 MG/DL — HIGH (ref 70–99)
GLUCOSE SERPL-MCNC: 128 MG/DL — HIGH (ref 70–99)
GLUCOSE SERPL-MCNC: 170 MG/DL — HIGH (ref 70–99)
HCT VFR BLD CALC: 24 % — LOW (ref 34.5–45)
HCT VFR BLD CALC: 26.4 % — LOW (ref 34.5–45)
HGB BLD-MCNC: 7.2 G/DL — LOW (ref 11.5–15.5)
HGB BLD-MCNC: 8.3 G/DL — LOW (ref 11.5–15.5)
INR BLD: 0.96 RATIO — SIGNIFICANT CHANGE UP (ref 0.85–1.16)
MCHC RBC-ENTMCNC: 27.4 PG — SIGNIFICANT CHANGE UP (ref 27–34)
MCHC RBC-ENTMCNC: 27.4 PG — SIGNIFICANT CHANGE UP (ref 27–34)
MCHC RBC-ENTMCNC: 30 G/DL — LOW (ref 32–36)
MCHC RBC-ENTMCNC: 31.4 G/DL — LOW (ref 32–36)
MCV RBC AUTO: 87.1 FL — SIGNIFICANT CHANGE UP (ref 80–100)
MCV RBC AUTO: 91.3 FL — SIGNIFICANT CHANGE UP (ref 80–100)
NRBC # BLD: 0 /100 WBCS — SIGNIFICANT CHANGE UP (ref 0–0)
NRBC # BLD: 0 /100 WBCS — SIGNIFICANT CHANGE UP (ref 0–0)
PLATELET # BLD AUTO: 378 K/UL — SIGNIFICANT CHANGE UP (ref 150–400)
PLATELET # BLD AUTO: 443 K/UL — HIGH (ref 150–400)
POTASSIUM SERPL-MCNC: 5.6 MMOL/L — HIGH (ref 3.5–5.3)
POTASSIUM SERPL-MCNC: 5.7 MMOL/L — HIGH (ref 3.5–5.3)
POTASSIUM SERPL-SCNC: 5.6 MMOL/L — HIGH (ref 3.5–5.3)
POTASSIUM SERPL-SCNC: 5.7 MMOL/L — HIGH (ref 3.5–5.3)
PROTHROM AB SERPL-ACNC: 10.9 SEC — SIGNIFICANT CHANGE UP (ref 9.9–13.4)
RBC # BLD: 2.63 M/UL — LOW (ref 3.8–5.2)
RBC # BLD: 3.03 M/UL — LOW (ref 3.8–5.2)
RBC # FLD: 18.5 % — HIGH (ref 10.3–14.5)
RBC # FLD: 19.5 % — HIGH (ref 10.3–14.5)
SODIUM SERPL-SCNC: 129 MMOL/L — LOW (ref 135–145)
SODIUM SERPL-SCNC: 130 MMOL/L — LOW (ref 135–145)
WBC # BLD: 11.59 K/UL — HIGH (ref 3.8–10.5)
WBC # BLD: 21.86 K/UL — HIGH (ref 3.8–10.5)
WBC # FLD AUTO: 11.59 K/UL — HIGH (ref 3.8–10.5)
WBC # FLD AUTO: 21.86 K/UL — HIGH (ref 3.8–10.5)

## 2024-11-11 PROCEDURE — 33361 REPLACE AORTIC VALVE PERQ: CPT | Mod: Q0,62

## 2024-11-11 PROCEDURE — 93010 ELECTROCARDIOGRAM REPORT: CPT

## 2024-11-11 PROCEDURE — 93306 TTE W/DOPPLER COMPLETE: CPT | Mod: 26

## 2024-11-11 PROCEDURE — 94010 BREATHING CAPACITY TEST: CPT | Mod: 26

## 2024-11-11 RX ORDER — DIAZEPAM 10 MG/1
2.5 FILM BUCCAL ONCE
Refills: 0 | Status: DISCONTINUED | OUTPATIENT
Start: 2024-11-11 | End: 2024-11-11

## 2024-11-11 RX ORDER — LIDOCAINE HYDROCHLORIDE 40 MG/ML
1 SOLUTION TOPICAL ONCE
Refills: 0 | Status: COMPLETED | OUTPATIENT
Start: 2024-11-11 | End: 2024-11-11

## 2024-11-11 RX ORDER — ONDANSETRON HYDROCHLORIDE 2 MG/ML
4 INJECTION, SOLUTION INTRAMUSCULAR; INTRAVENOUS ONCE
Refills: 0 | Status: DISCONTINUED | OUTPATIENT
Start: 2024-11-11 | End: 2024-11-11

## 2024-11-11 RX ORDER — ASPIRIN/MAG CARB/ALUMINUM AMIN 325 MG
81 TABLET ORAL DAILY
Refills: 0 | Status: DISCONTINUED | OUTPATIENT
Start: 2024-11-11 | End: 2024-11-14

## 2024-11-11 RX ORDER — INSULIN LISPRO 100/ML
VIAL (ML) SUBCUTANEOUS
Refills: 0 | Status: DISCONTINUED | OUTPATIENT
Start: 2024-11-11 | End: 2024-11-14

## 2024-11-11 RX ORDER — INSULIN LISPRO 100/ML
VIAL (ML) SUBCUTANEOUS
Refills: 0 | Status: DISCONTINUED | OUTPATIENT
Start: 2024-11-11 | End: 2024-11-12

## 2024-11-11 RX ORDER — VANCOMYCIN HYDROCHLORIDE 50 MG/ML
750 KIT ORAL EVERY 12 HOURS
Refills: 0 | Status: COMPLETED | OUTPATIENT
Start: 2024-11-11 | End: 2024-11-11

## 2024-11-11 RX ORDER — SODIUM ZIRCONIUM CYCLOSILICATE 10 G/10G
5 POWDER, FOR SUSPENSION ORAL ONCE
Refills: 0 | Status: COMPLETED | OUTPATIENT
Start: 2024-11-11 | End: 2024-11-11

## 2024-11-11 RX ORDER — LEVOTHYROXINE SODIUM 88 MCG
300 TABLET ORAL
Refills: 0 | Status: DISCONTINUED | OUTPATIENT
Start: 2024-11-11 | End: 2024-11-14

## 2024-11-11 RX ORDER — ACETAMINOPHEN 500 MG
1000 TABLET ORAL ONCE
Refills: 0 | Status: COMPLETED | OUTPATIENT
Start: 2024-11-11 | End: 2024-11-11

## 2024-11-11 RX ORDER — GABAPENTIN 300 MG/1
800 CAPSULE ORAL ONCE
Refills: 0 | Status: DISCONTINUED | OUTPATIENT
Start: 2024-11-11 | End: 2024-11-11

## 2024-11-11 RX ORDER — METOCLOPRAMIDE HCL 10 MG
10 TABLET ORAL ONCE
Refills: 0 | Status: DISCONTINUED | OUTPATIENT
Start: 2024-11-11 | End: 2024-11-11

## 2024-11-11 RX ORDER — HYDROMORPHONE HCL/0.9% NACL/PF 6 MG/30 ML
0.5 PATIENT CONTROLLED ANALGESIA SYRINGE INTRAVENOUS
Refills: 0 | Status: DISCONTINUED | OUTPATIENT
Start: 2024-11-11 | End: 2024-11-11

## 2024-11-11 RX ORDER — GLUCAGON INJECTION, SOLUTION 1 MG/.2ML
1 INJECTION, SOLUTION SUBCUTANEOUS ONCE
Refills: 0 | Status: DISCONTINUED | OUTPATIENT
Start: 2024-11-11 | End: 2024-11-14

## 2024-11-11 RX ORDER — GABAPENTIN 300 MG/1
800 CAPSULE ORAL
Refills: 0 | Status: DISCONTINUED | OUTPATIENT
Start: 2024-11-11 | End: 2024-11-14

## 2024-11-11 RX ADMIN — VANCOMYCIN HYDROCHLORIDE 250 MILLIGRAM(S): KIT at 22:35

## 2024-11-11 RX ADMIN — TRAMADOL HYDROCHLORIDE 50 MILLIGRAM(S): 50 TABLET, COATED ORAL at 00:22

## 2024-11-11 RX ADMIN — TRAMADOL HYDROCHLORIDE 50 MILLIGRAM(S): 50 TABLET, COATED ORAL at 08:29

## 2024-11-11 RX ADMIN — Medication 650 MILLIGRAM(S): at 00:25

## 2024-11-11 RX ADMIN — Medication 1000 MILLIGRAM(S): at 14:15

## 2024-11-11 RX ADMIN — GABAPENTIN 800 MILLIGRAM(S): 300 CAPSULE ORAL at 23:25

## 2024-11-11 RX ADMIN — Medication 300 MICROGRAM(S): at 05:12

## 2024-11-11 RX ADMIN — TRAMADOL HYDROCHLORIDE 50 MILLIGRAM(S): 50 TABLET, COATED ORAL at 07:29

## 2024-11-11 RX ADMIN — Medication 10: at 22:26

## 2024-11-11 RX ADMIN — Medication 25 MILLIGRAM(S): at 05:12

## 2024-11-11 RX ADMIN — Medication 10 MILLIGRAM(S): at 05:11

## 2024-11-11 RX ADMIN — GABAPENTIN 800 MILLIGRAM(S): 300 CAPSULE ORAL at 19:08

## 2024-11-11 RX ADMIN — NYSTATIN 1 APPLICATION(S): 100000 POWDER TOPICAL at 05:12

## 2024-11-11 RX ADMIN — Medication 20 MILLIGRAM(S): at 22:20

## 2024-11-11 RX ADMIN — Medication 1: at 07:30

## 2024-11-11 RX ADMIN — Medication 81 MILLIGRAM(S): at 19:09

## 2024-11-11 RX ADMIN — CHLORHEXIDINE GLUCONATE 30 MILLILITER(S): 40 SOLUTION TOPICAL at 06:30

## 2024-11-11 RX ADMIN — Medication 400 MILLIGRAM(S): at 14:00

## 2024-11-11 RX ADMIN — CHLORHEXIDINE GLUCONATE 1 APPLICATION(S): 40 SOLUTION TOPICAL at 05:14

## 2024-11-11 RX ADMIN — Medication 4: at 15:03

## 2024-11-11 RX ADMIN — SODIUM ZIRCONIUM CYCLOSILICATE 5 GRAM(S): 10 POWDER, FOR SUSPENSION ORAL at 05:30

## 2024-11-11 RX ADMIN — DIAZEPAM 2.5 MILLIGRAM(S): 10 FILM BUCCAL at 14:20

## 2024-11-11 RX ADMIN — GABAPENTIN 800 MILLIGRAM(S): 300 CAPSULE ORAL at 05:14

## 2024-11-11 NOTE — PROGRESS NOTE ADULT - ASSESSMENT
74F w/Hx of chronic anemia, bullous pemphigoid on IVIG, HTN, HLD, DM, hypothyroidism, chronic back pain who presented to [facility] 10/28 after syncopal event.  Found to have severe AS and symptomatic anemia.  Patient undergoing workup for TAVR. Hospital course c/b Acute lower extremity DVT, below the knee, involving the right gastrocnemius vein. urine culture on  10/30 positive for E.coli which was empirically treated with Aztreonam.     11/11 s/p Transfemoral TAVR General anesthesia 26mm Evolut Fx+ with pre and post dilation. Post op Course: (+) Delirium.  74F w/Hx of chronic anemia, bullous pemphigoid on IVIG, HTN, HLD, DM, hypothyroidism, chronic back pain who presented to [facility] 10/28 after syncopal event.  Found to have severe AS and symptomatic anemia.  Patient undergoing workup for TAVR. Hospital course c/b Acute lower extremity DVT, below the knee, involving the right gastrocnemius vein. urine culture on  10/30 positive for E.coli which was empirically treated with Aztreonam.     11/11 H&H 7/24 --> Transfused with 1 unit PRBC.   11/11 s/p Transfemoral TAVR General anesthesia 26mm Evolut Fx+ with pre and post dilation.   Post op Course:  Recovered in PACU --> Post op Delirium now resolved.  Hyperkalemia --> serum K+ 5.6; repeat 6.4.  Discussed with structural team.  Will repeat K+ now.  [facility]  Daily EKG.  TTE in AM.

## 2024-11-11 NOTE — PACU DISCHARGE NOTE - NAUSEA/VOMITING:
Patient has an appointment scheduled with Kaley Gore on 10/17/23 @ 10:30am.  Wondering if she can get a refill on her Pantoprazole 40mg to get her through to her appointment.  Jaspal Damian/Karey.    
None
None

## 2024-11-11 NOTE — CHART NOTE - NSCHARTNOTEFT_GEN_A_CORE
Medicine PA Note     Patient is a 74y old  Female who presents with a chief complaint of Symptomatic anemia, Syncope (10 Nov 2024 14:44)  Notified by RN, patient with slight confusion. Pt seen and examined at bedside, alert and oriented x3, however slightly confused, asking to turn her bed to its original position. Pt states she is going for TAVR procedure today and refuses to answer any further questions.     Vital Signs Last 24 Hrs  T(C): 36.9 (11 Nov 2024 04:42), Max: 36.9 (10 Nov 2024 20:34)  T(F): 98.4 (11 Nov 2024 04:42), Max: 98.4 (10 Nov 2024 20:34)  HR: 78 (11 Nov 2024 04:42) (73 - 112)  BP: 95/60 (11 Nov 2024 04:42) (95/59 - 123/65)  BP(mean): --  RR: 18 (11 Nov 2024 04:42) (16 - 18)  SpO2: 92% (11 Nov 2024 04:42) (92% - 95%)    Parameters below as of 11 Nov 2024 04:42  Patient On (Oxygen Delivery Method): room air          Labs:                        7.2    11.59 )-----------( 443      ( 11 Nov 2024 04:00 )             24.0     11-11    130[L]  |  96  |  48[H]  ----------------------------<  128[H]  5.6[H]   |  22  |  1.07    Ca    9.0      11 Nov 2024 04:00  Phos  4.4     11-10  Mg     2.3     11-10            REVIEW OF SYSTEMS:    CONSTITUTIONAL: No weakness, fevers or chills  EYES/ENT: No visual changes;  No vertigo or throat pain   NECK: No pain or stiffness  RESPIRATORY: No cough, wheezing, hemoptysis; No shortness of breath  CARDIOVASCULAR: No palpitations  GASTROINTESTINAL: No abdominal or epigastric pain. No nausea, vomiting, or hematemesis; No diarrhea or constipation. No melena or hematochezia.  GENITOURINARY: No dysuria, frequency or hematuria  NEUROLOGICAL: No numbness or weakness  SKIN: No itching, rashes    Physical Exam:  General: WN/WD NAD  Neurology: A&Ox3, nonfocal, COVARRUBIAS x 4  Head:  Normocephalic, atraumatic  Respiratory: CTA B/L  CV: RRR, S1S2, no murmur  Abdominal: Soft, NT, ND no palpable mass  MSK: No edema, + peripheral pulses, FROM all 4 extremit    Assessment & Plan:  HPI:  74F w/Hx of chronic anemia, bullous pemphigoid on IVIG, HTN, HLD, DM, hypothyroidism, chronic back pain presents after syncopal episode. 1 minute of CPR was initiated in the field by EMS because there was concern of pulseness with unresponsiveness. Patient was hypotensive upon arrival and found to have a hgb of 5.1. Patient reports she syncopized after receiving IVIG. Reports she had been feeling lightheaded and SOB for a few days prior to this event. Has had bout of anemia in the past treated with iron supplementation, folate and B12. She denies any hematuria, melena, gingival bleeding. She reports significant improvement of symptoms since receiving 2u of pRBCs. Her only current complaint is acute on chronic back pain from her stretcher. Patient denies fever, chills, chest pain, SOB, headache, dizziness, abd pain, nausea, vomiting, constipation, dysuria.    Patient has had work up for anemia in the past including bone marrow biopsy in 2014, but no clear diagnosis/cause was ever established. (28 Oct 2024 23:52)  Pt now slightly delirious. Pt oriented to person, place, and year. Pt is well aware of planned procedure today. Upon physical exam, no neurological deficits. Pt likely with in hospital delirium.     1) In Hospital Delirium  - Pt hemodynamically stable, VSS  - STAT Urinalysis to rule out Infection  - ABG- asses for adequate oxygenation  - If Delirium worsens, would consider CT head   - c/w monitoring overnight  - will endorse to AM Team    Marleny Ureña PA-C  Department of Medicine   =

## 2024-11-11 NOTE — PROVIDER CONTACT NOTE (CRITICAL VALUE NOTIFICATION) - TEST AND RESULT REPORTED:
APTT: 122.5
Hgb 6.4
Hemoglobin- 7 and Hemocrit -21
aPTT 157.5
aPTT: >200.0
Hemoglobin 6.9
Hgb: 6.6
aPTT-128.5

## 2024-11-11 NOTE — PROVIDER CONTACT NOTE (CRITICAL VALUE NOTIFICATION) - ASSESSMENT
Pt remains A&O x 4, no s/s visible bleeding, VSS, denies SOB/CP. Had  units PRBCs during this admission for anemia.
Critical Lab Value: aPTT >200.0
VSS. Pt asymptomatic and not actively bleeding
Pt A&Ox4, able to make needs known. Pt asymptomatic. VSS. No s/s of bleeding. Pt denies cp, denies SOB, denies pain.
Pt s/p 2 units PRBC's. Pt AOx4. Pt resting comfortably in bed. No complaints at the moment.
Pt. AXO4. VSS. Hgb: 6.6. On heparin infusion.
Pt. AOX4. No signs of bleeding noted.
Critical Lab Value: aPTT-128.5

## 2024-11-11 NOTE — PROGRESS NOTE ADULT - SUBJECTIVE AND OBJECTIVE BOX
DATE OF SERVICE: 11-11-24 @ 17:04    Patient is a 74y old  Female who presents with a chief complaint of Symptomatic anemia, Syncope (10 Nov 2024 14:44)      INTERVAL HISTORY: s/p TAVR today 11/11    REVIEW OF SYSTEMS:  CONSTITUTIONAL: No weakness  EYES/ENT: No visual changes;  No throat pain   NECK: No pain or stiffness  RESPIRATORY: No cough, wheezing; No shortness of breath  CARDIOVASCULAR: No chest pain or palpitations  GASTROINTESTINAL: No abdominal  pain. No nausea, vomiting, or hematemesis  GENITOURINARY: No dysuria, frequency or hematuria  NEUROLOGICAL: No stroke like symptoms  SKIN: No rashes    TELEMETRY Personally reviewed: SR 80s-100  	  MEDICATIONS:        PHYSICAL EXAM:  T(C): 36.3 (11-11-24 @ 16:00), Max: 37.7 (11-11-24 @ 09:00)  HR: 83 (11-11-24 @ 16:00) (73 - 105)  BP: 115/54 (11-11-24 @ 16:00) (95/60 - 128/55)  RR: 16 (11-11-24 @ 16:00) (16 - 18)  SpO2: 98% (11-11-24 @ 16:00) (92% - 98%)  Wt(kg): --  I&O's Summary    10 Nov 2024 07:01  -  11 Nov 2024 07:00  --------------------------------------------------------  IN: 810 mL / OUT: 1800 mL / NET: -990 mL    11 Nov 2024 07:01  -  11 Nov 2024 17:04  --------------------------------------------------------  IN: 100 mL / OUT: 600 mL / NET: -500 mL      Height (cm): 165.1 (11-11 @ 09:28)  Weight (kg): 133.2 (11-11 @ 09:28)  BMI (kg/m2): 48.9 (11-11 @ 09:28)  BSA (m2): 2.33 (11-11 @ 09:28)    Appearance: In no distress	  HEENT:    PERRL, EOMI	  Cardiovascular:  S1 S2, No JVD  Respiratory: Lungs clear to auscultation	  Gastrointestinal:  Soft, Non-tender, + BS	  Vascularature:  No edema of LE  Psychiatric: Appropriate affect   Neuro: no acute focal deficits                               7.2    11.59 )-----------( 443      ( 11 Nov 2024 04:00 )             24.0     11-11    130[L]  |  96  |  48[H]  ----------------------------<  128[H]  5.6[H]   |  22  |  1.07    Ca    9.0      11 Nov 2024 04:00  Phos  4.4     11-10  Mg     2.3     11-10          Labs personally reviewed      ASSESSMENT/PLAN: 	    74F w/Hx of chronic anemia, bullous pemphigoid on IVIG, HTN, HLD, DM, hypothyroidism, chronic back pain presents after syncopal episode. 1 minute of CPR was initiated in the field by EMS because there was concern of pulseness with unresponsiveness. Patient was hypotensive upon arrival and found to have a hgb of 5.1. Patient reports she syncopized after receiving IVIG. Reports she had been feeling lightheaded and SOB for a few days prior to this event. Has had bout of anemia in the past treated with iron supplementation, folate and B12. She denies any hematuria, melena, gingival bleeding. She reports significant improvement of symptoms since receiving 2u of pRBCs. Her only current complaint is acute on chronic back pain from her stretcher. Patient denies fever, chills, chest pain, SOB, headache, dizziness, abd pain, nausea, vomiting, constipation, dysuria.      Problem/Plan #1:  Problem: Syncope   - Likely symptomatic anemia  - Trop 26 --> 28  - EKG ischemic but likely in setting of Hb of 5.1  - POCUS noted no pericardial effusion, globally reduced LV function   - Echo with reduced EF & severe aortic stenosis  - Replete Hgb > 7   - s/p cath 11/5 with mild disease only, plan for TAVR 11/11  - s/p TAVR today 11/11    Problem/Plan #2:  Problem: Hypertension  - stable on sHF GDMT    Problem/Plan #3:  Problem: HLD  - c/w atorvastatin 20mg PO daily    Problem/Plan #4:  Problem: Cardiac Risk Stratification  - Symptomatic anemia  - Echo with reduced EF noted & severe aortic stenosis  - High risk for low risk nonelective EGD/Colonoscopy 2/2 systolic HF and severe aortic stenosis, planned for monday 11/4  - s/p egd/ colonoscopy today 11/4, no source of bleed identified    Problem/Plan #5:  Problem: Systolic HF  - TTE 10/30 with EF 25-30%  - Switched to Metoprolol tartrate 50mg BID due to episodes PSVT & WCT 11/1  - Entresto 24-26mg BID  - Spironolactone 25mg daily     Problem/Plan #6:  - Problem: Severe Aortic Stenosis  - Structural Dr. Greene consulted   - TAVR workup in progress; tentatively scheduled TAVR 11/11/24  - s/p TAVR today 11/11    Problem/Plan #7:  - Problem: Acute right LE DVT  - heparin gtt as per primary team        Iolani Behrbom, AG-NP   Adama Rollins,  [facility]  Cardiovascular Medicine  [address] [place] [address], [address]  Available through call or text on [employer]  Office: [phone]

## 2024-11-11 NOTE — PROVIDER CONTACT NOTE (CRITICAL VALUE NOTIFICATION) - PERSON GIVING RESULT:
Johanna Cast
Kenia Herring
Destiny Hernandez/Darrel
Nima Aquino/Lab
LAB -- Jonathan Aquino
Segun Zavaleta, Lab
Wil Teague/Laboratory
Ricky Gee, Lab

## 2024-11-11 NOTE — PROVIDER CONTACT NOTE (CRITICAL VALUE NOTIFICATION) - ACTION/TREATMENT ORDERED:
Continue to monitor.
NICK Bustillo notified. Follow nomogram.
Mine made aware. 1 unit PRBC's to be ordered.
Per provider, follow Full AC nomogram per orders. All safety measures maintained, plan of care ongoing, will continue to monitor.
Per provider, follow Full AC nomogram per orders. All safety measures maintained, plan of care ongoing, will continue to monitor.
ACP- Kayla Mckee made aware. agreeable to follow nomogram protocol. Plan of care ongoing.
JUDSON- Kayla Mckee made aware with an order of occult blood test, 1 unit PRBC and cbc post transfusion. Plan of care ongoing.
1 unit PRBC ordered. CBC & T/S to be drawn prior to.

## 2024-11-11 NOTE — BRIEF OPERATIVE NOTE - NSICDXBRIEFPROCEDURE_GEN_ALL_CORE_FT
PROCEDURES:  TAVR, percutaneous femoral approach, using prosthetic valve 11-Nov-2024 14:10:24  Yeimy Vaughan

## 2024-11-11 NOTE — PROVIDER CONTACT NOTE (CRITICAL VALUE NOTIFICATION) - NS PROVIDER READ BACK TO LAB
Per v/o Dr. Zhong, Alprazolam 0.25mg 1 tab TID as needed called to [facility]. Patient was informed.   
yes

## 2024-11-11 NOTE — PROVIDER CONTACT NOTE (CRITICAL VALUE NOTIFICATION) - BACKGROUND
74 year old female admitted for Syncope and collapse.
Pt admitted with dx. Syncope, Anemia, Severe AS. Pending TAVR tomorrow.
75 y/o female with PMHx HTN, HLD, presents for eval after syncopal episode and being found unresponsive. EMS did 1 minute of chest compressions with ROSC and return of mental status.
Pt admitted  for Syncope vs cardiac arrest, more likely syncope, trop neg x2, cards following  	Symptomatic anemia, hgb 5.1>7.1>6.9>8.0, s/p 2U PRBC, s/p IV PPI, GI
74 year old female admitted for Syncope and collapse.
DX: syncope and collapse
73 y/o female with PMHx HTN, HLD, presents for eval after syncopal episode and being found unresponsive. EMS did 1 minute of chest compressions with ROSC and return of mental status.
See H&P

## 2024-11-11 NOTE — PROVIDER CONTACT NOTE (CRITICAL VALUE NOTIFICATION) - SITUATION
APTT: 122.5
Pt's hemoglobin dropped from 7.1 to 6.9
Hgb: 6.6
Hgb 6.4
Critical Lab Value: aPTT >200.0
Critical Lab Value: aPTT-128.5
Hemoglobin- 7 and Hemocrit -21
aPTT 157.5

## 2024-11-11 NOTE — PROVIDER CONTACT NOTE (CRITICAL VALUE NOTIFICATION) - NAME OF MD/NP/PA/DO NOTIFIED:
JUDSON- Kayla Mckee
Sary Martino
NICK Perez
PHAN Ureña
Sary Martino
JUDSON- Kayla Mckee
Mine Orozco
NICK Bustillo

## 2024-11-11 NOTE — CHART NOTE - NSCHARTNOTEFT_GEN_A_CORE
Signout as per Dr. Greene as follows     Right Transfemoral TAVR 26mm Evolut Fx+, pre and post dilation   Anesthesia - General  Right groin 14Fr arterial sheath - closed with Perclose x 2   Left groin 6FA sheath removed with Perclose x 1    Right groin 8Fr venous sheath - closed with Vascade x 1, TVP removed in the lab    Arrived to PACU at 1330 s/p TAVR under general anesthesia, extubated  Patient is alert and oriented and answering questions appropriately  Denies pain or SOB    T(C): 36.3 (11-11-24 @ 13:30), Max: 37.7 (11-11-24 @ 09:00)  HR: 99 (11-11-24 @ 14:00) (73 - 105)  BP: 115/45 (11-11-24 @ 13:30) (95/60 - 128/55)  RR: 18 (11-11-24 @ 14:00) (16 - 18)  SpO2: 95% (11-11-24 @ 14:00) (92% - 97%)    EKG:   Pre-op  NSR 60    QRS 94  QTc 495    Post-op  NSR 87    QRS 92  QTc 490    aspirin enteric coated 81 milliGRAM(s) Oral daily  atorvastatin 20 milliGRAM(s) Oral at bedtime  gabapentin 800 milliGRAM(s) Oral four times a day  HYDROmorphone  Injectable 0.5 milliGRAM(s) IV Push every 15 minutes PRN  levothyroxine 300 MICROGram(s) Oral <User Schedule>  lidocaine   4% Patch 1 Patch Transdermal once  metoclopramide Injectable 10 milliGRAM(s) IV Push once PRN  ondansetron Injectable 4 milliGRAM(s) IV Push once PRN  predniSONE   Tablet 10 milliGRAM(s) Oral daily  vancomycin  IVPB 750 milliGRAM(s) IV Intermittent every 12 hours      on exam  Pulm CTA anteriorly and laterally  CV S1S2 RRR   abd soft non tender +BS  b/l groin - C/D/I with dressing, no hematoma or bleeding. +2 pulses       Plan    - Resume ASA in 6 hours    - Vancomycin tolerated in the OR, pt. has zinacef allergy    - Echo and EKG ordered for AM   - Hold BB   - Resumed atorvastatin, gabapentin, synthroid, and prednisone   - DC planning with DAGMAR Vaughan NP

## 2024-11-11 NOTE — PROGRESS NOTE ADULT - SUBJECTIVE AND OBJECTIVE BOX
VITAL SIGNS    Subjective:     Telemetry:      Vital Signs Last 24 Hrs  T(C): 36.9 (24 @ 17:00), Max: 37.7 (24 @ 09:00)  T(F): 98.4 (24 @ 17:00), Max: 99.8 (24 @ 09:00)  HR: 81 (24 @ 17:00) (73 - 105)  BP: 112/58 (24 @ 17:00) (95/60 - 128/55)  RR: 18 (24 @ 17:00) (16 - 18)  SpO2: 96% (24 @ 17:00) (92% - 98%)               11-10 @ 07:01  -   @ 07:00  --------------------------------------------------------  IN: 810 mL / OUT: 1800 mL / NET: -990 mL     @ 07:01  -   @ 17:55  --------------------------------------------------------  IN: 100 mL / OUT: 600 mL / NET: -500 mL        LABS      130[L]  |  96  |  48[H]  ----------------------------<  128[H]  5.6[H]   |  22  |  1.07    Ca    9.0      2024 04:00  Phos  4.4     11-10  Mg     2.3     -10                                   7.2    11.59 )-----------( 443      ( 2024 04:00 )             24.0          PT/INR - ( 2024 04:00 )   PT: 10.9 sec;   INR: 0.96 ratio         PTT - ( 2024 04:00 )  PTT:26.9 sec        Daily Height in cm: 165.1 (2024 09:28)    Daily Weight in k.2 (2024 07:15)      CAPILLARY BLOOD GLUCOSE      POCT Blood Glucose.: 227 mg/dL (2024 14:39)  POCT Blood Glucose.: 169 mg/dL (2024 07:25)  POCT Blood Glucose.: 139 mg/dL (10 Nov 2024 21:15)          Drains:     MS         [  ] Drainage:                 L Pleural  [  ]  Drainage:                R Pleural  [  ]  Drainage:    Pacing Wires        [  ]   Settings:                                  Isolated  [  ]    Coumadin    [ ] YES          [  ]      NO         Reason:           PHYSICAL EXAM      Neurology: alert and oriented x 3, nonfocal, no gross deficits    CV:     Sternal Wound: MSI -->CDI, sternum stable    Lungs: CTA B/L     Abdomen: soft, nontender, nondistended, positive bowel sounds, (+) Flatus; (+) BM     :  Voiding               Extremities:  B/L LE (+) edema; negative calf tenderness; (+) 2 DP palpable          aspirin enteric coated 81 milliGRAM(s) Oral daily  atorvastatin 20 milliGRAM(s) Oral at bedtime  dextrose 5%. 1000 milliLiter(s) IV Continuous <Continuous>  dextrose 5%. 1000 milliLiter(s) IV Continuous <Continuous>  dextrose 50% Injectable 25 Gram(s) IV Push once  dextrose 50% Injectable 12.5 Gram(s) IV Push once  dextrose 50% Injectable 25 Gram(s) IV Push once  dextrose Oral Gel 15 Gram(s) Oral once PRN  gabapentin 800 milliGRAM(s) Oral four times a day  glucagon  Injectable 1 milliGRAM(s) IntraMuscular once  insulin lispro (ADMELOG) corrective regimen sliding scale   SubCutaneous three times a day before meals  levothyroxine 300 MICROGram(s) Oral <User Schedule>  predniSONE   Tablet 10 milliGRAM(s) Oral daily  vancomycin  IVPB 750 milliGRAM(s) IV Intermittent every 12 hours                 Physical Therapy Rec:   Home  [  ]   Home w/ PT  [  ]  Rehab  [  ]    Discussed with Cardiothoracic Team at AM rounds.     VITAL SIGNS    Subjective: "I'm feeling ok" Denies CP, palpitation, SOB, OLIVA, HA, dizziness, N/V/D, fever or chills.  No acute event noted overnight.     Telemetry:  NSR 70-80    Vital Signs Last 24 Hrs  T(C): 36.9 (24 @ 17:00), Max: 37.7 (24 @ 09:00)  T(F): 98.4 (24 @ 17:00), Max: 99.8 (24 @ 09:00)  HR: 81 (24:00) (73 - 105)  BP: 112/58 (24:00) (95/60 - 128/55)  RR: 18 (24 @ :00) (16 - 18)  SpO2: 96% (24 @ 17:00) (92% - 98%)           11-10 @ 07:01  -   @ 07:00  --------------------------------------------------------  IN: 810 mL / OUT: 1800 mL / NET: -990 mL     @ 07:01  -   @ 17:55  --------------------------------------------------------  IN: 100 mL / OUT: 600 mL / NET: -500 mL    LABS      130[L]  |  96  |  48[H]  ----------------------------<  128[H]  5.6[H]   |  22  |  1.07    Ca    9.0      2024 04:00  Phos  4.4     11-10  Mg     2.3     11-10                               7.2    11.59 )-----------( 443      ( 2024 04:00 )             24.0          PT/INR - ( 2024 04:00 )   PT: 10.9 sec;   INR: 0.96 ratio         PTT - ( 2024 04:00 )  PTT:26.9 sec        Daily Height in cm: 165.1 (2024 09:28)    Daily Weight in k.2 (2024 07:15)    CAPILLARY BLOOD GLUCOSE    POCT Blood Glucose.: 227 mg/dL (2024 14:39)  POCT Blood Glucose.: 169 mg/dL (2024 07:25)  POCT Blood Glucose.: 139 mg/dL (10 Nov 2024 21:15)        PHYSICAL EXAM    Neurology: alert and oriented x 3, nonfocal, no gross deficits    CV: (+) S1 and S2, No murmurs, rubs, gallops or clicks     Lungs: CTA B/L; Diminished at base      Abdomen: soft, nontender, nondistended, positive bowel sounds, (+) Flatus; (+) BM     :  Voiding               Extremities:  B/L LE (+) edema; negative calf tenderness; (+) 2 DP palpable; B/L groin sites C/D/I. JOS PICC line with occlusive dressing C/D/I           aspirin enteric coated 81 milliGRAM(s) Oral daily  atorvastatin 20 milliGRAM(s) Oral at bedtime  dextrose 5%. 1000 milliLiter(s) IV Continuous <Continuous>  dextrose 5%. 1000 milliLiter(s) IV Continuous <Continuous>  dextrose 50% Injectable 25 Gram(s) IV Push once  dextrose 50% Injectable 12.5 Gram(s) IV Push once  dextrose 50% Injectable 25 Gram(s) IV Push once  dextrose Oral Gel 15 Gram(s) Oral once PRN  gabapentin 800 milliGRAM(s) Oral four times a day  glucagon  Injectable 1 milliGRAM(s) IntraMuscular once  insulin lispro (ADMELOG) corrective regimen sliding scale   SubCutaneous three times a day before meals  levothyroxine 300 MICROGram(s) Oral <User Schedule>  predniSONE   Tablet 10 milliGRAM(s) Oral daily  vancomycin  IVPB 750 milliGRAM(s) IV Intermittent every 12 hours    Physical Therapy Rec:   Home  [ X ]   Home w/ PT  [  ]  Rehab  [  ]    Discussed with Cardiothoracic Team at AM rounds.     VITAL SIGNS    Subjective: "I'm feeling ok" Denies CP, palpitation, SOB, OLIVA, HA, dizziness, N/V/D, fever or chills.  No acute event noted overnight.     Telemetry:  NSR 70-80    Vital Signs Last 24 Hrs  T(C): 36.9 (24 @ 17:00), Max: 37.7 (24 @ 09:00)  T(F): 98.4 (24 @ 17:00), Max: 99.8 (24 @ 09:00)  HR: 81 (24:00) (73 - 105)  BP: 112/58 (24:00) (95/60 - 128/55)  RR: 18 (24 @ :00) (16 - 18)  SpO2: 96% (24 @ 17:00) (92% - 98%)           11-10 @ 07:01  -   @ 07:00  --------------------------------------------------------  IN: 810 mL / OUT: 1800 mL / NET: -990 mL     @ 07:01  -   @ 17:55  --------------------------------------------------------  IN: 100 mL / OUT: 600 mL / NET: -500 mL    LABS      130[L]  |  96  |  48[H]  ----------------------------<  128[H]  5.6[H]   |  22  |  1.07    Ca    9.0      2024 04:00  Phos  4.4     11-10  Mg     2.3     11-10                               7.2    11.59 )-----------( 443      ( 2024 04:00 )             24.0          PT/INR - ( 2024 04:00 )   PT: 10.9 sec;   INR: 0.96 ratio         PTT - ( 2024 04:00 )  PTT:26.9 sec        Daily Height in cm: 165.1 (2024 09:28)    Daily Weight in k.2 (2024 07:15)    CAPILLARY BLOOD GLUCOSE    POCT Blood Glucose.: 227 mg/dL (2024 14:39)  POCT Blood Glucose.: 169 mg/dL (2024 07:25)  POCT Blood Glucose.: 139 mg/dL (10 Nov 2024 21:15)        PHYSICAL EXAM    Neurology: alert and oriented x 3, nonfocal, no gross deficits    CV: (+) S1 and S2, No murmurs, rubs, gallops or clicks     Lungs: CTA B/L; Diminished at base      Abdomen: soft, nontender, nondistended, positive bowel sounds, (+) Flatus; (+) BM     :  Voiding               Extremities:  B/L LE (+) edema; negative calf tenderness; (+) 2 DP palpable; B/L groin sites C/D/I. JOS PICC line with occlusive dressing C/D/I           aspirin enteric coated 81 milliGRAM(s) Oral daily  atorvastatin 20 milliGRAM(s) Oral at bedtime  dextrose 5%. 1000 milliLiter(s) IV Continuous <Continuous>  dextrose 5%. 1000 milliLiter(s) IV Continuous <Continuous>  dextrose 50% Injectable 25 Gram(s) IV Push once  dextrose 50% Injectable 12.5 Gram(s) IV Push once  dextrose 50% Injectable 25 Gram(s) IV Push once  dextrose Oral Gel 15 Gram(s) Oral once PRN  gabapentin 800 milliGRAM(s) Oral four times a day  glucagon  Injectable 1 milliGRAM(s) IntraMuscular once  insulin lispro (ADMELOG) corrective regimen sliding scale SubCutaneous three times a day before meals  levothyroxine 300 MICROGram(s) Oral <User Schedule>  predniSONE   Tablet 10 milliGRAM(s) Oral daily  vancomycin  IVPB 750 milliGRAM(s) IV Intermittent every 12 hours    Physical Therapy Rec:   Home  [ X ]   Home w/ PT  [  ]  Rehab  [  ]    Discussed with Cardiothoracic Team at AM rounds.     VITAL SIGNS    Subjective: "I'm feeling ok" Denies CP, palpitation, SOB, OLIVA, HA, dizziness, N/V/D, fever or chills.  No acute event noted overnight.     Telemetry:  NSR 70-80    Vital Signs Last 24 Hrs  T(C): 36.9 (24 @ 17:00), Max: 37.7 (24 @ 09:00)  T(F): 98.4 (24 @ 17:00), Max: 99.8 (24 @ 09:00)  HR: 81 (24:00) (73 - 105)  BP: 112/58 (24:00) (95/60 - 128/55)  RR: 18 (24 @ :00) (16 - 18)  SpO2: 96% (24 @ 17:00) (92% - 98%)           11-10 @ 07:01  -   @ 07:00  --------------------------------------------------------  IN: 810 mL / OUT: 1800 mL / NET: -990 mL     @ 07:01  -   @ 17:55  --------------------------------------------------------  IN: 100 mL / OUT: 600 mL / NET: -500 mL    LABS      130[L]  |  96  |  48[H]  ----------------------------<  128[H]  5.6[H]   |  22  |  1.07    Ca    9.0      2024 04:00  Phos  4.4     11-10  Mg     2.3     11-10                               7.2    11.59 )-----------( 443      ( 2024 04:00 )             24.0          PT/INR - ( 2024 04:00 )   PT: 10.9 sec;   INR: 0.96 ratio         PTT - ( 2024 04:00 )  PTT:26.9 sec        Daily Height in cm: 165.1 (2024 09:28)    Daily Weight in k.2 (2024 07:15)    CAPILLARY BLOOD GLUCOSE    POCT Blood Glucose.: 227 mg/dL (2024 14:39)  POCT Blood Glucose.: 169 mg/dL (2024 07:25)  POCT Blood Glucose.: 139 mg/dL (10 Nov 2024 21:15)        PHYSICAL EXAM    Neurology: alert and oriented x 3, nonfocal, no gross deficits    CV: (+) S1 and S2, No murmurs, rubs, gallops or clicks     Lungs: CTA B/L; Diminished at base      Abdomen: soft, nontender, nondistended, positive bowel sounds, (+) Flatus; (+) BM     :  Voiding --> PrimaFit External collection device to Negative Suction                Extremities:  B/L LE (+) edema; negative calf tenderness; (+) 2 DP palpable; B/L groin sites C/D/I. JOS PICC line with occlusive dressing C/D/I           aspirin enteric coated 81 milliGRAM(s) Oral daily  atorvastatin 20 milliGRAM(s) Oral at bedtime  dextrose 5%. 1000 milliLiter(s) IV Continuous <Continuous>  dextrose 5%. 1000 milliLiter(s) IV Continuous <Continuous>  dextrose 50% Injectable 25 Gram(s) IV Push once  dextrose 50% Injectable 12.5 Gram(s) IV Push once  dextrose 50% Injectable 25 Gram(s) IV Push once  dextrose Oral Gel 15 Gram(s) Oral once PRN  gabapentin 800 milliGRAM(s) Oral four times a day  glucagon  Injectable 1 milliGRAM(s) IntraMuscular once  insulin lispro (ADMELOG) corrective regimen sliding scale SubCutaneous three times a day before meals  levothyroxine 300 MICROGram(s) Oral <User Schedule>  predniSONE   Tablet 10 milliGRAM(s) Oral daily  vancomycin  IVPB 750 milliGRAM(s) IV Intermittent every 12 hours    Physical Therapy Rec:   Home  [ X ]   Home w/ PT  [  ]  Rehab  [  ]    Discussed with Cardiothoracic Team at AM rounds.

## 2024-11-11 NOTE — PRE-OP CHECKLIST - WAS PATIENT ON BETA BLOCKER?
DVT ppx: low risk  Regular diet (has seafood allergy, patient aware not to order calamari/fish eggs) No

## 2024-11-11 NOTE — PROGRESS NOTE ADULT - PROBLEM SELECTOR PLAN 1
Structural Team following   Continue with ASA 81 mg PO Daily.   Continue with Lipitor 20 mg PO HS   Continue Neurontin 800 mg PO TID    TTE in AM   DAily EKG   Increase activity as tolerated.   Encourage Chest PT / Pulmonary toileting and Incentive spirometry every 1 hour x 10 while awake.   Continue with Protonix 40 mg PO daily and Lovenox 40 mg SQ daily for PUD and DVT prophylaxis.   D/C plan home once medically cleared   Plan of care discussed with attending Structural Team following   Continue with ASA 81 mg PO Daily.   Continue with Lipitor 20 mg PO HS   Continue Neurontin 800 mg PO TID    TTE in AM   Daily EKG   Increase activity as tolerated.   Encourage Chest PT / Pulmonary toileting and Incentive spirometry every 1 hour x 10 while awake.   Continue with Protonix 40 mg PO daily and Lovenox 40 mg SQ daily for PUD and DVT prophylaxis.   D/C plan home once medically cleared   Plan of care discussed with attending

## 2024-11-11 NOTE — PROVIDER CONTACT NOTE (CRITICAL VALUE NOTIFICATION) - RECOMMENDATIONS
Follow Full AC nomogram per orders. No other interventions ordered at this time.
Mine made aware.
NP made aware.
Notify NICK Bustillo.
PHAN Ureña notified
Follow Full AC nomogram per orders. No other interventions ordered at this time.

## 2024-11-12 ENCOUNTER — RESULT REVIEW (OUTPATIENT)
Age: 74
End: 2024-11-12

## 2024-11-12 LAB
ALBUMIN SERPL ELPH-MCNC: 3.2 G/DL — LOW (ref 3.3–5)
ALP SERPL-CCNC: 72 U/L — SIGNIFICANT CHANGE UP (ref 40–120)
ALT FLD-CCNC: 16 U/L — SIGNIFICANT CHANGE UP (ref 10–45)
ANION GAP SERPL CALC-SCNC: 10 MMOL/L — SIGNIFICANT CHANGE UP (ref 5–17)
ANISOCYTOSIS BLD QL: SLIGHT — SIGNIFICANT CHANGE UP
AST SERPL-CCNC: 23 U/L — SIGNIFICANT CHANGE UP (ref 10–40)
BASOPHILS # BLD AUTO: 0 K/UL — SIGNIFICANT CHANGE UP (ref 0–0.2)
BASOPHILS NFR BLD AUTO: 0 % — SIGNIFICANT CHANGE UP (ref 0–2)
BILIRUB SERPL-MCNC: 0.6 MG/DL — SIGNIFICANT CHANGE UP (ref 0.2–1.2)
BUN SERPL-MCNC: 39 MG/DL — HIGH (ref 7–23)
CALCIUM SERPL-MCNC: 8.6 MG/DL — SIGNIFICANT CHANGE UP (ref 8.4–10.5)
CHLORIDE SERPL-SCNC: 98 MMOL/L — SIGNIFICANT CHANGE UP (ref 96–108)
CO2 SERPL-SCNC: 25 MMOL/L — SIGNIFICANT CHANGE UP (ref 22–31)
CREAT SERPL-MCNC: 0.91 MG/DL — SIGNIFICANT CHANGE UP (ref 0.5–1.3)
EGFR: 67 ML/MIN/1.73M2 — SIGNIFICANT CHANGE UP
ELLIPTOCYTES BLD QL SMEAR: SIGNIFICANT CHANGE UP
EOSINOPHIL # BLD AUTO: 0 K/UL — SIGNIFICANT CHANGE UP (ref 0–0.5)
EOSINOPHIL NFR BLD AUTO: 0 % — SIGNIFICANT CHANGE UP (ref 0–6)
GLUCOSE BLDC GLUCOMTR-MCNC: 160 MG/DL — HIGH (ref 70–99)
GLUCOSE BLDC GLUCOMTR-MCNC: 181 MG/DL — HIGH (ref 70–99)
GLUCOSE BLDC GLUCOMTR-MCNC: 208 MG/DL — HIGH (ref 70–99)
GLUCOSE BLDC GLUCOMTR-MCNC: 217 MG/DL — HIGH (ref 70–99)
GLUCOSE BLDC GLUCOMTR-MCNC: 245 MG/DL — HIGH (ref 70–99)
GLUCOSE SERPL-MCNC: 200 MG/DL — HIGH (ref 70–99)
HCT VFR BLD CALC: 23.8 % — LOW (ref 34.5–45)
HGB BLD-MCNC: 7.1 G/DL — LOW (ref 11.5–15.5)
LYMPHOCYTES # BLD AUTO: 0.44 K/UL — LOW (ref 1–3.3)
LYMPHOCYTES # BLD AUTO: 2.6 % — LOW (ref 13–44)
MACROCYTES BLD QL: SLIGHT — SIGNIFICANT CHANGE UP
MANUAL SMEAR VERIFICATION: SIGNIFICANT CHANGE UP
MCHC RBC-ENTMCNC: 26.4 PG — LOW (ref 27–34)
MCHC RBC-ENTMCNC: 29.8 G/DL — LOW (ref 32–36)
MCV RBC AUTO: 88.5 FL — SIGNIFICANT CHANGE UP (ref 80–100)
MONOCYTES # BLD AUTO: 0.73 K/UL — SIGNIFICANT CHANGE UP (ref 0–0.9)
MONOCYTES NFR BLD AUTO: 4.3 % — SIGNIFICANT CHANGE UP (ref 2–14)
MYELOCYTES NFR BLD: 0.9 % — HIGH (ref 0–0)
NEUTROPHILS # BLD AUTO: 15.57 K/UL — HIGH (ref 1.8–7.4)
NEUTROPHILS NFR BLD AUTO: 91.4 % — HIGH (ref 43–77)
NEUTS BAND # BLD: 0.8 % — SIGNIFICANT CHANGE UP (ref 0–8)
PLAT MORPH BLD: NORMAL — SIGNIFICANT CHANGE UP
PLATELET # BLD AUTO: 360 K/UL — SIGNIFICANT CHANGE UP (ref 150–400)
POIKILOCYTOSIS BLD QL AUTO: SLIGHT — SIGNIFICANT CHANGE UP
POLYCHROMASIA BLD QL SMEAR: SLIGHT — SIGNIFICANT CHANGE UP
POTASSIUM SERPL-MCNC: 5.1 MMOL/L — SIGNIFICANT CHANGE UP (ref 3.5–5.3)
POTASSIUM SERPL-SCNC: 5.1 MMOL/L — SIGNIFICANT CHANGE UP (ref 3.5–5.3)
PROT SERPL-MCNC: 6.8 G/DL — SIGNIFICANT CHANGE UP (ref 6–8.3)
RBC # BLD: 2.69 M/UL — LOW (ref 3.8–5.2)
RBC # FLD: 20.2 % — HIGH (ref 10.3–14.5)
RBC BLD AUTO: ABNORMAL
SODIUM SERPL-SCNC: 133 MMOL/L — LOW (ref 135–145)
TARGETS BLD QL SMEAR: SLIGHT — SIGNIFICANT CHANGE UP
WBC # BLD: 16.89 K/UL — HIGH (ref 3.8–10.5)
WBC # FLD AUTO: 16.89 K/UL — HIGH (ref 3.8–10.5)

## 2024-11-12 PROCEDURE — 93306 TTE W/DOPPLER COMPLETE: CPT | Mod: 26

## 2024-11-12 PROCEDURE — 93010 ELECTROCARDIOGRAM REPORT: CPT

## 2024-11-12 PROCEDURE — 93970 EXTREMITY STUDY: CPT | Mod: 26

## 2024-11-12 PROCEDURE — 99232 SBSQ HOSP IP/OBS MODERATE 35: CPT

## 2024-11-12 RX ORDER — POLYETHYLENE GLYCOL 3350 17 G/17G
17 POWDER, FOR SOLUTION ORAL DAILY
Refills: 0 | Status: DISCONTINUED | OUTPATIENT
Start: 2024-11-12 | End: 2024-11-14

## 2024-11-12 RX ORDER — VANCOMYCIN HYDROCHLORIDE 50 MG/ML
1500 KIT ORAL ONCE
Refills: 0 | Status: COMPLETED | OUTPATIENT
Start: 2024-11-12 | End: 2024-11-12

## 2024-11-12 RX ORDER — SENNA 187 MG
2 TABLET ORAL AT BEDTIME
Refills: 0 | Status: DISCONTINUED | OUTPATIENT
Start: 2024-11-12 | End: 2024-11-14

## 2024-11-12 RX ORDER — ACETAMINOPHEN 500 MG
650 TABLET ORAL EVERY 6 HOURS
Refills: 0 | Status: COMPLETED | OUTPATIENT
Start: 2024-11-12 | End: 2024-11-12

## 2024-11-12 RX ORDER — LEVOTHYROXINE SODIUM 88 MCG
150 TABLET ORAL
Refills: 0 | Status: DISCONTINUED | OUTPATIENT
Start: 2024-11-12 | End: 2024-11-14

## 2024-11-12 RX ORDER — CHLORHEXIDINE GLUCONATE 40 MG/ML
1 SOLUTION TOPICAL DAILY
Refills: 0 | Status: DISCONTINUED | OUTPATIENT
Start: 2024-11-12 | End: 2024-11-14

## 2024-11-12 RX ADMIN — Medication 2: at 08:10

## 2024-11-12 RX ADMIN — Medication 4: at 21:59

## 2024-11-12 RX ADMIN — Medication 650 MILLIGRAM(S): at 20:30

## 2024-11-12 RX ADMIN — GABAPENTIN 800 MILLIGRAM(S): 300 CAPSULE ORAL at 17:30

## 2024-11-12 RX ADMIN — Medication 20 MILLIGRAM(S): at 21:58

## 2024-11-12 RX ADMIN — Medication 81 MILLIGRAM(S): at 12:05

## 2024-11-12 RX ADMIN — Medication 650 MILLIGRAM(S): at 19:45

## 2024-11-12 RX ADMIN — GABAPENTIN 800 MILLIGRAM(S): 300 CAPSULE ORAL at 05:51

## 2024-11-12 RX ADMIN — Medication 4: at 17:27

## 2024-11-12 RX ADMIN — VANCOMYCIN HYDROCHLORIDE 300 MILLIGRAM(S): KIT at 14:38

## 2024-11-12 RX ADMIN — Medication 10 MILLIGRAM(S): at 05:51

## 2024-11-12 RX ADMIN — Medication 4: at 12:06

## 2024-11-12 RX ADMIN — Medication 150 MICROGRAM(S): at 14:43

## 2024-11-12 RX ADMIN — GABAPENTIN 800 MILLIGRAM(S): 300 CAPSULE ORAL at 23:08

## 2024-11-12 RX ADMIN — Medication 2 TABLET(S): at 22:35

## 2024-11-12 RX ADMIN — GABAPENTIN 800 MILLIGRAM(S): 300 CAPSULE ORAL at 12:05

## 2024-11-12 RX ADMIN — Medication 650 MILLIGRAM(S): at 10:06

## 2024-11-12 RX ADMIN — Medication 650 MILLIGRAM(S): at 09:25

## 2024-11-12 NOTE — PROGRESS NOTE ADULT - SUBJECTIVE AND OBJECTIVE BOX
Patient seen and evaluated at bedside with CTS team and on-call Attending during AM rounds.     SUBJECTIVE:     TELEMETRY:      VITAL SIGNS:    Vital Signs Last 24 Hrs  T(C): 36.9 (11-12-24 @ 04:41), Max: 36.9 (11-11-24 @ 17:00)  T(F): 98.4 (11-12-24 @ 04:41), Max: 98.4 (11-11-24 @ 17:00)  HR: 84 (11-12-24 @ 04:41) (81 - 99)  BP: 95/60 (11-12-24 @ 04:41) (95/60 - 115/54)  RR: 18 (11-12-24 @ 04:41) (16 - 18)  SpO2: 93% (11-12-24 @ 04:41) (93% - 98%)           INPUT/OUTPUT:    11-11 @ 07:01  -  11-12 @ 07:00  --------------------------------------------------------  IN: 100 mL / OUT: 1900 mL / NET: -1800 mL    11-12 @ 07:01  -  11-12 @ 11:18  --------------------------------------------------------  IN: 360 mL / OUT: 0 mL / NET: 360 mL        OR      I&O's Detail    11 Nov 2024 07:01  -  12 Nov 2024 07:00  --------------------------------------------------------  IN:    PRBCs (Packed Red Blood Cells): 100 mL  Total IN: 100 mL    OUT:    Voided (mL): 1900 mL  Total OUT: 1900 mL    Total NET: -1800 mL      12 Nov 2024 07:01  -  12 Nov 2024 11:18  --------------------------------------------------------  IN:    Oral Fluid: 360 mL  Total IN: 360 mL    OUT:  Total OUT: 0 mL    Total NET: 360 mL            LABS:  11-12    133[L]  |  98  |  39[H]  ----------------------------<  200[H]  5.1   |  25  |  0.91    Ca    8.6      12 Nov 2024 09:50  Phos  4.4     11-10  Mg     2.3     11-10    TPro  6.8  /  Alb  3.2[L]  /  TBili  0.6  /  DBili  x   /  AST  23  /  ALT  16  /  AlkPhos  72  11-12                                 7.1    16.89 )-----------( 360      ( 12 Nov 2024 09:50 )             23.8          PT/INR - ( 11 Nov 2024 04:00 )   PT: 10.9 sec;   INR: 0.96 ratio         PTT - ( 11 Nov 2024 04:00 )  PTT:26.9 sec      Daily     Daily       CAPILLARY BLOOD GLUCOSE      POCT Blood Glucose.: 181 mg/dL (12 Nov 2024 07:30)  POCT Blood Glucose.: 160 mg/dL (12 Nov 2024 01:45)  POCT Blood Glucose.: 386 mg/dL (11 Nov 2024 22:10)  POCT Blood Glucose.: 227 mg/dL (11 Nov 2024 14:39)          PHYSICAL EXAM  GENERAL: awake, alert, responsive, in no acute distress  NEURO: alert and oriented x 3, nonfocal, no gross deficits appreciated at time of evaluation  CVS : S1S2  LUNGS: B/L CTA on auscultation, non-labored breathing, no use of accessory muscle  GI: abdomen soft, nontender, nondistended, positive bowel sounds  :  + voids           MSK/Extremities: warm, well-perfused, equal strength throughout, B/L LE+DP, no edema   Right groin: nontender, no hematoma, no ecchymosis, CDI      Left groin: nontender, no hematoma, no ecchymosis, CDI       ACTIVE MEDICATIONS:  acetaminophen     Tablet .. 650 milliGRAM(s) Oral every 6 hours PRN  aspirin enteric coated 81 milliGRAM(s) Oral daily  atorvastatin 20 milliGRAM(s) Oral at bedtime  dextrose 5%. 1000 milliLiter(s) IV Continuous <Continuous>  dextrose 5%. 1000 milliLiter(s) IV Continuous <Continuous>  dextrose 50% Injectable 25 Gram(s) IV Push once  dextrose 50% Injectable 12.5 Gram(s) IV Push once  dextrose 50% Injectable 25 Gram(s) IV Push once  dextrose Oral Gel 15 Gram(s) Oral once PRN  gabapentin 800 milliGRAM(s) Oral four times a day  glucagon  Injectable 1 milliGRAM(s) IntraMuscular once  insulin lispro (ADMELOG) corrective regimen sliding scale   SubCutaneous three times a day before meals  insulin lispro (ADMELOG) corrective regimen sliding scale   SubCutaneous Before meals and at bedtime  levothyroxine 300 MICROGram(s) Oral <User Schedule>  predniSONE   Tablet 10 milliGRAM(s) Oral daily      Case discussed in detail with Cardiothoracic Team and Attending Dr. Onofre. Plan below as per discussion. SUBJECTIVE: "Hi." Denies acute chest pain, palpitations, or shortness of breath    TELEMETRY:  SR     Vital Signs Last 24 Hrs  T(C): 36.9 (11-12-24 @ 04:41), Max: 36.9 (11-11-24 @ 17:00)  T(F): 98.4 (11-12-24 @ 04:41), Max: 98.4 (11-11-24 @ 17:00)  HR: 84 (11-12-24 @ 04:41) (81 - 99)  BP: 95/60 (11-12-24 @ 04:41) (95/60 - 115/54)  RR: 18 (11-12-24 @ 04:41) (16 - 18)  SpO2: 93% (11-12-24 @ 04:41) (93% - 98%)           INPUT/OUTPUT:  11-11 @ 07:01  -  11-12 @ 07:00  --------------------------------------------------------  IN: 100 mL / OUT: 1900 mL / NET: -1800 mL  11-12 @ 07:01  -  11-12 @ 11:18  --------------------------------------------------------  IN: 360 mL / OUT: 0 mL / NET: 360 mL      LABS:  11-12  133[L]  |  98  |  39[H]  ----------------------------<  200[H]  5.1   |  25  |  0.91  Ca    8.6      12 Nov 2024 09:50  TPro  6.8  /  Alb  3.2[L]  /  TBili  0.6  /  DBili  x   /  AST  23  /  ALT  16  /  AlkPhos  72  11-12               7.1    16.89 )-----------( 360      ( 12 Nov 2024 09:50 )             23.8       CAPILLARY BLOOD GLUCOSE  POCT Blood Glucose.: 181 mg/dL (12 Nov 2024 07:30)  POCT Blood Glucose.: 160 mg/dL (12 Nov 2024 01:45)  POCT Blood Glucose.: 386 mg/dL (11 Nov 2024 22:10)  POCT Blood Glucose.: 227 mg/dL (11 Nov 2024 14:39)          PHYSICAL EXAM  Neurology: alert and oriented x 3, nonfocal, no gross deficits  CV: (+) S1 and S2  Lungs: nonlabored respirations with no use of accessory muscles  Abdomen: soft, nontender, positive bowel sounds  Extremities:  B/L LE (+) edema; negative calf tenderness; (+) 2 DP palpable; B/L groin sites C/D/I. JOS PICC line with occlusive dressing C/D/I             ACTIVE MEDICATIONS:  acetaminophen     Tablet .. 650 milliGRAM(s) Oral every 6 hours PRN  aspirin enteric coated 81 milliGRAM(s) Oral daily  atorvastatin 20 milliGRAM(s) Oral at bedtime  dextrose 5%. 1000 milliLiter(s) IV Continuous <Continuous>  dextrose 5%. 1000 milliLiter(s) IV Continuous <Continuous>  dextrose 50% Injectable 25 Gram(s) IV Push once  dextrose 50% Injectable 12.5 Gram(s) IV Push once  dextrose 50% Injectable 25 Gram(s) IV Push once  dextrose Oral Gel 15 Gram(s) Oral once PRN  gabapentin 800 milliGRAM(s) Oral four times a day  glucagon  Injectable 1 milliGRAM(s) IntraMuscular once  insulin lispro (ADMELOG) corrective regimen sliding scale   SubCutaneous three times a day before meals  insulin lispro (ADMELOG) corrective regimen sliding scale   SubCutaneous Before meals and at bedtime  levothyroxine 300 MICROGram(s) Oral <User Schedule>  predniSONE   Tablet 10 milliGRAM(s) Oral daily    Case discussed in detail with Cardiothoracic Team and Attending Dr. Onofre. Plan below as per discussion. SUBJECTIVE: "Hi." Denies acute chest pain, palpitations, or shortness of breath    TELEMETRY:  SR     Vital Signs Last 24 Hrs  T(C): 36.9 (11-12-24 @ 04:41), Max: 36.9 (11-11-24 @ 17:00)  T(F): 98.4 (11-12-24 @ 04:41), Max: 98.4 (11-11-24 @ 17:00)  HR: 84 (11-12-24 @ 04:41) (81 - 99)  BP: 95/60 (11-12-24 @ 04:41) (95/60 - 115/54)  RR: 18 (11-12-24 @ 04:41) (16 - 18)  SpO2: 93% (11-12-24 @ 04:41) (93% - 98%)           INPUT/OUTPUT:  11-11 @ 07:01  -  11-12 @ 07:00  --------------------------------------------------------  IN: 100 mL / OUT: 1900 mL / NET: -1800 mL  11-12 @ 07:01  -  11-12 @ 11:18  --------------------------------------------------------  IN: 360 mL / OUT: 0 mL / NET: 360 mL      LABS:  11-12  133[L]  |  98  |  39[H]  ----------------------------<  200[H]  5.1   |  25  |  0.91  Ca    8.6      12 Nov 2024 09:50  TPro  6.8  /  Alb  3.2[L]  /  TBili  0.6  /  DBili  x   /  AST  23  /  ALT  16  /  AlkPhos  72  11-12               7.1    16.89 )-----------( 360      ( 12 Nov 2024 09:50 )             23.8       CAPILLARY BLOOD GLUCOSE  POCT Blood Glucose.: 181 mg/dL (12 Nov 2024 07:30)  POCT Blood Glucose.: 160 mg/dL (12 Nov 2024 01:45)  POCT Blood Glucose.: 386 mg/dL (11 Nov 2024 22:10)  POCT Blood Glucose.: 227 mg/dL (11 Nov 2024 14:39)          PHYSICAL EXAM  Neurology: alert and oriented x 3, nonfocal, no gross deficits  CV: (+) S1 and S2  Lungs: nonlabored respirations with no use of accessory muscles  Abdomen: soft, nontender, positive bowel sounds  Extremities:  B/L LE (+) edema; negative calf tenderness; (+) 2 DP palpable; B/L groin sites C/D/I. JOS PICC line with occlusive dressing C/D/I             ACTIVE MEDICATIONS:  acetaminophen     Tablet .. 650 milliGRAM(s) Oral every 6 hours PRN  aspirin enteric coated 81 milliGRAM(s) Oral daily  atorvastatin 20 milliGRAM(s) Oral at bedtime  dextrose 5%. 1000 milliLiter(s) IV Continuous <Continuous>  dextrose 5%. 1000 milliLiter(s) IV Continuous <Continuous>  dextrose 50% Injectable 25 Gram(s) IV Push once  dextrose 50% Injectable 12.5 Gram(s) IV Push once  dextrose 50% Injectable 25 Gram(s) IV Push once  dextrose Oral Gel 15 Gram(s) Oral once PRN  gabapentin 800 milliGRAM(s) Oral four times a day  glucagon  Injectable 1 milliGRAM(s) IntraMuscular once  insulin lispro (ADMELOG) corrective regimen sliding scale   SubCutaneous three times a day before meals  insulin lispro (ADMELOG) corrective regimen sliding scale   SubCutaneous Before meals and at bedtime  levothyroxine 300 MICROGram(s) Oral <User Schedule>  predniSONE   Tablet 10 milliGRAM(s) Oral daily    Case discussed in detail with Cardiothoracic Team and Attending Dr. Onofre, and Structural Heart Attending Dr. Fred Greene. Plan below as per discussion.

## 2024-11-12 NOTE — PROGRESS NOTE ADULT - SUBJECTIVE AND OBJECTIVE BOX
DATE OF SERVICE: 11-12-24 @ 14:47    Patient is a 74y old  Female who presents with a chief complaint of Symptomatic anemia, Syncope (12 Nov 2024 13:22)      INTERVAL HISTORY: Feels ok.     REVIEW OF SYSTEMS:  CONSTITUTIONAL: No weakness  EYES/ENT: No visual changes;  No throat pain   NECK: No pain or stiffness  RESPIRATORY: No cough, wheezing; No shortness of breath  CARDIOVASCULAR: No chest pain or palpitations  GASTROINTESTINAL: No abdominal  pain. No nausea, vomiting, or hematemesis  GENITOURINARY: No dysuria, frequency or hematuria  NEUROLOGICAL: No stroke like symptoms  SKIN: No rashes    TELEMETRY Personally reviewed: SR 80-90s with brief PAT  	  MEDICATIONS:        PHYSICAL EXAM:  T(C): 36.9 (11-12-24 @ 04:41), Max: 36.9 (11-11-24 @ 17:00)  HR: 84 (11-12-24 @ 04:41) (81 - 91)  BP: 95/60 (11-12-24 @ 04:41) (95/60 - 115/54)  RR: 18 (11-12-24 @ 04:41) (16 - 18)  SpO2: 93% (11-12-24 @ 04:41) (93% - 98%)  Wt(kg): --  I&O's Summary    11 Nov 2024 07:01  -  12 Nov 2024 07:00  --------------------------------------------------------  IN: 100 mL / OUT: 1900 mL / NET: -1800 mL    12 Nov 2024 07:01  -  12 Nov 2024 14:47  --------------------------------------------------------  IN: 720 mL / OUT: 0 mL / NET: 720 mL          Appearance: In no distress	  HEENT:    PERRL, EOMI	  Cardiovascular:  S1 S2, No JVD  Respiratory: Lungs clear to auscultation	  Gastrointestinal:  Soft, Non-tender, + BS	  Vascularature:  No edema of LE  Psychiatric: Appropriate affect   Neuro: no acute focal deficits                               7.1    16.89 )-----------( 360      ( 12 Nov 2024 09:50 )             23.8     11-12    133[L]  |  98  |  39[H]  ----------------------------<  200[H]  5.1   |  25  |  0.91    Ca    8.6      12 Nov 2024 09:50    TPro  6.8  /  Alb  3.2[L]  /  TBili  0.6  /  DBili  x   /  AST  23  /  ALT  16  /  AlkPhos  72  11-12        Labs personally reviewed      ASSESSMENT/PLAN: 	    74F w/Hx of chronic anemia, bullous pemphigoid on IVIG, HTN, HLD, DM, hypothyroidism, chronic back pain presents after syncopal episode. 1 minute of CPR was initiated in the field by EMS because there was concern of pulseness with unresponsiveness. Patient was hypotensive upon arrival and found to have a hgb of 5.1. Patient reports she syncopized after receiving IVIG. Reports she had been feeling lightheaded and SOB for a few days prior to this event. Has had bout of anemia in the past treated with iron supplementation, folate and B12. She denies any hematuria, melena, gingival bleeding. She reports significant improvement of symptoms since receiving 2u of pRBCs. Her only current complaint is acute on chronic back pain from her stretcher. Patient denies fever, chills, chest pain, SOB, headache, dizziness, abd pain, nausea, vomiting, constipation, dysuria.      Problem/Plan #1:  Problem: Syncope   - Likely symptomatic anemia  - Trop 26 --> 28  - EKG ischemic but likely in setting of Hb of 5.1  - POCUS noted no pericardial effusion, globally reduced LV function   - Echo with reduced EF & severe aortic stenosis  - Replete Hgb > 7   - s/p cath 11/5 with mild disease only  - s/p TAVR 11/11  - Repeat TTE 11/12 with valve well seated    Problem/Plan #2:  Problem: Hypertension  - stable on sHF GDMT    Problem/Plan #3:  Problem: HLD  - c/w atorvastatin 20mg PO daily    Problem/Plan #4:  Problem: Cardiac Risk Stratification  - Symptomatic anemia  - Echo with reduced EF noted & severe aortic stenosis  - High risk for low risk nonelective EGD/Colonoscopy 2/2 systolic HF and severe aortic stenosis  - s/p egd/ colonoscopy today 11/4, no source of bleed identified    Problem/Plan #5:  Problem: Systolic HF  - TTE 10/30 with EF 25-30%  - Switched to Metoprolol tartrate 50mg BID due to episodes PSVT & WCT 11/1  - Entresto 24-26mg BID  - Spironolactone 25mg daily  - Repeat TTE with EF wnl, elevated filling pressures     Problem/Plan #6:  - Problem: Severe Aortic Stenosis  - Structural Dr. Greene consulted   - s/p TAVR 11/11    Problem/Plan #7:  - Problem: Acute right LE DVT  - heparin gtt as per primary team          MERRICK Mcelroy-NP   Adama Rollins DO [facility]  Cardiovascular Medicine  [address] [place] [address], [address]  Available through call or text on [employer]  Office: [phone]

## 2024-11-12 NOTE — PROGRESS NOTE ADULT - ASSESSMENT
74F w/Hx of chronic anemia, bullous pemphigoid on IVIG, HTN, HLD, DM, hypothyroidism, chronic back pain who presented to [facility] 10/28 after syncopal event.  Found to have severe AS and symptomatic anemia.  Patient undergoing workup for TAVR. Hospital course c/b Acute lower extremity DVT, below the knee, involving the right gastrocnemius vein. urine culture on  10/30 positive for E.coli which was empirically treated with Aztreonam.     11/11 H&H 7/24 --> Transfused with 1 unit PRBC.   11/11 s/p Transfemoral TAVR General anesthesia 26mm Evolut Fx+ with pre and post dilation.   Post op Course:  Recovered in PACU --> Post op Delirium now resolved.  Hyperkalemia --> serum K+ 5.6; repeat 6.4.  Discussed with structural team.  Will repeat K+ now.  Transferred to 2 [facility] Telemetry floor.  Daily EKG.  TTE in AM.   11/12 K 5.1 this AM. Rpt US of LE today. Monitor H/H. Keep active T&S 74F w/Hx of chronic anemia, bullous pemphigoid on IVIG, HTN, HLD, DM, hypothyroidism, chronic back pain who presented to [facility] 10/28 after syncopal event.  Found to have severe AS and symptomatic anemia.  Patient undergoing workup for TAVR. Hospital course c/b Acute lower extremity DVT, below the knee, involving the right gastrocnemius vein. urine culture on  10/30 positive for E.coli which was empirically treated with Aztreonam.     11/11 H&H 7/24 --> Transfused with 1 unit PRBC.   11/11 s/p Transfemoral TAVR General anesthesia 26mm Evolut Fx+ with pre and post dilation.   Post op Course:  Recovered in PACU --> Post op Delirium now resolved.  Hyperkalemia --> serum K+ 5.6; repeat 6.4.  Discussed with structural team.  Will repeat K+ now.  Transferred to 2 [facility] Telemetry floor.  Daily EKG.  TTE in AM.   11/12 K 5.1 this AM. Rpt US of LE today. Monitor H/H. Keep active T&S. EKG and TTE reviewed by Dr. Greene. CM following case regarding dispo. 74F w/Hx of chronic anemia, bullous pemphigoid on IVIG, HTN, HLD, DM, hypothyroidism, chronic back pain who presented to [facility] 10/28 after syncopal event.  Found to have severe AS and symptomatic anemia.  Patient undergoing workup for TAVR. Hospital course c/b Acute lower extremity DVT, below the knee, involving the right gastrocnemius vein. urine culture on  10/30 positive for E.coli which was empirically treated with Aztreonam.     11/11 H&H 7/24 --> Transfused with 1 unit PRBC.   11/11 s/p Transfemoral TAVR General anesthesia 26mm Evolut Fx+ with pre and post dilation.   Post op Course:  Recovered in PACU --> Post op Delirium now resolved.  Hyperkalemia --> serum K+ 5.6; repeat 6.4.  Discussed with structural team.  Will repeat K+ now.  Transferred to 2 [facility] Telemetry floor.  Daily EKG.  TTE in AM.   11/12 K 5.1 this AM. Rpt US of LE today. Monitor H/H. Keep active T&S. EKG and TTE reviewed by Dr. Greene. CM following case regarding dispo.  Addendum: Notified by ID Dr. Green per structural NP note, patient tolerated Vanco preop. Per ID Dr. Green, patient to receive additional Vanco 1.5g x1 dose now as post-op ppx. RN aware to administer slowly and monitor for any reaction.

## 2024-11-12 NOTE — PROGRESS NOTE ADULT - SUBJECTIVE AND OBJECTIVE BOX
Follow Up:    Antibiotic allergy    Interval History/ROS:  S/p TAVR 11/11. Patient was seen and examined at bedside. Denies fever, SOB, or chest pain. Patient tolerated ajay-op vancomycin w/o reaction per NP note.    Allergies  penicillin (Stomach Upset; Nausea; Swelling; Hives)  cephalexin (Swelling; Rash; Nausea; Hives)        ANTIMICROBIALS:      OTHER MEDS:  MEDICATIONS  (STANDING):  acetaminophen     Tablet .. 650 every 6 hours PRN  aspirin enteric coated 81 daily  atorvastatin 20 at bedtime  dextrose 50% Injectable 25 once  dextrose 50% Injectable 25 once  dextrose 50% Injectable 12.5 once  dextrose Oral Gel 15 once PRN  gabapentin 800 four times a day  glucagon  Injectable 1 once  insulin lispro (ADMELOG) corrective regimen sliding scale  Before meals and at bedtime  insulin lispro (ADMELOG) corrective regimen sliding scale  three times a day before meals  levothyroxine 300 <User Schedule>  predniSONE   Tablet 10 daily      Vital Signs Last 24 Hrs  T(C): 36.9 (12 Nov 2024 04:41), Max: 36.9 (11 Nov 2024 17:00)  T(F): 98.4 (12 Nov 2024 04:41), Max: 98.4 (11 Nov 2024 17:00)  HR: 84 (12 Nov 2024 04:41) (81 - 99)  BP: 95/60 (12 Nov 2024 04:41) (95/60 - 115/54)  BP(mean): 78 (11 Nov 2024 16:00) (67 - 78)  RR: 18 (12 Nov 2024 04:41) (16 - 18)  SpO2: 93% (12 Nov 2024 04:41) (93% - 98%)    Parameters below as of 12 Nov 2024 04:41  Patient On (Oxygen Delivery Method): nasal cannula        PHYSICAL EXAM:  Constitutional: non-toxic, no distress  Cardiovascular:   normal S1, S2, no murmur, RRR  Respiratory:  clear BS bilaterally, no wheezes  GI:  soft, non-tender  Skin:  +bruises; bilateral groin w/ c/d/i dressing                                7.1    16.89 )-----------( 360      ( 12 Nov 2024 09:50 )             23.8       11-12    133[L]  |  98  |  39[H]  ----------------------------<  200[H]  5.1   |  25  |  0.91    Ca    8.6      12 Nov 2024 09:50    TPro  6.8  /  Alb  3.2[L]  /  TBili  0.6  /  DBili  x   /  AST  23  /  ALT  16  /  AlkPhos  72  11-12      Urinalysis Basic - ( 12 Nov 2024 09:50 )    Color: x / Appearance: x / SG: x / pH: x  Gluc: 200 mg/dL / Ketone: x  / Bili: x / Urobili: x   Blood: x / Protein: x / Nitrite: x   Leuk Esterase: x / RBC: x / WBC x   Sq Epi: x / Non Sq Epi: x / Bacteria: x        MICROBIOLOGY:  v  Clean Catch Clean Catch (Midstream)  10-30-24   >100,000 CFU/ml Escherichia coli  --  Escherichia coli      .Blood BLOOD  10-30-24   No growth at 5 days  --  --      .Blood BLOOD  10-30-24   No growth at 5 days  --  --                RADIOLOGY:  Imaging below independently reviewed.  < from: CT Angio Chest TAVR BRENDON w/wo IV Cont (11.01.24 @ 15:13) >    ACC: [id number] EXAM:  CT ANGIO AB PEL TAVR BRENDON WAWIC   ORDERED BY: THUY LAWSON     ACC: [id number] EXAM:  CT ANGIO CHEST TAVR BRENDON WAWIC   ORDERED BY: THUY LAWSON     PROCEDURE DATE:  11/01/2024          INTERPRETATION:  Clinical: Severe aortic stenosis, preoperative   evaluation for TAVR.    PROCEDURE:  CT of the Chest and Abdomen and Pelvis was performed.  Sagittal and coronal reformats were performed.    FINDINGS:  CHEST:  LUNGS AND LARGE AIRWAYS: Patent central airways. Bilateral lower lobe   compressive atelectasis. Patchy areas of groundglass airspace disease in   the bilateral upper lobes new from study of 10/28/2024 may represent   early pulmonary edema, correlate for infectious or inflammatory etiology.  PLEURA: Small to mild bilateral pleural effusions new as compared to most   recent exam..  VESSELS: Aortic calcifications. Coronary artery calcifications.  HEART: Left atrial enlargement. No pericardial effusion.  MEDIASTINUM AND TIARA: No lymphadenopathy.  CHEST WALL AND LOWER NECK: Within normal limits.    ABDOMEN:  LIVER: Hepatomegaly with diffuse hepatic steatosis. No discrete focal   masses.  BILE DUCTS: Normal caliber.  GALLBLADDER: Cholelithiasis.  SPLEEN: Within normal limits.  PANCREAS: Within normal limits.  ADRENALS: Indeterminate 2.4 cm left adrenal lesion.  KIDNEYS/URETERS: Kidneys enhance bilaterally and symmetrically without   hydronephrosis. Exophytic bilateral renal cysts. Indeterminate   hyperattenuating right medial midpole renal lesion measuring 0.8 cm. The   ureters are unremarkable.  BLADDER: Within normal limits.  PELVIS: Prior hysterectomy. No pelvic mass pelvic adenopathy or pelvic   free fluid.    BOWEL: Bowel is of normal course and caliber without evidence of   obstruction. Appendix not visualized however no secondary signs of acute   appendicitis. Colonic diverticulosis without diverticulitis.  PERITONEUM/RETROPERITONEUM: Within normal limits.  VESSELS: Atherosclerotic changes.  LYMPH NODES: No lymphadenopathy.  ABDOMINAL WALL: Mild weakness of the anterior abdominal wall.  BONES: Degenerative changes.    IMPRESSION:  Interval development of small to mild bilateral pleural effusions with   bibasilar atelectasis as compared to 10/28/2024. Additional patchy   groundglass airspace disease in the bilateral upper lobes new from prior   exam may represent early pulmonary edema however correlate to exclude   infectious or inflammatory etiology.    Indeterminate left adrenal lesion. Indeterminate subcentimeter right   renal lesion.    Additional findings as described above.    --- End of Report ---            GLENN LACEY MD; Attending Radiologist  This document has been electronically signed. Nov 1 2024  6:30PM    < end of copied text >

## 2024-11-12 NOTE — PROGRESS NOTE ADULT - PROBLEM SELECTOR PLAN 1
Structural Team following   Continue with ASA 81 mg PO Daily.   Continue with Lipitor 20 mg PO HS   Continue Neurontin 800 mg PO TID    TTE in AM   Daily EKG   Increase activity as tolerated.   Encourage Chest PT / Pulmonary toileting and Incentive spirometry every 1 hour x 10 while awake.   Continue with Protonix 40 mg PO daily and Lovenox 40 mg SQ daily for PUD and DVT prophylaxis.   D/C plan home once medically cleared   Plan of care discussed with attending Structural Team following   Continue with ASA 81 mg PO Daily.   Continue with Lipitor 20 mg PO HS   Continue Neurontin 800 mg PO TID    TTE in AM   Daily EKG   Increase activity as tolerated.   Encourage Chest PT / Pulmonary toileting and Incentive spirometry every 1 hour x 10 while awake.   Continue with Protonix 40 mg PO daily and Lovenox 40 mg SQ daily for PUD and DVT prophylaxis.   D/C plan home once medically cleared   Plan of care discussed with CTS and Structural Heart Attendings

## 2024-11-12 NOTE — PROGRESS NOTE ADULT - ASSESSMENT
74-yo F w/ PMH of bullous pemphigoid on IVIG, DM, and HTN, admitted for syncopal workup, found to have new onset heart failure w/ AS and RLE DVT. UCx 10/30 w/ E coli, s/p aztreonam. Scheduled for TAVR 11/11. ID was consulted for antibiotic prophylaxis i/s/o reported allergic reaction to antibiotics.    Reported rash to penicillin. Had "Red Man Syndrome" with vancomycin in the past, however tolerated with slow infusion.    S/p TAVR 11/11. Per NP note, patient tolerated periop vancomycin without allergic reaction.    #CHF  #AS  #Leukocytosis  #DVT  #Antibiotic allergy    Recommendations:  - Reportedly patient received periop vancomycin. Last dose 750 mg IV overnight. Can provide another dose of 1.5 g IV x1 today.  - F/u WBC and VS    Plan discussed with primary team ACP.  Thank you for this consult. Inpatient ID consult team will discontinue active follow-up for this patient.    Further changes in lab values, imaging studies, or clinical status will not be known to ID inpatient consultants unless specifically communicated by primary team.    Kenn Green MD, PhD  Attending Physician  Division of Infectious Diseases  Department of Medicine    Please contact through MS Teams message.  Office: [phone] (after 5 PM or weekend)

## 2024-11-13 LAB
ANION GAP SERPL CALC-SCNC: 11 MMOL/L — SIGNIFICANT CHANGE UP (ref 5–17)
BUN SERPL-MCNC: 40 MG/DL — HIGH (ref 7–23)
CALCIUM SERPL-MCNC: 8.5 MG/DL — SIGNIFICANT CHANGE UP (ref 8.4–10.5)
CHLORIDE SERPL-SCNC: 100 MMOL/L — SIGNIFICANT CHANGE UP (ref 96–108)
CO2 SERPL-SCNC: 26 MMOL/L — SIGNIFICANT CHANGE UP (ref 22–31)
CREAT SERPL-MCNC: 0.85 MG/DL — SIGNIFICANT CHANGE UP (ref 0.5–1.3)
EGFR: 73 ML/MIN/1.73M2 — SIGNIFICANT CHANGE UP
GLUCOSE BLDC GLUCOMTR-MCNC: 188 MG/DL — HIGH (ref 70–99)
GLUCOSE BLDC GLUCOMTR-MCNC: 203 MG/DL — HIGH (ref 70–99)
GLUCOSE BLDC GLUCOMTR-MCNC: 213 MG/DL — HIGH (ref 70–99)
GLUCOSE BLDC GLUCOMTR-MCNC: 215 MG/DL — HIGH (ref 70–99)
GLUCOSE SERPL-MCNC: 145 MG/DL — HIGH (ref 70–99)
HCT VFR BLD CALC: 25.1 % — LOW (ref 34.5–45)
HGB BLD-MCNC: 7.3 G/DL — LOW (ref 11.5–15.5)
MCHC RBC-ENTMCNC: 26.3 PG — LOW (ref 27–34)
MCHC RBC-ENTMCNC: 29.1 G/DL — LOW (ref 32–36)
MCV RBC AUTO: 90.3 FL — SIGNIFICANT CHANGE UP (ref 80–100)
NRBC # BLD: 0 /100 WBCS — SIGNIFICANT CHANGE UP (ref 0–0)
PLATELET # BLD AUTO: 373 K/UL — SIGNIFICANT CHANGE UP (ref 150–400)
POTASSIUM SERPL-MCNC: 5 MMOL/L — SIGNIFICANT CHANGE UP (ref 3.5–5.3)
POTASSIUM SERPL-SCNC: 5 MMOL/L — SIGNIFICANT CHANGE UP (ref 3.5–5.3)
RBC # BLD: 2.78 M/UL — LOW (ref 3.8–5.2)
RBC # FLD: 20.5 % — HIGH (ref 10.3–14.5)
SODIUM SERPL-SCNC: 137 MMOL/L — SIGNIFICANT CHANGE UP (ref 135–145)
T3 SERPL-MCNC: 77 NG/DL — LOW (ref 80–200)
T4 AB SER-ACNC: 13.6 UG/DL — HIGH (ref 4.6–12)
T4 FREE SERPL-MCNC: 3 NG/DL — HIGH (ref 0.9–1.8)
TSH SERPL-MCNC: 0.15 UIU/ML — LOW (ref 0.27–4.2)
WBC # BLD: 10.5 K/UL — SIGNIFICANT CHANGE UP (ref 3.8–10.5)
WBC # FLD AUTO: 10.5 K/UL — SIGNIFICANT CHANGE UP (ref 3.8–10.5)

## 2024-11-13 PROCEDURE — 93010 ELECTROCARDIOGRAM REPORT: CPT

## 2024-11-13 PROCEDURE — 99232 SBSQ HOSP IP/OBS MODERATE 35: CPT

## 2024-11-13 RX ORDER — ACETAMINOPHEN 500 MG
650 TABLET ORAL EVERY 6 HOURS
Refills: 0 | Status: DISCONTINUED | OUTPATIENT
Start: 2024-11-13 | End: 2024-11-14

## 2024-11-13 RX ORDER — NYSTATIN 100000 U/G
1 POWDER TOPICAL EVERY 12 HOURS
Refills: 0 | Status: DISCONTINUED | OUTPATIENT
Start: 2024-11-13 | End: 2024-11-14

## 2024-11-13 RX ORDER — APIXABAN 5 MG/1
5 TABLET, FILM COATED ORAL
Refills: 0 | Status: DISCONTINUED | OUTPATIENT
Start: 2024-11-13 | End: 2024-11-14

## 2024-11-13 RX ADMIN — Medication 10 MILLIGRAM(S): at 05:10

## 2024-11-13 RX ADMIN — GABAPENTIN 800 MILLIGRAM(S): 300 CAPSULE ORAL at 23:18

## 2024-11-13 RX ADMIN — GABAPENTIN 800 MILLIGRAM(S): 300 CAPSULE ORAL at 17:19

## 2024-11-13 RX ADMIN — APIXABAN 5 MILLIGRAM(S): 5 TABLET, FILM COATED ORAL at 07:54

## 2024-11-13 RX ADMIN — Medication 650 MILLIGRAM(S): at 19:30

## 2024-11-13 RX ADMIN — Medication 20 MILLIGRAM(S): at 21:56

## 2024-11-13 RX ADMIN — GABAPENTIN 800 MILLIGRAM(S): 300 CAPSULE ORAL at 05:10

## 2024-11-13 RX ADMIN — Medication 650 MILLIGRAM(S): at 20:10

## 2024-11-13 RX ADMIN — Medication 81 MILLIGRAM(S): at 10:31

## 2024-11-13 RX ADMIN — Medication 4: at 17:18

## 2024-11-13 RX ADMIN — Medication 4: at 07:54

## 2024-11-13 RX ADMIN — APIXABAN 5 MILLIGRAM(S): 5 TABLET, FILM COATED ORAL at 17:19

## 2024-11-13 RX ADMIN — Medication 300 MICROGRAM(S): at 05:10

## 2024-11-13 RX ADMIN — CHLORHEXIDINE GLUCONATE 1 APPLICATION(S): 40 SOLUTION TOPICAL at 10:29

## 2024-11-13 RX ADMIN — Medication 4: at 12:19

## 2024-11-13 RX ADMIN — GABAPENTIN 800 MILLIGRAM(S): 300 CAPSULE ORAL at 12:19

## 2024-11-13 RX ADMIN — Medication 2 TABLET(S): at 21:56

## 2024-11-13 RX ADMIN — Medication 2: at 21:55

## 2024-11-13 NOTE — PROGRESS NOTE ADULT - PROBLEM SELECTOR PROBLEM 1
S/p TAVR (transcatheter aortic valve replacement), bioprosthetic
Acute systolic congestive heart failure
S/p TAVR (transcatheter aortic valve replacement), bioprosthetic
Acute systolic congestive heart failure
S/p TAVR (transcatheter aortic valve replacement), bioprosthetic
Symptomatic anemia
Acute systolic congestive heart failure

## 2024-11-13 NOTE — PROGRESS NOTE ADULT - PROBLEM SELECTOR PLAN 1
Structural Team following   Continue with ASA 81 mg PO Daily.   Continue with Lipitor 20 mg PO HS   Continue Neurontin 800 mg PO TID    TTE in AM   Daily EKG   Increase activity as tolerated.   Encourage Chest PT / Pulmonary toileting and Incentive spirometry every 1 hour x 10 while awake.   Continue with Protonix 40 mg PO daily and Lovenox 40 mg SQ daily for PUD and DVT prophylaxis.   D/C plan home once medically cleared   Plan of care discussed with CTS and Structural Heart Attendings Structural Team following   Continue with ASA 81 mg PO Daily.   Continue with Lipitor 20 mg PO HS   Continue Neurontin 800 mg PO TID    TTE 11/12 neg AR/ neg effusion  bilateral groin sites stable   Daily EKG   Increase activity as tolerated.   Encourage Chest PT / Pulmonary toileting and Incentive spirometry every 1 hour x 10 while awake.   Continue with Protonix 40 mg PO daily and Lovenox 40 mg SQ daily for PUD and DVT prophylaxis.   D/C plan home once medically cleared   Plan of care discussed with CTS and Structural Heart Attendings

## 2024-11-13 NOTE — PROGRESS NOTE ADULT - SUBJECTIVE AND OBJECTIVE BOX
VITAL SIGNS    Telemetry:    Vital Signs Last 24 Hrs  T(C): 36.9 (11-13-24 @ 05:40), Max: 36.9 (11-13-24 @ 05:40)  T(F): 98.5 (11-13-24 @ 05:40), Max: 98.5 (11-13-24 @ 05:40)  HR: 91 (11-13-24 @ 05:40) (88 - 91)  BP: 106/65 (11-13-24 @ 05:40) (101/63 - 110/60)  RR: 18 (11-13-24 @ 05:40) (18 - 18)  SpO2: 96% (11-13-24 @ 05:40) (95% - 96%)            11-12 @ 07:01  -  11-13 @ 07:00  --------------------------------------------------------  IN: 2360 mL / OUT: 2800 mL / NET: -440 mL       Daily     Daily   Admit Wt: Drug Dosing Weight  Height (cm): 165.1 (11 Nov 2024 09:28)  Weight (kg): 133.2 (11 Nov 2024 09:28)  BMI (kg/m2): 48.9 (11 Nov 2024 09:28)  BSA (m2): 2.33 (11 Nov 2024 09:28)    Bilirubin Total: 0.6 mg/dL (11-12 @ 09:50)    CAPILLARY BLOOD GLUCOSE      POCT Blood Glucose.: 203 mg/dL (13 Nov 2024 07:38)  POCT Blood Glucose.: 208 mg/dL (12 Nov 2024 21:07)  POCT Blood Glucose.: 217 mg/dL (12 Nov 2024 17:22)  POCT Blood Glucose.: 245 mg/dL (12 Nov 2024 11:51)          LABS:    11-12 @ 07:01  -  11-13 @ 07:00  --------------------------------------------------------  IN: 2360 mL / OUT: 2800 mL / NET: -440 mL    cret                        7.3    10.50 )-----------( 373      ( 13 Nov 2024 06:22 )             25.1     11-13    137  |  100  |  40[H]  ----------------------------<  145[H]  5.0   |  26  |  0.85    Ca    8.5      13 Nov 2024 06:22    TPro  6.8  /  Alb  3.2[L]  /  TBili  0.6  /  DBili  x   /  AST  23  /  ALT  16  /  AlkPhos  72  11-12        apixaban 5 milliGRAM(s) Oral two times a day  aspirin enteric coated 81 milliGRAM(s) Oral daily  atorvastatin 20 milliGRAM(s) Oral at bedtime  chlorhexidine 2% Cloths 1 Application(s) Topical daily  dextrose 5%. 1000 milliLiter(s) IV Continuous <Continuous>  dextrose 5%. 1000 milliLiter(s) IV Continuous <Continuous>  dextrose 50% Injectable 25 Gram(s) IV Push once  dextrose 50% Injectable 12.5 Gram(s) IV Push once  dextrose 50% Injectable 25 Gram(s) IV Push once  dextrose Oral Gel 15 Gram(s) Oral once PRN  gabapentin 800 milliGRAM(s) Oral four times a day  glucagon  Injectable 1 milliGRAM(s) IntraMuscular once  insulin lispro (ADMELOG) corrective regimen sliding scale   SubCutaneous Before meals and at bedtime  levothyroxine 300 MICROGram(s) Oral <User Schedule>  levothyroxine 150 MICROGram(s) Oral <User Schedule>  polyethylene glycol 3350 17 Gram(s) Oral daily  predniSONE   Tablet 10 milliGRAM(s) Oral daily  senna 2 Tablet(s) Oral at bedtime      PHYSICAL EXAM    Subjective: "Hi.   Neurology: alert and oriented x 3, nonfocal, no gross deficits  CV : tele:  RSR  Sternal Wound :  CDI with dressing , Stable  Lungs: clear. RR easy, unlabored   Abdomen: soft, nontender, nondistended, positive bowel sounds, bowel movement   Neg N/V/D   :  pt voiding without difficulty   Extremities:   COVARRUBIAS; edema, neg calf tenderness.   PPP bilaterally      PW:  Chest tubes:                 VITAL SIGNS    Telemetry:  RSR    Vital Signs Last 24 Hrs  T(C): 36.9 (11-13-24 @ 05:40), Max: 36.9 (11-13-24 @ 05:40)  T(F): 98.5 (11-13-24 @ 05:40), Max: 98.5 (11-13-24 @ 05:40)  HR: 91 (11-13-24 @ 05:40) (88 - 91)  BP: 106/65 (11-13-24 @ 05:40) (101/63 - 110/60)  RR: 18 (11-13-24 @ 05:40) (18 - 18)  SpO2: 96% (11-13-24 @ 05:40) (95% - 96%)            11-12 @ 07:01  -  11-13 @ 07:00  --------------------------------------------------------  IN: 2360 mL / OUT: 2800 mL / NET: -440 mL       Daily     Daily   Admit Wt: Drug Dosing Weight  Height (cm): 165.1 (11 Nov 2024 09:28)  Weight (kg): 133.2 (11 Nov 2024 09:28)  BMI (kg/m2): 48.9 (11 Nov 2024 09:28)  BSA (m2): 2.33 (11 Nov 2024 09:28)    Bilirubin Total: 0.6 mg/dL (11-12 @ 09:50)    CAPILLARY BLOOD GLUCOSE      POCT Blood Glucose.: 203 mg/dL (13 Nov 2024 07:38)  POCT Blood Glucose.: 208 mg/dL (12 Nov 2024 21:07)  POCT Blood Glucose.: 217 mg/dL (12 Nov 2024 17:22)  POCT Blood Glucose.: 245 mg/dL (12 Nov 2024 11:51)          LABS:    11-12 @ 07:01  -  11-13 @ 07:00  --------------------------------------------------------  IN: 2360 mL / OUT: 2800 mL / NET: -440 mL    cret                        7.3    10.50 )-----------( 373      ( 13 Nov 2024 06:22 )             25.1     11-13    137  |  100  |  40[H]  ----------------------------<  145[H]  5.0   |  26  |  0.85    Ca    8.5      13 Nov 2024 06:22    TPro  6.8  /  Alb  3.2[L]  /  TBili  0.6  /  DBili  x   /  AST  23  /  ALT  16  /  AlkPhos  72  11-12        apixaban 5 milliGRAM(s) Oral two times a day  aspirin enteric coated 81 milliGRAM(s) Oral daily  atorvastatin 20 milliGRAM(s) Oral at bedtime  chlorhexidine 2% Cloths 1 Application(s) Topical daily  dextrose 5%. 1000 milliLiter(s) IV Continuous <Continuous>  dextrose 5%. 1000 milliLiter(s) IV Continuous <Continuous>  dextrose 50% Injectable 25 Gram(s) IV Push once  dextrose 50% Injectable 12.5 Gram(s) IV Push once  dextrose 50% Injectable 25 Gram(s) IV Push once  dextrose Oral Gel 15 Gram(s) Oral once PRN  gabapentin 800 milliGRAM(s) Oral four times a day  glucagon  Injectable 1 milliGRAM(s) IntraMuscular once  insulin lispro (ADMELOG) corrective regimen sliding scale   SubCutaneous Before meals and at bedtime  levothyroxine 300 MICROGram(s) Oral <User Schedule>  levothyroxine 150 MICROGram(s) Oral <User Schedule>  polyethylene glycol 3350 17 Gram(s) Oral daily  predniSONE   Tablet 10 milliGRAM(s) Oral daily  senna 2 Tablet(s) Oral at bedtime      PHYSICAL EXAM    Subjective: "I need to speak to my ."   Neurology: alert and oriented x 3, nonfocal, no gross deficits  CV : tele:  RSR   Lungs: clear. RR easy, unlabored   Abdomen: soft, nontender, nondistended, positive bowel sounds, + bowel movement   Neg N/V/D; obese abdomen   :  pt voiding without difficulty   Extremities:   COVARRUBIAS; trace LE edema, neg calf tenderness.   PPP bilaterally; bilateral groin sites cdi janet- neg hematoma/bleeding

## 2024-11-13 NOTE — ED PROVIDER NOTE - OBJECTIVE STATEMENT
Rx Refill Note  Requested Prescriptions     Pending Prescriptions Disp Refills    clopidogrel (PLAVIX) 75 MG tablet 90 tablet 3     Sig: Take 1 tablet by mouth Daily.      Last office visit with prescribing clinician: Dr. oMseley 9/24/24  Last telemedicine visit with prescribing clinician: Visit date not found   Next office visit with prescribing clinician: Dr. Moseley 3/27/25                        Would you like a call back once the refill request has been completed: [] Yes [] No    If the office needs to give you a call back, can they leave a voicemail: [] Yes [] No    Jalyn Crespo MA  11/13/24, 16:42 EST   SEE MDM

## 2024-11-13 NOTE — PROGRESS NOTE ADULT - SUBJECTIVE AND OBJECTIVE BOX
DATE OF SERVICE: 11-13-24 @ 15:23    Patient is a 74y old  Female who presents with a chief complaint of Symptomatic anemia, Syncope (13 Nov 2024 08:35)      INTERVAL HISTORY: Feels ok. Upset about possibly having to go to [facility].     REVIEW OF SYSTEMS:  CONSTITUTIONAL: No weakness  EYES/ENT: No visual changes;  No throat pain   NECK: No pain or stiffness  RESPIRATORY: No cough, wheezing; No shortness of breath  CARDIOVASCULAR: No chest pain or palpitations  GASTROINTESTINAL: No abdominal  pain. No nausea, vomiting, or hematemesis  GENITOURINARY: No dysuria, frequency or hematuria  NEUROLOGICAL: No stroke like symptoms  SKIN: No rashes    TELEMETRY Personally reviewed: SR with brief PAT  	  MEDICATIONS:        PHYSICAL EXAM:  T(C): 37.4 (11-13-24 @ 11:02), Max: 37.4 (11-13-24 @ 11:02)  HR: 90 (11-13-24 @ 11:02) (88 - 91)  BP: 125/69 (11-13-24 @ 11:02) (101/63 - 125/69)  RR: 18 (11-13-24 @ 11:02) (18 - 18)  SpO2: 96% (11-13-24 @ 11:02) (95% - 96%)  Wt(kg): --  I&O's Summary    12 Nov 2024 07:01  -  13 Nov 2024 07:00  --------------------------------------------------------  IN: 2360 mL / OUT: 2800 mL / NET: -440 mL    13 Nov 2024 07:01  -  13 Nov 2024 15:23  --------------------------------------------------------  IN: 720 mL / OUT: 1000 mL / NET: -280 mL          Appearance: In no distress	  HEENT:    PERRL, EOMI	  Cardiovascular:  S1 S2, No JVD  Respiratory: Lungs clear to auscultation	  Gastrointestinal:  Soft, Non-tender, + BS	  Vascularature:  No edema of LE  Psychiatric: Appropriate affect   Neuro: no acute focal deficits                               7.3    10.50 )-----------( 373      ( 13 Nov 2024 06:22 )             25.1     11-13    137  |  100  |  40[H]  ----------------------------<  145[H]  5.0   |  26  |  0.85    Ca    8.5      13 Nov 2024 06:22    TPro  6.8  /  Alb  3.2[L]  /  TBili  0.6  /  DBili  x   /  AST  23  /  ALT  16  /  AlkPhos  72  11-12        Labs personally reviewed      ASSESSMENT/PLAN: 	    74F w/Hx of chronic anemia, bullous pemphigoid on IVIG, HTN, HLD, DM, hypothyroidism, chronic back pain presents after syncopal episode. 1 minute of CPR was initiated in the field by EMS because there was concern of pulseness with unresponsiveness. Patient was hypotensive upon arrival and found to have a hgb of 5.1. Patient reports she syncopized after receiving IVIG. Reports she had been feeling lightheaded and SOB for a few days prior to this event. Has had bout of anemia in the past treated with iron supplementation, folate and B12. She denies any hematuria, melena, gingival bleeding. She reports significant improvement of symptoms since receiving 2u of pRBCs. Her only current complaint is acute on chronic back pain from her stretcher. Patient denies fever, chills, chest pain, SOB, headache, dizziness, abd pain, nausea, vomiting, constipation, dysuria.      Problem/Plan #1:  Problem: Syncope   - Trop 26 --> 28  - EKG ischemic but likely in setting of Hb of 5.1  - POCUS noted no pericardial effusion, globally reduced LV function   - Echo with reduced EF & severe aortic stenosis  - Replete Hgb > 7   - s/p cath 11/5 with mild disease only  - s/p TAVR 11/11  - Repeat TTE 11/12 with valve well seated    Problem/Plan #2:  Problem: Hypertension  - stable on sHF GDMT    Problem/Plan #3:  Problem: HLD  - c/w atorvastatin 20mg PO daily    Problem/Plan #4:  Problem: Cardiac Risk Stratification  - Symptomatic anemia  - Echo with reduced EF noted & severe aortic stenosis  - High risk for low risk nonelective EGD/Colonoscopy 2/2 systolic HF and severe aortic stenosis  - s/p egd/ colonoscopy today 11/4, no source of bleed identified    Problem/Plan #5:  Problem: Systolic HF  - TTE 10/30 with EF 25-30%  - Switched to Metoprolol tartrate 50mg BID due to episodes PSVT & WCT 11/1  - Entresto 24-26mg BID  - Spironolactone 25mg daily DC'd given hyerkalemia  - Repeat TTE with EF wnl, elevated filling pressures     Problem/Plan #6:  - Problem: Severe Aortic Stenosis  - Structural Dr. Greene consulted   - s/p TAVR 11/11    Problem/Plan #7:  - Problem: Acute right LE DVT  - heparin gtt as per primary team  - Plan for transition to Eliquis 5mg PO BID for DC      Joann Teague, MERRICK-NP   Adama Rollins DO [facility]  Cardiovascular Medicine  [address] [place] [address], [address]  Available through call or text on [employer]  Office: [phone]

## 2024-11-13 NOTE — PROGRESS NOTE ADULT - PROBLEM SELECTOR PROBLEM 2
Syncope
Anemia
Anemia
Severe aortic stenosis
Anemia
Deep vein thrombosis (DVT)
Fever
Deep vein thrombosis (DVT)

## 2024-11-13 NOTE — PROGRESS NOTE ADULT - NUTRITIONAL ASSESSMENT
This patient has been assessed with a concern for Malnutrition and has been determined to have a diagnosis/diagnoses of Morbid obesity (BMI > 40).    This patient is being managed with:   Diet Consistent Carbohydrate w/Evening Snack-  Supplement Feeding Modality:  Oral  Ensure Max Cans or Servings Per Day:  1       Frequency:  Daily  Entered: Nov 4 2024  1:12PM  
This patient has been assessed with a concern for Malnutrition and has been determined to have a diagnosis/diagnoses of Morbid obesity (BMI > 40).    This patient is being managed with:   Diet NPO after Midnight-     NPO Start Date: 03-Nov-2024   NPO Start Time: 23:59  Entered: Nov  3 2024  5:45PM    Diet Clear Liquid-  Entered: Nov  3 2024  7:58AM  
This patient has been assessed with a concern for Malnutrition and has been determined to have a diagnosis/diagnoses of Morbid obesity (BMI > 40).    This patient is being managed with:   Diet Consistent Carbohydrate w/Evening Snack-  Supplement Feeding Modality:  Oral  Ensure Max Cans or Servings Per Day:  1       Frequency:  Daily  Entered: Nov 4 2024  1:12PM  
This patient has been assessed with a concern for Malnutrition and has been determined to have a diagnosis/diagnoses of Morbid obesity (BMI > 40).    This patient is being managed with:   Diet Consistent Carbohydrate w/Evening Snack-  Supplement Feeding Modality:  Oral  Ensure Max Cans or Servings Per Day:  1       Frequency:  Daily  Entered: Oct 31 2024  4:59PM  
This patient has been assessed with a concern for Malnutrition and has been determined to have a diagnosis/diagnoses of Morbid obesity (BMI > 40).    This patient is being managed with:   Diet NPO-  Entered: Nov 11 2024  1:40PM  
This patient has been assessed with a concern for Malnutrition and has been determined to have a diagnosis/diagnoses of Morbid obesity (BMI > 40).    This patient is being managed with:   Diet Clear Liquid-  Entered: Nov  3 2024  7:58AM  
This patient has been assessed with a concern for Malnutrition and has been determined to have a diagnosis/diagnoses of Morbid obesity (BMI > 40).    This patient is being managed with:   Diet Consistent Carbohydrate w/Evening Snack-  Supplement Feeding Modality:  Oral  Ensure Max Cans or Servings Per Day:  1       Frequency:  Daily  Entered: Nov 4 2024  1:12PM  
This patient has been assessed with a concern for Malnutrition and has been determined to have a diagnosis/diagnoses of Morbid obesity (BMI > 40).    This patient is being managed with:   Diet Regular-  Consistent Carbohydrate {No Snacks} (CSTCHO)  DASH/TLC {Sodium & Cholesterol Restricted} (DASH)  No Concentrated Potassium  Entered: Nov 11 2024  6:41PM  
This patient has been assessed with a concern for Malnutrition and has been determined to have a diagnosis/diagnoses of Morbid obesity (BMI > 40).    This patient is being managed with:   Diet NPO-  Entered: Nov 11 2024  1:40PM  
This patient has been assessed with a concern for Malnutrition and has been determined to have a diagnosis/diagnoses of Morbid obesity (BMI > 40).    This patient is being managed with:   Diet Consistent Carbohydrate w/Evening Snack-  Supplement Feeding Modality:  Oral  Ensure Max Cans or Servings Per Day:  1       Frequency:  Daily  Entered: Nov 4 2024  1:12PM  
This patient has been assessed with a concern for Malnutrition and has been determined to have a diagnosis/diagnoses of Morbid obesity (BMI > 40).    This patient is being managed with:   Diet NPO after Midnight-     NPO Start Date: 10-Nov-2024   NPO Start Time: 23:59  Entered: Nov 10 2024  1:42PM    Diet NPO after Midnight-     NPO Start Date: 10-Nov-2024   NPO Start Time: 23:59  Except Medications     Special Instructions for Nursing:  Except Medications  Entered: Nov 10 2024  9:40AM    Diet Consistent Carbohydrate w/Evening Snack-  Supplement Feeding Modality:  Oral  Ensure Max Cans or Servings Per Day:  1       Frequency:  Daily  Entered: Nov 4 2024  1:12PM  
This patient has been assessed with a concern for Malnutrition and has been determined to have a diagnosis/diagnoses of Morbid obesity (BMI > 40).    This patient is being managed with:   Diet Consistent Carbohydrate w/Evening Snack-  Supplement Feeding Modality:  Oral  Ensure Max Cans or Servings Per Day:  1       Frequency:  Daily  Entered: Oct 31 2024  4:59PM

## 2024-11-13 NOTE — PROGRESS NOTE ADULT - ASSESSMENT
74F w/Hx of chronic anemia, bullous pemphigoid on IVIG, HTN, HLD, DM, hypothyroidism, chronic back pain who presented to [facility] 10/28 after syncopal event.  Found to have severe AS and symptomatic anemia.  Patient undergoing workup for TAVR. Hospital course c/b Acute lower extremity DVT, below the knee, involving the right gastrocnemius vein. urine culture on  10/30 positive for E.coli which was empirically treated with Aztreonam.     11/11 H&H 7/24 --> Transfused with 1 unit PRBC.   11/11 s/p Transfemoral TAVR General anesthesia 26mm Evolut Fx+ with pre and post dilation.   Post op Course:  Recovered in PACU --> Post op Delirium now resolved.  Hyperkalemia --> serum K+ 5.6; repeat 6.4.  Discussed with structural team.  Will repeat K+ now.  Transferred to 2 [facility] Telemetry floor.  Daily EKG.  TTE in AM.   11/12 K 5.1 this AM. Rpt US of LE today. Monitor H/H. Keep active T&S. EKG and TTE reviewed by Dr. Greene. CM following case regarding dispo.  Addendum: Notified by ID Dr. Green per structural NP note, patient tolerated Vanco preop. Per ID Dr. Green, patient to receive additional Vanco 1.5g x1 dose now as post-op ppx. RN aware to administer slowly and monitor for any reaction. 74F w/Hx of chronic anemia, bullous pemphigoid on IVIG, HTN, HLD, DM, hypothyroidism, chronic back pain who presented to [facility] 10/28 after syncopal event.  Found to have severe AS and symptomatic anemia.  Patient undergoing workup for TAVR. Hospital course c/b Acute lower extremity DVT, below the knee, involving the right gastrocnemius vein. urine culture on  10/30 positive for E.coli which was empirically treated with Aztreonam.     11/11 H&H 7/24 --> Transfused with 1 unit PRBC.   11/11 s/p Transfemoral TAVR General anesthesia 26mm Evolut Fx+ with pre and post dilation.   Post op Course:  Recovered in PACU --> Post op Delirium now resolved.  Hyperkalemia --> serum K+ 5.6; repeat 6.4.  Discussed with structural team.  Will repeat K+ now.  [facility]red to 2 [facility] floor.  Daily EKG.  TTE in AM.   11/12 K 5.1 this AM. Rpt US of LE today. Monitor H/H. Keep active T&S. EKG and TTE reviewed by Dr. Greene. CM following case regarding dispo.  Addendum: Notified by ID Dr. Green per structural NP note, patient tolerated Vanco preop. Per ID Dr. Green, patient to receive additional Vanco 1.5g x1 dose now as post-op ppx. RN aware to administer slowly and monitor for any reaction.  11/13 VSS; RSR ; bilateral groin sites stable; ekg qd; postop echo done neg AR/ neg effusion; eliquis 5 bid started for dvt  discharge planing- home pt vs rehab - pt undecided

## 2024-11-13 NOTE — PROGRESS NOTE ADULT - PROBLEM SELECTOR PLAN 3
Resolved. K 5.1 this AM  Potassium restriction Diet Resolved. K 5.0 this AM  Potassium restriction Diet

## 2024-11-14 ENCOUNTER — TRANSCRIPTION ENCOUNTER (OUTPATIENT)
Age: 74
End: 2024-11-14

## 2024-11-14 VITALS
DIASTOLIC BLOOD PRESSURE: 65 MMHG | RESPIRATION RATE: 18 BRPM | TEMPERATURE: 98 F | OXYGEN SATURATION: 97 % | HEART RATE: 82 BPM | SYSTOLIC BLOOD PRESSURE: 125 MMHG

## 2024-11-14 LAB
ANION GAP SERPL CALC-SCNC: 12 MMOL/L — SIGNIFICANT CHANGE UP (ref 5–17)
BUN SERPL-MCNC: 37 MG/DL — HIGH (ref 7–23)
CALCIUM SERPL-MCNC: 8.7 MG/DL — SIGNIFICANT CHANGE UP (ref 8.4–10.5)
CHLORIDE SERPL-SCNC: 100 MMOL/L — SIGNIFICANT CHANGE UP (ref 96–108)
CO2 SERPL-SCNC: 25 MMOL/L — SIGNIFICANT CHANGE UP (ref 22–31)
CREAT SERPL-MCNC: 0.76 MG/DL — SIGNIFICANT CHANGE UP (ref 0.5–1.3)
EGFR: 83 ML/MIN/1.73M2 — SIGNIFICANT CHANGE UP
GLUCOSE BLDC GLUCOMTR-MCNC: 174 MG/DL — HIGH (ref 70–99)
GLUCOSE SERPL-MCNC: 146 MG/DL — HIGH (ref 70–99)
HCT VFR BLD CALC: 24.7 % — LOW (ref 34.5–45)
HGB BLD-MCNC: 7.1 G/DL — LOW (ref 11.5–15.5)
MAGNESIUM SERPL-MCNC: 2.1 MG/DL — SIGNIFICANT CHANGE UP (ref 1.6–2.6)
MCHC RBC-ENTMCNC: 26.2 PG — LOW (ref 27–34)
MCHC RBC-ENTMCNC: 28.7 G/DL — LOW (ref 32–36)
MCV RBC AUTO: 91.1 FL — SIGNIFICANT CHANGE UP (ref 80–100)
NRBC # BLD: 0 /100 WBCS — SIGNIFICANT CHANGE UP (ref 0–0)
PHOSPHATE SERPL-MCNC: 3.6 MG/DL — SIGNIFICANT CHANGE UP (ref 2.5–4.5)
PLATELET # BLD AUTO: 350 K/UL — SIGNIFICANT CHANGE UP (ref 150–400)
POTASSIUM SERPL-MCNC: 4.2 MMOL/L — SIGNIFICANT CHANGE UP (ref 3.5–5.3)
POTASSIUM SERPL-SCNC: 4.2 MMOL/L — SIGNIFICANT CHANGE UP (ref 3.5–5.3)
RBC # BLD: 2.71 M/UL — LOW (ref 3.8–5.2)
RBC # FLD: 20.3 % — HIGH (ref 10.3–14.5)
SODIUM SERPL-SCNC: 137 MMOL/L — SIGNIFICANT CHANGE UP (ref 135–145)
WBC # BLD: 7.12 K/UL — SIGNIFICANT CHANGE UP (ref 3.8–10.5)
WBC # FLD AUTO: 7.12 K/UL — SIGNIFICANT CHANGE UP (ref 3.8–10.5)

## 2024-11-14 PROCEDURE — 93010 ELECTROCARDIOGRAM REPORT: CPT

## 2024-11-14 RX ORDER — OXYCODONE HYDROCHLORIDE 30 MG/1
5 TABLET ORAL ONCE
Refills: 0 | Status: DISCONTINUED | OUTPATIENT
Start: 2024-11-14 | End: 2024-11-14

## 2024-11-14 RX ORDER — NYSTATIN 100000 U/G
1 POWDER TOPICAL THREE TIMES A DAY
Refills: 0 | Status: DISCONTINUED | OUTPATIENT
Start: 2024-11-14 | End: 2024-11-14

## 2024-11-14 RX ORDER — ASPIRIN/MAG CARB/ALUMINUM AMIN 325 MG
1 TABLET ORAL
Qty: 0 | Refills: 0 | DISCHARGE
Start: 2024-11-14

## 2024-11-14 RX ORDER — SENNA 187 MG
2 TABLET ORAL
Qty: 0 | Refills: 0 | DISCHARGE
Start: 2024-11-14

## 2024-11-14 RX ORDER — GABAPENTIN 300 MG/1
1 CAPSULE ORAL
Qty: 0 | Refills: 0 | DISCHARGE
Start: 2024-11-14

## 2024-11-14 RX ORDER — NYSTATIN 100000 U/G
1 POWDER TOPICAL
Qty: 0 | Refills: 0 | DISCHARGE
Start: 2024-11-14

## 2024-11-14 RX ORDER — APIXABAN 5 MG/1
1 TABLET, FILM COATED ORAL
Qty: 0 | Refills: 0 | DISCHARGE
Start: 2024-11-14

## 2024-11-14 RX ADMIN — APIXABAN 5 MILLIGRAM(S): 5 TABLET, FILM COATED ORAL at 05:47

## 2024-11-14 RX ADMIN — Medication 650 MILLIGRAM(S): at 01:05

## 2024-11-14 RX ADMIN — CHLORHEXIDINE GLUCONATE 1 APPLICATION(S): 40 SOLUTION TOPICAL at 12:43

## 2024-11-14 RX ADMIN — GABAPENTIN 800 MILLIGRAM(S): 300 CAPSULE ORAL at 05:48

## 2024-11-14 RX ADMIN — GABAPENTIN 800 MILLIGRAM(S): 300 CAPSULE ORAL at 12:46

## 2024-11-14 RX ADMIN — Medication 10 MILLIGRAM(S): at 05:47

## 2024-11-14 RX ADMIN — OXYCODONE HYDROCHLORIDE 5 MILLIGRAM(S): 30 TABLET ORAL at 03:55

## 2024-11-14 RX ADMIN — NYSTATIN 1 APPLICATION(S): 100000 POWDER TOPICAL at 05:49

## 2024-11-14 RX ADMIN — Medication 300 MICROGRAM(S): at 05:47

## 2024-11-14 RX ADMIN — Medication 81 MILLIGRAM(S): at 12:46

## 2024-11-14 RX ADMIN — Medication 2: at 07:52

## 2024-11-14 RX ADMIN — OXYCODONE HYDROCHLORIDE 5 MILLIGRAM(S): 30 TABLET ORAL at 15:27

## 2024-11-14 RX ADMIN — Medication 650 MILLIGRAM(S): at 01:50

## 2024-11-14 RX ADMIN — OXYCODONE HYDROCHLORIDE 5 MILLIGRAM(S): 30 TABLET ORAL at 03:14

## 2024-11-14 NOTE — PROGRESS NOTE ADULT - NS ATTEND OPT1 GEN_ALL_CORE
I independently performed the documented:
I attest my time as attending is greater than 50% of the total combined time spent on qualifying patient care activities by the PA/NP and attending.
I independently performed the documented:

## 2024-11-14 NOTE — DISCHARGE NOTE NURSING/CASE MANAGEMENT/SOCIAL WORK - NSDCPEFALRISK_GEN_ALL_CORE
For information on Fall & Injury Prevention, visit: [email or web address] OR  [email or web address] OR  https://www.cdc.gov/steadi/patient.html

## 2024-11-14 NOTE — PROGRESS NOTE ADULT - NS ATTEND OPT1A GEN_ALL_CORE
History/Exam/Medical decision making

## 2024-11-14 NOTE — DISCHARGE NOTE PROVIDER - NSDCPNSUBOBJ_GEN_ALL_CORE
Patient is doing well. Plan for discharge to [facility]    ICU Vital Signs Last 24 Hrs  T(C): 36.8 (14 Nov 2024 04:32), Max: 37.4 (13 Nov 2024 11:02)  T(F): 98.3 (14 Nov 2024 04:32), Max: 99.3 (13 Nov 2024 11:02)  HR: 79 (14 Nov 2024 04:32) (79 - 90)  BP: 124/65 (14 Nov 2024 04:32) (124/65 - 125/69)  BP(mean): --  ABP: --  ABP(mean): --  RR: 18 (14 Nov 2024 04:32) (18 - 18)  SpO2: 96% (14 Nov 2024 04:32) (96% - 96%)    O2 Parameters below as of 14 Nov 2024 04:32  Patient On (Oxygen Delivery Method): room air                            7.1    7.12  )-----------( 350      ( 14 Nov 2024 06:23 )             24.7         11-14    137  |  100  |  37[H]  ----------------------------<  146[H]  4.2   |  25  |  0.76    Ca    8.7      14 Nov 2024 06:26  Phos  3.6     11-14  Mg     2.1     11-14    General: WN/WD NAD  Neurology: A&Ox3, nonfocal, COVARRUBIAS x 4  Head:  Normocephalic, atraumatic  ENT:  Mucosa moist, no ulcerations  Neck:  Supple, no sinuses or palpable masses  Lymphatic:  No palpable cervical, supraclavicular, axillary or inguinal adenopathy  Respiratory: CTA B/L  CV: RRR, S1S2, no murmur  Abdominal: Soft, NT, ND no palpable mass  MSK: No edema, + peripheral pulses, FROM all 4 extremity  Incisions: intact, no erythema or drainage   Patient is doing well. Plan for discharge to [facility].     PATIENT IS CLEARED FROM DERMATOLOGY - RECOMMENDS NO IVIG INPATIENT AT THIS TIME- F/U OUTPATIENT    ICU Vital Signs Last 24 Hrs  T(C): 36.8 (14 Nov 2024 04:32), Max: 37.4 (13 Nov 2024 11:02)  T(F): 98.3 (14 Nov 2024 04:32), Max: 99.3 (13 Nov 2024 11:02)  HR: 79 (14 Nov 2024 04:32) (79 - 90)  BP: 124/65 (14 Nov 2024 04:32) (124/65 - 125/69)  BP(mean): --  ABP: --  ABP(mean): --  RR: 18 (14 Nov 2024 04:32) (18 - 18)  SpO2: 96% (14 Nov 2024 04:32) (96% - 96%)    O2 Parameters below as of 14 Nov 2024 04:32  Patient On (Oxygen Delivery Method): room air                            7.1    7.12  )-----------( 350      ( 14 Nov 2024 06:23 )             24.7         11-14    137  |  100  |  37[H]  ----------------------------<  146[H]  4.2   |  25  |  0.76    Ca    8.7      14 Nov 2024 06:26  Phos  3.6     11-14  Mg     2.1     11-14    General: WN/WD NAD  Neurology: A&Ox3, nonfocal, COVARRUBIAS x 4  Head:  Normocephalic, atraumatic  ENT:  Mucosa moist, no ulcerations  Neck:  Supple, no sinuses or palpable masses  Lymphatic:  No palpable cervical, supraclavicular, axillary or inguinal adenopathy  Respiratory: CTA B/L  CV: RRR, S1S2, no murmur  Abdominal: Soft, NT, ND no palpable mass  MSK: No edema, + peripheral pulses, FROM all 4 extremity  Incisions: intact, no erythema or drainage   Patient is doing well. Plan for discharge to [facility] rehab. Patient H&H is stable, patient has hx of chronic anemia -. F/U with PCP outpatient for hematology referral.     PATIENT IS CLEARED FROM DERMATOLOGY - RECOMMENDS NO IVIG INPATIENT AT THIS TIME- F/U OUTPATIENT    ICU Vital Signs Last 24 Hrs  T(C): 36.8 (14 Nov 2024 04:32), Max: 37.4 (13 Nov 2024 11:02)  T(F): 98.3 (14 Nov 2024 04:32), Max: 99.3 (13 Nov 2024 11:02)  HR: 79 (14 Nov 2024 04:32) (79 - 90)  BP: 124/65 (14 Nov 2024 04:32) (124/65 - 125/69)  BP(mean): --  ABP: --  ABP(mean): --  RR: 18 (14 Nov 2024 04:32) (18 - 18)  SpO2: 96% (14 Nov 2024 04:32) (96% - 96%)    O2 Parameters below as of 14 Nov 2024 04:32  Patient On (Oxygen Delivery Method): room air                            7.1    7.12  )-----------( 350      ( 14 Nov 2024 06:23 )             24.7         11-14    137  |  100  |  37[H]  ----------------------------<  146[H]  4.2   |  25  |  0.76    Ca    8.7      14 Nov 2024 06:26  Phos  3.6     11-14  Mg     2.1     11-14    General: WN/WD NAD  Neurology: A&Ox3, nonfocal, COVARRUBIAS x 4  Head:  Normocephalic, atraumatic  ENT:  Mucosa moist, no ulcerations  Neck:  Supple, no sinuses or palpable masses  Lymphatic:  No palpable cervical, supraclavicular, axillary or inguinal adenopathy  Respiratory: CTA B/L  CV: RRR, S1S2, no murmur  Abdominal: Soft, NT, ND no palpable mass  MSK: No edema, + peripheral pulses, FROM all 4 extremity  Incisions: intact, no erythema or drainage

## 2024-11-14 NOTE — PROGRESS NOTE ADULT - REASON FOR ADMISSION
Symptomatic anemia, Syncope

## 2024-11-14 NOTE — DISCHARGE NOTE PROVIDER - NSDCMRMEDTOKEN_GEN_ALL_CORE_FT
apixaban 5 mg oral tablet: 1 tab(s) orally 2 times a day  aspirin 81 mg oral delayed release tablet: 1 tab(s) orally once a day  atorvastatin 20 mg oral tablet: 1 tab(s) orally once a day (at bedtime)  gabapentin 800 mg oral tablet: 1 tab(s) orally 3 times a day  metFORMIN 1000 mg oral tablet: 1 tab(s) orally once a day (at bedtime)  nystatin 100,000 units/g topical powder: 1 Apply topically to affected area 3 times a day  predniSONE 10 mg oral tablet: 1 tab(s) orally once a day  senna leaf extract oral tablet: 2 tab(s) orally once a day (at bedtime)

## 2024-11-14 NOTE — DISCHARGE NOTE PROVIDER - HOSPITAL COURSE
74F w/Hx of chronic anemia, bullous pemphigoid on IVIG, HTN, HLD, DM, hypothyroidism, chronic back pain who presented to [facility] 10/28 after syncopal event.  Found to have severe AS and symptomatic anemia.  Patient undergoing workup for TAVR. Hospital course c/b Acute lower extremity DVT, below the knee, involving the right gastrocnemius vein. urine culture on  10/30 positive for E.coli which was empirically treated with Aztreonam.     11/11 H&H 7/24 --> Transfused with 1 unit PRBC.   11/11 s/p Transfemoral TAVR General anesthesia 26mm Evolut Fx+ with pre and post dilation.   Post op Course:  Recovered in PACU --> Post op Delirium now resolved.  Hyperkalemia --> serum K+ 5.6; repeat 6.4.  Discussed with structural team.  Will repeat K+ now.  [facility]  Daily EKG.  TTE in AM.   11/12 K 5.1 this AM. Rpt US of LE today. Monitor H/H. Keep active T&S. EKG and TTE reviewed by Dr. Greene. CM following case regarding dispo.  Addendum: Notified by ID Dr. Green per structural NP note, patient tolerated Vanco preop. Per ID Dr. Green, patient to receive additional Vanco 1.5g x1 dose now as post-op ppx. RN aware to administer slowly and monitor for any reaction.  11/13 VSS; RSR ; bilateral groin sites stable; ekg qd; postop echo done neg AR/ neg effusion; eliquis 5 bid started for dvt  discharge planing- home pt vs rehab - pt undecided   11/14 Plan for discharge to [facility] today

## 2024-11-14 NOTE — PROGRESS NOTE ADULT - NS ATTEND AMEND GEN_ALL_CORE FT
Patient care and plan discussed and reviewed with Advanced Care Provider. Plan as outlined above edited by me to reflect our discussion.
Events that transpired over the last 24 hours were reviewed.  The patients past medical/surgical/family/social history was gone over.      Patient is scheduled to undergo endoscopy/colonoscopy for anemia.  If patient is found not to have any acute bleeding diathesis she will be able to proceed with further cardiac catheterization/coronary angiogram.  Indications and details for the coronary angiogram reviewed.  Benefits and risks were discussed.  Risks include but not limited to infection, bleeding, arrhythmia, TIA/stroke, hemodynamic instability, vascular tree, need for urgent surgery and death.  The patient will need to be cleared by GI prior to proceeding.    Due to the patient's severe aortic valve stenosis/anemia leading to syncopal event plan for inpatient TAVR.  Indications and details for the procedure were reviewed.  Benefits and risks were discussed.  Risks include but are not limited to infection, bleeding, arrhythmia, TIA/stroke, hemodynamic instability, vascular injury, pericardial effusion/tamponade, need for urgent surgery and death.    Appreciate medical optimization.  Closely monitor for any signs of infectious process or acute flareup of the patient's underlying bullous pemphigoid.    Continue telemetry monitoring.    All questions and concerns of the patient were addressed.    Findings discussed with cardiology and cardiac surgical teams.    Time-based billing (NON-critical care).   35 minutes spent on total encounter. The necessity of the time spent during the encounter on this date of service was due to: education, assessment and coordination of care beyond time spent with housestaff.
Patient care and plan discussed and reviewed with Advanced Care Provider. Plan as outlined above edited by me to reflect our discussion.
Events that transpired over the last 24 hours were reviewed.  The patients past medical/surgical/family/social history was gone over.      Patient is scheduled to undergo endoscopy/colonoscopy for anemia.  If patient is found not to have any acute bleeding diathesis she will be able to proceed with further cardiac catheterization/coronary angiogram.  Indications and details for the coronary angiogram reviewed.  Benefits and risks were discussed.  Risks include but not limited to infection, bleeding, arrhythmia, TIA/stroke, hemodynamic instability, vascular tree, need for urgent surgery and death.  The patient will need to be cleared by GI prior to proceeding.    Due to the patient's severe aortic valve stenosis/anemia leading to syncopal event plan for inpatient TAVR.  Indications and details for the procedure were reviewed.  Benefits and risks were discussed.  Risks include but are not limited to infection, bleeding, arrhythmia, TIA/stroke, hemodynamic instability, vascular injury, pericardial effusion/tamponade, need for urgent surgery and death.    Appreciate medical optimization.  Closely monitor for any signs of infectious process or acute flareup of the patient's underlying bullous pemphigoid.    Continue telemetry monitoring.    All questions and concerns of the patient were addressed.    Findings discussed with cardiology and cardiac surgical teams.    Time-based billing (NON-critical care).   35 minutes spent on total encounter. The necessity of the time spent during the encounter on this date of service was due to: education, assessment and coordination of care beyond time spent with housestaff.
Patient care and plan discussed and reviewed with Advanced Care Provider. Plan as outlined above edited by me to reflect our discussion.

## 2024-11-14 NOTE — DISCHARGE NOTE NURSING/CASE MANAGEMENT/SOCIAL WORK - FINANCIAL ASSISTANCE
[facility] provides services at a reduced cost to those who are determined to be eligible through [facility]’s financial assistance program. Information regarding [facility]’s financial assistance program can be found by going to [email or web address] or by calling [phone].

## 2024-11-14 NOTE — DISCHARGE NOTE PROVIDER - CARE PROVIDER_API CALL
stretcher Rey Onofre  Thoracic and Cardiac Surgery  [address] [place] [id number]  Phone: [phone]  Fax: [phone]  Follow Up Time: 2 weeks

## 2024-11-14 NOTE — DISCHARGE NOTE PROVIDER - NSDCFUADDAPPT_GEN_ALL_CORE_FT
follow up with her dermatologist regarding IVIG treatments   F/U with Dr. Greene/ Dr. Onofre after discharge from rehab  follow up with her dermatologist regarding IVIG treatments   F/U with Dr. Greene/ Dr. Onofre after discharge from rehab   F/U with PCP/Hematologist.

## 2024-11-14 NOTE — PROGRESS NOTE ADULT - PROVIDER SPECIALTY LIST ADULT
Cardiology
Hospitalist
Structural Heart
Structural Heart
Cardiology
Gastroenterology
Hospitalist
Hospitalist
Infectious Disease
Structural Heart
CT Surgery
Cardiology
Gastroenterology
Hospitalist
Hospitalist
Gastroenterology
Gastroenterology
Hospitalist
CT Surgery
CT Surgery
Hospitalist
Internal Medicine
Hospitalist
Internal Medicine

## 2024-11-14 NOTE — DISCHARGE NOTE PROVIDER - NSDCCPCAREPLAN_GEN_ALL_CORE_FT
PRINCIPAL DISCHARGE DIAGNOSIS  Diagnosis: S/P TAVR (transcatheter aortic valve replacement)  Assessment and Plan of Treatment: 1. Daily Shower  2. Weight yourself daily and notify any weight gain greater than 2-3 pounds in 24 hours.  3. Carb consistent diet - low fat, low cholesterol, no added salt.  4. Cleanse Midsternal incision and leg incision daily while showering with warm water and mild soap, pat dry and maintain open to air.   5. Follow Cardiac Surgery Do's and Don'ts discharge instructions.   6. No driving until cleared by MD.   7. No heavy lifting nothing greater than 5 pounds until cleared by MD.   8. Call / Notify MD any fever greater than 101.0  9. Increase Activity as tolerated.   10. Follow up Dr. Onofre after discharge from rehab. Call to schedule appt.

## 2024-11-14 NOTE — DISCHARGE NOTE PROVIDER - DETAILS OF MALNUTRITION DIAGNOSIS/DIAGNOSES
This patient has been assessed with a concern for Malnutrition and was treated during this hospitalization for the following Nutrition diagnosis/diagnoses:     -  10/31/2024: Morbid obesity (BMI > 40)

## 2024-11-14 NOTE — DISCHARGE NOTE NURSING/CASE MANAGEMENT/SOCIAL WORK - NSDCFUADDAPPT_GEN_ALL_CORE_FT
follow up with her dermatologist regarding IVIG treatments   F/U with Dr. Greene/ Dr. Onofre after discharge from rehab   F/U with PCP/Hematologist.

## 2024-11-14 NOTE — DISCHARGE NOTE NURSING/CASE MANAGEMENT/SOCIAL WORK - PATIENT PORTAL LINK FT
You can access the FollowMyHealth Patient Portal offered by [facility] by registering at the following website: [email or web address]. By joining [facility]’s FollowMyHealth portal, you will also be able to view your health information using other applications (apps) compatible with our system.

## 2024-11-18 RX ORDER — LEVOTHYROXINE SODIUM 88 MCG
1 TABLET ORAL
Refills: 0 | DISCHARGE

## 2024-11-18 RX ORDER — METFORMIN HYDROCHLORIDE 500 MG/1
1 TABLET, EXTENDED RELEASE ORAL
Refills: 0 | DISCHARGE

## 2024-11-18 RX ORDER — DUPILUMAB 300 MG/2ML
0 INJECTION, SOLUTION SUBCUTANEOUS
Refills: 0 | DISCHARGE

## 2024-11-18 RX ORDER — GABAPENTIN 300 MG/1
1 CAPSULE ORAL
Refills: 0 | DISCHARGE

## 2024-11-20 ENCOUNTER — NON-APPOINTMENT (OUTPATIENT)
Age: 74
End: 2024-11-20

## 2024-11-25 ENCOUNTER — APPOINTMENT (OUTPATIENT)
Dept: RHEUMATOLOGY | Facility: CLINIC | Age: 74
End: 2024-11-25

## 2024-11-26 PROCEDURE — C1889: CPT

## 2024-11-26 PROCEDURE — 84132 ASSAY OF SERUM POTASSIUM: CPT

## 2024-11-26 PROCEDURE — 80048 BASIC METABOLIC PNL TOTAL CA: CPT

## 2024-11-26 PROCEDURE — 93454 CORONARY ARTERY ANGIO S&I: CPT

## 2024-11-26 PROCEDURE — 87040 BLOOD CULTURE FOR BACTERIA: CPT

## 2024-11-26 PROCEDURE — 80053 COMPREHEN METABOLIC PANEL: CPT

## 2024-11-26 PROCEDURE — 85610 PROTHROMBIN TIME: CPT

## 2024-11-26 PROCEDURE — 85025 COMPLETE CBC W/AUTO DIFF WBC: CPT

## 2024-11-26 PROCEDURE — C1725: CPT

## 2024-11-26 PROCEDURE — 80061 LIPID PANEL: CPT

## 2024-11-26 PROCEDURE — 96375 TX/PRO/DX INJ NEW DRUG ADDON: CPT

## 2024-11-26 PROCEDURE — 36569 INSJ PICC 5 YR+ W/O IMAGING: CPT

## 2024-11-26 PROCEDURE — 83880 ASSAY OF NATRIURETIC PEPTIDE: CPT

## 2024-11-26 PROCEDURE — C8929: CPT

## 2024-11-26 PROCEDURE — 87186 SC STD MICRODIL/AGAR DIL: CPT

## 2024-11-26 PROCEDURE — 97530 THERAPEUTIC ACTIVITIES: CPT

## 2024-11-26 PROCEDURE — 97110 THERAPEUTIC EXERCISES: CPT

## 2024-11-26 PROCEDURE — 81001 URINALYSIS AUTO W/SCOPE: CPT

## 2024-11-26 PROCEDURE — 83036 HEMOGLOBIN GLYCOSYLATED A1C: CPT

## 2024-11-26 PROCEDURE — 93005 ELECTROCARDIOGRAM TRACING: CPT

## 2024-11-26 PROCEDURE — 93308 TTE F-UP OR LMTD: CPT

## 2024-11-26 PROCEDURE — 82330 ASSAY OF CALCIUM: CPT

## 2024-11-26 PROCEDURE — 99291 CRITICAL CARE FIRST HOUR: CPT | Mod: 25

## 2024-11-26 PROCEDURE — 84480 ASSAY TRIIODOTHYRONINE (T3): CPT

## 2024-11-26 PROCEDURE — 87641 MR-STAPH DNA AMP PROBE: CPT

## 2024-11-26 PROCEDURE — 36415 COLL VENOUS BLD VENIPUNCTURE: CPT

## 2024-11-26 PROCEDURE — 76000 FLUOROSCOPY <1 HR PHYS/QHP: CPT

## 2024-11-26 PROCEDURE — P9016: CPT

## 2024-11-26 PROCEDURE — 83615 LACTATE (LD) (LDH) ENZYME: CPT

## 2024-11-26 PROCEDURE — 84443 ASSAY THYROID STIM HORMONE: CPT

## 2024-11-26 PROCEDURE — 82962 GLUCOSE BLOOD TEST: CPT

## 2024-11-26 PROCEDURE — 82947 ASSAY GLUCOSE BLOOD QUANT: CPT

## 2024-11-26 PROCEDURE — 84295 ASSAY OF SERUM SODIUM: CPT

## 2024-11-26 PROCEDURE — C1894: CPT

## 2024-11-26 PROCEDURE — 86900 BLOOD TYPING SEROLOGIC ABO: CPT

## 2024-11-26 PROCEDURE — 82272 OCCULT BLD FECES 1-3 TESTS: CPT

## 2024-11-26 PROCEDURE — 97162 PT EVAL MOD COMPLEX 30 MIN: CPT

## 2024-11-26 PROCEDURE — 96374 THER/PROPH/DIAG INJ IV PUSH: CPT

## 2024-11-26 PROCEDURE — 93970 EXTREMITY STUDY: CPT

## 2024-11-26 PROCEDURE — C1751: CPT

## 2024-11-26 PROCEDURE — 85045 AUTOMATED RETICULOCYTE COUNT: CPT

## 2024-11-26 PROCEDURE — 82550 ASSAY OF CK (CPK): CPT

## 2024-11-26 PROCEDURE — 86850 RBC ANTIBODY SCREEN: CPT

## 2024-11-26 PROCEDURE — 83605 ASSAY OF LACTIC ACID: CPT

## 2024-11-26 PROCEDURE — 83010 ASSAY OF HAPTOGLOBIN QUANT: CPT

## 2024-11-26 PROCEDURE — 81003 URINALYSIS AUTO W/O SCOPE: CPT

## 2024-11-26 PROCEDURE — 82553 CREATINE MB FRACTION: CPT

## 2024-11-26 PROCEDURE — 75574 CT ANGIO HRT W/3D IMAGE: CPT | Mod: MC

## 2024-11-26 PROCEDURE — 82728 ASSAY OF FERRITIN: CPT

## 2024-11-26 PROCEDURE — 83550 IRON BINDING TEST: CPT

## 2024-11-26 PROCEDURE — 84484 ASSAY OF TROPONIN QUANT: CPT

## 2024-11-26 PROCEDURE — 84100 ASSAY OF PHOSPHORUS: CPT

## 2024-11-26 PROCEDURE — 36600 WITHDRAWAL OF ARTERIAL BLOOD: CPT

## 2024-11-26 PROCEDURE — C1760: CPT

## 2024-11-26 PROCEDURE — 86923 COMPATIBILITY TEST ELECTRIC: CPT

## 2024-11-26 PROCEDURE — 82607 VITAMIN B-12: CPT

## 2024-11-26 PROCEDURE — 84436 ASSAY OF TOTAL THYROXINE: CPT

## 2024-11-26 PROCEDURE — 87640 STAPH A DNA AMP PROBE: CPT

## 2024-11-26 PROCEDURE — 82803 BLOOD GASES ANY COMBINATION: CPT

## 2024-11-26 PROCEDURE — C1887: CPT

## 2024-11-26 PROCEDURE — 83735 ASSAY OF MAGNESIUM: CPT

## 2024-11-26 PROCEDURE — C9399: CPT

## 2024-11-26 PROCEDURE — 93306 TTE W/DOPPLER COMPLETE: CPT

## 2024-11-26 PROCEDURE — 94010 BREATHING CAPACITY TEST: CPT

## 2024-11-26 PROCEDURE — P9040: CPT

## 2024-11-26 PROCEDURE — 85027 COMPLETE CBC AUTOMATED: CPT

## 2024-11-26 PROCEDURE — 82533 TOTAL CORTISOL: CPT

## 2024-11-26 PROCEDURE — 97116 GAIT TRAINING THERAPY: CPT

## 2024-11-26 PROCEDURE — 85014 HEMATOCRIT: CPT

## 2024-11-26 PROCEDURE — 71275 CT ANGIOGRAPHY CHEST: CPT | Mod: MC

## 2024-11-26 PROCEDURE — 83540 ASSAY OF IRON: CPT

## 2024-11-26 PROCEDURE — 84439 ASSAY OF FREE THYROXINE: CPT

## 2024-11-26 PROCEDURE — 36430 TRANSFUSION BLD/BLD COMPNT: CPT

## 2024-11-26 PROCEDURE — 85018 HEMOGLOBIN: CPT

## 2024-11-26 PROCEDURE — 86901 BLOOD TYPING SEROLOGIC RH(D): CPT

## 2024-11-26 PROCEDURE — 71045 X-RAY EXAM CHEST 1 VIEW: CPT

## 2024-11-26 PROCEDURE — C1769: CPT

## 2024-11-26 PROCEDURE — 87086 URINE CULTURE/COLONY COUNT: CPT

## 2024-11-26 PROCEDURE — 74174 CTA ABD&PLVS W/CONTRAST: CPT | Mod: MC

## 2024-11-26 PROCEDURE — 85730 THROMBOPLASTIN TIME PARTIAL: CPT

## 2024-11-26 PROCEDURE — C1726: CPT

## 2024-11-26 PROCEDURE — 82746 ASSAY OF FOLIC ACID SERUM: CPT

## 2024-11-26 PROCEDURE — 82435 ASSAY OF BLOOD CHLORIDE: CPT

## 2024-11-27 ENCOUNTER — APPOINTMENT (OUTPATIENT)
Dept: RHEUMATOLOGY | Facility: CLINIC | Age: 74
End: 2024-11-27

## 2024-12-02 ENCOUNTER — APPOINTMENT (OUTPATIENT)
Dept: RHEUMATOLOGY | Facility: CLINIC | Age: 74
End: 2024-12-02

## 2024-12-23 ENCOUNTER — APPOINTMENT (OUTPATIENT)
Dept: RHEUMATOLOGY | Facility: CLINIC | Age: 74
End: 2024-12-23

## 2024-12-26 ENCOUNTER — APPOINTMENT (OUTPATIENT)
Dept: RHEUMATOLOGY | Facility: CLINIC | Age: 74
End: 2024-12-26

## 2024-12-30 ENCOUNTER — APPOINTMENT (OUTPATIENT)
Dept: RHEUMATOLOGY | Facility: CLINIC | Age: 74
End: 2024-12-30

## 2025-01-21 ENCOUNTER — APPOINTMENT (OUTPATIENT)
Dept: RHEUMATOLOGY | Facility: CLINIC | Age: 75
End: 2025-01-21

## 2025-01-22 ENCOUNTER — APPOINTMENT (OUTPATIENT)
Dept: RHEUMATOLOGY | Facility: CLINIC | Age: 75
End: 2025-01-22

## 2025-01-22 PROBLEM — L12.0 BULLOUS PEMPHIGOID: Chronic | Status: ACTIVE | Noted: 2024-10-28

## 2025-01-22 PROBLEM — E78.5 HYPERLIPIDEMIA, UNSPECIFIED: Chronic | Status: ACTIVE | Noted: 2024-10-28

## 2025-01-22 PROBLEM — I10 ESSENTIAL (PRIMARY) HYPERTENSION: Chronic | Status: ACTIVE | Noted: 2024-10-28

## 2025-01-22 PROBLEM — E03.9 HYPOTHYROIDISM, UNSPECIFIED: Chronic | Status: ACTIVE | Noted: 2024-10-29

## 2025-01-24 ENCOUNTER — APPOINTMENT (OUTPATIENT)
Dept: RHEUMATOLOGY | Facility: CLINIC | Age: 75
End: 2025-01-24

## 2025-01-28 NOTE — ED PROVIDER NOTE - DISCUSSED CLINICAL AND RADIOLOGICAL FINDINGS WITH, MDM
Procedure Date  1/28/25     Impression  Overall Impression:   The cecum, ascending colon, transverse colon, descending colon, sigmoid colon and rectum appeared normal.     Recommendation  Repeat screening colonoscopy in 10 years         Indication  Screen for colon cancer      family/patient

## 2025-02-12 ENCOUNTER — APPOINTMENT (OUTPATIENT)
Dept: DERMATOLOGY | Facility: CLINIC | Age: 75
End: 2025-02-12
Payer: COMMERCIAL

## 2025-02-12 VITALS — BODY MASS INDEX: 45.93 KG/M2 | WEIGHT: 276 LBS

## 2025-02-12 DIAGNOSIS — L30.4 ERYTHEMA INTERTRIGO: ICD-10-CM

## 2025-02-12 DIAGNOSIS — L12.0 BULLOUS PEMPHIGOID: ICD-10-CM

## 2025-02-12 PROCEDURE — 99204 OFFICE O/P NEW MOD 45 MIN: CPT

## 2025-02-12 PROCEDURE — G2211 COMPLEX E/M VISIT ADD ON: CPT | Mod: NC

## 2025-02-12 PROCEDURE — 99214 OFFICE O/P EST MOD 30 MIN: CPT

## 2025-02-12 RX ORDER — TACROLIMUS 1 MG/G
0.1 OINTMENT TOPICAL
Qty: 1 | Refills: 1 | Status: ACTIVE | COMMUNITY
Start: 2025-02-12 | End: 1900-01-01

## 2025-02-12 RX ORDER — KETOCONAZOLE 20 MG/G
2 CREAM TOPICAL TWICE DAILY
Qty: 1 | Refills: 3 | Status: ACTIVE | COMMUNITY
Start: 2025-02-12 | End: 1900-01-01

## 2025-02-13 ENCOUNTER — RX RENEWAL (OUTPATIENT)
Age: 75
End: 2025-02-13

## 2025-03-12 NOTE — PROGRESS NOTE ADULT - SUBJECTIVE AND OBJECTIVE BOX
3/12/2025      Joao Raymundo  [facility]   [address]  [place] [address]        No notes on file      Sincerely,        Ihsan Ellington CNP    Electronically signed   [facility], Division of Infectious Diseases  MAYDA Braxton Y. Patel, S. [place]  [phone]  ([phone] - weekdays after 5pm and weekends)    Name: JAKE ZIMMERMAN  Age/Gender: 70y Female  MRN: [id number]    Interval History:  Patient seen this morning, sitting up in chair, feels tired and intermittent pain in legs.  Denies fever, chills, chest pain, dyspnea, cough, abd pain, n/v/d.  ROS reviewed, pertinent positives and negatives as above.   Notes reviewed. Afebrile. Will get IVIG today and PRBC transfusion.     Allergies: Ancef (Rash)  daptomycin (Vomiting)  Keflex (Unknown)  penicillin (Pruritus; Rash; Hives)    Objective:  Vitals:   T(F): 97.8 (12-30-20 @ 05:19), Max: 98.5 (12-29-20 @ 15:38)  HR: 80 (12-30-20 @ 05:19) (80 - 102)  BP: 98/63 (12-30-20 @ 05:19) (98/61 - 114/63)  RR: 18 (12-30-20 @ 05:19) (18 - 18)  SpO2: 99% (12-30-20 @ 05:19) (95% - 99%)    Physical Examination:  General: no acute distress, obese  HEENT: NC/AT, EOMI, anicteric, neck supple  Cardio: S1, S2 heard, RRR, murmur heard  Resp: CTA bilaterally, no rales/wheezes/rhonchi  Abd: soft, NT, ND, + BS  Neuro: AAOx3, no obvious focal deficits  Ext: no edema or cyanosis, moving extremities  Skin: RLE erythema improved, bullae/wounds with some scabbing, no drainage or bleeding  Psych: appropriate affect and mood for situation  Lines: PIV    Laboratory Studies:  CBC:                       6.0    8.43  )-----------( 338      ( 30 Dec 2020 07:36 )             20.3     CMP: 12-30    135  |  103  |  31<H>  ----------------------------<  186<H>  4.1   |  26  |  0.68    Ca    8.3<L>      30 Dec 2020 07:35    Vancomycin Level, Trough: 13.6: ug/mL (12.29.20 @ 16:43)  Vancomycin Level, Trough: 22.5  ug/mL (12.29.20 @ 04:51)  Vancomycin Level, Trough: 18.2 ug/mL (12-27-20 @ 17:02)  Vancomycin Level, Trough: 18.3 ug/mL (12-26-20 @ 06:52)    Microbiology:  12/24 - urine culture - 50,000 - 99,000 CFU/mL Escherichia coli (pansensitive)  12/23 - blood cultures - negative     Radiology: reviewed, no new imaging in last 48h    Medications:  MEDICATIONS  (STANDING):  aspirin enteric coated 81 milliGRAM(s) Oral daily  atorvastatin 20 milliGRAM(s) Oral at bedtime  cholecalciferol 2000 Unit(s) Oral two times a day  dextrose 40% Gel 15 Gram(s) Oral once  dextrose 5%. 1000 milliLiter(s) (50 mL/Hr) IV Continuous <Continuous>  dextrose 5%. 1000 milliLiter(s) (100 mL/Hr) IV Continuous <Continuous>  dextrose 50% Injectable 25 Gram(s) IV Push once  dextrose 50% Injectable 12.5 Gram(s) IV Push once  dextrose 50% Injectable 25 Gram(s) IV Push once  enoxaparin Injectable 40 milliGRAM(s) SubCutaneous every 12 hours  glucagon  Injectable 1 milliGRAM(s) IntraMuscular once  insulin glargine Injectable (LANTUS) 12 Unit(s) SubCutaneous at bedtime  insulin lispro (ADMELOG) corrective regimen sliding scale   SubCutaneous three times a day before meals  insulin lispro (ADMELOG) corrective regimen sliding scale   SubCutaneous at bedtime  levothyroxine 150 MICROGram(s) Oral daily  lisinopril 5 milliGRAM(s) Oral daily  multivitamin 1 Tablet(s) Oral daily  predniSONE   Tablet 40 milliGRAM(s) Oral daily  senna 2 Tablet(s) Oral at bedtime  sodium chloride 0.9%. 1000 milliLiter(s) (80 mL/Hr) IV Continuous <Continuous>  vancomycin  IVPB 1250 milliGRAM(s) IV Intermittent every 12 hours    Antimicrobials:  vancomycin  IVPB 1250 milliGRAM(s) IV Intermittent every 12 hours - started 12/24  s/p aztreonam

## 2025-04-04 PROBLEM — R01.1 MURMUR: Status: ACTIVE | Noted: 2025-04-04

## 2025-04-09 ENCOUNTER — APPOINTMENT (OUTPATIENT)
Dept: DERMATOLOGY | Facility: CLINIC | Age: 75
End: 2025-04-09
Payer: MEDICARE

## 2025-04-09 DIAGNOSIS — L12.0 BULLOUS PEMPHIGOID: ICD-10-CM

## 2025-04-09 PROCEDURE — G2211 COMPLEX E/M VISIT ADD ON: CPT

## 2025-04-09 PROCEDURE — 99214 OFFICE O/P EST MOD 30 MIN: CPT

## 2025-04-12 ENCOUNTER — NON-APPOINTMENT (OUTPATIENT)
Age: 75
End: 2025-04-12

## 2025-04-12 ENCOUNTER — OUTPATIENT (OUTPATIENT)
Dept: OUTPATIENT SERVICES | Facility: HOSPITAL | Age: 75
LOS: 1 days | End: 2025-04-12
Payer: MEDICARE

## 2025-04-12 ENCOUNTER — APPOINTMENT (OUTPATIENT)
Dept: ULTRASOUND IMAGING | Facility: IMAGING CENTER | Age: 75
End: 2025-04-12
Payer: MEDICARE

## 2025-04-12 DIAGNOSIS — Z98.89 OTHER SPECIFIED POSTPROCEDURAL STATES: Chronic | ICD-10-CM

## 2025-04-12 DIAGNOSIS — Z00.8 ENCOUNTER FOR OTHER GENERAL EXAMINATION: ICD-10-CM

## 2025-04-12 DIAGNOSIS — C54.1 MALIGNANT NEOPLASM OF ENDOMETRIUM: Chronic | ICD-10-CM

## 2025-04-12 DIAGNOSIS — Z90.710 ACQUIRED ABSENCE OF BOTH CERVIX AND UTERUS: Chronic | ICD-10-CM

## 2025-04-12 DIAGNOSIS — Z90.722 ACQUIRED ABSENCE OF OVARIES, BILATERAL: Chronic | ICD-10-CM

## 2025-04-12 PROCEDURE — 93970 EXTREMITY STUDY: CPT

## 2025-04-12 PROCEDURE — 93970 EXTREMITY STUDY: CPT | Mod: 26

## 2025-04-14 ENCOUNTER — APPOINTMENT (OUTPATIENT)
Dept: CARDIOLOGY | Facility: CLINIC | Age: 75
End: 2025-04-14
Payer: MEDICARE

## 2025-04-14 PROCEDURE — 93306 TTE W/DOPPLER COMPLETE: CPT

## 2025-05-22 NOTE — PHYSICAL THERAPY INITIAL EVALUATION ADULT - DISCHARGE PLANNER MADE AWARE
"  Individual Psychotherapy (PhD/LCSW)     DATE 5/21/25     Site: Met with pt virtually. Pt presented for appointment via home. Address confirmed.      Therapeutic Intervention: Met with patient. Outpatient - Insight oriented psychotherapy 45 min - CPT code 68505        Chief complaint/reason for encounter: Anxiety     Interval history and content of current session: Pt expressed frustration and anxiety around her on-going health issues. She stated she had a recent appointment with a neurologist who suggested that pt may have MS. In order to obtain this diagnosis and receive appropriate medication for associated pain, pt will need an MRI with contrast,which cannot be completed till September. Pt stated she is frustrated with her part providers who did not think " or care" to order this test and feels that if her symptoms were "taken seriously," and diagnosis could have already been reached. Pt also stated she has begun summer classes, and due to her symptoms she feels that school"is bobby stressful." While pt is excited about beginning her education she stated she is having doubts about her ability to "do well, I haven't been in school in a long time."     LCSW offered empathetic listening and psycho-education on pain and mental health symptoms. LCSW praised pt for her bravery related to education and safety planned with pt around school related stress. LCSW and pt discussed the hardships of self-advocacy and discussed the importance of self-care while waiting an official diagnosis. LCSWS engaged pt in the DBT skill of STOP to health and school related anxiety.      Treatment plan:  Target symptoms: anxiety and frustration  Why chosen therapy is appropriate versus another modality: relevant to diagnosis  Outcome monitoring methods: self-report, observation  Therapeutic intervention type: insight oriented psychotherapy, supportive psychotherapy, interactive psychotherapy     Risk parameters:  Patient reports no " suicidal ideation  Patient reports no homicidal ideation  Patient reports no self-injurious behavior  Patient reports no violent behavior     Verbal deficits: None     Patient's response to intervention:  The patient's response to intervention is accepting, motivated.     Progress toward goals and other mental status changes:  The patient's progress toward goals is fair .     Diagnosis:   Z68.43 (BMI) of 50.0-59.9 in adults   F33.1- Major Depressive Disorder, Moderate, recurring episode  F11.21-Opioid Use Disorder, Sever, in sustained remission     Plan:  individual psychotherapy and medication management by physician     Return to clinic: as scheduled     Length of Service (minutes): 45       Amaris Laura [facility]  Department of Surgery/ Bariatric Surgery [facility]                     yes

## 2025-07-07 ENCOUNTER — APPOINTMENT (OUTPATIENT)
Dept: DERMATOLOGY | Facility: CLINIC | Age: 75
End: 2025-07-07

## 2025-09-03 ENCOUNTER — APPOINTMENT (OUTPATIENT)
Dept: WOUND CARE | Facility: HOSPITAL | Age: 75
End: 2025-09-03

## 2025-09-03 ENCOUNTER — APPOINTMENT (OUTPATIENT)
Dept: WOUND CARE | Facility: CLINIC | Age: 75
End: 2025-09-03